# Patient Record
Sex: MALE | Race: WHITE | NOT HISPANIC OR LATINO
[De-identification: names, ages, dates, MRNs, and addresses within clinical notes are randomized per-mention and may not be internally consistent; named-entity substitution may affect disease eponyms.]

---

## 2021-01-01 ENCOUNTER — APPOINTMENT (OUTPATIENT)
Dept: CT IMAGING | Facility: CLINIC | Age: 76
End: 2021-01-01
Payer: MEDICARE

## 2021-01-01 ENCOUNTER — APPOINTMENT (OUTPATIENT)
Dept: RADIATION ONCOLOGY | Facility: CLINIC | Age: 76
End: 2021-01-01

## 2021-01-01 ENCOUNTER — NON-APPOINTMENT (OUTPATIENT)
Age: 76
End: 2021-01-01

## 2021-01-01 ENCOUNTER — APPOINTMENT (OUTPATIENT)
Dept: NUCLEAR MEDICINE | Facility: CLINIC | Age: 76
End: 2021-01-01
Payer: MEDICARE

## 2021-01-01 ENCOUNTER — RESULT REVIEW (OUTPATIENT)
Age: 76
End: 2021-01-01

## 2021-01-01 ENCOUNTER — EMERGENCY (EMERGENCY)
Facility: HOSPITAL | Age: 76
LOS: 1 days | Discharge: HOME | End: 2021-01-01
Attending: EMERGENCY MEDICINE | Admitting: EMERGENCY MEDICINE
Payer: MEDICARE

## 2021-01-01 ENCOUNTER — OUTPATIENT (OUTPATIENT)
Dept: OUTPATIENT SERVICES | Facility: HOSPITAL | Age: 76
LOS: 1 days | End: 2021-01-01
Payer: MEDICARE

## 2021-01-01 ENCOUNTER — APPOINTMENT (OUTPATIENT)
Dept: OTOLARYNGOLOGY | Facility: CLINIC | Age: 76
End: 2021-01-01
Payer: MEDICARE

## 2021-01-01 ENCOUNTER — APPOINTMENT (OUTPATIENT)
Dept: OTOLARYNGOLOGY | Facility: CLINIC | Age: 76
End: 2021-01-01

## 2021-01-01 ENCOUNTER — APPOINTMENT (OUTPATIENT)
Dept: ULTRASOUND IMAGING | Facility: IMAGING CENTER | Age: 76
End: 2021-01-01
Payer: MEDICARE

## 2021-01-01 VITALS
HEIGHT: 65 IN | WEIGHT: 149.91 LBS | TEMPERATURE: 97 F | OXYGEN SATURATION: 98 % | DIASTOLIC BLOOD PRESSURE: 74 MMHG | SYSTOLIC BLOOD PRESSURE: 132 MMHG | HEART RATE: 76 BPM | RESPIRATION RATE: 16 BRPM

## 2021-01-01 VITALS
HEART RATE: 81 BPM | OXYGEN SATURATION: 96 % | DIASTOLIC BLOOD PRESSURE: 84 MMHG | WEIGHT: 178.9 LBS | DIASTOLIC BLOOD PRESSURE: 92 MMHG | SYSTOLIC BLOOD PRESSURE: 148 MMHG | OXYGEN SATURATION: 95 % | RESPIRATION RATE: 17 BRPM | BODY MASS INDEX: 25.67 KG/M2 | RESPIRATION RATE: 15 BRPM | TEMPERATURE: 98 F | SYSTOLIC BLOOD PRESSURE: 142 MMHG | HEART RATE: 91 BPM | TEMPERATURE: 97.2 F | HEIGHT: 70 IN

## 2021-01-01 VITALS
WEIGHT: 160.06 LBS | DIASTOLIC BLOOD PRESSURE: 89 MMHG | HEIGHT: 64 IN | TEMPERATURE: 99 F | HEART RATE: 115 BPM | OXYGEN SATURATION: 94 % | RESPIRATION RATE: 15 BRPM | SYSTOLIC BLOOD PRESSURE: 149 MMHG

## 2021-01-01 VITALS
RESPIRATION RATE: 15 BRPM | DIASTOLIC BLOOD PRESSURE: 89 MMHG | SYSTOLIC BLOOD PRESSURE: 154 MMHG | OXYGEN SATURATION: 96 % | TEMPERATURE: 99 F | HEART RATE: 98 BPM

## 2021-01-01 VITALS
SYSTOLIC BLOOD PRESSURE: 136 MMHG | OXYGEN SATURATION: 96 % | HEART RATE: 106 BPM | WEIGHT: 173.17 LBS | BODY MASS INDEX: 24.85 KG/M2 | TEMPERATURE: 96.8 F | RESPIRATION RATE: 17 BRPM | DIASTOLIC BLOOD PRESSURE: 84 MMHG

## 2021-01-01 VITALS
BODY MASS INDEX: 21.47 KG/M2 | TEMPERATURE: 97.5 F | SYSTOLIC BLOOD PRESSURE: 172 MMHG | HEIGHT: 70 IN | WEIGHT: 150 LBS | DIASTOLIC BLOOD PRESSURE: 117 MMHG

## 2021-01-01 VITALS
HEART RATE: 91 BPM | HEIGHT: 70 IN | DIASTOLIC BLOOD PRESSURE: 85 MMHG | WEIGHT: 160 LBS | SYSTOLIC BLOOD PRESSURE: 128 MMHG | BODY MASS INDEX: 22.9 KG/M2

## 2021-01-01 VITALS — HEIGHT: 70 IN | BODY MASS INDEX: 21.47 KG/M2 | WEIGHT: 150 LBS

## 2021-01-01 VITALS — RESPIRATION RATE: 18 BRPM | HEIGHT: 70 IN | BODY MASS INDEX: 25.77 KG/M2 | WEIGHT: 180 LBS

## 2021-01-01 VITALS — BODY MASS INDEX: 25.15 KG/M2 | WEIGHT: 175.26 LBS

## 2021-01-01 VITALS — SYSTOLIC BLOOD PRESSURE: 135 MMHG | HEART RATE: 99 BPM | DIASTOLIC BLOOD PRESSURE: 78 MMHG

## 2021-01-01 DIAGNOSIS — C02.9 MALIGNANT NEOPLASM OF TONGUE, UNSPECIFIED: ICD-10-CM

## 2021-01-01 DIAGNOSIS — C61 MALIGNANT NEOPLASM OF PROSTATE: Chronic | ICD-10-CM

## 2021-01-01 DIAGNOSIS — I10 ESSENTIAL (PRIMARY) HYPERTENSION: ICD-10-CM

## 2021-01-01 DIAGNOSIS — R59.0 LOCALIZED ENLARGED LYMPH NODES: ICD-10-CM

## 2021-01-01 DIAGNOSIS — K21.9 GASTRO-ESOPHAGEAL REFLUX DISEASE WITHOUT ESOPHAGITIS: ICD-10-CM

## 2021-01-01 DIAGNOSIS — Z98.52 VASECTOMY STATUS: Chronic | ICD-10-CM

## 2021-01-01 DIAGNOSIS — Z98.890 OTHER SPECIFIED POSTPROCEDURAL STATES: Chronic | ICD-10-CM

## 2021-01-01 DIAGNOSIS — G20 PARKINSON'S DISEASE: ICD-10-CM

## 2021-01-01 DIAGNOSIS — G47.33 OBSTRUCTIVE SLEEP APNEA (ADULT) (PEDIATRIC): ICD-10-CM

## 2021-01-01 DIAGNOSIS — C01 MALIGNANT NEOPLASM OF BASE OF TONGUE: ICD-10-CM

## 2021-01-01 LAB
ALBUMIN SERPL ELPH-MCNC: 4 G/DL — SIGNIFICANT CHANGE UP (ref 3.3–5)
ALP SERPL-CCNC: 65 U/L — SIGNIFICANT CHANGE UP (ref 40–120)
ALT FLD-CCNC: 30 U/L — SIGNIFICANT CHANGE UP (ref 12–78)
ANION GAP SERPL CALC-SCNC: 13 MMOL/L — SIGNIFICANT CHANGE UP (ref 7–14)
ANION GAP SERPL CALC-SCNC: 7 MMOL/L — SIGNIFICANT CHANGE UP (ref 5–17)
AST SERPL-CCNC: 17 U/L — SIGNIFICANT CHANGE UP (ref 15–37)
BASOPHILS # BLD AUTO: 0.05 K/UL — SIGNIFICANT CHANGE UP (ref 0–0.2)
BASOPHILS NFR BLD AUTO: 0.6 % — SIGNIFICANT CHANGE UP (ref 0–2)
BILIRUB SERPL-MCNC: 0.9 MG/DL — SIGNIFICANT CHANGE UP (ref 0.2–1.2)
BLD GP AB SCN SERPL QL: NEGATIVE — SIGNIFICANT CHANGE UP
BUN SERPL-MCNC: 16 MG/DL — SIGNIFICANT CHANGE UP (ref 7–23)
BUN SERPL-MCNC: 19 MG/DL — SIGNIFICANT CHANGE UP (ref 7–23)
CALCIUM SERPL-MCNC: 9.9 MG/DL — SIGNIFICANT CHANGE UP (ref 8.4–10.5)
CALCIUM SERPL-MCNC: 9.9 MG/DL — SIGNIFICANT CHANGE UP (ref 8.5–10.1)
CHLORIDE SERPL-SCNC: 106 MMOL/L — SIGNIFICANT CHANGE UP (ref 96–108)
CHLORIDE SERPL-SCNC: 99 MMOL/L — SIGNIFICANT CHANGE UP (ref 98–107)
CO2 SERPL-SCNC: 26 MMOL/L — SIGNIFICANT CHANGE UP (ref 22–31)
CO2 SERPL-SCNC: 26 MMOL/L — SIGNIFICANT CHANGE UP (ref 22–31)
CREAT SERPL-MCNC: 0.74 MG/DL — SIGNIFICANT CHANGE UP (ref 0.5–1.3)
CREAT SERPL-MCNC: 0.83 MG/DL — SIGNIFICANT CHANGE UP (ref 0.5–1.3)
EOSINOPHIL # BLD AUTO: 0.04 K/UL — SIGNIFICANT CHANGE UP (ref 0–0.5)
EOSINOPHIL NFR BLD AUTO: 0.5 % — SIGNIFICANT CHANGE UP (ref 0–6)
GLUCOSE SERPL-MCNC: 125 MG/DL — HIGH (ref 70–99)
GLUCOSE SERPL-MCNC: 96 MG/DL — SIGNIFICANT CHANGE UP (ref 70–99)
HCT VFR BLD CALC: 36.6 % — LOW (ref 39–50)
HCT VFR BLD CALC: 46 % — SIGNIFICANT CHANGE UP (ref 39–50)
HGB BLD-MCNC: 12.7 G/DL — LOW (ref 13–17)
HGB BLD-MCNC: 16.2 G/DL — SIGNIFICANT CHANGE UP (ref 13–17)
IMM GRANULOCYTES NFR BLD AUTO: 0.5 % — SIGNIFICANT CHANGE UP (ref 0–1.5)
LYMPHOCYTES # BLD AUTO: 0.42 K/UL — LOW (ref 1–3.3)
LYMPHOCYTES # BLD AUTO: 5.1 % — LOW (ref 13–44)
MCHC RBC-ENTMCNC: 33.8 PG — SIGNIFICANT CHANGE UP (ref 27–34)
MCHC RBC-ENTMCNC: 34.7 GM/DL — SIGNIFICANT CHANGE UP (ref 32–36)
MCHC RBC-ENTMCNC: 35.2 GM/DL — SIGNIFICANT CHANGE UP (ref 32–36)
MCHC RBC-ENTMCNC: 35.6 PG — HIGH (ref 27–34)
MCV RBC AUTO: 102.5 FL — HIGH (ref 80–100)
MCV RBC AUTO: 96 FL — SIGNIFICANT CHANGE UP (ref 80–100)
MONOCYTES # BLD AUTO: 0.98 K/UL — HIGH (ref 0–0.9)
MONOCYTES NFR BLD AUTO: 11.9 % — SIGNIFICANT CHANGE UP (ref 2–14)
NEUTROPHILS # BLD AUTO: 6.71 K/UL — SIGNIFICANT CHANGE UP (ref 1.8–7.4)
NEUTROPHILS NFR BLD AUTO: 81.4 % — HIGH (ref 43–77)
NRBC # BLD: 0 /100 WBCS — SIGNIFICANT CHANGE UP
NRBC # BLD: 0 /100 WBCS — SIGNIFICANT CHANGE UP (ref 0–0)
NRBC # FLD: 0 K/UL — SIGNIFICANT CHANGE UP
PLATELET # BLD AUTO: 241 K/UL — SIGNIFICANT CHANGE UP (ref 150–400)
PLATELET # BLD AUTO: 268 K/UL — SIGNIFICANT CHANGE UP (ref 150–400)
POTASSIUM SERPL-MCNC: 3.9 MMOL/L — SIGNIFICANT CHANGE UP (ref 3.5–5.3)
POTASSIUM SERPL-MCNC: 4.2 MMOL/L — SIGNIFICANT CHANGE UP (ref 3.5–5.3)
POTASSIUM SERPL-SCNC: 3.9 MMOL/L — SIGNIFICANT CHANGE UP (ref 3.5–5.3)
POTASSIUM SERPL-SCNC: 4.2 MMOL/L — SIGNIFICANT CHANGE UP (ref 3.5–5.3)
PROT SERPL-MCNC: 7.9 G/DL — SIGNIFICANT CHANGE UP (ref 6–8.3)
RBC # BLD: 3.57 M/UL — LOW (ref 4.2–5.8)
RBC # BLD: 4.79 M/UL — SIGNIFICANT CHANGE UP (ref 4.2–5.8)
RBC # FLD: 13.1 % — SIGNIFICANT CHANGE UP (ref 10.3–14.5)
RBC # FLD: 14.1 % — SIGNIFICANT CHANGE UP (ref 10.3–14.5)
RH IG SCN BLD-IMP: POSITIVE — SIGNIFICANT CHANGE UP
SODIUM SERPL-SCNC: 138 MMOL/L — SIGNIFICANT CHANGE UP (ref 135–145)
SODIUM SERPL-SCNC: 139 MMOL/L — SIGNIFICANT CHANGE UP (ref 135–145)
TROPONIN I SERPL-MCNC: <.015 NG/ML — SIGNIFICANT CHANGE UP (ref 0.01–0.04)
WBC # BLD: 5.18 K/UL — SIGNIFICANT CHANGE UP (ref 3.8–10.5)
WBC # BLD: 8.24 K/UL — SIGNIFICANT CHANGE UP (ref 3.8–10.5)
WBC # FLD AUTO: 5.18 K/UL — SIGNIFICANT CHANGE UP (ref 3.8–10.5)
WBC # FLD AUTO: 8.24 K/UL — SIGNIFICANT CHANGE UP (ref 3.8–10.5)

## 2021-01-01 PROCEDURE — 99214 OFFICE O/P EST MOD 30 MIN: CPT | Mod: 25

## 2021-01-01 PROCEDURE — 78815 PET IMAGE W/CT SKULL-THIGH: CPT | Mod: 26,PS,MH

## 2021-01-01 PROCEDURE — 77014: CPT | Mod: 26

## 2021-01-01 PROCEDURE — 77387B: CUSTOM | Mod: 26

## 2021-01-01 PROCEDURE — 99215 OFFICE O/P EST HI 40 MIN: CPT

## 2021-01-01 PROCEDURE — 93010 ELECTROCARDIOGRAM REPORT: CPT

## 2021-01-01 PROCEDURE — 78815 PET IMAGE W/CT SKULL-THIGH: CPT

## 2021-01-01 PROCEDURE — 80053 COMPREHEN METABOLIC PANEL: CPT

## 2021-01-01 PROCEDURE — 31575 DIAGNOSTIC LARYNGOSCOPY: CPT

## 2021-01-01 PROCEDURE — 88173 CYTOPATH EVAL FNA REPORT: CPT | Mod: 26

## 2021-01-01 PROCEDURE — 10005 FNA BX W/US GDN 1ST LES: CPT

## 2021-01-01 PROCEDURE — 99284 EMERGENCY DEPT VISIT MOD MDM: CPT

## 2021-01-01 PROCEDURE — 77427 RADIATION TX MANAGEMENT X5: CPT

## 2021-01-01 PROCEDURE — 88173 CYTOPATH EVAL FNA REPORT: CPT

## 2021-01-01 PROCEDURE — A9552: CPT

## 2021-01-01 PROCEDURE — 70491 CT SOFT TISSUE NECK W/DYE: CPT | Mod: 26,MH

## 2021-01-01 PROCEDURE — 88172 CYTP DX EVAL FNA 1ST EA SITE: CPT

## 2021-01-01 PROCEDURE — 88305 TISSUE EXAM BY PATHOLOGIST: CPT | Mod: 26

## 2021-01-01 PROCEDURE — 93005 ELECTROCARDIOGRAM TRACING: CPT

## 2021-01-01 PROCEDURE — 85025 COMPLETE CBC W/AUTO DIFF WBC: CPT

## 2021-01-01 PROCEDURE — 84484 ASSAY OF TROPONIN QUANT: CPT

## 2021-01-01 PROCEDURE — 82565 ASSAY OF CREATININE: CPT

## 2021-01-01 PROCEDURE — 88305 TISSUE EXAM BY PATHOLOGIST: CPT

## 2021-01-01 PROCEDURE — 99284 EMERGENCY DEPT VISIT MOD MDM: CPT | Mod: 25

## 2021-01-01 PROCEDURE — 70491 CT SOFT TISSUE NECK W/DYE: CPT

## 2021-01-01 PROCEDURE — 70450 CT HEAD/BRAIN W/O DYE: CPT | Mod: MA

## 2021-01-01 PROCEDURE — 70450 CT HEAD/BRAIN W/O DYE: CPT | Mod: 26,MA

## 2021-01-01 PROCEDURE — 36415 COLL VENOUS BLD VENIPUNCTURE: CPT

## 2021-01-01 RX ORDER — GLUCOSAMINE HCL/CHONDROITIN SU 500-400 MG
2 CAPSULE ORAL
Qty: 0 | Refills: 0 | DISCHARGE

## 2021-01-01 RX ORDER — PRIMIDONE 250 MG/1
1 TABLET ORAL
Qty: 0 | Refills: 0 | DISCHARGE

## 2021-01-01 RX ORDER — PANTOPRAZOLE SODIUM 20 MG/1
1 TABLET, DELAYED RELEASE ORAL
Qty: 0 | Refills: 0 | DISCHARGE

## 2021-01-01 RX ORDER — SIMVASTATIN 20 MG/1
1 TABLET, FILM COATED ORAL
Qty: 0 | Refills: 0 | DISCHARGE

## 2021-01-01 RX ORDER — SODIUM CHLORIDE 9 MG/ML
1000 INJECTION, SOLUTION INTRAVENOUS
Refills: 0 | Status: DISCONTINUED | OUTPATIENT
Start: 2022-01-01 | End: 2022-01-01

## 2021-01-01 RX ORDER — CARBIDOPA AND LEVODOPA 25; 100 MG/1; MG/1
1 TABLET ORAL
Qty: 0 | Refills: 0 | DISCHARGE

## 2021-01-01 RX ORDER — CITALOPRAM 10 MG/1
1 TABLET, FILM COATED ORAL
Qty: 0 | Refills: 0 | DISCHARGE

## 2021-01-01 RX ORDER — DILTIAZEM HCL 120 MG
1 CAPSULE, EXT RELEASE 24 HR ORAL
Qty: 0 | Refills: 0 | DISCHARGE

## 2021-01-29 PROBLEM — Z00.00 ENCOUNTER FOR PREVENTIVE HEALTH EXAMINATION: Status: ACTIVE | Noted: 2021-01-29

## 2021-02-05 ENCOUNTER — APPOINTMENT (OUTPATIENT)
Dept: OTOLARYNGOLOGY | Facility: CLINIC | Age: 76
End: 2021-02-05
Payer: MEDICARE

## 2021-02-05 VITALS
DIASTOLIC BLOOD PRESSURE: 84 MMHG | HEART RATE: 94 BPM | HEIGHT: 70 IN | BODY MASS INDEX: 29.2 KG/M2 | SYSTOLIC BLOOD PRESSURE: 128 MMHG | WEIGHT: 204 LBS

## 2021-02-05 DIAGNOSIS — Z78.9 OTHER SPECIFIED HEALTH STATUS: ICD-10-CM

## 2021-02-05 PROCEDURE — 99204 OFFICE O/P NEW MOD 45 MIN: CPT

## 2021-02-05 PROCEDURE — 31575 DIAGNOSTIC LARYNGOSCOPY: CPT

## 2021-02-05 RX ORDER — SIMVASTATIN 80 MG/1
TABLET, FILM COATED ORAL
Refills: 0 | Status: ACTIVE | COMMUNITY

## 2021-02-05 RX ORDER — PREDNISONE 50 MG/1
50 TABLET ORAL
Refills: 0 | Status: ACTIVE | COMMUNITY

## 2021-02-05 RX ORDER — DILTIAZEM HYDROCHLORIDE 90 MG/1
TABLET ORAL
Refills: 0 | Status: ACTIVE | COMMUNITY

## 2021-02-05 RX ORDER — CITALOPRAM HYDROBROMIDE 10 MG/1
TABLET, FILM COATED ORAL
Refills: 0 | Status: ACTIVE | COMMUNITY

## 2021-02-05 RX ORDER — CHROMIUM 200 MCG
TABLET ORAL
Refills: 0 | Status: ACTIVE | COMMUNITY

## 2021-02-05 RX ORDER — PANTOPRAZOLE 40 MG/1
TABLET, DELAYED RELEASE ORAL
Refills: 0 | Status: ACTIVE | COMMUNITY

## 2021-02-05 RX ORDER — MULTIVIT-MIN/FA/LYCOPEN/LUTEIN .4-300-25
TABLET ORAL
Refills: 0 | Status: ACTIVE | COMMUNITY

## 2021-02-05 RX ORDER — ASCORBIC ACID 500 MG
TABLET ORAL
Refills: 0 | Status: ACTIVE | COMMUNITY

## 2021-02-05 RX ORDER — CARBIDOPA 25 MG/1
TABLET ORAL
Refills: 0 | Status: ACTIVE | COMMUNITY

## 2021-02-05 RX ORDER — ENALAPRIL MALEATE 10 MG/1
10 TABLET ORAL
Refills: 0 | Status: ACTIVE | COMMUNITY

## 2021-02-05 NOTE — REASON FOR VISIT
[Initial Consultation] : an initial consultation for [FreeTextEntry2] : referred by Dr. Ariel Cottrell, oral surgeon for SCCa.

## 2021-02-05 NOTE — CONSULT LETTER
[Dear  ___] : Dear ~ANKIT, [Consult Letter:] : I had the pleasure of evaluating your patient, [unfilled]. [Please see my note below.] : Please see my note below. [Consult Closing:] : Thank you very much for allowing me to participate in the care of this patient.  If you have any questions, please do not hesitate to contact me. [Sincerely,] : Sincerely, [FreeTextEntry2] : Dr. Minh Cottrell\par [address], \[place] [address] [FreeTextEntry3] : Dev

## 2021-02-05 NOTE — PHYSICAL EXAM
[Laryngoscopy Performed] : laryngoscopy was performed, see procedure section for findings [FreeTextEntry1] : Ulcerated lesion approx. 1.5 cm along the L. ventral tongue.  No other lesions.  +TTP. [Normal] : no rashes

## 2021-02-05 NOTE — HISTORY OF PRESENT ILLNESS
[de-identified] : 75 year male with Parkinson's referred by Dr. Ariel Cottrell, oral surgeon for SCCa. 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 Tumor extends into striated muscle. States noticed lesion on under tongue 12/2020, stable in size. Reports pain only when he eats/drink anything acidic. Denies fevers, chills, night sweats, weight loss.

## 2021-02-05 NOTE — PROCEDURE
[Gag Reflex] : gag reflex preventing mirror examination [None] : none [Flexible Endoscope] : examined with the flexible endoscope [Serial Number: ___] : Serial Number: [unfilled] [de-identified] : No lesions in the NPx, OPx, HPx or larynx.  VC are mobile, airway patent.\par

## 2021-02-12 ENCOUNTER — OUTPATIENT (OUTPATIENT)
Dept: OUTPATIENT SERVICES | Facility: HOSPITAL | Age: 76
LOS: 1 days | End: 2021-02-12
Payer: MEDICARE

## 2021-02-12 ENCOUNTER — APPOINTMENT (OUTPATIENT)
Dept: NUCLEAR MEDICINE | Facility: CLINIC | Age: 76
End: 2021-02-12
Payer: MEDICARE

## 2021-02-12 ENCOUNTER — APPOINTMENT (OUTPATIENT)
Dept: CT IMAGING | Facility: CLINIC | Age: 76
End: 2021-02-12
Payer: MEDICARE

## 2021-02-12 ENCOUNTER — APPOINTMENT (OUTPATIENT)
Dept: NUCLEAR MEDICINE | Facility: CLINIC | Age: 76
End: 2021-02-12

## 2021-02-12 DIAGNOSIS — C02.9 MALIGNANT NEOPLASM OF TONGUE, UNSPECIFIED: ICD-10-CM

## 2021-02-12 PROCEDURE — 70491 CT SOFT TISSUE NECK W/DYE: CPT | Mod: 26,MG

## 2021-02-12 PROCEDURE — 78815 PET IMAGE W/CT SKULL-THIGH: CPT

## 2021-02-12 PROCEDURE — G1004: CPT

## 2021-02-12 PROCEDURE — 78815 PET IMAGE W/CT SKULL-THIGH: CPT | Mod: 26,PI,MG

## 2021-02-12 PROCEDURE — A9552: CPT

## 2021-02-12 PROCEDURE — 70491 CT SOFT TISSUE NECK W/DYE: CPT

## 2021-02-16 ENCOUNTER — APPOINTMENT (OUTPATIENT)
Dept: OTOLARYNGOLOGY | Facility: CLINIC | Age: 76
End: 2021-02-16
Payer: MEDICARE

## 2021-02-16 VITALS
HEART RATE: 84 BPM | HEIGHT: 70 IN | DIASTOLIC BLOOD PRESSURE: 88 MMHG | RESPIRATION RATE: 18 BRPM | TEMPERATURE: 97 F | BODY MASS INDEX: 29.2 KG/M2 | SYSTOLIC BLOOD PRESSURE: 144 MMHG | WEIGHT: 204 LBS

## 2021-02-16 DIAGNOSIS — Z09 ENCOUNTER FOR FOLLOW-UP EXAMINATION AFTER COMPLETED TREATMENT FOR CONDITIONS OTHER THAN MALIGNANT NEOPLASM: ICD-10-CM

## 2021-02-16 PROCEDURE — 99215 OFFICE O/P EST HI 40 MIN: CPT

## 2021-02-16 NOTE — PHYSICAL EXAM
[Normal] : no rashes [FreeTextEntry1] : Ulcerated lesion approx. 1.5 cm along the L. ventral tongue.  No other lesions.  +TTP.

## 2021-02-16 NOTE — REASON FOR VISIT
[Subsequent Evaluation] : a subsequent evaluation for [Other: _____] : [unfilled] [FreeTextEntry2] : follow up left ventral tongue SCCa

## 2021-02-16 NOTE — CONSULT LETTER
[Dear  ___] : Dear ~ANKIT, [Courtesy Letter:] : I had the pleasure of seeing your patient, [unfilled], in my office today. [Consult Closing:] : Thank you very much for allowing me to participate in the care of this patient.  If you have any questions, please do not hesitate to contact me. [Please see my note below.] : Please see my note below. [Sincerely,] : Sincerely, [FreeTextEntry2] : Dr. Minh Cottrell\par [address], \[place] [address]  [FreeTextEntry3] : Dev\par \par Jayden Boland MD FACS\par Division of Head and Neck Surgery\par Department of Otolaryngology \par [facility]\par \par  of Otolaryngology\par Assistant Professor of Surgery\par [facility] [employer] [facility] [DrJesus  ___] : Dr. PATEL

## 2021-02-16 NOTE — HISTORY OF PRESENT ILLNESS
[de-identified] : 75 year old male presents for follow up with Left ventral tongue SCCa; well to moderately differentiated, tumor extends into striated muscle. Pt had recent  neck CT and Pet Ct February 12, 2021 here to discuss results.   Denies any dyspnea, dysphagia, otalgia or weight loss.  He denies any bleeding.

## 2021-02-24 ENCOUNTER — APPOINTMENT (OUTPATIENT)
Dept: OTOLARYNGOLOGY | Facility: CLINIC | Age: 76
End: 2021-02-24
Payer: MEDICARE

## 2021-02-24 PROCEDURE — 92610 EVALUATE SWALLOWING FUNCTION: CPT | Mod: GN

## 2021-03-03 NOTE — ASSESSMENT
[FreeTextEntry1] : CLINICAL DYSPHAGIA EVALUATION\par  \par Date of Report:  2021\par Date of Evaluation:  2021\par Patient Name: Mandeep Naidu\par Patient : 1945          CA: 75 years old\par Primary Diagnosis: Ventral tongue SCCa\par Treatment Diagnosis: Oral-Pharyngeal dysphagia \par Referring Physician: Dr. Jayden Boland\par Date of Onset: 2021\par  \par IMPRESSIONS: Oral-pharyngeal swallow function is WFL at this time\par \par RECOMMENDATIONS:\par 1) The patient was advised to continue consumption of a regular solid with thin liquid diet, as tolerated.\par 2) The patient was advised to allow for swallow between intakes, alternate food with liquid, crush medication (when feasible), and to consume small bites/sips at a decreased rate of consumption. \par 3) The need for swallow therapy will be based upon the patient’s surgical outcomes.\par 4) Follow up with physician as directed.\par  \par REASON FOR REFERRAL\par Mandeep Naidu is a 75 year old male who was seen for a pre-operative comprehensive Clinical Dysphagia Evaluation upon the referral of his MD, Dr. Boland, to rule out aspiration, assess for diet texture change as appropriate, explore positional strategies, and/or compensatory techniques as necessary. The physician ordered this evaluation to ensure that the patient meets his nutrition/hydration needs by mouth without compromising respiratory status.  \par  \par HISTORY OF PRESENTING ILLNESS:  Mr. Naidu arrived to today’s evaluation for education regarding patient’s plan of care and a baseline clinical dysphagia evaluation. The patient was found to have “cT1/2 N0 M0 L. ventral tongue SCCa.” Upon clinician questioning, the patient is denying odynophagia or change in breathing pattern during or following meals. He is further denying any unintentional weight loss or recent or recurrent pneumonia at this time.\par \par CURRENT NUTRITIONAL INTAKE\par Solids:  The patient is currently consuming a regular solid diet at this time. \par Liquids:  The patient is currently consuming thin liquids at this time.  \par \par MEDICAL HISTORY\par As per chart review and patient’s report, Mr. Naidu’s medical and surgical history are significant of:\par \par Active Problems\par Tongue cancer (141.9) (C02.9)\par Parkinson’s Disease\par \par CLINICAL FINDINGS\par Oral Peripheral Assessment:\par Structures:    WFL\par Symmetry:    WFL                 \par Dentition:     Complete upper and lower dentition \par Soft Palate:   WFL\par Secretions:  Patient managed his secretions without difficulty throughout today’s assessment.\par Cognition/Communication: WFL\par Voice: WFL\par  \par Functions: Assessment of the labio-lingual musculature revealed labio-lingual strength and ROM to be WFL. \par  \par Hyolaryngeal Elevation/Excursion: Hyolaryngeal excursion was judged to be adequate and complete via digital palpation.\par \par Volitional Cough/Throat Clear: Upon clinician verbal instruction, the patient elicited an effective volitional cough.\par  \par Volitional Swallow:  Upon clinician verbal instruction, the patient demonstrated a timely pharyngeal swallow trigger and adequate hyolaryngeal excursion and elevation.\par \par Consistencies Administered: \par Solids:  _X__ Regular   __  Mechanical Soft   _X_Puree\par Liquids:  __X_ Thin   __  Nectar Thick   ___Honey Thick\par _X_	via single sips             _X_ via consecutive sips\par  \par Oral Stage: \par The patient demonstrated functional oral management of puree, regular solid, and thin liquid textures marked by adequate bolus collection, transfer, and posterior transport without any evidence of anterior/lateral loss or buccal pocketing noted.\par  \par Pharyngeal Stage:\par The patient demonstrated a functional pharyngeal phase of the swallow for puree, regular solid, and thin liquid textures marked by a timely pharyngeal swallow trigger with adequate hyolaryngeal elevation upon digital palpation without any evidence of airway penetration.\par   \par PROGNOSIS: Good for recommended diet\par  \par EDUCATION: Verbal and written educational information and instruction was provided in regard to optimal eating strategies (i.e. small bites/sips, decreased rate of consumption) to the patient.\par \par Per chart review and most recent note from Dr. Boland regarding the patient’s plan of care, “Standard of care would be surgical ablation with neck dissection and appropriate reconstruction followed by adjuvant (C)XRT based on pathologic findings. The rationale behind this was reviewed along with the expected risks and benefits of the procedure and the anticipated perioperative course with emphasis on the risks of bleeding, infection, chyle leak, failure of reconstruction requiring significant revision surgery and the possibility of tracheostomy and gastrostomy tube placements.  Given the anticipated defect I think he would be a good candidate for reconstruction with a FAMM flap.  However, I have also discussed with him the option of RFFF if necessary based on findings on frozen section regarding the margins.  We also discussed the alternative of primary nonsurgical treatment and its limitations. All questions answered.   He understands all this and would like to proceed.  He will obtain clearance from Dr. Felix and we will plan for the procedure in the very near future.” \par \par Upon questioning, patient reported that he is not having difficulty swallowing and /or voicing, currently taking regular diet with thin liquids. Education provided re: potential changes in voice and swallowing function following surgical intervention - patient advised on rehabilitation process during in- patient admission as well as following discharge. Patient verbalized full understanding. All questions answered and contact information provided. \par \par Should you have any additional concerns, please contact the Center at [phone].\par \par Hailey Sands M.S. CCC-SLP\par Senior Speech-Language Pathologist\par [facility]	\par

## 2021-03-04 ENCOUNTER — OUTPATIENT (OUTPATIENT)
Dept: OUTPATIENT SERVICES | Facility: HOSPITAL | Age: 76
LOS: 1 days | End: 2021-03-04
Payer: MEDICARE

## 2021-03-04 VITALS
TEMPERATURE: 98 F | SYSTOLIC BLOOD PRESSURE: 122 MMHG | HEIGHT: 64 IN | HEART RATE: 93 BPM | WEIGHT: 199.96 LBS | DIASTOLIC BLOOD PRESSURE: 80 MMHG | OXYGEN SATURATION: 98 % | RESPIRATION RATE: 16 BRPM

## 2021-03-04 DIAGNOSIS — E66.9 OBESITY, UNSPECIFIED: ICD-10-CM

## 2021-03-04 DIAGNOSIS — C61 MALIGNANT NEOPLASM OF PROSTATE: Chronic | ICD-10-CM

## 2021-03-04 DIAGNOSIS — Z01.812 ENCOUNTER FOR PREPROCEDURAL LABORATORY EXAMINATION: ICD-10-CM

## 2021-03-04 DIAGNOSIS — Z98.52 VASECTOMY STATUS: Chronic | ICD-10-CM

## 2021-03-04 DIAGNOSIS — I10 ESSENTIAL (PRIMARY) HYPERTENSION: ICD-10-CM

## 2021-03-04 DIAGNOSIS — C02.9 MALIGNANT NEOPLASM OF TONGUE, UNSPECIFIED: ICD-10-CM

## 2021-03-04 DIAGNOSIS — G20 PARKINSON'S DISEASE: ICD-10-CM

## 2021-03-04 DIAGNOSIS — R06.00 DYSPNEA, UNSPECIFIED: ICD-10-CM

## 2021-03-04 LAB
ALBUMIN SERPL ELPH-MCNC: 4.7 G/DL — SIGNIFICANT CHANGE UP (ref 3.3–5)
ALP SERPL-CCNC: 64 U/L — SIGNIFICANT CHANGE UP (ref 40–120)
ALT FLD-CCNC: 28 U/L — SIGNIFICANT CHANGE UP (ref 4–41)
ANION GAP SERPL CALC-SCNC: 13 MMOL/L — SIGNIFICANT CHANGE UP (ref 7–14)
AST SERPL-CCNC: 28 U/L — SIGNIFICANT CHANGE UP (ref 4–40)
BILIRUB SERPL-MCNC: 0.4 MG/DL — SIGNIFICANT CHANGE UP (ref 0.2–1.2)
BLD GP AB SCN SERPL QL: NEGATIVE — SIGNIFICANT CHANGE UP
BUN SERPL-MCNC: 22 MG/DL — SIGNIFICANT CHANGE UP (ref 7–23)
CALCIUM SERPL-MCNC: 10.3 MG/DL — SIGNIFICANT CHANGE UP (ref 8.4–10.5)
CHLORIDE SERPL-SCNC: 100 MMOL/L — SIGNIFICANT CHANGE UP (ref 98–107)
CO2 SERPL-SCNC: 26 MMOL/L — SIGNIFICANT CHANGE UP (ref 22–31)
CREAT SERPL-MCNC: 1.05 MG/DL — SIGNIFICANT CHANGE UP (ref 0.5–1.3)
GLUCOSE SERPL-MCNC: 144 MG/DL — HIGH (ref 70–99)
HCT VFR BLD CALC: 49.9 % — SIGNIFICANT CHANGE UP (ref 39–50)
HGB BLD-MCNC: 17.1 G/DL — HIGH (ref 13–17)
MCHC RBC-ENTMCNC: 32.3 PG — SIGNIFICANT CHANGE UP (ref 27–34)
MCHC RBC-ENTMCNC: 34.3 GM/DL — SIGNIFICANT CHANGE UP (ref 32–36)
MCV RBC AUTO: 94.2 FL — SIGNIFICANT CHANGE UP (ref 80–100)
NRBC # BLD: 0 /100 WBCS — SIGNIFICANT CHANGE UP
NRBC # FLD: 0 K/UL — SIGNIFICANT CHANGE UP
PLATELET # BLD AUTO: 260 K/UL — SIGNIFICANT CHANGE UP (ref 150–400)
POTASSIUM SERPL-MCNC: 4.4 MMOL/L — SIGNIFICANT CHANGE UP (ref 3.5–5.3)
POTASSIUM SERPL-SCNC: 4.4 MMOL/L — SIGNIFICANT CHANGE UP (ref 3.5–5.3)
PROT SERPL-MCNC: 7.8 G/DL — SIGNIFICANT CHANGE UP (ref 6–8.3)
RBC # BLD: 5.3 M/UL — SIGNIFICANT CHANGE UP (ref 4.2–5.8)
RBC # FLD: 13.5 % — SIGNIFICANT CHANGE UP (ref 10.3–14.5)
RH IG SCN BLD-IMP: POSITIVE — SIGNIFICANT CHANGE UP
SODIUM SERPL-SCNC: 139 MMOL/L — SIGNIFICANT CHANGE UP (ref 135–145)
WBC # BLD: 7.51 K/UL — SIGNIFICANT CHANGE UP (ref 3.8–10.5)
WBC # FLD AUTO: 7.51 K/UL — SIGNIFICANT CHANGE UP (ref 3.8–10.5)

## 2021-03-04 PROCEDURE — 93010 ELECTROCARDIOGRAM REPORT: CPT

## 2021-03-04 RX ORDER — SODIUM CHLORIDE 9 MG/ML
1000 INJECTION, SOLUTION INTRAVENOUS
Refills: 0 | Status: DISCONTINUED | OUTPATIENT
Start: 2021-03-12 | End: 2021-03-13

## 2021-03-04 NOTE — H&P PST ADULT - NSANTHOSAYNRD_GEN_A_CORE
No. SUZANNE screening performed.  STOP BANG Legend: 0-2 = LOW Risk; 3-4 = INTERMEDIATE Risk; 5-8 = HIGH Risk

## 2021-03-04 NOTE — H&P PST ADULT - NEGATIVE NEUROLOGICAL SYMPTOMS
no weakness/no paresthesias/no generalized seizures/no focal seizures/no syncope/no tremors/no vertigo/no loss of sensation

## 2021-03-04 NOTE — H&P PST ADULT - NSICDXPASTMEDICALHX_GEN_ALL_CORE_FT
PAST MEDICAL HISTORY:  GERD (gastroesophageal reflux disease)     Hyperlipidemia     Hypertension     Malignant neoplasm of tongue, unspecified     Mitral valve prolapse     Parkinson's disease     Prostate cancer

## 2021-03-04 NOTE — H&P PST ADULT - NSICDXPROBLEM_GEN_ALL_CORE_FT
PROBLEM DIAGNOSES  Problem: Malignant neoplasm of tongue, unspecified  Assessment and Plan: Patient scheduled for left partial glossectomy and floor of mouth resection left neck dissection repair with facial artery myomucosal flap on 3/12/2021  Written & verbal preop instructions, gi prophylaxis patient to take own & surgical soap given  Pt verbalized good understanding.  Teach back done on surgical soap instructions.   Requesting preop medical evaluation with PCP, pending copy of report    Problem: Class 1 obesity with body mass index (BMI) of 34.0 to 34.9 in adult  Assessment and Plan: SUZANNE precaution, OR booking notified    Problem: Dyspnea on exertion  Assessment and Plan: Patient reports dyspnea wit activities, EKG done in chart, requesting preop Cardiology evaluation, pending copy of report    Problem: Hypertension  Assessment and Plan: Patient instructed to take  Enalapril and Diltiazemon as per routine schedule    Problem: Parkinson's disease  Assessment and Plan: Patient instructed to take Carbidopa Levodopa on AM of surgery    Problem: Encounter for preprocedure screening laboratory testing for COVID-19  Assessment and Plan: Patient aware of need for COVID testing prior to procedure and advised to co ordinate with surgeon.

## 2021-03-04 NOTE — H&P PST ADULT - HISTORY OF PRESENT ILLNESS
76 yo male presents to [facility] for preop evaluation for left partial glossectomy and floor of mouth resection left neck dissection repair with facial artery myomucosal flap.  Patient reports hypoglossal lesion on left side since December 2020 that he initially thought was a canker sore.  Patient was seen by his dentist then by oral surgeon s/p biopsy and diagnosed with malignant neoplasm of tongue, unspecified. 76 yo male presents to [facility] for preop evaluation for left partial glossectomy and floor of mouth resection left neck dissection repair with facial artery myomucosal flap.  Patient reports sublingual lesion on left side since December 2020 that he initially thought was a canker sore.  Patient was seen by his dentist then by oral surgeon s/p biopsy and diagnosed with malignant neoplasm of tongue, unspecified.

## 2021-03-08 DIAGNOSIS — Z01.818 ENCOUNTER FOR OTHER PREPROCEDURAL EXAMINATION: ICD-10-CM

## 2021-03-09 ENCOUNTER — APPOINTMENT (OUTPATIENT)
Dept: DISASTER EMERGENCY | Facility: CLINIC | Age: 76
End: 2021-03-09

## 2021-03-10 LAB — SARS-COV-2 N GENE NPH QL NAA+PROBE: NOT DETECTED

## 2021-03-11 ENCOUNTER — TRANSCRIPTION ENCOUNTER (OUTPATIENT)
Age: 76
End: 2021-03-11

## 2021-03-11 NOTE — ASU PATIENT PROFILE, ADULT - PMH
GERD (gastroesophageal reflux disease)    Hyperlipidemia    Hypertension    Malignant neoplasm of tongue, unspecified    Mitral valve prolapse    Parkinson's disease    Prostate cancer

## 2021-03-12 ENCOUNTER — RESULT REVIEW (OUTPATIENT)
Age: 76
End: 2021-03-12

## 2021-03-12 ENCOUNTER — APPOINTMENT (OUTPATIENT)
Dept: OTOLARYNGOLOGY | Facility: HOSPITAL | Age: 76
End: 2021-03-12

## 2021-03-12 ENCOUNTER — INPATIENT (INPATIENT)
Facility: HOSPITAL | Age: 76
LOS: 6 days | Discharge: NURSING, REHAB OR LONG-TERM CARE | End: 2021-03-19
Attending: OTOLARYNGOLOGY | Admitting: OTOLARYNGOLOGY
Payer: MEDICARE

## 2021-03-12 VITALS
HEART RATE: 90 BPM | DIASTOLIC BLOOD PRESSURE: 82 MMHG | RESPIRATION RATE: 18 BRPM | OXYGEN SATURATION: 96 % | TEMPERATURE: 98 F | HEIGHT: 64 IN | WEIGHT: 199.96 LBS | SYSTOLIC BLOOD PRESSURE: 156 MMHG

## 2021-03-12 DIAGNOSIS — C02.9 MALIGNANT NEOPLASM OF TONGUE, UNSPECIFIED: ICD-10-CM

## 2021-03-12 DIAGNOSIS — Z98.52 VASECTOMY STATUS: Chronic | ICD-10-CM

## 2021-03-12 DIAGNOSIS — C61 MALIGNANT NEOPLASM OF PROSTATE: Chronic | ICD-10-CM

## 2021-03-12 PROCEDURE — 88307 TISSUE EXAM BY PATHOLOGIST: CPT | Mod: 26

## 2021-03-12 PROCEDURE — 88305 TISSUE EXAM BY PATHOLOGIST: CPT | Mod: 26

## 2021-03-12 PROCEDURE — 41120 PARTIAL REMOVAL OF TONGUE: CPT | Mod: GC

## 2021-03-12 PROCEDURE — 40840 RECONSTRUCTION OF MOUTH: CPT | Mod: GC

## 2021-03-12 PROCEDURE — 88331 PATH CONSLTJ SURG 1 BLK 1SPC: CPT | Mod: 26

## 2021-03-12 PROCEDURE — 15120 SPLT AGRFT F/S/N/H/F/G/M 1ST: CPT | Mod: GC

## 2021-03-12 PROCEDURE — 38724 REMOVAL OF LYMPH NODES NECK: CPT | Mod: GC,LT

## 2021-03-12 PROCEDURE — 71045 X-RAY EXAM CHEST 1 VIEW: CPT | Mod: 26

## 2021-03-12 PROCEDURE — 88332 PATH CONSLTJ SURG EA ADD BLK: CPT | Mod: 26

## 2021-03-12 PROCEDURE — 41116 EXCISION OF MOUTH LESION: CPT | Mod: GC

## 2021-03-12 PROCEDURE — 88309 TISSUE EXAM BY PATHOLOGIST: CPT | Mod: 26

## 2021-03-12 RX ORDER — ACETAMINOPHEN 500 MG
650 TABLET ORAL EVERY 6 HOURS
Refills: 0 | Status: DISCONTINUED | OUTPATIENT
Start: 2021-03-12 | End: 2021-03-14

## 2021-03-12 RX ORDER — PRIMIDONE 250 MG/1
50 TABLET ORAL DAILY
Refills: 0 | Status: DISCONTINUED | OUTPATIENT
Start: 2021-03-12 | End: 2021-03-19

## 2021-03-12 RX ORDER — PANTOPRAZOLE SODIUM 20 MG/1
40 TABLET, DELAYED RELEASE ORAL
Refills: 0 | Status: DISCONTINUED | OUTPATIENT
Start: 2021-03-12 | End: 2021-03-16

## 2021-03-12 RX ORDER — DILTIAZEM HCL 120 MG
60 CAPSULE, EXT RELEASE 24 HR ORAL EVERY 6 HOURS
Refills: 0 | Status: DISCONTINUED | OUTPATIENT
Start: 2021-03-12 | End: 2021-03-12

## 2021-03-12 RX ORDER — ENOXAPARIN SODIUM 100 MG/ML
40 INJECTION SUBCUTANEOUS DAILY
Refills: 0 | Status: DISCONTINUED | OUTPATIENT
Start: 2021-03-12 | End: 2021-03-19

## 2021-03-12 RX ORDER — BACITRACIN ZINC 500 UNIT/G
1 OINTMENT IN PACKET (EA) TOPICAL
Refills: 0 | Status: DISCONTINUED | OUTPATIENT
Start: 2021-03-12 | End: 2021-03-19

## 2021-03-12 RX ORDER — MORPHINE SULFATE 50 MG/1
4 CAPSULE, EXTENDED RELEASE ORAL
Refills: 0 | Status: DISCONTINUED | OUTPATIENT
Start: 2021-03-12 | End: 2021-03-12

## 2021-03-12 RX ORDER — OXYCODONE HYDROCHLORIDE 5 MG/1
5 TABLET ORAL EVERY 6 HOURS
Refills: 0 | Status: DISCONTINUED | OUTPATIENT
Start: 2021-03-12 | End: 2021-03-19

## 2021-03-12 RX ORDER — ONDANSETRON 8 MG/1
4 TABLET, FILM COATED ORAL ONCE
Refills: 0 | Status: DISCONTINUED | OUTPATIENT
Start: 2021-03-12 | End: 2021-03-12

## 2021-03-12 RX ORDER — CITALOPRAM 10 MG/1
20 TABLET, FILM COATED ORAL DAILY
Refills: 0 | Status: DISCONTINUED | OUTPATIENT
Start: 2021-03-12 | End: 2021-03-19

## 2021-03-12 RX ORDER — AMPICILLIN SODIUM AND SULBACTAM SODIUM 250; 125 MG/ML; MG/ML
INJECTION, POWDER, FOR SUSPENSION INTRAMUSCULAR; INTRAVENOUS
Refills: 0 | Status: DISCONTINUED | OUTPATIENT
Start: 2021-03-12 | End: 2021-03-13

## 2021-03-12 RX ORDER — AMPICILLIN SODIUM AND SULBACTAM SODIUM 250; 125 MG/ML; MG/ML
3 INJECTION, POWDER, FOR SUSPENSION INTRAMUSCULAR; INTRAVENOUS ONCE
Refills: 0 | Status: COMPLETED | OUTPATIENT
Start: 2021-03-12 | End: 2021-03-12

## 2021-03-12 RX ORDER — AMPICILLIN SODIUM AND SULBACTAM SODIUM 250; 125 MG/ML; MG/ML
1.5 INJECTION, POWDER, FOR SUSPENSION INTRAMUSCULAR; INTRAVENOUS EVERY 6 HOURS
Refills: 0 | Status: DISCONTINUED | OUTPATIENT
Start: 2021-03-12 | End: 2021-03-13

## 2021-03-12 RX ORDER — FENTANYL CITRATE 50 UG/ML
25 INJECTION INTRAVENOUS
Refills: 0 | Status: DISCONTINUED | OUTPATIENT
Start: 2021-03-12 | End: 2021-03-12

## 2021-03-12 RX ORDER — CARBIDOPA AND LEVODOPA 25; 100 MG/1; MG/1
1 TABLET ORAL
Refills: 0 | Status: DISCONTINUED | OUTPATIENT
Start: 2021-03-12 | End: 2021-03-19

## 2021-03-12 RX ORDER — DILTIAZEM HCL 120 MG
60 CAPSULE, EXT RELEASE 24 HR ORAL EVERY 6 HOURS
Refills: 0 | Status: DISCONTINUED | OUTPATIENT
Start: 2021-03-12 | End: 2021-03-15

## 2021-03-12 RX ADMIN — AMPICILLIN SODIUM AND SULBACTAM SODIUM 200 GRAM(S): 250; 125 INJECTION, POWDER, FOR SUSPENSION INTRAMUSCULAR; INTRAVENOUS at 15:02

## 2021-03-12 RX ADMIN — AMPICILLIN SODIUM AND SULBACTAM SODIUM 200 GRAM(S): 250; 125 INJECTION, POWDER, FOR SUSPENSION INTRAMUSCULAR; INTRAVENOUS at 22:12

## 2021-03-12 RX ADMIN — OXYCODONE HYDROCHLORIDE 5 MILLIGRAM(S): 5 TABLET ORAL at 22:09

## 2021-03-12 RX ADMIN — CARBIDOPA AND LEVODOPA 1 TABLET(S): 25; 100 TABLET ORAL at 21:06

## 2021-03-12 RX ADMIN — Medication 1 APPLICATION(S): at 21:06

## 2021-03-12 RX ADMIN — OXYCODONE HYDROCHLORIDE 5 MILLIGRAM(S): 5 TABLET ORAL at 22:39

## 2021-03-12 NOTE — PACU DISCHARGE NOTE - PAIN:
Controlled with current regime Doxycycline Pregnancy And Lactation Text: This medication is Pregnancy Category D and not consider safe during pregnancy. It is also excreted in breast milk but is considered safe for shorter treatment courses. Imiquimod Pregnancy And Lactation Text: This medication is Pregnancy Category C. It is unknown if this medication is excreted in breast milk. Simponi Counseling:  I discussed with the patient the risks of golimumab including but not limited to myelosuppression, immunosuppression, autoimmune hepatitis, demyelinating diseases, lymphoma, and serious infections.  The patient understands that monitoring is required including a PPD at baseline and must alert us or the primary physician if symptoms of infection or other concerning signs are noted. Valtrex Counseling: I discussed with the patient the risks of valacyclovir including but not limited to kidney damage, nausea, vomiting and severe allergy.  The patient understands that if the infection seems to be worsening or is not improving, they are to call. Clofazimine Pregnancy And Lactation Text: This medication is Pregnancy Category C and isn't considered safe during pregnancy. It is excreted in breast milk. Ketoconazole Pregnancy And Lactation Text: This medication is Pregnancy Category C and it isn't know if it is safe during pregnancy. It is also excreted in breast milk and breast feeding isn't recommended. Cimzia Counseling:  I discussed with the patient the risks of Cimzia including but not limited to immunosuppression, allergic reactions and infections.  The patient understands that monitoring is required including a PPD at baseline and must alert us or the primary physician if symptoms of infection or other concerning signs are noted. Cyclophosphamide Counseling:  I discussed with the patient the risks of cyclophosphamide including but not limited to hair loss, hormonal abnormalities, decreased fertility, abdominal pain, diarrhea, nausea and vomiting, bone marrow suppression and infection. The patient understands that monitoring is required while taking this medication. Topical Sulfur Applications Counseling: Topical Sulfur Counseling: Patient counseled that this medication may cause skin irritation or allergic reactions.  In the event of skin irritation, the patient was advised to reduce the amount of the drug applied or use it less frequently.   The patient verbalized understanding of the proper use and possible adverse effects of topical sulfur application.  All of the patient's questions and concerns were addressed. Odomzo Counseling- I discussed with the patient the risks of Odomzo including but not limited to nausea, vomiting, diarrhea, constipation, weight loss, changes in the sense of taste, decreased appetite, muscle spasms, and hair loss.  The patient verbalized understanding of the proper use and possible adverse effects of Odomzo.  All of the patient's questions and concerns were addressed. Minoxidil Counseling: Minoxidil is a topical medication which can increase blood flow where it is applied. It is uncertain how this medication increases hair growth. Side effects are uncommon and include stinging and allergic reactions. Simponi Pregnancy And Lactation Text: The risk during pregnancy and breastfeeding is uncertain with this medication. Benzoyl Peroxide Counseling: Patient counseled that medicine may cause skin irritation and bleach clothing.  In the event of skin irritation, the patient was advised to reduce the amount of the drug applied or use it less frequently.   The patient verbalized understanding of the proper use and possible adverse effects of benzoyl peroxide.  All of the patient's questions and concerns were addressed. Terbinafine Counseling: Patient counseling regarding adverse effects of terbinafine including but not limited to headache, diarrhea, rash, upset stomach, liver function test abnormalities, itching, taste/smell disturbance, nausea, abdominal pain, and flatulence.  There is a rare possibility of liver failure that can occur when taking terbinafine.  The patient understands that a baseline LFT and kidney function test may be required. The patient verbalized understanding of the proper use and possible adverse effects of terbinafine.  All of the patient's questions and concerns were addressed. Colchicine Counseling:  Patient counseled regarding adverse effects including but not limited to stomach upset (nausea, vomiting, stomach pain, or diarrhea).  Patient instructed to limit alcohol consumption while taking this medication.  Colchicine may reduce blood counts especially with prolonged use.  The patient understands that monitoring of kidney function and blood counts may be required, especially at baseline. The patient verbalized understanding of the proper use and possible adverse effects of colchicine.  All of the patient's questions and concerns were addressed. Cimzia Pregnancy And Lactation Text: This medication crosses the placenta but can be considered safe in certain situations. Cimzia may be excreted in breast milk. Topical Sulfur Applications Pregnancy And Lactation Text: This medication is Pregnancy Category C and has an unknown safety profile during pregnancy. It is unknown if this topical medication is excreted in breast milk. Erythromycin Counseling:  I discussed with the patient the risks of erythromycin including but not limited to GI upset, allergic reaction, drug rash, diarrhea, increase in liver enzymes, and yeast infections. Valtrex Pregnancy And Lactation Text: this medication is Pregnancy Category B and is considered safe during pregnancy. This medication is not directly found in breast milk but it's metabolite acyclovir is present. Cyclophosphamide Pregnancy And Lactation Text: This medication is Pregnancy Category D and it isn't considered safe during pregnancy. This medication is excreted in breast milk. Minoxidil Pregnancy And Lactation Text: This medication has not been assigned a Pregnancy Risk Category but animal studies failed to show danger with the topical medication. It is unknown if the medication is excreted in breast milk. Odomzo Pregnancy And Lactation Text: This medication is Pregnancy Category X and is absolutely contraindicated during pregnancy. It is unknown if it is excreted in breast milk. Skyrizi Counseling: I discussed with the patient the risks of risankizumab-rzaa including but not limited to immunosuppression, and serious infections.  The patient understands that monitoring is required including a PPD at baseline and must alert us or the primary physician if symptoms of infection or other concerning signs are noted. Erythromycin Pregnancy And Lactation Text: This medication is Pregnancy Category B and is considered safe during pregnancy. It is also excreted in breast milk. Benzoyl Peroxide Pregnancy And Lactation Text: This medication is Pregnancy Category C. It is unknown if benzoyl peroxide is excreted in breast milk. Terbinafine Pregnancy And Lactation Text: This medication is Pregnancy Category B and is considered safe during pregnancy. It is also excreted in breast milk and breast feeding isn't recommended. Cosentyx Counseling:  I discussed with the patient the risks of Cosentyx including but not limited to worsening of Crohn's disease, immunosuppression, allergic reactions and infections.  The patient understands that monitoring is required including a PPD at baseline and must alert us or the primary physician if symptoms of infection or other concerning signs are noted. Cyclosporine Counseling:  I discussed with the patient the risks of cyclosporine including but not limited to hypertension, gingival hyperplasia,myelosuppression, immunosuppression, liver damage, kidney damage, neurotoxicity, lymphoma, and serious infections. The patient understands that monitoring is required including baseline blood pressure, CBC, CMP, lipid panel and uric acid, and then 1-2 times monthly CMP and blood pressure. Otezla Counseling: The side effects of Otezla were discussed with the patient, including but not limited to worsening or new depression, weight loss, diarrhea, nausea, upper respiratory tract infection, and headache. Patient instructed to call the office should any adverse effect occur.  The patient verbalized understanding of the proper use and possible adverse effects of Otezla.  All the patient's questions and concerns were addressed. Wartpeel Counseling:  I discussed with the patient the risks of Wartpeel including but not limited to erythema, scaling, itching, weeping, crusting, and pain. Use Enhanced Medication Counseling?: No Carac Counseling:  I discussed with the patient the risks of Carac including but not limited to erythema, scaling, itching, weeping, crusting, and pain. Detail Level: Simple Dapsone Counseling: I discussed with the patient the risks of dapsone including but not limited to hemolytic anemia, agranulocytosis, rashes, methemoglobinemia, kidney failure, peripheral neuropathy, headaches, GI upset, and liver toxicity.  Patients who start dapsone require monitoring including baseline LFTs and weekly CBCs for the first month, then every month thereafter.  The patient verbalized understanding of the proper use and possible adverse effects of dapsone.  All of the patient's questions and concerns were addressed. Cosentyx Pregnancy And Lactation Text: This medication is Pregnancy Category B and is considered safe during pregnancy. It is unknown if this medication is excreted in breast milk. Mirvaso Counseling: Mirvaso is a topical medication which can decrease superficial blood flow where applied. Side effects are uncommon and include stinging, redness and allergic reactions. Metronidazole Counseling:  I discussed with the patient the risks of metronidazole including but not limited to seizures, nausea/vomiting, a metallic taste in the mouth, nausea/vomiting and severe allergy. Cyclosporine Pregnancy And Lactation Text: This medication is Pregnancy Category C and it isn't know if it is safe during pregnancy. This medication is excreted in breast milk. Wartpeel Pregnancy And Lactation Text: This medication is Pregnancy Category X and contraindicated in pregnancy and in women who may become pregnant. It is unknown if this medication is excreted in breast milk. Stelara Counseling:  I discussed with the patient the risks of ustekinumab including but not limited to immunosuppression, malignancy, posterior leukoencephalopathy syndrome, and serious infections.  The patient understands that monitoring is required including a PPD at baseline and must alert us or the primary physician if symptoms of infection or other concerning signs are noted. Otezla Pregnancy And Lactation Text: This medication is Pregnancy Category C and it isn't known if it is safe during pregnancy. It is unknown if it is excreted in breast milk. Dapsone Pregnancy And Lactation Text: This medication is Pregnancy Category C and is not considered safe during pregnancy or breast feeding. Mirvaso Pregnancy And Lactation Text: This medication has not been assigned a Pregnancy Risk Category. It is unknown if the medication is excreted in breast milk. Metronidazole Pregnancy And Lactation Text: This medication is Pregnancy Category B and considered safe during pregnancy.  It is also excreted in breast milk. Dupixent Counseling: I discussed with the patient the risks of dupilumab including but not limited to eye infection and irritation, cold sores, injection site reactions, worsening of asthma, allergic reactions and increased risk of parasitic infection.  Live vaccines should be avoided while taking dupilumab. Dupilumab will also interact with certain medications such as warfarin and cyclosporine. The patient understands that monitoring is required and they must alert us or the primary physician if symptoms of infection or other concerning signs are noted. Cimetidine Counseling:  I discussed with the patient the risks of Cimetidine including but not limited to gynecomastia, headache, diarrhea, nausea, drowsiness, arrhythmias, pancreatitis, skin rashes, psychosis, bone marrow suppression and kidney toxicity. Methotrexate Counseling:  Patient counseled regarding adverse effects of methotrexate including but not limited to nausea, vomiting, abnormalities in liver function tests. Patients may develop mouth sores, rash, diarrhea, and abnormalities in blood counts. The patient understands that monitoring is required including LFT's and blood counts.  There is a rare possibility of scarring of the liver and lung problems that can occur when taking methotrexate. Persistent nausea, loss of appetite, pale stools, dark urine, cough, and shortness of breath should be reported immediately. Patient advised to discontinue methotrexate treatment at least three months before attempting to become pregnant.  I discussed the need for folate supplements while taking methotrexate.  These supplements can decrease side effects during methotrexate treatment. The patient verbalized understanding of the proper use and possible adverse effects of methotrexate.  All of the patient's questions and concerns were addressed. Erivedge Counseling- I discussed with the patient the risks of Erivedge including but not limited to nausea, vomiting, diarrhea, constipation, weight loss, changes in the sense of taste, decreased appetite, muscle spasms, and hair loss.  The patient verbalized understanding of the proper use and possible adverse effects of Erivedge.  All of the patient's questions and concerns were addressed. Oxybutynin Counseling:  I discussed with the patient the risks of oxybutynin including but not limited to skin rash, drowsiness, dry mouth, difficulty urinating, and blurred vision. Zyclara Counseling:  I discussed with the patient the risks of imiquimod including but not limited to erythema, scaling, itching, weeping, crusting, and pain.  Patient understands that the inflammatory response to imiquimod is variable from person to person and was educated regarded proper titration schedule.  If flu-like symptoms develop, patient knows to discontinue the medication and contact us. Minocycline Counseling: Patient advised regarding possible photosensitivity and discoloration of the teeth, skin, lips, tongue and gums.  Patient instructed to avoid sunlight, if possible.  When exposed to sunlight, patients should wear protective clothing, sunglasses, and sunscreen.  The patient was instructed to call the office immediately if the following severe adverse effects occur:  hearing changes, easy bruising/bleeding, severe headache, or vision changes.  The patient verbalized understanding of the proper use and possible adverse effects of minocycline.  All of the patient's questions and concerns were addressed. Picato Counseling:  I discussed with the patient the risks of Picato including but not limited to erythema, scaling, itching, weeping, crusting, and pain. Dupixent Pregnancy And Lactation Text: This medication likely crosses the placenta but the risk for the fetus is uncertain. This medication is excreted in breast milk. Calcipotriene Counseling:  I discussed with the patient the risks of calcipotriene including but not limited to erythema, scaling, itching, and irritation. Methotrexate Pregnancy And Lactation Text: This medication is Pregnancy Category X and is known to cause fetal harm. This medication is excreted in breast milk. Taltz Counseling: I discussed with the patient the risks of ixekizumab including but not limited to immunosuppression, serious infections, worsening of inflammatory bowel disease and drug reactions.  The patient understands that monitoring is required including a PPD at baseline and must alert us or the primary physician if symptoms of infection or other concerning signs are noted. Enbrel Counseling:  I discussed with the patient the risks of etanercept including but not limited to myelosuppression, immunosuppression, autoimmune hepatitis, demyelinating diseases, lymphoma, and infections.  The patient understands that monitoring is required including a PPD at baseline and must alert us or the primary physician if symptoms of infection or other concerning signs are noted. Oxybutynin Pregnancy And Lactation Text: This medication is Pregnancy Category B and is considered safe during pregnancy. It is unknown if it is excreted in breast milk. Calcipotriene Pregnancy And Lactation Text: This medication has not been proven safe during pregnancy. It is unknown if this medication is excreted in breast milk. Minocycline Pregnancy And Lactation Text: This medication is Pregnancy Category D and not consider safe during pregnancy. It is also excreted in breast milk. Doxepin Counseling:  Patient advised that the medication is sedating and not to drive a car after taking this medication. Patient informed of potential adverse effects including but not limited to dry mouth, urinary retention, and blurry vision.  The patient verbalized understanding of the proper use and possible adverse effects of doxepin.  All of the patient's questions and concerns were addressed. Propranolol Counseling:  I discussed with the patient the risks of propranolol including but not limited to low heart rate, low blood pressure, low blood sugar, restlessness and increased cold sensitivity. They should call the office if they experience any of these side effects. Prednisone Counseling:  I discussed with the patient the risks of prolonged use of prednisone including but not limited to weight gain, insomnia, osteoporosis, mood changes, diabetes, susceptibility to infection, glaucoma and high blood pressure.  In cases where prednisone use is prolonged, patients should be monitored with blood pressure checks, serum glucose levels and an eye exam.  Additionally, the patient may need to be placed on GI prophylaxis, PCP prophylaxis, and calcium and vitamin D supplementation and/or a bisphosphonate.  The patient verbalized understanding of the proper use and the possible adverse effects of prednisone.  All of the patient's questions and concerns were addressed. Finasteride Counseling:  I discussed with the patient the risks of use of finasteride including but not limited to decreased libido, decreased ejaculate volume, gynecomastia, and depression. Women should not handle medication.  All of the patient's questions and concerns were addressed. Quinolones Counseling:  I discussed with the patient the risks of fluoroquinolones including but not limited to GI upset, allergic reaction, drug rash, diarrhea, dizziness, photosensitivity, yeast infections, liver function test abnormalities, tendonitis/tendon rupture. Protopic Counseling: Patient may experience a mild burning sensation during topical application. Protopic is not approved in children less than 2 years of age. There have been case reports of hematologic and skin malignancies in patients using topical calcineurin inhibitors although causality is questionable. 5-Fu Counseling: 5-Fluorouracil Counseling:  I discussed with the patient the risks of 5-fluorouracil including but not limited to erythema, scaling, itching, weeping, crusting, and pain. Doxepin Pregnancy And Lactation Text: This medication is Pregnancy Category C and it isn't known if it is safe during pregnancy. It is also excreted in breast milk and breast feeding isn't recommended. Propranolol Pregnancy And Lactation Text: This medication is Pregnancy Category C and it isn't known if it is safe during pregnancy. It is excreted in breast milk. Protopic Pregnancy And Lactation Text: This medication is Pregnancy Category C. It is unknown if this medication is excreted in breast milk when applied topically. Quinolones Pregnancy And Lactation Text: This medication is Pregnancy Category C and it isn't know if it is safe during pregnancy. It is also excreted in breast milk. Humira Counseling:  I discussed with the patient the risks of adalimumab including but not limited to myelosuppression, immunosuppression, autoimmune hepatitis, demyelinating diseases, lymphoma, and serious infections.  The patient understands that monitoring is required including a PPD at baseline and must alert us or the primary physician if symptoms of infection or other concerning signs are noted. Tremfya Counseling: I discussed with the patient the risks of guselkumab including but not limited to immunosuppression, serious infections, worsening of inflammatory bowel disease and drug reactions.  The patient understands that monitoring is required including a PPD at baseline and must alert us or the primary physician if symptoms of infection or other concerning signs are noted. Hydroxyzine Counseling: Patient advised that the medication is sedating and not to drive a car after taking this medication.  Patient informed of potential adverse effects including but not limited to dry mouth, urinary retention, and blurry vision.  The patient verbalized understanding of the proper use and possible adverse effects of hydroxyzine.  All of the patient's questions and concerns were addressed. Finasteride Pregnancy And Lactation Text: This medication is absolutely contraindicated during pregnancy. It is unknown if it is excreted in breast milk. Tetracycline Counseling: Patient counseled regarding possible photosensitivity and increased risk for sunburn.  Patient instructed to avoid sunlight, if possible.  When exposed to sunlight, patients should wear protective clothing, sunglasses, and sunscreen.  The patient was instructed to call the office immediately if the following severe adverse effects occur:  hearing changes, easy bruising/bleeding, severe headache, or vision changes.  The patient verbalized understanding of the proper use and possible adverse effects of tetracycline.  All of the patient's questions and concerns were addressed. Patient understands to avoid pregnancy while on therapy due to potential birth defects. Birth Control Pills Counseling: Birth Control Pill Counseling: I discussed with the patient the potential side effects of OCPs including but not limited to increased risk of stroke, heart attack, thrombophlebitis, deep venous thrombosis, hepatic adenomas, breast changes, GI upset, headaches, and depression.  The patient verbalized understanding of the proper use and possible adverse effects of OCPs. All of the patient's questions and concerns were addressed. Rhofade Counseling: Rhofade is a topical medication which can decrease superficial blood flow where applied. Side effects are uncommon and include stinging, redness and allergic reactions. Drysol Counseling:  I discussed with the patient the risks of drysol/aluminum chloride including but not limited to skin rash, itching, irritation, burning. Rifampin Counseling: I discussed with the patient the risks of rifampin including but not limited to liver damage, kidney damage, red-orange body fluids, nausea/vomiting and severe allergy. Gabapentin Counseling: I discussed with the patient the risks of gabapentin including but not limited to dizziness, somnolence, fatigue and ataxia. Hydroxyzine Pregnancy And Lactation Text: This medication is not safe during pregnancy and should not be taken. It is also excreted in breast milk and breast feeding isn't recommended. Azithromycin Counseling:  I discussed with the patient the risks of azithromycin including but not limited to GI upset, allergic reaction, drug rash, diarrhea, and yeast infections. Birth Control Pills Pregnancy And Lactation Text: This medication should be avoided if pregnant and for the first 30 days post-partum. Xeljanz Counseling: I discussed with the patient the risks of Xeljanz therapy including increased risk of infection, liver issues, headache, diarrhea, or cold symptoms. Live vaccines should be avoided. They were instructed to call if they have any problems. Drysol Pregnancy And Lactation Text: This medication is considered safe during pregnancy and breast feeding. Azithromycin Pregnancy And Lactation Text: This medication is considered safe during pregnancy and is also secreted in breast milk. Ilumya Counseling: I discussed with the patient the risks of tildrakizumab including but not limited to immunosuppression, malignancy, posterior leukoencephalopathy syndrome, and serious infections.  The patient understands that monitoring is required including a PPD at baseline and must alert us or the primary physician if symptoms of infection or other concerning signs are noted. Acitretin Counseling:  I discussed with the patient the risks of acitretin including but not limited to hair loss, dry lips/skin/eyes, liver damage, hyperlipidemia, depression/suicidal ideation, photosensitivity.  Serious rare side effects can include but are not limited to pancreatitis, pseudotumor cerebri, bony changes, clot formation/stroke/heart attack.  Patient understands that alcohol is contraindicated since it can result in liver toxicity and significantly prolong the elimination of the drug by many years. Spironolactone Counseling: Patient advised regarding risks of diarrhea, abdominal pain, hyperkalemia, birth defects (for female patients), liver toxicity and renal toxicity. The patient may need blood work to monitor liver and kidney function and potassium levels while on therapy. The patient verbalized understanding of the proper use and possible adverse effects of spironolactone.  All of the patient's questions and concerns were addressed. Rifampin Pregnancy And Lactation Text: This medication is Pregnancy Category C and it isn't know if it is safe during pregnancy. It is also excreted in breast milk and should not be used if you are breast feeding. Solaraze Counseling:  I discussed with the patient the risks of Solaraze including but not limited to erythema, scaling, itching, weeping, crusting, and pain. Xelalphonsoz Pregnancy And Lactation Text: This medication is Pregnancy Category D and is not considered safe during pregnancy.  The risk during breast feeding is also uncertain. Glycopyrrolate Counseling:  I discussed with the patient the risks of glycopyrrolate including but not limited to skin rash, drowsiness, dry mouth, difficulty urinating, and blurred vision. Elidel Counseling: Patient may experience a mild burning sensation during topical application. Elidel is not approved in children less than 2 years of age. There have been case reports of hematologic and skin malignancies in patients using topical calcineurin inhibitors although causality is questionable. Acitretin Pregnancy And Lactation Text: This medication is Pregnancy Category X and should not be given to women who are pregnant or may become pregnant in the future. This medication is excreted in breast milk. Sarecycline Counseling: Patient advised regarding possible photosensitivity and discoloration of the teeth, skin, lips, tongue and gums.  Patient instructed to avoid sunlight, if possible.  When exposed to sunlight, patients should wear protective clothing, sunglasses, and sunscreen.  The patient was instructed to call the office immediately if the following severe adverse effects occur:  hearing changes, easy bruising/bleeding, severe headache, or vision changes.  The patient verbalized understanding of the proper use and possible adverse effects of sarecycline.  All of the patient's questions and concerns were addressed. Fluconazole Counseling:  Patient counseled regarding adverse effects of fluconazole including but not limited to headache, diarrhea, nausea, upset stomach, liver function test abnormalities, taste disturbance, and stomach pain.  There is a rare possibility of liver failure that can occur when taking fluconazole.  The patient understands that monitoring of LFTs and kidney function test may be required, especially at baseline. The patient verbalized understanding of the proper use and possible adverse effects of fluconazole.  All of the patient's questions and concerns were addressed. Albendazole Counseling:  I discussed with the patient the risks of albendazole including but not limited to cytopenia, kidney damage, nausea/vomiting and severe allergy.  The patient understands that this medication is being used in an off-label manner. Bactrim Counseling:  I discussed with the patient the risks of sulfa antibiotics including but not limited to GI upset, allergic reaction, drug rash, diarrhea, dizziness, photosensitivity, and yeast infections.  Rarely, more serious reactions can occur including but not limited to aplastic anemia, agranulocytosis, methemoglobinemia, blood dyscrasias, liver or kidney failure, lung infiltrates or desquamative/blistering drug rashes. Spironolactone Pregnancy And Lactation Text: This medication can cause feminization of the male fetus and should be avoided during pregnancy. The active metabolite is also found in breast milk. Xolair Counseling:  Patient informed of potential adverse effects including but not limited to fever, muscle aches, rash and allergic reactions.  The patient verbalized understanding of the proper use and possible adverse effects of Xolair.  All of the patient's questions and concerns were addressed. Glycopyrrolate Pregnancy And Lactation Text: This medication is Pregnancy Category B and is considered safe during pregnancy. It is unknown if it is excreted breast milk. Infliximab Counseling:  I discussed with the patient the risks of infliximab including but not limited to myelosuppression, immunosuppression, autoimmune hepatitis, demyelinating diseases, lymphoma, and serious infections.  The patient understands that monitoring is required including a PPD at baseline and must alert us or the primary physician if symptoms of infection or other concerning signs are noted. Opioid Counseling: I discussed with the patient the potential side effects of opioids including but not limited to addiction, altered mental status, and depression. I stressed avoiding alcohol, benzodiazepines, muscle relaxants and sleep aids unless specifically okayed by a physician. The patient verbalized understanding of the proper use and possible adverse effects of opioids. All of the patient's questions and concerns were addressed. They were instructed to flush the remaining pills down the toilet if they did not need them for pain. Solaraze Pregnancy And Lactation Text: This medication is Pregnancy Category B and is considered safe. There is some data to suggest avoiding during the third trimester. It is unknown if this medication is excreted in breast milk. Bactrim Pregnancy And Lactation Text: This medication is Pregnancy Category D and is known to cause fetal risk.  It is also excreted in breast milk. Bexarotene Counseling:  I discussed with the patient the risks of bexarotene including but not limited to hair loss, dry lips/skin/eyes, liver abnormalities, hyperlipidemia, pancreatitis, depression/suicidal ideation, photosensitivity, drug rash/allergic reactions, hypothyroidism, anemia, leukopenia, infection, cataracts, and teratogenicity.  Patient understands that they will need regular blood tests to check lipid profile, liver function tests, white blood cell count, thyroid function tests and pregnancy test if applicable. Albendazole Pregnancy And Lactation Text: This medication is Pregnancy Category C and it isn't known if it is safe during pregnancy. It is also excreted in breast milk. Xolair Pregnancy And Lactation Text: This medication is Pregnancy Category B and is considered safe during pregnancy. This medication is excreted in breast milk. Hydroxychloroquine Counseling:  I discussed with the patient that a baseline ophthalmologic exam is needed at the start of therapy and every year thereafter while on therapy. A CBC may also be warranted for monitoring.  The side effects of this medication were discussed with the patient, including but not limited to agranulocytosis, aplastic anemia, seizures, rashes, retinopathy, and liver toxicity. Patient instructed to call the office should any adverse effect occur.  The patient verbalized understanding of the proper use and possible adverse effects of Plaquenil.  All the patient's questions and concerns were addressed. Opioid Pregnancy And Lactation Text: These medications can lead to premature delivery and should be avoided during pregnancy. These medications are also present in breast milk in small amounts. SSKI Counseling:  I discussed with the patient the risks of SSKI including but not limited to thyroid abnormalities, metallic taste, GI upset, fever, headache, acne, arthralgias, paraesthesias, lymphadenopathy, easy bleeding, arrhythmias, and allergic reaction. Topical Retinoid counseling:  Patient advised to apply a pea-sized amount only at bedtime and wait 30 minutes after washing their face before applying.  If too drying, patient may add a non-comedogenic moisturizer. The patient verbalized understanding of the proper use and possible adverse effects of retinoids.  All of the patient's questions and concerns were addressed. Bexarotene Pregnancy And Lactation Text: This medication is Pregnancy Category X and should not be given to women who are pregnant or may become pregnant. This medication should not be used if you are breast feeding. Eucrisa Counseling: Patient may experience a mild burning sensation during topical application. Eucrisa is not approved in children less than 2 years of age. Ivermectin Counseling:  Patient instructed to take medication on an empty stomach with a full glass of water.  Patient informed of potential adverse effects including but not limited to nausea, diarrhea, dizziness, itching, and swelling of the extremities or lymph nodes.  The patient verbalized understanding of the proper use and possible adverse effects of ivermectin.  All of the patient's questions and concerns were addressed. Cephalexin Counseling: I counseled the patient regarding use of cephalexin as an antibiotic for prophylactic and/or therapeutic purposes. Cephalexin (commonly prescribed under brand name Keflex) is a cephalosporin antibiotic which is active against numerous classes of bacteria, including most skin bacteria. Side effects may include nausea, diarrhea, gastrointestinal upset, rash, hives, yeast infections, and in rare cases, hepatitis, kidney disease, seizures, fever, confusion, neurologic symptoms, and others. Patients with severe allergies to penicillin medications are cautioned that there is about a 10% incidence of cross-reactivity with cephalosporins. When possible, patients with penicillin allergies should use alternatives to cephalosporins for antibiotic therapy. Griseofulvin Counseling:  I discussed with the patient the risks of griseofulvin including but not limited to photosensitivity, cytopenia, liver damage, nausea/vomiting and severe allergy.  The patient understands that this medication is best absorbed when taken with a fatty meal (e.g., ice cream or french fries). Rituxan Counseling:  I discussed with the patient the risks of Rituxan infusions. Side effects can include infusion reactions, severe drug rashes including mucocutaneous reactions, reactivation of latent hepatitis and other infections and rarely progressive multifocal leukoencephalopathy.  All of the patient's questions and concerns were addressed. Sski Pregnancy And Lactation Text: This medication is Pregnancy Category D and isn't considered safe during pregnancy. It is excreted in breast milk. Hydroxychloroquine Pregnancy And Lactation Text: This medication has been shown to cause fetal harm but it isn't assigned a Pregnancy Risk Category. There are small amounts excreted in breast milk. Cephalexin Pregnancy And Lactation Text: This medication is Pregnancy Category B and considered safe during pregnancy.  It is also excreted in breast milk but can be used safely for shorter doses. Azathioprine Counseling:  I discussed with the patient the risks of azathioprine including but not limited to myelosuppression, immunosuppression, hepatotoxicity, lymphoma, and infections.  The patient understands that monitoring is required including baseline LFTs, Creatinine, possible TPMP genotyping and weekly CBCs for the first month and then every 2 weeks thereafter.  The patient verbalized understanding of the proper use and possible adverse effects of azathioprine.  All of the patient's questions and concerns were addressed. Thalidomide Counseling: I discussed with the patient the risks of thalidomide including but not limited to birth defects, anxiety, weakness, chest pain, dizziness, cough and severe allergy. Isotretinoin Counseling: Patient should get monthly blood tests, not donate blood, not drive at night if vision affected, not share medication, and not undergo elective surgery for 6 months after tx completed. Side effects reviewed, pt to contact office should one occur. Griseofulvin Pregnancy And Lactation Text: This medication is Pregnancy Category X and is known to cause serious birth defects. It is unknown if this medication is excreted in breast milk but breast feeding should be avoided. Niacinamide Counseling: I recommended taking niacin or niacinamide, also know as vitamin B3, twice daily. Recent evidence suggests that taking vitamin B3 (500 mg twice daily) can reduce the risk of actinic keratoses and non-melanoma skin cancers. Side effects of vitamin B3 include flushing and headache. Rituxan Pregnancy And Lactation Text: This medication is Pregnancy Category C and it isn't know if it is safe during pregnancy. It is unknown if this medication is excreted in breast milk but similar antibodies are known to be excreted. Tazorac Counseling:  Patient advised that medication is irritating and drying.  Patient may need to apply sparingly and wash off after an hour before eventually leaving it on overnight.  The patient verbalized understanding of the proper use and possible adverse effects of tazorac.  All of the patient's questions and concerns were addressed. Hydroquinone Counseling:  Patient advised that medication may result in skin irritation, lightening (hypopigmentation), dryness, and burning.  In the event of skin irritation, the patient was advised to reduce the amount of the drug applied or use it less frequently.  Rarely, spots that are treated with hydroquinone can become darker (pseudoochronosis).  Should this occur, patient instructed to stop medication and call the office. The patient verbalized understanding of the proper use and possible adverse effects of hydroquinone.  All of the patient's questions and concerns were addressed. Clindamycin Counseling: I counseled the patient regarding use of clindamycin as an antibiotic for prophylactic and/or therapeutic purposes. Clindamycin is active against numerous classes of bacteria, including skin bacteria. Side effects may include nausea, diarrhea, gastrointestinal upset, rash, hives, yeast infections, and in rare cases, colitis. Itraconazole Counseling:  I discussed with the patient the risks of itraconazole including but not limited to liver damage, nausea/vomiting, neuropathy, and severe allergy.  The patient understands that this medication is best absorbed when taken with acidic beverages such as non-diet cola or ginger ale.  The patient understands that monitoring is required including baseline LFTs and repeat LFTs at intervals.  The patient understands that they are to contact us or the primary physician if concerning signs are noted. Arava Counseling:  Patient counseled regarding adverse effects of Arava including but not limited to nausea, vomiting, abnormalities in liver function tests. Patients may develop mouth sores, rash, diarrhea, and abnormalities in blood counts. The patient understands that monitoring is required including LFTs and blood counts.  There is a rare possibility of scarring of the liver and lung problems that can occur when taking methotrexate. Persistent nausea, loss of appetite, pale stools, dark urine, cough, and shortness of breath should be reported immediately. Patient advised to discontinue Arava treatment and consult with a physician prior to attempting conception. The patient will have to undergo a treatment to eliminate Arava from the body prior to conception. Isotretinoin Pregnancy And Lactation Text: This medication is Pregnancy Category X and is considered extremely dangerous during pregnancy. It is unknown if it is excreted in breast milk. Azathioprine Pregnancy And Lactation Text: This medication is Pregnancy Category D and isn't considered safe during pregnancy. It is unknown if this medication is excreted in breast milk. Siliq Counseling:  I discussed with the patient the risks of Siliq including but not limited to new or worsening depression, suicidal thoughts and behavior, immunosuppression, malignancy, posterior leukoencephalopathy syndrome, and serious infections.  The patient understands that monitoring is required including a PPD at baseline and must alert us or the primary physician if symptoms of infection or other concerning signs are noted. There is also a special program designed to monitor depression which is required with Siliq. Tazorac Pregnancy And Lactation Text: This medication is not safe during pregnancy. It is unknown if this medication is excreted in breast milk. Niacinamide Pregnancy And Lactation Text: These medications are considered safe during pregnancy. High Dose Vitamin A Counseling: Side effects reviewed, pt to contact office should one occur. Clindamycin Pregnancy And Lactation Text: This medication can be used in pregnancy if certain situations. Clindamycin is also present in breast milk. Topical Clindamycin Counseling: Patient counseled that this medication may cause skin irritation or allergic reactions.  In the event of skin irritation, the patient was advised to reduce the amount of the drug applied or use it less frequently.   The patient verbalized understanding of the proper use and possible adverse effects of clindamycin.  All of the patient's questions and concerns were addressed. Tranexamic Acid Counseling:  Patient advised of the small risk of bleeding problems with tranexamic acid. They were also instructed to call if they developed any nausea, vomiting or diarrhea. All of the patient's questions and concerns were addressed. Nsaids Counseling: NSAID Counseling: I discussed with the patient that NSAIDs should be taken with food. Prolonged use of NSAIDs can result in the development of stomach ulcers.  Patient advised to stop taking NSAIDs if abdominal pain occurs.  The patient verbalized understanding of the proper use and possible adverse effects of NSAIDs.  All of the patient's questions and concerns were addressed. Cellcept Counseling:  I discussed with the patient the risks of mycophenolate mofetil including but not limited to infection/immunosuppression, GI upset, hypokalemia, hypercholesterolemia, bone marrow suppression, lymphoproliferative disorders, malignancy, GI ulceration/bleed/perforation, colitis, interstitial lung disease, kidney failure, progressive multifocal leukoencephalopathy, and birth defects.  The patient understands that monitoring is required including a baseline creatinine and regular CBC testing. In addition, patient must alert us immediately if symptoms of infection or other concerning signs are noted. Imiquimod Counseling:  I discussed with the patient the risks of imiquimod including but not limited to erythema, scaling, itching, weeping, crusting, and pain.  Patient understands that the inflammatory response to imiquimod is variable from person to person and was educated regarded proper titration schedule.  If flu-like symptoms develop, patient knows to discontinue the medication and contact us. Clofazimine Counseling:  I discussed with the patient the risks of clofazimine including but not limited to skin and eye pigmentation, liver damage, nausea/vomiting, gastrointestinal bleeding and allergy. Doxycycline Counseling:  Patient counseled regarding possible photosensitivity and increased risk for sunburn.  Patient instructed to avoid sunlight, if possible.  When exposed to sunlight, patients should wear protective clothing, sunglasses, and sunscreen.  The patient was instructed to call the office immediately if the following severe adverse effects occur:  hearing changes, easy bruising/bleeding, severe headache, or vision changes.  The patient verbalized understanding of the proper use and possible adverse effects of doxycycline.  All of the patient's questions and concerns were addressed. Tranexamic Acid Pregnancy And Lactation Text: It is unknown if this medication is safe during pregnancy or breast feeding. High Dose Vitamin A Pregnancy And Lactation Text: High dose vitamin A therapy is contraindicated during pregnancy and breast feeding. Ketoconazole Counseling:   Patient counseled regarding improving absorption with orange juice.  Adverse effects include but are not limited to breast enlargement, headache, diarrhea, nausea, upset stomach, liver function test abnormalities, taste disturbance, and stomach pain.  There is a rare possibility of liver failure that can occur when taking ketoconazole. The patient understands that monitoring of LFTs may be required, especially at baseline. The patient verbalized understanding of the proper use and possible adverse effects of ketoconazole.  All of the patient's questions and concerns were addressed. Nsaids Pregnancy And Lactation Text: These medications are considered safe up to 30 weeks gestation. It is excreted in breast milk.

## 2021-03-12 NOTE — BRIEF OPERATIVE NOTE - OPERATION/FINDINGS
L partial glossectomy/FOM resection. Full thickness skin graft from R chest, closed primarily. L SND levels 1-3.

## 2021-03-12 NOTE — BRIEF OPERATIVE NOTE - NSICDXBRIEFPROCEDURE_GEN_ALL_CORE_FT
PROCEDURES:  Selective neck dissection 12-Mar-2021 13:23:43  Dian Shultz  Partial glossectomy 12-Mar-2021 13:23:34  Dian Shultz

## 2021-03-13 LAB
ANION GAP SERPL CALC-SCNC: 13 MMOL/L — SIGNIFICANT CHANGE UP (ref 7–14)
BUN SERPL-MCNC: 21 MG/DL — SIGNIFICANT CHANGE UP (ref 7–23)
CALCIUM SERPL-MCNC: 9.2 MG/DL — SIGNIFICANT CHANGE UP (ref 8.4–10.5)
CHLORIDE SERPL-SCNC: 100 MMOL/L — SIGNIFICANT CHANGE UP (ref 98–107)
CO2 SERPL-SCNC: 26 MMOL/L — SIGNIFICANT CHANGE UP (ref 22–31)
CREAT SERPL-MCNC: 0.9 MG/DL — SIGNIFICANT CHANGE UP (ref 0.5–1.3)
GLUCOSE SERPL-MCNC: 122 MG/DL — HIGH (ref 70–99)
HCT VFR BLD CALC: 44.1 % — SIGNIFICANT CHANGE UP (ref 39–50)
HGB BLD-MCNC: 15.1 G/DL — SIGNIFICANT CHANGE UP (ref 13–17)
MAGNESIUM SERPL-MCNC: 2.3 MG/DL — SIGNIFICANT CHANGE UP (ref 1.6–2.6)
MCHC RBC-ENTMCNC: 33.4 PG — SIGNIFICANT CHANGE UP (ref 27–34)
MCHC RBC-ENTMCNC: 34.2 GM/DL — SIGNIFICANT CHANGE UP (ref 32–36)
MCV RBC AUTO: 97.6 FL — SIGNIFICANT CHANGE UP (ref 80–100)
NRBC # BLD: 0 /100 WBCS — SIGNIFICANT CHANGE UP
NRBC # FLD: 0 K/UL — SIGNIFICANT CHANGE UP
PHOSPHATE SERPL-MCNC: 2.8 MG/DL — SIGNIFICANT CHANGE UP (ref 2.5–4.5)
PLATELET # BLD AUTO: 224 K/UL — SIGNIFICANT CHANGE UP (ref 150–400)
POTASSIUM SERPL-MCNC: 4.1 MMOL/L — SIGNIFICANT CHANGE UP (ref 3.5–5.3)
POTASSIUM SERPL-SCNC: 4.1 MMOL/L — SIGNIFICANT CHANGE UP (ref 3.5–5.3)
RBC # BLD: 4.52 M/UL — SIGNIFICANT CHANGE UP (ref 4.2–5.8)
RBC # FLD: 13.6 % — SIGNIFICANT CHANGE UP (ref 10.3–14.5)
SODIUM SERPL-SCNC: 139 MMOL/L — SIGNIFICANT CHANGE UP (ref 135–145)
WBC # BLD: 10.28 K/UL — SIGNIFICANT CHANGE UP (ref 3.8–10.5)
WBC # FLD AUTO: 10.28 K/UL — SIGNIFICANT CHANGE UP (ref 3.8–10.5)

## 2021-03-13 RX ORDER — OXYCODONE HYDROCHLORIDE 5 MG/1
5 TABLET ORAL ONCE
Refills: 0 | Status: DISCONTINUED | OUTPATIENT
Start: 2021-03-13 | End: 2021-03-13

## 2021-03-13 RX ORDER — OXYCODONE HYDROCHLORIDE 5 MG/1
10 TABLET ORAL EVERY 6 HOURS
Refills: 0 | Status: DISCONTINUED | OUTPATIENT
Start: 2021-03-13 | End: 2021-03-19

## 2021-03-13 RX ADMIN — CARBIDOPA AND LEVODOPA 1 TABLET(S): 25; 100 TABLET ORAL at 06:12

## 2021-03-13 RX ADMIN — AMPICILLIN SODIUM AND SULBACTAM SODIUM 200 GRAM(S): 250; 125 INJECTION, POWDER, FOR SUSPENSION INTRAMUSCULAR; INTRAVENOUS at 03:37

## 2021-03-13 RX ADMIN — Medication 60 MILLIGRAM(S): at 06:10

## 2021-03-13 RX ADMIN — SODIUM CHLORIDE 30 MILLILITER(S): 9 INJECTION, SOLUTION INTRAVENOUS at 09:36

## 2021-03-13 RX ADMIN — OXYCODONE HYDROCHLORIDE 5 MILLIGRAM(S): 5 TABLET ORAL at 06:11

## 2021-03-13 RX ADMIN — Medication 1 APPLICATION(S): at 19:17

## 2021-03-13 RX ADMIN — Medication 60 MILLIGRAM(S): at 00:32

## 2021-03-13 RX ADMIN — OXYCODONE HYDROCHLORIDE 5 MILLIGRAM(S): 5 TABLET ORAL at 06:41

## 2021-03-13 RX ADMIN — OXYCODONE HYDROCHLORIDE 10 MILLIGRAM(S): 5 TABLET ORAL at 14:45

## 2021-03-13 RX ADMIN — CARBIDOPA AND LEVODOPA 1 TABLET(S): 25; 100 TABLET ORAL at 19:10

## 2021-03-13 RX ADMIN — OXYCODONE HYDROCHLORIDE 10 MILLIGRAM(S): 5 TABLET ORAL at 15:30

## 2021-03-13 RX ADMIN — Medication 60 MILLIGRAM(S): at 23:42

## 2021-03-13 RX ADMIN — OXYCODONE HYDROCHLORIDE 5 MILLIGRAM(S): 5 TABLET ORAL at 10:10

## 2021-03-13 RX ADMIN — OXYCODONE HYDROCHLORIDE 10 MILLIGRAM(S): 5 TABLET ORAL at 20:53

## 2021-03-13 RX ADMIN — AMPICILLIN SODIUM AND SULBACTAM SODIUM 200 GRAM(S): 250; 125 INJECTION, POWDER, FOR SUSPENSION INTRAMUSCULAR; INTRAVENOUS at 14:43

## 2021-03-13 RX ADMIN — CITALOPRAM 20 MILLIGRAM(S): 10 TABLET, FILM COATED ORAL at 14:43

## 2021-03-13 RX ADMIN — PRIMIDONE 50 MILLIGRAM(S): 250 TABLET ORAL at 19:10

## 2021-03-13 RX ADMIN — Medication 10 MILLIGRAM(S): at 06:11

## 2021-03-13 RX ADMIN — Medication 60 MILLIGRAM(S): at 19:10

## 2021-03-13 RX ADMIN — ENOXAPARIN SODIUM 40 MILLIGRAM(S): 100 INJECTION SUBCUTANEOUS at 14:45

## 2021-03-13 RX ADMIN — OXYCODONE HYDROCHLORIDE 10 MILLIGRAM(S): 5 TABLET ORAL at 21:43

## 2021-03-13 RX ADMIN — Medication 1 APPLICATION(S): at 06:11

## 2021-03-13 RX ADMIN — PANTOPRAZOLE SODIUM 40 MILLIGRAM(S): 20 TABLET, DELAYED RELEASE ORAL at 06:12

## 2021-03-13 RX ADMIN — AMPICILLIN SODIUM AND SULBACTAM SODIUM 200 GRAM(S): 250; 125 INJECTION, POWDER, FOR SUSPENSION INTRAMUSCULAR; INTRAVENOUS at 09:33

## 2021-03-13 RX ADMIN — OXYCODONE HYDROCHLORIDE 5 MILLIGRAM(S): 5 TABLET ORAL at 09:33

## 2021-03-14 RX ORDER — ACETAMINOPHEN 500 MG
1000 TABLET ORAL ONCE
Refills: 0 | Status: COMPLETED | OUTPATIENT
Start: 2021-03-14 | End: 2021-03-14

## 2021-03-14 RX ORDER — ACETAMINOPHEN 500 MG
650 TABLET ORAL EVERY 6 HOURS
Refills: 0 | Status: DISCONTINUED | OUTPATIENT
Start: 2021-03-14 | End: 2021-03-19

## 2021-03-14 RX ORDER — ACETAMINOPHEN 500 MG
650 TABLET ORAL ONCE
Refills: 0 | Status: DISCONTINUED | OUTPATIENT
Start: 2021-03-14 | End: 2021-03-14

## 2021-03-14 RX ORDER — ONDANSETRON 8 MG/1
4 TABLET, FILM COATED ORAL ONCE
Refills: 0 | Status: COMPLETED | OUTPATIENT
Start: 2021-03-14 | End: 2021-03-15

## 2021-03-14 RX ADMIN — Medication 1 APPLICATION(S): at 06:22

## 2021-03-14 RX ADMIN — Medication 60 MILLIGRAM(S): at 19:42

## 2021-03-14 RX ADMIN — CARBIDOPA AND LEVODOPA 1 TABLET(S): 25; 100 TABLET ORAL at 19:42

## 2021-03-14 RX ADMIN — ENOXAPARIN SODIUM 40 MILLIGRAM(S): 100 INJECTION SUBCUTANEOUS at 12:39

## 2021-03-14 RX ADMIN — OXYCODONE HYDROCHLORIDE 10 MILLIGRAM(S): 5 TABLET ORAL at 04:34

## 2021-03-14 RX ADMIN — OXYCODONE HYDROCHLORIDE 10 MILLIGRAM(S): 5 TABLET ORAL at 23:04

## 2021-03-14 RX ADMIN — Medication 60 MILLIGRAM(S): at 06:22

## 2021-03-14 RX ADMIN — Medication 1000 MILLIGRAM(S): at 10:25

## 2021-03-14 RX ADMIN — OXYCODONE HYDROCHLORIDE 10 MILLIGRAM(S): 5 TABLET ORAL at 04:04

## 2021-03-14 RX ADMIN — CARBIDOPA AND LEVODOPA 1 TABLET(S): 25; 100 TABLET ORAL at 06:22

## 2021-03-14 RX ADMIN — CITALOPRAM 20 MILLIGRAM(S): 10 TABLET, FILM COATED ORAL at 12:39

## 2021-03-14 RX ADMIN — Medication 10 MILLIGRAM(S): at 06:22

## 2021-03-14 RX ADMIN — Medication 650 MILLIGRAM(S): at 20:20

## 2021-03-14 RX ADMIN — OXYCODONE HYDROCHLORIDE 10 MILLIGRAM(S): 5 TABLET ORAL at 11:50

## 2021-03-14 RX ADMIN — PANTOPRAZOLE SODIUM 40 MILLIGRAM(S): 20 TABLET, DELAYED RELEASE ORAL at 06:23

## 2021-03-14 RX ADMIN — OXYCODONE HYDROCHLORIDE 10 MILLIGRAM(S): 5 TABLET ORAL at 16:49

## 2021-03-14 RX ADMIN — PRIMIDONE 50 MILLIGRAM(S): 250 TABLET ORAL at 12:39

## 2021-03-14 RX ADMIN — Medication 400 MILLIGRAM(S): at 09:48

## 2021-03-14 RX ADMIN — OXYCODONE HYDROCHLORIDE 10 MILLIGRAM(S): 5 TABLET ORAL at 23:43

## 2021-03-14 RX ADMIN — Medication 60 MILLIGRAM(S): at 23:04

## 2021-03-14 RX ADMIN — Medication 650 MILLIGRAM(S): at 19:44

## 2021-03-14 RX ADMIN — Medication 1 APPLICATION(S): at 19:19

## 2021-03-14 RX ADMIN — OXYCODONE HYDROCHLORIDE 10 MILLIGRAM(S): 5 TABLET ORAL at 11:06

## 2021-03-14 NOTE — PHYSICAL THERAPY INITIAL EVALUATION ADULT - GENERAL OBSERVATIONS, REHAB EVAL
Pt received semi-uribe in bed, +NG tube, NAD. Pt agreeable to PT consultation. Cleared for PT as per RUBEN Garber

## 2021-03-14 NOTE — PHYSICAL THERAPY INITIAL EVALUATION ADULT - ADDITIONAL COMMENTS
Pt lives at home with daughter in house, 3 steps to enter, resides on first floor. Ambulates with cane. performs ADLs independently.     pt left seated in bedside chair, NAD. +call bell. RN aware of session.

## 2021-03-14 NOTE — PHYSICAL THERAPY INITIAL EVALUATION ADULT - DISCHARGE DISPOSITION, PT EVAL
anticipate Home with home PT in order to address independence during ADLs and ambulation and decrease fall risk during all functional activities

## 2021-03-15 LAB
ANION GAP SERPL CALC-SCNC: 10 MMOL/L — SIGNIFICANT CHANGE UP (ref 7–14)
BUN SERPL-MCNC: 16 MG/DL — SIGNIFICANT CHANGE UP (ref 7–23)
CALCIUM SERPL-MCNC: 9.3 MG/DL — SIGNIFICANT CHANGE UP (ref 8.4–10.5)
CHLORIDE SERPL-SCNC: 99 MMOL/L — SIGNIFICANT CHANGE UP (ref 98–107)
CO2 SERPL-SCNC: 28 MMOL/L — SIGNIFICANT CHANGE UP (ref 22–31)
CREAT SERPL-MCNC: 0.87 MG/DL — SIGNIFICANT CHANGE UP (ref 0.5–1.3)
GLUCOSE SERPL-MCNC: 193 MG/DL — HIGH (ref 70–99)
HCT VFR BLD CALC: 44.9 % — SIGNIFICANT CHANGE UP (ref 39–50)
HGB BLD-MCNC: 15 G/DL — SIGNIFICANT CHANGE UP (ref 13–17)
MAGNESIUM SERPL-MCNC: 2.3 MG/DL — SIGNIFICANT CHANGE UP (ref 1.6–2.6)
MCHC RBC-ENTMCNC: 32.5 PG — SIGNIFICANT CHANGE UP (ref 27–34)
MCHC RBC-ENTMCNC: 33.4 GM/DL — SIGNIFICANT CHANGE UP (ref 32–36)
MCV RBC AUTO: 97.4 FL — SIGNIFICANT CHANGE UP (ref 80–100)
NRBC # BLD: 0 /100 WBCS — SIGNIFICANT CHANGE UP
NRBC # FLD: 0 K/UL — SIGNIFICANT CHANGE UP
PHOSPHATE SERPL-MCNC: 3.2 MG/DL — SIGNIFICANT CHANGE UP (ref 2.5–4.5)
PLATELET # BLD AUTO: 209 K/UL — SIGNIFICANT CHANGE UP (ref 150–400)
POTASSIUM SERPL-MCNC: 4.1 MMOL/L — SIGNIFICANT CHANGE UP (ref 3.5–5.3)
POTASSIUM SERPL-SCNC: 4.1 MMOL/L — SIGNIFICANT CHANGE UP (ref 3.5–5.3)
RBC # BLD: 4.61 M/UL — SIGNIFICANT CHANGE UP (ref 4.2–5.8)
RBC # FLD: 13.7 % — SIGNIFICANT CHANGE UP (ref 10.3–14.5)
SODIUM SERPL-SCNC: 137 MMOL/L — SIGNIFICANT CHANGE UP (ref 135–145)
WBC # BLD: 8.13 K/UL — SIGNIFICANT CHANGE UP (ref 3.8–10.5)
WBC # FLD AUTO: 8.13 K/UL — SIGNIFICANT CHANGE UP (ref 3.8–10.5)

## 2021-03-15 RX ORDER — DILTIAZEM HCL 120 MG
60 CAPSULE, EXT RELEASE 24 HR ORAL EVERY 6 HOURS
Refills: 0 | Status: DISCONTINUED | OUTPATIENT
Start: 2021-03-15 | End: 2021-03-19

## 2021-03-15 RX ORDER — DILTIAZEM HCL 120 MG
60 CAPSULE, EXT RELEASE 24 HR ORAL EVERY 6 HOURS
Refills: 0 | Status: DISCONTINUED | OUTPATIENT
Start: 2021-03-15 | End: 2021-03-15

## 2021-03-15 RX ADMIN — PANTOPRAZOLE SODIUM 40 MILLIGRAM(S): 20 TABLET, DELAYED RELEASE ORAL at 05:39

## 2021-03-15 RX ADMIN — ONDANSETRON 4 MILLIGRAM(S): 8 TABLET, FILM COATED ORAL at 04:22

## 2021-03-15 RX ADMIN — OXYCODONE HYDROCHLORIDE 10 MILLIGRAM(S): 5 TABLET ORAL at 13:39

## 2021-03-15 RX ADMIN — OXYCODONE HYDROCHLORIDE 10 MILLIGRAM(S): 5 TABLET ORAL at 05:39

## 2021-03-15 RX ADMIN — ENOXAPARIN SODIUM 40 MILLIGRAM(S): 100 INJECTION SUBCUTANEOUS at 13:38

## 2021-03-15 RX ADMIN — Medication 60 MILLIGRAM(S): at 17:39

## 2021-03-15 RX ADMIN — Medication 60 MILLIGRAM(S): at 05:39

## 2021-03-15 RX ADMIN — Medication 650 MILLIGRAM(S): at 01:44

## 2021-03-15 RX ADMIN — OXYCODONE HYDROCHLORIDE 10 MILLIGRAM(S): 5 TABLET ORAL at 14:03

## 2021-03-15 RX ADMIN — Medication 60 MILLIGRAM(S): at 23:12

## 2021-03-15 RX ADMIN — PRIMIDONE 50 MILLIGRAM(S): 250 TABLET ORAL at 13:38

## 2021-03-15 RX ADMIN — Medication 60 MILLIGRAM(S): at 13:38

## 2021-03-15 RX ADMIN — Medication 650 MILLIGRAM(S): at 02:15

## 2021-03-15 RX ADMIN — OXYCODONE HYDROCHLORIDE 5 MILLIGRAM(S): 5 TABLET ORAL at 10:27

## 2021-03-15 RX ADMIN — OXYCODONE HYDROCHLORIDE 5 MILLIGRAM(S): 5 TABLET ORAL at 18:09

## 2021-03-15 RX ADMIN — CARBIDOPA AND LEVODOPA 1 TABLET(S): 25; 100 TABLET ORAL at 17:39

## 2021-03-15 RX ADMIN — Medication 1 APPLICATION(S): at 05:38

## 2021-03-15 RX ADMIN — Medication 1 APPLICATION(S): at 17:39

## 2021-03-15 RX ADMIN — OXYCODONE HYDROCHLORIDE 5 MILLIGRAM(S): 5 TABLET ORAL at 17:39

## 2021-03-15 RX ADMIN — CARBIDOPA AND LEVODOPA 1 TABLET(S): 25; 100 TABLET ORAL at 05:39

## 2021-03-15 RX ADMIN — OXYCODONE HYDROCHLORIDE 5 MILLIGRAM(S): 5 TABLET ORAL at 09:57

## 2021-03-15 RX ADMIN — OXYCODONE HYDROCHLORIDE 10 MILLIGRAM(S): 5 TABLET ORAL at 22:08

## 2021-03-15 RX ADMIN — CITALOPRAM 20 MILLIGRAM(S): 10 TABLET, FILM COATED ORAL at 13:38

## 2021-03-15 RX ADMIN — Medication 10 MILLIGRAM(S): at 05:39

## 2021-03-15 RX ADMIN — OXYCODONE HYDROCHLORIDE 10 MILLIGRAM(S): 5 TABLET ORAL at 22:38

## 2021-03-15 NOTE — SWALLOW BEDSIDE ASSESSMENT ADULT - ADDITIONAL RECOMMENDATIONS
Monitor tolerance and reconsult this service as indicated  Swallow therapy pending discharge plans outpatient at [facility] [phone], this service will attempt to follow up as schedule permits

## 2021-03-15 NOTE — SWALLOW BEDSIDE ASSESSMENT ADULT - ORAL PHASE
suspect premature spillage diffuse oral cavity residue which clears with liquid wash Within functional limits

## 2021-03-15 NOTE — SWALLOW BEDSIDE ASSESSMENT ADULT - COMMENTS
As per ENT Note 3/15/21: 75M s/p s/p partial glossectomy, SND and full thickness skin graft. DOing well with NG tube feeds started.    As per Operative Note 3/12/21: L partial glossectomy/FOM resection. Full thickness skin graft from R chest, closed primarily. L SND levels 1-3.    CXR 3/12/21: The cardiac silhouette is normal in size. The lung volumes are low. There is a trace left pleural effusion and bibasilar atelectasis. There is a nasogastric tube with its tip in good position, within the stomach.    According to [employer], patient seen for outpatient swallow evaluation 2/2021, please refer to report for full details.      SLP received patient sitting upright in chair, awake, alert, able to follow directions, answer questions and engage in conversation. Patient denies pain.  Noted sutures on lateral left side of neck, KERVIN drain on lateral left of neck and NGT in situ.

## 2021-03-15 NOTE — SWALLOW BEDSIDE ASSESSMENT ADULT - SWALLOW EVAL: DIAGNOSIS
Patient consumed trials of puree, solids, honey thick liquids, nectar thick liquids and thin liquids presenting with moderate oropharyngeal dysphagia. Oral phase characterized by adequate acceptance, adequate anterior bolus containment, slow mastication and bolus manipulation, suspect premature spillage with thin liquids, diffuse oral cavity residue with solids which cleared with liquid wash.  Pharyngeal phase characterized by suspect delayed initiation of the pharyngeal swallow and hyolaryngeal elevation and excursion noted upon palpation.  Coughing following trials of thin liquids indicative of laryngeal penetration/aspiration.  No clinically overt signs or symptoms of aspiration across trials of puree, solids, nectar thick liquids and honey thick liquids. However, given severity of oral phase deficits patient would benefit from puree consistency at this time.

## 2021-03-16 ENCOUNTER — TRANSCRIPTION ENCOUNTER (OUTPATIENT)
Age: 76
End: 2021-03-16

## 2021-03-16 LAB — SARS-COV-2 RNA SPEC QL NAA+PROBE: SIGNIFICANT CHANGE UP

## 2021-03-16 RX ORDER — OXYCODONE AND ACETAMINOPHEN 5; 325 MG/1; MG/1
1 TABLET ORAL
Qty: 16 | Refills: 0
Start: 2021-03-16 | End: 2021-03-19

## 2021-03-16 RX ORDER — ACETAMINOPHEN 500 MG
20.31 TABLET ORAL
Qty: 0 | Refills: 0 | DISCHARGE
Start: 2021-03-16

## 2021-03-16 RX ORDER — OXYCODONE HYDROCHLORIDE 5 MG/1
5 TABLET ORAL ONCE
Refills: 0 | Status: DISCONTINUED | OUTPATIENT
Start: 2021-03-16 | End: 2021-03-16

## 2021-03-16 RX ORDER — PANTOPRAZOLE SODIUM 20 MG/1
40 TABLET, DELAYED RELEASE ORAL
Refills: 0 | Status: DISCONTINUED | OUTPATIENT
Start: 2021-03-16 | End: 2021-03-19

## 2021-03-16 RX ORDER — BACITRACIN ZINC 500 UNIT/G
1 OINTMENT IN PACKET (EA) TOPICAL
Qty: 1 | Refills: 0
Start: 2021-03-16 | End: 2021-03-22

## 2021-03-16 RX ADMIN — OXYCODONE HYDROCHLORIDE 5 MILLIGRAM(S): 5 TABLET ORAL at 17:30

## 2021-03-16 RX ADMIN — OXYCODONE HYDROCHLORIDE 5 MILLIGRAM(S): 5 TABLET ORAL at 16:52

## 2021-03-16 RX ADMIN — OXYCODONE HYDROCHLORIDE 10 MILLIGRAM(S): 5 TABLET ORAL at 04:56

## 2021-03-16 RX ADMIN — OXYCODONE HYDROCHLORIDE 10 MILLIGRAM(S): 5 TABLET ORAL at 04:26

## 2021-03-16 RX ADMIN — Medication 1 APPLICATION(S): at 18:03

## 2021-03-16 RX ADMIN — OXYCODONE HYDROCHLORIDE 5 MILLIGRAM(S): 5 TABLET ORAL at 10:40

## 2021-03-16 RX ADMIN — Medication 10 MILLIGRAM(S): at 07:18

## 2021-03-16 RX ADMIN — Medication 1 APPLICATION(S): at 07:19

## 2021-03-16 RX ADMIN — OXYCODONE HYDROCHLORIDE 5 MILLIGRAM(S): 5 TABLET ORAL at 09:57

## 2021-03-16 RX ADMIN — CARBIDOPA AND LEVODOPA 1 TABLET(S): 25; 100 TABLET ORAL at 18:39

## 2021-03-16 RX ADMIN — OXYCODONE HYDROCHLORIDE 10 MILLIGRAM(S): 5 TABLET ORAL at 23:17

## 2021-03-16 RX ADMIN — OXYCODONE HYDROCHLORIDE 10 MILLIGRAM(S): 5 TABLET ORAL at 13:28

## 2021-03-16 RX ADMIN — CARBIDOPA AND LEVODOPA 1 TABLET(S): 25; 100 TABLET ORAL at 07:19

## 2021-03-16 RX ADMIN — PANTOPRAZOLE SODIUM 40 MILLIGRAM(S): 20 TABLET, DELAYED RELEASE ORAL at 07:19

## 2021-03-16 RX ADMIN — PRIMIDONE 50 MILLIGRAM(S): 250 TABLET ORAL at 11:16

## 2021-03-16 RX ADMIN — Medication 60 MILLIGRAM(S): at 07:18

## 2021-03-16 RX ADMIN — OXYCODONE HYDROCHLORIDE 10 MILLIGRAM(S): 5 TABLET ORAL at 12:47

## 2021-03-16 RX ADMIN — CITALOPRAM 20 MILLIGRAM(S): 10 TABLET, FILM COATED ORAL at 11:16

## 2021-03-16 RX ADMIN — OXYCODONE HYDROCHLORIDE 5 MILLIGRAM(S): 5 TABLET ORAL at 08:08

## 2021-03-16 RX ADMIN — ENOXAPARIN SODIUM 40 MILLIGRAM(S): 100 INJECTION SUBCUTANEOUS at 11:17

## 2021-03-16 RX ADMIN — Medication 60 MILLIGRAM(S): at 11:16

## 2021-03-16 RX ADMIN — OXYCODONE HYDROCHLORIDE 5 MILLIGRAM(S): 5 TABLET ORAL at 07:38

## 2021-03-16 RX ADMIN — Medication 60 MILLIGRAM(S): at 18:03

## 2021-03-16 RX ADMIN — Medication 60 MILLIGRAM(S): at 23:17

## 2021-03-16 RX ADMIN — OXYCODONE HYDROCHLORIDE 10 MILLIGRAM(S): 5 TABLET ORAL at 23:47

## 2021-03-16 NOTE — DISCHARGE NOTE PROVIDER - NSDCFUADDINST_GEN_ALL_CORE_FT
you should empty your KERVIN drain as taught at [facility] and record the amount. Call Dr. Boland's office to schedule an appointment to have it removed.  you should empty your KERVIN drain as taught at [facility] and record the amount. Call Dr. Boland's office to make an appointment to have the KERVIN drain removed on Thursday 3/18.   you should empty your KERVIN drain as taught at [facility] and record the amount. Call Dr. Boland's office to make an appointment to have the KERVIN drain removed and staples removed.

## 2021-03-16 NOTE — DISCHARGE NOTE PROVIDER - NSDCMRMEDTOKEN_GEN_ALL_CORE_FT
acetaminophen 160 mg/5 mL oral suspension: 20.31 milliliter(s) orally every 6 hours, As needed, mild pain (1-3), breakthrough mod/severe pain  bacitracin 500 units/g topical ointment: 1 application topically 2 times a day  carbidopa-levodopa 25 mg-100 mg oral tablet: 1 tab(s) orally 2 times a day  citalopram 20 mg oral tablet: 1 tab(s) orally once a day  dilTIAZem 240 mg/24 hours oral tablet, extended release: 1 tab(s) orally once a day  enalapril 10 mg oral tablet: 1 tab(s) orally once a day  Multiple Vitamins oral tablet: 2 tab(s) orally once a day, last dose 3/5/2021  Osteo Bi-Flex 250 mg-200 mg oral tablet: 2 tab(s) orally once a day, last dose 3/5/2021  pantoprazole 40 mg oral delayed release tablet: 1 tab(s) orally once a day-before breakfast  Percocet 5 mg-325 mg oral tablet: 1 tab(s) orally every 6 hours MDD:4  primidone 50 mg oral tablet: 1 tab(s) orally once a day-evening  simvastatin 40 mg oral tablet: 1 tab(s) orally once a day

## 2021-03-16 NOTE — DISCHARGE NOTE PROVIDER - NSDCCPCAREPLAN_GEN_ALL_CORE_FT
PRINCIPAL DISCHARGE DIAGNOSIS  Diagnosis: Tongue cancer  Assessment and Plan of Treatment:        PRINCIPAL DISCHARGE DIAGNOSIS  Diagnosis: Tongue cancer  Assessment and Plan of Treatment: - Please follow up with Dr. Boland as scheduled

## 2021-03-16 NOTE — DISCHARGE NOTE PROVIDER - HOSPITAL COURSE
S/P partial glossectomy with neck dissection 3/12/21. Patient was observed on [facility]. Swallow study was done and patient was started on a full liquid diet. KERVIN drain was monitored for out put and needed to be kept in place to prevent seroma formation. Cleared for discharge home on 3/16/20. All prescriptions were sent to patients pharmacy. S/P partial glossectomy with neck dissection 3/12/21. Patient was observed on 8 south. Swallow study was done and patient was started on a full liquid diet. KERVIN drain was monitored for out put and needed to be kept in place to prevent seroma formation. Cleared for discharge home on 3/16/20. Then recommended for rehab. Discharged to rehab on 3/17/21. All prescriptions were sent to patients pharmacy. 76 y/o M S/P partial glossectomy with neck dissection 3/12/21. Patient was observed on 8 [facility]. Swallow study was done and patient was started on a full liquid diet. KERVIN drain was monitored for out put and removed on 03/18/2021. PT evaluated patient recommended for rehab on 03/17/2021. neck staple removed on 03/19/2021. Patient discharged to rehab on 3/19/21. All prescriptions were sent to patients pharmacy.

## 2021-03-16 NOTE — DISCHARGE NOTE PROVIDER - CARE PROVIDER_API CALL
Jayden Boland)  Otolaryngology  [address] [place] [address]  Phone: [phone]  Fax: [phone]  Follow Up Time:

## 2021-03-17 RX ORDER — SODIUM CHLORIDE 9 MG/ML
1000 INJECTION, SOLUTION INTRAVENOUS
Refills: 0 | Status: DISCONTINUED | OUTPATIENT
Start: 2021-03-17 | End: 2021-03-17

## 2021-03-17 RX ORDER — SENNA PLUS 8.6 MG/1
2 TABLET ORAL AT BEDTIME
Refills: 0 | Status: DISCONTINUED | OUTPATIENT
Start: 2021-03-17 | End: 2021-03-19

## 2021-03-17 RX ADMIN — PRIMIDONE 50 MILLIGRAM(S): 250 TABLET ORAL at 11:15

## 2021-03-17 RX ADMIN — OXYCODONE HYDROCHLORIDE 5 MILLIGRAM(S): 5 TABLET ORAL at 08:56

## 2021-03-17 RX ADMIN — Medication 10 MILLIGRAM(S): at 05:22

## 2021-03-17 RX ADMIN — Medication 650 MILLIGRAM(S): at 03:39

## 2021-03-17 RX ADMIN — OXYCODONE HYDROCHLORIDE 5 MILLIGRAM(S): 5 TABLET ORAL at 09:34

## 2021-03-17 RX ADMIN — CARBIDOPA AND LEVODOPA 1 TABLET(S): 25; 100 TABLET ORAL at 18:27

## 2021-03-17 RX ADMIN — Medication 60 MILLIGRAM(S): at 18:27

## 2021-03-17 RX ADMIN — OXYCODONE HYDROCHLORIDE 10 MILLIGRAM(S): 5 TABLET ORAL at 05:23

## 2021-03-17 RX ADMIN — Medication 650 MILLIGRAM(S): at 04:09

## 2021-03-17 RX ADMIN — Medication 1 APPLICATION(S): at 18:27

## 2021-03-17 RX ADMIN — Medication 650 MILLIGRAM(S): at 12:15

## 2021-03-17 RX ADMIN — OXYCODONE HYDROCHLORIDE 10 MILLIGRAM(S): 5 TABLET ORAL at 16:14

## 2021-03-17 RX ADMIN — SODIUM CHLORIDE 75 MILLILITER(S): 9 INJECTION, SOLUTION INTRAVENOUS at 05:26

## 2021-03-17 RX ADMIN — OXYCODONE HYDROCHLORIDE 10 MILLIGRAM(S): 5 TABLET ORAL at 20:35

## 2021-03-17 RX ADMIN — Medication 1 APPLICATION(S): at 05:23

## 2021-03-17 RX ADMIN — OXYCODONE HYDROCHLORIDE 10 MILLIGRAM(S): 5 TABLET ORAL at 20:00

## 2021-03-17 RX ADMIN — Medication 60 MILLIGRAM(S): at 05:22

## 2021-03-17 RX ADMIN — CITALOPRAM 20 MILLIGRAM(S): 10 TABLET, FILM COATED ORAL at 11:16

## 2021-03-17 RX ADMIN — OXYCODONE HYDROCHLORIDE 10 MILLIGRAM(S): 5 TABLET ORAL at 17:00

## 2021-03-17 RX ADMIN — Medication 60 MILLIGRAM(S): at 11:16

## 2021-03-17 RX ADMIN — PANTOPRAZOLE SODIUM 40 MILLIGRAM(S): 20 TABLET, DELAYED RELEASE ORAL at 06:10

## 2021-03-17 RX ADMIN — Medication 650 MILLIGRAM(S): at 11:30

## 2021-03-17 RX ADMIN — ENOXAPARIN SODIUM 40 MILLIGRAM(S): 100 INJECTION SUBCUTANEOUS at 11:15

## 2021-03-17 RX ADMIN — CARBIDOPA AND LEVODOPA 1 TABLET(S): 25; 100 TABLET ORAL at 05:22

## 2021-03-17 RX ADMIN — OXYCODONE HYDROCHLORIDE 10 MILLIGRAM(S): 5 TABLET ORAL at 06:00

## 2021-03-18 LAB — SURGICAL PATHOLOGY STUDY: SIGNIFICANT CHANGE UP

## 2021-03-18 RX ADMIN — Medication 60 MILLIGRAM(S): at 17:56

## 2021-03-18 RX ADMIN — OXYCODONE HYDROCHLORIDE 10 MILLIGRAM(S): 5 TABLET ORAL at 09:16

## 2021-03-18 RX ADMIN — CARBIDOPA AND LEVODOPA 1 TABLET(S): 25; 100 TABLET ORAL at 17:56

## 2021-03-18 RX ADMIN — ENOXAPARIN SODIUM 40 MILLIGRAM(S): 100 INJECTION SUBCUTANEOUS at 12:52

## 2021-03-18 RX ADMIN — CITALOPRAM 20 MILLIGRAM(S): 10 TABLET, FILM COATED ORAL at 12:52

## 2021-03-18 RX ADMIN — OXYCODONE HYDROCHLORIDE 10 MILLIGRAM(S): 5 TABLET ORAL at 18:24

## 2021-03-18 RX ADMIN — OXYCODONE HYDROCHLORIDE 5 MILLIGRAM(S): 5 TABLET ORAL at 03:07

## 2021-03-18 RX ADMIN — PANTOPRAZOLE SODIUM 40 MILLIGRAM(S): 20 TABLET, DELAYED RELEASE ORAL at 06:24

## 2021-03-18 RX ADMIN — Medication 1 APPLICATION(S): at 07:46

## 2021-03-18 RX ADMIN — PRIMIDONE 50 MILLIGRAM(S): 250 TABLET ORAL at 12:52

## 2021-03-18 RX ADMIN — Medication 650 MILLIGRAM(S): at 12:49

## 2021-03-18 RX ADMIN — CARBIDOPA AND LEVODOPA 1 TABLET(S): 25; 100 TABLET ORAL at 06:24

## 2021-03-18 RX ADMIN — OXYCODONE HYDROCHLORIDE 5 MILLIGRAM(S): 5 TABLET ORAL at 02:37

## 2021-03-18 RX ADMIN — Medication 1 APPLICATION(S): at 17:53

## 2021-03-18 RX ADMIN — Medication 60 MILLIGRAM(S): at 02:38

## 2021-03-18 RX ADMIN — OXYCODONE HYDROCHLORIDE 10 MILLIGRAM(S): 5 TABLET ORAL at 08:43

## 2021-03-18 RX ADMIN — Medication 60 MILLIGRAM(S): at 12:52

## 2021-03-18 RX ADMIN — Medication 60 MILLIGRAM(S): at 06:24

## 2021-03-18 RX ADMIN — Medication 10 MILLIGRAM(S): at 06:24

## 2021-03-18 RX ADMIN — Medication 650 MILLIGRAM(S): at 13:19

## 2021-03-18 RX ADMIN — Medication 650 MILLIGRAM(S): at 06:24

## 2021-03-18 RX ADMIN — SENNA PLUS 2 TABLET(S): 8.6 TABLET ORAL at 02:41

## 2021-03-18 RX ADMIN — OXYCODONE HYDROCHLORIDE 10 MILLIGRAM(S): 5 TABLET ORAL at 17:54

## 2021-03-18 RX ADMIN — Medication 650 MILLIGRAM(S): at 06:54

## 2021-03-18 RX ADMIN — Medication 650 MILLIGRAM(S): at 19:59

## 2021-03-18 NOTE — CHART NOTE - NSCHARTNOTEFT_GEN_A_CORE
Patient's left neck hai-avalos drain removed, no complications, patient tolerated well.  Hai-avalos drain stoma covered with guaze pad.  Left neck every other staple removed as well.

## 2021-03-19 ENCOUNTER — TRANSCRIPTION ENCOUNTER (OUTPATIENT)
Age: 76
End: 2021-03-19

## 2021-03-19 VITALS
DIASTOLIC BLOOD PRESSURE: 97 MMHG | OXYGEN SATURATION: 97 % | HEART RATE: 75 BPM | RESPIRATION RATE: 18 BRPM | SYSTOLIC BLOOD PRESSURE: 142 MMHG | TEMPERATURE: 98 F

## 2021-03-19 RX ADMIN — OXYCODONE HYDROCHLORIDE 10 MILLIGRAM(S): 5 TABLET ORAL at 10:30

## 2021-03-19 RX ADMIN — Medication 650 MILLIGRAM(S): at 06:00

## 2021-03-19 RX ADMIN — OXYCODONE HYDROCHLORIDE 10 MILLIGRAM(S): 5 TABLET ORAL at 00:55

## 2021-03-19 RX ADMIN — OXYCODONE HYDROCHLORIDE 10 MILLIGRAM(S): 5 TABLET ORAL at 09:45

## 2021-03-19 RX ADMIN — CITALOPRAM 20 MILLIGRAM(S): 10 TABLET, FILM COATED ORAL at 11:13

## 2021-03-19 RX ADMIN — OXYCODONE HYDROCHLORIDE 10 MILLIGRAM(S): 5 TABLET ORAL at 00:25

## 2021-03-19 RX ADMIN — Medication 60 MILLIGRAM(S): at 00:25

## 2021-03-19 RX ADMIN — Medication 60 MILLIGRAM(S): at 11:14

## 2021-03-19 RX ADMIN — CARBIDOPA AND LEVODOPA 1 TABLET(S): 25; 100 TABLET ORAL at 05:28

## 2021-03-19 RX ADMIN — PRIMIDONE 50 MILLIGRAM(S): 250 TABLET ORAL at 11:14

## 2021-03-19 RX ADMIN — PANTOPRAZOLE SODIUM 40 MILLIGRAM(S): 20 TABLET, DELAYED RELEASE ORAL at 05:21

## 2021-03-19 RX ADMIN — Medication 650 MILLIGRAM(S): at 16:24

## 2021-03-19 RX ADMIN — Medication 1 APPLICATION(S): at 05:22

## 2021-03-19 RX ADMIN — OXYCODONE HYDROCHLORIDE 5 MILLIGRAM(S): 5 TABLET ORAL at 14:02

## 2021-03-19 RX ADMIN — Medication 650 MILLIGRAM(S): at 05:28

## 2021-03-19 RX ADMIN — Medication 10 MILLIGRAM(S): at 05:22

## 2021-03-19 RX ADMIN — Medication 60 MILLIGRAM(S): at 05:22

## 2021-03-19 RX ADMIN — ENOXAPARIN SODIUM 40 MILLIGRAM(S): 100 INJECTION SUBCUTANEOUS at 11:13

## 2021-03-19 NOTE — DIETITIAN INITIAL EVALUATION ADULT. - PERTINENT MEDS FT
carbidopa/levodopa  25/100  citalopram  diltiazem    Tablet  enalapril  pantoprazole    Tablet  primidone  senna

## 2021-03-19 NOTE — DISCHARGE NOTE NURSING/CASE MANAGEMENT/SOCIAL WORK - PATIENT PORTAL LINK FT
You can access the FollowMyHealth Patient Portal offered by [facility] by registering at the following website: [email or web address]. By joining [facility]’s FollowMyHealth portal, you will also be able to view your health information using other applications (apps) compatible with our system.

## 2021-03-19 NOTE — DISCHARGE NOTE NURSING/CASE MANAGEMENT/SOCIAL WORK - NSDCCRNAME_GEN_ALL_CORE_FT
[facility]  [address] [facility]  [phone]     [facility] [id number], Trip # [id number]   2pm

## 2021-03-19 NOTE — DIETITIAN INITIAL EVALUATION ADULT. - OTHER INFO
Met with patient at bedside. S/p swallow bedside on 3/15 Puree (dysphagia 1) and Nectar Thick Liquids recommended. Currently on Full liquids, Nectar Consistency per ENT, tolerating diet well. Patient denies any nausea/vomiting/diarrhea/constipation at this time. Patient denies any weight changes. Currently weighs 199.9lbs/90.7kg (3/12). Plan d/c [facility].

## 2021-03-19 NOTE — PROGRESS NOTE ADULT - SUBJECTIVE AND OBJECTIVE BOX
Pt seen and examined at the bedside. Pt doing well s/p partial glossectomy, SND and full thickness skin graft. Tolerating puree with nectar thick. Pain controlled.  PT changed recommendation to rehab, for which he is awaiting placement. KERVIN and every other staple was removed yesterday. Overnight, received maalox for gas.     Vital Signs Last 24 Hrs  T(C): 36.8 (18 Mar 2021 21:45), Max: 37 (18 Mar 2021 17:29)  T(F): 98.3 (18 Mar 2021 21:45), Max: 98.6 (18 Mar 2021 17:29)  HR: 70 (18 Mar 2021 21:45) (70 - 89)  BP: 141/98 (18 Mar 2021 21:45) (126/65 - 155/70)  BP(mean): --  RR: 16 (18 Mar 2021 21:45) (16 - 18)  SpO2: 97% (18 Mar 2021 21:45) (94% - 98%)    PE:  Gen: Laying supine in bed in NAD  OC: Surgical site clean/dry and intact. Full thickness skin graft appears viable  Neck: Incision clean/dry and intact with no swelling or collection noted ,  Chest: Incision clean/dry and intact with no swelling or collection noted     A/P:  75M s/p partial glossectomy, SND and full thickness skin graft. Doing well with PO diet    - Pain control  - continue puree with nectar  - Lovenox  - Ambulate as tolerated  - Home meds restarted   
Pt seen and examined at the bedside. Pt doing well s/p partial glossectomy, SND and full thickness skin graft. NG tube was placed and placement confirmed on CXR. NG tube feeds started. Pt was complaining of pain post op and was given liquid oxycodone via NG tube with good effect.    Vital Signs Last 24 Hrs  T(C): 36.6 (13 Mar 2021 00:30), Max: 37 (12 Mar 2021 13:45)  T(F): 97.9 (13 Mar 2021 00:30), Max: 98.6 (12 Mar 2021 13:45)  HR: 95 (13 Mar 2021 00:30) (90 - 107)  BP: 141/87 (13 Mar 2021 00:30) (126/79 - 162/107)  BP(mean): 105 (12 Mar 2021 21:00) (92 - 124)  RR: 18 (13 Mar 2021 00:30) (10 - 24)  SpO2: 93% (13 Mar 2021 00:30) (91% - 96%)    PE:  Gen: Laying supinn in bed in NAD  OC: Surgical site clean/dry and intact. Fullthickness skin graft appears viable  Neck: Incision clean/dry and intact with no swelling or collection noted     A/P:  POD1 s/p s/p partial glossectomy, SND and full thickness skin graft. DOing well with NG tube feeds started.     - Pain control  - Continue NG tube feeds  - Lovenox  - Ambulate as tolerated  - Unasyn for 24 hours  - Home meds restarted   
Pt seen and examined at the bedside. Pt doing well s/p partial glossectomy, SND and full thickness skin graft. Tolerating NGT feeds. Pain controlled on medication.    Vital Signs Last 24 Hrs  T(C): 37.2 (13 Mar 2021 21:51), Max: 37.2 (13 Mar 2021 21:51)  T(F): 98.9 (13 Mar 2021 21:51), Max: 98.9 (13 Mar 2021 21:51)  HR: 83 (13 Mar 2021 21:51) (81 - 100)  BP: 140/76 (13 Mar 2021 21:51) (116/68 - 167/95)  BP(mean): --  RR: 18 (13 Mar 2021 21:51) (17 - 19)  SpO2: 96% (13 Mar 2021 21:51) (90% - 96%)    PE:  Gen: Laying supinn in bed in NAD  OC: Surgical site clean/dry and intact. Fullthickness skin graft appears viable  Neck: Incision clean/dry and intact with no swelling or collection noted     A/P:  75M s/p s/p partial glossectomy, SND and full thickness skin graft. DOing well with NG tube feeds started.     - Pain control  - Continue NG tube feeds  - Lovenox  - Ambulate as tolerated  - Home meds restarted   
Pt seen and examined at the bedside. Pt doing well s/p partial glossectomy, SND and full thickness skin graft. Tolerating NGT feeds. Pt complaining of pain today with acetaminophen added between oxycodone, which helped control breakthrough pain. Plan for bedside swallow today.     Vital Signs Last 24 Hrs  T(C): 37.1 (14 Mar 2021 21:47), Max: 37.1 (14 Mar 2021 21:47)  T(F): 98.7 (14 Mar 2021 21:47), Max: 98.7 (14 Mar 2021 21:47)  HR: 79 (14 Mar 2021 21:47) (72 - 87)  BP: 143/77 (14 Mar 2021 21:47) (111/60 - 151/74)  BP(mean): --  RR: 17 (14 Mar 2021 21:47) (16 - 19)  SpO2: 94% (14 Mar 2021 21:47) (91% - 95%)    PE:  Gen: Laying supine in bed in NAD  OC: Surgical site clean/dry and intact. Full thickness skin graft appears viable  Neck: Incision clean/dry and intact with no swelling or collection noted   Chest: Incision clean/dry and intact with no swelling or collection noted     A/P:  75M s/p s/p partial glossectomy, SND and full thickness skin graft. DOing well with NG tube feeds started.     - Bedside swallow today   - Pain control  - Continue NG tube feeds  - Lovenox  - Ambulate as tolerated  - Home meds restarted   
Pt seen and examined at the bedside. Pt doing well s/p partial glossectomy, SND and full thickness skin graft. Tolerating puree with nectar thick. Pain controlled.    T(C): 37.1 (03-16-21 @ 07:17), Max: 37.2 (03-16-21 @ 02:06)  HR: 78 (03-16-21 @ 07:17) (78 - 90)  BP: 138/90 (03-16-21 @ 07:17) (132/83 - 153/84)  RR: 16 (03-16-21 @ 07:17) (16 - 16)  SpO2: 98% (03-16-21 @ 07:17) (94% - 98%)    PE:  Gen: Laying supine in bed in NAD  OC: Surgical site clean/dry and intact. Full thickness skin graft appears viable  Neck: Incision clean/dry and intact with no swelling or collection noted   Chest: Incision clean/dry and intact with no swelling or collection noted     A/P:  75M s/p s/p partial glossectomy, SND and full thickness skin graft. DOing well with NG tube feeds started.     - Pain control  - continue puree with nectar  - Lovenox  - Ambulate as tolerated  - Home meds restarted   
Pt seen and examined at the bedside. Pt doing well s/p partial glossectomy, SND and full thickness skin graft. Tolerating puree with nectar thick. Pain controlled.  PT changed recommendation to rehab.    T(C): 36.7 (03-18-21 @ 02:04), Max: 37.1 (03-17-21 @ 18:00)  HR: 81 (03-18-21 @ 02:38) (71 - 81)  BP: 133/71 (03-18-21 @ 02:38) (129/73 - 155/87)  RR: 18 (03-18-21 @ 02:04) (17 - 18)  SpO2: 97% (03-18-21 @ 02:04) (94% - 97%)    PE:  Gen: Laying supine in bed in NAD  OC: Surgical site clean/dry and intact. Full thickness skin graft appears viable  Neck: Incision clean/dry and intact with no swelling or collection noted , KERVIN 25cc  Chest: Incision clean/dry and intact with no swelling or collection noted     A/P:  75M s/p partial glossectomy, SND and full thickness skin graft. Doing well with PO diet    - Pain control  - continue puree with nectar  - Lovenox  - Ambulate as tolerated  - Home meds restarted   
Pt seen and examined at the bedside. Pt doing well s/p partial glossectomy, SND and full thickness skin graft. Tolerating puree with nectar thick. Pain controlled.  PT changed recommendation to rehab. Pt will remain in house and continue to work with PT until stable for home    Vital Signs Last 24 Hrs  T(C): 37.3 (16 Mar 2021 21:00), Max: 37.3 (16 Mar 2021 21:00)  T(F): 99.2 (16 Mar 2021 21:00), Max: 99.2 (16 Mar 2021 21:00)  HR: 72 (16 Mar 2021 21:00) (72 - 82)  BP: 112/65 (16 Mar 2021 21:00) (112/65 - 151/79)  BP(mean): --  RR: 18 (16 Mar 2021 21:00) (16 - 18)  SpO2: 96% (16 Mar 2021 21:00) (93% - 98%)    PE:  Gen: Laying supine in bed in NAD  OC: Surgical site clean/dry and intact. Full thickness skin graft appears viable  Neck: Incision clean/dry and intact with no swelling or collection noted   Chest: Incision clean/dry and intact with no swelling or collection noted     A/P:  75M s/p partial glossectomy, SND and full thickness skin graft. DOing well with PO diet    - Pain control  - continue puree with nectar  - Lovenox  - Ambulate as tolerated  - Home meds restarted

## 2021-03-29 PROBLEM — E78.5 HYPERLIPIDEMIA, UNSPECIFIED: Chronic | Status: ACTIVE | Noted: 2021-03-04

## 2021-03-29 PROBLEM — I34.1 NONRHEUMATIC MITRAL (VALVE) PROLAPSE: Chronic | Status: ACTIVE | Noted: 2021-03-04

## 2021-03-29 PROBLEM — C61 MALIGNANT NEOPLASM OF PROSTATE: Chronic | Status: ACTIVE | Noted: 2021-03-04

## 2021-03-29 PROBLEM — K21.9 GASTRO-ESOPHAGEAL REFLUX DISEASE WITHOUT ESOPHAGITIS: Chronic | Status: ACTIVE | Noted: 2021-03-04

## 2021-03-29 PROBLEM — G20 PARKINSON'S DISEASE: Chronic | Status: ACTIVE | Noted: 2021-03-04

## 2021-03-29 PROBLEM — I10 ESSENTIAL (PRIMARY) HYPERTENSION: Chronic | Status: ACTIVE | Noted: 2021-03-04

## 2021-04-05 ENCOUNTER — APPOINTMENT (OUTPATIENT)
Dept: OTOLARYNGOLOGY | Facility: CLINIC | Age: 76
End: 2021-04-05
Payer: MEDICARE

## 2021-04-05 PROCEDURE — 31575 DIAGNOSTIC LARYNGOSCOPY: CPT | Mod: 58

## 2021-04-05 PROCEDURE — 99024 POSTOP FOLLOW-UP VISIT: CPT

## 2021-04-05 NOTE — PHYSICAL EXAM
[de-identified] : Incision healing well, no LAD. [Laryngoscopy Performed] : laryngoscopy was performed, see procedure section for findings [FreeTextEntry1] : Tongue is healing well.  No obvious lesions along the posterior margin.  No new concerning lesions in the OC/OPx on inspection or palpation.\par  [Normal] : no rashes [de-identified] : FTSG donor site is healing well.

## 2021-04-05 NOTE — PROCEDURE
[Gag Reflex] : gag reflex preventing mirror examination [None] : none [Flexible Endoscope] : examined with the flexible endoscope [Serial Number: ___] : Serial Number: [unfilled] [de-identified] : No lesions in the NPx, OPx, HPx or larynx.  VC are mobile, airway patent.\par

## 2021-04-05 NOTE — REASON FOR VISIT
[Subsequent Evaluation] : a subsequent evaluation for [Other: _____] : [unfilled] [FreeTextEntry2] : follow up left ventral tongue SCCa- post op

## 2021-04-05 NOTE — HISTORY OF PRESENT ILLNESS
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 pt is doing well post op. pt has no c/o. pt was on puree diet and now regular diet.  Denies any dyspnea, dysphagia, otalgia or weight loss.  He denies any bleeding.

## 2021-04-15 ENCOUNTER — TRANSCRIPTION ENCOUNTER (OUTPATIENT)
Age: 76
End: 2021-04-15

## 2021-04-15 ENCOUNTER — RESULT REVIEW (OUTPATIENT)
Age: 76
End: 2021-04-15

## 2021-04-15 NOTE — ASU PATIENT PROFILE, ADULT - ABLE TO REACH PT
mom Patient in rehab, spoke to supervisor Hasmin/yes received call from hamzah Mckeon unable to arrive at hospital/yes

## 2021-04-15 NOTE — ASU PATIENT PROFILE, ADULT - SITE
oral   Phone number      ext [phone]  [phone], or [phone] Linda states pt will not be able to have surgery secondary to no ambulette arranged.

## 2021-04-16 ENCOUNTER — OUTPATIENT (OUTPATIENT)
Dept: OUTPATIENT SERVICES | Facility: HOSPITAL | Age: 76
LOS: 1 days | Discharge: HOME | End: 2021-04-16
Payer: MEDICARE

## 2021-04-16 ENCOUNTER — APPOINTMENT (OUTPATIENT)
Dept: OTOLARYNGOLOGY | Facility: HOSPITAL | Age: 76
End: 2021-04-16

## 2021-04-16 VITALS
HEART RATE: 67 BPM | RESPIRATION RATE: 18 BRPM | DIASTOLIC BLOOD PRESSURE: 65 MMHG | SYSTOLIC BLOOD PRESSURE: 133 MMHG | OXYGEN SATURATION: 98 %

## 2021-04-16 VITALS
TEMPERATURE: 99 F | SYSTOLIC BLOOD PRESSURE: 116 MMHG | DIASTOLIC BLOOD PRESSURE: 70 MMHG | HEART RATE: 66 BPM | RESPIRATION RATE: 16 BRPM | WEIGHT: 199.96 LBS | HEIGHT: 64 IN | OXYGEN SATURATION: 94 %

## 2021-04-16 DIAGNOSIS — Z98.52 VASECTOMY STATUS: Chronic | ICD-10-CM

## 2021-04-16 DIAGNOSIS — C61 MALIGNANT NEOPLASM OF PROSTATE: Chronic | ICD-10-CM

## 2021-04-16 DIAGNOSIS — C02.9 MALIGNANT NEOPLASM OF TONGUE, UNSPECIFIED: ICD-10-CM

## 2021-04-16 PROCEDURE — 88305 TISSUE EXAM BY PATHOLOGIST: CPT | Mod: 26

## 2021-04-16 PROCEDURE — 41120 PARTIAL REMOVAL OF TONGUE: CPT | Mod: GC,58

## 2021-04-16 RX ORDER — CHLORHEXIDINE GLUCONATE 213 G/1000ML
15 SOLUTION TOPICAL
Qty: 180 | Refills: 0
Start: 2021-04-16 | End: 2021-04-27

## 2021-04-16 RX ORDER — CHLORHEXIDINE GLUCONATE 213 G/1000ML
15 SOLUTION TOPICAL
Qty: 630 | Refills: 0
Start: 2021-04-16 | End: 2021-04-29

## 2021-04-16 NOTE — ASU DISCHARGE PLAN (ADULT/PEDIATRIC) - SPECIFY DIET AND FLUID
Soft diet for the next few days. Progress slowly. Wash out mouth with peridex after each meal and then again at bedtime

## 2021-04-16 NOTE — ASU DISCHARGE PLAN (ADULT/PEDIATRIC) - ASU DISCHARGE DATE/TIME
16-Apr-2021 15:16 16-Apr-2021 15:29 16-Apr-2021 15:31 16-Apr-2021 15:51 16-Apr-2021 15:53 16-Apr-2021 16:15

## 2021-04-16 NOTE — ASU DISCHARGE PLAN (ADULT/PEDIATRIC) - CARE PROVIDER_API CALL
Jayden Boland)  Otolaryngology  [address] [place] [address]  Phone: [phone]  Fax: [phone]  Follow Up Time:

## 2021-04-16 NOTE — ASU PREOP CHECKLIST - AICD PRESENT
Thank you for coming to the [facility]; please note the following instructions:    1. Follow up with a limited echocardiogram and Dr Ng in 6 months.     2. Remember to take an antibiotic prior to any dental procedures.             If you have any questions regarding your visit please contact your care team:     Cardiology  Telephone Number   Stephanie TESFAYE, RN  Reena REDD, RN   Pati LANDON, RMA  Nuria DOWNEY, RMA  Titus DUQUE, LPN   [phone] (option 1)   For scheduling appts:     [phone] (select option 1)       For the Device Clinic (Pacemakers and ICD's)  RN's :  Beti Simons   During business hours: [phone]    *After business hours:  [phone] (select option 4)      Normal test result notifications will be released via [email or web address] or mailed within 7 business days.  All other test results, will be communicated via telephone once reviewed by your cardiologist.    If you need a medication refill please contact your pharmacy.  Please allow 3 business days for your refill to be completed.    As always, thank you for trusting us with your health care needs!     no

## 2021-04-20 LAB — SURGICAL PATHOLOGY STUDY: SIGNIFICANT CHANGE UP

## 2021-05-05 RX ORDER — CHLORHEXIDINE GLUCONATE, 0.12% ORAL RINSE 1.2 MG/ML
0.12 SOLUTION DENTAL
Qty: 1 | Refills: 0 | Status: ACTIVE | COMMUNITY
Start: 2021-05-05 | End: 1900-01-01

## 2021-05-06 ENCOUNTER — NON-APPOINTMENT (OUTPATIENT)
Age: 76
End: 2021-05-06

## 2021-05-10 ENCOUNTER — OUTPATIENT (OUTPATIENT)
Dept: OUTPATIENT SERVICES | Facility: HOSPITAL | Age: 76
LOS: 1 days | Discharge: HOME | End: 2021-05-10
Payer: MEDICARE

## 2021-05-10 DIAGNOSIS — C61 MALIGNANT NEOPLASM OF PROSTATE: Chronic | ICD-10-CM

## 2021-05-10 DIAGNOSIS — Z98.52 VASECTOMY STATUS: Chronic | ICD-10-CM

## 2021-05-11 ENCOUNTER — APPOINTMENT (OUTPATIENT)
Dept: RADIATION ONCOLOGY | Facility: CLINIC | Age: 76
End: 2021-05-11
Payer: MEDICARE

## 2021-05-11 ENCOUNTER — NON-APPOINTMENT (OUTPATIENT)
Age: 76
End: 2021-05-11

## 2021-05-11 VITALS
TEMPERATURE: 96.4 F | HEIGHT: 70 IN | HEART RATE: 99 BPM | DIASTOLIC BLOOD PRESSURE: 76 MMHG | BODY MASS INDEX: 27.16 KG/M2 | SYSTOLIC BLOOD PRESSURE: 126 MMHG | WEIGHT: 189.7 LBS | RESPIRATION RATE: 18 BRPM | OXYGEN SATURATION: 93 %

## 2021-05-11 DIAGNOSIS — I10 ESSENTIAL (PRIMARY) HYPERTENSION: ICD-10-CM

## 2021-05-11 DIAGNOSIS — Z80.8 FAMILY HISTORY OF MALIGNANT NEOPLASM OF OTHER ORGANS OR SYSTEMS: ICD-10-CM

## 2021-05-11 DIAGNOSIS — E78.5 HYPERLIPIDEMIA, UNSPECIFIED: ICD-10-CM

## 2021-05-11 DIAGNOSIS — G20 PARKINSON'S DISEASE: ICD-10-CM

## 2021-05-11 PROCEDURE — 99204 OFFICE O/P NEW MOD 45 MIN: CPT | Mod: 25,GC

## 2021-05-11 RX ORDER — 70%ISOPROPYL ALCOHOL 0.7 ML/ML
70 SWAB TOPICAL
Refills: 0 | Status: ACTIVE | COMMUNITY

## 2021-05-11 NOTE — VITALS
[Maximal Pain Intensity: 3/10] : 3/10 [Least Pain Intensity: 0/10] : 0/10 [Pain Location: ___] : Pain Location: [unfilled] [80: Normal activity with effort; some signs or symptoms of disease.] : 80: Normal activity with effort; some signs or symptoms of disease.  [ECOG Performance Status: 2 - Ambulatory and capable of all self care but unable to carry out any work activities] : Performance Status: 2 - Ambulatory and capable of all self care but unable to carry out any work activities. Up and about more than 50% of waking hours

## 2021-05-16 NOTE — REVIEW OF SYSTEMS
[Negative] : Allergic/Immunologic [FreeTextEntry5] : HTN, HLD [FreeTextEntry9] : tremor, Parkinson disease

## 2021-05-16 NOTE — PHYSICAL EXAM
[Sclera] : the sclera and conjunctiva were normal [] : no respiratory distress [Heart Rate And Rhythm] : heart rate and rhythm were normal [Cervical Lymph Nodes Enlarged Posterior Bilaterally] : posterior cervical [Cervical Lymph Nodes Enlarged Anterior Bilaterally] : anterior cervical [Supraclavicular Lymph Nodes Enlarged Bilaterally] : supraclavicular [Skin Color & Pigmentation] : normal skin color and pigmentation [Oriented To Time, Place, And Person] : oriented to person, place, and time [Normal] : normoactive bowel sounds, soft and nontender, no hepatosplenomegaly or masses appreciated [de-identified] : Hand tremors seen from Parkinson.

## 2021-05-16 NOTE — HISTORY OF PRESENT ILLNESS
[FreeTextEntry1] : Mandeep Escalona is a 75 year old male with history of Parkinsons Disease and newly diagnosed left ventral tongue SCC s/p left partial glossectomy, floor of mouth resection and neck dissection and repaired with flap on 3/12/2021 followed by re-excision on 4/16/2021 for positive posterior margin, [id number], Stage II. Patient presents to discuss radiation therapy.\par \par Brief Oncological History:\par 2017 - Prostate cancer s/p Cyberknife at [place].\par \par 1/22/21 -  Underwent biopsy of left ventral tongue lesion found by dentist. Biopsy showed SCC, well to moderately differentiated. \par \par 2/16/21- CT Neck showing enhancing soft tissue focus within the oral tongue on the left in keeping with patient's history of a squamous cell neoplasm. No cervical adenopathy or lingual cortical erosion.\par \par PET CT showing  FDG-avid enhancing lesion, left lateral oral tongue, corresponds to known squamous cell carcinoma, 1.7 x 0.8 cm. No scan evidence of metastatic disease.\par \par 3/12/21 - Underwent tongue and floor of mouth, left, partial glossectomy. Pathology showing invasive squamous cell carcinoma, well to moderately differentiated. Posterior margin positive for tumor, with PNI. Revised margin microscopically positive.  Lymph nodes, left levels 1, 2 and 3, neck dissection. Submandibular gland negative for metastatic carcinoma. Tumor size 1.2x0.9x0.7 cm. [id number]. Depth of invasion 6mm. \par \par 4/16/21 - Underwent re-excesion of posterior margin, negative for carcinoma. \par \par Patient doing well today, eating well and recovered post surgery. Back to normal appetite, no recent weight loss.

## 2021-05-18 ENCOUNTER — APPOINTMENT (OUTPATIENT)
Dept: OTOLARYNGOLOGY | Facility: CLINIC | Age: 76
End: 2021-05-18
Payer: MEDICARE

## 2021-05-18 VITALS
WEIGHT: 185 LBS | DIASTOLIC BLOOD PRESSURE: 75 MMHG | HEIGHT: 70 IN | SYSTOLIC BLOOD PRESSURE: 117 MMHG | BODY MASS INDEX: 26.48 KG/M2 | HEART RATE: 82 BPM

## 2021-05-18 PROCEDURE — 99024 POSTOP FOLLOW-UP VISIT: CPT

## 2021-05-18 PROCEDURE — 31575 DIAGNOSTIC LARYNGOSCOPY: CPT | Mod: 58

## 2021-05-18 NOTE — PROCEDURE
[Gag Reflex] : gag reflex preventing mirror examination [None] : none [Flexible Endoscope] : examined with the flexible endoscope [Serial Number: ___] : Serial Number: [unfilled] [de-identified] : No lesions in the NPx, OPx, HPx or larynx.  VC are mobile, airway patent.\par  [de-identified] : Tongue cancer

## 2021-05-18 NOTE — REASON FOR VISIT
[Subsequent Evaluation] : a subsequent evaluation for [FreeTextEntry2] : s/p Revision partial glossectomy, 04/16/2021.

## 2021-05-18 NOTE — HISTORY OF PRESENT ILLNESS
[de-identified] : 75 year old male with Left ventral tongue SCCa, s/p Revision partial glossectomy, 04/16/2021. Denies fevers, bleeding or infections. States eating well and avoiding any sour foods. Denies dysphagia, odynophagia, dyspnea.

## 2021-05-18 NOTE — PHYSICAL EXAM
[Laryngoscopy Performed] : laryngoscopy was performed, see procedure section for findings [Normal] : no rashes [de-identified] : Incision healing well, no LAD. [FreeTextEntry1] : Tongue is healing well.  No obvious lesions along the posterior margin.  No new concerning lesions in the OC/OPx on inspection or palpation.\par  [de-identified] : FTSG donor site is healing well.

## 2021-05-18 NOTE — CONSULT LETTER
[Dear  ___] : Dear ~ANKIT, [Courtesy Letter:] : I had the pleasure of seeing your patient, [unfilled], in my office today. [Please see my note below.] : Please see my note below. [Consult Closing:] : Thank you very much for allowing me to participate in the care of this patient.  If you have any questions, please do not hesitate to contact me. [Sincerely,] : Sincerely, [FreeTextEntry2] : Dr. Minh Cottrell\par [address], \[place] [address]  [FreeTextEntry3] : Dev

## 2021-05-24 PROCEDURE — 77263 THER RADIOLOGY TX PLNG CPLX: CPT

## 2021-05-25 ENCOUNTER — NON-APPOINTMENT (OUTPATIENT)
Age: 76
End: 2021-05-25

## 2021-06-07 PROCEDURE — 77338 DESIGN MLC DEVICE FOR IMRT: CPT | Mod: 26

## 2021-06-07 PROCEDURE — 77301 RADIOTHERAPY DOSE PLAN IMRT: CPT | Mod: 26

## 2021-06-07 PROCEDURE — 77300 RADIATION THERAPY DOSE PLAN: CPT | Mod: 26

## 2021-06-08 ENCOUNTER — NON-APPOINTMENT (OUTPATIENT)
Age: 76
End: 2021-06-08

## 2021-06-14 PROCEDURE — 77387B: CUSTOM | Mod: 26

## 2021-06-14 PROCEDURE — 77427 RADIATION TX MANAGEMENT X5: CPT

## 2021-06-15 PROCEDURE — 77387B: CUSTOM | Mod: 26

## 2021-06-16 ENCOUNTER — NON-APPOINTMENT (OUTPATIENT)
Age: 76
End: 2021-06-16

## 2021-06-16 VITALS — HEIGHT: 70 IN | WEIGHT: 181 LBS | BODY MASS INDEX: 25.91 KG/M2 | RESPIRATION RATE: 18 BRPM

## 2021-06-16 PROCEDURE — 77387B: CUSTOM | Mod: 26

## 2021-06-17 PROCEDURE — 77387B: CUSTOM | Mod: 26

## 2021-06-18 PROCEDURE — 77014: CPT | Mod: 26

## 2021-06-21 PROCEDURE — 77387B: CUSTOM | Mod: 26

## 2021-06-21 PROCEDURE — 77427 RADIATION TX MANAGEMENT X5: CPT

## 2021-06-22 ENCOUNTER — NON-APPOINTMENT (OUTPATIENT)
Age: 76
End: 2021-06-22

## 2021-06-22 PROCEDURE — 77387B: CUSTOM | Mod: 26

## 2021-06-23 ENCOUNTER — NON-APPOINTMENT (OUTPATIENT)
Age: 76
End: 2021-06-23

## 2021-06-23 VITALS — RESPIRATION RATE: 18 BRPM | BODY MASS INDEX: 26.05 KG/M2 | HEIGHT: 70 IN | WEIGHT: 182 LBS

## 2021-06-23 PROCEDURE — 77387B: CUSTOM | Mod: 26

## 2021-06-24 PROCEDURE — 77387B: CUSTOM | Mod: 26

## 2021-06-27 NOTE — HISTORY OF PRESENT ILLNESS
[FreeTextEntry1] : Mandeep Escalona is a 75 year old male with history of Parkinsons Disease and newly diagnosed left ventral tongue SCC s/p left partial glossectomy, floor of mouth resection and neck dissection and repaired with flap on 3/12/2021 followed by re-excision on 4/16/2021 for positive posterior margin, [id number], Stage II. Patient presents to discuss radiation therapy.\par \par Brief Oncological History:\par 2017 - Prostate cancer s/p Cyberknife at [place].\par \par 1/22/21 -  Underwent biopsy of left ventral tongue lesion found by dentist. Biopsy showed SCC, well to moderately differentiated. \par \par 2/16/21- CT Neck showing enhancing soft tissue focus within the oral tongue on the left in keeping with patient's history of a squamous cell neoplasm. No cervical adenopathy or lingual cortical erosion.\par \par PET CT showing  FDG-avid enhancing lesion, left lateral oral tongue, corresponds to known squamous cell carcinoma, 1.7 x 0.8 cm. No scan evidence of metastatic disease.\par \par 3/12/21 - Underwent tongue and floor of mouth, left, partial glossectomy. Pathology showing invasive squamous cell carcinoma, well to moderately differentiated. Posterior margin positive for tumor, with PNI. Revised margin microscopically positive.  Lymph nodes, left levels 1, 2 and 3, neck dissection. Submandibular gland negative for metastatic carcinoma. Tumor size 1.2x0.9x0.7 cm. [id number]. Depth of invasion 6mm. \par \par 4/16/21 - Underwent re-excesion of posterior margin, negative for carcinoma. \par \par Patient doing well, eating well and recovered post surgery. Back to normal appetite in 5/2021 on consult day. \par \par 3/30 fractions of radiation completed. No pain, dysphagia, SOB or skin toxicity. \par \par \par \par \par

## 2021-06-27 NOTE — REVIEW OF SYSTEMS
[Dysphagia: Grade 0] : Dysphagia: Grade 0 [Tinnitus - Grade 0] : Tinnitus - Grade 0 [Blurred Vision: Grade 0] : Blurred Vision: Grade 0 [Mucositis Oral: Grade 0] : Mucositis Oral: Grade 0  [Xerostomia: Grade 0] : Xerostomia: Grade 0 [Oral Pain: Grade 0] : Oral Pain: Grade 0 [Salivary duct inflammation: Grade 0] : Salivary duct inflammation: Grade 0 [Dysgeusia: Grade 0] : Dysgeusia: Grade 0 [Hoarseness: Grade 0] : Hoarseness: Grade 0 [Alopecia: Grade 0] : Alopecia: Grade 0 [Pruritus: Grade 0] : Pruritus: Grade 0 [Skin Atrophy: Grade 0] : Skin Atrophy: Grade 0 [Skin Hyperpigmentation: Grade 0] : Skin Hyperpigmentation: Grade 0 [Skin Induration: Grade 0] : Skin Induration: Grade 0 [Dermatitis Radiation: Grade 0] : Dermatitis Radiation: Grade 0 [Negative] : Allergic/Immunologic [FreeTextEntry5] : HTN, HLD [FreeTextEntry9] : tremor, Parkinson disease

## 2021-06-28 ENCOUNTER — EMERGENCY (EMERGENCY)
Facility: HOSPITAL | Age: 76
LOS: 1 days | Discharge: HOME | End: 2021-06-28
Attending: EMERGENCY MEDICINE | Admitting: EMERGENCY MEDICINE
Payer: MEDICARE

## 2021-06-28 ENCOUNTER — NON-APPOINTMENT (OUTPATIENT)
Age: 76
End: 2021-06-28

## 2021-06-28 VITALS
DIASTOLIC BLOOD PRESSURE: 96 MMHG | RESPIRATION RATE: 18 BRPM | HEART RATE: 88 BPM | TEMPERATURE: 98 F | SYSTOLIC BLOOD PRESSURE: 165 MMHG | OXYGEN SATURATION: 98 %

## 2021-06-28 VITALS
HEIGHT: 64 IN | SYSTOLIC BLOOD PRESSURE: 153 MMHG | OXYGEN SATURATION: 96 % | TEMPERATURE: 98 F | HEART RATE: 85 BPM | DIASTOLIC BLOOD PRESSURE: 79 MMHG | RESPIRATION RATE: 18 BRPM

## 2021-06-28 VITALS
SYSTOLIC BLOOD PRESSURE: 162 MMHG | DIASTOLIC BLOOD PRESSURE: 89 MMHG | OXYGEN SATURATION: 98 % | RESPIRATION RATE: 18 BRPM | HEART RATE: 88 BPM

## 2021-06-28 DIAGNOSIS — C61 MALIGNANT NEOPLASM OF PROSTATE: Chronic | ICD-10-CM

## 2021-06-28 DIAGNOSIS — Z98.52 VASECTOMY STATUS: Chronic | ICD-10-CM

## 2021-06-28 PROCEDURE — 70450 CT HEAD/BRAIN W/O DYE: CPT | Mod: 26

## 2021-06-28 PROCEDURE — 77387B: CUSTOM | Mod: 26

## 2021-06-28 PROCEDURE — 99284 EMERGENCY DEPT VISIT MOD MDM: CPT | Mod: GC

## 2021-06-28 RX ORDER — TETANUS TOXOID, REDUCED DIPHTHERIA TOXOID AND ACELLULAR PERTUSSIS VACCINE, ADSORBED 5; 2.5; 8; 8; 2.5 [IU]/.5ML; [IU]/.5ML; UG/.5ML; UG/.5ML; UG/.5ML
0.5 SUSPENSION INTRAMUSCULAR ONCE
Refills: 0 | Status: COMPLETED | OUTPATIENT
Start: 2021-06-28 | End: 2021-06-28

## 2021-06-28 RX ADMIN — TETANUS TOXOID, REDUCED DIPHTHERIA TOXOID AND ACELLULAR PERTUSSIS VACCINE, ADSORBED 0.5 MILLILITER(S): 5; 2.5; 8; 8; 2.5 SUSPENSION INTRAMUSCULAR at 17:24

## 2021-06-28 NOTE — ED PROVIDER NOTE - NS ED ROS FT
General: no fever  Head: no headache  Eyes: no vision change  ENT: no nasal discharge/congestion  CV: no chest pain  Resp: no SOB, no cough  GI: no N/V/D, no abdominal pain  : no dysuria  MSK: no joint pain, no muscle aches, no neck pain or back pain  Skin: +abrasion on forehead  Neuro: no focal weakness, no change in sensation

## 2021-06-28 NOTE — ED PROVIDER NOTE - CLINICAL SUMMARY MEDICAL DECISION MAKING FREE TEXT BOX
76yo man presents after a mechanical fall at doctor's office with head injury and sustained abrasion on right forehead and has no other traumatic injuries or neurologic deficits on exam. Will evaluate with CTH and update tetanus. If no bleed or abnormalities on CT, will discharge with outpt f/u.

## 2021-06-28 NOTE — ED ADULT NURSE NOTE - NSIMPLEMENTINTERV_GEN_ALL_ED
Implemented All Fall Risk Interventions:  [place] to call system. Call bell, personal items and telephone within reach. Instruct patient to call for assistance. Room bathroom lighting operational. Non-slip footwear when patient is off stretcher. Physically safe environment: no spills, clutter or unnecessary equipment. Stretcher in lowest position, wheels locked, appropriate side rails in place. Provide visual cue, wrist band, yellow gown, etc. Monitor gait and stability. Monitor for mental status changes and reorient to person, place, and time. Review medications for side effects contributing to fall risk. Reinforce activity limits and safety measures with patient and family.

## 2021-06-28 NOTE — ED PROVIDER NOTE - OBJECTIVE STATEMENT
76yo man recent diagnosis of tongue cancer s/p resection and undergoing radiation presents after fall with head injury at [facility] and was BIBEMS. He states his foot caught on the rug and he fell. He was able to get up by himself. He denies any lightheadedness, chest pain, SOB prior to fall. No LOC, headache, n/v, change in vision or speech, focal weakness or paresthesia/numbness in extremities. No neck pain, back pain, or extremity joint pain. No recent fevers, cough, vomiting or diarrhea, abdominal pain, or dysuria. 74yo man recent diagnosis of tongue cancer s/p resection and undergoing radiation presents after fall with head injury at [facility] and was BIBEMS. Not on a/c. He states his foot caught on the rug and he fell. He was able to get up by himself. He denies any lightheadedness, chest pain, SOB prior to fall. No LOC, headache, n/v, change in vision or speech, focal weakness or paresthesia/numbness in extremities. No neck pain, back pain, or extremity joint pain. No recent fevers, cough, vomiting or diarrhea, abdominal pain, or dysuria.

## 2021-06-28 NOTE — ED PROVIDER NOTE - ATTENDING CONTRIBUTION TO CARE
76yo man recent diagnosis of tongue cancer s/p resection and undergoing radiation presents after fall with head injury at [facility] and was BIBEMS. Not on a/c. He states his foot caught on the rug and he fell. He was able to get up by himself. He denies any lightheadedness, chest pain, SOB prior to fall. No LOC, headache, n/v, change in vision or speech, focal weakness or paresthesia/numbness in extremities. No neck pain, back pain, or extremity joint pain. No recent fevers, cough, vomiting or diarrhea, abdominal pain, or dysuria.

## 2021-06-28 NOTE — ED PROVIDER NOTE - PATIENT PORTAL LINK FT
You can access the FollowMyHealth Patient Portal offered by [facility] by registering at the following website: [email or web address]. By joining [employer]’s FollowMyHealth portal, you will also be able to view your health information using other applications (apps) compatible with our system.

## 2021-06-28 NOTE — ED ADULT NURSE NOTE - OBJECTIVE STATEMENT
pt tripped and fell hit head today while leaving radiation treatment. pt denies chest pain, dizziness, lightheadedness, loc, weakness, ha. pt placed in spot 12B, awaiting md corbin. no s/s of acute distress, small abrasion noted on right forehead.

## 2021-06-28 NOTE — ED ADULT NURSE NOTE - CHIEF COMPLAINT QUOTE
pt coming from [facility], pt had mechanical fall and c/o abrasion to head.  denies LOC

## 2021-06-28 NOTE — ED ADULT TRIAGE NOTE - CHIEF COMPLAINT QUOTE
pt coming from [facility], pt had mechanical fall and c/o abrasion to head.  denies LOC

## 2021-06-28 NOTE — ED PROVIDER NOTE - PHYSICAL EXAMINATION
General: well-appearing elderly man in no acute distress  Head: normocephalic, no skull depressions or tenderness to palpation, abrasion on right midforehead  Eyes: PERRL, EOMI   Mouth: moist mucous membranes  Neck: supple neck, full ROM, no midline cervical tenderness to palpation  CV: normal rate and rhythm, peripheral pulses 2+ bilateral UE and LE  Respiratory: clear to auscultation bilaterally  Abdomen: soft, nontender, nondistended  MSK: full passive ROM of all extremity joints, no joint deformities or tenderness to palpation  Neuro: alert and oriented x3, CN III-XII intact, speech clear, strength 5/5 UE and LE bilaterally  Skin: superficial abrasion on right forehead

## 2021-06-28 NOTE — ED PROVIDER NOTE - NSFOLLOWUPINSTRUCTIONS_ED_ALL_ED_FT
You were seen and evaluated in the emergency room for head injury after a fall.    Please follow up with your primary care provider in the next few days.    Clean area on your forehead once a day with soap and warm water, apply ointment, and keep covered until sutures are removed.    Continue all home medications as prescribed.    Return to the Emergency Department immediately if you have slurred speech, difficulty swallowing, change in vision, weakness in arms or legs, worsening headache, or any new and concerning symptoms or concerns.     Please read all attached.

## 2021-06-29 ENCOUNTER — APPOINTMENT (OUTPATIENT)
Dept: OTOLARYNGOLOGY | Facility: CLINIC | Age: 76
End: 2021-06-29

## 2021-06-29 PROCEDURE — 77387B: CUSTOM | Mod: 26

## 2021-06-30 ENCOUNTER — NON-APPOINTMENT (OUTPATIENT)
Age: 76
End: 2021-06-30

## 2021-06-30 VITALS — RESPIRATION RATE: 16 BRPM | BODY MASS INDEX: 26.05 KG/M2 | HEIGHT: 70 IN | WEIGHT: 182 LBS

## 2021-06-30 PROCEDURE — 77387B: CUSTOM | Mod: 26

## 2021-07-01 PROCEDURE — 77427 RADIATION TX MANAGEMENT X5: CPT

## 2021-07-01 PROCEDURE — 77387B: CUSTOM | Mod: 26

## 2021-07-02 LAB
ALBUMIN SERPL ELPH-MCNC: 4.5 G/DL
ALP BLD-CCNC: 63 U/L
ALT SERPL-CCNC: 19 U/L
ANION GAP SERPL CALC-SCNC: 14 MMOL/L
AST SERPL-CCNC: 22 U/L
BASOPHILS # BLD AUTO: 0.06 K/UL
BASOPHILS NFR BLD AUTO: 0.9 %
BILIRUB SERPL-MCNC: 0.4 MG/DL
BUN SERPL-MCNC: 24 MG/DL
CALCIUM SERPL-MCNC: 10.7 MG/DL
CHLORIDE SERPL-SCNC: 100 MMOL/L
CO2 SERPL-SCNC: 24 MMOL/L
CREAT SERPL-MCNC: 0.95 MG/DL
EOSINOPHIL # BLD AUTO: 0.17 K/UL
EOSINOPHIL NFR BLD AUTO: 2.6 %
GLUCOSE SERPL-MCNC: 123 MG/DL
HCT VFR BLD CALC: 51 %
HGB BLD-MCNC: 16.5 G/DL
IMM GRANULOCYTES NFR BLD AUTO: 0.3 %
LYMPHOCYTES # BLD AUTO: 0.62 K/UL
LYMPHOCYTES NFR BLD AUTO: 9.5 %
MAN DIFF?: NORMAL
MCHC RBC-ENTMCNC: 32.4 GM/DL
MCHC RBC-ENTMCNC: 32.5 PG
MCV RBC AUTO: 100.6 FL
MONOCYTES # BLD AUTO: 0.94 K/UL
MONOCYTES NFR BLD AUTO: 14.4 %
NEUTROPHILS # BLD AUTO: 4.73 K/UL
NEUTROPHILS NFR BLD AUTO: 72.3 %
PLATELET # BLD AUTO: 231 K/UL
POTASSIUM SERPL-SCNC: 4.5 MMOL/L
PROT SERPL-MCNC: 7.7 G/DL
RBC # BLD: 5.07 M/UL
RBC # FLD: 14 %
SODIUM SERPL-SCNC: 139 MMOL/L
WBC # FLD AUTO: 6.54 K/UL

## 2021-07-02 PROCEDURE — 77387B: CUSTOM | Mod: 26

## 2021-07-02 NOTE — REVIEW OF SYSTEMS
[Dysphagia: Grade 0] : Dysphagia: Grade 0 [Tinnitus - Grade 0] : Tinnitus - Grade 0 [Blurred Vision: Grade 0] : Blurred Vision: Grade 0 [Mucositis Oral: Grade 0] : Mucositis Oral: Grade 0  [Xerostomia: Grade 0] : Xerostomia: Grade 0 [Oral Pain: Grade 0] : Oral Pain: Grade 0 [Salivary duct inflammation: Grade 0] : Salivary duct inflammation: Grade 0 [Dysgeusia: Grade 0] : Dysgeusia: Grade 0 [Hoarseness: Grade 0] : Hoarseness: Grade 0 [Alopecia: Grade 0] : Alopecia: Grade 0 [Pruritus: Grade 0] : Pruritus: Grade 0 [Skin Atrophy: Grade 0] : Skin Atrophy: Grade 0 [Skin Hyperpigmentation: Grade 0] : Skin Hyperpigmentation: Grade 0 [Dermatitis Radiation: Grade 0] : Dermatitis Radiation: Grade 0 [Skin Induration: Grade 0] : Skin Induration: Grade 0 [Negative] : Allergic/Immunologic [FreeTextEntry5] : HTN, HLD [FreeTextEntry9] : tremor, Parkinson disease

## 2021-07-02 NOTE — HISTORY OF PRESENT ILLNESS
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 Patient presents to discuss radiation therapy.\par \par Brief Oncological History:\par 2017 - Prostate cancer s/p [facility] at [place].\par \par 1/22/21 -  Underwent biopsy of left ventral tongue lesion found by dentist. Biopsy showed SCC, well to moderately differentiated. \par \par 2/16/21- CT Neck showing enhancing soft tissue focus within the oral tongue on the left in keeping with patient's history of a squamous cell neoplasm. No cervical adenopathy or lingual cortical erosion.\par \par PET CT showing  FDG-avid enhancing lesion, left lateral oral tongue, corresponds to known squamous cell carcinoma, 1.7 x 0.8 cm. No scan evidence of metastatic disease.\par \par 3/12/21 - Underwent tongue and floor of mouth, left, partial glossectomy. Pathology showing invasive squamous cell carcinoma, well to moderately differentiated. Posterior margin positive for tumor, with PNI. Revised margin microscopically positive.  Lymph nodes, left levels 1, 2 and 3, neck dissection. Submandibular gland negative for metastatic carcinoma. Tumor size 1.2x0.9x0.7 cm. [id number]. Depth of invasion 6mm. \par \par 4/16/21 - Underwent re-excesion of posterior margin, negative for carcinoma. \par \par Patient doing well, eating well and recovered post surgery. Back to normal appetite in 5/2021 on consult day. \par \par 8/30 fractions of radiation completed. No pain, dysphagia, SOB or skin toxicity. \par \par \par \par \par

## 2021-07-13 NOTE — REVIEW OF SYSTEMS
[Skin Hyperpigmentation: Grade 1 - Hyperpigmentation covering <10% BSA; no psychosocial impact] : Skin Hyperpigmentation: Grade 1 - Hyperpigmentation covering <10% BSA; no psychosocial impact [Dermatitis Radiation: Grade 1 - Faint erythema or dry desquamation] : Dermatitis Radiation: Grade 1 - Faint erythema or dry desquamation [de-identified] : throat pain [FreeTextEntry5] : HTN, HLD [FreeTextEntry9] : tremor, Parkinson disease

## 2021-07-13 NOTE — HISTORY OF PRESENT ILLNESS
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 Patient undergoing RT to 60Gy. \par \par 12/30 fractions of radiation completed. c/o throat pain. Rx mouthwash and Gabapentin given. No dysphagia, SOB or skin toxicity. Blood test was okay. \par \par \par \par \par 
intermittent scattered wheeze

## 2021-07-27 NOTE — DISEASE MANAGEMENT
[Pathological] : TNM Stage: p [TTNM] : 2 [NTNM] : 0 [MTNM] : 0 [II] : II [de-identified] : 2271 [de-identified] : 6000 cgy [de-identified] : tongue /head and neck

## 2021-07-27 NOTE — HISTORY OF PRESENT ILLNESS
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 Patient undergoing RT to 60Gy. \par \par 30/30 fractions of radiation completed   C/o increased throat pain using mouthwash and Gabapentin. C/o dysphagia but still has sense of taste with decreased appetite. 6-7 lb weight loss note. Thrush noted to oral cavity and tongue. Fluconazole e-scribed.  No SOB. Using skin cream for skin toxicity. \par \par \par \par

## 2021-07-27 NOTE — REVIEW OF SYSTEMS
[Dysphagia: Grade 0] : Dysphagia: Grade 0 [Fatigue: Grade 1 - Fatigue relieved by rest] : Fatigue: Grade 1 - Fatigue relieved by rest [Tinnitus - Grade 0] : Tinnitus - Grade 0 [Blurred Vision: Grade 0] : Blurred Vision: Grade 0 [Mucositis Oral: Grade 1 - Asymptomatic or mild symptoms; intervention not indicated] : Mucositis Oral: Grade 1 - Asymptomatic or mild symptoms; intervention not indicated [Xerostomia: Grade 1 - Symptomatic (e.g., dry or thick saliva) without significant dietary alteration; unstimulated saliva flow >0.2 ml/min] : Xerostomia: Grade 1 - Symptomatic (e.g., dry or thick saliva) without significant dietary alteration; unstimulated saliva flow >0.2 ml/min [Oral Pain: Grade 2 - Moderate pain; limiting instrumental ADL] : Oral Pain: Grade 2 - Moderate pain; limiting instrumental ADL [Salivary duct inflammation: Grade 0] : Salivary duct inflammation: Grade 0 [Dysgeusia: Grade 0] : Dysgeusia: Grade 0 [de-identified] : throat pain [Hoarseness: Grade 0] : Hoarseness: Grade 0 [Alopecia: Grade 0] : Alopecia: Grade 0 [Pruritus: Grade 0] : Pruritus: Grade 0 [Skin Atrophy: Grade 0] : Skin Atrophy: Grade 0 [Skin Hyperpigmentation: Grade 1 - Hyperpigmentation covering <10% BSA; no psychosocial impact] : Skin Hyperpigmentation: Grade 1 - Hyperpigmentation covering <10% BSA; no psychosocial impact [Skin Induration: Grade 0] : Skin Induration: Grade 0 [Dermatitis Radiation: Grade 2 - Moderate to brisk erythema; patchy moist desquamation, mostly confined to skin folds and creases; moderate edema] : Dermatitis Radiation: Grade 2 - Moderate to brisk erythema; patchy moist desquamation, mostly confined to skin folds and creases; moderate edema [Negative] : Allergic/Immunologic [FreeTextEntry5] : HTN, HLD [FreeTextEntry9] : tremor, Parkinson disease

## 2021-07-27 NOTE — VITALS
[Maximal Pain Intensity: 5/10] : 5/10 [Least Pain Intensity: 0/10] : 0/10 [Pain Description/Quality: ___] : Pain description/quality: [unfilled] [Pain Duration: ___] : Pain duration: [unfilled] [Pain Location: ___] : Pain Location: [unfilled] [Pain Interferes with ADLs] : Pain interferes with activities of daily living. [Adjuvant (neuroleptics)] : adjuvant (neuroleptics) [80: Normal activity with effort; some signs or symptoms of disease.] : 80: Normal activity with effort; some signs or symptoms of disease.  [ECOG Performance Status: 2 - Ambulatory and capable of all self care but unable to carry out any work activities] : Performance Status: 2 - Ambulatory and capable of all self care but unable to carry out any work activities. Up and about more than 50% of waking hours

## 2021-07-30 NOTE — HISTORY OF PRESENT ILLNESS
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 Patient undergoing RT to 60Gy. \par \par 16/30 fractions of radiation completed. C/o throat pain. On mouthwash given and Gabapentin. c/o dysphagia. Rx percocet given and will refer to pain team. No SOB or skin toxicity. CBC/ CMP unremarkable. \par \par \par \par

## 2021-07-30 NOTE — DISEASE MANAGEMENT
[TTNM] : 2 [NTNM] : 0 [MTNM] : 0 [de-identified] : 4000 cgy [de-identified] : 6000 cgy [de-identified] : tongue /head and neck

## 2021-07-30 NOTE — REVIEW OF SYSTEMS
[de-identified] : throat pain [FreeTextEntry5] : HTN, HLD [FreeTextEntry9] : tremor, Parkinson disease

## 2021-07-30 NOTE — HISTORY OF PRESENT ILLNESS
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 Patient undergoing RT to 60Gy. \par \par 20/30 fractions of radiation complete and 400 cGy of 600 cGy.  C/o mild throat pain using mouthwash given and Gabapentin. c/o dysphagia but still has sense of taste and a healthy appetite..No SOB or skin toxicity. Feeling well overall. \par \par \par \par

## 2021-07-30 NOTE — REVIEW OF SYSTEMS
[de-identified] : throat pain [FreeTextEntry5] : HTN, HLD [FreeTextEntry9] : tremor, Parkinson disease

## 2021-07-30 NOTE — HISTORY OF PRESENT ILLNESS
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 Patient undergoing RT to 60Gy. \par \par 26/30 fractions of radiation complete   C/o mild throat pain using mouthwash given and Gabapentin. c/o dysphagia but still has sense of taste and a healthy appetite..No SOB. Using skin cream for skin toxicity. Feeling well overall. \par \par \par \par

## 2021-07-30 NOTE — REVIEW OF SYSTEMS
[Dysphagia: Grade 0] : Dysphagia: Grade 0 [Tinnitus - Grade 0] : Tinnitus - Grade 0 [Blurred Vision: Grade 0] : Blurred Vision: Grade 0 [Mucositis Oral: Grade 0] : Mucositis Oral: Grade 0  [Xerostomia: Grade 0] : Xerostomia: Grade 0 [Oral Pain: Grade 1 - Mild pain] : Oral Pain: Grade 1 - Mild pain [Salivary duct inflammation: Grade 0] : Salivary duct inflammation: Grade 0 [Dysgeusia: Grade 0] : Dysgeusia: Grade 0 [Hoarseness: Grade 0] : Hoarseness: Grade 0 [Alopecia: Grade 0] : Alopecia: Grade 0 [Pruritus: Grade 0] : Pruritus: Grade 0 [Skin Atrophy: Grade 0] : Skin Atrophy: Grade 0 [Skin Hyperpigmentation: Grade 1 - Hyperpigmentation covering <10% BSA; no psychosocial impact] : Skin Hyperpigmentation: Grade 1 - Hyperpigmentation covering <10% BSA; no psychosocial impact [Skin Induration: Grade 0] : Skin Induration: Grade 0 [Dermatitis Radiation: Grade 1 - Faint erythema or dry desquamation] : Dermatitis Radiation: Grade 1 - Faint erythema or dry desquamation [Negative] : Allergic/Immunologic [de-identified] : throat pain [FreeTextEntry5] : HTN, HLD [FreeTextEntry9] : tremor, Parkinson disease

## 2021-08-03 NOTE — HISTORY OF PRESENT ILLNESS
[de-identified] : 75 year old male with Left ventral tongue SCCa, s/p Revision partial glossectomy, 04/16/2021. pt  completed RT in 7/2021, pt is here to f/u.   pt c.o dry mouth, sore throat  and neck pain. pt tolerates soft diet, stable wt.   Denies fevers, bleeding or infections. States eating well and avoiding any sour foods. Denies dysphagia, odynophagia, dyspnea.

## 2021-08-03 NOTE — PROCEDURE
[None] : none [Flexible Endoscope] : examined with the flexible endoscope [Serial Number: ___] : Serial Number: [unfilled] [de-identified] : No lesions in the NPx, OPx, HPx or larynx.  Expected posttreatment changes with moderate mucositis, VC are mobile, airway patent.\par  [de-identified] : Tongue SCCa

## 2021-08-03 NOTE — PHYSICAL EXAM
[Laryngoscopy Performed] : laryngoscopy was performed, see procedure section for findings [Normal] : no rashes [de-identified] : Posttreatment changes with mild dermatitis, no LAD. [FreeTextEntry1] : Tongue is well healed.  No obvious lesions along the posterior margin.  No new concerning lesions in the OC/OPx on inspection or palpation.  Moderate mucositis.\par  [de-identified] : FTSG donor site is healing well.

## 2021-09-20 NOTE — PROCEDURE
[Gag Reflex] : gag reflex preventing mirror examination [None] : none [Flexible Endoscope] : examined with the flexible endoscope [Serial Number: ___] : Serial Number: [unfilled] [de-identified] : No lesions in the NPx, OPx, HPx or larynx.  Stable posttreatment changes, VC are mobile, airway patent.\par

## 2021-09-20 NOTE — PHYSICAL EXAM
[Laryngoscopy Performed] : laryngoscopy was performed, see procedure section for findings [Normal] : no rashes [de-identified] : Posttreatment changes, no LAD. [de-identified] : FTSG donor site is healing well.   [FreeTextEntry1] : Tongue is well healed.  No obvious lesions along the posterior margin.  No new concerning lesions in the OC/OPx on inspection or palpation.

## 2021-09-20 NOTE — HISTORY OF PRESENT ILLNESS
[de-identified] : 75 year old male follow up for Left Tongue SCCa\par History s/p revision partial glossectomy 4/16/21, completed RT July 2021\par Reports dry mouth, sore throat with neck pain?\par Tolerating solids, mechanical soft diet maintained, Weight stable\par Reports issues with Right side of tongue, molars are uncomfortable and not smooth, area is sore\par Denies dysphagia, dyspnea, odynophagia, dysphonia, hemoptysis, recent fevers, chills and throat/oral infections.

## 2021-10-07 NOTE — ED PROVIDER NOTE - OBJECTIVE STATEMENT
74 yo male hx of gerd, htn, hld, parkinsons BIBEMS called by his family because he was out all day.  States he went to the beach for joyride which he normally does and didn't get a chance to answer his calls.  AAOx3, otherwise states he feels fine.  No chest pain, no sob.

## 2021-10-07 NOTE — ED PROVIDER NOTE - PATIENT PORTAL LINK FT
You can access the FollowMyHealth Patient Portal offered by [facility] by registering at the following website: [email or web address]. By joining [employer]’s FollowMyHealth portal, you will also be able to view your health information using other applications (apps) compatible with our system.

## 2021-10-07 NOTE — ED PROVIDER NOTE - PHYSICAL EXAMINATION
Gen: Alert, NAD  Head/eyes: NC/AT, PERRL  ENT: airway patent  Neck: supple, no tenderness/meningismus/JVD, Trachea midline  Pulm/lung: Bilateral clear BS, normal resp effort, no wheeze/stridor/retractions  CV/heart: RRR, no M/R/G, +2 dist pulses (radial, pedal DP/PT, popliteal)  GI/Abd: soft, NT/ND, +BS, no guarding/rebound tenderness  Musculoskeletal: no edema/erythema/cyanosis, FROM in all extremities, no C/T/L spine ttp  Skin: no rash, no vesicles, no petechaie, no ecchymosis, no swelling  Neuro: AAOx3, CN 2-12 intact, normal sensation, 5/5 motor strength in all extremities, normal gait, +b/l hand resting tremor

## 2021-11-30 NOTE — HISTORY OF PRESENT ILLNESS
[de-identified] : 76  year old male follow up for Left Tongue SCCa, s/p revision partial glossectomy 4/16/21, completed RT July 2021. PET and CT of neck completed on 11/27/2021 report pending. Patient reports dry mouth. Denies throat/neck pain and dysphagia. Tolerating thin liquids, certain solid food and mechanical soft diet. States no changes with appetite, eating and drinking well. Recent 10 lb weight loss.

## 2021-11-30 NOTE — PHYSICAL EXAM
[Laryngoscopy Performed] : laryngoscopy was performed, see procedure section for findings [Normal] : no rashes [de-identified] : Posttreatment changes, no LAD. [FreeTextEntry1] : Tongue is well healed.  No obvious lesions along the posterior margin.  No new concerning lesions in the OC/OPx on inspection or palpation. [de-identified] : FTSG donor site is healing well.

## 2021-11-30 NOTE — PROCEDURE
[Gag Reflex] : gag reflex preventing mirror examination [None] : none [Flexible Endoscope] : examined with the flexible endoscope [Serial Number: ___] : Serial Number: [unfilled] [de-identified] : No lesions in the NPx, OPx, HPx or larynx. Stable posttreatment changes, VC are mobile, airway patent.\par  [de-identified] : Tongue SCCa

## 2021-12-14 NOTE — PHYSICAL EXAM
[de-identified] : Posttreatment changes, no obviously palpable LAD. [FreeTextEntry1] : Tongue is well healed.  No obvious lesions along the posterior margin.  No new concerning lesions in the OC/OPx on inspection or palpation. [Normal] : no rashes [de-identified] : FTSG donor site is healing well.

## 2021-12-14 NOTE — HISTORY OF PRESENT ILLNESS
[de-identified] : 76  year old male follow up for Left Tongue SCCa, s/p revision partial glossectomy 4/16/21, completed RT July 2021. PET ct 11/27/2021,  11/27/2021 ct neck showed Enlarging right level II lymph node suspicious for a clau metastasis. Ultrasound-guided FNA.and 12/7/2021 FNA - Consistent with  squamous carcinoma. pt is here for further treatment plan. \par Patient reports dry mouth. Denies throat/neck pain and dysphagia. Tolerating thin liquids, certain solid food and mechanical soft diet. States no changes with appetite, eating and drinking well. gaining wt back

## 2021-12-14 NOTE — REASON FOR VISIT
[Subsequent Evaluation] : a subsequent evaluation for [FreeTextEntry2] : Tongue SCCa Abdomen soft, nontender, nondistended, bowel sounds present in all 4 quadrants.

## 2021-12-30 NOTE — H&P PST ADULT - NSANTHNECKRD_ENT_A_CORE
Patient ID: Beatrice Marshall  is a 70 y o  male Date of Birth 1953     My role is Foot, Ankle and Wound Specialist    Chief Complaint   Patient presents with    Follow Up Wound Care Visit     Right Foot wound       Surgical wound foot    Subjective:   Kalia Orosco is here two weeks after delayed primary closure of wound  He finished his course of oral antibiotics and notices less redness, drainage from the site  He is having drainage from his anterior Left thigh SPTSG donor site  He denies pain, fevers         The following portions of the patient's history were reviewed and updated as appropriate:   Patient Active Problem List   Diagnosis    Hyperlipidemia    Type 2 diabetes mellitus with diabetic neuropathy ([address] )    Atrial fibrillation with RVR ([facility] )    Right middle lobe pulmonary nodule    Essential hypertension    Acquired absence of other right toe(s) ([address] )    Benign prostatic hyperplasia with lower urinary tract symptoms    Positive for microalbuminuria    Deep disruption or dehiscence of operation wound    Post-operative state    Aftercare for amputation stump    Ambulatory dysfunction    S/P transmetatarsal amputation of foot, right (HCC)    Sleep disturbance    Acute osteomyelitis of right ankle or foot ([address] )    Anxiety    S/P R ALT flap to right foot     Past Medical History:   Diagnosis Date    Arthritis     Atrial fibrillation ([address] )     Diagnosed 11/2018    Benign prostate hyperplasia 04/2002    Cellulitis     right lower leg     Colon polyp 2006    Diabetes mellitus ([address] )     Hearing aid worn     bilat    Hearing loss     90% loss left ear and 40% right ear    History of clubfoot     "since birth"    History of pneumonia     History of TIA (transient ischemic attack) 04/25/2017 11/30/18 pt denies    Hyperlipidemia 5/15/2014    Hypertension     Infectious viral hepatitis     "cant remember type"    Irregular heart beat     Neuropathy     both feet    Osteomyelitis ([address] ) right great toe    Right middle lobe pulmonary nodule 11/6/2018    Seasickness     Teeth missing     Type 2 diabetes mellitus with diabetic neuropathy ([facility] ) 11/6/2018    Wears glasses     reading    Wound, open     bottom of right foot     Past Surgical History:   Procedure Laterality Date    BUNIONECTOMY Right 12/4/2018    Procedure: 5TH METATARSAL BONE PARTIAL RESECTION, FULL THICKNESS DEBRIDEMENT OF DIABETIC ULCER;  Surgeon: Ever Yadav DPM;  [facility];  Service: Podiatry    CLUB FOOT RELEASE Bilateral     COLONOSCOPY      COMPLEX WOUND CLOSURE TO EXTREMITY  4/27/2021    Procedure: COMPLEX WOUND CLOSURE TO EXTREMITY: RIGHT FOOT;  Surgeon: Annabelle Montiel MD;  [facility];  Service: Plastics    EXTERNAL FIXATOR APPLICATION Right 3/95/4766    Procedure: Application multiplane external fixation;  Surgeon: Gracia Arreguin DPM;  [facility];  Service: Podiatry    FOOT HARDWARE REMOVAL Right 2/17/2021    Procedure: REMOVAL EXTERNAL FIXATOR;  Surgeon: Gracia Arreguin DPM;  [facility];  Service: Podiatry    FOREIGN BODY REMOVAL Right 4/27/2021    Procedure: REMOVAL FOREIGN BODY EXTREMITY: RIGHT FOOT;  Surgeon: Annabelle Montiel MD;  [facility];  Service: Plastics    INCISION AND DRAINAGE OF WOUND Right 2/17/2021    Procedure: INCISION AND DRAINAGE (I&D) EXTREMITY;  Surgeon: Gracia Arreguin DPM;  [facility];  Service: Podiatry    ORIF FOOT FRACTURE Right 3/4/2021    Procedure: VAC PLACEMENT; SCREW FIXATION RIGHT FOOT FUSION;  Surgeon: Gracia Arreguin DPM;  [facility];  Service: Podiatry    [address] [place] [address] Right 2/22/2021    Procedure: AMPUTATION TRANSMETATARSAL (TMA), REMOVAL NAIL IM T2 ICF HARDWARE;  Surgeon: Gracia Arreguin DPM;  [facility];  Service: Podiatry    MO FUSION FOOT BONES,MIDTARSAL,OSTEOTMY Right 2/12/2021    Procedure: foot ARTHRODESIS/FUSION;  Surgeon: Gracia Arreguin DONOVAN;  [facility];  Service: Podiatry    CO LENGTH/SHORT LEG/ANKL TENDON,SINGLE Right 2/12/2021    Procedure: LENGTHEN TIBIAL TENDON, TRANS HEEL CORD;  Surgeon: Eleazar Villalobos DPM;  [facility];  Service: Podiatry    CO MUSCLE-SKIN FLAP,TRUNK Right 4/12/2021    Procedure: RECONSTRUCTION MICROSURGICAL W/ FREE FLAP;  Surgeon: Taylor Mayo MD;  [facility];  Service: Plastics    CO MUSCLE-SKIN FLAP,TRUNK Right 4/12/2021    Procedure: TAKEBACK RECONSTRUCTION MICROSURGICAL W/ FREE FLAP;  Surgeon: Taylor Mayo MD;  [facility];  Service: Plastics    CO MUSCLE-SKIN FLAP,TRUNK Right 4/13/2021    Procedure: RECONSTRUCTION MICROSURGICAL W/ FREE FLAP, RE EXPLORATION, MICRO VASCULAR REVISION;  Surgeon: Taylor Mayo MD;  [facility];  Service: Plastics    CO PART [address] BONE METATARSAL HEAD,EA Right 12/29/2020    Procedure: EXCISION EXOSTOSIS;  Surgeon: Eleazar Villalobos DPM;  [facility];  Service: Rosie Deems SURG WND EXTEN/COMPLIC Right 29/54/0769    Procedure: PRIMARY DELAYED CLOSURE;  Surgeon: Eleazar Villalobos DPM;  [facility];  Service: Podiatry    TOE AMPUTATION Right     partial great toe    TONSILLECTOMY      VAC DRESSING APPLICATION Right 2/1/4663    Procedure: APPLICATION VAC DRESSING EXTREMITY;  Surgeon: Taylor Mayo MD;  [facility];  Service: Plastics    [address] [facility] Right 3/1/2021    Procedure: DEBRIDEMENT LOWER EXTREMITY ([address] OUT); Surgeon: Taylor Mayo MD;  [facility];  Service: Plastics    WOUND DEBRIDEMENT Right 4/7/2021    Procedure: DEBRIDEMENT RIGHT FOOT;  Surgeon: Taylor Mayo MD;  [facility];  Service: Plastics    WOUND DEBRIDEMENT Right 4/12/2021    Procedure: DEBRIDEMENT LOWER EXTREMITY [facility] OUT);   Surgeon: Taylor Mayo MD;  [facility];  Service: Plastics    WOUND DEBRIDEMENT Right 2021    Procedure: DEBRIDEMENT LOWER EXTREMITY [facility] OUT): RIGHT FOOT;  Surgeon: Olvin Prather MD;  [facility];  Service: Plastics     Social History     Socioeconomic History    Marital status: /Civil Union     Spouse name: None    Number of children: None    Years of education: None    Highest education level: None   Occupational History    None   Tobacco Use    Smoking status: Former Smoker     Types: Cigarettes     Quit date: 1988     Years since quittin 7    Smokeless tobacco: Former User    Tobacco comment: exposure to passive smoke   Vaping Use    Vaping Use: Never used   Substance and Sexual Activity    Alcohol use: Yes     Comment: socially    Drug use: Not Currently    Sexual activity: Yes     Partners: Female   Other Topics Concern    None   Social History Narrative    None     Social Determinants of Health     Financial Resource Strain:     Difficulty of Paying Living Expenses:    Food Insecurity:     Worried About Running Out of Food in the Last Year:     Ran Out of Food in the Last Year:    Transportation Needs:     Lack of Transportation (Medical):      Lack of Transportation (Non-Medical):    Physical Activity:     Days of Exercise per Week:     Minutes of Exercise per Session:    Stress:     Feeling of Stress :    Social Connections:     Frequency of Communication with Friends and Family:     Frequency of Social Gatherings with Friends and Family:     Attends Worship Services:     Active Member of Clubs or Organizations:     Attends Club or Organization Meetings:     Marital Status:    Intimate Partner Violence:     Fear of Current or Ex-Partner:     Emotionally Abused:     Physically Abused:     Sexually Abused:         Current Outpatient Medications:     acetaminophen (Mapap Arthritis Pain) 650 mg CR tablet, Take 1 tablet (650 mg total) by mouth 2 (two) times a day, Disp: 60 tablet, Rfl: 1    aspirin 325 mg tablet, Take 1 tablet (325 mg total) by mouth daily, Disp:  , Rfl: 0    atorvastatin (LIPITOR) 40 mg tablet, Take 1 tablet (40 mg total) by mouth daily, Disp: 90 tablet, Rfl: 3    Blood Glucose Monitoring Suppl (ACCU-CHEK JYOTI SMARTVIEW) w/Device KIT, use to check Blood Sugar as needed, Disp: , Rfl:     cephalexin (KEFLEX) 500 mg capsule, Take 1 capsule (500 mg total) by mouth every 6 (six) hours for 7 days, Disp: 28 capsule, Rfl: 0    diltiazem (CARDIZEM) 90 mg tablet, Take 1 tablet (90 mg total) by mouth 2 (two) times a day, Disp: 60 tablet, Rfl: 0    docusate sodium (COLACE) 100 mg capsule, Take 1 capsule (100 mg total) by mouth 2 (two) times a day, Disp: 10 capsule, Rfl: 0    Eliquis 5 MG, TAKE 1 TABLET BY MOUTH TWICE A DAY, Disp: 60 tablet, Rfl: 12    glimepiride (AMARYL) 2 mg tablet, Take 1 tablet (2 mg total) by mouth 2 (two) times a day, Disp: 60 tablet, Rfl: 1    glucose blood (Accu-Chek SmartView) test strip, Use as instructed BID, Disp: 200 each, Rfl: 6    Januvia 100 MG tablet, Take 1 tablet (100 mg total) by mouth daily, Disp: 132 tablet, Rfl: 3    lisinopril (ZESTRIL) 40 mg tablet, Take 1 tablet (40 mg total) by mouth daily, Disp: 90 tablet, Rfl: 3    melatonin 3 mg, Take 1 tablet (3 mg total) by mouth daily at bedtime, Disp: , Rfl: 0    metFORMIN (GLUCOPHAGE) 500 mg tablet, Take 1 tablet (500 mg total) by mouth 2 (two) times a day with meals, Disp: 180 tablet, Rfl: 6    metoprolol succinate (TOPROL-XL) 25 mg 24 hr tablet, Take (3) Tablets daily by mouth PO BID, Disp: 60 tablet, Rfl: 12    polyethylene glycol (MIRALAX) 17 g packet, Take 17 g by mouth daily, Disp:  , Rfl: 0    Sodium Chloride Flush (NORMAL SALINE FLUSH IV), Infuse 10 mL into a venous catheter 4 (four) times a day, Disp: , Rfl:   Family History   Problem Relation Age of Onset    Diabetes Father         mellitus      Review of Systems   Constitutional: Positive for activity change   Negative for appetite change, chills, diaphoresis, fatigue, fever and unexpected weight change  Skin: Positive for wound  Allergies  Simvastatin and Itraconazole    Objective:  BP (!) 187/94   Pulse 78   Temp (!) 97 3 °F (36 3 °C)   Resp 18     Physical Exam      Wound 06/04/21 Diabetic Ulcer Foot Anterior;Right (Active)   Wound Image   08/06/21 0911   Wound Description Epithelialization;Pink; Other (Comment) 08/06/21 0914   Lavern-wound Assessment Maceration;Scar Tissue 08/06/21 0914   Wound Length (cm) 1 5 cm 08/06/21 0914   Wound Width (cm) 3 5 cm 08/06/21 0914   Wound Depth (cm) 1 cm 08/06/21 0914   Wound Surface Area (cm^2) 5 25 cm^2 08/06/21 0914   Wound Volume (cm^3) 5 25 cm^3 08/06/21 0914   Calculated Wound Volume (cm^3) 5 25 cm^3 08/06/21 0914   Change in Wound Size % 83 17 08/06/21 0914   Drainage Amount Moderate 07/30/21 0858   Drainage Description Serosanguineous; Yellow 07/30/21 0858   Non-staged Wound Description Full thickness 07/30/21 0858   Dressing Wound V A C  07/02/21 0847   Dressing Status Leaking (Comment) 07/30/21 0858       Wound 08/06/21 Donor Site Thigh Left; Anterior (Active)   Wound Image   08/06/21 0925   Wound Description Pink;Granulation tissue; Yellow 08/06/21 0925   Lavern-wound Assessment Scar Tissue;Fragile 08/06/21 0925   Wound Length (cm) 4 5 cm 08/06/21 0925   Wound Width (cm) 2 cm 08/06/21 0925   Wound Depth (cm) 0 1 cm 08/06/21 0925   Wound Surface Area (cm^2) 9 cm^2 08/06/21 0925   Wound Volume (cm^3) 0 9 cm^3 08/06/21 0925   Calculated Wound Volume (cm^3) 0 9 cm^3 08/06/21 0925   Drainage Amount Scant 08/06/21 0925   Drainage Description Serosanguineous 08/06/21 0925   Non-staged Wound Description Full thickness 08/06/21 0925           Wound 06/04/21 Diabetic Ulcer Foot Anterior;Right (Active)   Wound Image   08/06/21 0911   Wound Description Epithelialization;Pink; Other (Comment) 08/06/21 0914   Lavern-wound Assessment Maceration;Scar Tissue 08/06/21 0914   Wound Length (cm) 1 5 cm 08/06/21 0914 Wound Width (cm) 3 5 cm 08/06/21 0914   Wound Depth (cm) 1 cm 08/06/21 0914   Wound Surface Area (cm^2) 5 25 cm^2 08/06/21 0914   Wound Volume (cm^3) 5 25 cm^3 08/06/21 0914   Calculated Wound Volume (cm^3) 5 25 cm^3 08/06/21 0914   Change in Wound Size % 83 17 08/06/21 0914   Drainage Amount Moderate 07/30/21 0858   Drainage Description Serosanguineous; Yellow 07/30/21 0858   Non-staged Wound Description Full thickness 07/30/21 0858   Dressing Wound V A C  07/02/21 0847   Dressing Status Leaking (Comment) 07/30/21 0858       Wound 08/06/21 Donor Site Thigh Left; Anterior (Active)   Wound Image   08/06/21 0925   Wound Description Pink;Granulation tissue; Yellow 08/06/21 0925   Lavern-wound Assessment Scar Tissue;Fragile 08/06/21 0925   Wound Length (cm) 4 5 cm 08/06/21 0925   Wound Width (cm) 2 cm 08/06/21 0925   Wound Depth (cm) 0 1 cm 08/06/21 0925   Wound Surface Area (cm^2) 9 cm^2 08/06/21 0925   Wound Volume (cm^3) 0 9 cm^3 08/06/21 0925   Calculated Wound Volume (cm^3) 0 9 cm^3 08/06/21 0925   Drainage Amount Scant 08/06/21 0925   Drainage Description Serosanguineous 08/06/21 0925   Non-staged Wound Description Full thickness 08/06/21 0925                         Diagnosis:  There are no diagnoses linked to this encounter  No diagnosis found  ASSESSMENT    Surgical wound      PLAN    Discussion of Management /  Recommendations  Sutures left in place  Benzoin to forefoot  Long "steri strips" made 6" long with medfix tape to reinforce wound  DSD to be changed triweekly  Follow-up 3 weeks to assess  I recommend he cleanse the donor site with soap and water and apply Telfa daily  I texted Dr Wright Messing about all of above  No

## 2021-12-30 NOTE — H&P PST ADULT - NSICDXPASTSURGICALHX_GEN_ALL_CORE_FT
PAST SURGICAL HISTORY:  History of vasectomy     Prostate cancer s/p cyber knife    S/P partial glossectomy 4/21

## 2021-12-30 NOTE — H&P PST ADULT - PROBLEM SELECTOR PLAN 1
Right neck dissection     Preop instructions provided and patient verbalizes understanding.  Labs done and results pending.   Hibiclens provided with instructions and was signed by patient. Teach-back method was utilized to assess patient's understanding. Patient verbalized understanding.

## 2021-12-30 NOTE — H&P PST ADULT - HISTORY OF PRESENT ILLNESS
This is a 76 y.o. male  This is a 76 y.o. male s/p partial glossectomy 4/16/21 , follow up radiation discontinues 7/21 . Pt had Pet scan 11/27/21 ,CT of neck 11/27/21 with enlarged right level II lymph node suspicious for clau metastasis . Ultrasound - guided FNA and 12/7/21 FNA consistent with squamous cell carcinoma. Pt evaluated by Dr Boland ,malignant neoplasm of tongue unspecified Pt scheduled for surgery 1/5/22.

## 2021-12-30 NOTE — H&P PST ADULT - NSICDXPASTMEDICALHX_GEN_ALL_CORE_FT
PAST MEDICAL HISTORY:  GERD (gastroesophageal reflux disease)     Hyperlipidemia     Hypertension     Malignant neoplasm of tongue, unspecified s/p surgery and radiation discontinued 7/21    Mitral valve prolapse     Parkinson's disease     Prostate cancer

## 2021-12-30 NOTE — H&P PST ADULT - NEUROLOGICAL COMMENTS
history of parkinson's disease , left upper extremity tremor history of parkinson's disease , left and right upper extremity tremor

## 2022-01-01 ENCOUNTER — EMERGENCY (EMERGENCY)
Facility: HOSPITAL | Age: 77
LOS: 1 days | Discharge: ANOTHER HOSPITAL | End: 2022-01-01
Attending: EMERGENCY MEDICINE | Admitting: EMERGENCY MEDICINE
Payer: MEDICARE

## 2022-01-01 ENCOUNTER — INPATIENT (INPATIENT)
Facility: HOSPITAL | Age: 77
LOS: 17 days | DRG: 870 | End: 2022-07-05
Attending: INTERNAL MEDICINE | Admitting: INTERNAL MEDICINE
Payer: MEDICARE

## 2022-01-01 ENCOUNTER — APPOINTMENT (OUTPATIENT)
Dept: HEMATOLOGY ONCOLOGY | Facility: CLINIC | Age: 77
End: 2022-01-01
Payer: MEDICARE

## 2022-01-01 ENCOUNTER — EMERGENCY (EMERGENCY)
Facility: HOSPITAL | Age: 77
LOS: 1 days | Discharge: HOME | End: 2022-01-01
Attending: INTERNAL MEDICINE | Admitting: INTERNAL MEDICINE
Payer: MEDICARE

## 2022-01-01 ENCOUNTER — TRANSCRIPTION ENCOUNTER (OUTPATIENT)
Age: 77
End: 2022-01-01

## 2022-01-01 ENCOUNTER — APPOINTMENT (OUTPATIENT)
Dept: OTOLARYNGOLOGY | Facility: HOSPITAL | Age: 77
End: 2022-01-01

## 2022-01-01 ENCOUNTER — APPOINTMENT (OUTPATIENT)
Dept: OTOLARYNGOLOGY | Facility: CLINIC | Age: 77
End: 2022-01-01
Payer: MEDICARE

## 2022-01-01 ENCOUNTER — RESULT REVIEW (OUTPATIENT)
Age: 77
End: 2022-01-01

## 2022-01-01 ENCOUNTER — APPOINTMENT (OUTPATIENT)
Dept: NUCLEAR MEDICINE | Facility: CLINIC | Age: 77
End: 2022-01-01
Payer: MEDICARE

## 2022-01-01 ENCOUNTER — APPOINTMENT (OUTPATIENT)
Dept: NUCLEAR MEDICINE | Facility: CLINIC | Age: 77
End: 2022-01-01

## 2022-01-01 ENCOUNTER — APPOINTMENT (OUTPATIENT)
Dept: OTOLARYNGOLOGY | Facility: CLINIC | Age: 77
End: 2022-01-01

## 2022-01-01 ENCOUNTER — NON-APPOINTMENT (OUTPATIENT)
Age: 77
End: 2022-01-01

## 2022-01-01 ENCOUNTER — APPOINTMENT (OUTPATIENT)
Dept: HEMATOLOGY ONCOLOGY | Facility: CLINIC | Age: 77
End: 2022-01-01

## 2022-01-01 ENCOUNTER — OUTPATIENT (OUTPATIENT)
Dept: OUTPATIENT SERVICES | Facility: HOSPITAL | Age: 77
LOS: 1 days | Discharge: HOME | End: 2022-01-01

## 2022-01-01 ENCOUNTER — APPOINTMENT (OUTPATIENT)
Dept: NEUROLOGY | Facility: CLINIC | Age: 77
End: 2022-01-01

## 2022-01-01 ENCOUNTER — EMERGENCY (EMERGENCY)
Facility: HOSPITAL | Age: 77
LOS: 1 days | Discharge: HOME | End: 2022-01-01
Attending: EMERGENCY MEDICINE | Admitting: EMERGENCY MEDICINE
Payer: MEDICARE

## 2022-01-01 ENCOUNTER — OUTPATIENT (OUTPATIENT)
Dept: OUTPATIENT SERVICES | Facility: HOSPITAL | Age: 77
LOS: 1 days | End: 2022-01-01
Payer: MEDICARE

## 2022-01-01 ENCOUNTER — INPATIENT (INPATIENT)
Facility: HOSPITAL | Age: 77
LOS: 3 days | Discharge: HOME | End: 2022-01-09
Attending: OTOLARYNGOLOGY | Admitting: OTOLARYNGOLOGY
Payer: MEDICARE

## 2022-01-01 ENCOUNTER — INPATIENT (INPATIENT)
Facility: HOSPITAL | Age: 77
LOS: 10 days | Discharge: NURSING, REHAB OR LONG-TERM CARE | DRG: 40 | End: 2022-06-08
Attending: HOSPITALIST | Admitting: HOSPITALIST
Payer: MEDICARE

## 2022-01-01 ENCOUNTER — INPATIENT (INPATIENT)
Facility: HOSPITAL | Age: 77
LOS: 4 days | Discharge: HOME | DRG: 64 | End: 2022-05-10
Attending: INTERNAL MEDICINE | Admitting: INTERNAL MEDICINE
Payer: MEDICARE

## 2022-01-01 ENCOUNTER — APPOINTMENT (OUTPATIENT)
Dept: CT IMAGING | Facility: CLINIC | Age: 77
End: 2022-01-01
Payer: MEDICARE

## 2022-01-01 ENCOUNTER — APPOINTMENT (OUTPATIENT)
Dept: PHYSICAL MEDICINE AND REHAB | Facility: CLINIC | Age: 77
End: 2022-01-01
Payer: MEDICARE

## 2022-01-01 VITALS
SYSTOLIC BLOOD PRESSURE: 114 MMHG | TEMPERATURE: 98 F | OXYGEN SATURATION: 96 % | DIASTOLIC BLOOD PRESSURE: 68 MMHG | HEART RATE: 74 BPM | RESPIRATION RATE: 18 BRPM

## 2022-01-01 VITALS
HEART RATE: 122 BPM | TEMPERATURE: 102 F | OXYGEN SATURATION: 79 % | WEIGHT: 98.11 LBS | SYSTOLIC BLOOD PRESSURE: 123 MMHG | HEIGHT: 65 IN | DIASTOLIC BLOOD PRESSURE: 80 MMHG

## 2022-01-01 VITALS
TEMPERATURE: 98 F | WEIGHT: 130.07 LBS | HEIGHT: 65 IN | HEART RATE: 117 BPM | OXYGEN SATURATION: 100 % | DIASTOLIC BLOOD PRESSURE: 73 MMHG | RESPIRATION RATE: 18 BRPM | SYSTOLIC BLOOD PRESSURE: 129 MMHG

## 2022-01-01 VITALS
OXYGEN SATURATION: 98 % | DIASTOLIC BLOOD PRESSURE: 93 MMHG | TEMPERATURE: 98 F | SYSTOLIC BLOOD PRESSURE: 142 MMHG | RESPIRATION RATE: 18 BRPM | HEART RATE: 97 BPM

## 2022-01-01 VITALS
BODY MASS INDEX: 21.95 KG/M2 | WEIGHT: 144.84 LBS | HEART RATE: 78 BPM | HEIGHT: 67.99 IN | TEMPERATURE: 97.2 F | DIASTOLIC BLOOD PRESSURE: 61 MMHG | RESPIRATION RATE: 16 BRPM | SYSTOLIC BLOOD PRESSURE: 100 MMHG | OXYGEN SATURATION: 96 %

## 2022-01-01 VITALS
OXYGEN SATURATION: 97 % | DIASTOLIC BLOOD PRESSURE: 72 MMHG | HEART RATE: 81 BPM | RESPIRATION RATE: 17 BRPM | SYSTOLIC BLOOD PRESSURE: 136 MMHG | TEMPERATURE: 98 F

## 2022-01-01 VITALS
RESPIRATION RATE: 18 BRPM | HEART RATE: 83 BPM | TEMPERATURE: 99 F | OXYGEN SATURATION: 91 % | DIASTOLIC BLOOD PRESSURE: 63 MMHG | SYSTOLIC BLOOD PRESSURE: 109 MMHG

## 2022-01-01 VITALS
DIASTOLIC BLOOD PRESSURE: 83 MMHG | HEIGHT: 68 IN | WEIGHT: 148 LBS | HEART RATE: 100 BPM | BODY MASS INDEX: 22.43 KG/M2 | SYSTOLIC BLOOD PRESSURE: 126 MMHG

## 2022-01-01 VITALS
SYSTOLIC BLOOD PRESSURE: 130 MMHG | HEART RATE: 88 BPM | RESPIRATION RATE: 16 BRPM | DIASTOLIC BLOOD PRESSURE: 76 MMHG | TEMPERATURE: 98 F | OXYGEN SATURATION: 98 %

## 2022-01-01 VITALS
TEMPERATURE: 98 F | WEIGHT: 143.96 LBS | HEART RATE: 106 BPM | HEIGHT: 65 IN | SYSTOLIC BLOOD PRESSURE: 104 MMHG | RESPIRATION RATE: 16 BRPM | OXYGEN SATURATION: 99 % | DIASTOLIC BLOOD PRESSURE: 68 MMHG

## 2022-01-01 VITALS
HEIGHT: 65 IN | DIASTOLIC BLOOD PRESSURE: 47 MMHG | WEIGHT: 149.91 LBS | SYSTOLIC BLOOD PRESSURE: 100 MMHG | RESPIRATION RATE: 15 BRPM | OXYGEN SATURATION: 99 % | TEMPERATURE: 97 F | HEART RATE: 74 BPM

## 2022-01-01 VITALS
SYSTOLIC BLOOD PRESSURE: 145 MMHG | HEIGHT: 68 IN | BODY MASS INDEX: 21.98 KG/M2 | HEART RATE: 96 BPM | DIASTOLIC BLOOD PRESSURE: 93 MMHG | WEIGHT: 145 LBS

## 2022-01-01 VITALS
OXYGEN SATURATION: 100 % | TEMPERATURE: 99 F | SYSTOLIC BLOOD PRESSURE: 114 MMHG | RESPIRATION RATE: 18 BRPM | HEIGHT: 65 IN | HEART RATE: 101 BPM | DIASTOLIC BLOOD PRESSURE: 69 MMHG

## 2022-01-01 VITALS
DIASTOLIC BLOOD PRESSURE: 67 MMHG | TEMPERATURE: 98 F | RESPIRATION RATE: 20 BRPM | HEART RATE: 77 BPM | OXYGEN SATURATION: 100 % | SYSTOLIC BLOOD PRESSURE: 136 MMHG

## 2022-01-01 VITALS — HEART RATE: 250 BPM | OXYGEN SATURATION: 90 % | SYSTOLIC BLOOD PRESSURE: 156 MMHG | DIASTOLIC BLOOD PRESSURE: 81 MMHG

## 2022-01-01 VITALS
SYSTOLIC BLOOD PRESSURE: 115 MMHG | OXYGEN SATURATION: 97 % | TEMPERATURE: 98 F | HEIGHT: 65 IN | WEIGHT: 149.91 LBS | HEART RATE: 86 BPM | DIASTOLIC BLOOD PRESSURE: 69 MMHG | RESPIRATION RATE: 15 BRPM

## 2022-01-01 VITALS
HEIGHT: 65 IN | DIASTOLIC BLOOD PRESSURE: 84 MMHG | SYSTOLIC BLOOD PRESSURE: 126 MMHG | RESPIRATION RATE: 26 BRPM | TEMPERATURE: 99 F | HEART RATE: 67 BPM | WEIGHT: 137.13 LBS | OXYGEN SATURATION: 99 %

## 2022-01-01 VITALS
HEIGHT: 68 IN | WEIGHT: 145 LBS | HEART RATE: 103 BPM | DIASTOLIC BLOOD PRESSURE: 84 MMHG | SYSTOLIC BLOOD PRESSURE: 156 MMHG | BODY MASS INDEX: 21.98 KG/M2

## 2022-01-01 DIAGNOSIS — F32.9 MAJOR DEPRESSIVE DISORDER, SINGLE EPISODE, UNSPECIFIED: ICD-10-CM

## 2022-01-01 DIAGNOSIS — Z80.6 FAMILY HISTORY OF LEUKEMIA: ICD-10-CM

## 2022-01-01 DIAGNOSIS — Z98.52 VASECTOMY STATUS: Chronic | ICD-10-CM

## 2022-01-01 DIAGNOSIS — G20 PARKINSON'S DISEASE: ICD-10-CM

## 2022-01-01 DIAGNOSIS — E43 UNSPECIFIED SEVERE PROTEIN-CALORIE MALNUTRITION: ICD-10-CM

## 2022-01-01 DIAGNOSIS — N39.0 URINARY TRACT INFECTION, SITE NOT SPECIFIED: ICD-10-CM

## 2022-01-01 DIAGNOSIS — N17.9 ACUTE KIDNEY FAILURE, UNSPECIFIED: ICD-10-CM

## 2022-01-01 DIAGNOSIS — E83.52 HYPERCALCEMIA: ICD-10-CM

## 2022-01-01 DIAGNOSIS — C61 MALIGNANT NEOPLASM OF PROSTATE: Chronic | ICD-10-CM

## 2022-01-01 DIAGNOSIS — A41.9 SEPSIS, UNSPECIFIED ORGANISM: ICD-10-CM

## 2022-01-01 DIAGNOSIS — E87.2 ACIDOSIS: ICD-10-CM

## 2022-01-01 DIAGNOSIS — I10 ESSENTIAL (PRIMARY) HYPERTENSION: ICD-10-CM

## 2022-01-01 DIAGNOSIS — R59.0 LOCALIZED ENLARGED LYMPH NODES: ICD-10-CM

## 2022-01-01 DIAGNOSIS — Z29.9 ENCOUNTER FOR PROPHYLACTIC MEASURES, UNSPECIFIED: ICD-10-CM

## 2022-01-01 DIAGNOSIS — C02.9 MALIGNANT NEOPLASM OF TONGUE, UNSPECIFIED: ICD-10-CM

## 2022-01-01 DIAGNOSIS — Z98.890 OTHER SPECIFIED POSTPROCEDURAL STATES: Chronic | ICD-10-CM

## 2022-01-01 DIAGNOSIS — E87.6 HYPOKALEMIA: ICD-10-CM

## 2022-01-01 DIAGNOSIS — I63.9 CEREBRAL INFARCTION, UNSPECIFIED: ICD-10-CM

## 2022-01-01 DIAGNOSIS — E87.0 HYPEROSMOLALITY AND HYPERNATREMIA: ICD-10-CM

## 2022-01-01 DIAGNOSIS — L89.150 PRESSURE ULCER OF SACRAL REGION, UNSTAGEABLE: ICD-10-CM

## 2022-01-01 DIAGNOSIS — K62.5 HEMORRHAGE OF ANUS AND RECTUM: ICD-10-CM

## 2022-01-01 DIAGNOSIS — E87.8 OTHER DISORDERS OF ELECTROLYTE AND FLUID BALANCE, NOT ELSEWHERE CLASSIFIED: ICD-10-CM

## 2022-01-01 DIAGNOSIS — R53.1 WEAKNESS: ICD-10-CM

## 2022-01-01 DIAGNOSIS — D64.9 ANEMIA, UNSPECIFIED: ICD-10-CM

## 2022-01-01 DIAGNOSIS — Z84.1 FAMILY HISTORY OF DISORDERS OF KIDNEY AND URETER: ICD-10-CM

## 2022-01-01 DIAGNOSIS — N20.1 CALCULUS OF URETER: ICD-10-CM

## 2022-01-01 DIAGNOSIS — K21.9 GASTRO-ESOPHAGEAL REFLUX DISEASE WITHOUT ESOPHAGITIS: ICD-10-CM

## 2022-01-01 DIAGNOSIS — E78.5 HYPERLIPIDEMIA, UNSPECIFIED: ICD-10-CM

## 2022-01-01 DIAGNOSIS — A41.50 GRAM-NEGATIVE SEPSIS, UNSPECIFIED: ICD-10-CM

## 2022-01-01 DIAGNOSIS — C44.92 SQUAMOUS CELL CARCINOMA OF SKIN, UNSPECIFIED: ICD-10-CM

## 2022-01-01 DIAGNOSIS — Z78.9 OTHER SPECIFIED HEALTH STATUS: ICD-10-CM

## 2022-01-01 DIAGNOSIS — Z80.0 FAMILY HISTORY OF MALIGNANT NEOPLASM OF DIGESTIVE ORGANS: ICD-10-CM

## 2022-01-01 DIAGNOSIS — J96.01 ACUTE RESPIRATORY FAILURE WITH HYPOXIA: ICD-10-CM

## 2022-01-01 LAB
-  AMIKACIN: SIGNIFICANT CHANGE UP
-  AMOXICILLIN/CLAVULANIC ACID: SIGNIFICANT CHANGE UP
-  AMPICILLIN/SULBACTAM: SIGNIFICANT CHANGE UP
-  AMPICILLIN: SIGNIFICANT CHANGE UP
-  AMPICILLIN: SIGNIFICANT CHANGE UP
-  AZTREONAM: SIGNIFICANT CHANGE UP
-  CEFAZOLIN: SIGNIFICANT CHANGE UP
-  CEFEPIME: SIGNIFICANT CHANGE UP
-  CEFOXITIN: SIGNIFICANT CHANGE UP
-  CEFTAZIDIME: SIGNIFICANT CHANGE UP
-  CEFTAZIDIME: SIGNIFICANT CHANGE UP
-  CEFTRIAXONE: SIGNIFICANT CHANGE UP
-  CIPROFLOXACIN: SIGNIFICANT CHANGE UP
-  ERTAPENEM: SIGNIFICANT CHANGE UP
-  GENTAMICIN: SIGNIFICANT CHANGE UP
-  IMIPENEM: SIGNIFICANT CHANGE UP
-  LEVOFLOXACIN: SIGNIFICANT CHANGE UP
-  MEROPENEM: SIGNIFICANT CHANGE UP
-  NITROFURANTOIN: SIGNIFICANT CHANGE UP
-  PIPERACILLIN/TAZOBACTAM: SIGNIFICANT CHANGE UP
-  TETRACYCLINE: SIGNIFICANT CHANGE UP
-  TOBRAMYCIN: SIGNIFICANT CHANGE UP
-  TRIMETHOPRIM/SULFAMETHOXAZOLE: SIGNIFICANT CHANGE UP
-  VANCOMYCIN: SIGNIFICANT CHANGE UP
24R-OH-CALCIDIOL SERPL-MCNC: 39.2 NG/ML — SIGNIFICANT CHANGE UP (ref 30–80)
A1C WITH ESTIMATED AVERAGE GLUCOSE RESULT: 5.6 % — SIGNIFICANT CHANGE UP (ref 4–5.6)
A1C WITH ESTIMATED AVERAGE GLUCOSE RESULT: 5.7 % — HIGH (ref 4–5.6)
ACANTHOCYTES BLD QL SMEAR: SLIGHT — SIGNIFICANT CHANGE UP
ACANTHOCYTES BLD QL SMEAR: SLIGHT — SIGNIFICANT CHANGE UP
ALBUMIN SERPL ELPH-MCNC: 1.5 G/DL — LOW (ref 3.3–5)
ALBUMIN SERPL ELPH-MCNC: 1.6 G/DL — LOW (ref 3.3–5)
ALBUMIN SERPL ELPH-MCNC: 1.7 G/DL — LOW (ref 3.3–5)
ALBUMIN SERPL ELPH-MCNC: 1.8 G/DL — LOW (ref 3.3–5)
ALBUMIN SERPL ELPH-MCNC: 1.9 G/DL — LOW (ref 3.3–5)
ALBUMIN SERPL ELPH-MCNC: 1.9 G/DL — LOW (ref 3.3–5)
ALBUMIN SERPL ELPH-MCNC: 2 G/DL — LOW (ref 3.3–5)
ALBUMIN SERPL ELPH-MCNC: 2.1 G/DL — LOW (ref 3.3–5)
ALBUMIN SERPL ELPH-MCNC: 2.2 G/DL — LOW (ref 3.3–5)
ALBUMIN SERPL ELPH-MCNC: 2.8 G/DL — LOW (ref 3.3–5.2)
ALBUMIN SERPL ELPH-MCNC: 2.9 G/DL — LOW (ref 3.3–5)
ALBUMIN SERPL ELPH-MCNC: 3 G/DL — LOW (ref 3.3–5)
ALBUMIN SERPL ELPH-MCNC: 3.1 G/DL — LOW (ref 3.3–5.2)
ALBUMIN SERPL ELPH-MCNC: 3.2 G/DL — LOW (ref 3.3–5)
ALBUMIN SERPL ELPH-MCNC: 3.2 G/DL — LOW (ref 3.3–5)
ALBUMIN SERPL ELPH-MCNC: 3.2 G/DL — LOW (ref 3.3–5.2)
ALBUMIN SERPL ELPH-MCNC: 3.3 G/DL — SIGNIFICANT CHANGE UP (ref 3.3–5.2)
ALBUMIN SERPL ELPH-MCNC: 3.4 G/DL — SIGNIFICANT CHANGE UP (ref 3.3–5)
ALBUMIN SERPL ELPH-MCNC: 4.1 G/DL
ALBUMIN SERPL ELPH-MCNC: 4.3 G/DL — SIGNIFICANT CHANGE UP (ref 3.3–5)
ALP BLD-CCNC: 79 U/L
ALP SERPL-CCNC: 49 U/L — SIGNIFICANT CHANGE UP (ref 40–120)
ALP SERPL-CCNC: 50 U/L — SIGNIFICANT CHANGE UP (ref 40–120)
ALP SERPL-CCNC: 51 U/L — SIGNIFICANT CHANGE UP (ref 40–120)
ALP SERPL-CCNC: 55 U/L — SIGNIFICANT CHANGE UP (ref 40–120)
ALP SERPL-CCNC: 67 U/L — SIGNIFICANT CHANGE UP (ref 40–120)
ALP SERPL-CCNC: 67 U/L — SIGNIFICANT CHANGE UP (ref 40–120)
ALP SERPL-CCNC: 68 U/L — SIGNIFICANT CHANGE UP (ref 40–120)
ALP SERPL-CCNC: 70 U/L — SIGNIFICANT CHANGE UP (ref 40–120)
ALP SERPL-CCNC: 72 U/L — SIGNIFICANT CHANGE UP (ref 40–120)
ALP SERPL-CCNC: 72 U/L — SIGNIFICANT CHANGE UP (ref 40–120)
ALP SERPL-CCNC: 73 U/L — SIGNIFICANT CHANGE UP (ref 40–120)
ALP SERPL-CCNC: 73 U/L — SIGNIFICANT CHANGE UP (ref 40–120)
ALP SERPL-CCNC: 74 U/L — SIGNIFICANT CHANGE UP (ref 40–120)
ALP SERPL-CCNC: 75 U/L — SIGNIFICANT CHANGE UP (ref 40–120)
ALP SERPL-CCNC: 76 U/L — SIGNIFICANT CHANGE UP (ref 40–120)
ALP SERPL-CCNC: 79 U/L — SIGNIFICANT CHANGE UP (ref 40–120)
ALP SERPL-CCNC: 79 U/L — SIGNIFICANT CHANGE UP (ref 40–120)
ALP SERPL-CCNC: 80 U/L — SIGNIFICANT CHANGE UP (ref 40–120)
ALP SERPL-CCNC: 81 U/L — SIGNIFICANT CHANGE UP (ref 30–120)
ALP SERPL-CCNC: 81 U/L — SIGNIFICANT CHANGE UP (ref 40–120)
ALP SERPL-CCNC: 82 U/L — SIGNIFICANT CHANGE UP (ref 40–120)
ALP SERPL-CCNC: 83 U/L — SIGNIFICANT CHANGE UP (ref 40–120)
ALP SERPL-CCNC: 86 U/L — SIGNIFICANT CHANGE UP (ref 40–120)
ALP SERPL-CCNC: 87 U/L — SIGNIFICANT CHANGE UP (ref 40–120)
ALP SERPL-CCNC: 91 U/L — SIGNIFICANT CHANGE UP (ref 40–120)
ALT FLD-CCNC: 10 U/L — LOW (ref 12–78)
ALT FLD-CCNC: 14 U/L — SIGNIFICANT CHANGE UP (ref 12–78)
ALT FLD-CCNC: 15 U/L — SIGNIFICANT CHANGE UP (ref 12–78)
ALT FLD-CCNC: 17 U/L DA — SIGNIFICANT CHANGE UP (ref 10–60)
ALT FLD-CCNC: 18 U/L — SIGNIFICANT CHANGE UP (ref 12–78)
ALT FLD-CCNC: 19 U/L — SIGNIFICANT CHANGE UP (ref 12–78)
ALT FLD-CCNC: 19 U/L — SIGNIFICANT CHANGE UP (ref 12–78)
ALT FLD-CCNC: 21 U/L — SIGNIFICANT CHANGE UP (ref 4–41)
ALT FLD-CCNC: 22 U/L — SIGNIFICANT CHANGE UP (ref 12–78)
ALT FLD-CCNC: 23 U/L — SIGNIFICANT CHANGE UP (ref 12–78)
ALT FLD-CCNC: 24 U/L — SIGNIFICANT CHANGE UP (ref 12–78)
ALT FLD-CCNC: 36 U/L — SIGNIFICANT CHANGE UP (ref 12–78)
ALT FLD-CCNC: 42 U/L — SIGNIFICANT CHANGE UP (ref 12–78)
ALT FLD-CCNC: 48 U/L — SIGNIFICANT CHANGE UP (ref 12–78)
ALT FLD-CCNC: 48 U/L — SIGNIFICANT CHANGE UP (ref 12–78)
ALT FLD-CCNC: 49 U/L — SIGNIFICANT CHANGE UP (ref 12–78)
ALT FLD-CCNC: 52 U/L — SIGNIFICANT CHANGE UP (ref 12–78)
ALT FLD-CCNC: 56 U/L — SIGNIFICANT CHANGE UP (ref 12–78)
ALT FLD-CCNC: 6 U/L — LOW (ref 12–78)
ALT FLD-CCNC: 7 U/L — LOW (ref 12–78)
ALT FLD-CCNC: 7 U/L — LOW (ref 12–78)
ALT FLD-CCNC: 7 U/L — SIGNIFICANT CHANGE UP
ALT FLD-CCNC: 8 U/L — LOW (ref 12–78)
ALT FLD-CCNC: 9 U/L — LOW (ref 12–78)
ALT FLD-CCNC: 9 U/L — SIGNIFICANT CHANGE UP
ALT FLD-CCNC: <5 U/L — SIGNIFICANT CHANGE UP
ALT FLD-CCNC: <5 U/L — SIGNIFICANT CHANGE UP
ALT FLD-CCNC: <6 U/L — LOW (ref 12–78)
ALT SERPL-CCNC: 21 U/L
AMMONIA BLD-MCNC: 30 UMOL/L — SIGNIFICANT CHANGE UP (ref 11–32)
AMMONIA BLD-MCNC: 30 UMOL/L — SIGNIFICANT CHANGE UP (ref 11–32)
AMMONIA BLD-MCNC: 33 UMOL/L — HIGH (ref 11–32)
AMMONIA BLD-MCNC: 60 UMOL/L — HIGH (ref 11–32)
ANION GAP SERPL CALC-SCNC: -1 MMOL/L — LOW (ref 5–17)
ANION GAP SERPL CALC-SCNC: 10 MMOL/L — SIGNIFICANT CHANGE UP (ref 5–17)
ANION GAP SERPL CALC-SCNC: 11 MMOL/L — SIGNIFICANT CHANGE UP (ref 5–17)
ANION GAP SERPL CALC-SCNC: 11 MMOL/L — SIGNIFICANT CHANGE UP (ref 7–14)
ANION GAP SERPL CALC-SCNC: 12 MMOL/L
ANION GAP SERPL CALC-SCNC: 12 MMOL/L — SIGNIFICANT CHANGE UP (ref 5–17)
ANION GAP SERPL CALC-SCNC: 13 MMOL/L — SIGNIFICANT CHANGE UP (ref 5–17)
ANION GAP SERPL CALC-SCNC: 2 MMOL/L — LOW (ref 5–17)
ANION GAP SERPL CALC-SCNC: 3 MMOL/L — LOW (ref 5–17)
ANION GAP SERPL CALC-SCNC: 4 MMOL/L — LOW (ref 5–17)
ANION GAP SERPL CALC-SCNC: 4 MMOL/L — LOW (ref 5–17)
ANION GAP SERPL CALC-SCNC: 5 MMOL/L — SIGNIFICANT CHANGE UP (ref 5–17)
ANION GAP SERPL CALC-SCNC: 6 MMOL/L — SIGNIFICANT CHANGE UP (ref 5–17)
ANION GAP SERPL CALC-SCNC: 7 MMOL/L — SIGNIFICANT CHANGE UP (ref 5–17)
ANION GAP SERPL CALC-SCNC: 8 MMOL/L — SIGNIFICANT CHANGE UP (ref 5–17)
ANION GAP SERPL CALC-SCNC: 9 MMOL/L — SIGNIFICANT CHANGE UP (ref 5–17)
ANISOCYTOSIS BLD QL: SLIGHT — SIGNIFICANT CHANGE UP
ANISOCYTOSIS BLD QL: SLIGHT — SIGNIFICANT CHANGE UP
APPEARANCE UR: ABNORMAL
APTT BLD: 28.7 SEC — SIGNIFICANT CHANGE UP (ref 27.5–35.5)
APTT BLD: 33.1 SEC — SIGNIFICANT CHANGE UP (ref 27.5–35.5)
APTT BLD: 35.5 SEC — SIGNIFICANT CHANGE UP (ref 27.5–35.5)
APTT BLD: 36.1 SEC — HIGH (ref 27.5–35.5)
APTT BLD: 36.2 SEC — HIGH (ref 27.5–35.5)
APTT BLD: 36.4 SEC — HIGH (ref 27.5–35.5)
APTT BLD: 37.9 SEC — HIGH (ref 27.5–35.5)
APTT BLD: 39.3 SEC — HIGH (ref 27.5–35.5)
APTT BLD: 43.1 SEC — HIGH (ref 27.5–35.5)
AST SERPL-CCNC: 10 U/L
AST SERPL-CCNC: 10 U/L — LOW (ref 15–37)
AST SERPL-CCNC: 10 U/L — SIGNIFICANT CHANGE UP
AST SERPL-CCNC: 10 U/L — SIGNIFICANT CHANGE UP
AST SERPL-CCNC: 11 U/L — LOW (ref 15–37)
AST SERPL-CCNC: 11 U/L — SIGNIFICANT CHANGE UP
AST SERPL-CCNC: 12 U/L — SIGNIFICANT CHANGE UP
AST SERPL-CCNC: 14 U/L — SIGNIFICANT CHANGE UP (ref 4–40)
AST SERPL-CCNC: 15 U/L — SIGNIFICANT CHANGE UP (ref 15–37)
AST SERPL-CCNC: 15 U/L — SIGNIFICANT CHANGE UP (ref 15–37)
AST SERPL-CCNC: 17 U/L — SIGNIFICANT CHANGE UP (ref 15–37)
AST SERPL-CCNC: 18 U/L — SIGNIFICANT CHANGE UP (ref 15–37)
AST SERPL-CCNC: 19 U/L — SIGNIFICANT CHANGE UP (ref 15–37)
AST SERPL-CCNC: 19 U/L — SIGNIFICANT CHANGE UP (ref 15–37)
AST SERPL-CCNC: 23 U/L — SIGNIFICANT CHANGE UP (ref 15–37)
AST SERPL-CCNC: 24 U/L — SIGNIFICANT CHANGE UP (ref 15–37)
AST SERPL-CCNC: 26 U/L — SIGNIFICANT CHANGE UP (ref 10–40)
AST SERPL-CCNC: 26 U/L — SIGNIFICANT CHANGE UP (ref 15–37)
AST SERPL-CCNC: 27 U/L — SIGNIFICANT CHANGE UP (ref 15–37)
AST SERPL-CCNC: 28 U/L — SIGNIFICANT CHANGE UP (ref 15–37)
AST SERPL-CCNC: 28 U/L — SIGNIFICANT CHANGE UP (ref 15–37)
AST SERPL-CCNC: 30 U/L — SIGNIFICANT CHANGE UP (ref 15–37)
AST SERPL-CCNC: 31 U/L — SIGNIFICANT CHANGE UP (ref 15–37)
AST SERPL-CCNC: 32 U/L — SIGNIFICANT CHANGE UP (ref 15–37)
AST SERPL-CCNC: 36 U/L — SIGNIFICANT CHANGE UP (ref 15–37)
AST SERPL-CCNC: 36 U/L — SIGNIFICANT CHANGE UP (ref 15–37)
AST SERPL-CCNC: 37 U/L — SIGNIFICANT CHANGE UP (ref 15–37)
AST SERPL-CCNC: 38 U/L — HIGH (ref 15–37)
AST SERPL-CCNC: 40 U/L — HIGH (ref 15–37)
AST SERPL-CCNC: 62 U/L — HIGH (ref 15–37)
AST SERPL-CCNC: 71 U/L — HIGH (ref 15–37)
BACTERIA # UR AUTO: ABNORMAL
BASE EXCESS BLDA CALC-SCNC: -2.6 MMOL/L — LOW (ref -2–3)
BASE EXCESS BLDA CALC-SCNC: -4.3 MMOL/L — LOW (ref -2–3)
BASE EXCESS BLDA CALC-SCNC: -4.9 MMOL/L — LOW (ref -2–3)
BASE EXCESS BLDA CALC-SCNC: -5.6 MMOL/L — LOW (ref -2–3)
BASE EXCESS BLDA CALC-SCNC: 1.5 MMOL/L — SIGNIFICANT CHANGE UP (ref -2–3)
BASE EXCESS BLDA CALC-SCNC: 12.1 MMOL/L — HIGH (ref -2–3)
BASE EXCESS BLDA CALC-SCNC: 14.9 MMOL/L — HIGH (ref -2–3)
BASE EXCESS BLDA CALC-SCNC: 15.2 MMOL/L — HIGH (ref -2–3)
BASE EXCESS BLDA CALC-SCNC: 4.9 MMOL/L — HIGH (ref -2–3)
BASE EXCESS BLDA CALC-SCNC: 7.3 MMOL/L — HIGH (ref -2–3)
BASE EXCESS BLDA CALC-SCNC: 7.8 MMOL/L — HIGH (ref -2–3)
BASE EXCESS BLDV CALC-SCNC: 1.9 MMOL/L — SIGNIFICANT CHANGE UP (ref -2–3)
BASE EXCESS BLDV CALC-SCNC: 7.7 MMOL/L — HIGH (ref -2–3)
BASOPHILS # BLD AUTO: 0 K/UL — SIGNIFICANT CHANGE UP (ref 0–0.2)
BASOPHILS # BLD AUTO: 0.01 K/UL — SIGNIFICANT CHANGE UP (ref 0–0.2)
BASOPHILS # BLD AUTO: 0.02 K/UL — SIGNIFICANT CHANGE UP (ref 0–0.2)
BASOPHILS # BLD AUTO: 0.03 K/UL — SIGNIFICANT CHANGE UP (ref 0–0.2)
BASOPHILS # BLD AUTO: 0.04 K/UL — SIGNIFICANT CHANGE UP (ref 0–0.2)
BASOPHILS # BLD AUTO: 0.05 K/UL — SIGNIFICANT CHANGE UP (ref 0–0.2)
BASOPHILS # BLD AUTO: 0.07 K/UL — SIGNIFICANT CHANGE UP (ref 0–0.2)
BASOPHILS NFR BLD AUTO: 0 % — SIGNIFICANT CHANGE UP (ref 0–2)
BASOPHILS NFR BLD AUTO: 0.1 % — SIGNIFICANT CHANGE UP (ref 0–2)
BASOPHILS NFR BLD AUTO: 0.2 % — SIGNIFICANT CHANGE UP (ref 0–2)
BASOPHILS NFR BLD AUTO: 0.3 % — SIGNIFICANT CHANGE UP (ref 0–2)
BASOPHILS NFR BLD AUTO: 0.3 % — SIGNIFICANT CHANGE UP (ref 0–2)
BASOPHILS NFR BLD AUTO: 0.4 % — SIGNIFICANT CHANGE UP (ref 0–2)
BASOPHILS NFR BLD AUTO: 0.4 % — SIGNIFICANT CHANGE UP (ref 0–2)
BASOPHILS NFR BLD AUTO: 0.5 % — SIGNIFICANT CHANGE UP (ref 0–2)
BASOPHILS NFR BLD AUTO: 0.5 % — SIGNIFICANT CHANGE UP (ref 0–2)
BASOPHILS NFR BLD AUTO: 0.6 % — SIGNIFICANT CHANGE UP (ref 0–2)
BASOPHILS NFR BLD AUTO: 0.6 % — SIGNIFICANT CHANGE UP (ref 0–2)
BASOPHILS NFR BLD AUTO: 0.8 % — SIGNIFICANT CHANGE UP (ref 0–2)
BASOPHILS NFR BLD AUTO: 1.1 % — SIGNIFICANT CHANGE UP (ref 0–2)
BILIRUB SERPL-MCNC: 0.2 MG/DL — SIGNIFICANT CHANGE UP (ref 0.2–1.2)
BILIRUB SERPL-MCNC: 0.3 MG/DL — LOW (ref 0.4–2)
BILIRUB SERPL-MCNC: 0.3 MG/DL — SIGNIFICANT CHANGE UP (ref 0.2–1.2)
BILIRUB SERPL-MCNC: 0.4 MG/DL — SIGNIFICANT CHANGE UP (ref 0.2–1.2)
BILIRUB SERPL-MCNC: 0.4 MG/DL — SIGNIFICANT CHANGE UP (ref 0.4–2)
BILIRUB SERPL-MCNC: 0.5 MG/DL
BILIRUB SERPL-MCNC: 0.5 MG/DL — SIGNIFICANT CHANGE UP (ref 0.2–1.2)
BILIRUB SERPL-MCNC: 0.6 MG/DL — SIGNIFICANT CHANGE UP (ref 0.2–1.2)
BILIRUB SERPL-MCNC: 0.7 MG/DL — SIGNIFICANT CHANGE UP (ref 0.2–1.2)
BILIRUB SERPL-MCNC: 0.7 MG/DL — SIGNIFICANT CHANGE UP (ref 0.2–1.2)
BILIRUB UR-MCNC: ABNORMAL
BILIRUB UR-MCNC: ABNORMAL
BILIRUB UR-MCNC: NEGATIVE — SIGNIFICANT CHANGE UP
BLD GP AB SCN SERPL QL: SIGNIFICANT CHANGE UP
BLD GP AB SCN SERPL QL: SIGNIFICANT CHANGE UP
BLOOD GAS COMMENTS ARTERIAL: SIGNIFICANT CHANGE UP
BLOOD GAS VENOUS COMPREHENSIVE RESULT: SIGNIFICANT CHANGE UP
BUN SERPL-MCNC: 11 MG/DL — SIGNIFICANT CHANGE UP (ref 7–23)
BUN SERPL-MCNC: 11.6 MG/DL — SIGNIFICANT CHANGE UP (ref 8–20)
BUN SERPL-MCNC: 12.4 MG/DL — SIGNIFICANT CHANGE UP (ref 8–20)
BUN SERPL-MCNC: 13 MG/DL — SIGNIFICANT CHANGE UP (ref 7–23)
BUN SERPL-MCNC: 14.4 MG/DL — SIGNIFICANT CHANGE UP (ref 8–20)
BUN SERPL-MCNC: 14.7 MG/DL — SIGNIFICANT CHANGE UP (ref 8–20)
BUN SERPL-MCNC: 15 MG/DL — SIGNIFICANT CHANGE UP (ref 7–23)
BUN SERPL-MCNC: 15.1 MG/DL — SIGNIFICANT CHANGE UP (ref 8–20)
BUN SERPL-MCNC: 15.3 MG/DL — SIGNIFICANT CHANGE UP (ref 8–20)
BUN SERPL-MCNC: 15.5 MG/DL — SIGNIFICANT CHANGE UP (ref 8–20)
BUN SERPL-MCNC: 16 MG/DL — SIGNIFICANT CHANGE UP (ref 7–23)
BUN SERPL-MCNC: 18.7 MG/DL — SIGNIFICANT CHANGE UP (ref 8–20)
BUN SERPL-MCNC: 19 MG/DL — SIGNIFICANT CHANGE UP (ref 7–23)
BUN SERPL-MCNC: 19.7 MG/DL — SIGNIFICANT CHANGE UP (ref 8–20)
BUN SERPL-MCNC: 21.8 MG/DL — HIGH (ref 8–20)
BUN SERPL-MCNC: 22 MG/DL — SIGNIFICANT CHANGE UP (ref 7–23)
BUN SERPL-MCNC: 25 MG/DL
BUN SERPL-MCNC: 25 MG/DL — HIGH (ref 7–23)
BUN SERPL-MCNC: 25 MG/DL — HIGH (ref 7–23)
BUN SERPL-MCNC: 26 MG/DL — HIGH (ref 7–23)
BUN SERPL-MCNC: 29 MG/DL — HIGH (ref 7–23)
BUN SERPL-MCNC: 29 MG/DL — HIGH (ref 7–23)
BUN SERPL-MCNC: 30 MG/DL — HIGH (ref 7–23)
BUN SERPL-MCNC: 31 MG/DL — HIGH (ref 7–23)
BUN SERPL-MCNC: 32 MG/DL — HIGH (ref 7–23)
BUN SERPL-MCNC: 32 MG/DL — HIGH (ref 7–23)
BUN SERPL-MCNC: 33 MG/DL — HIGH (ref 7–23)
BUN SERPL-MCNC: 34 MG/DL — HIGH (ref 7–23)
BUN SERPL-MCNC: 35 MG/DL — HIGH (ref 7–23)
BUN SERPL-MCNC: 35 MG/DL — HIGH (ref 7–23)
BUN SERPL-MCNC: 38 MG/DL — HIGH (ref 7–23)
BUN SERPL-MCNC: 42 MG/DL — HIGH (ref 7–23)
BUN SERPL-MCNC: 43 MG/DL — HIGH (ref 7–23)
BUN SERPL-MCNC: 46 MG/DL — HIGH (ref 7–23)
BUN SERPL-MCNC: 46 MG/DL — HIGH (ref 7–23)
BURR CELLS BLD QL SMEAR: PRESENT — SIGNIFICANT CHANGE UP
CA-I SERPL-SCNC: 1.32 MMOL/L — SIGNIFICANT CHANGE UP (ref 1.15–1.33)
CALCIUM SERPL-MCNC: 10 MG/DL — SIGNIFICANT CHANGE UP (ref 8.4–10.5)
CALCIUM SERPL-MCNC: 10.1 MG/DL — SIGNIFICANT CHANGE UP (ref 8.5–10.1)
CALCIUM SERPL-MCNC: 10.1 MG/DL — SIGNIFICANT CHANGE UP (ref 8.5–10.1)
CALCIUM SERPL-MCNC: 10.2 MG/DL — HIGH (ref 8.5–10.1)
CALCIUM SERPL-MCNC: 10.3 MG/DL — HIGH (ref 8.5–10.1)
CALCIUM SERPL-MCNC: 10.3 MG/DL — HIGH (ref 8.6–10.2)
CALCIUM SERPL-MCNC: 10.4 MG/DL — HIGH (ref 8.5–10.1)
CALCIUM SERPL-MCNC: 10.4 MG/DL — SIGNIFICANT CHANGE UP (ref 8.4–10.5)
CALCIUM SERPL-MCNC: 10.6 MG/DL
CALCIUM SERPL-MCNC: 10.6 MG/DL — HIGH (ref 8.5–10.1)
CALCIUM SERPL-MCNC: 10.8 MG/DL — HIGH (ref 8.6–10.2)
CALCIUM SERPL-MCNC: 10.8 MG/DL — HIGH (ref 8.6–10.2)
CALCIUM SERPL-MCNC: 10.9 MG/DL — HIGH (ref 8.6–10.2)
CALCIUM SERPL-MCNC: 11 MG/DL — HIGH (ref 8.4–10.5)
CALCIUM SERPL-MCNC: 11 MG/DL — HIGH (ref 8.5–10.1)
CALCIUM SERPL-MCNC: 11 MG/DL — HIGH (ref 8.6–10.2)
CALCIUM SERPL-MCNC: 11.2 MG/DL — HIGH (ref 8.5–10.1)
CALCIUM SERPL-MCNC: 11.2 MG/DL — HIGH (ref 8.6–10.2)
CALCIUM SERPL-MCNC: 11.2 MG/DL — HIGH (ref 8.6–10.2)
CALCIUM SERPL-MCNC: 11.3 MG/DL — HIGH (ref 8.6–10.2)
CALCIUM SERPL-MCNC: 11.3 MG/DL — HIGH (ref 8.6–10.2)
CALCIUM SERPL-MCNC: 11.5 MG/DL — HIGH (ref 8.5–10.1)
CALCIUM SERPL-MCNC: 11.6 MG/DL — HIGH (ref 8.6–10.2)
CALCIUM SERPL-MCNC: 11.8 MG/DL — HIGH (ref 8.5–10.1)
CALCIUM SERPL-MCNC: 12 MG/DL — HIGH (ref 8.5–10.1)
CALCIUM SERPL-MCNC: 12.5 MG/DL — HIGH (ref 8.5–10.1)
CALCIUM SERPL-MCNC: 8.2 MG/DL — LOW (ref 8.5–10.1)
CALCIUM SERPL-MCNC: 8.7 MG/DL — SIGNIFICANT CHANGE UP (ref 8.4–10.5)
CALCIUM SERPL-MCNC: 8.8 MG/DL — SIGNIFICANT CHANGE UP (ref 8.5–10.1)
CALCIUM SERPL-MCNC: 8.8 MG/DL — SIGNIFICANT CHANGE UP (ref 8.5–10.1)
CALCIUM SERPL-MCNC: 8.9 MG/DL — SIGNIFICANT CHANGE UP (ref 8.5–10.1)
CALCIUM SERPL-MCNC: 9 MG/DL — SIGNIFICANT CHANGE UP (ref 8.5–10.1)
CALCIUM SERPL-MCNC: 9 MG/DL — SIGNIFICANT CHANGE UP (ref 8.5–10.1)
CALCIUM SERPL-MCNC: 9.1 MG/DL — SIGNIFICANT CHANGE UP (ref 8.5–10.1)
CALCIUM SERPL-MCNC: 9.1 MG/DL — SIGNIFICANT CHANGE UP (ref 8.5–10.1)
CALCIUM SERPL-MCNC: 9.3 MG/DL — SIGNIFICANT CHANGE UP (ref 8.5–10.1)
CALCIUM SERPL-MCNC: 9.4 MG/DL — SIGNIFICANT CHANGE UP (ref 8.5–10.1)
CALCIUM SERPL-MCNC: 9.5 MG/DL — SIGNIFICANT CHANGE UP (ref 8.5–10.1)
CALCIUM SERPL-MCNC: 9.5 MG/DL — SIGNIFICANT CHANGE UP (ref 8.5–10.1)
CALCIUM SERPL-MCNC: 9.7 MG/DL — SIGNIFICANT CHANGE UP (ref 8.5–10.1)
CALCIUM SERPL-MCNC: 9.9 MG/DL — SIGNIFICANT CHANGE UP (ref 8.5–10.1)
CHLORIDE BLDV-SCNC: 100 MMOL/L — SIGNIFICANT CHANGE UP (ref 96–108)
CHLORIDE BLDV-SCNC: 103 MMOL/L — SIGNIFICANT CHANGE UP (ref 98–107)
CHLORIDE SERPL-SCNC: 100 MMOL/L — SIGNIFICANT CHANGE UP (ref 96–108)
CHLORIDE SERPL-SCNC: 100 MMOL/L — SIGNIFICANT CHANGE UP (ref 98–107)
CHLORIDE SERPL-SCNC: 101 MMOL/L — SIGNIFICANT CHANGE UP (ref 96–108)
CHLORIDE SERPL-SCNC: 102 MMOL/L — SIGNIFICANT CHANGE UP (ref 96–108)
CHLORIDE SERPL-SCNC: 102 MMOL/L — SIGNIFICANT CHANGE UP (ref 96–108)
CHLORIDE SERPL-SCNC: 103 MMOL/L — SIGNIFICANT CHANGE UP (ref 96–108)
CHLORIDE SERPL-SCNC: 103 MMOL/L — SIGNIFICANT CHANGE UP (ref 98–107)
CHLORIDE SERPL-SCNC: 104 MMOL/L — SIGNIFICANT CHANGE UP (ref 96–108)
CHLORIDE SERPL-SCNC: 104 MMOL/L — SIGNIFICANT CHANGE UP (ref 98–107)
CHLORIDE SERPL-SCNC: 105 MMOL/L — SIGNIFICANT CHANGE UP (ref 96–108)
CHLORIDE SERPL-SCNC: 105 MMOL/L — SIGNIFICANT CHANGE UP (ref 96–108)
CHLORIDE SERPL-SCNC: 105 MMOL/L — SIGNIFICANT CHANGE UP (ref 98–107)
CHLORIDE SERPL-SCNC: 106 MMOL/L — SIGNIFICANT CHANGE UP (ref 96–108)
CHLORIDE SERPL-SCNC: 107 MMOL/L — SIGNIFICANT CHANGE UP (ref 96–108)
CHLORIDE SERPL-SCNC: 107 MMOL/L — SIGNIFICANT CHANGE UP (ref 98–107)
CHLORIDE SERPL-SCNC: 108 MMOL/L — SIGNIFICANT CHANGE UP (ref 96–108)
CHLORIDE SERPL-SCNC: 109 MMOL/L — HIGH (ref 96–108)
CHLORIDE SERPL-SCNC: 109 MMOL/L — HIGH (ref 96–108)
CHLORIDE SERPL-SCNC: 110 MMOL/L — HIGH (ref 98–107)
CHLORIDE SERPL-SCNC: 111 MMOL/L — HIGH (ref 98–107)
CHLORIDE SERPL-SCNC: 111 MMOL/L — HIGH (ref 98–107)
CHLORIDE SERPL-SCNC: 114 MMOL/L — HIGH (ref 96–108)
CHLORIDE SERPL-SCNC: 114 MMOL/L — HIGH (ref 96–108)
CHLORIDE SERPL-SCNC: 115 MMOL/L — HIGH (ref 96–108)
CHLORIDE SERPL-SCNC: 115 MMOL/L — HIGH (ref 96–108)
CHLORIDE SERPL-SCNC: 116 MMOL/L — HIGH (ref 96–108)
CHLORIDE SERPL-SCNC: 116 MMOL/L — HIGH (ref 96–108)
CHLORIDE SERPL-SCNC: 117 MMOL/L — HIGH (ref 96–108)
CHLORIDE SERPL-SCNC: 118 MMOL/L — HIGH (ref 96–108)
CHLORIDE SERPL-SCNC: 119 MMOL/L — HIGH (ref 96–108)
CHLORIDE SERPL-SCNC: 120 MMOL/L — HIGH (ref 96–108)
CHLORIDE SERPL-SCNC: 97 MMOL/L — LOW (ref 98–107)
CHLORIDE SERPL-SCNC: 99 MMOL/L
CHLORIDE SERPL-SCNC: 99 MMOL/L — SIGNIFICANT CHANGE UP (ref 96–108)
CHOLEST SERPL-MCNC: 148 MG/DL — SIGNIFICANT CHANGE UP
CHOLEST SERPL-MCNC: 155 MG/DL — SIGNIFICANT CHANGE UP
CHOLEST SERPL-MCNC: 178 MG/DL — SIGNIFICANT CHANGE UP
CO2 BLDV-SCNC: 34.1 MMOL/L — HIGH (ref 22–26)
CO2 SERPL-SCNC: 21 MMOL/L — LOW (ref 22–31)
CO2 SERPL-SCNC: 23 MMOL/L — SIGNIFICANT CHANGE UP (ref 22–31)
CO2 SERPL-SCNC: 24 MMOL/L — SIGNIFICANT CHANGE UP (ref 22–29)
CO2 SERPL-SCNC: 24 MMOL/L — SIGNIFICANT CHANGE UP (ref 22–31)
CO2 SERPL-SCNC: 24 MMOL/L — SIGNIFICANT CHANGE UP (ref 22–31)
CO2 SERPL-SCNC: 25 MMOL/L — SIGNIFICANT CHANGE UP (ref 22–29)
CO2 SERPL-SCNC: 25 MMOL/L — SIGNIFICANT CHANGE UP (ref 22–29)
CO2 SERPL-SCNC: 25 MMOL/L — SIGNIFICANT CHANGE UP (ref 22–31)
CO2 SERPL-SCNC: 26 MMOL/L — SIGNIFICANT CHANGE UP (ref 22–31)
CO2 SERPL-SCNC: 27 MMOL/L — SIGNIFICANT CHANGE UP (ref 22–29)
CO2 SERPL-SCNC: 27 MMOL/L — SIGNIFICANT CHANGE UP (ref 22–29)
CO2 SERPL-SCNC: 28 MMOL/L
CO2 SERPL-SCNC: 28 MMOL/L — SIGNIFICANT CHANGE UP (ref 22–29)
CO2 SERPL-SCNC: 28 MMOL/L — SIGNIFICANT CHANGE UP (ref 22–31)
CO2 SERPL-SCNC: 29 MMOL/L — SIGNIFICANT CHANGE UP (ref 22–29)
CO2 SERPL-SCNC: 29 MMOL/L — SIGNIFICANT CHANGE UP (ref 22–31)
CO2 SERPL-SCNC: 29 MMOL/L — SIGNIFICANT CHANGE UP (ref 22–31)
CO2 SERPL-SCNC: 30 MMOL/L — SIGNIFICANT CHANGE UP (ref 22–31)
CO2 SERPL-SCNC: 31 MMOL/L — SIGNIFICANT CHANGE UP (ref 22–31)
CO2 SERPL-SCNC: 31 MMOL/L — SIGNIFICANT CHANGE UP (ref 22–31)
CO2 SERPL-SCNC: 32 MMOL/L — HIGH (ref 22–31)
CO2 SERPL-SCNC: 32 MMOL/L — HIGH (ref 22–31)
CO2 SERPL-SCNC: 33 MMOL/L — HIGH (ref 22–31)
CO2 SERPL-SCNC: 34 MMOL/L — HIGH (ref 22–31)
CO2 SERPL-SCNC: 34 MMOL/L — HIGH (ref 22–31)
CO2 SERPL-SCNC: 35 MMOL/L — HIGH (ref 22–31)
CO2 SERPL-SCNC: 35 MMOL/L — HIGH (ref 22–31)
CO2 SERPL-SCNC: 36 MMOL/L — HIGH (ref 22–31)
CO2 SERPL-SCNC: 36 MMOL/L — HIGH (ref 22–31)
CO2 SERPL-SCNC: 37 MMOL/L — HIGH (ref 22–31)
CO2 SERPL-SCNC: 37 MMOL/L — HIGH (ref 22–31)
CO2 SERPL-SCNC: 40 MMOL/L — HIGH (ref 22–31)
COLOR SPEC: YELLOW — SIGNIFICANT CHANGE UP
CREAT SERPL-MCNC: 0.52 MG/DL — SIGNIFICANT CHANGE UP (ref 0.5–1.3)
CREAT SERPL-MCNC: 0.57 MG/DL — SIGNIFICANT CHANGE UP (ref 0.5–1.3)
CREAT SERPL-MCNC: 0.67 MG/DL — SIGNIFICANT CHANGE UP (ref 0.5–1.3)
CREAT SERPL-MCNC: 0.67 MG/DL — SIGNIFICANT CHANGE UP (ref 0.5–1.3)
CREAT SERPL-MCNC: 0.69 MG/DL — SIGNIFICANT CHANGE UP (ref 0.5–1.3)
CREAT SERPL-MCNC: 0.7 MG/DL — SIGNIFICANT CHANGE UP (ref 0.5–1.3)
CREAT SERPL-MCNC: 0.71 MG/DL — SIGNIFICANT CHANGE UP (ref 0.5–1.3)
CREAT SERPL-MCNC: 0.72 MG/DL — SIGNIFICANT CHANGE UP (ref 0.5–1.3)
CREAT SERPL-MCNC: 0.73 MG/DL — SIGNIFICANT CHANGE UP (ref 0.5–1.3)
CREAT SERPL-MCNC: 0.74 MG/DL — SIGNIFICANT CHANGE UP (ref 0.5–1.3)
CREAT SERPL-MCNC: 0.75 MG/DL — SIGNIFICANT CHANGE UP (ref 0.5–1.3)
CREAT SERPL-MCNC: 0.78 MG/DL — SIGNIFICANT CHANGE UP (ref 0.5–1.3)
CREAT SERPL-MCNC: 0.79 MG/DL — SIGNIFICANT CHANGE UP (ref 0.5–1.3)
CREAT SERPL-MCNC: 0.81 MG/DL — SIGNIFICANT CHANGE UP (ref 0.5–1.3)
CREAT SERPL-MCNC: 0.83 MG/DL — SIGNIFICANT CHANGE UP (ref 0.5–1.3)
CREAT SERPL-MCNC: 0.85 MG/DL — SIGNIFICANT CHANGE UP (ref 0.5–1.3)
CREAT SERPL-MCNC: 0.88 MG/DL — SIGNIFICANT CHANGE UP (ref 0.5–1.3)
CREAT SERPL-MCNC: 0.9 MG/DL — SIGNIFICANT CHANGE UP (ref 0.5–1.3)
CREAT SERPL-MCNC: 0.91 MG/DL — SIGNIFICANT CHANGE UP (ref 0.5–1.3)
CREAT SERPL-MCNC: 0.93 MG/DL
CREAT SERPL-MCNC: 0.94 MG/DL — SIGNIFICANT CHANGE UP (ref 0.5–1.3)
CREAT SERPL-MCNC: 0.95 MG/DL — SIGNIFICANT CHANGE UP (ref 0.5–1.3)
CREAT SERPL-MCNC: 0.99 MG/DL — SIGNIFICANT CHANGE UP (ref 0.5–1.3)
CREAT SERPL-MCNC: 1 MG/DL — SIGNIFICANT CHANGE UP (ref 0.5–1.3)
CREAT SERPL-MCNC: 1.03 MG/DL — SIGNIFICANT CHANGE UP (ref 0.5–1.3)
CREAT SERPL-MCNC: 1.05 MG/DL — SIGNIFICANT CHANGE UP (ref 0.5–1.3)
CREAT SERPL-MCNC: 1.1 MG/DL — SIGNIFICANT CHANGE UP (ref 0.5–1.3)
CREAT SERPL-MCNC: 1.12 MG/DL — SIGNIFICANT CHANGE UP (ref 0.5–1.3)
CREAT SERPL-MCNC: 1.2 MG/DL — SIGNIFICANT CHANGE UP (ref 0.5–1.3)
CREAT SERPL-MCNC: 1.6 MG/DL — HIGH (ref 0.5–1.3)
CREAT SERPL-MCNC: 1.8 MG/DL — HIGH (ref 0.5–1.3)
CREAT SERPL-MCNC: 2.2 MG/DL — HIGH (ref 0.5–1.3)
CREAT SERPL-MCNC: 2.4 MG/DL — HIGH (ref 0.5–1.3)
CREAT SERPL-MCNC: 2.5 MG/DL — HIGH (ref 0.5–1.3)
CREAT SERPL-MCNC: 2.6 MG/DL — HIGH (ref 0.5–1.3)
CRP SERPL-MCNC: 32 MG/L
CULTURE RESULTS: SIGNIFICANT CHANGE UP
D DIMER BLD IA.RAPID-MCNC: <150 NG/ML DDU — SIGNIFICANT CHANGE UP
DIFF PNL FLD: ABNORMAL
EGFR: 101 ML/MIN/1.73M2 — SIGNIFICANT CHANGE UP
EGFR: 104 ML/MIN/1.73M2 — SIGNIFICANT CHANGE UP
EGFR: 25 ML/MIN/1.73M2 — LOW
EGFR: 26 ML/MIN/1.73M2 — LOW
EGFR: 27 ML/MIN/1.73M2 — LOW
EGFR: 30 ML/MIN/1.73M2 — LOW
EGFR: 38 ML/MIN/1.73M2 — LOW
EGFR: 44 ML/MIN/1.73M2 — LOW
EGFR: 62 ML/MIN/1.73M2 — SIGNIFICANT CHANGE UP
EGFR: 68 ML/MIN/1.73M2 — SIGNIFICANT CHANGE UP
EGFR: 69 ML/MIN/1.73M2 — SIGNIFICANT CHANGE UP
EGFR: 73 ML/MIN/1.73M2 — SIGNIFICANT CHANGE UP
EGFR: 75 ML/MIN/1.73M2 — SIGNIFICANT CHANGE UP
EGFR: 78 ML/MIN/1.73M2 — SIGNIFICANT CHANGE UP
EGFR: 82 ML/MIN/1.73M2 — SIGNIFICANT CHANGE UP
EGFR: 83 ML/MIN/1.73M2 — SIGNIFICANT CHANGE UP
EGFR: 85 ML/MIN/1.73M2
EGFR: 87 ML/MIN/1.73M2 — SIGNIFICANT CHANGE UP
EGFR: 88 ML/MIN/1.73M2 — SIGNIFICANT CHANGE UP
EGFR: 89 ML/MIN/1.73M2 — SIGNIFICANT CHANGE UP
EGFR: 90 ML/MIN/1.73M2 — SIGNIFICANT CHANGE UP
EGFR: 90 ML/MIN/1.73M2 — SIGNIFICANT CHANGE UP
EGFR: 91 ML/MIN/1.73M2 — SIGNIFICANT CHANGE UP
EGFR: 92 ML/MIN/1.73M2 — SIGNIFICANT CHANGE UP
EGFR: 92 ML/MIN/1.73M2 — SIGNIFICANT CHANGE UP
EGFR: 93 ML/MIN/1.73M2 — SIGNIFICANT CHANGE UP
EGFR: 94 ML/MIN/1.73M2 — SIGNIFICANT CHANGE UP
EGFR: 95 ML/MIN/1.73M2 — SIGNIFICANT CHANGE UP
EGFR: 95 ML/MIN/1.73M2 — SIGNIFICANT CHANGE UP
EGFR: 96 ML/MIN/1.73M2 — SIGNIFICANT CHANGE UP
EGFR: 96 ML/MIN/1.73M2 — SIGNIFICANT CHANGE UP
ELLIPTOCYTES BLD QL SMEAR: SLIGHT — SIGNIFICANT CHANGE UP
EOSINOPHIL # BLD AUTO: 0 K/UL — SIGNIFICANT CHANGE UP (ref 0–0.5)
EOSINOPHIL # BLD AUTO: 0.01 K/UL — SIGNIFICANT CHANGE UP (ref 0–0.5)
EOSINOPHIL # BLD AUTO: 0.02 K/UL — SIGNIFICANT CHANGE UP (ref 0–0.5)
EOSINOPHIL # BLD AUTO: 0.02 K/UL — SIGNIFICANT CHANGE UP (ref 0–0.5)
EOSINOPHIL # BLD AUTO: 0.03 K/UL — SIGNIFICANT CHANGE UP (ref 0–0.5)
EOSINOPHIL # BLD AUTO: 0.05 K/UL — SIGNIFICANT CHANGE UP (ref 0–0.5)
EOSINOPHIL # BLD AUTO: 0.06 K/UL — SIGNIFICANT CHANGE UP (ref 0–0.5)
EOSINOPHIL # BLD AUTO: 0.07 K/UL — SIGNIFICANT CHANGE UP (ref 0–0.5)
EOSINOPHIL # BLD AUTO: 0.08 K/UL — SIGNIFICANT CHANGE UP (ref 0–0.5)
EOSINOPHIL # BLD AUTO: 0.09 K/UL — SIGNIFICANT CHANGE UP (ref 0–0.5)
EOSINOPHIL # BLD AUTO: 0.09 K/UL — SIGNIFICANT CHANGE UP (ref 0–0.5)
EOSINOPHIL # BLD AUTO: 0.1 K/UL — SIGNIFICANT CHANGE UP (ref 0–0.5)
EOSINOPHIL # BLD AUTO: 0.12 K/UL — SIGNIFICANT CHANGE UP (ref 0–0.5)
EOSINOPHIL # BLD AUTO: 0.14 K/UL — SIGNIFICANT CHANGE UP (ref 0–0.5)
EOSINOPHIL # BLD AUTO: 0.14 K/UL — SIGNIFICANT CHANGE UP (ref 0–0.5)
EOSINOPHIL # BLD AUTO: 0.16 K/UL — SIGNIFICANT CHANGE UP (ref 0–0.5)
EOSINOPHIL # BLD AUTO: 0.16 K/UL — SIGNIFICANT CHANGE UP (ref 0–0.5)
EOSINOPHIL # BLD AUTO: 0.17 K/UL — SIGNIFICANT CHANGE UP (ref 0–0.5)
EOSINOPHIL # BLD AUTO: 0.19 K/UL — SIGNIFICANT CHANGE UP (ref 0–0.5)
EOSINOPHIL # BLD AUTO: 0.21 K/UL — SIGNIFICANT CHANGE UP (ref 0–0.5)
EOSINOPHIL # BLD AUTO: 0.26 K/UL — SIGNIFICANT CHANGE UP (ref 0–0.5)
EOSINOPHIL # BLD AUTO: 0.28 K/UL — SIGNIFICANT CHANGE UP (ref 0–0.5)
EOSINOPHIL # BLD AUTO: 0.28 K/UL — SIGNIFICANT CHANGE UP (ref 0–0.5)
EOSINOPHIL # BLD AUTO: 0.3 K/UL — SIGNIFICANT CHANGE UP (ref 0–0.5)
EOSINOPHIL # BLD AUTO: 0.3 K/UL — SIGNIFICANT CHANGE UP (ref 0–0.5)
EOSINOPHIL NFR BLD AUTO: 0 % — SIGNIFICANT CHANGE UP (ref 0–6)
EOSINOPHIL NFR BLD AUTO: 0.1 % — SIGNIFICANT CHANGE UP (ref 0–6)
EOSINOPHIL NFR BLD AUTO: 0.2 % — SIGNIFICANT CHANGE UP (ref 0–6)
EOSINOPHIL NFR BLD AUTO: 0.3 % — SIGNIFICANT CHANGE UP (ref 0–6)
EOSINOPHIL NFR BLD AUTO: 0.3 % — SIGNIFICANT CHANGE UP (ref 0–6)
EOSINOPHIL NFR BLD AUTO: 0.7 % — SIGNIFICANT CHANGE UP (ref 0–6)
EOSINOPHIL NFR BLD AUTO: 0.9 % — SIGNIFICANT CHANGE UP (ref 0–6)
EOSINOPHIL NFR BLD AUTO: 0.9 % — SIGNIFICANT CHANGE UP (ref 0–6)
EOSINOPHIL NFR BLD AUTO: 1.1 % — SIGNIFICANT CHANGE UP (ref 0–6)
EOSINOPHIL NFR BLD AUTO: 1.2 % — SIGNIFICANT CHANGE UP (ref 0–6)
EOSINOPHIL NFR BLD AUTO: 1.7 % — SIGNIFICANT CHANGE UP (ref 0–6)
EOSINOPHIL NFR BLD AUTO: 1.9 % — SIGNIFICANT CHANGE UP (ref 0–6)
EOSINOPHIL NFR BLD AUTO: 1.9 % — SIGNIFICANT CHANGE UP (ref 0–6)
EOSINOPHIL NFR BLD AUTO: 2.3 % — SIGNIFICANT CHANGE UP (ref 0–6)
EOSINOPHIL NFR BLD AUTO: 2.3 % — SIGNIFICANT CHANGE UP (ref 0–6)
EOSINOPHIL NFR BLD AUTO: 2.6 % — SIGNIFICANT CHANGE UP (ref 0–6)
EOSINOPHIL NFR BLD AUTO: 2.7 % — SIGNIFICANT CHANGE UP (ref 0–6)
EOSINOPHIL NFR BLD AUTO: 3.2 % — SIGNIFICANT CHANGE UP (ref 0–6)
EOSINOPHIL NFR BLD AUTO: 3.2 % — SIGNIFICANT CHANGE UP (ref 0–6)
EOSINOPHIL NFR BLD AUTO: 3.5 % — SIGNIFICANT CHANGE UP (ref 0–6)
EOSINOPHIL NFR BLD AUTO: 3.5 % — SIGNIFICANT CHANGE UP (ref 0–6)
EOSINOPHIL NFR BLD AUTO: 4.9 % — SIGNIFICANT CHANGE UP (ref 0–6)
EOSINOPHIL NFR BLD AUTO: 5.2 % — SIGNIFICANT CHANGE UP (ref 0–6)
EPI CELLS # UR: SIGNIFICANT CHANGE UP
ERYTHROCYTE [SEDIMENTATION RATE] IN BLOOD: 70 MM/HR — HIGH (ref 0–20)
ESTIMATED AVERAGE GLUCOSE: 114 MG/DL — SIGNIFICANT CHANGE UP (ref 68–114)
ESTIMATED AVERAGE GLUCOSE: 117 MG/DL — HIGH (ref 68–114)
FERRITIN SERPL-MCNC: 169 NG/ML — SIGNIFICANT CHANGE UP (ref 30–400)
FLUAV AG NPH QL: SIGNIFICANT CHANGE UP
FLUAV AG NPH QL: SIGNIFICANT CHANGE UP
FLUBV AG NPH QL: SIGNIFICANT CHANGE UP
FLUBV AG NPH QL: SIGNIFICANT CHANGE UP
FOLATE SERPL-MCNC: 12.7 NG/ML — SIGNIFICANT CHANGE UP
GAS PNL BLDA: SIGNIFICANT CHANGE UP
GAS PNL BLDV: 136 MMOL/L — SIGNIFICANT CHANGE UP (ref 136–145)
GAS PNL BLDV: 136 MMOL/L — SIGNIFICANT CHANGE UP (ref 136–145)
GAS PNL BLDV: SIGNIFICANT CHANGE UP
GIANT PLATELETS BLD QL SMEAR: PRESENT — SIGNIFICANT CHANGE UP
GIANT PLATELETS BLD QL SMEAR: PRESENT — SIGNIFICANT CHANGE UP
GLUCOSE BLDV-MCNC: 126 MG/DL — HIGH (ref 70–99)
GLUCOSE BLDV-MCNC: 162 MG/DL — HIGH (ref 70–99)
GLUCOSE SERPL-MCNC: 104 MG/DL — HIGH (ref 70–99)
GLUCOSE SERPL-MCNC: 104 MG/DL — HIGH (ref 70–99)
GLUCOSE SERPL-MCNC: 105 MG/DL — HIGH (ref 70–99)
GLUCOSE SERPL-MCNC: 107 MG/DL — HIGH (ref 70–99)
GLUCOSE SERPL-MCNC: 108 MG/DL — HIGH (ref 70–99)
GLUCOSE SERPL-MCNC: 108 MG/DL — HIGH (ref 70–99)
GLUCOSE SERPL-MCNC: 110 MG/DL — HIGH (ref 70–99)
GLUCOSE SERPL-MCNC: 111 MG/DL — HIGH (ref 70–99)
GLUCOSE SERPL-MCNC: 112 MG/DL — HIGH (ref 70–99)
GLUCOSE SERPL-MCNC: 118 MG/DL — HIGH (ref 70–99)
GLUCOSE SERPL-MCNC: 122 MG/DL — HIGH (ref 70–99)
GLUCOSE SERPL-MCNC: 125 MG/DL — HIGH (ref 70–99)
GLUCOSE SERPL-MCNC: 127 MG/DL — HIGH (ref 70–99)
GLUCOSE SERPL-MCNC: 128 MG/DL — HIGH (ref 70–99)
GLUCOSE SERPL-MCNC: 131 MG/DL — HIGH (ref 70–99)
GLUCOSE SERPL-MCNC: 134 MG/DL — HIGH (ref 70–99)
GLUCOSE SERPL-MCNC: 144 MG/DL — HIGH (ref 70–99)
GLUCOSE SERPL-MCNC: 145 MG/DL — HIGH (ref 70–99)
GLUCOSE SERPL-MCNC: 146 MG/DL — HIGH (ref 70–99)
GLUCOSE SERPL-MCNC: 146 MG/DL — HIGH (ref 70–99)
GLUCOSE SERPL-MCNC: 152 MG/DL — HIGH (ref 70–99)
GLUCOSE SERPL-MCNC: 162 MG/DL — HIGH (ref 70–99)
GLUCOSE SERPL-MCNC: 163 MG/DL — HIGH (ref 70–99)
GLUCOSE SERPL-MCNC: 163 MG/DL — HIGH (ref 70–99)
GLUCOSE SERPL-MCNC: 165 MG/DL — HIGH (ref 70–99)
GLUCOSE SERPL-MCNC: 169 MG/DL — HIGH (ref 70–99)
GLUCOSE SERPL-MCNC: 170 MG/DL — HIGH (ref 70–99)
GLUCOSE SERPL-MCNC: 170 MG/DL — HIGH (ref 70–99)
GLUCOSE SERPL-MCNC: 177 MG/DL — HIGH (ref 70–99)
GLUCOSE SERPL-MCNC: 192 MG/DL — HIGH (ref 70–99)
GLUCOSE SERPL-MCNC: 198 MG/DL — HIGH (ref 70–99)
GLUCOSE SERPL-MCNC: 200 MG/DL — HIGH (ref 70–99)
GLUCOSE SERPL-MCNC: 203 MG/DL — HIGH (ref 70–99)
GLUCOSE SERPL-MCNC: 212 MG/DL — HIGH (ref 70–99)
GLUCOSE SERPL-MCNC: 225 MG/DL — HIGH (ref 70–99)
GLUCOSE SERPL-MCNC: 228 MG/DL — HIGH (ref 70–99)
GLUCOSE SERPL-MCNC: 95 MG/DL
GLUCOSE SERPL-MCNC: 97 MG/DL — SIGNIFICANT CHANGE UP (ref 70–99)
GLUCOSE UR QL: NEGATIVE MG/DL — SIGNIFICANT CHANGE UP
GLUCOSE UR QL: NEGATIVE MG/DL — SIGNIFICANT CHANGE UP
GLUCOSE UR QL: NEGATIVE — SIGNIFICANT CHANGE UP
GRAM STN FLD: SIGNIFICANT CHANGE UP
GRAM STN FLD: SIGNIFICANT CHANGE UP
HAV IGM SER QL: NONREACTIVE
HBV CORE IGM SER QL: NONREACTIVE
HBV SURFACE AG SER QL: NONREACTIVE
HCO3 BLDA-SCNC: 21 MMOL/L — SIGNIFICANT CHANGE UP (ref 21–28)
HCO3 BLDA-SCNC: 22 MMOL/L — SIGNIFICANT CHANGE UP (ref 21–28)
HCO3 BLDA-SCNC: 23 MMOL/L — SIGNIFICANT CHANGE UP (ref 21–28)
HCO3 BLDA-SCNC: 23 MMOL/L — SIGNIFICANT CHANGE UP (ref 21–28)
HCO3 BLDA-SCNC: 27 MMOL/L — SIGNIFICANT CHANGE UP (ref 21–28)
HCO3 BLDA-SCNC: 31 MMOL/L — HIGH (ref 21–28)
HCO3 BLDA-SCNC: 33 MMOL/L — HIGH (ref 21–28)
HCO3 BLDA-SCNC: 34 MMOL/L — HIGH (ref 21–28)
HCO3 BLDA-SCNC: 35 MMOL/L — HIGH (ref 21–28)
HCO3 BLDA-SCNC: 39 MMOL/L — HIGH (ref 21–28)
HCO3 BLDA-SCNC: 41 MMOL/L — HIGH (ref 21–28)
HCO3 BLDV-SCNC: 27 MMOL/L — SIGNIFICANT CHANGE UP (ref 22–29)
HCO3 BLDV-SCNC: 33 MMOL/L — HIGH (ref 22–29)
HCT VFR BLD CALC: 21.6 % — LOW (ref 39–50)
HCT VFR BLD CALC: 21.6 % — LOW (ref 39–50)
HCT VFR BLD CALC: 22.4 % — LOW (ref 39–50)
HCT VFR BLD CALC: 22.7 % — LOW (ref 39–50)
HCT VFR BLD CALC: 24 % — LOW (ref 39–50)
HCT VFR BLD CALC: 24 % — LOW (ref 39–50)
HCT VFR BLD CALC: 24.2 % — LOW (ref 39–50)
HCT VFR BLD CALC: 24.6 % — LOW (ref 39–50)
HCT VFR BLD CALC: 25.7 % — LOW (ref 39–50)
HCT VFR BLD CALC: 26.1 % — LOW (ref 39–50)
HCT VFR BLD CALC: 26.5 % — LOW (ref 39–50)
HCT VFR BLD CALC: 26.6 % — LOW (ref 39–50)
HCT VFR BLD CALC: 26.7 % — LOW (ref 39–50)
HCT VFR BLD CALC: 26.9 % — LOW (ref 39–50)
HCT VFR BLD CALC: 27 % — LOW (ref 39–50)
HCT VFR BLD CALC: 27.3 % — LOW (ref 39–50)
HCT VFR BLD CALC: 27.8 % — LOW (ref 39–50)
HCT VFR BLD CALC: 27.9 % — LOW (ref 39–50)
HCT VFR BLD CALC: 28.4 % — LOW (ref 39–50)
HCT VFR BLD CALC: 29.1 % — LOW (ref 39–50)
HCT VFR BLD CALC: 29.5 % — LOW (ref 39–50)
HCT VFR BLD CALC: 30.6 % — LOW (ref 39–50)
HCT VFR BLD CALC: 31.3 % — LOW (ref 39–50)
HCT VFR BLD CALC: 32 % — LOW (ref 39–50)
HCT VFR BLD CALC: 34.2 % — LOW (ref 39–50)
HCT VFR BLD CALC: 35 % — LOW (ref 39–50)
HCT VFR BLD CALC: 35.2 % — LOW (ref 39–50)
HCT VFR BLD CALC: 36.5 % — LOW (ref 39–50)
HCT VFR BLD CALC: 36.6 % — LOW (ref 39–50)
HCT VFR BLD CALC: 36.7 % — LOW (ref 39–50)
HCT VFR BLD CALC: 36.7 % — LOW (ref 39–50)
HCT VFR BLD CALC: 37.2 % — LOW (ref 39–50)
HCT VFR BLD CALC: 37.3 % — LOW (ref 39–50)
HCT VFR BLD CALC: 37.4 % — LOW (ref 39–50)
HCT VFR BLD CALC: 37.5 % — LOW (ref 39–50)
HCT VFR BLD CALC: 37.7 % — LOW (ref 39–50)
HCT VFR BLD CALC: 37.9 % — LOW (ref 39–50)
HCT VFR BLD CALC: 38.1 % — LOW (ref 39–50)
HCT VFR BLD CALC: 38.1 % — LOW (ref 39–50)
HCT VFR BLD CALC: 38.5 % — LOW (ref 39–50)
HCT VFR BLD CALC: 38.5 % — LOW (ref 39–50)
HCT VFR BLD CALC: 39.6 % — SIGNIFICANT CHANGE UP (ref 39–50)
HCT VFR BLD CALC: 41.5 % — SIGNIFICANT CHANGE UP (ref 39–50)
HCT VFR BLDA CALC: 37 % — SIGNIFICANT CHANGE UP
HCT VFR BLDA CALC: 43 % — SIGNIFICANT CHANGE UP (ref 39–51)
HCV AB SER QL: NONREACTIVE
HCV S/CO RATIO: 0.13 S/CO
HDLC SERPL-MCNC: 45 MG/DL — SIGNIFICANT CHANGE UP
HDLC SERPL-MCNC: 52 MG/DL — SIGNIFICANT CHANGE UP
HDLC SERPL-MCNC: 55 MG/DL — SIGNIFICANT CHANGE UP
HEPARIN-PF4 AB RESULT: <0.6 U/ML — SIGNIFICANT CHANGE UP (ref 0–0.9)
HGB BLD CALC-MCNC: 12.3 G/DL — LOW (ref 12.6–17.4)
HGB BLD CALC-MCNC: 14.4 G/DL — SIGNIFICANT CHANGE UP (ref 13–17)
HGB BLD-MCNC: 10 G/DL — LOW (ref 13–17)
HGB BLD-MCNC: 10.6 G/DL — LOW (ref 13–17)
HGB BLD-MCNC: 10.7 G/DL — LOW (ref 13–17)
HGB BLD-MCNC: 11.5 G/DL — LOW (ref 13–17)
HGB BLD-MCNC: 11.6 G/DL — LOW (ref 13–17)
HGB BLD-MCNC: 12.1 G/DL — LOW (ref 13–17)
HGB BLD-MCNC: 12.2 G/DL — LOW (ref 13–17)
HGB BLD-MCNC: 12.3 G/DL — LOW (ref 13–17)
HGB BLD-MCNC: 12.3 G/DL — LOW (ref 13–17)
HGB BLD-MCNC: 12.4 G/DL — LOW (ref 13–17)
HGB BLD-MCNC: 12.5 G/DL — LOW (ref 13–17)
HGB BLD-MCNC: 12.5 G/DL — LOW (ref 13–17)
HGB BLD-MCNC: 12.7 G/DL — LOW (ref 13–17)
HGB BLD-MCNC: 12.8 G/DL — LOW (ref 13–17)
HGB BLD-MCNC: 12.9 G/DL — LOW (ref 13–17)
HGB BLD-MCNC: 12.9 G/DL — LOW (ref 13–17)
HGB BLD-MCNC: 13.2 G/DL — SIGNIFICANT CHANGE UP (ref 13–17)
HGB BLD-MCNC: 13.3 G/DL — SIGNIFICANT CHANGE UP (ref 13–17)
HGB BLD-MCNC: 13.4 G/DL — SIGNIFICANT CHANGE UP (ref 13–17)
HGB BLD-MCNC: 13.5 G/DL — SIGNIFICANT CHANGE UP (ref 13–17)
HGB BLD-MCNC: 13.6 G/DL — SIGNIFICANT CHANGE UP (ref 13–17)
HGB BLD-MCNC: 14.2 G/DL — SIGNIFICANT CHANGE UP (ref 13–17)
HGB BLD-MCNC: 7 G/DL — CRITICAL LOW (ref 13–17)
HGB BLD-MCNC: 7 G/DL — CRITICAL LOW (ref 13–17)
HGB BLD-MCNC: 7.3 G/DL — LOW (ref 13–17)
HGB BLD-MCNC: 7.4 G/DL — LOW (ref 13–17)
HGB BLD-MCNC: 7.8 G/DL — LOW (ref 13–17)
HGB BLD-MCNC: 7.9 G/DL — LOW (ref 13–17)
HGB BLD-MCNC: 7.9 G/DL — LOW (ref 13–17)
HGB BLD-MCNC: 8 G/DL — LOW (ref 13–17)
HGB BLD-MCNC: 8.2 G/DL — LOW (ref 13–17)
HGB BLD-MCNC: 8.4 G/DL — LOW (ref 13–17)
HGB BLD-MCNC: 8.5 G/DL — LOW (ref 13–17)
HGB BLD-MCNC: 8.7 G/DL — LOW (ref 13–17)
HGB BLD-MCNC: 8.8 G/DL — LOW (ref 13–17)
HGB BLD-MCNC: 9.1 G/DL — LOW (ref 13–17)
HGB BLD-MCNC: 9.1 G/DL — LOW (ref 13–17)
HGB BLD-MCNC: 9.2 G/DL — LOW (ref 13–17)
HGB BLD-MCNC: 9.3 G/DL — LOW (ref 13–17)
HGB BLD-MCNC: 9.4 G/DL — LOW (ref 13–17)
HGB BLD-MCNC: 9.6 G/DL — LOW (ref 13–17)
HOROWITZ INDEX BLDA+IHG-RTO: 100 — SIGNIFICANT CHANGE UP
HOROWITZ INDEX BLDA+IHG-RTO: 30 — SIGNIFICANT CHANGE UP
HOROWITZ INDEX BLDA+IHG-RTO: 40 — SIGNIFICANT CHANGE UP
HOROWITZ INDEX BLDA+IHG-RTO: 50 — SIGNIFICANT CHANGE UP
HOROWITZ INDEX BLDA+IHG-RTO: 50 — SIGNIFICANT CHANGE UP
IANC: 4.19 K/UL — SIGNIFICANT CHANGE UP (ref 1.5–8.5)
IMM GRANULOCYTES NFR BLD AUTO: 0.2 % — SIGNIFICANT CHANGE UP (ref 0–1.5)
IMM GRANULOCYTES NFR BLD AUTO: 0.2 % — SIGNIFICANT CHANGE UP (ref 0–1.5)
IMM GRANULOCYTES NFR BLD AUTO: 0.3 % — SIGNIFICANT CHANGE UP (ref 0–1.5)
IMM GRANULOCYTES NFR BLD AUTO: 0.3 % — SIGNIFICANT CHANGE UP (ref 0–1.5)
IMM GRANULOCYTES NFR BLD AUTO: 0.4 % — SIGNIFICANT CHANGE UP (ref 0–1.5)
IMM GRANULOCYTES NFR BLD AUTO: 0.4 % — SIGNIFICANT CHANGE UP (ref 0–1.5)
IMM GRANULOCYTES NFR BLD AUTO: 0.5 % — SIGNIFICANT CHANGE UP (ref 0–1.5)
IMM GRANULOCYTES NFR BLD AUTO: 0.6 % — SIGNIFICANT CHANGE UP (ref 0–1.5)
IMM GRANULOCYTES NFR BLD AUTO: 0.7 % — SIGNIFICANT CHANGE UP (ref 0–1.5)
IMM GRANULOCYTES NFR BLD AUTO: 0.8 % — SIGNIFICANT CHANGE UP (ref 0–1.5)
IMM GRANULOCYTES NFR BLD AUTO: 0.9 % — SIGNIFICANT CHANGE UP (ref 0–1.5)
IMM GRANULOCYTES NFR BLD AUTO: 1 % — SIGNIFICANT CHANGE UP (ref 0–1.5)
IMM GRANULOCYTES NFR BLD AUTO: 1.1 % — SIGNIFICANT CHANGE UP (ref 0–1.5)
IMM GRANULOCYTES NFR BLD AUTO: 1.3 % — SIGNIFICANT CHANGE UP (ref 0–1.5)
INR BLD: 1.02 RATIO — SIGNIFICANT CHANGE UP (ref 0.88–1.16)
INR BLD: 1.06 RATIO — SIGNIFICANT CHANGE UP (ref 0.88–1.16)
INR BLD: 1.14 RATIO — SIGNIFICANT CHANGE UP (ref 0.88–1.16)
INR BLD: 1.15 RATIO — SIGNIFICANT CHANGE UP (ref 0.88–1.16)
INR BLD: 1.16 RATIO — SIGNIFICANT CHANGE UP (ref 0.88–1.16)
INR BLD: 1.23 RATIO — HIGH (ref 0.88–1.16)
INR BLD: 1.23 RATIO — HIGH (ref 0.88–1.16)
INR BLD: 1.25 RATIO — HIGH (ref 0.88–1.16)
INR BLD: 1.26 RATIO — HIGH (ref 0.88–1.16)
INR BLD: 1.73 RATIO — HIGH (ref 0.88–1.16)
IRON SATN MFR SERPL: 22 % — SIGNIFICANT CHANGE UP (ref 16–55)
IRON SATN MFR SERPL: 56 UG/DL — SIGNIFICANT CHANGE UP (ref 45–165)
KETONES UR-MCNC: ABNORMAL
KETONES UR-MCNC: ABNORMAL
KETONES UR-MCNC: NEGATIVE — SIGNIFICANT CHANGE UP
LACTATE BLDV-MCNC: 0.8 MMOL/L — SIGNIFICANT CHANGE UP (ref 0.5–2)
LACTATE BLDV-MCNC: 1.9 MMOL/L — SIGNIFICANT CHANGE UP (ref 0.5–2)
LACTATE SERPL-SCNC: 1.5 MMOL/L — SIGNIFICANT CHANGE UP (ref 0.7–2)
LACTATE SERPL-SCNC: 2.3 MMOL/L — HIGH (ref 0.7–2)
LACTATE SERPL-SCNC: 2.5 MMOL/L — HIGH (ref 0.7–2)
LACTATE SERPL-SCNC: 2.6 MMOL/L — HIGH (ref 0.7–2)
LACTATE SERPL-SCNC: 3.1 MMOL/L — HIGH (ref 0.7–2)
LACTATE SERPL-SCNC: 4.8 MMOL/L — CRITICAL HIGH (ref 0.7–2)
LACTATE SERPL-SCNC: 4.9 MMOL/L — CRITICAL HIGH (ref 0.7–2)
LEUKOCYTE ESTERASE UR-ACNC: ABNORMAL
LIDOCAIN IGE QN: 81 U/L — SIGNIFICANT CHANGE UP (ref 73–393)
LIPID PNL WITH DIRECT LDL SERPL: 103 MG/DL — HIGH
LIPID PNL WITH DIRECT LDL SERPL: 86 MG/DL — SIGNIFICANT CHANGE UP
LIPID PNL WITH DIRECT LDL SERPL: 89 MG/DL — SIGNIFICANT CHANGE UP
LYMPHOCYTES # BLD AUTO: 0.09 K/UL — LOW (ref 1–3.3)
LYMPHOCYTES # BLD AUTO: 0.1 K/UL — LOW (ref 1–3.3)
LYMPHOCYTES # BLD AUTO: 0.12 K/UL — LOW (ref 1–3.3)
LYMPHOCYTES # BLD AUTO: 0.12 K/UL — LOW (ref 1–3.3)
LYMPHOCYTES # BLD AUTO: 0.13 K/UL — LOW (ref 1–3.3)
LYMPHOCYTES # BLD AUTO: 0.14 K/UL — LOW (ref 1–3.3)
LYMPHOCYTES # BLD AUTO: 0.14 K/UL — LOW (ref 1–3.3)
LYMPHOCYTES # BLD AUTO: 0.16 K/UL — LOW (ref 1–3.3)
LYMPHOCYTES # BLD AUTO: 0.17 K/UL — LOW (ref 1–3.3)
LYMPHOCYTES # BLD AUTO: 0.17 K/UL — LOW (ref 1–3.3)
LYMPHOCYTES # BLD AUTO: 0.19 K/UL — LOW (ref 1–3.3)
LYMPHOCYTES # BLD AUTO: 0.2 K/UL — LOW (ref 1–3.3)
LYMPHOCYTES # BLD AUTO: 0.2 K/UL — LOW (ref 1–3.3)
LYMPHOCYTES # BLD AUTO: 0.21 K/UL — LOW (ref 1–3.3)
LYMPHOCYTES # BLD AUTO: 0.21 K/UL — LOW (ref 1–3.3)
LYMPHOCYTES # BLD AUTO: 0.22 K/UL — LOW (ref 1–3.3)
LYMPHOCYTES # BLD AUTO: 0.22 K/UL — LOW (ref 1–3.3)
LYMPHOCYTES # BLD AUTO: 0.23 K/UL — LOW (ref 1–3.3)
LYMPHOCYTES # BLD AUTO: 0.25 K/UL — LOW (ref 1–3.3)
LYMPHOCYTES # BLD AUTO: 0.28 K/UL — LOW (ref 1–3.3)
LYMPHOCYTES # BLD AUTO: 0.28 K/UL — LOW (ref 1–3.3)
LYMPHOCYTES # BLD AUTO: 0.31 K/UL — LOW (ref 1–3.3)
LYMPHOCYTES # BLD AUTO: 0.31 K/UL — LOW (ref 1–3.3)
LYMPHOCYTES # BLD AUTO: 0.34 K/UL — LOW (ref 1–3.3)
LYMPHOCYTES # BLD AUTO: 0.34 K/UL — LOW (ref 1–3.3)
LYMPHOCYTES # BLD AUTO: 0.35 K/UL — LOW (ref 1–3.3)
LYMPHOCYTES # BLD AUTO: 0.37 K/UL — LOW (ref 1–3.3)
LYMPHOCYTES # BLD AUTO: 0.37 K/UL — LOW (ref 1–3.3)
LYMPHOCYTES # BLD AUTO: 0.39 K/UL — LOW (ref 1–3.3)
LYMPHOCYTES # BLD AUTO: 0.41 K/UL — LOW (ref 1–3.3)
LYMPHOCYTES # BLD AUTO: 0.53 K/UL — LOW (ref 1–3.3)
LYMPHOCYTES # BLD AUTO: 0.6 K/UL — LOW (ref 1–3.3)
LYMPHOCYTES # BLD AUTO: 1.1 % — LOW (ref 13–44)
LYMPHOCYTES # BLD AUTO: 1.1 % — LOW (ref 13–44)
LYMPHOCYTES # BLD AUTO: 1.4 % — LOW (ref 13–44)
LYMPHOCYTES # BLD AUTO: 1.5 % — LOW (ref 13–44)
LYMPHOCYTES # BLD AUTO: 1.6 % — LOW (ref 13–44)
LYMPHOCYTES # BLD AUTO: 1.7 % — LOW (ref 13–44)
LYMPHOCYTES # BLD AUTO: 1.8 % — LOW (ref 13–44)
LYMPHOCYTES # BLD AUTO: 2 % — LOW (ref 13–44)
LYMPHOCYTES # BLD AUTO: 2 % — LOW (ref 13–44)
LYMPHOCYTES # BLD AUTO: 2.1 % — LOW (ref 13–44)
LYMPHOCYTES # BLD AUTO: 2.3 % — LOW (ref 13–44)
LYMPHOCYTES # BLD AUTO: 2.4 % — LOW (ref 13–44)
LYMPHOCYTES # BLD AUTO: 2.6 % — LOW (ref 13–44)
LYMPHOCYTES # BLD AUTO: 2.8 % — LOW (ref 13–44)
LYMPHOCYTES # BLD AUTO: 2.9 % — LOW (ref 13–44)
LYMPHOCYTES # BLD AUTO: 3 % — LOW (ref 13–44)
LYMPHOCYTES # BLD AUTO: 3 % — LOW (ref 13–44)
LYMPHOCYTES # BLD AUTO: 3.3 % — LOW (ref 13–44)
LYMPHOCYTES # BLD AUTO: 3.7 % — LOW (ref 13–44)
LYMPHOCYTES # BLD AUTO: 4.1 % — LOW (ref 13–44)
LYMPHOCYTES # BLD AUTO: 4.2 % — LOW (ref 13–44)
LYMPHOCYTES # BLD AUTO: 4.4 % — LOW (ref 13–44)
LYMPHOCYTES # BLD AUTO: 4.6 % — LOW (ref 13–44)
LYMPHOCYTES # BLD AUTO: 4.7 % — LOW (ref 13–44)
LYMPHOCYTES # BLD AUTO: 5 % — LOW (ref 13–44)
LYMPHOCYTES # BLD AUTO: 5 % — LOW (ref 13–44)
LYMPHOCYTES # BLD AUTO: 5.1 % — LOW (ref 13–44)
LYMPHOCYTES # BLD AUTO: 5.5 % — LOW (ref 13–44)
LYMPHOCYTES # BLD AUTO: 6 % — LOW (ref 13–44)
LYMPHOCYTES # BLD AUTO: 6.1 % — LOW (ref 13–44)
LYMPHOCYTES # BLD AUTO: 7.1 % — LOW (ref 13–44)
LYMPHOCYTES # BLD AUTO: 7.9 % — LOW (ref 13–44)
MACROCYTES BLD QL: SLIGHT — SIGNIFICANT CHANGE UP
MAGNESIUM SERPL-MCNC: 1.8 MG/DL — SIGNIFICANT CHANGE UP (ref 1.6–2.6)
MAGNESIUM SERPL-MCNC: 1.9 MG/DL — SIGNIFICANT CHANGE UP (ref 1.6–2.6)
MAGNESIUM SERPL-MCNC: 2 MG/DL — SIGNIFICANT CHANGE UP (ref 1.6–2.6)
MAGNESIUM SERPL-MCNC: 2.1 MG/DL — SIGNIFICANT CHANGE UP (ref 1.6–2.6)
MAGNESIUM SERPL-MCNC: 2.2 MG/DL — SIGNIFICANT CHANGE UP (ref 1.6–2.6)
MAGNESIUM SERPL-MCNC: 2.3 MG/DL — SIGNIFICANT CHANGE UP (ref 1.6–2.6)
MAGNESIUM SERPL-MCNC: 2.4 MG/DL
MAGNESIUM SERPL-MCNC: 2.4 MG/DL — SIGNIFICANT CHANGE UP (ref 1.6–2.6)
MANUAL SMEAR VERIFICATION: SIGNIFICANT CHANGE UP
MCHC RBC-ENTMCNC: 31.6 GM/DL — LOW (ref 32–36)
MCHC RBC-ENTMCNC: 31.8 PG — SIGNIFICANT CHANGE UP (ref 27–34)
MCHC RBC-ENTMCNC: 31.8 PG — SIGNIFICANT CHANGE UP (ref 27–34)
MCHC RBC-ENTMCNC: 31.9 GM/DL — LOW (ref 32–36)
MCHC RBC-ENTMCNC: 31.9 PG — SIGNIFICANT CHANGE UP (ref 27–34)
MCHC RBC-ENTMCNC: 31.9 PG — SIGNIFICANT CHANGE UP (ref 27–34)
MCHC RBC-ENTMCNC: 32 PG — SIGNIFICANT CHANGE UP (ref 27–34)
MCHC RBC-ENTMCNC: 32.1 GM/DL — SIGNIFICANT CHANGE UP (ref 32–36)
MCHC RBC-ENTMCNC: 32.1 GM/DL — SIGNIFICANT CHANGE UP (ref 32–36)
MCHC RBC-ENTMCNC: 32.1 PG — SIGNIFICANT CHANGE UP (ref 27–34)
MCHC RBC-ENTMCNC: 32.2 GM/DL — SIGNIFICANT CHANGE UP (ref 32–36)
MCHC RBC-ENTMCNC: 32.2 PG — SIGNIFICANT CHANGE UP (ref 27–34)
MCHC RBC-ENTMCNC: 32.2 PG — SIGNIFICANT CHANGE UP (ref 27–34)
MCHC RBC-ENTMCNC: 32.3 PG — SIGNIFICANT CHANGE UP (ref 27–34)
MCHC RBC-ENTMCNC: 32.4 GM/DL — SIGNIFICANT CHANGE UP (ref 32–36)
MCHC RBC-ENTMCNC: 32.4 GM/DL — SIGNIFICANT CHANGE UP (ref 32–36)
MCHC RBC-ENTMCNC: 32.4 PG — SIGNIFICANT CHANGE UP (ref 27–34)
MCHC RBC-ENTMCNC: 32.5 GM/DL — SIGNIFICANT CHANGE UP (ref 32–36)
MCHC RBC-ENTMCNC: 32.5 PG — SIGNIFICANT CHANGE UP (ref 27–34)
MCHC RBC-ENTMCNC: 32.5 PG — SIGNIFICANT CHANGE UP (ref 27–34)
MCHC RBC-ENTMCNC: 32.6 GM/DL — SIGNIFICANT CHANGE UP (ref 32–36)
MCHC RBC-ENTMCNC: 32.6 PG — SIGNIFICANT CHANGE UP (ref 27–34)
MCHC RBC-ENTMCNC: 32.7 GM/DL — SIGNIFICANT CHANGE UP (ref 32–36)
MCHC RBC-ENTMCNC: 32.7 PG — SIGNIFICANT CHANGE UP (ref 27–34)
MCHC RBC-ENTMCNC: 32.8 PG — SIGNIFICANT CHANGE UP (ref 27–34)
MCHC RBC-ENTMCNC: 32.9 GM/DL — SIGNIFICANT CHANGE UP (ref 32–36)
MCHC RBC-ENTMCNC: 32.9 PG — SIGNIFICANT CHANGE UP (ref 27–34)
MCHC RBC-ENTMCNC: 33 G/DL — SIGNIFICANT CHANGE UP (ref 32–36)
MCHC RBC-ENTMCNC: 33.1 GM/DL — SIGNIFICANT CHANGE UP (ref 32–36)
MCHC RBC-ENTMCNC: 33.3 GM/DL — SIGNIFICANT CHANGE UP (ref 32–36)
MCHC RBC-ENTMCNC: 33.4 GM/DL — SIGNIFICANT CHANGE UP (ref 32–36)
MCHC RBC-ENTMCNC: 33.5 GM/DL — SIGNIFICANT CHANGE UP (ref 32–36)
MCHC RBC-ENTMCNC: 33.6 GM/DL — SIGNIFICANT CHANGE UP (ref 32–36)
MCHC RBC-ENTMCNC: 33.6 GM/DL — SIGNIFICANT CHANGE UP (ref 32–36)
MCHC RBC-ENTMCNC: 33.7 GM/DL — SIGNIFICANT CHANGE UP (ref 32–36)
MCHC RBC-ENTMCNC: 33.8 GM/DL — SIGNIFICANT CHANGE UP (ref 32–36)
MCHC RBC-ENTMCNC: 33.8 GM/DL — SIGNIFICANT CHANGE UP (ref 32–36)
MCHC RBC-ENTMCNC: 33.9 GM/DL — SIGNIFICANT CHANGE UP (ref 32–36)
MCHC RBC-ENTMCNC: 34 GM/DL — SIGNIFICANT CHANGE UP (ref 32–36)
MCHC RBC-ENTMCNC: 34.1 PG — HIGH (ref 27–34)
MCHC RBC-ENTMCNC: 34.2 GM/DL — SIGNIFICANT CHANGE UP (ref 32–36)
MCHC RBC-ENTMCNC: 34.2 GM/DL — SIGNIFICANT CHANGE UP (ref 32–36)
MCHC RBC-ENTMCNC: 34.5 GM/DL — SIGNIFICANT CHANGE UP (ref 32–36)
MCHC RBC-ENTMCNC: 34.6 GM/DL — SIGNIFICANT CHANGE UP (ref 32–36)
MCHC RBC-ENTMCNC: 35.3 GM/DL — SIGNIFICANT CHANGE UP (ref 32–36)
MCHC RBC-ENTMCNC: 35.7 GM/DL — SIGNIFICANT CHANGE UP (ref 32–36)
MCHC RBC-ENTMCNC: 35.8 GM/DL — SIGNIFICANT CHANGE UP (ref 32–36)
MCV RBC AUTO: 100 FL — SIGNIFICANT CHANGE UP (ref 80–100)
MCV RBC AUTO: 100 FL — SIGNIFICANT CHANGE UP (ref 80–100)
MCV RBC AUTO: 100.4 FL — HIGH (ref 80–100)
MCV RBC AUTO: 100.7 FL — HIGH (ref 80–100)
MCV RBC AUTO: 100.8 FL — HIGH (ref 80–100)
MCV RBC AUTO: 101.2 FL — HIGH (ref 80–100)
MCV RBC AUTO: 101.7 FL — HIGH (ref 80–100)
MCV RBC AUTO: 101.9 FL — HIGH (ref 80–100)
MCV RBC AUTO: 91.7 FL — SIGNIFICANT CHANGE UP (ref 80–100)
MCV RBC AUTO: 92.3 FL — SIGNIFICANT CHANGE UP (ref 80–100)
MCV RBC AUTO: 92.8 FL — SIGNIFICANT CHANGE UP (ref 80–100)
MCV RBC AUTO: 93.4 FL — SIGNIFICANT CHANGE UP (ref 80–100)
MCV RBC AUTO: 94.5 FL — SIGNIFICANT CHANGE UP (ref 80–100)
MCV RBC AUTO: 94.7 FL — SIGNIFICANT CHANGE UP (ref 80–100)
MCV RBC AUTO: 95 FL — SIGNIFICANT CHANGE UP (ref 80–100)
MCV RBC AUTO: 95 FL — SIGNIFICANT CHANGE UP (ref 80–100)
MCV RBC AUTO: 95.3 FL — SIGNIFICANT CHANGE UP (ref 80–100)
MCV RBC AUTO: 95.7 FL — SIGNIFICANT CHANGE UP (ref 80–100)
MCV RBC AUTO: 95.8 FL — SIGNIFICANT CHANGE UP (ref 80–100)
MCV RBC AUTO: 95.9 FL — SIGNIFICANT CHANGE UP (ref 80–100)
MCV RBC AUTO: 96.3 FL — SIGNIFICANT CHANGE UP (ref 80–100)
MCV RBC AUTO: 96.6 FL — SIGNIFICANT CHANGE UP (ref 80–100)
MCV RBC AUTO: 97.2 FL — SIGNIFICANT CHANGE UP (ref 80–100)
MCV RBC AUTO: 97.2 FL — SIGNIFICANT CHANGE UP (ref 80–100)
MCV RBC AUTO: 97.6 FL — SIGNIFICANT CHANGE UP (ref 80–100)
MCV RBC AUTO: 97.8 FL — SIGNIFICANT CHANGE UP (ref 80–100)
MCV RBC AUTO: 98 FL — SIGNIFICANT CHANGE UP (ref 80–100)
MCV RBC AUTO: 98 FL — SIGNIFICANT CHANGE UP (ref 80–100)
MCV RBC AUTO: 98.1 FL — SIGNIFICANT CHANGE UP (ref 80–100)
MCV RBC AUTO: 98.2 FL — SIGNIFICANT CHANGE UP (ref 80–100)
MCV RBC AUTO: 98.2 FL — SIGNIFICANT CHANGE UP (ref 80–100)
MCV RBC AUTO: 98.3 FL — SIGNIFICANT CHANGE UP (ref 80–100)
MCV RBC AUTO: 98.3 FL — SIGNIFICANT CHANGE UP (ref 80–100)
MCV RBC AUTO: 98.6 FL — SIGNIFICANT CHANGE UP (ref 80–100)
MCV RBC AUTO: 98.6 FL — SIGNIFICANT CHANGE UP (ref 80–100)
MCV RBC AUTO: 99.1 FL — SIGNIFICANT CHANGE UP (ref 80–100)
MCV RBC AUTO: 99.1 FL — SIGNIFICANT CHANGE UP (ref 80–100)
MCV RBC AUTO: 99.6 FL — SIGNIFICANT CHANGE UP (ref 80–100)
MCV RBC AUTO: 99.8 FL — SIGNIFICANT CHANGE UP (ref 80–100)
METHOD TYPE: SIGNIFICANT CHANGE UP
MICROCYTES BLD QL: SLIGHT — SIGNIFICANT CHANGE UP
MONOCYTES # BLD AUTO: 0.09 K/UL — SIGNIFICANT CHANGE UP (ref 0–0.9)
MONOCYTES # BLD AUTO: 0.18 K/UL — SIGNIFICANT CHANGE UP (ref 0–0.9)
MONOCYTES # BLD AUTO: 0.2 K/UL — SIGNIFICANT CHANGE UP (ref 0–0.9)
MONOCYTES # BLD AUTO: 0.22 K/UL — SIGNIFICANT CHANGE UP (ref 0–0.9)
MONOCYTES # BLD AUTO: 0.24 K/UL — SIGNIFICANT CHANGE UP (ref 0–0.9)
MONOCYTES # BLD AUTO: 0.25 K/UL — SIGNIFICANT CHANGE UP (ref 0–0.9)
MONOCYTES # BLD AUTO: 0.28 K/UL — SIGNIFICANT CHANGE UP (ref 0–0.9)
MONOCYTES # BLD AUTO: 0.28 K/UL — SIGNIFICANT CHANGE UP (ref 0–0.9)
MONOCYTES # BLD AUTO: 0.29 K/UL — SIGNIFICANT CHANGE UP (ref 0–0.9)
MONOCYTES # BLD AUTO: 0.3 K/UL — SIGNIFICANT CHANGE UP (ref 0–0.9)
MONOCYTES # BLD AUTO: 0.31 K/UL — SIGNIFICANT CHANGE UP (ref 0–0.9)
MONOCYTES # BLD AUTO: 0.31 K/UL — SIGNIFICANT CHANGE UP (ref 0–0.9)
MONOCYTES # BLD AUTO: 0.35 K/UL — SIGNIFICANT CHANGE UP (ref 0–0.9)
MONOCYTES # BLD AUTO: 0.35 K/UL — SIGNIFICANT CHANGE UP (ref 0–0.9)
MONOCYTES # BLD AUTO: 0.37 K/UL — SIGNIFICANT CHANGE UP (ref 0–0.9)
MONOCYTES # BLD AUTO: 0.38 K/UL — SIGNIFICANT CHANGE UP (ref 0–0.9)
MONOCYTES # BLD AUTO: 0.39 K/UL — SIGNIFICANT CHANGE UP (ref 0–0.9)
MONOCYTES # BLD AUTO: 0.4 K/UL — SIGNIFICANT CHANGE UP (ref 0–0.9)
MONOCYTES # BLD AUTO: 0.41 K/UL — SIGNIFICANT CHANGE UP (ref 0–0.9)
MONOCYTES # BLD AUTO: 0.42 K/UL — SIGNIFICANT CHANGE UP (ref 0–0.9)
MONOCYTES # BLD AUTO: 0.42 K/UL — SIGNIFICANT CHANGE UP (ref 0–0.9)
MONOCYTES # BLD AUTO: 0.49 K/UL — SIGNIFICANT CHANGE UP (ref 0–0.9)
MONOCYTES # BLD AUTO: 0.63 K/UL — SIGNIFICANT CHANGE UP (ref 0–0.9)
MONOCYTES # BLD AUTO: 0.64 K/UL — SIGNIFICANT CHANGE UP (ref 0–0.9)
MONOCYTES # BLD AUTO: 0.65 K/UL — SIGNIFICANT CHANGE UP (ref 0–0.9)
MONOCYTES # BLD AUTO: 0.68 K/UL — SIGNIFICANT CHANGE UP (ref 0–0.9)
MONOCYTES # BLD AUTO: 0.69 K/UL — SIGNIFICANT CHANGE UP (ref 0–0.9)
MONOCYTES # BLD AUTO: 0.78 K/UL — SIGNIFICANT CHANGE UP (ref 0–0.9)
MONOCYTES NFR BLD AUTO: 1.4 % — LOW (ref 2–14)
MONOCYTES NFR BLD AUTO: 1.5 % — LOW (ref 2–14)
MONOCYTES NFR BLD AUTO: 1.7 % — LOW (ref 2–14)
MONOCYTES NFR BLD AUTO: 10.3 % — SIGNIFICANT CHANGE UP (ref 2–14)
MONOCYTES NFR BLD AUTO: 10.4 % — SIGNIFICANT CHANGE UP (ref 2–14)
MONOCYTES NFR BLD AUTO: 11.7 % — SIGNIFICANT CHANGE UP (ref 2–14)
MONOCYTES NFR BLD AUTO: 2 % — SIGNIFICANT CHANGE UP (ref 2–14)
MONOCYTES NFR BLD AUTO: 2.4 % — SIGNIFICANT CHANGE UP (ref 2–14)
MONOCYTES NFR BLD AUTO: 2.6 % — SIGNIFICANT CHANGE UP (ref 2–14)
MONOCYTES NFR BLD AUTO: 3 % — SIGNIFICANT CHANGE UP (ref 2–14)
MONOCYTES NFR BLD AUTO: 3.3 % — SIGNIFICANT CHANGE UP (ref 2–14)
MONOCYTES NFR BLD AUTO: 3.5 % — SIGNIFICANT CHANGE UP (ref 2–14)
MONOCYTES NFR BLD AUTO: 4 % — SIGNIFICANT CHANGE UP (ref 2–14)
MONOCYTES NFR BLD AUTO: 4.4 % — SIGNIFICANT CHANGE UP (ref 2–14)
MONOCYTES NFR BLD AUTO: 4.4 % — SIGNIFICANT CHANGE UP (ref 2–14)
MONOCYTES NFR BLD AUTO: 4.6 % — SIGNIFICANT CHANGE UP (ref 2–14)
MONOCYTES NFR BLD AUTO: 4.7 % — SIGNIFICANT CHANGE UP (ref 2–14)
MONOCYTES NFR BLD AUTO: 4.8 % — SIGNIFICANT CHANGE UP (ref 2–14)
MONOCYTES NFR BLD AUTO: 5 % — SIGNIFICANT CHANGE UP (ref 2–14)
MONOCYTES NFR BLD AUTO: 5.2 % — SIGNIFICANT CHANGE UP (ref 2–14)
MONOCYTES NFR BLD AUTO: 5.5 % — SIGNIFICANT CHANGE UP (ref 2–14)
MONOCYTES NFR BLD AUTO: 6 % — SIGNIFICANT CHANGE UP (ref 2–14)
MONOCYTES NFR BLD AUTO: 6.8 % — SIGNIFICANT CHANGE UP (ref 2–14)
MONOCYTES NFR BLD AUTO: 7 % — SIGNIFICANT CHANGE UP (ref 2–14)
MONOCYTES NFR BLD AUTO: 7 % — SIGNIFICANT CHANGE UP (ref 2–14)
MONOCYTES NFR BLD AUTO: 7.9 % — SIGNIFICANT CHANGE UP (ref 2–14)
MONOCYTES NFR BLD AUTO: 8 % — SIGNIFICANT CHANGE UP (ref 2–14)
MONOCYTES NFR BLD AUTO: 8.2 % — SIGNIFICANT CHANGE UP (ref 2–14)
MRSA PCR RESULT.: SIGNIFICANT CHANGE UP
NEUTROPHILS # BLD AUTO: 11.55 K/UL — HIGH (ref 1.8–7.4)
NEUTROPHILS # BLD AUTO: 11.64 K/UL — HIGH (ref 1.8–7.4)
NEUTROPHILS # BLD AUTO: 12.21 K/UL — HIGH (ref 1.8–7.4)
NEUTROPHILS # BLD AUTO: 12.44 K/UL — HIGH (ref 1.8–7.4)
NEUTROPHILS # BLD AUTO: 3.1 K/UL — SIGNIFICANT CHANGE UP (ref 1.8–7.4)
NEUTROPHILS # BLD AUTO: 4.29 K/UL — SIGNIFICANT CHANGE UP (ref 1.8–7.4)
NEUTROPHILS # BLD AUTO: 4.41 K/UL — SIGNIFICANT CHANGE UP (ref 1.8–7.4)
NEUTROPHILS # BLD AUTO: 4.47 K/UL — SIGNIFICANT CHANGE UP (ref 1.8–7.4)
NEUTROPHILS # BLD AUTO: 4.48 K/UL — SIGNIFICANT CHANGE UP (ref 1.8–7.4)
NEUTROPHILS # BLD AUTO: 4.69 K/UL — SIGNIFICANT CHANGE UP (ref 1.8–7.4)
NEUTROPHILS # BLD AUTO: 5.15 K/UL — SIGNIFICANT CHANGE UP (ref 1.8–7.4)
NEUTROPHILS # BLD AUTO: 5.17 K/UL — SIGNIFICANT CHANGE UP (ref 1.8–7.4)
NEUTROPHILS # BLD AUTO: 5.33 K/UL — SIGNIFICANT CHANGE UP (ref 1.8–7.4)
NEUTROPHILS # BLD AUTO: 5.57 K/UL — SIGNIFICANT CHANGE UP (ref 1.8–7.4)
NEUTROPHILS # BLD AUTO: 5.64 K/UL — SIGNIFICANT CHANGE UP (ref 1.8–7.4)
NEUTROPHILS # BLD AUTO: 5.67 K/UL — SIGNIFICANT CHANGE UP (ref 1.8–7.4)
NEUTROPHILS # BLD AUTO: 5.72 K/UL — SIGNIFICANT CHANGE UP (ref 1.8–7.4)
NEUTROPHILS # BLD AUTO: 6.13 K/UL — SIGNIFICANT CHANGE UP (ref 1.8–7.4)
NEUTROPHILS # BLD AUTO: 6.18 K/UL — SIGNIFICANT CHANGE UP (ref 1.8–7.4)
NEUTROPHILS # BLD AUTO: 6.81 K/UL — SIGNIFICANT CHANGE UP (ref 1.8–7.4)
NEUTROPHILS # BLD AUTO: 6.94 K/UL — SIGNIFICANT CHANGE UP (ref 1.8–7.4)
NEUTROPHILS # BLD AUTO: 6.94 K/UL — SIGNIFICANT CHANGE UP (ref 1.8–7.4)
NEUTROPHILS # BLD AUTO: 6.97 K/UL — SIGNIFICANT CHANGE UP (ref 1.8–7.4)
NEUTROPHILS # BLD AUTO: 7.11 K/UL — SIGNIFICANT CHANGE UP (ref 1.8–7.4)
NEUTROPHILS # BLD AUTO: 7.3 K/UL — SIGNIFICANT CHANGE UP (ref 1.8–7.4)
NEUTROPHILS # BLD AUTO: 7.34 K/UL — SIGNIFICANT CHANGE UP (ref 1.8–7.4)
NEUTROPHILS # BLD AUTO: 7.48 K/UL — HIGH (ref 1.8–7.4)
NEUTROPHILS # BLD AUTO: 7.56 K/UL — HIGH (ref 1.8–7.4)
NEUTROPHILS # BLD AUTO: 7.92 K/UL — HIGH (ref 1.8–7.4)
NEUTROPHILS # BLD AUTO: 9.14 K/UL — HIGH (ref 1.8–7.4)
NEUTROPHILS # BLD AUTO: 9.33 K/UL — HIGH (ref 1.8–7.4)
NEUTROPHILS # BLD AUTO: 9.61 K/UL — HIGH (ref 1.8–7.4)
NEUTROPHILS NFR BLD AUTO: 66 % — SIGNIFICANT CHANGE UP (ref 43–77)
NEUTROPHILS NFR BLD AUTO: 76.1 % — SIGNIFICANT CHANGE UP (ref 43–77)
NEUTROPHILS NFR BLD AUTO: 77.3 % — HIGH (ref 43–77)
NEUTROPHILS NFR BLD AUTO: 79.9 % — HIGH (ref 43–77)
NEUTROPHILS NFR BLD AUTO: 82.2 % — HIGH (ref 43–77)
NEUTROPHILS NFR BLD AUTO: 82.5 % — HIGH (ref 43–77)
NEUTROPHILS NFR BLD AUTO: 82.5 % — HIGH (ref 43–77)
NEUTROPHILS NFR BLD AUTO: 82.6 % — HIGH (ref 43–77)
NEUTROPHILS NFR BLD AUTO: 83.3 % — HIGH (ref 43–77)
NEUTROPHILS NFR BLD AUTO: 84.3 % — HIGH (ref 43–77)
NEUTROPHILS NFR BLD AUTO: 85.8 % — HIGH (ref 43–77)
NEUTROPHILS NFR BLD AUTO: 86 % — HIGH (ref 43–77)
NEUTROPHILS NFR BLD AUTO: 86.3 % — HIGH (ref 43–77)
NEUTROPHILS NFR BLD AUTO: 87.1 % — HIGH (ref 43–77)
NEUTROPHILS NFR BLD AUTO: 87.1 % — HIGH (ref 43–77)
NEUTROPHILS NFR BLD AUTO: 88 % — HIGH (ref 43–77)
NEUTROPHILS NFR BLD AUTO: 88.6 % — HIGH (ref 43–77)
NEUTROPHILS NFR BLD AUTO: 89.4 % — HIGH (ref 43–77)
NEUTROPHILS NFR BLD AUTO: 89.6 % — HIGH (ref 43–77)
NEUTROPHILS NFR BLD AUTO: 90.1 % — HIGH (ref 43–77)
NEUTROPHILS NFR BLD AUTO: 91 % — HIGH (ref 43–77)
NEUTROPHILS NFR BLD AUTO: 91.4 % — HIGH (ref 43–77)
NEUTROPHILS NFR BLD AUTO: 91.7 % — HIGH (ref 43–77)
NEUTROPHILS NFR BLD AUTO: 92 % — HIGH (ref 43–77)
NEUTROPHILS NFR BLD AUTO: 92.4 % — HIGH (ref 43–77)
NEUTROPHILS NFR BLD AUTO: 92.5 % — HIGH (ref 43–77)
NEUTROPHILS NFR BLD AUTO: 93.1 % — HIGH (ref 43–77)
NEUTROPHILS NFR BLD AUTO: 94.2 % — HIGH (ref 43–77)
NEUTROPHILS NFR BLD AUTO: 94.2 % — HIGH (ref 43–77)
NEUTROPHILS NFR BLD AUTO: 95.1 % — HIGH (ref 43–77)
NEUTROPHILS NFR BLD AUTO: 95.4 % — HIGH (ref 43–77)
NEUTROPHILS NFR BLD AUTO: 96.4 % — HIGH (ref 43–77)
NEUTS BAND # BLD: 27 % — HIGH (ref 0–8)
NITRITE UR-MCNC: NEGATIVE — SIGNIFICANT CHANGE UP
NON HDL CHOLESTEROL: 103 MG/DL — SIGNIFICANT CHANGE UP
NON HDL CHOLESTEROL: 103 MG/DL — SIGNIFICANT CHANGE UP
NON HDL CHOLESTEROL: 123 MG/DL — SIGNIFICANT CHANGE UP
NRBC # BLD: 0 /100 WBCS — SIGNIFICANT CHANGE UP (ref 0–0)
NRBC # BLD: 0 — SIGNIFICANT CHANGE UP
NRBC # BLD: SIGNIFICANT CHANGE UP /100 WBCS (ref 0–0)
NT-PROBNP SERPL-SCNC: 2520 PG/ML — HIGH (ref 0–450)
OB PNL STL: POSITIVE
ORGANISM # SPEC MICROSCOPIC CNT: SIGNIFICANT CHANGE UP
OVALOCYTES BLD QL SMEAR: SLIGHT — SIGNIFICANT CHANGE UP
PCO2 BLDA: 43 MMHG — SIGNIFICANT CHANGE UP (ref 35–48)
PCO2 BLDA: 44 MMHG — SIGNIFICANT CHANGE UP (ref 35–48)
PCO2 BLDA: 45 MMHG — SIGNIFICANT CHANGE UP (ref 35–48)
PCO2 BLDA: 48 MMHG — SIGNIFICANT CHANGE UP (ref 35–48)
PCO2 BLDA: 54 MMHG — HIGH (ref 35–48)
PCO2 BLDA: 56 MMHG — HIGH (ref 35–48)
PCO2 BLDA: 64 MMHG — HIGH (ref 35–48)
PCO2 BLDA: 78 MMHG — CRITICAL HIGH (ref 35–48)
PCO2 BLDA: 82 MMHG — CRITICAL HIGH (ref 35–48)
PCO2 BLDV: 46 MMHG — SIGNIFICANT CHANGE UP (ref 42–55)
PCO2 BLDV: 47 MMHG — SIGNIFICANT CHANGE UP (ref 42–55)
PF4 HEPARIN CMPLX AB SER-ACNC: NEGATIVE — SIGNIFICANT CHANGE UP
PH BLDA: 7.21 — LOW (ref 7.35–7.45)
PH BLDA: 7.24 — LOW (ref 7.35–7.45)
PH BLDA: 7.27 — LOW (ref 7.35–7.45)
PH BLDA: 7.28 — LOW (ref 7.35–7.45)
PH BLDA: 7.33 — LOW (ref 7.35–7.45)
PH BLDA: 7.33 — LOW (ref 7.35–7.45)
PH BLDA: 7.34 — LOW (ref 7.35–7.45)
PH BLDA: 7.38 — SIGNIFICANT CHANGE UP (ref 7.35–7.45)
PH BLDA: 7.45 — SIGNIFICANT CHANGE UP (ref 7.35–7.45)
PH BLDA: 7.45 — SIGNIFICANT CHANGE UP (ref 7.35–7.45)
PH BLDA: 7.51 — HIGH (ref 7.35–7.45)
PH BLDV: 7.37 — SIGNIFICANT CHANGE UP (ref 7.32–7.43)
PH BLDV: 7.46 — HIGH (ref 7.32–7.43)
PH UR: 5 — SIGNIFICANT CHANGE UP (ref 5–8)
PH UR: 5 — SIGNIFICANT CHANGE UP (ref 5–8)
PH UR: 6 — SIGNIFICANT CHANGE UP (ref 5–8)
PH UR: 7 — SIGNIFICANT CHANGE UP (ref 5–8)
PH UR: 7 — SIGNIFICANT CHANGE UP (ref 5–8)
PHOSPHATE SERPL-MCNC: 1.5 MG/DL — LOW (ref 2.5–4.5)
PHOSPHATE SERPL-MCNC: 1.8 MG/DL — LOW (ref 2.5–4.5)
PHOSPHATE SERPL-MCNC: 2.2 MG/DL — LOW (ref 2.5–4.5)
PHOSPHATE SERPL-MCNC: 2.2 MG/DL — LOW (ref 2.5–4.5)
PHOSPHATE SERPL-MCNC: 2.3 MG/DL — LOW (ref 2.5–4.5)
PHOSPHATE SERPL-MCNC: 2.3 MG/DL — LOW (ref 2.5–4.5)
PHOSPHATE SERPL-MCNC: 2.4 MG/DL — LOW (ref 2.5–4.5)
PHOSPHATE SERPL-MCNC: 2.4 MG/DL — SIGNIFICANT CHANGE UP (ref 2.4–4.7)
PHOSPHATE SERPL-MCNC: 2.6 MG/DL — SIGNIFICANT CHANGE UP (ref 2.4–4.7)
PHOSPHATE SERPL-MCNC: 2.6 MG/DL — SIGNIFICANT CHANGE UP (ref 2.5–4.5)
PHOSPHATE SERPL-MCNC: 2.7 MG/DL — SIGNIFICANT CHANGE UP (ref 2.5–4.5)
PHOSPHATE SERPL-MCNC: 2.8 MG/DL — SIGNIFICANT CHANGE UP (ref 2.5–4.5)
PHOSPHATE SERPL-MCNC: 2.9 MG/DL — SIGNIFICANT CHANGE UP (ref 2.5–4.5)
PHOSPHATE SERPL-MCNC: 3 MG/DL — SIGNIFICANT CHANGE UP (ref 2.5–4.5)
PHOSPHATE SERPL-MCNC: 3.2 MG/DL — SIGNIFICANT CHANGE UP (ref 2.5–4.5)
PHOSPHATE SERPL-MCNC: 3.2 MG/DL — SIGNIFICANT CHANGE UP (ref 2.5–4.5)
PHOSPHATE SERPL-MCNC: 3.3 MG/DL — SIGNIFICANT CHANGE UP (ref 2.5–4.5)
PHOSPHATE SERPL-MCNC: 3.3 MG/DL — SIGNIFICANT CHANGE UP (ref 2.5–4.5)
PHOSPHATE SERPL-MCNC: 3.8 MG/DL — SIGNIFICANT CHANGE UP (ref 2.5–4.5)
PHOSPHATE SERPL-MCNC: 3.9 MG/DL — SIGNIFICANT CHANGE UP (ref 2.5–4.5)
PLAT MORPH BLD: ABNORMAL
PLAT MORPH BLD: NORMAL — SIGNIFICANT CHANGE UP
PLATELET # BLD AUTO: 123 K/UL — LOW (ref 150–400)
PLATELET # BLD AUTO: 125 K/UL — LOW (ref 150–400)
PLATELET # BLD AUTO: 131 K/UL — LOW (ref 150–400)
PLATELET # BLD AUTO: 138 K/UL — LOW (ref 150–400)
PLATELET # BLD AUTO: 141 K/UL — LOW (ref 150–400)
PLATELET # BLD AUTO: 162 K/UL — SIGNIFICANT CHANGE UP (ref 150–400)
PLATELET # BLD AUTO: 190 K/UL — SIGNIFICANT CHANGE UP (ref 150–400)
PLATELET # BLD AUTO: 190 K/UL — SIGNIFICANT CHANGE UP (ref 150–400)
PLATELET # BLD AUTO: 233 K/UL — SIGNIFICANT CHANGE UP (ref 150–400)
PLATELET # BLD AUTO: 237 K/UL — SIGNIFICANT CHANGE UP (ref 150–400)
PLATELET # BLD AUTO: 240 K/UL — SIGNIFICANT CHANGE UP (ref 150–400)
PLATELET # BLD AUTO: 245 K/UL — SIGNIFICANT CHANGE UP (ref 150–400)
PLATELET # BLD AUTO: 259 K/UL — SIGNIFICANT CHANGE UP (ref 150–400)
PLATELET # BLD AUTO: 262 K/UL — SIGNIFICANT CHANGE UP (ref 150–400)
PLATELET # BLD AUTO: 271 K/UL — SIGNIFICANT CHANGE UP (ref 150–400)
PLATELET # BLD AUTO: 282 K/UL — SIGNIFICANT CHANGE UP (ref 150–400)
PLATELET # BLD AUTO: 283 K/UL — SIGNIFICANT CHANGE UP (ref 150–400)
PLATELET # BLD AUTO: 285 K/UL — SIGNIFICANT CHANGE UP (ref 150–400)
PLATELET # BLD AUTO: 287 K/UL — SIGNIFICANT CHANGE UP (ref 150–400)
PLATELET # BLD AUTO: 289 K/UL — SIGNIFICANT CHANGE UP (ref 150–400)
PLATELET # BLD AUTO: 291 K/UL — SIGNIFICANT CHANGE UP (ref 150–400)
PLATELET # BLD AUTO: 292 K/UL — SIGNIFICANT CHANGE UP (ref 150–400)
PLATELET # BLD AUTO: 296 K/UL — SIGNIFICANT CHANGE UP (ref 150–400)
PLATELET # BLD AUTO: 296 K/UL — SIGNIFICANT CHANGE UP (ref 150–400)
PLATELET # BLD AUTO: 303 K/UL — SIGNIFICANT CHANGE UP (ref 150–400)
PLATELET # BLD AUTO: 303 K/UL — SIGNIFICANT CHANGE UP (ref 150–400)
PLATELET # BLD AUTO: 307 K/UL — SIGNIFICANT CHANGE UP (ref 150–400)
PLATELET # BLD AUTO: 309 K/UL — SIGNIFICANT CHANGE UP (ref 150–400)
PLATELET # BLD AUTO: 315 K/UL — SIGNIFICANT CHANGE UP (ref 150–400)
PLATELET # BLD AUTO: 321 K/UL — SIGNIFICANT CHANGE UP (ref 150–400)
PLATELET # BLD AUTO: 322 K/UL — SIGNIFICANT CHANGE UP (ref 150–400)
PLATELET # BLD AUTO: 337 K/UL — SIGNIFICANT CHANGE UP (ref 150–400)
PLATELET # BLD AUTO: 339 K/UL — SIGNIFICANT CHANGE UP (ref 150–400)
PLATELET # BLD AUTO: 358 K/UL — SIGNIFICANT CHANGE UP (ref 150–400)
PLATELET # BLD AUTO: 399 K/UL — SIGNIFICANT CHANGE UP (ref 150–400)
PLATELET # BLD AUTO: 404 K/UL — HIGH (ref 150–400)
PLATELET # BLD AUTO: 419 K/UL — HIGH (ref 150–400)
PLATELET # BLD AUTO: 431 K/UL — HIGH (ref 150–400)
PLATELET # BLD AUTO: SIGNIFICANT CHANGE UP K/UL (ref 150–400)
PLATELET COUNT - ESTIMATE: NORMAL — SIGNIFICANT CHANGE UP
PO2 BLDA: 104 MMHG — SIGNIFICANT CHANGE UP (ref 83–108)
PO2 BLDA: 134 MMHG — HIGH (ref 83–108)
PO2 BLDA: 147 MMHG — HIGH (ref 83–108)
PO2 BLDA: 155 MMHG — HIGH (ref 83–108)
PO2 BLDA: 52 MMHG — LOW (ref 83–108)
PO2 BLDA: 65 MMHG — LOW (ref 83–108)
PO2 BLDA: 70 MMHG — LOW (ref 83–108)
PO2 BLDA: 76 MMHG — LOW (ref 83–108)
PO2 BLDA: 82 MMHG — LOW (ref 83–108)
PO2 BLDA: 92 MMHG — SIGNIFICANT CHANGE UP (ref 83–108)
PO2 BLDA: 94 MMHG — SIGNIFICANT CHANGE UP (ref 83–108)
PO2 BLDV: 39 MMHG — SIGNIFICANT CHANGE UP
PO2 BLDV: 53 MMHG — HIGH (ref 25–45)
POIKILOCYTOSIS BLD QL AUTO: SLIGHT — SIGNIFICANT CHANGE UP
POIKILOCYTOSIS BLD QL AUTO: SLIGHT — SIGNIFICANT CHANGE UP
POLYCHROMASIA BLD QL SMEAR: SIGNIFICANT CHANGE UP
POLYCHROMASIA BLD QL SMEAR: SLIGHT — SIGNIFICANT CHANGE UP
POLYCHROMASIA BLD QL SMEAR: SLIGHT — SIGNIFICANT CHANGE UP
POTASSIUM BLDV-SCNC: 3 MMOL/L — LOW (ref 3.5–5.1)
POTASSIUM BLDV-SCNC: 4.1 MMOL/L — SIGNIFICANT CHANGE UP (ref 3.5–5.1)
POTASSIUM SERPL-MCNC: 3 MMOL/L — LOW (ref 3.5–5.3)
POTASSIUM SERPL-MCNC: 3.1 MMOL/L — LOW (ref 3.5–5.3)
POTASSIUM SERPL-MCNC: 3.2 MMOL/L — LOW (ref 3.5–5.3)
POTASSIUM SERPL-MCNC: 3.3 MMOL/L — LOW (ref 3.5–5.3)
POTASSIUM SERPL-MCNC: 3.5 MMOL/L — SIGNIFICANT CHANGE UP (ref 3.5–5.3)
POTASSIUM SERPL-MCNC: 3.6 MMOL/L — SIGNIFICANT CHANGE UP (ref 3.5–5.3)
POTASSIUM SERPL-MCNC: 3.7 MMOL/L — SIGNIFICANT CHANGE UP (ref 3.5–5.3)
POTASSIUM SERPL-MCNC: 3.8 MMOL/L — SIGNIFICANT CHANGE UP (ref 3.5–5.3)
POTASSIUM SERPL-MCNC: 3.8 MMOL/L — SIGNIFICANT CHANGE UP (ref 3.5–5.3)
POTASSIUM SERPL-MCNC: 3.9 MMOL/L — SIGNIFICANT CHANGE UP (ref 3.5–5.3)
POTASSIUM SERPL-MCNC: 4 MMOL/L — SIGNIFICANT CHANGE UP (ref 3.5–5.3)
POTASSIUM SERPL-MCNC: 4.1 MMOL/L — SIGNIFICANT CHANGE UP (ref 3.5–5.3)
POTASSIUM SERPL-MCNC: 4.1 MMOL/L — SIGNIFICANT CHANGE UP (ref 3.5–5.3)
POTASSIUM SERPL-MCNC: 4.4 MMOL/L — SIGNIFICANT CHANGE UP (ref 3.5–5.3)
POTASSIUM SERPL-MCNC: 4.5 MMOL/L — SIGNIFICANT CHANGE UP (ref 3.5–5.3)
POTASSIUM SERPL-MCNC: 4.6 MMOL/L — SIGNIFICANT CHANGE UP (ref 3.5–5.3)
POTASSIUM SERPL-MCNC: 4.7 MMOL/L — SIGNIFICANT CHANGE UP (ref 3.5–5.3)
POTASSIUM SERPL-MCNC: 4.8 MMOL/L — SIGNIFICANT CHANGE UP (ref 3.5–5.3)
POTASSIUM SERPL-MCNC: 4.8 MMOL/L — SIGNIFICANT CHANGE UP (ref 3.5–5.3)
POTASSIUM SERPL-MCNC: 4.9 MMOL/L — SIGNIFICANT CHANGE UP (ref 3.5–5.3)
POTASSIUM SERPL-SCNC: 3 MMOL/L — LOW (ref 3.5–5.3)
POTASSIUM SERPL-SCNC: 3.1 MMOL/L — LOW (ref 3.5–5.3)
POTASSIUM SERPL-SCNC: 3.2 MMOL/L — LOW (ref 3.5–5.3)
POTASSIUM SERPL-SCNC: 3.3 MMOL/L — LOW (ref 3.5–5.3)
POTASSIUM SERPL-SCNC: 3.5 MMOL/L — SIGNIFICANT CHANGE UP (ref 3.5–5.3)
POTASSIUM SERPL-SCNC: 3.6 MMOL/L — SIGNIFICANT CHANGE UP (ref 3.5–5.3)
POTASSIUM SERPL-SCNC: 3.7 MMOL/L — SIGNIFICANT CHANGE UP (ref 3.5–5.3)
POTASSIUM SERPL-SCNC: 3.8 MMOL/L — SIGNIFICANT CHANGE UP (ref 3.5–5.3)
POTASSIUM SERPL-SCNC: 3.8 MMOL/L — SIGNIFICANT CHANGE UP (ref 3.5–5.3)
POTASSIUM SERPL-SCNC: 3.9 MMOL/L — SIGNIFICANT CHANGE UP (ref 3.5–5.3)
POTASSIUM SERPL-SCNC: 4 MMOL/L — SIGNIFICANT CHANGE UP (ref 3.5–5.3)
POTASSIUM SERPL-SCNC: 4.1 MMOL/L — SIGNIFICANT CHANGE UP (ref 3.5–5.3)
POTASSIUM SERPL-SCNC: 4.1 MMOL/L — SIGNIFICANT CHANGE UP (ref 3.5–5.3)
POTASSIUM SERPL-SCNC: 4.4 MMOL/L — SIGNIFICANT CHANGE UP (ref 3.5–5.3)
POTASSIUM SERPL-SCNC: 4.5 MMOL/L — SIGNIFICANT CHANGE UP (ref 3.5–5.3)
POTASSIUM SERPL-SCNC: 4.6 MMOL/L
POTASSIUM SERPL-SCNC: 4.6 MMOL/L — SIGNIFICANT CHANGE UP (ref 3.5–5.3)
POTASSIUM SERPL-SCNC: 4.7 MMOL/L — SIGNIFICANT CHANGE UP (ref 3.5–5.3)
POTASSIUM SERPL-SCNC: 4.8 MMOL/L — SIGNIFICANT CHANGE UP (ref 3.5–5.3)
POTASSIUM SERPL-SCNC: 4.8 MMOL/L — SIGNIFICANT CHANGE UP (ref 3.5–5.3)
POTASSIUM SERPL-SCNC: 4.9 MMOL/L — SIGNIFICANT CHANGE UP (ref 3.5–5.3)
PROCALCITONIN SERPL-MCNC: 0.06 NG/ML — HIGH (ref 0–0.04)
PROT SERPL-MCNC: 5.4 G/DL — LOW (ref 6–8.3)
PROT SERPL-MCNC: 5.5 G/DL — LOW (ref 6–8.3)
PROT SERPL-MCNC: 5.6 G/DL — LOW (ref 6–8.3)
PROT SERPL-MCNC: 5.6 G/DL — LOW (ref 6–8.3)
PROT SERPL-MCNC: 5.7 G/DL — LOW (ref 6–8.3)
PROT SERPL-MCNC: 5.8 G/DL — LOW (ref 6–8.3)
PROT SERPL-MCNC: 5.8 G/DL — LOW (ref 6–8.3)
PROT SERPL-MCNC: 5.9 G/DL — LOW (ref 6–8.3)
PROT SERPL-MCNC: 6 G/DL — SIGNIFICANT CHANGE UP (ref 6–8.3)
PROT SERPL-MCNC: 6 G/DL — SIGNIFICANT CHANGE UP (ref 6–8.3)
PROT SERPL-MCNC: 6.1 G/DL — SIGNIFICANT CHANGE UP (ref 6–8.3)
PROT SERPL-MCNC: 6.2 G/DL — LOW (ref 6.6–8.7)
PROT SERPL-MCNC: 6.2 G/DL — SIGNIFICANT CHANGE UP (ref 6–8.3)
PROT SERPL-MCNC: 6.2 G/DL — SIGNIFICANT CHANGE UP (ref 6–8.3)
PROT SERPL-MCNC: 6.3 G/DL — LOW (ref 6.6–8.7)
PROT SERPL-MCNC: 6.3 G/DL — SIGNIFICANT CHANGE UP (ref 6–8.3)
PROT SERPL-MCNC: 6.4 G/DL — SIGNIFICANT CHANGE UP (ref 6–8.3)
PROT SERPL-MCNC: 6.5 G/DL — LOW (ref 6.6–8.7)
PROT SERPL-MCNC: 6.5 G/DL — SIGNIFICANT CHANGE UP (ref 6–8.3)
PROT SERPL-MCNC: 6.6 G/DL — SIGNIFICANT CHANGE UP (ref 6–8.3)
PROT SERPL-MCNC: 6.7 G/DL
PROT SERPL-MCNC: 6.7 G/DL — SIGNIFICANT CHANGE UP (ref 6.6–8.7)
PROT SERPL-MCNC: 7 G/DL — SIGNIFICANT CHANGE UP (ref 6–8.3)
PROT SERPL-MCNC: 7.1 G/DL — SIGNIFICANT CHANGE UP (ref 6–8.3)
PROT SERPL-MCNC: 7.2 G/DL — SIGNIFICANT CHANGE UP (ref 6–8.3)
PROT SERPL-MCNC: 7.3 G/DL — SIGNIFICANT CHANGE UP (ref 6–8.3)
PROT SERPL-MCNC: 7.5 G/DL — SIGNIFICANT CHANGE UP (ref 6–8.3)
PROT UR-MCNC: 100
PROT UR-MCNC: 100
PROT UR-MCNC: 15
PROT UR-MCNC: 15
PROT UR-MCNC: 30 MG/DL
PROTHROM AB SERPL-ACNC: 11.9 SEC — SIGNIFICANT CHANGE UP (ref 10.5–13.4)
PROTHROM AB SERPL-ACNC: 12.4 SEC — SIGNIFICANT CHANGE UP (ref 10.5–13.4)
PROTHROM AB SERPL-ACNC: 13.3 SEC — SIGNIFICANT CHANGE UP (ref 10.5–13.4)
PROTHROM AB SERPL-ACNC: 13.5 SEC — HIGH (ref 10.5–13.4)
PROTHROM AB SERPL-ACNC: 13.5 SEC — HIGH (ref 10.5–13.4)
PROTHROM AB SERPL-ACNC: 14.4 SEC — HIGH (ref 10.5–13.4)
PROTHROM AB SERPL-ACNC: 14.4 SEC — HIGH (ref 10.5–13.4)
PROTHROM AB SERPL-ACNC: 14.6 SEC — HIGH (ref 10.5–13.4)
PROTHROM AB SERPL-ACNC: 14.8 SEC — HIGH (ref 10.5–13.4)
PROTHROM AB SERPL-ACNC: 20.4 SEC — HIGH (ref 10.5–13.4)
PTH RELATED PROT SERPL-MCNC: <2 PMOL/L — SIGNIFICANT CHANGE UP
PTH-INTACT FLD-MCNC: 19 PG/ML — SIGNIFICANT CHANGE UP (ref 15–65)
PTH-INTACT FLD-MCNC: 22 PG/ML — SIGNIFICANT CHANGE UP (ref 15–65)
RAPID RVP RESULT: SIGNIFICANT CHANGE UP
RAPID RVP RESULT: SIGNIFICANT CHANGE UP
RBC # BLD: 2.16 M/UL — LOW (ref 4.2–5.8)
RBC # BLD: 2.18 M/UL — LOW (ref 4.2–5.8)
RBC # BLD: 2.26 M/UL — LOW (ref 4.2–5.8)
RBC # BLD: 2.31 M/UL — LOW (ref 4.2–5.8)
RBC # BLD: 2.38 M/UL — LOW (ref 4.2–5.8)
RBC # BLD: 2.41 M/UL — LOW (ref 4.2–5.8)
RBC # BLD: 2.43 M/UL — LOW (ref 4.2–5.8)
RBC # BLD: 2.45 M/UL — LOW (ref 4.2–5.8)
RBC # BLD: 2.58 M/UL — LOW (ref 4.2–5.8)
RBC # BLD: 2.59 M/UL — LOW (ref 4.2–5.8)
RBC # BLD: 2.65 M/UL — LOW (ref 4.2–5.8)
RBC # BLD: 2.66 M/UL — LOW (ref 4.2–5.8)
RBC # BLD: 2.68 M/UL — LOW (ref 4.2–5.8)
RBC # BLD: 2.71 M/UL — LOW (ref 4.2–5.8)
RBC # BLD: 2.72 M/UL — LOW (ref 4.2–5.8)
RBC # BLD: 2.78 M/UL — LOW (ref 4.2–5.8)
RBC # BLD: 2.79 M/UL — LOW (ref 4.2–5.8)
RBC # BLD: 2.87 M/UL — LOW (ref 4.2–5.8)
RBC # BLD: 2.88 M/UL — LOW (ref 4.2–5.8)
RBC # BLD: 2.89 M/UL — LOW (ref 4.2–5.8)
RBC # BLD: 3 M/UL — LOW (ref 4.2–5.8)
RBC # BLD: 3.12 M/UL — LOW (ref 4.2–5.8)
RBC # BLD: 3.27 M/UL — LOW (ref 4.2–5.8)
RBC # BLD: 3.28 M/UL — LOW (ref 4.2–5.8)
RBC # BLD: 3.54 M/UL — LOW (ref 4.2–5.8)
RBC # BLD: 3.57 M/UL — LOW (ref 4.2–5.8)
RBC # BLD: 3.81 M/UL — LOW (ref 4.2–5.8)
RBC # BLD: 3.83 M/UL — LOW (ref 4.2–5.8)
RBC # BLD: 3.83 M/UL — LOW (ref 4.2–5.8)
RBC # BLD: 3.86 M/UL — LOW (ref 4.2–5.8)
RBC # BLD: 3.88 M/UL — LOW (ref 4.2–5.8)
RBC # BLD: 3.93 M/UL — LOW (ref 4.2–5.8)
RBC # BLD: 3.95 M/UL — LOW (ref 4.2–5.8)
RBC # BLD: 3.99 M/UL — LOW (ref 4.2–5.8)
RBC # BLD: 4.01 M/UL — LOW (ref 4.2–5.8)
RBC # BLD: 4.04 M/UL — LOW (ref 4.2–5.8)
RBC # BLD: 4.08 M/UL — LOW (ref 4.2–5.8)
RBC # BLD: 4.11 M/UL — LOW (ref 4.2–5.8)
RBC # BLD: 4.15 M/UL — LOW (ref 4.2–5.8)
RBC # BLD: 4.16 M/UL — LOW (ref 4.2–5.8)
RBC # BLD: 4.19 M/UL — LOW (ref 4.2–5.8)
RBC # FLD: 12.3 % — SIGNIFICANT CHANGE UP (ref 10.3–14.5)
RBC # FLD: 13.8 % — SIGNIFICANT CHANGE UP (ref 10.3–14.5)
RBC # FLD: 13.9 % — SIGNIFICANT CHANGE UP (ref 10.3–14.5)
RBC # FLD: 14 % — SIGNIFICANT CHANGE UP (ref 10.3–14.5)
RBC # FLD: 14.1 % — SIGNIFICANT CHANGE UP (ref 10.3–14.5)
RBC # FLD: 14.2 % — SIGNIFICANT CHANGE UP (ref 10.3–14.5)
RBC # FLD: 14.3 % — SIGNIFICANT CHANGE UP (ref 10.3–14.5)
RBC # FLD: 14.4 % — SIGNIFICANT CHANGE UP (ref 10.3–14.5)
RBC # FLD: 14.6 % — HIGH (ref 10.3–14.5)
RBC # FLD: 14.8 % — HIGH (ref 10.3–14.5)
RBC # FLD: 14.9 % — HIGH (ref 10.3–14.5)
RBC # FLD: 15 % — HIGH (ref 10.3–14.5)
RBC # FLD: 15.1 % — HIGH (ref 10.3–14.5)
RBC # FLD: 15.3 % — HIGH (ref 10.3–14.5)
RBC # FLD: 15.4 % — HIGH (ref 10.3–14.5)
RBC # FLD: 15.5 % — HIGH (ref 10.3–14.5)
RBC # FLD: 15.5 % — HIGH (ref 10.3–14.5)
RBC # FLD: 15.7 % — HIGH (ref 10.3–14.5)
RBC # FLD: 15.7 % — HIGH (ref 10.3–14.5)
RBC # FLD: 15.8 % — HIGH (ref 10.3–14.5)
RBC # FLD: 15.9 % — HIGH (ref 10.3–14.5)
RBC # FLD: 15.9 % — HIGH (ref 10.3–14.5)
RBC # FLD: 16 % — HIGH (ref 10.3–14.5)
RBC # FLD: 16.1 % — HIGH (ref 10.3–14.5)
RBC # FLD: 16.2 % — HIGH (ref 10.3–14.5)
RBC # FLD: 16.4 % — HIGH (ref 10.3–14.5)
RBC # FLD: 16.6 % — HIGH (ref 10.3–14.5)
RBC # FLD: 16.8 % — HIGH (ref 10.3–14.5)
RBC BLD AUTO: ABNORMAL
RBC BLD AUTO: SIGNIFICANT CHANGE UP
RBC CASTS # UR COMP ASSIST: ABNORMAL /HPF (ref 0–4)
RSV RNA NPH QL NAA+NON-PROBE: SIGNIFICANT CHANGE UP
S AUREUS DNA NOSE QL NAA+PROBE: SIGNIFICANT CHANGE UP
SAO2 % BLDA: 100 % — HIGH (ref 94–98)
SAO2 % BLDA: 82.8 % — LOW (ref 94–98)
SAO2 % BLDA: 96.1 % — SIGNIFICANT CHANGE UP (ref 94–98)
SAO2 % BLDA: 96.3 % — SIGNIFICANT CHANGE UP (ref 94–98)
SAO2 % BLDA: 97.5 % — SIGNIFICANT CHANGE UP (ref 94–98)
SAO2 % BLDA: 99.1 % — HIGH (ref 94–98)
SAO2 % BLDA: 99.7 % — HIGH (ref 94–98)
SAO2 % BLDA: 99.8 % — HIGH (ref 94–98)
SAO2 % BLDV: 70.5 % — SIGNIFICANT CHANGE UP
SAO2 % BLDV: 83.6 % — SIGNIFICANT CHANGE UP
SARS-COV-2 RNA SPEC QL NAA+PROBE: SIGNIFICANT CHANGE UP
SCHISTOCYTES BLD QL AUTO: SLIGHT — SIGNIFICANT CHANGE UP
SODIUM SERPL-SCNC: 137 MMOL/L — SIGNIFICANT CHANGE UP (ref 135–145)
SODIUM SERPL-SCNC: 138 MMOL/L — SIGNIFICANT CHANGE UP (ref 135–145)
SODIUM SERPL-SCNC: 139 MMOL/L
SODIUM SERPL-SCNC: 139 MMOL/L — SIGNIFICANT CHANGE UP (ref 135–145)
SODIUM SERPL-SCNC: 140 MMOL/L — SIGNIFICANT CHANGE UP (ref 135–145)
SODIUM SERPL-SCNC: 141 MMOL/L — SIGNIFICANT CHANGE UP (ref 135–145)
SODIUM SERPL-SCNC: 141 MMOL/L — SIGNIFICANT CHANGE UP (ref 135–145)
SODIUM SERPL-SCNC: 142 MMOL/L — SIGNIFICANT CHANGE UP (ref 135–145)
SODIUM SERPL-SCNC: 143 MMOL/L — SIGNIFICANT CHANGE UP (ref 135–145)
SODIUM SERPL-SCNC: 144 MMOL/L — SIGNIFICANT CHANGE UP (ref 135–145)
SODIUM SERPL-SCNC: 144 MMOL/L — SIGNIFICANT CHANGE UP (ref 135–145)
SODIUM SERPL-SCNC: 147 MMOL/L — HIGH (ref 135–145)
SODIUM SERPL-SCNC: 148 MMOL/L — HIGH (ref 135–145)
SODIUM SERPL-SCNC: 149 MMOL/L — HIGH (ref 135–145)
SODIUM SERPL-SCNC: 151 MMOL/L — HIGH (ref 135–145)
SODIUM SERPL-SCNC: 151 MMOL/L — HIGH (ref 135–145)
SODIUM SERPL-SCNC: 152 MMOL/L — HIGH (ref 135–145)
SODIUM SERPL-SCNC: 153 MMOL/L — HIGH (ref 135–145)
SODIUM SERPL-SCNC: 154 MMOL/L — HIGH (ref 135–145)
SODIUM SERPL-SCNC: 155 MMOL/L — HIGH (ref 135–145)
SP GR SPEC: 1 — LOW (ref 1.01–1.02)
SP GR SPEC: 1.01 — SIGNIFICANT CHANGE UP (ref 1.01–1.02)
SP GR SPEC: 1.01 — SIGNIFICANT CHANGE UP (ref 1.01–1.02)
SP GR SPEC: 1.02 — SIGNIFICANT CHANGE UP (ref 1.01–1.02)
SP GR SPEC: 1.02 — SIGNIFICANT CHANGE UP (ref 1.01–1.02)
SPECIMEN SOURCE: SIGNIFICANT CHANGE UP
SURGICAL PATHOLOGY STUDY: SIGNIFICANT CHANGE UP
TIBC SERPL-MCNC: 254 UG/DL — SIGNIFICANT CHANGE UP (ref 220–430)
TRANSFERRIN SERPL-MCNC: 203 MG/DL — SIGNIFICANT CHANGE UP (ref 200–360)
TRIGL SERPL-MCNC: 70 MG/DL — SIGNIFICANT CHANGE UP
TRIGL SERPL-MCNC: 87 MG/DL — SIGNIFICANT CHANGE UP
TRIGL SERPL-MCNC: 96 MG/DL — SIGNIFICANT CHANGE UP
TROPONIN I, HIGH SENSITIVITY RESULT: 8 NG/L — SIGNIFICANT CHANGE UP
TSH SERPL-ACNC: 1.9 UIU/ML
TSH SERPL-MCNC: 4.2 UIU/ML — HIGH (ref 0.36–3.74)
UIBC SERPL-MCNC: 197 UG/DL — SIGNIFICANT CHANGE UP (ref 110–370)
UROBILINOGEN FLD QL: 1
UROBILINOGEN FLD QL: NEGATIVE MG/DL — SIGNIFICANT CHANGE UP
UROBILINOGEN FLD QL: NEGATIVE MG/DL — SIGNIFICANT CHANGE UP
UROBILINOGEN FLD QL: NEGATIVE — SIGNIFICANT CHANGE UP
UROBILINOGEN FLD QL: NEGATIVE — SIGNIFICANT CHANGE UP
VARIANT LYMPHS # BLD: 1.7 % — SIGNIFICANT CHANGE UP (ref 0–6)
VARIANT LYMPHS # BLD: 1.8 % — SIGNIFICANT CHANGE UP (ref 0–6)
VARIANT LYMPHS # BLD: 5.3 % — SIGNIFICANT CHANGE UP (ref 0–6)
VIT B12 SERPL-MCNC: 599 PG/ML — SIGNIFICANT CHANGE UP (ref 232–1245)
VIT D25+D1,25 OH+D1,25 PNL SERPL-MCNC: 80.7 PG/ML — HIGH (ref 19.9–79.3)
VIT D25+D1,25 OH+D1,25 PNL SERPL-MCNC: 89.9 PG/ML — HIGH (ref 19.9–79.3)
WBC # BLD: 10.33 K/UL — SIGNIFICANT CHANGE UP (ref 3.8–10.5)
WBC # BLD: 12.07 K/UL — HIGH (ref 3.8–10.5)
WBC # BLD: 12.1 K/UL — HIGH (ref 3.8–10.5)
WBC # BLD: 13.07 K/UL — HIGH (ref 3.8–10.5)
WBC # BLD: 13.38 K/UL — HIGH (ref 3.8–10.5)
WBC # BLD: 3.68 K/UL — LOW (ref 3.8–10.5)
WBC # BLD: 4.61 K/UL — SIGNIFICANT CHANGE UP (ref 3.8–10.5)
WBC # BLD: 5.13 K/UL — SIGNIFICANT CHANGE UP (ref 3.8–10.5)
WBC # BLD: 5.43 K/UL — SIGNIFICANT CHANGE UP (ref 3.8–10.5)
WBC # BLD: 5.77 K/UL — SIGNIFICANT CHANGE UP (ref 3.8–10.5)
WBC # BLD: 5.8 K/UL — SIGNIFICANT CHANGE UP (ref 3.8–10.5)
WBC # BLD: 6 K/UL — SIGNIFICANT CHANGE UP (ref 3.8–10.5)
WBC # BLD: 6.01 K/UL — SIGNIFICANT CHANGE UP (ref 3.8–10.5)
WBC # BLD: 6.07 K/UL — SIGNIFICANT CHANGE UP (ref 3.8–10.5)
WBC # BLD: 6.14 K/UL — SIGNIFICANT CHANGE UP (ref 3.8–10.5)
WBC # BLD: 6.24 K/UL — SIGNIFICANT CHANGE UP (ref 3.8–10.5)
WBC # BLD: 6.46 K/UL — SIGNIFICANT CHANGE UP (ref 3.8–10.5)
WBC # BLD: 6.59 K/UL — SIGNIFICANT CHANGE UP (ref 3.8–10.5)
WBC # BLD: 6.66 K/UL — SIGNIFICANT CHANGE UP (ref 3.8–10.5)
WBC # BLD: 6.69 K/UL — SIGNIFICANT CHANGE UP (ref 3.8–10.5)
WBC # BLD: 6.73 K/UL — SIGNIFICANT CHANGE UP (ref 3.8–10.5)
WBC # BLD: 6.76 K/UL — SIGNIFICANT CHANGE UP (ref 3.8–10.5)
WBC # BLD: 6.77 K/UL — SIGNIFICANT CHANGE UP (ref 3.8–10.5)
WBC # BLD: 6.82 K/UL — SIGNIFICANT CHANGE UP (ref 3.8–10.5)
WBC # BLD: 6.92 K/UL — SIGNIFICANT CHANGE UP (ref 3.8–10.5)
WBC # BLD: 7.48 K/UL — SIGNIFICANT CHANGE UP (ref 3.8–10.5)
WBC # BLD: 7.5 K/UL — SIGNIFICANT CHANGE UP (ref 3.8–10.5)
WBC # BLD: 7.55 K/UL — SIGNIFICANT CHANGE UP (ref 3.8–10.5)
WBC # BLD: 7.56 K/UL — SIGNIFICANT CHANGE UP (ref 3.8–10.5)
WBC # BLD: 7.8 K/UL — SIGNIFICANT CHANGE UP (ref 3.8–10.5)
WBC # BLD: 7.93 K/UL — SIGNIFICANT CHANGE UP (ref 3.8–10.5)
WBC # BLD: 8 K/UL — SIGNIFICANT CHANGE UP (ref 3.8–10.5)
WBC # BLD: 8.01 K/UL — SIGNIFICANT CHANGE UP (ref 3.8–10.5)
WBC # BLD: 8.05 K/UL — SIGNIFICANT CHANGE UP (ref 3.8–10.5)
WBC # BLD: 8.17 K/UL — SIGNIFICANT CHANGE UP (ref 3.8–10.5)
WBC # BLD: 8.38 K/UL — SIGNIFICANT CHANGE UP (ref 3.8–10.5)
WBC # BLD: 8.48 K/UL — SIGNIFICANT CHANGE UP (ref 3.8–10.5)
WBC # BLD: 8.59 K/UL — SIGNIFICANT CHANGE UP (ref 3.8–10.5)
WBC # BLD: 8.84 K/UL — SIGNIFICANT CHANGE UP (ref 3.8–10.5)
WBC # BLD: 9.72 K/UL — SIGNIFICANT CHANGE UP (ref 3.8–10.5)
WBC # BLD: 9.91 K/UL — SIGNIFICANT CHANGE UP (ref 3.8–10.5)
WBC # FLD AUTO: 10.33 K/UL — SIGNIFICANT CHANGE UP (ref 3.8–10.5)
WBC # FLD AUTO: 12.07 K/UL — HIGH (ref 3.8–10.5)
WBC # FLD AUTO: 12.1 K/UL — HIGH (ref 3.8–10.5)
WBC # FLD AUTO: 13.07 K/UL — HIGH (ref 3.8–10.5)
WBC # FLD AUTO: 13.38 K/UL — HIGH (ref 3.8–10.5)
WBC # FLD AUTO: 3.68 K/UL — LOW (ref 3.8–10.5)
WBC # FLD AUTO: 4.61 K/UL — SIGNIFICANT CHANGE UP (ref 3.8–10.5)
WBC # FLD AUTO: 5.13 K/UL — SIGNIFICANT CHANGE UP (ref 3.8–10.5)
WBC # FLD AUTO: 5.43 K/UL — SIGNIFICANT CHANGE UP (ref 3.8–10.5)
WBC # FLD AUTO: 5.77 K/UL — SIGNIFICANT CHANGE UP (ref 3.8–10.5)
WBC # FLD AUTO: 5.8 K/UL — SIGNIFICANT CHANGE UP (ref 3.8–10.5)
WBC # FLD AUTO: 6 K/UL — SIGNIFICANT CHANGE UP (ref 3.8–10.5)
WBC # FLD AUTO: 6.01 K/UL — SIGNIFICANT CHANGE UP (ref 3.8–10.5)
WBC # FLD AUTO: 6.07 K/UL — SIGNIFICANT CHANGE UP (ref 3.8–10.5)
WBC # FLD AUTO: 6.14 K/UL — SIGNIFICANT CHANGE UP (ref 3.8–10.5)
WBC # FLD AUTO: 6.24 K/UL — SIGNIFICANT CHANGE UP (ref 3.8–10.5)
WBC # FLD AUTO: 6.46 K/UL — SIGNIFICANT CHANGE UP (ref 3.8–10.5)
WBC # FLD AUTO: 6.59 K/UL — SIGNIFICANT CHANGE UP (ref 3.8–10.5)
WBC # FLD AUTO: 6.66 K/UL — SIGNIFICANT CHANGE UP (ref 3.8–10.5)
WBC # FLD AUTO: 6.69 K/UL — SIGNIFICANT CHANGE UP (ref 3.8–10.5)
WBC # FLD AUTO: 6.73 K/UL — SIGNIFICANT CHANGE UP (ref 3.8–10.5)
WBC # FLD AUTO: 6.76 K/UL — SIGNIFICANT CHANGE UP (ref 3.8–10.5)
WBC # FLD AUTO: 6.77 K/UL — SIGNIFICANT CHANGE UP (ref 3.8–10.5)
WBC # FLD AUTO: 6.82 K/UL — SIGNIFICANT CHANGE UP (ref 3.8–10.5)
WBC # FLD AUTO: 6.92 K/UL — SIGNIFICANT CHANGE UP (ref 3.8–10.5)
WBC # FLD AUTO: 7.48 K/UL — SIGNIFICANT CHANGE UP (ref 3.8–10.5)
WBC # FLD AUTO: 7.5 K/UL — SIGNIFICANT CHANGE UP (ref 3.8–10.5)
WBC # FLD AUTO: 7.55 K/UL — SIGNIFICANT CHANGE UP (ref 3.8–10.5)
WBC # FLD AUTO: 7.56 K/UL — SIGNIFICANT CHANGE UP (ref 3.8–10.5)
WBC # FLD AUTO: 7.8 K/UL — SIGNIFICANT CHANGE UP (ref 3.8–10.5)
WBC # FLD AUTO: 7.93 K/UL — SIGNIFICANT CHANGE UP (ref 3.8–10.5)
WBC # FLD AUTO: 8 K/UL — SIGNIFICANT CHANGE UP (ref 3.8–10.5)
WBC # FLD AUTO: 8.01 K/UL — SIGNIFICANT CHANGE UP (ref 3.8–10.5)
WBC # FLD AUTO: 8.05 K/UL — SIGNIFICANT CHANGE UP (ref 3.8–10.5)
WBC # FLD AUTO: 8.17 K/UL — SIGNIFICANT CHANGE UP (ref 3.8–10.5)
WBC # FLD AUTO: 8.38 K/UL — SIGNIFICANT CHANGE UP (ref 3.8–10.5)
WBC # FLD AUTO: 8.48 K/UL — SIGNIFICANT CHANGE UP (ref 3.8–10.5)
WBC # FLD AUTO: 8.59 K/UL — SIGNIFICANT CHANGE UP (ref 3.8–10.5)
WBC # FLD AUTO: 8.84 K/UL — SIGNIFICANT CHANGE UP (ref 3.8–10.5)
WBC # FLD AUTO: 9.72 K/UL — SIGNIFICANT CHANGE UP (ref 3.8–10.5)
WBC # FLD AUTO: 9.91 K/UL — SIGNIFICANT CHANGE UP (ref 3.8–10.5)
WBC UR QL: >50 /HPF (ref 0–5)
WBC UR QL: ABNORMAL
WBC UR QL: ABNORMAL /HPF (ref 0–5)

## 2022-01-01 PROCEDURE — 83605 ASSAY OF LACTIC ACID: CPT

## 2022-01-01 PROCEDURE — 96374 THER/PROPH/DIAG INJ IV PUSH: CPT | Mod: XU

## 2022-01-01 PROCEDURE — 99291 CRITICAL CARE FIRST HOUR: CPT

## 2022-01-01 PROCEDURE — 93306 TTE W/DOPPLER COMPLETE: CPT

## 2022-01-01 PROCEDURE — 99233 SBSQ HOSP IP/OBS HIGH 50: CPT

## 2022-01-01 PROCEDURE — 83735 ASSAY OF MAGNESIUM: CPT

## 2022-01-01 PROCEDURE — 85379 FIBRIN DEGRADATION QUANT: CPT

## 2022-01-01 PROCEDURE — 74176 CT ABD & PELVIS W/O CONTRAST: CPT | Mod: MA

## 2022-01-01 PROCEDURE — 86900 BLOOD TYPING SEROLOGIC ABO: CPT

## 2022-01-01 PROCEDURE — 85610 PROTHROMBIN TIME: CPT

## 2022-01-01 PROCEDURE — 99223 1ST HOSP IP/OBS HIGH 75: CPT

## 2022-01-01 PROCEDURE — 99232 SBSQ HOSP IP/OBS MODERATE 35: CPT

## 2022-01-01 PROCEDURE — 81001 URINALYSIS AUTO W/SCOPE: CPT

## 2022-01-01 PROCEDURE — 87077 CULTURE AEROBIC IDENTIFY: CPT

## 2022-01-01 PROCEDURE — 85027 COMPLETE CBC AUTOMATED: CPT

## 2022-01-01 PROCEDURE — 99239 HOSP IP/OBS DSCHRG MGMT >30: CPT

## 2022-01-01 PROCEDURE — 99024 POSTOP FOLLOW-UP VISIT: CPT

## 2022-01-01 PROCEDURE — 84484 ASSAY OF TROPONIN QUANT: CPT

## 2022-01-01 PROCEDURE — 74230 X-RAY XM SWLNG FUNCJ C+: CPT

## 2022-01-01 PROCEDURE — 70544 MR ANGIOGRAPHY HEAD W/O DYE: CPT | Mod: 26

## 2022-01-01 PROCEDURE — 80061 LIPID PANEL: CPT

## 2022-01-01 PROCEDURE — 97110 THERAPEUTIC EXERCISES: CPT

## 2022-01-01 PROCEDURE — 99292 CRITICAL CARE ADDL 30 MIN: CPT

## 2022-01-01 PROCEDURE — 96374 THER/PROPH/DIAG INJ IV PUSH: CPT

## 2022-01-01 PROCEDURE — 70551 MRI BRAIN STEM W/O DYE: CPT | Mod: 26

## 2022-01-01 PROCEDURE — 99233 SBSQ HOSP IP/OBS HIGH 50: CPT | Mod: GC

## 2022-01-01 PROCEDURE — 92526 ORAL FUNCTION THERAPY: CPT

## 2022-01-01 PROCEDURE — 70450 CT HEAD/BRAIN W/O DYE: CPT | Mod: MA

## 2022-01-01 PROCEDURE — 97116 GAIT TRAINING THERAPY: CPT

## 2022-01-01 PROCEDURE — 99285 EMERGENCY DEPT VISIT HI MDM: CPT

## 2022-01-01 PROCEDURE — 82803 BLOOD GASES ANY COMBINATION: CPT

## 2022-01-01 PROCEDURE — G1004: CPT

## 2022-01-01 PROCEDURE — 92611 MOTION FLUOROSCOPY/SWALLOW: CPT

## 2022-01-01 PROCEDURE — 78815 PET IMAGE W/CT SKULL-THIGH: CPT | Mod: 26,PS,MH

## 2022-01-01 PROCEDURE — 94668 MNPJ CHEST WALL SBSQ: CPT

## 2022-01-01 PROCEDURE — 71045 X-RAY EXAM CHEST 1 VIEW: CPT | Mod: 26

## 2022-01-01 PROCEDURE — 84100 ASSAY OF PHOSPHORUS: CPT

## 2022-01-01 PROCEDURE — 84443 ASSAY THYROID STIM HORMONE: CPT

## 2022-01-01 PROCEDURE — 70450 CT HEAD/BRAIN W/O DYE: CPT | Mod: 26,MA

## 2022-01-01 PROCEDURE — 82962 GLUCOSE BLOOD TEST: CPT

## 2022-01-01 PROCEDURE — 86901 BLOOD TYPING SEROLOGIC RH(D): CPT

## 2022-01-01 PROCEDURE — 97162 PT EVAL MOD COMPLEX 30 MIN: CPT

## 2022-01-01 PROCEDURE — U0003: CPT

## 2022-01-01 PROCEDURE — 76770 US EXAM ABDO BACK WALL COMP: CPT

## 2022-01-01 PROCEDURE — 38724 REMOVAL OF LYMPH NODES NECK: CPT | Mod: GC,RT

## 2022-01-01 PROCEDURE — 0042T: CPT

## 2022-01-01 PROCEDURE — 84145 PROCALCITONIN (PCT): CPT

## 2022-01-01 PROCEDURE — 87637 SARSCOV2&INF A&B&RSV AMP PRB: CPT

## 2022-01-01 PROCEDURE — 84132 ASSAY OF SERUM POTASSIUM: CPT

## 2022-01-01 PROCEDURE — 71250 CT THORAX DX C-: CPT | Mod: MA

## 2022-01-01 PROCEDURE — 87186 SC STD MICRODIL/AGAR DIL: CPT

## 2022-01-01 PROCEDURE — 82310 ASSAY OF CALCIUM: CPT

## 2022-01-01 PROCEDURE — 87040 BLOOD CULTURE FOR BACTERIA: CPT

## 2022-01-01 PROCEDURE — 99222 1ST HOSP IP/OBS MODERATE 55: CPT

## 2022-01-01 PROCEDURE — 93010 ELECTROCARDIOGRAM REPORT: CPT

## 2022-01-01 PROCEDURE — 96361 HYDRATE IV INFUSION ADD-ON: CPT

## 2022-01-01 PROCEDURE — 99285 EMERGENCY DEPT VISIT HI MDM: CPT | Mod: 25

## 2022-01-01 PROCEDURE — U0005: CPT

## 2022-01-01 PROCEDURE — 80053 COMPREHEN METABOLIC PANEL: CPT

## 2022-01-01 PROCEDURE — 85025 COMPLETE CBC W/AUTO DIFF WBC: CPT

## 2022-01-01 PROCEDURE — 71260 CT THORAX DX C+: CPT | Mod: MG

## 2022-01-01 PROCEDURE — 85730 THROMBOPLASTIN TIME PARTIAL: CPT

## 2022-01-01 PROCEDURE — 93005 ELECTROCARDIOGRAM TRACING: CPT

## 2022-01-01 PROCEDURE — C1764: CPT

## 2022-01-01 PROCEDURE — 99291 CRITICAL CARE FIRST HOUR: CPT | Mod: CS

## 2022-01-01 PROCEDURE — 82607 VITAMIN B-12: CPT

## 2022-01-01 PROCEDURE — 87070 CULTURE OTHR SPECIMN AEROBIC: CPT

## 2022-01-01 PROCEDURE — 83036 HEMOGLOBIN GLYCOSYLATED A1C: CPT

## 2022-01-01 PROCEDURE — 70491 CT SOFT TISSUE NECK W/DYE: CPT | Mod: 26,MG

## 2022-01-01 PROCEDURE — 70491 CT SOFT TISSUE NECK W/DYE: CPT | Mod: 26,MB

## 2022-01-01 PROCEDURE — 70498 CT ANGIOGRAPHY NECK: CPT | Mod: 26,MA

## 2022-01-01 PROCEDURE — 72125 CT NECK SPINE W/O DYE: CPT | Mod: 26,MA

## 2022-01-01 PROCEDURE — 99231 SBSQ HOSP IP/OBS SF/LOW 25: CPT

## 2022-01-01 PROCEDURE — 83540 ASSAY OF IRON: CPT

## 2022-01-01 PROCEDURE — 31575 DIAGNOSTIC LARYNGOSCOPY: CPT

## 2022-01-01 PROCEDURE — A9552: CPT

## 2022-01-01 PROCEDURE — 99223 1ST HOSP IP/OBS HIGH 75: CPT | Mod: GC

## 2022-01-01 PROCEDURE — 83880 ASSAY OF NATRIURETIC PEPTIDE: CPT

## 2022-01-01 PROCEDURE — 93306 TTE W/DOPPLER COMPLETE: CPT | Mod: 26

## 2022-01-01 PROCEDURE — 99221 1ST HOSP IP/OBS SF/LOW 40: CPT

## 2022-01-01 PROCEDURE — 94640 AIRWAY INHALATION TREATMENT: CPT

## 2022-01-01 PROCEDURE — 74176 CT ABD & PELVIS W/O CONTRAST: CPT | Mod: 26

## 2022-01-01 PROCEDURE — 85018 HEMOGLOBIN: CPT

## 2022-01-01 PROCEDURE — 70544 MR ANGIOGRAPHY HEAD W/O DYE: CPT

## 2022-01-01 PROCEDURE — 82947 ASSAY GLUCOSE BLOOD QUANT: CPT

## 2022-01-01 PROCEDURE — 70450 CT HEAD/BRAIN W/O DYE: CPT | Mod: 26

## 2022-01-01 PROCEDURE — 76770 US EXAM ABDO BACK WALL COMP: CPT | Mod: 26

## 2022-01-01 PROCEDURE — 70551 MRI BRAIN STEM W/O DYE: CPT

## 2022-01-01 PROCEDURE — 71045 X-RAY EXAM CHEST 1 VIEW: CPT

## 2022-01-01 PROCEDURE — 36415 COLL VENOUS BLD VENIPUNCTURE: CPT

## 2022-01-01 PROCEDURE — 80048 BASIC METABOLIC PNL TOTAL CA: CPT

## 2022-01-01 PROCEDURE — 94002 VENT MGMT INPAT INIT DAY: CPT

## 2022-01-01 PROCEDURE — 74230 X-RAY XM SWLNG FUNCJ C+: CPT | Mod: 26

## 2022-01-01 PROCEDURE — 99205 OFFICE O/P NEW HI 60 MIN: CPT

## 2022-01-01 PROCEDURE — 87641 MR-STAPH DNA AMP PROBE: CPT

## 2022-01-01 PROCEDURE — P9045: CPT

## 2022-01-01 PROCEDURE — 94799 UNLISTED PULMONARY SVC/PX: CPT

## 2022-01-01 PROCEDURE — 72125 CT NECK SPINE W/O DYE: CPT | Mod: MA

## 2022-01-01 PROCEDURE — 82330 ASSAY OF CALCIUM: CPT

## 2022-01-01 PROCEDURE — 82306 VITAMIN D 25 HYDROXY: CPT

## 2022-01-01 PROCEDURE — 86022 PLATELET ANTIBODIES: CPT

## 2022-01-01 PROCEDURE — P9047: CPT

## 2022-01-01 PROCEDURE — 51703 INSERT BLADDER CATH COMPLEX: CPT

## 2022-01-01 PROCEDURE — 83519 RIA NONANTIBODY: CPT

## 2022-01-01 PROCEDURE — 71045 X-RAY EXAM CHEST 1 VIEW: CPT | Mod: 26,76

## 2022-01-01 PROCEDURE — 71260 CT THORAX DX C+: CPT | Mod: 26,MG

## 2022-01-01 PROCEDURE — 99497 ADVNCD CARE PLAN 30 MIN: CPT

## 2022-01-01 PROCEDURE — 85014 HEMATOCRIT: CPT

## 2022-01-01 PROCEDURE — 0225U NFCT DS DNA&RNA 21 SARSCOV2: CPT

## 2022-01-01 PROCEDURE — 82140 ASSAY OF AMMONIA: CPT

## 2022-01-01 PROCEDURE — 12001 RPR S/N/AX/GEN/TRNK 2.5CM/<: CPT | Mod: 58

## 2022-01-01 PROCEDURE — 84466 ASSAY OF TRANSFERRIN: CPT

## 2022-01-01 PROCEDURE — 83970 ASSAY OF PARATHORMONE: CPT

## 2022-01-01 PROCEDURE — 94667 MNPJ CHEST WALL 1ST: CPT

## 2022-01-01 PROCEDURE — 86850 RBC ANTIBODY SCREEN: CPT

## 2022-01-01 PROCEDURE — P9016: CPT

## 2022-01-01 PROCEDURE — 99284 EMERGENCY DEPT VISIT MOD MDM: CPT

## 2022-01-01 PROCEDURE — 70450 CT HEAD/BRAIN W/O DYE: CPT

## 2022-01-01 PROCEDURE — 36430 TRANSFUSION BLD/BLD COMPNT: CPT

## 2022-01-01 PROCEDURE — 83550 IRON BINDING TEST: CPT

## 2022-01-01 PROCEDURE — 82746 ASSAY OF FOLIC ACID SERUM: CPT

## 2022-01-01 PROCEDURE — 71250 CT THORAX DX C-: CPT | Mod: 26

## 2022-01-01 PROCEDURE — 82728 ASSAY OF FERRITIN: CPT

## 2022-01-01 PROCEDURE — 96375 TX/PRO/DX INJ NEW DRUG ADDON: CPT | Mod: XU

## 2022-01-01 PROCEDURE — L8699: CPT

## 2022-01-01 PROCEDURE — 92610 EVALUATE SWALLOWING FUNCTION: CPT

## 2022-01-01 PROCEDURE — 78815 PET IMAGE W/CT SKULL-THIGH: CPT

## 2022-01-01 PROCEDURE — 82272 OCCULT BLD FECES 1-3 TESTS: CPT

## 2022-01-01 PROCEDURE — 99214 OFFICE O/P EST MOD 30 MIN: CPT | Mod: 25

## 2022-01-01 PROCEDURE — 87640 STAPH A DNA AMP PROBE: CPT

## 2022-01-01 PROCEDURE — 87086 URINE CULTURE/COLONY COUNT: CPT

## 2022-01-01 PROCEDURE — 99204 OFFICE O/P NEW MOD 45 MIN: CPT

## 2022-01-01 PROCEDURE — 82435 ASSAY OF BLOOD CHLORIDE: CPT

## 2022-01-01 PROCEDURE — A9698: CPT

## 2022-01-01 PROCEDURE — 84295 ASSAY OF SERUM SODIUM: CPT

## 2022-01-01 PROCEDURE — 33285 INSJ SUBQ CAR RHYTHM MNTR: CPT

## 2022-01-01 PROCEDURE — 70496 CT ANGIOGRAPHY HEAD: CPT | Mod: 26,MA

## 2022-01-01 PROCEDURE — 70496 CT ANGIOGRAPHY HEAD: CPT | Mod: MA

## 2022-01-01 PROCEDURE — 88307 TISSUE EXAM BY PATHOLOGIST: CPT | Mod: 26

## 2022-01-01 PROCEDURE — 86923 COMPATIBILITY TEST ELECTRIC: CPT

## 2022-01-01 PROCEDURE — 94003 VENT MGMT INPAT SUBQ DAY: CPT

## 2022-01-01 PROCEDURE — 74176 CT ABD & PELVIS W/O CONTRAST: CPT

## 2022-01-01 PROCEDURE — 82652 VIT D 1 25-DIHYDROXY: CPT

## 2022-01-01 PROCEDURE — 99284 EMERGENCY DEPT VISIT MOD MDM: CPT | Mod: GC

## 2022-01-01 PROCEDURE — 93880 EXTRACRANIAL BILAT STUDY: CPT | Mod: 26

## 2022-01-01 PROCEDURE — 70491 CT SOFT TISSUE NECK W/DYE: CPT | Mod: MG

## 2022-01-01 PROCEDURE — 96375 TX/PRO/DX INJ NEW DRUG ADDON: CPT

## 2022-01-01 PROCEDURE — 70498 CT ANGIOGRAPHY NECK: CPT | Mod: MA

## 2022-01-01 PROCEDURE — 97530 THERAPEUTIC ACTIVITIES: CPT

## 2022-01-01 PROCEDURE — 83690 ASSAY OF LIPASE: CPT

## 2022-01-01 PROCEDURE — 97167 OT EVAL HIGH COMPLEX 60 MIN: CPT

## 2022-01-01 PROCEDURE — 93880 EXTRACRANIAL BILAT STUDY: CPT

## 2022-01-01 DEVICE — KIT ENDO SAFTEY PEG PULL STD 20 FR ENDOVIVE: Type: IMPLANTABLE DEVICE | Status: FUNCTIONAL

## 2022-01-01 DEVICE — PEG SAFETY 20FR PUSH WITH AMP: Type: IMPLANTABLE DEVICE | Status: FUNCTIONAL

## 2022-01-01 DEVICE — LIGATING CLIPS WECK HORIZON SMALL-WIDE (RED) 24: Type: IMPLANTABLE DEVICE | Status: FUNCTIONAL

## 2022-01-01 DEVICE — LIGATING CLIPS WECK HORIZON MEDIUM (BLUE) 24: Type: IMPLANTABLE DEVICE | Status: FUNCTIONAL

## 2022-01-01 RX ORDER — HEPARIN SODIUM 5000 [USP'U]/ML
5000 INJECTION INTRAVENOUS; SUBCUTANEOUS EVERY 8 HOURS
Refills: 0 | Status: DISCONTINUED | OUTPATIENT
Start: 2022-01-01 | End: 2022-01-01

## 2022-01-01 RX ORDER — ASPIRIN/CALCIUM CARB/MAGNESIUM 324 MG
324 TABLET ORAL ONCE
Refills: 0 | Status: DISCONTINUED | OUTPATIENT
Start: 2022-01-01 | End: 2022-01-01

## 2022-01-01 RX ORDER — POTASSIUM CHLORIDE 20 MEQ
40 PACKET (EA) ORAL EVERY 4 HOURS
Refills: 0 | Status: COMPLETED | OUTPATIENT
Start: 2022-01-01 | End: 2022-01-01

## 2022-01-01 RX ORDER — PANTOPRAZOLE SODIUM 20 MG/1
40 TABLET, DELAYED RELEASE ORAL DAILY
Refills: 0 | Status: DISCONTINUED | OUTPATIENT
Start: 2022-01-01 | End: 2022-01-01

## 2022-01-01 RX ORDER — LACTULOSE 10 G/15ML
20 SOLUTION ORAL ONCE
Refills: 0 | Status: DISCONTINUED | OUTPATIENT
Start: 2022-01-01 | End: 2022-01-01

## 2022-01-01 RX ORDER — DIPHENHYDRAMINE HYDROCHLORIDE AND LIDOCAINE HYDROCHLORIDE AND ALUMINUM HYDROXIDE AND MAGNESIUM HYDRO
KIT
Qty: 300 | Refills: 2 | Status: DISCONTINUED | COMMUNITY
Start: 2021-06-26 | End: 2022-01-01

## 2022-01-01 RX ORDER — HYDROMORPHONE HYDROCHLORIDE 2 MG/ML
0.5 INJECTION INTRAMUSCULAR; INTRAVENOUS; SUBCUTANEOUS
Refills: 0 | Status: DISCONTINUED | OUTPATIENT
Start: 2022-01-01 | End: 2022-01-01

## 2022-01-01 RX ORDER — CHLORHEXIDINE GLUCONATE 213 G/1000ML
15 SOLUTION TOPICAL EVERY 12 HOURS
Refills: 0 | Status: DISCONTINUED | OUTPATIENT
Start: 2022-01-01 | End: 2022-01-01

## 2022-01-01 RX ORDER — MIDAZOLAM HYDROCHLORIDE 1 MG/ML
2 INJECTION, SOLUTION INTRAMUSCULAR; INTRAVENOUS ONCE
Refills: 0 | Status: DISCONTINUED | OUTPATIENT
Start: 2022-01-01 | End: 2022-01-01

## 2022-01-01 RX ORDER — SODIUM CHLORIDE 9 MG/ML
500 INJECTION, SOLUTION INTRAVENOUS ONCE
Refills: 0 | Status: COMPLETED | OUTPATIENT
Start: 2022-01-01 | End: 2022-01-01

## 2022-01-01 RX ORDER — FENTANYL CITRATE 50 UG/ML
50 INJECTION INTRAVENOUS ONCE
Refills: 0 | Status: DISCONTINUED | OUTPATIENT
Start: 2022-01-01 | End: 2022-01-01

## 2022-01-01 RX ORDER — TAMSULOSIN HYDROCHLORIDE 0.4 MG/1
1 CAPSULE ORAL
Qty: 0 | Refills: 0 | DISCHARGE
Start: 2022-01-01

## 2022-01-01 RX ORDER — HYDROCORTISONE 20 MG
50 TABLET ORAL EVERY 8 HOURS
Refills: 0 | Status: DISCONTINUED | OUTPATIENT
Start: 2022-01-01 | End: 2022-01-01

## 2022-01-01 RX ORDER — POTASSIUM CHLORIDE 20 MEQ
10 PACKET (EA) ORAL
Refills: 0 | Status: COMPLETED | OUTPATIENT
Start: 2022-01-01 | End: 2022-01-01

## 2022-01-01 RX ORDER — SIMVASTATIN 20 MG/1
40 TABLET, FILM COATED ORAL AT BEDTIME
Refills: 0 | Status: DISCONTINUED | OUTPATIENT
Start: 2022-01-01 | End: 2022-01-01

## 2022-01-01 RX ORDER — HYDROCORTISONE 20 MG
25 TABLET ORAL EVERY 8 HOURS
Refills: 0 | Status: DISCONTINUED | OUTPATIENT
Start: 2022-01-01 | End: 2022-01-01

## 2022-01-01 RX ORDER — PROPOFOL 10 MG/ML
15.89 INJECTION, EMULSION INTRAVENOUS
Qty: 1000 | Refills: 0 | Status: DISCONTINUED | OUTPATIENT
Start: 2022-01-01 | End: 2022-01-01

## 2022-01-01 RX ORDER — DIPHENHYDRAMINE HYDROCHLORIDE AND LIDOCAINE HYDROCHLORIDE AND ALUMINUM HYDROXIDE AND MAGNESIUM HYDRO
KIT
Qty: 1 | Refills: 2 | Status: DISCONTINUED | COMMUNITY
Start: 2021-06-30 | End: 2022-01-01

## 2022-01-01 RX ORDER — PRIMIDONE 250 MG/1
1 TABLET ORAL
Qty: 0 | Refills: 0 | DISCHARGE

## 2022-01-01 RX ORDER — ACETAMINOPHEN 500 MG
650 TABLET ORAL EVERY 6 HOURS
Refills: 0 | Status: DISCONTINUED | OUTPATIENT
Start: 2022-01-01 | End: 2022-01-01

## 2022-01-01 RX ORDER — PRIMIDONE 250 MG/1
50 TABLET ORAL DAILY
Refills: 0 | Status: DISCONTINUED | OUTPATIENT
Start: 2022-01-01 | End: 2022-01-01

## 2022-01-01 RX ORDER — TOBRAMYCIN SULFATE 40 MG/ML
300 VIAL (ML) INJECTION EVERY 12 HOURS
Refills: 0 | Status: COMPLETED | OUTPATIENT
Start: 2022-01-01 | End: 2022-01-01

## 2022-01-01 RX ORDER — CARBIDOPA AND LEVODOPA 25; 100 MG/1; MG/1
1 TABLET ORAL
Refills: 0 | Status: DISCONTINUED | OUTPATIENT
Start: 2022-01-01 | End: 2022-01-01

## 2022-01-01 RX ORDER — IPRATROPIUM/ALBUTEROL SULFATE 18-103MCG
3 AEROSOL WITH ADAPTER (GRAM) INHALATION EVERY 6 HOURS
Refills: 0 | Status: DISCONTINUED | OUTPATIENT
Start: 2022-01-01 | End: 2022-01-01

## 2022-01-01 RX ORDER — HYDROMORPHONE HYDROCHLORIDE 2 MG/ML
2 INJECTION INTRAMUSCULAR; INTRAVENOUS; SUBCUTANEOUS
Qty: 10 | Refills: 0 | Status: DISCONTINUED | OUTPATIENT
Start: 2022-01-01 | End: 2022-01-01

## 2022-01-01 RX ORDER — ENOXAPARIN SODIUM 100 MG/ML
40 INJECTION SUBCUTANEOUS EVERY 24 HOURS
Refills: 0 | Status: ACTIVE | OUTPATIENT
Start: 2022-01-01 | End: 2023-04-27

## 2022-01-01 RX ORDER — CARBIDOPA AND LEVODOPA 25; 100 MG/1; MG/1
1 TABLET ORAL DAILY
Refills: 0 | Status: DISCONTINUED | OUTPATIENT
Start: 2022-01-01 | End: 2022-01-01

## 2022-01-01 RX ORDER — ALBUMIN HUMAN 25 %
250 VIAL (ML) INTRAVENOUS ONCE
Refills: 0 | Status: COMPLETED | OUTPATIENT
Start: 2022-01-01 | End: 2022-01-01

## 2022-01-01 RX ORDER — ENOXAPARIN SODIUM 100 MG/ML
40 INJECTION SUBCUTANEOUS EVERY 12 HOURS
Refills: 0 | Status: DISCONTINUED | OUTPATIENT
Start: 2022-01-01 | End: 2022-01-01

## 2022-01-01 RX ORDER — OMEPRAZOLE 10 MG/1
1 CAPSULE, DELAYED RELEASE ORAL
Qty: 0 | Refills: 0 | DISCHARGE

## 2022-01-01 RX ORDER — CLOPIDOGREL BISULFATE 75 MG/1
75 TABLET, FILM COATED ORAL DAILY
Refills: 0 | Status: DISCONTINUED | OUTPATIENT
Start: 2022-01-01 | End: 2022-01-01

## 2022-01-01 RX ORDER — PIPERACILLIN AND TAZOBACTAM 4; .5 G/20ML; G/20ML
3.38 INJECTION, POWDER, LYOPHILIZED, FOR SOLUTION INTRAVENOUS EVERY 8 HOURS
Refills: 0 | Status: DISCONTINUED | OUTPATIENT
Start: 2022-01-01 | End: 2022-01-01

## 2022-01-01 RX ORDER — GABAPENTIN 300 MG/1
300 CAPSULE ORAL EVERY 8 HOURS
Qty: 90 | Refills: 0 | Status: COMPLETED | COMMUNITY
Start: 2021-06-30 | End: 2022-01-01

## 2022-01-01 RX ORDER — POTASSIUM CHLORIDE 20 MEQ
10 PACKET (EA) ORAL ONCE
Refills: 0 | Status: DISCONTINUED | OUTPATIENT
Start: 2022-01-01 | End: 2022-01-01

## 2022-01-01 RX ORDER — ENOXAPARIN SODIUM 100 MG/ML
60 INJECTION SUBCUTANEOUS EVERY 12 HOURS
Refills: 0 | Status: DISCONTINUED | OUTPATIENT
Start: 2022-01-01 | End: 2022-01-01

## 2022-01-01 RX ORDER — HYDROMORPHONE HYDROCHLORIDE 2 MG/ML
1 INJECTION INTRAMUSCULAR; INTRAVENOUS; SUBCUTANEOUS EVERY 4 HOURS
Refills: 0 | Status: DISCONTINUED | OUTPATIENT
Start: 2022-01-01 | End: 2022-01-01

## 2022-01-01 RX ORDER — POTASSIUM PHOSPHATE, MONOBASIC POTASSIUM PHOSPHATE, DIBASIC 236; 224 MG/ML; MG/ML
30 INJECTION, SOLUTION INTRAVENOUS ONCE
Refills: 0 | Status: COMPLETED | OUTPATIENT
Start: 2022-01-01 | End: 2022-01-01

## 2022-01-01 RX ORDER — SODIUM,POTASSIUM PHOSPHATES 278-250MG
1 POWDER IN PACKET (EA) ORAL ONCE
Refills: 0 | Status: COMPLETED | OUTPATIENT
Start: 2022-01-01 | End: 2022-01-01

## 2022-01-01 RX ORDER — PIPERACILLIN AND TAZOBACTAM 4; .5 G/20ML; G/20ML
4.5 INJECTION, POWDER, LYOPHILIZED, FOR SOLUTION INTRAVENOUS EVERY 12 HOURS
Refills: 0 | Status: DISCONTINUED | OUTPATIENT
Start: 2022-01-01 | End: 2022-01-01

## 2022-01-01 RX ORDER — SIMVASTATIN 20 MG/1
1 TABLET, FILM COATED ORAL
Qty: 0 | Refills: 0 | DISCHARGE

## 2022-01-01 RX ORDER — CITALOPRAM 10 MG/1
20 TABLET, FILM COATED ORAL DAILY
Refills: 0 | Status: DISCONTINUED | OUTPATIENT
Start: 2022-01-01 | End: 2022-01-01

## 2022-01-01 RX ORDER — SODIUM CHLORIDE 9 MG/ML
1000 INJECTION, SOLUTION INTRAVENOUS ONCE
Refills: 0 | Status: COMPLETED | OUTPATIENT
Start: 2022-01-01 | End: 2022-01-01

## 2022-01-01 RX ORDER — SODIUM CHLORIDE 9 MG/ML
1000 INJECTION INTRAMUSCULAR; INTRAVENOUS; SUBCUTANEOUS ONCE
Refills: 0 | Status: COMPLETED | OUTPATIENT
Start: 2022-01-01 | End: 2022-01-01

## 2022-01-01 RX ORDER — ASCORBIC ACID 60 MG
500 TABLET,CHEWABLE ORAL DAILY
Refills: 0 | Status: DISCONTINUED | OUTPATIENT
Start: 2022-01-01 | End: 2022-01-01

## 2022-01-01 RX ORDER — ACETYLCYSTEINE 200 MG/ML
4 VIAL (ML) MISCELLANEOUS EVERY 6 HOURS
Refills: 0 | Status: DISCONTINUED | OUTPATIENT
Start: 2022-01-01 | End: 2022-01-01

## 2022-01-01 RX ORDER — ROBINUL 0.2 MG/ML
0.2 INJECTION INTRAMUSCULAR; INTRAVENOUS EVERY 8 HOURS
Refills: 0 | Status: DISCONTINUED | OUTPATIENT
Start: 2022-01-01 | End: 2022-01-01

## 2022-01-01 RX ORDER — CEFAZOLIN SODIUM 1 G
2000 VIAL (EA) INJECTION EVERY 8 HOURS
Refills: 0 | Status: COMPLETED | OUTPATIENT
Start: 2022-01-01 | End: 2022-01-01

## 2022-01-01 RX ORDER — DILTIAZEM HCL 120 MG
240 CAPSULE, EXT RELEASE 24 HR ORAL DAILY
Refills: 0 | Status: DISCONTINUED | OUTPATIENT
Start: 2022-01-01 | End: 2022-01-01

## 2022-01-01 RX ORDER — CITALOPRAM 10 MG/1
1 TABLET, FILM COATED ORAL
Qty: 0 | Refills: 0 | DISCHARGE

## 2022-01-01 RX ORDER — PANTOPRAZOLE SODIUM 20 MG/1
40 TABLET, DELAYED RELEASE ORAL EVERY 12 HOURS
Refills: 0 | Status: DISCONTINUED | OUTPATIENT
Start: 2022-01-01 | End: 2022-01-01

## 2022-01-01 RX ORDER — HYDROCORTISONE 20 MG
25 TABLET ORAL EVERY 12 HOURS
Refills: 0 | Status: DISCONTINUED | OUTPATIENT
Start: 2022-01-01 | End: 2022-01-01

## 2022-01-01 RX ORDER — HYDROMORPHONE HYDROCHLORIDE 2 MG/ML
2 INJECTION INTRAMUSCULAR; INTRAVENOUS; SUBCUTANEOUS ONCE
Refills: 0 | Status: DISCONTINUED | OUTPATIENT
Start: 2022-01-01 | End: 2022-01-01

## 2022-01-01 RX ORDER — TAMSULOSIN HYDROCHLORIDE 0.4 MG/1
1 CAPSULE ORAL
Qty: 30 | Refills: 0
Start: 2022-01-01 | End: 2022-01-01

## 2022-01-01 RX ORDER — POTASSIUM CHLORIDE 20 MEQ
40 PACKET (EA) ORAL ONCE
Refills: 0 | Status: COMPLETED | OUTPATIENT
Start: 2022-01-01 | End: 2022-01-01

## 2022-01-01 RX ORDER — SODIUM CHLORIDE 9 MG/ML
1000 INJECTION INTRAMUSCULAR; INTRAVENOUS; SUBCUTANEOUS
Refills: 0 | Status: DISCONTINUED | OUTPATIENT
Start: 2022-01-01 | End: 2022-01-01

## 2022-01-01 RX ORDER — ZINC OXIDE 200 MG/G
1 OINTMENT TOPICAL
Qty: 0 | Refills: 0 | DISCHARGE

## 2022-01-01 RX ORDER — DEXAMETHASONE 0.5 MG/5ML
10 ELIXIR ORAL DAILY
Refills: 0 | Status: DISCONTINUED | OUTPATIENT
Start: 2022-01-01 | End: 2022-01-01

## 2022-01-01 RX ORDER — PIPERACILLIN AND TAZOBACTAM 4; .5 G/20ML; G/20ML
3.38 INJECTION, POWDER, LYOPHILIZED, FOR SOLUTION INTRAVENOUS ONCE
Refills: 0 | Status: COMPLETED | OUTPATIENT
Start: 2022-01-01 | End: 2022-01-01

## 2022-01-01 RX ORDER — CARBIDOPA AND LEVODOPA 25; 100 MG/1; MG/1
1 TABLET ORAL AT BEDTIME
Refills: 0 | Status: DISCONTINUED | OUTPATIENT
Start: 2022-01-01 | End: 2022-01-01

## 2022-01-01 RX ORDER — VASOPRESSIN 20 [USP'U]/ML
0.04 INJECTION INTRAVENOUS
Qty: 50 | Refills: 0 | Status: DISCONTINUED | OUTPATIENT
Start: 2022-01-01 | End: 2022-01-01

## 2022-01-01 RX ORDER — ASPIRIN/CALCIUM CARB/MAGNESIUM 324 MG
1 TABLET ORAL
Qty: 0 | Refills: 0 | DISCHARGE
Start: 2022-01-01

## 2022-01-01 RX ORDER — ACETAMINOPHEN 500 MG
2 TABLET ORAL
Qty: 0 | Refills: 0 | DISCHARGE
Start: 2022-01-01

## 2022-01-01 RX ORDER — CHLORHEXIDINE GLUCONATE 213 G/1000ML
15 SOLUTION TOPICAL ONCE
Refills: 0 | Status: COMPLETED | OUTPATIENT
Start: 2022-01-01 | End: 2022-01-01

## 2022-01-01 RX ORDER — POTASSIUM CHLORIDE 20 MEQ
40 PACKET (EA) ORAL EVERY 4 HOURS
Refills: 0 | Status: DISCONTINUED | OUTPATIENT
Start: 2022-01-01 | End: 2022-01-01

## 2022-01-01 RX ORDER — ATORVASTATIN CALCIUM 80 MG/1
1 TABLET, FILM COATED ORAL
Qty: 0 | Refills: 0 | DISCHARGE
Start: 2022-01-01

## 2022-01-01 RX ORDER — POTASSIUM CHLORIDE 20 MEQ
10 PACKET (EA) ORAL ONCE
Refills: 0 | Status: COMPLETED | OUTPATIENT
Start: 2022-01-01 | End: 2022-01-01

## 2022-01-01 RX ORDER — LANOLIN ALCOHOL/MO/W.PET/CERES
3 CREAM (GRAM) TOPICAL AT BEDTIME
Refills: 0 | Status: DISCONTINUED | OUTPATIENT
Start: 2022-01-01 | End: 2022-01-01

## 2022-01-01 RX ORDER — CEFTRIAXONE 500 MG/1
1000 INJECTION, POWDER, FOR SOLUTION INTRAMUSCULAR; INTRAVENOUS ONCE
Refills: 0 | Status: COMPLETED | OUTPATIENT
Start: 2022-01-01 | End: 2022-01-01

## 2022-01-01 RX ORDER — VANCOMYCIN HCL 1 G
1000 VIAL (EA) INTRAVENOUS ONCE
Refills: 0 | Status: COMPLETED | OUTPATIENT
Start: 2022-01-01 | End: 2022-01-01

## 2022-01-01 RX ORDER — TAMSULOSIN HYDROCHLORIDE 0.4 MG/1
0.4 CAPSULE ORAL AT BEDTIME
Refills: 0 | Status: DISCONTINUED | OUTPATIENT
Start: 2022-01-01 | End: 2022-01-01

## 2022-01-01 RX ORDER — SODIUM CHLORIDE 9 MG/ML
4 INJECTION INTRAMUSCULAR; INTRAVENOUS; SUBCUTANEOUS EVERY 6 HOURS
Refills: 0 | Status: DISCONTINUED | OUTPATIENT
Start: 2022-01-01 | End: 2022-01-01

## 2022-01-01 RX ORDER — FLUCONAZOLE 100 MG/1
100 TABLET ORAL
Qty: 6 | Refills: 0 | Status: DISCONTINUED | COMMUNITY
Start: 2021-01-01 | End: 2022-01-01

## 2022-01-01 RX ORDER — CLOPIDOGREL BISULFATE 75 MG/1
75 TABLET, FILM COATED ORAL DAILY
Refills: 0 | Status: ACTIVE | OUTPATIENT
Start: 2022-01-01 | End: 2023-04-29

## 2022-01-01 RX ORDER — FLUCONAZOLE 40 MG/ML
40 POWDER, FOR SUSPENSION ORAL
Qty: 1 | Refills: 2 | Status: DISCONTINUED | COMMUNITY
Start: 2021-01-01 | End: 2022-01-01

## 2022-01-01 RX ORDER — AMOXICILLIN 500 MG/1
500 CAPSULE ORAL
Qty: 40 | Refills: 0 | Status: COMPLETED | COMMUNITY
Start: 2022-01-01

## 2022-01-01 RX ORDER — SENNA PLUS 8.6 MG/1
2 TABLET ORAL AT BEDTIME
Refills: 0 | Status: DISCONTINUED | OUTPATIENT
Start: 2022-01-01 | End: 2022-01-01

## 2022-01-01 RX ORDER — PIPERACILLIN AND TAZOBACTAM 4; .5 G/20ML; G/20ML
3.38 INJECTION, POWDER, LYOPHILIZED, FOR SOLUTION INTRAVENOUS EVERY 8 HOURS
Refills: 0 | Status: ACTIVE | OUTPATIENT
Start: 2022-01-01 | End: 2022-01-01

## 2022-01-01 RX ORDER — APREPITANT 80 MG/1
40 CAPSULE ORAL ONCE
Refills: 0 | Status: COMPLETED | OUTPATIENT
Start: 2022-01-01 | End: 2022-01-01

## 2022-01-01 RX ORDER — ASPIRIN/CALCIUM CARB/MAGNESIUM 324 MG
81 TABLET ORAL DAILY
Refills: 0 | Status: DISCONTINUED | OUTPATIENT
Start: 2022-01-01 | End: 2022-01-01

## 2022-01-01 RX ORDER — SILVER SULFADIAZINE 10 MG/G
1 CREAM TOPICAL TWICE DAILY
Qty: 2 | Refills: 0 | Status: DISCONTINUED | COMMUNITY
Start: 2021-01-01 | End: 2022-01-01

## 2022-01-01 RX ORDER — CHLORHEXIDINE GLUCONATE 213 G/1000ML
1 SOLUTION TOPICAL
Refills: 0 | Status: DISCONTINUED | OUTPATIENT
Start: 2022-01-01 | End: 2022-01-01

## 2022-01-01 RX ORDER — ATORVASTATIN CALCIUM 80 MG/1
80 TABLET, FILM COATED ORAL AT BEDTIME
Refills: 0 | Status: DISCONTINUED | OUTPATIENT
Start: 2022-01-01 | End: 2022-01-01

## 2022-01-01 RX ORDER — NOREPINEPHRINE BITARTRATE/D5W 8 MG/250ML
0.05 PLASTIC BAG, INJECTION (ML) INTRAVENOUS
Qty: 8 | Refills: 0 | Status: DISCONTINUED | OUTPATIENT
Start: 2022-01-01 | End: 2022-01-01

## 2022-01-01 RX ORDER — ENOXAPARIN SODIUM 100 MG/ML
70 INJECTION SUBCUTANEOUS EVERY 12 HOURS
Refills: 0 | Status: DISCONTINUED | OUTPATIENT
Start: 2022-01-01 | End: 2022-01-01

## 2022-01-01 RX ORDER — ASPIRIN 81 MG
81 TABLET, DELAYED RELEASE (ENTERIC COATED) ORAL
Refills: 0 | Status: ACTIVE | COMMUNITY

## 2022-01-01 RX ORDER — ASPIRIN/CALCIUM CARB/MAGNESIUM 324 MG
300 TABLET ORAL ONCE
Refills: 0 | Status: COMPLETED | OUTPATIENT
Start: 2022-01-01 | End: 2022-01-01

## 2022-01-01 RX ORDER — CARBIDOPA AND LEVODOPA 25; 100 MG/1; MG/1
1 TABLET ORAL
Qty: 0 | Refills: 0 | DISCHARGE

## 2022-01-01 RX ORDER — SODIUM CHLORIDE 9 MG/ML
1000 INJECTION, SOLUTION INTRAVENOUS
Refills: 0 | Status: DISCONTINUED | OUTPATIENT
Start: 2022-01-01 | End: 2022-01-01

## 2022-01-01 RX ORDER — MEROPENEM 1 G/30ML
1000 INJECTION INTRAVENOUS EVERY 8 HOURS
Refills: 0 | Status: COMPLETED | OUTPATIENT
Start: 2022-01-01 | End: 2022-01-01

## 2022-01-01 RX ORDER — MUPIROCIN 20 MG/G
1 OINTMENT TOPICAL
Refills: 0 | Status: DISCONTINUED | OUTPATIENT
Start: 2022-01-01 | End: 2022-01-01

## 2022-01-01 RX ORDER — PANTOPRAZOLE SODIUM 20 MG/1
1 TABLET, DELAYED RELEASE ORAL
Qty: 0 | Refills: 0 | DISCHARGE
Start: 2022-01-01

## 2022-01-01 RX ORDER — PANTOPRAZOLE SODIUM 20 MG/1
40 TABLET, DELAYED RELEASE ORAL
Refills: 0 | Status: DISCONTINUED | OUTPATIENT
Start: 2022-01-01 | End: 2022-01-01

## 2022-01-01 RX ORDER — FENTANYL CITRATE 50 UG/ML
50 INJECTION INTRAVENOUS
Refills: 0 | Status: DISCONTINUED | OUTPATIENT
Start: 2022-01-01 | End: 2022-01-01

## 2022-01-01 RX ORDER — SODIUM,POTASSIUM PHOSPHATES 278-250MG
1 POWDER IN PACKET (EA) ORAL
Refills: 0 | Status: COMPLETED | OUTPATIENT
Start: 2022-01-01 | End: 2022-01-01

## 2022-01-01 RX ORDER — PANTOPRAZOLE SODIUM 20 MG/1
1 TABLET, DELAYED RELEASE ORAL
Qty: 0 | Refills: 0 | DISCHARGE

## 2022-01-01 RX ORDER — CLOPIDOGREL BISULFATE 75 MG/1
300 TABLET, FILM COATED ORAL ONCE
Refills: 0 | Status: COMPLETED | OUTPATIENT
Start: 2022-01-01 | End: 2022-01-01

## 2022-01-01 RX ORDER — HYDROCORTISONE 1 %
1 OINTMENT (GRAM) TOPICAL DAILY
Refills: 0 | Status: DISCONTINUED | OUTPATIENT
Start: 2022-01-01 | End: 2022-01-01

## 2022-01-01 RX ORDER — ASPIRIN/CALCIUM CARB/MAGNESIUM 324 MG
1 TABLET ORAL
Qty: 90 | Refills: 0
Start: 2022-01-01 | End: 2022-08-07

## 2022-01-01 RX ORDER — SUCCINYLCHOLINE CHLORIDE 100 MG/5ML
100 SYRINGE (ML) INTRAVENOUS ONCE
Refills: 0 | Status: COMPLETED | OUTPATIENT
Start: 2022-01-01 | End: 2022-01-01

## 2022-01-01 RX ORDER — ACETAMINOPHEN 500 MG
975 TABLET ORAL ONCE
Refills: 0 | Status: COMPLETED | OUTPATIENT
Start: 2022-01-01 | End: 2022-01-01

## 2022-01-01 RX ORDER — CEFTRIAXONE 500 MG/1
1000 INJECTION, POWDER, FOR SOLUTION INTRAMUSCULAR; INTRAVENOUS EVERY 24 HOURS
Refills: 0 | Status: DISCONTINUED | OUTPATIENT
Start: 2022-01-01 | End: 2022-01-01

## 2022-01-01 RX ORDER — DEXMEDETOMIDINE HYDROCHLORIDE IN 0.9% SODIUM CHLORIDE 4 UG/ML
0.3 INJECTION INTRAVENOUS
Qty: 400 | Refills: 0 | Status: DISCONTINUED | OUTPATIENT
Start: 2022-01-01 | End: 2022-01-01

## 2022-01-01 RX ORDER — ACETAMINOPHEN 500 MG
2 TABLET ORAL
Qty: 0 | Refills: 0 | DISCHARGE

## 2022-01-01 RX ORDER — PROPOFOL 10 MG/ML
20 INJECTION, EMULSION INTRAVENOUS
Qty: 1000 | Refills: 0 | Status: DISCONTINUED | OUTPATIENT
Start: 2022-01-01 | End: 2022-01-01

## 2022-01-01 RX ORDER — DILTIAZEM HCL 120 MG
1 CAPSULE, EXT RELEASE 24 HR ORAL
Qty: 0 | Refills: 0 | DISCHARGE

## 2022-01-01 RX ORDER — POLYETHYLENE GLYCOL 3350 17 G/17G
17 POWDER, FOR SOLUTION ORAL EVERY 24 HOURS
Refills: 0 | Status: DISCONTINUED | OUTPATIENT
Start: 2022-01-01 | End: 2022-01-01

## 2022-01-01 RX ORDER — CITALOPRAM 10 MG/1
10 TABLET, FILM COATED ORAL DAILY
Refills: 0 | Status: DISCONTINUED | OUTPATIENT
Start: 2022-01-01 | End: 2022-01-01

## 2022-01-01 RX ORDER — ENOXAPARIN SODIUM 100 MG/ML
60 INJECTION SUBCUTANEOUS
Qty: 60 | Refills: 0
Start: 2022-01-01 | End: 2022-07-07

## 2022-01-01 RX ORDER — ENOXAPARIN SODIUM 100 MG/ML
40 INJECTION SUBCUTANEOUS DAILY
Refills: 0 | Status: DISCONTINUED | OUTPATIENT
Start: 2022-01-01 | End: 2022-01-01

## 2022-01-01 RX ORDER — CEFTRIAXONE 500 MG/1
1000 INJECTION, POWDER, FOR SOLUTION INTRAMUSCULAR; INTRAVENOUS EVERY 24 HOURS
Refills: 0 | Status: COMPLETED | OUTPATIENT
Start: 2022-01-01 | End: 2022-01-01

## 2022-01-01 RX ORDER — ONDANSETRON 8 MG/1
4 TABLET, FILM COATED ORAL ONCE
Refills: 0 | Status: DISCONTINUED | OUTPATIENT
Start: 2022-01-01 | End: 2022-01-01

## 2022-01-01 RX ORDER — ACETAMINOPHEN 500 MG
1000 TABLET ORAL ONCE
Refills: 0 | Status: COMPLETED | OUTPATIENT
Start: 2022-01-01 | End: 2022-01-01

## 2022-01-01 RX ORDER — PAMIDRONATE DISODIUM 9 MG/ML
90 INJECTION, SOLUTION INTRAVENOUS ONCE
Refills: 0 | Status: COMPLETED | OUTPATIENT
Start: 2022-01-01 | End: 2022-01-01

## 2022-01-01 RX ORDER — CEFEPIME 1 G/1
1000 INJECTION, POWDER, FOR SOLUTION INTRAMUSCULAR; INTRAVENOUS EVERY 12 HOURS
Refills: 0 | Status: DISCONTINUED | OUTPATIENT
Start: 2022-01-01 | End: 2022-01-01

## 2022-01-01 RX ORDER — LACTULOSE 10 G/15ML
20 SOLUTION ORAL EVERY 8 HOURS
Refills: 0 | Status: DISCONTINUED | OUTPATIENT
Start: 2022-01-01 | End: 2022-01-01

## 2022-01-01 RX ORDER — MIDODRINE HYDROCHLORIDE 2.5 MG/1
5 TABLET ORAL EVERY 8 HOURS
Refills: 0 | Status: DISCONTINUED | OUTPATIENT
Start: 2022-01-01 | End: 2022-01-01

## 2022-01-01 RX ORDER — PRIMIDONE 250 MG/1
25 TABLET ORAL DAILY
Refills: 0 | Status: DISCONTINUED | OUTPATIENT
Start: 2022-01-01 | End: 2022-01-01

## 2022-01-01 RX ORDER — ASPIRIN/CALCIUM CARB/MAGNESIUM 324 MG
81 TABLET ORAL DAILY
Refills: 0 | Status: ACTIVE | OUTPATIENT
Start: 2022-01-01 | End: 2023-04-28

## 2022-01-01 RX ORDER — ETOMIDATE 2 MG/ML
20 INJECTION INTRAVENOUS ONCE
Refills: 0 | Status: COMPLETED | OUTPATIENT
Start: 2022-01-01 | End: 2022-01-01

## 2022-01-01 RX ORDER — AMPICILLIN TRIHYDRATE 250 MG
2 CAPSULE ORAL EVERY 6 HOURS
Refills: 0 | Status: COMPLETED | OUTPATIENT
Start: 2022-01-01 | End: 2022-01-01

## 2022-01-01 RX ORDER — ASPIRIN/CALCIUM CARB/MAGNESIUM 324 MG
300 TABLET ORAL DAILY
Refills: 0 | Status: DISCONTINUED | OUTPATIENT
Start: 2022-01-01 | End: 2022-01-01

## 2022-01-01 RX ORDER — DEXAMETHASONE 0.5 MG/5ML
10 ELIXIR ORAL EVERY 12 HOURS
Refills: 0 | Status: DISCONTINUED | OUTPATIENT
Start: 2022-01-01 | End: 2022-01-01

## 2022-01-01 RX ORDER — CEFAZOLIN SODIUM 1 G
2000 VIAL (EA) INJECTION ONCE
Refills: 0 | Status: DISCONTINUED | OUTPATIENT
Start: 2022-01-01 | End: 2022-01-01

## 2022-01-01 RX ORDER — DEXAMETHASONE 0.5 MG/5ML
10 ELIXIR ORAL EVERY 12 HOURS
Refills: 0 | Status: COMPLETED | OUTPATIENT
Start: 2022-01-01 | End: 2022-01-01

## 2022-01-01 RX ORDER — OXYCODONE AND ACETAMINOPHEN 5; 325 MG/1; MG/1
5-325 TABLET ORAL
Qty: 30 | Refills: 0 | Status: DISCONTINUED | COMMUNITY
Start: 2021-01-01 | End: 2022-01-01

## 2022-01-01 RX ORDER — FERROUS SULFATE 325(65) MG
1 TABLET ORAL
Qty: 30 | Refills: 0
Start: 2022-01-01 | End: 2022-07-07

## 2022-01-01 RX ORDER — POLYETHYLENE GLYCOL 3350 17 G/17G
17 POWDER, FOR SOLUTION ORAL DAILY
Refills: 0 | Status: DISCONTINUED | OUTPATIENT
Start: 2022-01-01 | End: 2022-01-01

## 2022-01-01 RX ORDER — ONDANSETRON 8 MG/1
4 TABLET, FILM COATED ORAL EVERY 8 HOURS
Refills: 0 | Status: DISCONTINUED | OUTPATIENT
Start: 2022-01-01 | End: 2022-01-01

## 2022-01-01 RX ORDER — ASCORBIC ACID 60 MG
1 TABLET,CHEWABLE ORAL
Qty: 0 | Refills: 0 | DISCHARGE

## 2022-01-01 RX ORDER — FENTANYL CITRATE 50 UG/ML
25 INJECTION INTRAVENOUS
Refills: 0 | Status: DISCONTINUED | OUTPATIENT
Start: 2022-01-01 | End: 2022-01-01

## 2022-01-01 RX ORDER — ALBUMIN HUMAN 25 %
50 VIAL (ML) INTRAVENOUS EVERY 6 HOURS
Refills: 0 | Status: COMPLETED | OUTPATIENT
Start: 2022-01-01 | End: 2022-01-01

## 2022-01-01 RX ADMIN — CHLORHEXIDINE GLUCONATE 1 APPLICATION(S): 213 SOLUTION TOPICAL at 05:03

## 2022-01-01 RX ADMIN — CHLORHEXIDINE GLUCONATE 15 MILLILITER(S): 213 SOLUTION TOPICAL at 05:21

## 2022-01-01 RX ADMIN — CHLORHEXIDINE GLUCONATE 15 MILLILITER(S): 213 SOLUTION TOPICAL at 18:05

## 2022-01-01 RX ADMIN — SIMVASTATIN 40 MILLIGRAM(S): 20 TABLET, FILM COATED ORAL at 21:31

## 2022-01-01 RX ADMIN — PIPERACILLIN AND TAZOBACTAM 25 GRAM(S): 4; .5 INJECTION, POWDER, LYOPHILIZED, FOR SOLUTION INTRAVENOUS at 10:23

## 2022-01-01 RX ADMIN — SENNA PLUS 2 TABLET(S): 8.6 TABLET ORAL at 21:31

## 2022-01-01 RX ADMIN — CITALOPRAM 10 MILLIGRAM(S): 10 TABLET, FILM COATED ORAL at 12:21

## 2022-01-01 RX ADMIN — HEPARIN SODIUM 5000 UNIT(S): 5000 INJECTION INTRAVENOUS; SUBCUTANEOUS at 21:32

## 2022-01-01 RX ADMIN — FENTANYL CITRATE 25 MICROGRAM(S): 50 INJECTION INTRAVENOUS at 15:24

## 2022-01-01 RX ADMIN — Medication 4 MILLILITER(S): at 20:35

## 2022-01-01 RX ADMIN — Medication 300 MILLIGRAM(S): at 11:45

## 2022-01-01 RX ADMIN — CHLORHEXIDINE GLUCONATE 15 MILLILITER(S): 213 SOLUTION TOPICAL at 17:50

## 2022-01-01 RX ADMIN — CITALOPRAM 20 MILLIGRAM(S): 10 TABLET, FILM COATED ORAL at 12:24

## 2022-01-01 RX ADMIN — CARBIDOPA AND LEVODOPA 1 TABLET(S): 25; 100 TABLET ORAL at 06:30

## 2022-01-01 RX ADMIN — Medication 3 MILLILITER(S): at 00:49

## 2022-01-01 RX ADMIN — MUPIROCIN 1 APPLICATION(S): 20 OINTMENT TOPICAL at 05:19

## 2022-01-01 RX ADMIN — LACTULOSE 20 GRAM(S): 10 SOLUTION ORAL at 21:45

## 2022-01-01 RX ADMIN — CARBIDOPA AND LEVODOPA 1 TABLET(S): 25; 100 TABLET ORAL at 10:31

## 2022-01-01 RX ADMIN — Medication 100 MILLIEQUIVALENT(S): at 11:41

## 2022-01-01 RX ADMIN — Medication 125 MILLILITER(S): at 11:30

## 2022-01-01 RX ADMIN — CITALOPRAM 10 MILLIGRAM(S): 10 TABLET, FILM COATED ORAL at 11:46

## 2022-01-01 RX ADMIN — PRIMIDONE 50 MILLIGRAM(S): 250 TABLET ORAL at 11:46

## 2022-01-01 RX ADMIN — CARBIDOPA AND LEVODOPA 1 TABLET(S): 25; 100 TABLET ORAL at 05:13

## 2022-01-01 RX ADMIN — Medication 4 MILLILITER(S): at 13:58

## 2022-01-01 RX ADMIN — TAMSULOSIN HYDROCHLORIDE 0.4 MILLIGRAM(S): 0.4 CAPSULE ORAL at 22:39

## 2022-01-01 RX ADMIN — SODIUM CHLORIDE 4 MILLILITER(S): 9 INJECTION INTRAMUSCULAR; INTRAVENOUS; SUBCUTANEOUS at 13:30

## 2022-01-01 RX ADMIN — ENOXAPARIN SODIUM 60 MILLIGRAM(S): 100 INJECTION SUBCUTANEOUS at 05:54

## 2022-01-01 RX ADMIN — Medication 3 MILLILITER(S): at 00:02

## 2022-01-01 RX ADMIN — SODIUM CHLORIDE 125 MILLILITER(S): 9 INJECTION, SOLUTION INTRAVENOUS at 16:42

## 2022-01-01 RX ADMIN — SODIUM CHLORIDE 30 MILLILITER(S): 9 INJECTION, SOLUTION INTRAVENOUS at 15:39

## 2022-01-01 RX ADMIN — SODIUM CHLORIDE 1000 MILLILITER(S): 9 INJECTION INTRAMUSCULAR; INTRAVENOUS; SUBCUTANEOUS at 02:37

## 2022-01-01 RX ADMIN — Medication 250 MILLIGRAM(S): at 13:27

## 2022-01-01 RX ADMIN — Medication 3 MILLILITER(S): at 20:07

## 2022-01-01 RX ADMIN — ENOXAPARIN SODIUM 70 MILLIGRAM(S): 100 INJECTION SUBCUTANEOUS at 11:12

## 2022-01-01 RX ADMIN — PANTOPRAZOLE SODIUM 40 MILLIGRAM(S): 20 TABLET, DELAYED RELEASE ORAL at 05:20

## 2022-01-01 RX ADMIN — Medication 3 MILLILITER(S): at 19:51

## 2022-01-01 RX ADMIN — POLYETHYLENE GLYCOL 3350 17 GRAM(S): 17 POWDER, FOR SOLUTION ORAL at 12:11

## 2022-01-01 RX ADMIN — MUPIROCIN 1 APPLICATION(S): 20 OINTMENT TOPICAL at 05:07

## 2022-01-01 RX ADMIN — Medication 200 GRAM(S): at 01:00

## 2022-01-01 RX ADMIN — HEPARIN SODIUM 5000 UNIT(S): 5000 INJECTION INTRAVENOUS; SUBCUTANEOUS at 05:04

## 2022-01-01 RX ADMIN — MUPIROCIN 1 APPLICATION(S): 20 OINTMENT TOPICAL at 05:20

## 2022-01-01 RX ADMIN — CARBIDOPA AND LEVODOPA 1 TABLET(S): 25; 100 TABLET ORAL at 11:40

## 2022-01-01 RX ADMIN — ENOXAPARIN SODIUM 60 MILLIGRAM(S): 100 INJECTION SUBCUTANEOUS at 17:36

## 2022-01-01 RX ADMIN — CITALOPRAM 10 MILLIGRAM(S): 10 TABLET, FILM COATED ORAL at 12:23

## 2022-01-01 RX ADMIN — PRIMIDONE 25 MILLIGRAM(S): 250 TABLET ORAL at 11:12

## 2022-01-01 RX ADMIN — SODIUM CHLORIDE 4 MILLILITER(S): 9 INJECTION INTRAMUSCULAR; INTRAVENOUS; SUBCUTANEOUS at 20:28

## 2022-01-01 RX ADMIN — MEROPENEM 100 MILLIGRAM(S): 1 INJECTION INTRAVENOUS at 13:03

## 2022-01-01 RX ADMIN — LACTULOSE 20 GRAM(S): 10 SOLUTION ORAL at 21:56

## 2022-01-01 RX ADMIN — CARBIDOPA AND LEVODOPA 1 TABLET(S): 25; 100 TABLET ORAL at 21:33

## 2022-01-01 RX ADMIN — CHLORHEXIDINE GLUCONATE 15 MILLILITER(S): 213 SOLUTION TOPICAL at 07:31

## 2022-01-01 RX ADMIN — Medication 25 MILLIGRAM(S): at 05:42

## 2022-01-01 RX ADMIN — TAMSULOSIN HYDROCHLORIDE 0.4 MILLIGRAM(S): 0.4 CAPSULE ORAL at 21:29

## 2022-01-01 RX ADMIN — HEPARIN SODIUM 5000 UNIT(S): 5000 INJECTION INTRAVENOUS; SUBCUTANEOUS at 05:48

## 2022-01-01 RX ADMIN — CHLORHEXIDINE GLUCONATE 15 MILLILITER(S): 213 SOLUTION TOPICAL at 18:12

## 2022-01-01 RX ADMIN — LACTULOSE 20 GRAM(S): 10 SOLUTION ORAL at 21:52

## 2022-01-01 RX ADMIN — CHLORHEXIDINE GLUCONATE 15 MILLILITER(S): 213 SOLUTION TOPICAL at 17:54

## 2022-01-01 RX ADMIN — Medication 200 GRAM(S): at 05:19

## 2022-01-01 RX ADMIN — Medication 3 MILLILITER(S): at 19:57

## 2022-01-01 RX ADMIN — Medication 40 MILLIEQUIVALENT(S): at 12:27

## 2022-01-01 RX ADMIN — PANTOPRAZOLE SODIUM 40 MILLIGRAM(S): 20 TABLET, DELAYED RELEASE ORAL at 06:27

## 2022-01-01 RX ADMIN — Medication 300 MILLIGRAM(S): at 07:28

## 2022-01-01 RX ADMIN — MEROPENEM 100 MILLIGRAM(S): 1 INJECTION INTRAVENOUS at 21:59

## 2022-01-01 RX ADMIN — Medication 40 MILLIEQUIVALENT(S): at 13:16

## 2022-01-01 RX ADMIN — CHLORHEXIDINE GLUCONATE 15 MILLILITER(S): 213 SOLUTION TOPICAL at 05:49

## 2022-01-01 RX ADMIN — Medication 10 MILLIGRAM(S): at 05:18

## 2022-01-01 RX ADMIN — Medication 300 MILLIGRAM(S): at 19:50

## 2022-01-01 RX ADMIN — LACTULOSE 20 GRAM(S): 10 SOLUTION ORAL at 22:39

## 2022-01-01 RX ADMIN — MUPIROCIN 1 APPLICATION(S): 20 OINTMENT TOPICAL at 17:36

## 2022-01-01 RX ADMIN — MEROPENEM 100 MILLIGRAM(S): 1 INJECTION INTRAVENOUS at 05:08

## 2022-01-01 RX ADMIN — Medication 100 MILLIEQUIVALENT(S): at 16:50

## 2022-01-01 RX ADMIN — CHLORHEXIDINE GLUCONATE 15 MILLILITER(S): 213 SOLUTION TOPICAL at 18:14

## 2022-01-01 RX ADMIN — POLYETHYLENE GLYCOL 3350 17 GRAM(S): 17 POWDER, FOR SOLUTION ORAL at 13:47

## 2022-01-01 RX ADMIN — HEPARIN SODIUM 5000 UNIT(S): 5000 INJECTION INTRAVENOUS; SUBCUTANEOUS at 15:36

## 2022-01-01 RX ADMIN — PANTOPRAZOLE SODIUM 40 MILLIGRAM(S): 20 TABLET, DELAYED RELEASE ORAL at 05:11

## 2022-01-01 RX ADMIN — SIMVASTATIN 40 MILLIGRAM(S): 20 TABLET, FILM COATED ORAL at 23:34

## 2022-01-01 RX ADMIN — POTASSIUM PHOSPHATE, MONOBASIC POTASSIUM PHOSPHATE, DIBASIC 83.33 MILLIMOLE(S): 236; 224 INJECTION, SOLUTION INTRAVENOUS at 11:48

## 2022-01-01 RX ADMIN — Medication 3 MILLILITER(S): at 13:35

## 2022-01-01 RX ADMIN — HEPARIN SODIUM 5000 UNIT(S): 5000 INJECTION INTRAVENOUS; SUBCUTANEOUS at 14:25

## 2022-01-01 RX ADMIN — MEROPENEM 100 MILLIGRAM(S): 1 INJECTION INTRAVENOUS at 05:49

## 2022-01-01 RX ADMIN — CITALOPRAM 20 MILLIGRAM(S): 10 TABLET, FILM COATED ORAL at 11:48

## 2022-01-01 RX ADMIN — CHLORHEXIDINE GLUCONATE 15 MILLILITER(S): 213 SOLUTION TOPICAL at 05:50

## 2022-01-01 RX ADMIN — MEROPENEM 100 MILLIGRAM(S): 1 INJECTION INTRAVENOUS at 22:14

## 2022-01-01 RX ADMIN — Medication 3 MILLILITER(S): at 14:21

## 2022-01-01 RX ADMIN — Medication 240 MILLIGRAM(S): at 05:47

## 2022-01-01 RX ADMIN — ATORVASTATIN CALCIUM 80 MILLIGRAM(S): 80 TABLET, FILM COATED ORAL at 21:23

## 2022-01-01 RX ADMIN — PANTOPRAZOLE SODIUM 40 MILLIGRAM(S): 20 TABLET, DELAYED RELEASE ORAL at 11:36

## 2022-01-01 RX ADMIN — CARBIDOPA AND LEVODOPA 1 TABLET(S): 25; 100 TABLET ORAL at 23:01

## 2022-01-01 RX ADMIN — Medication 102 MILLIGRAM(S): at 12:05

## 2022-01-01 RX ADMIN — SODIUM CHLORIDE 4 MILLILITER(S): 9 INJECTION INTRAMUSCULAR; INTRAVENOUS; SUBCUTANEOUS at 20:24

## 2022-01-01 RX ADMIN — PIPERACILLIN AND TAZOBACTAM 25 GRAM(S): 4; .5 INJECTION, POWDER, LYOPHILIZED, FOR SOLUTION INTRAVENOUS at 11:48

## 2022-01-01 RX ADMIN — ATORVASTATIN CALCIUM 80 MILLIGRAM(S): 80 TABLET, FILM COATED ORAL at 22:14

## 2022-01-01 RX ADMIN — Medication 3 MILLILITER(S): at 15:35

## 2022-01-01 RX ADMIN — PANTOPRAZOLE SODIUM 40 MILLIGRAM(S): 20 TABLET, DELAYED RELEASE ORAL at 06:08

## 2022-01-01 RX ADMIN — Medication 100 MILLIEQUIVALENT(S): at 13:15

## 2022-01-01 RX ADMIN — Medication 25 MILLIGRAM(S): at 18:01

## 2022-01-01 RX ADMIN — CHLORHEXIDINE GLUCONATE 15 MILLILITER(S): 213 SOLUTION TOPICAL at 17:19

## 2022-01-01 RX ADMIN — Medication 500 MILLIGRAM(S): at 12:22

## 2022-01-01 RX ADMIN — CHLORHEXIDINE GLUCONATE 15 MILLILITER(S): 213 SOLUTION TOPICAL at 17:02

## 2022-01-01 RX ADMIN — PANTOPRAZOLE SODIUM 40 MILLIGRAM(S): 20 TABLET, DELAYED RELEASE ORAL at 05:29

## 2022-01-01 RX ADMIN — HEPARIN SODIUM 5000 UNIT(S): 5000 INJECTION INTRAVENOUS; SUBCUTANEOUS at 14:48

## 2022-01-01 RX ADMIN — Medication 240 MILLIGRAM(S): at 05:32

## 2022-01-01 RX ADMIN — CARBIDOPA AND LEVODOPA 1 TABLET(S): 25; 100 TABLET ORAL at 11:47

## 2022-01-01 RX ADMIN — SODIUM CHLORIDE 75 MILLILITER(S): 9 INJECTION, SOLUTION INTRAVENOUS at 12:26

## 2022-01-01 RX ADMIN — ATORVASTATIN CALCIUM 80 MILLIGRAM(S): 80 TABLET, FILM COATED ORAL at 21:27

## 2022-01-01 RX ADMIN — Medication 4.17 MICROGRAM(S)/KG/MIN: at 20:29

## 2022-01-01 RX ADMIN — FENTANYL CITRATE 50 MICROGRAM(S): 50 INJECTION INTRAVENOUS at 12:51

## 2022-01-01 RX ADMIN — PRIMIDONE 50 MILLIGRAM(S): 250 TABLET ORAL at 12:30

## 2022-01-01 RX ADMIN — SODIUM CHLORIDE 1000 MILLILITER(S): 9 INJECTION, SOLUTION INTRAVENOUS at 09:27

## 2022-01-01 RX ADMIN — Medication 4 MILLILITER(S): at 12:37

## 2022-01-01 RX ADMIN — Medication 3 MILLILITER(S): at 07:28

## 2022-01-01 RX ADMIN — Medication 100 MILLIEQUIVALENT(S): at 14:51

## 2022-01-01 RX ADMIN — Medication 1 PACKET(S): at 18:05

## 2022-01-01 RX ADMIN — Medication 4 MILLILITER(S): at 13:50

## 2022-01-01 RX ADMIN — SODIUM CHLORIDE 4 MILLILITER(S): 9 INJECTION INTRAMUSCULAR; INTRAVENOUS; SUBCUTANEOUS at 13:41

## 2022-01-01 RX ADMIN — MEROPENEM 100 MILLIGRAM(S): 1 INJECTION INTRAVENOUS at 05:07

## 2022-01-01 RX ADMIN — SENNA PLUS 2 TABLET(S): 8.6 TABLET ORAL at 21:22

## 2022-01-01 RX ADMIN — Medication 50 MILLIGRAM(S): at 21:27

## 2022-01-01 RX ADMIN — CHLORHEXIDINE GLUCONATE 1 APPLICATION(S): 213 SOLUTION TOPICAL at 05:21

## 2022-01-01 RX ADMIN — HEPARIN SODIUM 5000 UNIT(S): 5000 INJECTION INTRAVENOUS; SUBCUTANEOUS at 05:36

## 2022-01-01 RX ADMIN — HEPARIN SODIUM 5000 UNIT(S): 5000 INJECTION INTRAVENOUS; SUBCUTANEOUS at 05:29

## 2022-01-01 RX ADMIN — CHLORHEXIDINE GLUCONATE 1 APPLICATION(S): 213 SOLUTION TOPICAL at 05:37

## 2022-01-01 RX ADMIN — Medication 3 MILLILITER(S): at 12:35

## 2022-01-01 RX ADMIN — SODIUM CHLORIDE 1000 MILLILITER(S): 9 INJECTION INTRAMUSCULAR; INTRAVENOUS; SUBCUTANEOUS at 04:20

## 2022-01-01 RX ADMIN — MEROPENEM 100 MILLIGRAM(S): 1 INJECTION INTRAVENOUS at 14:06

## 2022-01-01 RX ADMIN — Medication 200 GRAM(S): at 18:17

## 2022-01-01 RX ADMIN — CARBIDOPA AND LEVODOPA 1 TABLET(S): 25; 100 TABLET ORAL at 12:12

## 2022-01-01 RX ADMIN — Medication 650 MILLIGRAM(S): at 06:11

## 2022-01-01 RX ADMIN — HEPARIN SODIUM 5000 UNIT(S): 5000 INJECTION INTRAVENOUS; SUBCUTANEOUS at 21:22

## 2022-01-01 RX ADMIN — PRIMIDONE 50 MILLIGRAM(S): 250 TABLET ORAL at 11:24

## 2022-01-01 RX ADMIN — SODIUM CHLORIDE 4 MILLILITER(S): 9 INJECTION INTRAMUSCULAR; INTRAVENOUS; SUBCUTANEOUS at 20:17

## 2022-01-01 RX ADMIN — Medication 1 PACKET(S): at 21:56

## 2022-01-01 RX ADMIN — PRIMIDONE 50 MILLIGRAM(S): 250 TABLET ORAL at 11:47

## 2022-01-01 RX ADMIN — SODIUM CHLORIDE 75 MILLILITER(S): 9 INJECTION, SOLUTION INTRAVENOUS at 05:17

## 2022-01-01 RX ADMIN — SODIUM CHLORIDE 4 MILLILITER(S): 9 INJECTION INTRAMUSCULAR; INTRAVENOUS; SUBCUTANEOUS at 00:27

## 2022-01-01 RX ADMIN — Medication 200 GRAM(S): at 17:44

## 2022-01-01 RX ADMIN — Medication 40 MILLIEQUIVALENT(S): at 14:24

## 2022-01-01 RX ADMIN — Medication 200 GRAM(S): at 17:42

## 2022-01-01 RX ADMIN — CITALOPRAM 10 MILLIGRAM(S): 10 TABLET, FILM COATED ORAL at 11:41

## 2022-01-01 RX ADMIN — LACTULOSE 20 GRAM(S): 10 SOLUTION ORAL at 05:48

## 2022-01-01 RX ADMIN — CARBIDOPA AND LEVODOPA 1 TABLET(S): 25; 100 TABLET ORAL at 21:31

## 2022-01-01 RX ADMIN — MIDODRINE HYDROCHLORIDE 5 MILLIGRAM(S): 2.5 TABLET ORAL at 05:36

## 2022-01-01 RX ADMIN — PANTOPRAZOLE SODIUM 40 MILLIGRAM(S): 20 TABLET, DELAYED RELEASE ORAL at 06:07

## 2022-01-01 RX ADMIN — Medication 3 MILLILITER(S): at 07:41

## 2022-01-01 RX ADMIN — CITALOPRAM 20 MILLIGRAM(S): 10 TABLET, FILM COATED ORAL at 11:42

## 2022-01-01 RX ADMIN — Medication 100 MILLIEQUIVALENT(S): at 21:03

## 2022-01-01 RX ADMIN — Medication 240 MILLIGRAM(S): at 05:30

## 2022-01-01 RX ADMIN — CHLORHEXIDINE GLUCONATE 1 APPLICATION(S): 213 SOLUTION TOPICAL at 06:17

## 2022-01-01 RX ADMIN — CARBIDOPA AND LEVODOPA 1 TABLET(S): 25; 100 TABLET ORAL at 21:43

## 2022-01-01 RX ADMIN — Medication 3 MILLILITER(S): at 20:13

## 2022-01-01 RX ADMIN — CARBIDOPA AND LEVODOPA 1 TABLET(S): 25; 100 TABLET ORAL at 00:29

## 2022-01-01 RX ADMIN — Medication 3 MILLILITER(S): at 20:23

## 2022-01-01 RX ADMIN — PIPERACILLIN AND TAZOBACTAM 25 GRAM(S): 4; .5 INJECTION, POWDER, LYOPHILIZED, FOR SOLUTION INTRAVENOUS at 09:30

## 2022-01-01 RX ADMIN — Medication 200 GRAM(S): at 05:10

## 2022-01-01 RX ADMIN — Medication 3 MILLILITER(S): at 00:51

## 2022-01-01 RX ADMIN — Medication 3 MILLILITER(S): at 19:49

## 2022-01-01 RX ADMIN — CEFTRIAXONE 100 MILLIGRAM(S): 500 INJECTION, POWDER, FOR SOLUTION INTRAMUSCULAR; INTRAVENOUS at 21:03

## 2022-01-01 RX ADMIN — PRIMIDONE 50 MILLIGRAM(S): 250 TABLET ORAL at 11:37

## 2022-01-01 RX ADMIN — HEPARIN SODIUM 5000 UNIT(S): 5000 INJECTION INTRAVENOUS; SUBCUTANEOUS at 06:26

## 2022-01-01 RX ADMIN — PANTOPRAZOLE SODIUM 40 MILLIGRAM(S): 20 TABLET, DELAYED RELEASE ORAL at 06:25

## 2022-01-01 RX ADMIN — Medication 650 MILLIGRAM(S): at 18:52

## 2022-01-01 RX ADMIN — Medication 40 MILLIEQUIVALENT(S): at 17:36

## 2022-01-01 RX ADMIN — CITALOPRAM 20 MILLIGRAM(S): 10 TABLET, FILM COATED ORAL at 11:46

## 2022-01-01 RX ADMIN — PRIMIDONE 50 MILLIGRAM(S): 250 TABLET ORAL at 11:42

## 2022-01-01 RX ADMIN — MUPIROCIN 1 APPLICATION(S): 20 OINTMENT TOPICAL at 05:55

## 2022-01-01 RX ADMIN — SODIUM CHLORIDE 4 MILLILITER(S): 9 INJECTION INTRAMUSCULAR; INTRAVENOUS; SUBCUTANEOUS at 07:28

## 2022-01-01 RX ADMIN — LACTULOSE 20 GRAM(S): 10 SOLUTION ORAL at 13:17

## 2022-01-01 RX ADMIN — Medication 4 MILLILITER(S): at 08:09

## 2022-01-01 RX ADMIN — PIPERACILLIN AND TAZOBACTAM 25 GRAM(S): 4; .5 INJECTION, POWDER, LYOPHILIZED, FOR SOLUTION INTRAVENOUS at 12:08

## 2022-01-01 RX ADMIN — Medication 3 MILLILITER(S): at 07:34

## 2022-01-01 RX ADMIN — SODIUM CHLORIDE 1000 MILLILITER(S): 9 INJECTION INTRAMUSCULAR; INTRAVENOUS; SUBCUTANEOUS at 21:03

## 2022-01-01 RX ADMIN — CARBIDOPA AND LEVODOPA 1 TABLET(S): 25; 100 TABLET ORAL at 06:34

## 2022-01-01 RX ADMIN — CARBIDOPA AND LEVODOPA 1 TABLET(S): 25; 100 TABLET ORAL at 18:11

## 2022-01-01 RX ADMIN — SODIUM CHLORIDE 4 MILLILITER(S): 9 INJECTION INTRAMUSCULAR; INTRAVENOUS; SUBCUTANEOUS at 07:04

## 2022-01-01 RX ADMIN — ATORVASTATIN CALCIUM 80 MILLIGRAM(S): 80 TABLET, FILM COATED ORAL at 21:45

## 2022-01-01 RX ADMIN — Medication 40 MILLIEQUIVALENT(S): at 11:19

## 2022-01-01 RX ADMIN — CHLORHEXIDINE GLUCONATE 1 APPLICATION(S): 213 SOLUTION TOPICAL at 18:25

## 2022-01-01 RX ADMIN — CARBIDOPA AND LEVODOPA 1 TABLET(S): 25; 100 TABLET ORAL at 21:23

## 2022-01-01 RX ADMIN — HEPARIN SODIUM 5000 UNIT(S): 5000 INJECTION INTRAVENOUS; SUBCUTANEOUS at 13:15

## 2022-01-01 RX ADMIN — Medication 4 MILLILITER(S): at 00:09

## 2022-01-01 RX ADMIN — PIPERACILLIN AND TAZOBACTAM 25 GRAM(S): 4; .5 INJECTION, POWDER, LYOPHILIZED, FOR SOLUTION INTRAVENOUS at 02:50

## 2022-01-01 RX ADMIN — ENOXAPARIN SODIUM 60 MILLIGRAM(S): 100 INJECTION SUBCUTANEOUS at 05:11

## 2022-01-01 RX ADMIN — CITALOPRAM 20 MILLIGRAM(S): 10 TABLET, FILM COATED ORAL at 11:25

## 2022-01-01 RX ADMIN — Medication 10 MILLIGRAM(S): at 05:13

## 2022-01-01 RX ADMIN — CARBIDOPA AND LEVODOPA 1 TABLET(S): 25; 100 TABLET ORAL at 11:46

## 2022-01-01 RX ADMIN — Medication 3 MILLILITER(S): at 13:29

## 2022-01-01 RX ADMIN — LACTULOSE 20 GRAM(S): 10 SOLUTION ORAL at 05:11

## 2022-01-01 RX ADMIN — CARBIDOPA AND LEVODOPA 1 TABLET(S): 25; 100 TABLET ORAL at 08:37

## 2022-01-01 RX ADMIN — CHLORHEXIDINE GLUCONATE 15 MILLILITER(S): 213 SOLUTION TOPICAL at 06:17

## 2022-01-01 RX ADMIN — MEROPENEM 100 MILLIGRAM(S): 1 INJECTION INTRAVENOUS at 22:03

## 2022-01-01 RX ADMIN — CITALOPRAM 20 MILLIGRAM(S): 10 TABLET, FILM COATED ORAL at 12:08

## 2022-01-01 RX ADMIN — Medication 100 MILLIEQUIVALENT(S): at 20:20

## 2022-01-01 RX ADMIN — LACTULOSE 20 GRAM(S): 10 SOLUTION ORAL at 22:14

## 2022-01-01 RX ADMIN — SODIUM CHLORIDE 4 MILLILITER(S): 9 INJECTION INTRAMUSCULAR; INTRAVENOUS; SUBCUTANEOUS at 13:48

## 2022-01-01 RX ADMIN — Medication 240 MILLIGRAM(S): at 15:37

## 2022-01-01 RX ADMIN — FENTANYL CITRATE 25 MICROGRAM(S): 50 INJECTION INTRAVENOUS at 15:09

## 2022-01-01 RX ADMIN — CITALOPRAM 20 MILLIGRAM(S): 10 TABLET, FILM COATED ORAL at 11:43

## 2022-01-01 RX ADMIN — SODIUM CHLORIDE 4 MILLILITER(S): 9 INJECTION INTRAMUSCULAR; INTRAVENOUS; SUBCUTANEOUS at 01:10

## 2022-01-01 RX ADMIN — TAMSULOSIN HYDROCHLORIDE 0.4 MILLIGRAM(S): 0.4 CAPSULE ORAL at 21:28

## 2022-01-01 RX ADMIN — Medication 4 MILLILITER(S): at 13:41

## 2022-01-01 RX ADMIN — Medication 200 GRAM(S): at 02:15

## 2022-01-01 RX ADMIN — CARBIDOPA AND LEVODOPA 1 TABLET(S): 25; 100 TABLET ORAL at 21:59

## 2022-01-01 RX ADMIN — Medication 3 MILLILITER(S): at 13:48

## 2022-01-01 RX ADMIN — Medication 240 MILLIGRAM(S): at 06:00

## 2022-01-01 RX ADMIN — PRIMIDONE 25 MILLIGRAM(S): 250 TABLET ORAL at 13:04

## 2022-01-01 RX ADMIN — CITALOPRAM 20 MILLIGRAM(S): 10 TABLET, FILM COATED ORAL at 12:10

## 2022-01-01 RX ADMIN — ENOXAPARIN SODIUM 60 MILLIGRAM(S): 100 INJECTION SUBCUTANEOUS at 05:19

## 2022-01-01 RX ADMIN — SODIUM CHLORIDE 4 MILLILITER(S): 9 INJECTION INTRAMUSCULAR; INTRAVENOUS; SUBCUTANEOUS at 13:22

## 2022-01-01 RX ADMIN — SODIUM CHLORIDE 500 MILLILITER(S): 9 INJECTION, SOLUTION INTRAVENOUS at 17:31

## 2022-01-01 RX ADMIN — CARBIDOPA AND LEVODOPA 1 TABLET(S): 25; 100 TABLET ORAL at 05:18

## 2022-01-01 RX ADMIN — ATORVASTATIN CALCIUM 80 MILLIGRAM(S): 80 TABLET, FILM COATED ORAL at 22:04

## 2022-01-01 RX ADMIN — Medication 102 MILLIGRAM(S): at 20:21

## 2022-01-01 RX ADMIN — TAMSULOSIN HYDROCHLORIDE 0.4 MILLIGRAM(S): 0.4 CAPSULE ORAL at 22:07

## 2022-01-01 RX ADMIN — POLYETHYLENE GLYCOL 3350 17 GRAM(S): 17 POWDER, FOR SOLUTION ORAL at 17:54

## 2022-01-01 RX ADMIN — Medication 500 MILLIGRAM(S): at 12:24

## 2022-01-01 RX ADMIN — Medication 3 MILLILITER(S): at 00:07

## 2022-01-01 RX ADMIN — PRIMIDONE 50 MILLIGRAM(S): 250 TABLET ORAL at 12:08

## 2022-01-01 RX ADMIN — Medication 3 MILLILITER(S): at 00:55

## 2022-01-01 RX ADMIN — CARBIDOPA AND LEVODOPA 1 TABLET(S): 25; 100 TABLET ORAL at 17:46

## 2022-01-01 RX ADMIN — CHLORHEXIDINE GLUCONATE 15 MILLILITER(S): 213 SOLUTION TOPICAL at 17:59

## 2022-01-01 RX ADMIN — Medication 10 MILLIGRAM(S): at 06:25

## 2022-01-01 RX ADMIN — CITALOPRAM 20 MILLIGRAM(S): 10 TABLET, FILM COATED ORAL at 11:50

## 2022-01-01 RX ADMIN — PANTOPRAZOLE SODIUM 40 MILLIGRAM(S): 20 TABLET, DELAYED RELEASE ORAL at 13:04

## 2022-01-01 RX ADMIN — VASOPRESSIN 2.4 UNIT(S)/MIN: 20 INJECTION INTRAVENOUS at 16:00

## 2022-01-01 RX ADMIN — CHLORHEXIDINE GLUCONATE 15 MILLILITER(S): 213 SOLUTION TOPICAL at 05:35

## 2022-01-01 RX ADMIN — Medication 650 MILLIGRAM(S): at 19:42

## 2022-01-01 RX ADMIN — Medication 200 GRAM(S): at 12:25

## 2022-01-01 RX ADMIN — SODIUM CHLORIDE 4 MILLILITER(S): 9 INJECTION INTRAMUSCULAR; INTRAVENOUS; SUBCUTANEOUS at 07:47

## 2022-01-01 RX ADMIN — HEPARIN SODIUM 5000 UNIT(S): 5000 INJECTION INTRAVENOUS; SUBCUTANEOUS at 23:34

## 2022-01-01 RX ADMIN — PANTOPRAZOLE SODIUM 40 MILLIGRAM(S): 20 TABLET, DELAYED RELEASE ORAL at 05:02

## 2022-01-01 RX ADMIN — CHLORHEXIDINE GLUCONATE 1 APPLICATION(S): 213 SOLUTION TOPICAL at 05:22

## 2022-01-01 RX ADMIN — SODIUM CHLORIDE 1000 MILLILITER(S): 9 INJECTION INTRAMUSCULAR; INTRAVENOUS; SUBCUTANEOUS at 13:01

## 2022-01-01 RX ADMIN — CITALOPRAM 20 MILLIGRAM(S): 10 TABLET, FILM COATED ORAL at 12:04

## 2022-01-01 RX ADMIN — LACTULOSE 20 GRAM(S): 10 SOLUTION ORAL at 05:25

## 2022-01-01 RX ADMIN — LACTULOSE 20 GRAM(S): 10 SOLUTION ORAL at 14:24

## 2022-01-01 RX ADMIN — Medication 650 MILLIGRAM(S): at 06:31

## 2022-01-01 RX ADMIN — Medication 10 MILLIGRAM(S): at 05:30

## 2022-01-01 RX ADMIN — LACTULOSE 20 GRAM(S): 10 SOLUTION ORAL at 13:54

## 2022-01-01 RX ADMIN — CITALOPRAM 20 MILLIGRAM(S): 10 TABLET, FILM COATED ORAL at 12:27

## 2022-01-01 RX ADMIN — Medication 3 MILLILITER(S): at 12:51

## 2022-01-01 RX ADMIN — CARBIDOPA AND LEVODOPA 1 TABLET(S): 25; 100 TABLET ORAL at 10:07

## 2022-01-01 RX ADMIN — SODIUM CHLORIDE 1000 MILLILITER(S): 9 INJECTION, SOLUTION INTRAVENOUS at 10:14

## 2022-01-01 RX ADMIN — Medication 500 MILLIGRAM(S): at 11:42

## 2022-01-01 RX ADMIN — CARBIDOPA AND LEVODOPA 1 TABLET(S): 25; 100 TABLET ORAL at 21:27

## 2022-01-01 RX ADMIN — PANTOPRAZOLE SODIUM 40 MILLIGRAM(S): 20 TABLET, DELAYED RELEASE ORAL at 05:32

## 2022-01-01 RX ADMIN — Medication 3 MILLILITER(S): at 09:12

## 2022-01-01 RX ADMIN — CARBIDOPA AND LEVODOPA 1 TABLET(S): 25; 100 TABLET ORAL at 22:29

## 2022-01-01 RX ADMIN — PRIMIDONE 50 MILLIGRAM(S): 250 TABLET ORAL at 12:52

## 2022-01-01 RX ADMIN — CHLORHEXIDINE GLUCONATE 15 MILLILITER(S): 213 SOLUTION TOPICAL at 18:31

## 2022-01-01 RX ADMIN — CITALOPRAM 10 MILLIGRAM(S): 10 TABLET, FILM COATED ORAL at 11:55

## 2022-01-01 RX ADMIN — MEROPENEM 100 MILLIGRAM(S): 1 INJECTION INTRAVENOUS at 13:18

## 2022-01-01 RX ADMIN — Medication 650 MILLIGRAM(S): at 13:08

## 2022-01-01 RX ADMIN — TAMSULOSIN HYDROCHLORIDE 0.4 MILLIGRAM(S): 0.4 CAPSULE ORAL at 23:15

## 2022-01-01 RX ADMIN — ATORVASTATIN CALCIUM 80 MILLIGRAM(S): 80 TABLET, FILM COATED ORAL at 21:54

## 2022-01-01 RX ADMIN — POTASSIUM PHOSPHATE, MONOBASIC POTASSIUM PHOSPHATE, DIBASIC 83.33 MILLIMOLE(S): 236; 224 INJECTION, SOLUTION INTRAVENOUS at 12:09

## 2022-01-01 RX ADMIN — TAMSULOSIN HYDROCHLORIDE 0.4 MILLIGRAM(S): 0.4 CAPSULE ORAL at 21:14

## 2022-01-01 RX ADMIN — CHLORHEXIDINE GLUCONATE 15 MILLILITER(S): 213 SOLUTION TOPICAL at 05:51

## 2022-01-01 RX ADMIN — ENOXAPARIN SODIUM 70 MILLIGRAM(S): 100 INJECTION SUBCUTANEOUS at 23:02

## 2022-01-01 RX ADMIN — Medication 50 MILLIGRAM(S): at 05:36

## 2022-01-01 RX ADMIN — CITALOPRAM 20 MILLIGRAM(S): 10 TABLET, FILM COATED ORAL at 12:49

## 2022-01-01 RX ADMIN — Medication 240 MILLIGRAM(S): at 05:37

## 2022-01-01 RX ADMIN — PANTOPRAZOLE SODIUM 40 MILLIGRAM(S): 20 TABLET, DELAYED RELEASE ORAL at 05:05

## 2022-01-01 RX ADMIN — Medication 650 MILLIGRAM(S): at 06:52

## 2022-01-01 RX ADMIN — Medication 3 MILLILITER(S): at 00:36

## 2022-01-01 RX ADMIN — Medication 500 MILLIGRAM(S): at 12:49

## 2022-01-01 RX ADMIN — CHLORHEXIDINE GLUCONATE 15 MILLILITER(S): 213 SOLUTION TOPICAL at 05:28

## 2022-01-01 RX ADMIN — Medication 300 MILLIGRAM(S): at 20:15

## 2022-01-01 RX ADMIN — Medication 3 MILLILITER(S): at 07:40

## 2022-01-01 RX ADMIN — Medication 240 MILLIGRAM(S): at 05:09

## 2022-01-01 RX ADMIN — Medication 650 MILLIGRAM(S): at 20:00

## 2022-01-01 RX ADMIN — Medication 3 MILLILITER(S): at 07:31

## 2022-01-01 RX ADMIN — CARBIDOPA AND LEVODOPA 1 TABLET(S): 25; 100 TABLET ORAL at 11:18

## 2022-01-01 RX ADMIN — CHLORHEXIDINE GLUCONATE 15 MILLILITER(S): 213 SOLUTION TOPICAL at 05:03

## 2022-01-01 RX ADMIN — Medication 50 MILLILITER(S): at 23:37

## 2022-01-01 RX ADMIN — Medication 3 MILLILITER(S): at 20:28

## 2022-01-01 RX ADMIN — ATORVASTATIN CALCIUM 80 MILLIGRAM(S): 80 TABLET, FILM COATED ORAL at 21:10

## 2022-01-01 RX ADMIN — HYDROMORPHONE HYDROCHLORIDE 5 MG/HR: 2 INJECTION INTRAMUSCULAR; INTRAVENOUS; SUBCUTANEOUS at 15:16

## 2022-01-01 RX ADMIN — Medication 3 MILLILITER(S): at 14:51

## 2022-01-01 RX ADMIN — ATORVASTATIN CALCIUM 80 MILLIGRAM(S): 80 TABLET, FILM COATED ORAL at 22:39

## 2022-01-01 RX ADMIN — ENOXAPARIN SODIUM 40 MILLIGRAM(S): 100 INJECTION SUBCUTANEOUS at 17:16

## 2022-01-01 RX ADMIN — Medication 10 MILLIGRAM(S): at 05:38

## 2022-01-01 RX ADMIN — LACTULOSE 20 GRAM(S): 10 SOLUTION ORAL at 05:09

## 2022-01-01 RX ADMIN — CHLORHEXIDINE GLUCONATE 1 APPLICATION(S): 213 SOLUTION TOPICAL at 05:35

## 2022-01-01 RX ADMIN — PRIMIDONE 50 MILLIGRAM(S): 250 TABLET ORAL at 12:49

## 2022-01-01 RX ADMIN — SODIUM CHLORIDE 4 MILLILITER(S): 9 INJECTION INTRAMUSCULAR; INTRAVENOUS; SUBCUTANEOUS at 13:50

## 2022-01-01 RX ADMIN — Medication 1 TABLET(S): at 17:51

## 2022-01-01 RX ADMIN — CARBIDOPA AND LEVODOPA 1 TABLET(S): 25; 100 TABLET ORAL at 05:30

## 2022-01-01 RX ADMIN — CEFEPIME 100 MILLIGRAM(S): 1 INJECTION, POWDER, FOR SOLUTION INTRAMUSCULAR; INTRAVENOUS at 21:22

## 2022-01-01 RX ADMIN — HEPARIN SODIUM 5000 UNIT(S): 5000 INJECTION INTRAVENOUS; SUBCUTANEOUS at 13:26

## 2022-01-01 RX ADMIN — SODIUM CHLORIDE 4 MILLILITER(S): 9 INJECTION INTRAMUSCULAR; INTRAVENOUS; SUBCUTANEOUS at 00:56

## 2022-01-01 RX ADMIN — CARBIDOPA AND LEVODOPA 1 TABLET(S): 25; 100 TABLET ORAL at 23:05

## 2022-01-01 RX ADMIN — Medication 4 MILLILITER(S): at 00:06

## 2022-01-01 RX ADMIN — CHLORHEXIDINE GLUCONATE 1 APPLICATION(S): 213 SOLUTION TOPICAL at 05:50

## 2022-01-01 RX ADMIN — Medication 4 MILLILITER(S): at 20:24

## 2022-01-01 RX ADMIN — Medication 300 MILLIGRAM(S): at 07:56

## 2022-01-01 RX ADMIN — Medication 125 MILLILITER(S): at 13:10

## 2022-01-01 RX ADMIN — Medication 4 MILLILITER(S): at 07:28

## 2022-01-01 RX ADMIN — MEROPENEM 100 MILLIGRAM(S): 1 INJECTION INTRAVENOUS at 21:32

## 2022-01-01 RX ADMIN — CEFTRIAXONE 100 MILLIGRAM(S): 500 INJECTION, POWDER, FOR SOLUTION INTRAMUSCULAR; INTRAVENOUS at 05:32

## 2022-01-01 RX ADMIN — Medication 200 GRAM(S): at 06:26

## 2022-01-01 RX ADMIN — PANTOPRAZOLE SODIUM 40 MILLIGRAM(S): 20 TABLET, DELAYED RELEASE ORAL at 11:46

## 2022-01-01 RX ADMIN — HEPARIN SODIUM 5000 UNIT(S): 5000 INJECTION INTRAVENOUS; SUBCUTANEOUS at 05:32

## 2022-01-01 RX ADMIN — SIMVASTATIN 40 MILLIGRAM(S): 20 TABLET, FILM COATED ORAL at 21:53

## 2022-01-01 RX ADMIN — ATORVASTATIN CALCIUM 80 MILLIGRAM(S): 80 TABLET, FILM COATED ORAL at 21:31

## 2022-01-01 RX ADMIN — HYDROMORPHONE HYDROCHLORIDE 2 MILLIGRAM(S): 2 INJECTION INTRAMUSCULAR; INTRAVENOUS; SUBCUTANEOUS at 15:57

## 2022-01-01 RX ADMIN — CITALOPRAM 20 MILLIGRAM(S): 10 TABLET, FILM COATED ORAL at 13:01

## 2022-01-01 RX ADMIN — Medication 200 GRAM(S): at 00:21

## 2022-01-01 RX ADMIN — CARBIDOPA AND LEVODOPA 1 TABLET(S): 25; 100 TABLET ORAL at 08:33

## 2022-01-01 RX ADMIN — Medication 62.5 MILLIMOLE(S): at 08:33

## 2022-01-01 RX ADMIN — CHLORHEXIDINE GLUCONATE 1 APPLICATION(S): 213 SOLUTION TOPICAL at 05:08

## 2022-01-01 RX ADMIN — CARBIDOPA AND LEVODOPA 1 TABLET(S): 25; 100 TABLET ORAL at 18:13

## 2022-01-01 RX ADMIN — Medication 200 GRAM(S): at 00:37

## 2022-01-01 RX ADMIN — ATORVASTATIN CALCIUM 80 MILLIGRAM(S): 80 TABLET, FILM COATED ORAL at 21:46

## 2022-01-01 RX ADMIN — SODIUM CHLORIDE 500 MILLILITER(S): 9 INJECTION, SOLUTION INTRAVENOUS at 14:46

## 2022-01-01 RX ADMIN — SODIUM CHLORIDE 75 MILLILITER(S): 9 INJECTION, SOLUTION INTRAVENOUS at 04:50

## 2022-01-01 RX ADMIN — SODIUM CHLORIDE 4 MILLILITER(S): 9 INJECTION INTRAMUSCULAR; INTRAVENOUS; SUBCUTANEOUS at 08:28

## 2022-01-01 RX ADMIN — TAMSULOSIN HYDROCHLORIDE 0.4 MILLIGRAM(S): 0.4 CAPSULE ORAL at 23:33

## 2022-01-01 RX ADMIN — PIPERACILLIN AND TAZOBACTAM 25 GRAM(S): 4; .5 INJECTION, POWDER, LYOPHILIZED, FOR SOLUTION INTRAVENOUS at 12:03

## 2022-01-01 RX ADMIN — Medication 650 MILLIGRAM(S): at 06:44

## 2022-01-01 RX ADMIN — CHLORHEXIDINE GLUCONATE 1 APPLICATION(S): 213 SOLUTION TOPICAL at 06:26

## 2022-01-01 RX ADMIN — POTASSIUM PHOSPHATE, MONOBASIC POTASSIUM PHOSPHATE, DIBASIC 83.33 MILLIMOLE(S): 236; 224 INJECTION, SOLUTION INTRAVENOUS at 10:23

## 2022-01-01 RX ADMIN — CARBIDOPA AND LEVODOPA 1 TABLET(S): 25; 100 TABLET ORAL at 11:55

## 2022-01-01 RX ADMIN — CHLORHEXIDINE GLUCONATE 1 APPLICATION(S): 213 SOLUTION TOPICAL at 05:09

## 2022-01-01 RX ADMIN — HEPARIN SODIUM 5000 UNIT(S): 5000 INJECTION INTRAVENOUS; SUBCUTANEOUS at 13:29

## 2022-01-01 RX ADMIN — Medication 200 GRAM(S): at 18:11

## 2022-01-01 RX ADMIN — CITALOPRAM 20 MILLIGRAM(S): 10 TABLET, FILM COATED ORAL at 11:17

## 2022-01-01 RX ADMIN — Medication 3 MILLILITER(S): at 01:03

## 2022-01-01 RX ADMIN — SODIUM CHLORIDE 4 MILLILITER(S): 9 INJECTION INTRAMUSCULAR; INTRAVENOUS; SUBCUTANEOUS at 00:02

## 2022-01-01 RX ADMIN — Medication 102 MILLIGRAM(S): at 23:29

## 2022-01-01 RX ADMIN — ATORVASTATIN CALCIUM 80 MILLIGRAM(S): 80 TABLET, FILM COATED ORAL at 21:33

## 2022-01-01 RX ADMIN — MEROPENEM 100 MILLIGRAM(S): 1 INJECTION INTRAVENOUS at 22:38

## 2022-01-01 RX ADMIN — ATORVASTATIN CALCIUM 80 MILLIGRAM(S): 80 TABLET, FILM COATED ORAL at 21:24

## 2022-01-01 RX ADMIN — ATORVASTATIN CALCIUM 80 MILLIGRAM(S): 80 TABLET, FILM COATED ORAL at 21:52

## 2022-01-01 RX ADMIN — PIPERACILLIN AND TAZOBACTAM 25 GRAM(S): 4; .5 INJECTION, POWDER, LYOPHILIZED, FOR SOLUTION INTRAVENOUS at 06:24

## 2022-01-01 RX ADMIN — Medication 50 MILLIGRAM(S): at 13:35

## 2022-01-01 RX ADMIN — LACTULOSE 20 GRAM(S): 10 SOLUTION ORAL at 05:52

## 2022-01-01 RX ADMIN — ENOXAPARIN SODIUM 70 MILLIGRAM(S): 100 INJECTION SUBCUTANEOUS at 23:07

## 2022-01-01 RX ADMIN — CHLORHEXIDINE GLUCONATE 1 APPLICATION(S): 213 SOLUTION TOPICAL at 05:48

## 2022-01-01 RX ADMIN — Medication 40 MILLIEQUIVALENT(S): at 10:23

## 2022-01-01 RX ADMIN — Medication 100 MILLIEQUIVALENT(S): at 21:22

## 2022-01-01 RX ADMIN — CITALOPRAM 20 MILLIGRAM(S): 10 TABLET, FILM COATED ORAL at 11:47

## 2022-01-01 RX ADMIN — Medication 10 MILLIGRAM(S): at 05:20

## 2022-01-01 RX ADMIN — FENTANYL CITRATE 50 MICROGRAM(S): 50 INJECTION INTRAVENOUS at 16:40

## 2022-01-01 RX ADMIN — CHLORHEXIDINE GLUCONATE 15 MILLILITER(S): 213 SOLUTION TOPICAL at 17:28

## 2022-01-01 RX ADMIN — CHLORHEXIDINE GLUCONATE 15 MILLILITER(S): 213 SOLUTION TOPICAL at 05:08

## 2022-01-01 RX ADMIN — Medication 10 MILLIGRAM(S): at 05:47

## 2022-01-01 RX ADMIN — Medication 3 MILLILITER(S): at 01:05

## 2022-01-01 RX ADMIN — SODIUM CHLORIDE 4 MILLILITER(S): 9 INJECTION INTRAMUSCULAR; INTRAVENOUS; SUBCUTANEOUS at 01:21

## 2022-01-01 RX ADMIN — CHLORHEXIDINE GLUCONATE 15 MILLILITER(S): 213 SOLUTION TOPICAL at 05:25

## 2022-01-01 RX ADMIN — PRIMIDONE 50 MILLIGRAM(S): 250 TABLET ORAL at 13:10

## 2022-01-01 RX ADMIN — CITALOPRAM 20 MILLIGRAM(S): 10 TABLET, FILM COATED ORAL at 12:52

## 2022-01-01 RX ADMIN — HEPARIN SODIUM 5000 UNIT(S): 5000 INJECTION INTRAVENOUS; SUBCUTANEOUS at 21:27

## 2022-01-01 RX ADMIN — Medication 4 MILLILITER(S): at 00:26

## 2022-01-01 RX ADMIN — PANTOPRAZOLE SODIUM 40 MILLIGRAM(S): 20 TABLET, DELAYED RELEASE ORAL at 17:37

## 2022-01-01 RX ADMIN — PRIMIDONE 50 MILLIGRAM(S): 250 TABLET ORAL at 11:33

## 2022-01-01 RX ADMIN — ATORVASTATIN CALCIUM 80 MILLIGRAM(S): 80 TABLET, FILM COATED ORAL at 23:17

## 2022-01-01 RX ADMIN — CARBIDOPA AND LEVODOPA 1 TABLET(S): 25; 100 TABLET ORAL at 21:54

## 2022-01-01 RX ADMIN — Medication 4 MILLILITER(S): at 01:00

## 2022-01-01 RX ADMIN — Medication 50 MILLIGRAM(S): at 06:24

## 2022-01-01 RX ADMIN — Medication 50 MILLIGRAM(S): at 14:58

## 2022-01-01 RX ADMIN — MUPIROCIN 1 APPLICATION(S): 20 OINTMENT TOPICAL at 18:17

## 2022-01-01 RX ADMIN — Medication 300 MILLIGRAM(S): at 20:16

## 2022-01-01 RX ADMIN — Medication 1000 MILLIGRAM(S): at 13:00

## 2022-01-01 RX ADMIN — Medication 10 MILLIGRAM(S): at 05:54

## 2022-01-01 RX ADMIN — Medication 4.17 MICROGRAM(S)/KG/MIN: at 21:28

## 2022-01-01 RX ADMIN — PANTOPRAZOLE SODIUM 40 MILLIGRAM(S): 20 TABLET, DELAYED RELEASE ORAL at 11:38

## 2022-01-01 RX ADMIN — CHLORHEXIDINE GLUCONATE 15 MILLILITER(S): 213 SOLUTION TOPICAL at 05:41

## 2022-01-01 RX ADMIN — CHLORHEXIDINE GLUCONATE 15 MILLILITER(S): 213 SOLUTION TOPICAL at 18:36

## 2022-01-01 RX ADMIN — SENNA PLUS 2 TABLET(S): 8.6 TABLET ORAL at 21:43

## 2022-01-01 RX ADMIN — MEROPENEM 100 MILLIGRAM(S): 1 INJECTION INTRAVENOUS at 22:08

## 2022-01-01 RX ADMIN — MEROPENEM 100 MILLIGRAM(S): 1 INJECTION INTRAVENOUS at 05:35

## 2022-01-01 RX ADMIN — Medication 3 MILLILITER(S): at 20:35

## 2022-01-01 RX ADMIN — Medication 81 MILLIGRAM(S): at 11:24

## 2022-01-01 RX ADMIN — Medication 240 MILLIGRAM(S): at 05:01

## 2022-01-01 RX ADMIN — CITALOPRAM 20 MILLIGRAM(S): 10 TABLET, FILM COATED ORAL at 11:13

## 2022-01-01 RX ADMIN — SODIUM CHLORIDE 4 MILLILITER(S): 9 INJECTION INTRAMUSCULAR; INTRAVENOUS; SUBCUTANEOUS at 01:00

## 2022-01-01 RX ADMIN — PANTOPRAZOLE SODIUM 40 MILLIGRAM(S): 20 TABLET, DELAYED RELEASE ORAL at 12:21

## 2022-01-01 RX ADMIN — MEROPENEM 100 MILLIGRAM(S): 1 INJECTION INTRAVENOUS at 13:09

## 2022-01-01 RX ADMIN — SODIUM CHLORIDE 75 MILLILITER(S): 9 INJECTION, SOLUTION INTRAVENOUS at 17:34

## 2022-01-01 RX ADMIN — ATORVASTATIN CALCIUM 80 MILLIGRAM(S): 80 TABLET, FILM COATED ORAL at 21:06

## 2022-01-01 RX ADMIN — Medication 102 MILLIGRAM(S): at 05:08

## 2022-01-01 RX ADMIN — ENOXAPARIN SODIUM 70 MILLIGRAM(S): 100 INJECTION SUBCUTANEOUS at 23:41

## 2022-01-01 RX ADMIN — CITALOPRAM 20 MILLIGRAM(S): 10 TABLET, FILM COATED ORAL at 13:04

## 2022-01-01 RX ADMIN — Medication 1 PACKET(S): at 11:38

## 2022-01-01 RX ADMIN — Medication 100 MILLIEQUIVALENT(S): at 18:57

## 2022-01-01 RX ADMIN — ATORVASTATIN CALCIUM 80 MILLIGRAM(S): 80 TABLET, FILM COATED ORAL at 21:29

## 2022-01-01 RX ADMIN — Medication 3 MILLILITER(S): at 07:30

## 2022-01-01 RX ADMIN — Medication 3 MILLILITER(S): at 20:15

## 2022-01-01 RX ADMIN — HEPARIN SODIUM 5000 UNIT(S): 5000 INJECTION INTRAVENOUS; SUBCUTANEOUS at 15:21

## 2022-01-01 RX ADMIN — PRIMIDONE 50 MILLIGRAM(S): 250 TABLET ORAL at 11:18

## 2022-01-01 RX ADMIN — SODIUM CHLORIDE 1000 MILLILITER(S): 9 INJECTION INTRAMUSCULAR; INTRAVENOUS; SUBCUTANEOUS at 11:45

## 2022-01-01 RX ADMIN — CITALOPRAM 20 MILLIGRAM(S): 10 TABLET, FILM COATED ORAL at 11:37

## 2022-01-01 RX ADMIN — Medication 240 MILLIGRAM(S): at 06:26

## 2022-01-01 RX ADMIN — HYDROMORPHONE HYDROCHLORIDE 1 MILLIGRAM(S): 2 INJECTION INTRAMUSCULAR; INTRAVENOUS; SUBCUTANEOUS at 15:16

## 2022-01-01 RX ADMIN — CITALOPRAM 10 MILLIGRAM(S): 10 TABLET, FILM COATED ORAL at 12:05

## 2022-01-01 RX ADMIN — Medication 1 PACKET(S): at 12:06

## 2022-01-01 RX ADMIN — Medication 3 MILLILITER(S): at 13:58

## 2022-01-01 RX ADMIN — HYDROMORPHONE HYDROCHLORIDE 10 MG/HR: 2 INJECTION INTRAMUSCULAR; INTRAVENOUS; SUBCUTANEOUS at 16:29

## 2022-01-01 RX ADMIN — Medication 100 MILLIGRAM(S): at 11:52

## 2022-01-01 RX ADMIN — Medication 3 MILLILITER(S): at 08:28

## 2022-01-01 RX ADMIN — Medication 10 MILLIGRAM(S): at 05:05

## 2022-01-01 RX ADMIN — Medication 300 MILLIGRAM(S): at 07:59

## 2022-01-01 RX ADMIN — PRIMIDONE 50 MILLIGRAM(S): 250 TABLET ORAL at 12:10

## 2022-01-01 RX ADMIN — LACTULOSE 20 GRAM(S): 10 SOLUTION ORAL at 09:47

## 2022-01-01 RX ADMIN — HEPARIN SODIUM 5000 UNIT(S): 5000 INJECTION INTRAVENOUS; SUBCUTANEOUS at 21:43

## 2022-01-01 RX ADMIN — PIPERACILLIN AND TAZOBACTAM 25 GRAM(S): 4; .5 INJECTION, POWDER, LYOPHILIZED, FOR SOLUTION INTRAVENOUS at 18:08

## 2022-01-01 RX ADMIN — POLYETHYLENE GLYCOL 3350 17 GRAM(S): 17 POWDER, FOR SOLUTION ORAL at 11:18

## 2022-01-01 RX ADMIN — HEPARIN SODIUM 5000 UNIT(S): 5000 INJECTION INTRAVENOUS; SUBCUTANEOUS at 06:25

## 2022-01-01 RX ADMIN — SODIUM CHLORIDE 4 MILLILITER(S): 9 INJECTION INTRAMUSCULAR; INTRAVENOUS; SUBCUTANEOUS at 00:51

## 2022-01-01 RX ADMIN — Medication 975 MILLIGRAM(S): at 06:56

## 2022-01-01 RX ADMIN — Medication 3 MILLILITER(S): at 07:27

## 2022-01-01 RX ADMIN — LACTULOSE 20 GRAM(S): 10 SOLUTION ORAL at 13:04

## 2022-01-01 RX ADMIN — SODIUM CHLORIDE 500 MILLILITER(S): 9 INJECTION, SOLUTION INTRAVENOUS at 15:42

## 2022-01-01 RX ADMIN — HEPARIN SODIUM 5000 UNIT(S): 5000 INJECTION INTRAVENOUS; SUBCUTANEOUS at 22:08

## 2022-01-01 RX ADMIN — TAMSULOSIN HYDROCHLORIDE 0.4 MILLIGRAM(S): 0.4 CAPSULE ORAL at 21:53

## 2022-01-01 RX ADMIN — Medication 240 MILLIGRAM(S): at 05:54

## 2022-01-01 RX ADMIN — LACTULOSE 20 GRAM(S): 10 SOLUTION ORAL at 13:24

## 2022-01-01 RX ADMIN — SODIUM CHLORIDE 1000 MILLILITER(S): 9 INJECTION INTRAMUSCULAR; INTRAVENOUS; SUBCUTANEOUS at 03:37

## 2022-01-01 RX ADMIN — Medication 50 MILLIGRAM(S): at 21:30

## 2022-01-01 RX ADMIN — ENOXAPARIN SODIUM 60 MILLIGRAM(S): 100 INJECTION SUBCUTANEOUS at 05:05

## 2022-01-01 RX ADMIN — CARBIDOPA AND LEVODOPA 1 TABLET(S): 25; 100 TABLET ORAL at 11:24

## 2022-01-01 RX ADMIN — HEPARIN SODIUM 5000 UNIT(S): 5000 INJECTION INTRAVENOUS; SUBCUTANEOUS at 13:10

## 2022-01-01 RX ADMIN — HEPARIN SODIUM 5000 UNIT(S): 5000 INJECTION INTRAVENOUS; SUBCUTANEOUS at 05:07

## 2022-01-01 RX ADMIN — ENOXAPARIN SODIUM 70 MILLIGRAM(S): 100 INJECTION SUBCUTANEOUS at 23:59

## 2022-01-01 RX ADMIN — HYDROMORPHONE HYDROCHLORIDE 2 MILLIGRAM(S): 2 INJECTION INTRAMUSCULAR; INTRAVENOUS; SUBCUTANEOUS at 16:28

## 2022-01-01 RX ADMIN — CARBIDOPA AND LEVODOPA 1 TABLET(S): 25; 100 TABLET ORAL at 17:34

## 2022-01-01 RX ADMIN — APREPITANT 40 MILLIGRAM(S): 80 CAPSULE ORAL at 06:56

## 2022-01-01 RX ADMIN — SODIUM CHLORIDE 4 MILLILITER(S): 9 INJECTION INTRAMUSCULAR; INTRAVENOUS; SUBCUTANEOUS at 07:55

## 2022-01-01 RX ADMIN — CITALOPRAM 20 MILLIGRAM(S): 10 TABLET, FILM COATED ORAL at 11:09

## 2022-01-01 RX ADMIN — Medication 300 MILLIGRAM(S): at 08:11

## 2022-01-01 RX ADMIN — POTASSIUM PHOSPHATE, MONOBASIC POTASSIUM PHOSPHATE, DIBASIC 83.33 MILLIMOLE(S): 236; 224 INJECTION, SOLUTION INTRAVENOUS at 08:47

## 2022-01-01 RX ADMIN — SODIUM CHLORIDE 4 MILLILITER(S): 9 INJECTION INTRAMUSCULAR; INTRAVENOUS; SUBCUTANEOUS at 07:30

## 2022-01-01 RX ADMIN — SODIUM CHLORIDE 60 MILLILITER(S): 9 INJECTION INTRAMUSCULAR; INTRAVENOUS; SUBCUTANEOUS at 07:59

## 2022-01-01 RX ADMIN — CITALOPRAM 20 MILLIGRAM(S): 10 TABLET, FILM COATED ORAL at 12:30

## 2022-01-01 RX ADMIN — PRIMIDONE 50 MILLIGRAM(S): 250 TABLET ORAL at 11:08

## 2022-01-01 RX ADMIN — MUPIROCIN 1 APPLICATION(S): 20 OINTMENT TOPICAL at 17:41

## 2022-01-01 RX ADMIN — LACTULOSE 20 GRAM(S): 10 SOLUTION ORAL at 14:04

## 2022-01-01 RX ADMIN — MEROPENEM 100 MILLIGRAM(S): 1 INJECTION INTRAVENOUS at 13:26

## 2022-01-01 RX ADMIN — Medication 500 MILLIGRAM(S): at 12:52

## 2022-01-01 RX ADMIN — TAMSULOSIN HYDROCHLORIDE 0.4 MILLIGRAM(S): 0.4 CAPSULE ORAL at 21:30

## 2022-01-01 RX ADMIN — Medication 650 MILLIGRAM(S): at 12:38

## 2022-01-01 RX ADMIN — PANTOPRAZOLE SODIUM 40 MILLIGRAM(S): 20 TABLET, DELAYED RELEASE ORAL at 13:01

## 2022-01-01 RX ADMIN — Medication 200 GRAM(S): at 11:36

## 2022-01-01 RX ADMIN — CITALOPRAM 20 MILLIGRAM(S): 10 TABLET, FILM COATED ORAL at 13:43

## 2022-01-01 RX ADMIN — ENOXAPARIN SODIUM 70 MILLIGRAM(S): 100 INJECTION SUBCUTANEOUS at 01:05

## 2022-01-01 RX ADMIN — CEFTRIAXONE 100 MILLIGRAM(S): 500 INJECTION, POWDER, FOR SOLUTION INTRAMUSCULAR; INTRAVENOUS at 05:35

## 2022-01-01 RX ADMIN — Medication 4 MILLILITER(S): at 19:27

## 2022-01-01 RX ADMIN — PRIMIDONE 50 MILLIGRAM(S): 250 TABLET ORAL at 11:50

## 2022-01-01 RX ADMIN — PANTOPRAZOLE SODIUM 40 MILLIGRAM(S): 20 TABLET, DELAYED RELEASE ORAL at 11:05

## 2022-01-01 RX ADMIN — SIMVASTATIN 40 MILLIGRAM(S): 20 TABLET, FILM COATED ORAL at 21:22

## 2022-01-01 RX ADMIN — Medication 50 MILLIGRAM(S): at 05:23

## 2022-01-01 RX ADMIN — SODIUM CHLORIDE 4 MILLILITER(S): 9 INJECTION INTRAMUSCULAR; INTRAVENOUS; SUBCUTANEOUS at 13:17

## 2022-01-01 RX ADMIN — LACTULOSE 20 GRAM(S): 10 SOLUTION ORAL at 05:21

## 2022-01-01 RX ADMIN — Medication 4 MILLILITER(S): at 14:21

## 2022-01-01 RX ADMIN — Medication 4 MILLILITER(S): at 01:23

## 2022-01-01 RX ADMIN — PRIMIDONE 50 MILLIGRAM(S): 250 TABLET ORAL at 14:36

## 2022-01-01 RX ADMIN — SODIUM CHLORIDE 4 MILLILITER(S): 9 INJECTION INTRAMUSCULAR; INTRAVENOUS; SUBCUTANEOUS at 09:04

## 2022-01-01 RX ADMIN — Medication 3 MILLIGRAM(S): at 21:31

## 2022-01-01 RX ADMIN — PANTOPRAZOLE SODIUM 40 MILLIGRAM(S): 20 TABLET, DELAYED RELEASE ORAL at 12:04

## 2022-01-01 RX ADMIN — SODIUM CHLORIDE 2000 MILLILITER(S): 9 INJECTION INTRAMUSCULAR; INTRAVENOUS; SUBCUTANEOUS at 23:10

## 2022-01-01 RX ADMIN — DEXMEDETOMIDINE HYDROCHLORIDE IN 0.9% SODIUM CHLORIDE 4.2 MICROGRAM(S)/KG/HR: 4 INJECTION INTRAVENOUS at 12:45

## 2022-01-01 RX ADMIN — Medication 4 MILLILITER(S): at 08:28

## 2022-01-01 RX ADMIN — LACTULOSE 20 GRAM(S): 10 SOLUTION ORAL at 13:00

## 2022-01-01 RX ADMIN — CARBIDOPA AND LEVODOPA 1 TABLET(S): 25; 100 TABLET ORAL at 23:33

## 2022-01-01 RX ADMIN — Medication 240 MILLIGRAM(S): at 06:03

## 2022-01-01 RX ADMIN — CHLORHEXIDINE GLUCONATE 15 MILLILITER(S): 213 SOLUTION TOPICAL at 05:09

## 2022-01-01 RX ADMIN — Medication 4 MILLILITER(S): at 07:27

## 2022-01-01 RX ADMIN — Medication 100 MILLIGRAM(S): at 21:06

## 2022-01-01 RX ADMIN — CHLORHEXIDINE GLUCONATE 1 APPLICATION(S): 213 SOLUTION TOPICAL at 05:29

## 2022-01-01 RX ADMIN — PRIMIDONE 50 MILLIGRAM(S): 250 TABLET ORAL at 12:12

## 2022-01-01 RX ADMIN — ENOXAPARIN SODIUM 60 MILLIGRAM(S): 100 INJECTION SUBCUTANEOUS at 10:05

## 2022-01-01 RX ADMIN — ATORVASTATIN CALCIUM 80 MILLIGRAM(S): 80 TABLET, FILM COATED ORAL at 22:16

## 2022-01-01 RX ADMIN — Medication 4.17 MICROGRAM(S)/KG/MIN: at 08:27

## 2022-01-01 RX ADMIN — Medication 4 MILLILITER(S): at 15:38

## 2022-01-01 RX ADMIN — MUPIROCIN 1 APPLICATION(S): 20 OINTMENT TOPICAL at 05:01

## 2022-01-01 RX ADMIN — LACTULOSE 20 GRAM(S): 10 SOLUTION ORAL at 05:41

## 2022-01-01 RX ADMIN — PANTOPRAZOLE SODIUM 40 MILLIGRAM(S): 20 TABLET, DELAYED RELEASE ORAL at 05:38

## 2022-01-01 RX ADMIN — PANTOPRAZOLE SODIUM 40 MILLIGRAM(S): 20 TABLET, DELAYED RELEASE ORAL at 18:10

## 2022-01-01 RX ADMIN — Medication 650 MILLIGRAM(S): at 13:00

## 2022-01-01 RX ADMIN — ENOXAPARIN SODIUM 40 MILLIGRAM(S): 100 INJECTION SUBCUTANEOUS at 12:12

## 2022-01-01 RX ADMIN — ENOXAPARIN SODIUM 70 MILLIGRAM(S): 100 INJECTION SUBCUTANEOUS at 23:29

## 2022-01-01 RX ADMIN — Medication 50 MILLIGRAM(S): at 13:10

## 2022-01-01 RX ADMIN — PRIMIDONE 50 MILLIGRAM(S): 250 TABLET ORAL at 12:03

## 2022-01-01 RX ADMIN — Medication 4.17 MICROGRAM(S)/KG/MIN: at 04:00

## 2022-01-01 RX ADMIN — MEROPENEM 100 MILLIGRAM(S): 1 INJECTION INTRAVENOUS at 05:28

## 2022-01-01 RX ADMIN — Medication 3 MILLIGRAM(S): at 21:42

## 2022-01-01 RX ADMIN — LACTULOSE 20 GRAM(S): 10 SOLUTION ORAL at 05:35

## 2022-01-01 RX ADMIN — TAMSULOSIN HYDROCHLORIDE 0.4 MILLIGRAM(S): 0.4 CAPSULE ORAL at 21:54

## 2022-01-01 RX ADMIN — MEROPENEM 100 MILLIGRAM(S): 1 INJECTION INTRAVENOUS at 05:51

## 2022-01-01 RX ADMIN — CARBIDOPA AND LEVODOPA 1 TABLET(S): 25; 100 TABLET ORAL at 18:17

## 2022-01-01 RX ADMIN — Medication 300 MILLIGRAM(S): at 21:03

## 2022-01-01 RX ADMIN — PRIMIDONE 50 MILLIGRAM(S): 250 TABLET ORAL at 12:22

## 2022-01-01 RX ADMIN — SODIUM CHLORIDE 4 MILLILITER(S): 9 INJECTION INTRAMUSCULAR; INTRAVENOUS; SUBCUTANEOUS at 13:52

## 2022-01-01 RX ADMIN — Medication 10 MILLIGRAM(S): at 06:26

## 2022-01-01 RX ADMIN — LACTULOSE 20 GRAM(S): 10 SOLUTION ORAL at 21:33

## 2022-01-01 RX ADMIN — CARBIDOPA AND LEVODOPA 1 TABLET(S): 25; 100 TABLET ORAL at 09:34

## 2022-01-01 RX ADMIN — Medication 200 GRAM(S): at 11:41

## 2022-01-01 RX ADMIN — SODIUM CHLORIDE 100 MILLILITER(S): 9 INJECTION, SOLUTION INTRAVENOUS at 16:31

## 2022-01-01 RX ADMIN — PANTOPRAZOLE SODIUM 40 MILLIGRAM(S): 20 TABLET, DELAYED RELEASE ORAL at 12:06

## 2022-01-01 RX ADMIN — CARBIDOPA AND LEVODOPA 1 TABLET(S): 25; 100 TABLET ORAL at 06:25

## 2022-01-01 RX ADMIN — Medication 4 MILLILITER(S): at 00:55

## 2022-01-01 RX ADMIN — Medication 4 MILLILITER(S): at 07:50

## 2022-01-01 RX ADMIN — Medication 50 MILLILITER(S): at 17:15

## 2022-01-01 RX ADMIN — HEPARIN SODIUM 5000 UNIT(S): 5000 INJECTION INTRAVENOUS; SUBCUTANEOUS at 15:03

## 2022-01-01 RX ADMIN — ATORVASTATIN CALCIUM 80 MILLIGRAM(S): 80 TABLET, FILM COATED ORAL at 21:59

## 2022-01-01 RX ADMIN — ENOXAPARIN SODIUM 70 MILLIGRAM(S): 100 INJECTION SUBCUTANEOUS at 12:06

## 2022-01-01 RX ADMIN — PIPERACILLIN AND TAZOBACTAM 25 GRAM(S): 4; .5 INJECTION, POWDER, LYOPHILIZED, FOR SOLUTION INTRAVENOUS at 19:29

## 2022-01-01 RX ADMIN — SODIUM CHLORIDE 4 MILLILITER(S): 9 INJECTION INTRAMUSCULAR; INTRAVENOUS; SUBCUTANEOUS at 20:04

## 2022-01-01 RX ADMIN — Medication 200 GRAM(S): at 12:26

## 2022-01-01 RX ADMIN — CARBIDOPA AND LEVODOPA 1 TABLET(S): 25; 100 TABLET ORAL at 12:30

## 2022-01-01 RX ADMIN — PANTOPRAZOLE SODIUM 40 MILLIGRAM(S): 20 TABLET, DELAYED RELEASE ORAL at 05:07

## 2022-01-01 RX ADMIN — CEFTRIAXONE 100 MILLIGRAM(S): 500 INJECTION, POWDER, FOR SOLUTION INTRAMUSCULAR; INTRAVENOUS at 05:09

## 2022-01-01 RX ADMIN — ATORVASTATIN CALCIUM 80 MILLIGRAM(S): 80 TABLET, FILM COATED ORAL at 21:28

## 2022-01-01 RX ADMIN — ENOXAPARIN SODIUM 70 MILLIGRAM(S): 100 INJECTION SUBCUTANEOUS at 12:20

## 2022-01-01 RX ADMIN — MUPIROCIN 1 APPLICATION(S): 20 OINTMENT TOPICAL at 17:29

## 2022-01-01 RX ADMIN — CARBIDOPA AND LEVODOPA 1 TABLET(S): 25; 100 TABLET ORAL at 18:30

## 2022-01-01 RX ADMIN — PANTOPRAZOLE SODIUM 40 MILLIGRAM(S): 20 TABLET, DELAYED RELEASE ORAL at 05:09

## 2022-01-01 RX ADMIN — CHLORHEXIDINE GLUCONATE 1 APPLICATION(S): 213 SOLUTION TOPICAL at 05:41

## 2022-01-01 RX ADMIN — PAMIDRONATE DISODIUM 65 MILLIGRAM(S): 9 INJECTION, SOLUTION INTRAVENOUS at 14:24

## 2022-01-01 RX ADMIN — Medication 4 MILLILITER(S): at 00:51

## 2022-01-01 RX ADMIN — Medication 200 GRAM(S): at 23:13

## 2022-01-01 RX ADMIN — Medication 300 MILLIGRAM(S): at 19:27

## 2022-01-01 RX ADMIN — CARBIDOPA AND LEVODOPA 1 TABLET(S): 25; 100 TABLET ORAL at 12:06

## 2022-01-01 RX ADMIN — MIDODRINE HYDROCHLORIDE 5 MILLIGRAM(S): 2.5 TABLET ORAL at 14:24

## 2022-01-01 RX ADMIN — HEPARIN SODIUM 5000 UNIT(S): 5000 INJECTION INTRAVENOUS; SUBCUTANEOUS at 13:54

## 2022-01-01 RX ADMIN — VASOPRESSIN 2.4 UNIT(S)/MIN: 20 INJECTION INTRAVENOUS at 06:24

## 2022-01-01 RX ADMIN — Medication 500 MILLIGRAM(S): at 12:27

## 2022-01-01 RX ADMIN — Medication 50 MILLIGRAM(S): at 21:24

## 2022-01-01 RX ADMIN — Medication 240 MILLIGRAM(S): at 14:37

## 2022-01-01 RX ADMIN — Medication 3 MILLILITER(S): at 00:18

## 2022-01-01 RX ADMIN — ATORVASTATIN CALCIUM 80 MILLIGRAM(S): 80 TABLET, FILM COATED ORAL at 21:58

## 2022-01-01 RX ADMIN — PANTOPRAZOLE SODIUM 40 MILLIGRAM(S): 20 TABLET, DELAYED RELEASE ORAL at 17:29

## 2022-01-01 RX ADMIN — Medication 10 MILLIGRAM(S): at 05:03

## 2022-01-01 RX ADMIN — CARBIDOPA AND LEVODOPA 1 TABLET(S): 25; 100 TABLET ORAL at 11:38

## 2022-01-01 RX ADMIN — CARBIDOPA AND LEVODOPA 1 TABLET(S): 25; 100 TABLET ORAL at 12:10

## 2022-01-01 RX ADMIN — ATORVASTATIN CALCIUM 80 MILLIGRAM(S): 80 TABLET, FILM COATED ORAL at 21:15

## 2022-01-01 RX ADMIN — CHLORHEXIDINE GLUCONATE 15 MILLILITER(S): 213 SOLUTION TOPICAL at 17:15

## 2022-01-01 RX ADMIN — FENTANYL CITRATE 25 MICROGRAM(S): 50 INJECTION INTRAVENOUS at 20:08

## 2022-01-01 RX ADMIN — CHLORHEXIDINE GLUCONATE 15 MILLILITER(S): 213 SOLUTION TOPICAL at 18:07

## 2022-01-01 RX ADMIN — ENOXAPARIN SODIUM 70 MILLIGRAM(S): 100 INJECTION SUBCUTANEOUS at 11:56

## 2022-01-01 RX ADMIN — Medication 200 GRAM(S): at 05:55

## 2022-01-01 RX ADMIN — Medication 4 MILLILITER(S): at 08:00

## 2022-01-01 RX ADMIN — PROPOFOL 5.34 MICROGRAM(S)/KG/MIN: 10 INJECTION, EMULSION INTRAVENOUS at 17:08

## 2022-01-01 RX ADMIN — ATORVASTATIN CALCIUM 80 MILLIGRAM(S): 80 TABLET, FILM COATED ORAL at 21:56

## 2022-01-01 RX ADMIN — CARBIDOPA AND LEVODOPA 1 TABLET(S): 25; 100 TABLET ORAL at 00:02

## 2022-01-01 RX ADMIN — CITALOPRAM 10 MILLIGRAM(S): 10 TABLET, FILM COATED ORAL at 11:38

## 2022-01-01 RX ADMIN — SODIUM CHLORIDE 4 MILLILITER(S): 9 INJECTION INTRAMUSCULAR; INTRAVENOUS; SUBCUTANEOUS at 13:59

## 2022-01-01 RX ADMIN — ATORVASTATIN CALCIUM 80 MILLIGRAM(S): 80 TABLET, FILM COATED ORAL at 22:08

## 2022-01-01 RX ADMIN — MEROPENEM 100 MILLIGRAM(S): 1 INJECTION INTRAVENOUS at 21:52

## 2022-01-01 RX ADMIN — Medication 240 MILLIGRAM(S): at 05:05

## 2022-01-01 RX ADMIN — ENOXAPARIN SODIUM 40 MILLIGRAM(S): 100 INJECTION SUBCUTANEOUS at 05:30

## 2022-01-01 RX ADMIN — Medication 3 MILLILITER(S): at 19:56

## 2022-01-01 RX ADMIN — FENTANYL CITRATE 25 MICROGRAM(S): 50 INJECTION INTRAVENOUS at 20:38

## 2022-01-01 RX ADMIN — HEPARIN SODIUM 5000 UNIT(S): 5000 INJECTION INTRAVENOUS; SUBCUTANEOUS at 21:26

## 2022-01-01 RX ADMIN — FENTANYL CITRATE 25 MICROGRAM(S): 50 INJECTION INTRAVENOUS at 03:57

## 2022-01-01 RX ADMIN — HEPARIN SODIUM 5000 UNIT(S): 5000 INJECTION INTRAVENOUS; SUBCUTANEOUS at 05:22

## 2022-01-01 RX ADMIN — Medication 3 MILLILITER(S): at 13:41

## 2022-01-01 RX ADMIN — Medication 100 MILLIEQUIVALENT(S): at 15:49

## 2022-01-01 RX ADMIN — SODIUM CHLORIDE 4 MILLILITER(S): 9 INJECTION INTRAMUSCULAR; INTRAVENOUS; SUBCUTANEOUS at 08:09

## 2022-01-01 RX ADMIN — Medication 3 MILLILITER(S): at 01:21

## 2022-01-01 RX ADMIN — Medication 40 MILLIEQUIVALENT(S): at 12:49

## 2022-01-01 RX ADMIN — Medication 3 MILLILITER(S): at 07:55

## 2022-01-01 RX ADMIN — CARBIDOPA AND LEVODOPA 1 TABLET(S): 25; 100 TABLET ORAL at 12:05

## 2022-01-01 RX ADMIN — Medication 4 MILLILITER(S): at 09:12

## 2022-01-01 RX ADMIN — SODIUM CHLORIDE 4 MILLILITER(S): 9 INJECTION INTRAMUSCULAR; INTRAVENOUS; SUBCUTANEOUS at 19:42

## 2022-01-01 RX ADMIN — Medication 3 MILLILITER(S): at 19:50

## 2022-01-01 RX ADMIN — HEPARIN SODIUM 5000 UNIT(S): 5000 INJECTION INTRAVENOUS; SUBCUTANEOUS at 14:24

## 2022-01-01 RX ADMIN — CARBIDOPA AND LEVODOPA 1 TABLET(S): 25; 100 TABLET ORAL at 12:23

## 2022-01-01 RX ADMIN — CITALOPRAM 20 MILLIGRAM(S): 10 TABLET, FILM COATED ORAL at 12:12

## 2022-01-01 RX ADMIN — Medication 3 MILLILITER(S): at 07:44

## 2022-01-01 RX ADMIN — HYDROMORPHONE HYDROCHLORIDE 2 MILLIGRAM(S): 2 INJECTION INTRAMUSCULAR; INTRAVENOUS; SUBCUTANEOUS at 16:15

## 2022-01-01 RX ADMIN — FENTANYL CITRATE 50 MICROGRAM(S): 50 INJECTION INTRAVENOUS at 14:37

## 2022-01-01 RX ADMIN — MEROPENEM 100 MILLIGRAM(S): 1 INJECTION INTRAVENOUS at 22:06

## 2022-01-01 RX ADMIN — Medication 4 MILLILITER(S): at 07:30

## 2022-01-01 RX ADMIN — POLYETHYLENE GLYCOL 3350 17 GRAM(S): 17 POWDER, FOR SOLUTION ORAL at 11:33

## 2022-01-01 RX ADMIN — CHLORHEXIDINE GLUCONATE 1 APPLICATION(S): 213 SOLUTION TOPICAL at 05:36

## 2022-01-01 RX ADMIN — Medication 25 MILLIGRAM(S): at 21:23

## 2022-01-01 RX ADMIN — Medication 4 MILLILITER(S): at 19:51

## 2022-01-01 RX ADMIN — Medication 650 MILLIGRAM(S): at 12:03

## 2022-01-01 RX ADMIN — HEPARIN SODIUM 5000 UNIT(S): 5000 INJECTION INTRAVENOUS; SUBCUTANEOUS at 22:39

## 2022-01-01 RX ADMIN — Medication 3 MILLILITER(S): at 07:50

## 2022-01-01 RX ADMIN — DEXMEDETOMIDINE HYDROCHLORIDE IN 0.9% SODIUM CHLORIDE 4.2 MICROGRAM(S)/KG/HR: 4 INJECTION INTRAVENOUS at 18:16

## 2022-01-01 RX ADMIN — CARBIDOPA AND LEVODOPA 1 TABLET(S): 25; 100 TABLET ORAL at 06:27

## 2022-01-01 RX ADMIN — Medication 3 MILLILITER(S): at 19:42

## 2022-01-01 RX ADMIN — Medication 10 MILLIGRAM(S): at 14:36

## 2022-01-01 RX ADMIN — Medication 81 MILLIGRAM(S): at 11:42

## 2022-01-01 RX ADMIN — Medication 4 MILLILITER(S): at 19:50

## 2022-01-01 RX ADMIN — ENOXAPARIN SODIUM 40 MILLIGRAM(S): 100 INJECTION SUBCUTANEOUS at 05:14

## 2022-01-01 RX ADMIN — Medication 81 MILLIGRAM(S): at 12:05

## 2022-01-01 RX ADMIN — TAMSULOSIN HYDROCHLORIDE 0.4 MILLIGRAM(S): 0.4 CAPSULE ORAL at 21:25

## 2022-01-01 RX ADMIN — Medication 3 MILLIGRAM(S): at 21:21

## 2022-01-01 RX ADMIN — CHLORHEXIDINE GLUCONATE 15 MILLILITER(S): 213 SOLUTION TOPICAL at 18:08

## 2022-01-01 RX ADMIN — Medication 3 MILLILITER(S): at 07:03

## 2022-01-01 RX ADMIN — TAMSULOSIN HYDROCHLORIDE 0.4 MILLIGRAM(S): 0.4 CAPSULE ORAL at 21:15

## 2022-01-01 RX ADMIN — PANTOPRAZOLE SODIUM 40 MILLIGRAM(S): 20 TABLET, DELAYED RELEASE ORAL at 11:50

## 2022-01-01 RX ADMIN — CHLORHEXIDINE GLUCONATE 15 MILLILITER(S): 213 SOLUTION TOPICAL at 06:25

## 2022-01-01 RX ADMIN — CARBIDOPA AND LEVODOPA 1 TABLET(S): 25; 100 TABLET ORAL at 06:29

## 2022-01-01 RX ADMIN — SODIUM CHLORIDE 4 MILLILITER(S): 9 INJECTION INTRAMUSCULAR; INTRAVENOUS; SUBCUTANEOUS at 01:24

## 2022-01-01 RX ADMIN — ENOXAPARIN SODIUM 60 MILLIGRAM(S): 100 INJECTION SUBCUTANEOUS at 18:11

## 2022-01-01 RX ADMIN — Medication 4 MILLILITER(S): at 19:49

## 2022-01-01 RX ADMIN — SODIUM CHLORIDE 4 MILLILITER(S): 9 INJECTION INTRAMUSCULAR; INTRAVENOUS; SUBCUTANEOUS at 00:08

## 2022-01-01 RX ADMIN — SODIUM CHLORIDE 4 MILLILITER(S): 9 INJECTION INTRAMUSCULAR; INTRAVENOUS; SUBCUTANEOUS at 15:36

## 2022-01-01 RX ADMIN — TAMSULOSIN HYDROCHLORIDE 0.4 MILLIGRAM(S): 0.4 CAPSULE ORAL at 21:42

## 2022-01-01 RX ADMIN — CHLORHEXIDINE GLUCONATE 15 MILLILITER(S): 213 SOLUTION TOPICAL at 05:11

## 2022-01-01 RX ADMIN — Medication 81 MILLIGRAM(S): at 17:34

## 2022-01-01 RX ADMIN — PIPERACILLIN AND TAZOBACTAM 25 GRAM(S): 4; .5 INJECTION, POWDER, LYOPHILIZED, FOR SOLUTION INTRAVENOUS at 02:42

## 2022-01-01 RX ADMIN — CARBIDOPA AND LEVODOPA 1 TABLET(S): 25; 100 TABLET ORAL at 21:22

## 2022-01-01 RX ADMIN — SODIUM CHLORIDE 4 MILLILITER(S): 9 INJECTION INTRAMUSCULAR; INTRAVENOUS; SUBCUTANEOUS at 19:50

## 2022-01-01 RX ADMIN — POLYETHYLENE GLYCOL 3350 17 GRAM(S): 17 POWDER, FOR SOLUTION ORAL at 12:05

## 2022-01-01 RX ADMIN — POLYETHYLENE GLYCOL 3350 17 GRAM(S): 17 POWDER, FOR SOLUTION ORAL at 18:16

## 2022-01-01 RX ADMIN — CHLORHEXIDINE GLUCONATE 1 APPLICATION(S): 213 SOLUTION TOPICAL at 05:11

## 2022-01-01 RX ADMIN — HEPARIN SODIUM 5000 UNIT(S): 5000 INJECTION INTRAVENOUS; SUBCUTANEOUS at 21:53

## 2022-01-01 RX ADMIN — Medication 102 MILLIGRAM(S): at 12:22

## 2022-01-01 RX ADMIN — ETOMIDATE 20 MILLIGRAM(S): 2 INJECTION INTRAVENOUS at 11:51

## 2022-01-01 RX ADMIN — PANTOPRAZOLE SODIUM 40 MILLIGRAM(S): 20 TABLET, DELAYED RELEASE ORAL at 12:23

## 2022-01-01 RX ADMIN — LACTULOSE 20 GRAM(S): 10 SOLUTION ORAL at 05:30

## 2022-01-01 RX ADMIN — Medication 300 MILLIGRAM(S): at 07:50

## 2022-01-01 RX ADMIN — CITALOPRAM 20 MILLIGRAM(S): 10 TABLET, FILM COATED ORAL at 11:33

## 2022-01-01 RX ADMIN — HEPARIN SODIUM 5000 UNIT(S): 5000 INJECTION INTRAVENOUS; SUBCUTANEOUS at 21:23

## 2022-01-01 RX ADMIN — Medication 25 MILLIGRAM(S): at 06:17

## 2022-01-01 RX ADMIN — SODIUM CHLORIDE 1000 MILLILITER(S): 9 INJECTION, SOLUTION INTRAVENOUS at 14:29

## 2022-01-01 RX ADMIN — Medication 240 MILLIGRAM(S): at 05:11

## 2022-01-01 RX ADMIN — CITALOPRAM 20 MILLIGRAM(S): 10 TABLET, FILM COATED ORAL at 13:10

## 2022-01-01 RX ADMIN — Medication 500 MILLIGRAM(S): at 14:36

## 2022-01-01 RX ADMIN — ENOXAPARIN SODIUM 70 MILLIGRAM(S): 100 INJECTION SUBCUTANEOUS at 11:47

## 2022-01-01 RX ADMIN — Medication 102 MILLIGRAM(S): at 22:38

## 2022-01-01 RX ADMIN — CARBIDOPA AND LEVODOPA 1 TABLET(S): 25; 100 TABLET ORAL at 06:00

## 2022-01-01 RX ADMIN — PRIMIDONE 50 MILLIGRAM(S): 250 TABLET ORAL at 13:43

## 2022-01-01 RX ADMIN — Medication 240 MILLIGRAM(S): at 05:12

## 2022-01-01 RX ADMIN — FENTANYL CITRATE 25 MICROGRAM(S): 50 INJECTION INTRAVENOUS at 03:27

## 2022-01-01 RX ADMIN — Medication 240 MILLIGRAM(S): at 06:25

## 2022-01-01 RX ADMIN — Medication 3 MILLILITER(S): at 07:57

## 2022-01-01 RX ADMIN — Medication 200 GRAM(S): at 12:51

## 2022-01-01 RX ADMIN — CARBIDOPA AND LEVODOPA 1 TABLET(S): 25; 100 TABLET ORAL at 11:09

## 2022-01-01 RX ADMIN — CHLORHEXIDINE GLUCONATE 1 APPLICATION(S): 213 SOLUTION TOPICAL at 05:30

## 2022-01-01 RX ADMIN — POLYETHYLENE GLYCOL 3350 17 GRAM(S): 17 POWDER, FOR SOLUTION ORAL at 17:17

## 2022-01-01 RX ADMIN — Medication 4.17 MICROGRAM(S)/KG/MIN: at 17:08

## 2022-01-01 RX ADMIN — PROPOFOL 5.34 MICROGRAM(S)/KG/MIN: 10 INJECTION, EMULSION INTRAVENOUS at 15:25

## 2022-01-01 RX ADMIN — CARBIDOPA AND LEVODOPA 1 TABLET(S): 25; 100 TABLET ORAL at 23:58

## 2022-01-01 RX ADMIN — SODIUM CHLORIDE 1000 MILLILITER(S): 9 INJECTION INTRAMUSCULAR; INTRAVENOUS; SUBCUTANEOUS at 03:20

## 2022-01-01 RX ADMIN — Medication 4 MILLILITER(S): at 13:48

## 2022-01-01 RX ADMIN — POLYETHYLENE GLYCOL 3350 17 GRAM(S): 17 POWDER, FOR SOLUTION ORAL at 17:19

## 2022-01-01 RX ADMIN — PANTOPRAZOLE SODIUM 40 MILLIGRAM(S): 20 TABLET, DELAYED RELEASE ORAL at 14:48

## 2022-01-01 RX ADMIN — Medication 100 MILLIEQUIVALENT(S): at 10:39

## 2022-01-01 RX ADMIN — PIPERACILLIN AND TAZOBACTAM 25 GRAM(S): 4; .5 INJECTION, POWDER, LYOPHILIZED, FOR SOLUTION INTRAVENOUS at 18:02

## 2022-01-01 RX ADMIN — PANTOPRAZOLE SODIUM 40 MILLIGRAM(S): 20 TABLET, DELAYED RELEASE ORAL at 05:13

## 2022-01-01 RX ADMIN — MUPIROCIN 1 APPLICATION(S): 20 OINTMENT TOPICAL at 18:12

## 2022-01-01 RX ADMIN — Medication 3 MILLILITER(S): at 13:52

## 2022-01-01 RX ADMIN — MEROPENEM 100 MILLIGRAM(S): 1 INJECTION INTRAVENOUS at 14:26

## 2022-01-01 RX ADMIN — Medication 100 MILLIEQUIVALENT(S): at 14:36

## 2022-01-01 RX ADMIN — CHLORHEXIDINE GLUCONATE 1 APPLICATION(S): 213 SOLUTION TOPICAL at 05:51

## 2022-01-01 RX ADMIN — Medication 4 MILLILITER(S): at 19:42

## 2022-01-01 RX ADMIN — Medication 3 MILLILITER(S): at 12:37

## 2022-01-01 RX ADMIN — SIMVASTATIN 40 MILLIGRAM(S): 20 TABLET, FILM COATED ORAL at 21:42

## 2022-01-01 RX ADMIN — CITALOPRAM 20 MILLIGRAM(S): 10 TABLET, FILM COATED ORAL at 11:08

## 2022-01-01 RX ADMIN — LACTULOSE 20 GRAM(S): 10 SOLUTION ORAL at 15:16

## 2022-01-01 RX ADMIN — Medication 300 MILLIGRAM(S): at 10:02

## 2022-01-01 RX ADMIN — CARBIDOPA AND LEVODOPA 1 TABLET(S): 25; 100 TABLET ORAL at 08:56

## 2022-01-01 RX ADMIN — Medication 200 GRAM(S): at 05:01

## 2022-01-01 RX ADMIN — MEROPENEM 100 MILLIGRAM(S): 1 INJECTION INTRAVENOUS at 21:10

## 2022-01-01 RX ADMIN — PANTOPRAZOLE SODIUM 40 MILLIGRAM(S): 20 TABLET, DELAYED RELEASE ORAL at 17:51

## 2022-01-01 RX ADMIN — HEPARIN SODIUM 5000 UNIT(S): 5000 INJECTION INTRAVENOUS; SUBCUTANEOUS at 05:46

## 2022-01-01 RX ADMIN — Medication 50 MILLILITER(S): at 05:04

## 2022-01-01 RX ADMIN — SENNA PLUS 2 TABLET(S): 8.6 TABLET ORAL at 23:33

## 2022-01-01 RX ADMIN — Medication 3 MILLILITER(S): at 08:10

## 2022-01-01 RX ADMIN — Medication 240 MILLIGRAM(S): at 05:19

## 2022-01-01 RX ADMIN — CITALOPRAM 20 MILLIGRAM(S): 10 TABLET, FILM COATED ORAL at 14:37

## 2022-01-01 RX ADMIN — CARBIDOPA AND LEVODOPA 1 TABLET(S): 25; 100 TABLET ORAL at 13:42

## 2022-01-01 RX ADMIN — HEPARIN SODIUM 5000 UNIT(S): 5000 INJECTION INTRAVENOUS; SUBCUTANEOUS at 05:38

## 2022-01-01 RX ADMIN — Medication 81 MILLIGRAM(S): at 11:33

## 2022-01-01 RX ADMIN — LACTULOSE 20 GRAM(S): 10 SOLUTION ORAL at 21:10

## 2022-01-01 RX ADMIN — CHLORHEXIDINE GLUCONATE 15 MILLILITER(S): 213 SOLUTION TOPICAL at 18:01

## 2022-01-01 RX ADMIN — CARBIDOPA AND LEVODOPA 1 TABLET(S): 25; 100 TABLET ORAL at 11:33

## 2022-01-01 RX ADMIN — Medication 4 MILLILITER(S): at 20:28

## 2022-01-01 RX ADMIN — HEPARIN SODIUM 5000 UNIT(S): 5000 INJECTION INTRAVENOUS; SUBCUTANEOUS at 14:40

## 2022-01-01 RX ADMIN — CARBIDOPA AND LEVODOPA 1 TABLET(S): 25; 100 TABLET ORAL at 09:47

## 2022-01-01 RX ADMIN — Medication 100 MILLIGRAM(S): at 05:36

## 2022-01-01 RX ADMIN — Medication 3 MILLILITER(S): at 13:22

## 2022-01-01 RX ADMIN — CITALOPRAM 20 MILLIGRAM(S): 10 TABLET, FILM COATED ORAL at 12:22

## 2022-01-01 RX ADMIN — MEROPENEM 100 MILLIGRAM(S): 1 INJECTION INTRAVENOUS at 13:54

## 2022-01-01 RX ADMIN — Medication 4.17 MICROGRAM(S)/KG/MIN: at 13:34

## 2022-01-01 RX ADMIN — LACTULOSE 20 GRAM(S): 10 SOLUTION ORAL at 22:04

## 2022-01-01 RX ADMIN — TAMSULOSIN HYDROCHLORIDE 0.4 MILLIGRAM(S): 0.4 CAPSULE ORAL at 23:17

## 2022-01-01 RX ADMIN — PRIMIDONE 50 MILLIGRAM(S): 250 TABLET ORAL at 12:05

## 2022-01-01 RX ADMIN — CEFEPIME 100 MILLIGRAM(S): 1 INJECTION, POWDER, FOR SOLUTION INTRAMUSCULAR; INTRAVENOUS at 08:26

## 2022-01-01 RX ADMIN — Medication 3 MILLILITER(S): at 20:16

## 2022-01-01 RX ADMIN — SODIUM CHLORIDE 4 MILLILITER(S): 9 INJECTION INTRAMUSCULAR; INTRAVENOUS; SUBCUTANEOUS at 07:50

## 2022-01-01 RX ADMIN — MEROPENEM 100 MILLIGRAM(S): 1 INJECTION INTRAVENOUS at 21:26

## 2022-01-01 RX ADMIN — Medication 3 MILLILITER(S): at 00:08

## 2022-01-01 RX ADMIN — SODIUM CHLORIDE 4 MILLILITER(S): 9 INJECTION INTRAMUSCULAR; INTRAVENOUS; SUBCUTANEOUS at 12:37

## 2022-01-01 RX ADMIN — CLOPIDOGREL BISULFATE 75 MILLIGRAM(S): 75 TABLET, FILM COATED ORAL at 12:24

## 2022-01-01 RX ADMIN — Medication 4 MILLILITER(S): at 01:08

## 2022-01-01 RX ADMIN — Medication 81 MILLIGRAM(S): at 11:08

## 2022-01-01 RX ADMIN — Medication 40 MILLIEQUIVALENT(S): at 10:38

## 2022-01-01 RX ADMIN — ATORVASTATIN CALCIUM 80 MILLIGRAM(S): 80 TABLET, FILM COATED ORAL at 21:14

## 2022-01-01 RX ADMIN — SODIUM CHLORIDE 4 MILLILITER(S): 9 INJECTION INTRAMUSCULAR; INTRAVENOUS; SUBCUTANEOUS at 00:06

## 2022-01-01 RX ADMIN — Medication 400 MILLIGRAM(S): at 12:52

## 2022-01-01 RX ADMIN — CARBIDOPA AND LEVODOPA 1 TABLET(S): 25; 100 TABLET ORAL at 11:23

## 2022-01-01 RX ADMIN — PROPOFOL 5.34 MICROGRAM(S)/KG/MIN: 10 INJECTION, EMULSION INTRAVENOUS at 23:57

## 2022-01-01 RX ADMIN — PANTOPRAZOLE SODIUM 40 MILLIGRAM(S): 20 TABLET, DELAYED RELEASE ORAL at 11:12

## 2022-01-01 RX ADMIN — SODIUM CHLORIDE 4 MILLILITER(S): 9 INJECTION INTRAMUSCULAR; INTRAVENOUS; SUBCUTANEOUS at 20:35

## 2022-01-01 RX ADMIN — ROBINUL 0.2 MILLIGRAM(S): 0.2 INJECTION INTRAMUSCULAR; INTRAVENOUS at 15:15

## 2022-01-01 RX ADMIN — HEPARIN SODIUM 5000 UNIT(S): 5000 INJECTION INTRAVENOUS; SUBCUTANEOUS at 21:31

## 2022-01-01 RX ADMIN — SENNA PLUS 2 TABLET(S): 8.6 TABLET ORAL at 21:54

## 2022-01-01 RX ADMIN — CARBIDOPA AND LEVODOPA 1 TABLET(S): 25; 100 TABLET ORAL at 12:21

## 2022-01-01 RX ADMIN — PIPERACILLIN AND TAZOBACTAM 25 GRAM(S): 4; .5 INJECTION, POWDER, LYOPHILIZED, FOR SOLUTION INTRAVENOUS at 18:09

## 2022-01-01 RX ADMIN — CARBIDOPA AND LEVODOPA 1 TABLET(S): 25; 100 TABLET ORAL at 18:10

## 2022-01-01 RX ADMIN — Medication 4 MILLILITER(S): at 20:16

## 2022-01-01 RX ADMIN — Medication 500 MILLIGRAM(S): at 11:37

## 2022-01-01 RX ADMIN — Medication 4 MILLILITER(S): at 01:21

## 2022-01-01 RX ADMIN — Medication 3 MILLILITER(S): at 19:26

## 2022-01-01 RX ADMIN — Medication 300 MILLIGRAM(S): at 20:36

## 2022-01-01 RX ADMIN — ENOXAPARIN SODIUM 60 MILLIGRAM(S): 100 INJECTION SUBCUTANEOUS at 17:41

## 2022-01-01 RX ADMIN — Medication 4 MILLILITER(S): at 07:40

## 2022-01-01 RX ADMIN — PIPERACILLIN AND TAZOBACTAM 200 GRAM(S): 4; .5 INJECTION, POWDER, LYOPHILIZED, FOR SOLUTION INTRAVENOUS at 02:27

## 2022-01-01 RX ADMIN — Medication 81 MILLIGRAM(S): at 17:31

## 2022-01-01 RX ADMIN — MUPIROCIN 1 APPLICATION(S): 20 OINTMENT TOPICAL at 17:51

## 2022-01-01 RX ADMIN — HEPARIN SODIUM 5000 UNIT(S): 5000 INJECTION INTRAVENOUS; SUBCUTANEOUS at 05:10

## 2022-01-01 RX ADMIN — Medication 3 MILLILITER(S): at 01:00

## 2022-01-01 RX ADMIN — CARBIDOPA AND LEVODOPA 1 TABLET(S): 25; 100 TABLET ORAL at 08:04

## 2022-01-01 RX ADMIN — Medication 4 MILLILITER(S): at 13:30

## 2022-01-01 RX ADMIN — PANTOPRAZOLE SODIUM 40 MILLIGRAM(S): 20 TABLET, DELAYED RELEASE ORAL at 05:19

## 2022-01-01 RX ADMIN — Medication 4.17 MICROGRAM(S)/KG/MIN: at 17:09

## 2022-01-01 RX ADMIN — Medication 4 MILLILITER(S): at 00:03

## 2022-01-01 RX ADMIN — Medication 10 MILLIGRAM(S): at 06:00

## 2022-01-01 RX ADMIN — CARBIDOPA AND LEVODOPA 1 TABLET(S): 25; 100 TABLET ORAL at 23:17

## 2022-01-01 RX ADMIN — ENOXAPARIN SODIUM 70 MILLIGRAM(S): 100 INJECTION SUBCUTANEOUS at 12:23

## 2022-01-01 RX ADMIN — CHLORHEXIDINE GLUCONATE 15 MILLILITER(S): 213 SOLUTION TOPICAL at 17:05

## 2022-01-01 RX ADMIN — Medication 300 MILLIGRAM(S): at 07:04

## 2022-01-01 RX ADMIN — PIPERACILLIN AND TAZOBACTAM 25 GRAM(S): 4; .5 INJECTION, POWDER, LYOPHILIZED, FOR SOLUTION INTRAVENOUS at 03:56

## 2022-01-01 RX ADMIN — PIPERACILLIN AND TAZOBACTAM 25 GRAM(S): 4; .5 INJECTION, POWDER, LYOPHILIZED, FOR SOLUTION INTRAVENOUS at 11:50

## 2022-01-01 RX ADMIN — CARBIDOPA AND LEVODOPA 1 TABLET(S): 25; 100 TABLET ORAL at 08:26

## 2022-01-01 RX ADMIN — Medication 3 MILLILITER(S): at 01:25

## 2022-01-01 RX ADMIN — Medication 240 MILLIGRAM(S): at 06:09

## 2022-01-01 RX ADMIN — Medication 300 MILLIGRAM(S): at 09:12

## 2022-01-01 RX ADMIN — TAMSULOSIN HYDROCHLORIDE 0.4 MILLIGRAM(S): 0.4 CAPSULE ORAL at 21:45

## 2022-01-01 RX ADMIN — SODIUM CHLORIDE 4 MILLILITER(S): 9 INJECTION INTRAMUSCULAR; INTRAVENOUS; SUBCUTANEOUS at 07:40

## 2022-01-01 RX ADMIN — ATORVASTATIN CALCIUM 80 MILLIGRAM(S): 80 TABLET, FILM COATED ORAL at 23:15

## 2022-01-01 RX ADMIN — PRIMIDONE 25 MILLIGRAM(S): 250 TABLET ORAL at 11:05

## 2022-01-01 RX ADMIN — CARBIDOPA AND LEVODOPA 1 TABLET(S): 25; 100 TABLET ORAL at 17:39

## 2022-01-01 RX ADMIN — PIPERACILLIN AND TAZOBACTAM 3.38 GRAM(S): 4; .5 INJECTION, POWDER, LYOPHILIZED, FOR SOLUTION INTRAVENOUS at 13:30

## 2022-01-01 RX ADMIN — MEROPENEM 100 MILLIGRAM(S): 1 INJECTION INTRAVENOUS at 05:21

## 2022-01-01 RX ADMIN — Medication 81 MILLIGRAM(S): at 12:24

## 2022-01-01 RX ADMIN — PRIMIDONE 25 MILLIGRAM(S): 250 TABLET ORAL at 11:40

## 2022-01-01 RX ADMIN — ENOXAPARIN SODIUM 60 MILLIGRAM(S): 100 INJECTION SUBCUTANEOUS at 17:44

## 2022-01-01 RX ADMIN — PIPERACILLIN AND TAZOBACTAM 25 GRAM(S): 4; .5 INJECTION, POWDER, LYOPHILIZED, FOR SOLUTION INTRAVENOUS at 18:53

## 2022-01-01 RX ADMIN — MUPIROCIN 1 APPLICATION(S): 20 OINTMENT TOPICAL at 06:27

## 2022-01-01 RX ADMIN — SODIUM CHLORIDE 50 MILLILITER(S): 9 INJECTION, SOLUTION INTRAVENOUS at 05:05

## 2022-01-01 RX ADMIN — MUPIROCIN 1 APPLICATION(S): 20 OINTMENT TOPICAL at 05:11

## 2022-01-01 RX ADMIN — Medication 300 MILLIGRAM(S): at 20:24

## 2022-01-01 RX ADMIN — Medication 200 GRAM(S): at 17:28

## 2022-01-01 RX ADMIN — Medication 3 MILLILITER(S): at 13:42

## 2022-01-01 RX ADMIN — CHLORHEXIDINE GLUCONATE 1 APPLICATION(S): 213 SOLUTION TOPICAL at 05:28

## 2022-01-01 RX ADMIN — CHLORHEXIDINE GLUCONATE 15 MILLILITER(S): 213 SOLUTION TOPICAL at 05:29

## 2022-01-01 RX ADMIN — Medication 3 MILLILITER(S): at 13:49

## 2022-01-01 RX ADMIN — CLOPIDOGREL BISULFATE 75 MILLIGRAM(S): 75 TABLET, FILM COATED ORAL at 12:38

## 2022-01-01 RX ADMIN — PANTOPRAZOLE SODIUM 40 MILLIGRAM(S): 20 TABLET, DELAYED RELEASE ORAL at 07:58

## 2022-01-01 RX ADMIN — ENOXAPARIN SODIUM 40 MILLIGRAM(S): 100 INJECTION SUBCUTANEOUS at 05:00

## 2022-01-01 RX ADMIN — HEPARIN SODIUM 5000 UNIT(S): 5000 INJECTION INTRAVENOUS; SUBCUTANEOUS at 21:58

## 2022-01-01 RX ADMIN — MIDAZOLAM HYDROCHLORIDE 2 MILLIGRAM(S): 1 INJECTION, SOLUTION INTRAMUSCULAR; INTRAVENOUS at 14:03

## 2022-01-01 RX ADMIN — MIDAZOLAM HYDROCHLORIDE 2 MILLIGRAM(S): 1 INJECTION, SOLUTION INTRAMUSCULAR; INTRAVENOUS at 16:41

## 2022-01-01 RX ADMIN — PANTOPRAZOLE SODIUM 40 MILLIGRAM(S): 20 TABLET, DELAYED RELEASE ORAL at 11:56

## 2022-01-01 RX ADMIN — PRIMIDONE 25 MILLIGRAM(S): 250 TABLET ORAL at 13:01

## 2022-01-01 RX ADMIN — ENOXAPARIN SODIUM 70 MILLIGRAM(S): 100 INJECTION SUBCUTANEOUS at 00:15

## 2022-01-01 RX ADMIN — PANTOPRAZOLE SODIUM 40 MILLIGRAM(S): 20 TABLET, DELAYED RELEASE ORAL at 11:41

## 2022-01-01 RX ADMIN — SODIUM CHLORIDE 4 MILLILITER(S): 9 INJECTION INTRAMUSCULAR; INTRAVENOUS; SUBCUTANEOUS at 14:21

## 2022-01-01 RX ADMIN — CARBIDOPA AND LEVODOPA 1 TABLET(S): 25; 100 TABLET ORAL at 17:44

## 2022-01-01 RX ADMIN — LACTULOSE 20 GRAM(S): 10 SOLUTION ORAL at 21:59

## 2022-01-01 RX ADMIN — Medication 100 MILLIEQUIVALENT(S): at 17:09

## 2022-01-01 RX ADMIN — Medication 100 MILLIEQUIVALENT(S): at 16:00

## 2022-01-01 RX ADMIN — ENOXAPARIN SODIUM 70 MILLIGRAM(S): 100 INJECTION SUBCUTANEOUS at 23:13

## 2022-01-01 RX ADMIN — ENOXAPARIN SODIUM 70 MILLIGRAM(S): 100 INJECTION SUBCUTANEOUS at 13:04

## 2022-01-01 RX ADMIN — CARBIDOPA AND LEVODOPA 1 TABLET(S): 25; 100 TABLET ORAL at 23:37

## 2022-01-01 RX ADMIN — PANTOPRAZOLE SODIUM 40 MILLIGRAM(S): 20 TABLET, DELAYED RELEASE ORAL at 12:08

## 2022-01-01 RX ADMIN — Medication 4.17 MICROGRAM(S)/KG/MIN: at 23:59

## 2022-01-01 RX ADMIN — PANTOPRAZOLE SODIUM 40 MILLIGRAM(S): 20 TABLET, DELAYED RELEASE ORAL at 06:10

## 2022-01-01 RX ADMIN — Medication 240 MILLIGRAM(S): at 05:18

## 2022-01-01 RX ADMIN — Medication 300 MILLIGRAM(S): at 20:28

## 2022-01-01 RX ADMIN — ENOXAPARIN SODIUM 60 MILLIGRAM(S): 100 INJECTION SUBCUTANEOUS at 17:52

## 2022-01-01 RX ADMIN — Medication 300 MILLIGRAM(S): at 07:40

## 2022-01-01 RX ADMIN — Medication 50 MILLIGRAM(S): at 14:24

## 2022-01-01 RX ADMIN — SENNA PLUS 2 TABLET(S): 8.6 TABLET ORAL at 23:22

## 2022-01-01 RX ADMIN — ATORVASTATIN CALCIUM 80 MILLIGRAM(S): 80 TABLET, FILM COATED ORAL at 22:06

## 2022-01-01 RX ADMIN — Medication 4 MILLILITER(S): at 07:09

## 2022-01-01 RX ADMIN — MUPIROCIN 1 APPLICATION(S): 20 OINTMENT TOPICAL at 17:43

## 2022-01-01 RX ADMIN — PIPERACILLIN AND TAZOBACTAM 200 GRAM(S): 4; .5 INJECTION, POWDER, LYOPHILIZED, FOR SOLUTION INTRAVENOUS at 12:52

## 2022-01-01 RX ADMIN — ENOXAPARIN SODIUM 40 MILLIGRAM(S): 100 INJECTION SUBCUTANEOUS at 05:55

## 2022-01-01 RX ADMIN — Medication 3 MILLILITER(S): at 00:26

## 2022-01-01 RX ADMIN — ENOXAPARIN SODIUM 70 MILLIGRAM(S): 100 INJECTION SUBCUTANEOUS at 13:00

## 2022-01-01 RX ADMIN — CHLORHEXIDINE GLUCONATE 15 MILLILITER(S): 213 SOLUTION TOPICAL at 17:37

## 2022-01-01 RX ADMIN — PANTOPRAZOLE SODIUM 40 MILLIGRAM(S): 20 TABLET, DELAYED RELEASE ORAL at 05:47

## 2022-01-01 RX ADMIN — Medication 1 PACKET(S): at 12:33

## 2022-01-01 RX ADMIN — HEPARIN SODIUM 5000 UNIT(S): 5000 INJECTION INTRAVENOUS; SUBCUTANEOUS at 13:47

## 2022-01-01 RX ADMIN — CITALOPRAM 20 MILLIGRAM(S): 10 TABLET, FILM COATED ORAL at 11:06

## 2022-01-01 RX ADMIN — Medication 4 MILLILITER(S): at 13:23

## 2022-01-01 RX ADMIN — Medication 4 MILLILITER(S): at 13:53

## 2022-01-01 RX ADMIN — SODIUM CHLORIDE 100 MILLILITER(S): 9 INJECTION, SOLUTION INTRAVENOUS at 08:04

## 2022-01-01 RX ADMIN — Medication 4.17 MICROGRAM(S)/KG/MIN: at 12:55

## 2022-01-01 RX ADMIN — SODIUM CHLORIDE 4 MILLILITER(S): 9 INJECTION INTRAMUSCULAR; INTRAVENOUS; SUBCUTANEOUS at 19:27

## 2022-01-01 RX ADMIN — PANTOPRAZOLE SODIUM 40 MILLIGRAM(S): 20 TABLET, DELAYED RELEASE ORAL at 06:00

## 2022-01-01 RX ADMIN — POLYETHYLENE GLYCOL 3350 17 GRAM(S): 17 POWDER, FOR SOLUTION ORAL at 11:25

## 2022-01-01 RX ADMIN — CARBIDOPA AND LEVODOPA 1 TABLET(S): 25; 100 TABLET ORAL at 22:09

## 2022-01-01 RX ADMIN — Medication 3 MILLILITER(S): at 13:17

## 2022-01-01 RX ADMIN — MEROPENEM 100 MILLIGRAM(S): 1 INJECTION INTRAVENOUS at 15:16

## 2022-01-01 RX ADMIN — FENTANYL CITRATE 50 MICROGRAM(S): 50 INJECTION INTRAVENOUS at 13:01

## 2022-01-01 RX ADMIN — MEROPENEM 100 MILLIGRAM(S): 1 INJECTION INTRAVENOUS at 13:17

## 2022-01-01 RX ADMIN — Medication 10 MILLIGRAM(S): at 05:11

## 2022-01-01 RX ADMIN — PIPERACILLIN AND TAZOBACTAM 25 GRAM(S): 4; .5 INJECTION, POWDER, LYOPHILIZED, FOR SOLUTION INTRAVENOUS at 05:35

## 2022-01-01 RX ADMIN — Medication 3 MILLILITER(S): at 01:15

## 2022-01-01 RX ADMIN — PRIMIDONE 50 MILLIGRAM(S): 250 TABLET ORAL at 12:24

## 2022-01-05 NOTE — PATIENT PROFILE ADULT - FALL HARM RISK - HARM RISK INTERVENTIONS
Assistance with ambulation/Assistance OOB with selected safe patient handling equipment/Communicate Risk of Fall with Harm to all staff/Discuss with provider need for PT consult/Monitor gait and stability/Provide patient with walking aids - walker, cane, crutches/Reinforce activity limits and safety measures with patient and family/Sit up slowly, dangle for a short time, stand at bedside before walking/Tailored Fall Risk Interventions/Use of alarms - bed, chair and/or voice tab/Visual Cue: Yellow wristband and red socks/Bed in lowest position, wheels locked, appropriate side rails in place/Call bell, personal items and telephone in reach/Instruct patient to call for assistance before getting out of bed or chair/Non-slip footwear when patient is out of bed/[place] to call system/Physically safe environment - no spills, clutter or unnecessary equipment/Purposeful Proactive Rounding/Room/bathroom lighting operational, light cord in reach

## 2022-01-05 NOTE — ASU PATIENT PROFILE, ADULT - FALL HARM RISK - HARM RISK INTERVENTIONS

## 2022-01-06 NOTE — DISCHARGE NOTE PROVIDER - HOSPITAL COURSE
S/P Right neck dissection. Patient was observed on [address] with KERVIN drain in place. Started on regular diet. Cleared for discharge with KERVIN drain after teaching was done on 1/6/22. S/P Right neck dissection. Patient was observed on [address] [place] with KERVIN drain in place. Started on regular diet. KERVIN drain output continued to decrease until it was below 30 and was then removed. Cleared for discharge. On the day of discharge, pt was willing and ready to go home. All questions were answered.

## 2022-01-06 NOTE — DISCHARGE NOTE PROVIDER - NSDCMRMEDTOKEN_GEN_ALL_CORE_FT
acetaminophen 325 mg oral tablet: 2 tab(s) orally every 6 hours, As needed, Mild Pain (1 - 3)  carbidopa-levodopa 25 mg-100 mg oral tablet: 1 tab(s) orally 2 times a day  citalopram 20 mg oral tablet: 1 tab(s) orally once a day  DilTIAZem (Eqv-Cardizem CD) 240 mg/24 hours oral capsule, extended release: 1 cap(s) orally once a day  enalapril 10 mg oral tablet: 1 tab(s) orally once a day  pantoprazole 40 mg oral delayed release tablet: 1 tab(s) orally once a day  primidone 50 mg oral tablet: 1 tab(s) orally once a day  simvastatin 40 mg oral tablet: 1 tab(s) orally once a day (at bedtime)   acetaminophen 325 mg oral tablet: 2 tab(s) orally every 6 hours, As needed, Mild Pain (1 - 3)  carbidopa-levodopa 25 mg-100 mg oral tablet: 1 tab(s) orally 2 times a day  citalopram 20 mg oral tablet: 1 tab(s) orally once a day  DilTIAZem (Eqv-Cardizem CD) 240 mg/24 hours oral capsule, extended release: 1 cap(s) orally once a day  enalapril 10 mg oral tablet: 1 tab(s) orally once a day  pantoprazole 40 mg oral delayed release tablet: 1 tab(s) orally once a day (before a meal)  primidone 50 mg oral tablet: 1 tab(s) orally once a day  simvastatin 40 mg oral tablet: 1 tab(s) orally once a day (at bedtime)

## 2022-01-06 NOTE — OCCUPATIONAL THERAPY INITIAL EVALUATION ADULT - ADDITIONAL COMMENTS
Pt reports living in a house with his daughter, 3 steps to enter, 1st floor set up. Tub shower +shower chair +grab bars. Used cane prior. Owns rolling walker. Pt reports being independent in all ADLs.

## 2022-01-06 NOTE — PROVIDER CONTACT NOTE (OTHER) - SITUATION
Patient is AXO3 ,during the initial round on the patient the surgical site at the neck was soaked with blood. Patient denies any chest pain or being hypotensive..

## 2022-01-06 NOTE — OCCUPATIONAL THERAPY INITIAL EVALUATION ADULT - PERTINENT HX OF CURRENT PROBLEM, REHAB EVAL
Pt. 75 y/o M with Parkinson's, now s/p right neck dissection I-III for level II clau metastasis POD#1.

## 2022-01-06 NOTE — OCCUPATIONAL THERAPY INITIAL EVALUATION ADULT - PLANNED THERAPY INTERVENTIONS, OT EVAL
ADL retraining/balance training/bed mobility training/fine motor coordination training/motor coordination training/strengthening/transfer training

## 2022-01-06 NOTE — OCCUPATIONAL THERAPY INITIAL EVALUATION ADULT - ASSISTIVE DEVICE FOR TRANSFER: SIT/STAND, REHAB EVAL
"Prescription approved per [facility] Refill Protocol.      Requested Prescriptions   Pending Prescriptions Disp Refills     ENTRESTO 24-26 MG per tablet [Pharmacy Med Name: ENTRESTO 24 MG-26 MG TABLET] 60 tablet 0     Sig: TAKE 1 TABLET BY MOUTH 2 TIMES DAILY FOR HEART DISEASE (& BLOOD PRESSURE)       Angiotensin-II Receptors Passed - 7/17/2020 12:14 PM        Passed - Last blood pressure under 140/90 in past 12 months     BP Readings from Last 3 Encounters:   02/05/20 134/70   06/17/19 126/59   05/31/19 114/58                 Passed - Recent (12 mo) or future (30 days) visit within the authorizing provider's specialty     Patient has had an office visit with the authorizing provider or a provider within the authorizing providers department within the previous 12 mos or has a future within next 30 days. See \"Patient Info\" tab in inbasket, or \"Choose Columns\" in Meds & Orders section of the refill encounter.              Passed - Medication is active on med list        Passed - Patient is age 18 or older        Passed - Normal serum creatinine on file in past 12 months     Recent Labs   Lab Test 05/11/20  1107   CR 0.99       Ok to refill medication if creatinine is low          Passed - Normal serum potassium on file in past 12 months     Recent Labs   Lab Test 05/11/20  1107   POTASSIUM 4.2                           Jeffry Zapata RN, BSN, PHN    " rolling walker

## 2022-01-06 NOTE — DISCHARGE NOTE PROVIDER - NSDCFUADDINST_GEN_ALL_CORE_FT
empty KERVIN drain as taught at [facility] and record the amount. Follow up in office to have KERVIN drain removed.  Please take Tylenol/Motrin for pain relief  Please follow up in clinic as scheduled to have staples and sutures removed   If you start to develop difficulty breathing, shortness of breath, neck swelling or any other concerning symptom, please come to the emergency room  Please take Tylenol/Motrin for pain relief  Please follow up in clinic as scheduled to have staples and sutures removed   Can shower at home. Allow water to run over incision. Do not scrub over incision site. Do not submerge incision site in water.   If you start to develop difficulty breathing, shortness of breath, neck swelling or any other concerning symptom, please come to the emergency room

## 2022-01-06 NOTE — PHYSICAL THERAPY INITIAL EVALUATION ADULT - GAIT DEVIATIONS NOTED, PT EVAL
decreased katarzyna/increased time in double stance/decreased step length/decreased stride length/decreased weight-shifting ability

## 2022-01-06 NOTE — DISCHARGE NOTE PROVIDER - CARE PROVIDER_API CALL
Jayden Boland)  Otolaryngology  [address] [place] [address]  Phone: [phone]  Fax: [phone]  Follow Up Time:

## 2022-01-07 NOTE — CONSULT NOTE ADULT - SUBJECTIVE AND OBJECTIVE BOX
HPI:  75 yo M w/ hx Parkinsons, HTN, HLD, prostate ca, Oral ca s/p partial glossectomy 4/16/21 and RT on PETCT 11/27/21 CT of neck 11/27/21 with enlarged right level II lymph node suspicious for clau metastasis . Ultrasound guided FNA and 12/7/21 FNA consistent with squamous cell carcinoma. Pt underwent 1/5 underwent s/p R SND I-III for level II clau metastasis.       PAST MEDICAL & SURGICAL HISTORY:  Hypertension    Hyperlipidemia    GERD (gastroesophageal reflux disease)    Parkinson&#x27;s disease    Prostate cancer    Mitral valve prolapse    Malignant neoplasm of tongue, 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    History of vasectomy    Prostate cancer  s/p cyber knife    S/P partial glossectomy  4/21        Social History: Lives in a house with daughter. 3 steps to enter, bedroom/bathroom on 1st floor. denies T/A/d    FAMILY HISTORY:  FH: heart disease  father, brothers    FH: type 2 diabetes  mother        Allergies    No Known Allergies    Intolerances          MEDICATIONS  (STANDING):  carbidopa/levodopa  25/100 1 Tablet(s) Oral <User Schedule>  citalopram 20 milliGRAM(s) Oral daily  diltiazem    milliGRAM(s) Oral daily  enoxaparin Injectable 40 milliGRAM(s) SubCutaneous every 12 hours  primidone 50 milliGRAM(s) Oral daily    MEDICATIONS  (PRN):  acetaminophen     Tablet .. 650 milliGRAM(s) Oral every 6 hours PRN Mild Pain (1 - 3)      Review of Systems:   CONSTITUTIONAL: No fever, weight loss, or fatigue  EYES: No eye pain, visual disturbances, or discharge  ENMT:  No difficulty hearing, tinnitus, vertigo; No sinus or throat pain  NECK: No pain or stiffness  BREASTS: No pain, masses, or nipple discharge  RESPIRATORY: No cough, wheezing, chills or hemoptysis; No shortness of breath  CARDIOVASCULAR: No chest pain, palpitations, dizziness, or leg swelling  GASTROINTESTINAL: No abdominal or epigastric pain. No nausea, vomiting, or hematemesis; No diarrhea or constipation. No melena or hematochezia.  GENITOURINARY: No dysuria, frequency, hematuria, or incontinence  NEUROLOGICAL: No headaches, memory loss, loss of strength, numbness, or tremors  SKIN: No itching, burning, rashes, or lesions   LYMPH NODES: No enlarged glands  ENDOCRINE: No heat or cold intolerance; No hair loss  MUSCULOSKELETAL: No joint pain or swelling; No muscle, back, or extremity pain  PSYCHIATRIC: No depression, anxiety, mood swings, or difficulty sleeping  HEME/LYMPH: No easy bruising, or bleeding gums  ALLERY AND IMMUNOLOGIC: No hives or eczema          CAPILLARY BLOOD GLUCOSE        I&O's Summary    06 Jan 2022 07:01  -  07 Jan 2022 07:00  --------------------------------------------------------  IN: 800 mL / OUT: 975 mL / NET: -175 mL    07 Jan 2022 07:01  -  07 Jan 2022 10:39  --------------------------------------------------------  IN: 0 mL / OUT: 150 mL / NET: -150 mL      Vital Signs Last 24 Hrs  T(C): 36.9 (07 Jan 2022 10:00), Max: 37.1 (06 Jan 2022 14:25)  T(F): 98.4 (07 Jan 2022 10:00), Max: 98.8 (06 Jan 2022 21:23)  HR: 79 (07 Jan 2022 10:00) (63 - 83)  BP: 120/70 (07 Jan 2022 10:00) (120/70 - 148/69)  BP(mean): --  RR: 18 (07 Jan 2022 10:00) (16 - 18)  SpO2: 91% (07 Jan 2022 10:00) (91% - 95%)    PHYSICAL EXAM:  GENERAL: NAD, well-developed  HEAD:  Atraumatic, Normocephalic  EYES: EOMI, PERRLA, conjunctiva and sclera clear  NECK: Supple, No JVD  CHEST/LUNG: Clear to auscultation bilaterally; No wheeze  HEART: Regular rate and rhythm; No murmurs, rubs, or gallops  ABDOMEN: Soft, Nontender, Nondistended; Bowel sounds present  EXTREMITIES:  2+ Peripheral Pulses, No clubbing, cyanosis, or edema  PSYCH: AAOx3  NEUROLOGY: non-focal  SKIN: No rashes or lesions    LABS:                    RADIOLOGY & ADDITIONAL TESTS:    Imaging Personally Reviewed:pe< from: NM PET/CT Onc FDG Skull to Thigh, Subsq (11.27.21 @ 00:00) >  IMPRESSION: Since FDG-PET/CT scan 2/12/2021:    1. Interval partial left glossectomy. Focal FDG avidity along the gingival surface of the first molar left mandibular region extending to the skin. Please correlate clinically.    2. New FDG-avid right level IIb cervical node, nonspecific.    < end of copied text >  past"LYMPH NODE", LEVEL 2B, RIGHT, US GUIDED FNA   POSITIVE FOR MALIGNANCY.   Consistent with squamous carcinoma.     Consultant(s) Notes Reviewed:      Care Discussed with Consultants/Other Providers:   HPI:  77 yo M w/ hx Parkinsons, HTN, HLD, prostate ca, Oral ca s/p partial glossectomy 4/16/21 and RT on PETCT 11/27/21 CT of neck 11/27/21 with enlarged right level II lymph node suspicious for clau metastasis . Ultrasound guided FNA and 12/7/21 FNA consistent with squamous cell carcinoma. Pt underwent 1/5 underwent s/p R SND I-III for level II clau metastasis.       PAST MEDICAL & SURGICAL HISTORY:  Hypertension    Hyperlipidemia    GERD (gastroesophageal reflux disease)    Parkinson&#x27;s disease    Prostate cancer    Mitral valve prolapse    Malignant neoplasm of tongue, 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    History of vasectomy    Prostate cancer  s/p cyber knife    S/P partial glossectomy  4/21        Social History: Lives in a house with daughter. 3 steps to enter, bedroom/bathroom on 1st floor. denies T/A/d    FAMILY HISTORY:  FH: heart disease  father, brothers    FH: type 2 diabetes  mother        Allergies    No Known Allergies    Intolerances          MEDICATIONS  (STANDING):  carbidopa/levodopa  25/100 1 Tablet(s) Oral <User Schedule>  citalopram 20 milliGRAM(s) Oral daily  diltiazem    milliGRAM(s) Oral daily  enoxaparin Injectable 40 milliGRAM(s) SubCutaneous every 12 hours  primidone 50 milliGRAM(s) Oral daily    MEDICATIONS  (PRN):  acetaminophen     Tablet .. 650 milliGRAM(s) Oral every 6 hours PRN Mild Pain (1 - 3)      Review of Systems:   CONSTITUTIONAL: No fever, weight loss, or fatigue  EYES: No eye pain, visual disturbances, or discharge  ENMT:  No difficulty hearing, tinnitus, vertigo; No sinus or throat pain  NECK: No pain or stiffness  BREASTS: No pain, masses, or nipple discharge  RESPIRATORY: No cough, wheezing, chills or hemoptysis; No shortness of breath  CARDIOVASCULAR: No chest pain, palpitations, dizziness, or leg swelling  GASTROINTESTINAL: No abdominal or epigastric pain. No nausea, vomiting, or hematemesis; No diarrhea or constipation. No melena or hematochezia.  GENITOURINARY: No dysuria, frequency, hematuria, or incontinence  NEUROLOGICAL: No headaches, memory loss, loss of strength, numbness, or tremors  SKIN: No itching, burning, rashes, or lesions   LYMPH NODES: No enlarged glands  ENDOCRINE: No heat or cold intolerance; No hair loss  MUSCULOSKELETAL: No joint pain or swelling; No muscle, back, or extremity pain  PSYCHIATRIC: No depression, anxiety, mood swings, or difficulty sleeping  HEME/LYMPH: No easy bruising, or bleeding gums  ALLERY AND IMMUNOLOGIC: No hives or eczema          CAPILLARY BLOOD GLUCOSE        I&O's Summary    06 Jan 2022 07:01  -  07 Jan 2022 07:00  --------------------------------------------------------  IN: 800 mL / OUT: 975 mL / NET: -175 mL    07 Jan 2022 07:01  -  07 Jan 2022 10:39  --------------------------------------------------------  IN: 0 mL / OUT: 150 mL / NET: -150 mL      Vital Signs Last 24 Hrs  T(C): 36.9 (07 Jan 2022 10:00), Max: 37.1 (06 Jan 2022 14:25)  T(F): 98.4 (07 Jan 2022 10:00), Max: 98.8 (06 Jan 2022 21:23)  HR: 79 (07 Jan 2022 10:00) (63 - 83)  BP: 120/70 (07 Jan 2022 10:00) (120/70 - 148/69)  BP(mean): --  RR: 18 (07 Jan 2022 10:00) (16 - 18)  SpO2: 91% (07 Jan 2022 10:00) (91% - 95%)    PHYSICAL EXAM:  GENERAL: NAD,   HEAD:  Atraumatic, Normocephalic  EYES: EOMI, PERRLA, conjunctiva and sclera clear  NECK: staples   CHEST/LUNG: Clear to auscultation bilaterally; No wheeze  HEART: Regular rate and rhythm; No murmurs, rubs, or gallops  ABDOMEN: Soft, Nontender, Nondistended; Bowel sounds present  EXTREMITIES:  2+ Peripheral Pulses, No clubbing, cyanosis, or edema  PSYCH: AAOx3  NEUROLOGY: non-focal; + tremor  SKIN: No rashes or lesions    LABS:                    RADIOLOGY & ADDITIONAL TESTS:    Imaging Personally Reviewed:pe< from: NM PET/CT Onc FDG Skull to Thigh, Subsq (11.27.21 @ 00:00) >  IMPRESSION: Since FDG-PET/CT scan 2/12/2021:    1. Interval partial left glossectomy. Focal FDG avidity along the gingival surface of the first molar left mandibular region extending to the skin. Please correlate clinically.    2. New FDG-avid right level IIb cervical node, nonspecific.    < end of copied text >  past"LYMPH NODE", LEVEL 2B, RIGHT, US GUIDED FNA   POSITIVE FOR MALIGNANCY.   Consistent with squamous carcinoma.     Consultant(s) Notes Reviewed:      Care Discussed with Consultants/Other Providers:

## 2022-01-07 NOTE — PROCEDURE NOTE - ADDITIONAL PROCEDURE DETAILS
Patient will need to follow up with Dr. [facility] for suture removal after 7-10 days 3 interrupted 4-0 nylon sutures placed in between the staples to control local oozing from the right incision site, patient tolerated the procedure well, no complications.

## 2022-01-07 NOTE — CONSULT NOTE ADULT - PROBLEM SELECTOR RECOMMENDATION 9
- KERVIN drain management as per ENT  - wound care as per ENT  - noted oozing at incision line today as per ENT plans for possible interrupted suture placement   - monitor CBC

## 2022-01-07 NOTE — CONSULT NOTE ADULT - PROBLEM SELECTOR RECOMMENDATION 6
- Lovenox sq. if indication is DVT prophylaxis then would change to qd - Lovenox sq. if indication is DVT prophylaxis then would change to qd    - Dispo: PT recom rehab pt would like to go home with family. dispo as per primary team

## 2022-01-07 NOTE — CONSULT NOTE ADULT - ASSESSMENT
75 yo M w/ Hx parkinson's, HTN, HLD, prostate ca, oral ca s/p partial glossectomy output PET scan and US guided FNA showed squamous cell ca. Now s/p R SND and KERVIN drain placement.

## 2022-01-09 NOTE — PROGRESS NOTE ADULT - ASSESSMENT
76M s/p R SND I-III for level II clau metastasis. FNA showing SCC 12/27/21.    - Regular diet  - continue home meds  - Pain control PRN  - d/c ancef  - OOBTC  - PT/OT  - Dc home
76M s/p R SND I-III for level II clau metastasis. FNA showing SCC 12/27/21.    - Regular diet  - continue home meds  - Pain control PRN  - OOBTC  - PT/OT  - Monitor KERVIN output  - dipso: home pending decrease in KERVIN output
76M s/p R SND I-III for level II clau metastasis. FNA showing SCC 12/27/21.    - Regular diet  - continue home meds  - Pain control PRN  - OOBTC  - PT/OT  - Monitor KERVIN output  - dipso: possible dc home today pending daughter able to pick him up 
76M s/p R SND I-III for level II clau metastasis. FNA showing SCC 12/27/21.    - Regular diet  - continue home meds  - Pain control PRN  - OOBTC  - PT/OT  - dipso: dc home today 
77 yo M w/ Hx parkinson's, HTN, HLD, prostate ca, oral ca s/p partial glossectomy output PET scan and US guided FNA showed squamous cell ca. Now s/p R SND and KERVIN drain placement.

## 2022-01-09 NOTE — DISCHARGE NOTE NURSING/CASE MANAGEMENT/SOCIAL WORK - NSDCPNINST_GEN_ALL_CORE
Please follow up with your surgeon within two weeks. Please resume regular diet and activity as tolerated. Keep incision site clean/dry.

## 2022-01-09 NOTE — DISCHARGE NOTE NURSING/CASE MANAGEMENT/SOCIAL WORK - PATIENT PORTAL LINK FT
You can access the FollowMyHealth Patient Portal offered by [facility] by registering at the following website: [email or web address]. By joining [facility]’s FollowMyHealth portal, you will also be able to view your health information using other applications (apps) compatible with our system.

## 2022-01-09 NOTE — DISCHARGE NOTE NURSING/CASE MANAGEMENT/SOCIAL WORK - NSDCPEFALRISK_GEN_ALL_CORE
For information on Fall & Injury Prevention, visit: [email or web address] OR  [email or web address] OR  https://www.cdc.gov/steadi/patient.html

## 2022-01-09 NOTE — PROGRESS NOTE ADULT - SUBJECTIVE AND OBJECTIVE BOX
ENT Progress Note    HPI  76M s/p partial glossectomy 4/16/21 , follow up radiation discontinues 7/21 . Pt had Pet scan 11/27/21 ,CT of neck 11/27/21 with enlarged right level II lymph node suspicious for clau metastasis . Ultrasound - guided FNA and 12/7/21 FNA consistent with squamous cell carcinoma. S/p R neck dissection I-III 1/5/21    Interval:  1/5: s/p R neck dissection I-III  1/6: NAEON. Pt examined at bedside this morning. No complaints. Tolerating diet.    Vital Signs Last 24 Hrs  T(C): 36.8 (06 Jan 2022 06:00), Max: 36.8 (06 Jan 2022 02:25)  T(F): 98.3 (06 Jan 2022 06:00), Max: 98.3 (06 Jan 2022 02:25)  HR: 76 (06 Jan 2022 06:00) (73 - 85)  BP: 136/67 (06 Jan 2022 06:00) (101/60 - 136/67)  BP(mean): 81 (05 Jan 2022 15:00) (72 - 82)  RR: 19 (06 Jan 2022 06:00) (13 - 27)  SpO2: 100% (06 Jan 2022 06:00) (96% - 100%)    Gen: NAD, nonlabored breathing on RA  Neck: R neck soft, flat, incision c/d/i, KERVIN 40cc SS
ENT Progress Note    HPI  76M s/p partial glossectomy 4/16/21 , follow up radiation discontinues 7/21 . Pt had Pet scan 11/27/21 ,CT of neck 11/27/21 with enlarged right level II lymph node suspicious for clau metastasis . Ultrasound - guided FNA and 12/7/21 FNA consistent with squamous cell carcinoma. S/p R neck dissection I-III 1/5/21    Interval:  1/5: s/p R neck dissection I-III  1/6: NAEON. Pt examined at bedside this morning. No complaints. Tolerating diet.  1/7: NAEON. Oozing from incision site. Dressing changed. No complaints. Tolerating diet. Will ambulate today with PT    Vital Signs Last 24 Hrs  T(C): 36.4 (07 Jan 2022 05:36), Max: 37.1 (06 Jan 2022 14:25)  T(F): 97.6 (07 Jan 2022 05:36), Max: 98.8 (06 Jan 2022 21:23)  HR: 63 (07 Jan 2022 05:36) (63 - 83)  BP: 123/75 (07 Jan 2022 05:36) (120/71 - 148/69)  BP(mean): --  RR: 18 (07 Jan 2022 05:36) (16 - 18)  SpO2: 94% (07 Jan 2022 05:36) (93% - 95%)    Gen: NAD, nonlabored breathing on RA  Neck: Oozing from incision site, dressing saturated and changed. R neck soft, flat, incision clean and intact with staples, KERVIN 125cc SS
ENT Progress Note    HPI  76M s/p partial glossectomy 4/16/21 , follow up radiation discontinues 7/21 . Pt had Pet scan 11/27/21 ,CT of neck 11/27/21 with enlarged right level II lymph node suspicious for clau metastasis . Ultrasound - guided FNA and 12/7/21 FNA consistent with squamous cell carcinoma. S/p R neck dissection I-III 1/5/21    Interval:  1/5: s/p R neck dissection I-III  1/6: NAEON. Pt examined at bedside this morning. No complaints. Tolerating diet.  1/7: NAEON. Oozing from incision site. Dressing changed. No complaints. Tolerating diet. Will ambulate today with PT  1/8: No acute events overnight. KERVIN with 24 ccs of output. Pt feels well and feels that he is ready for discharge.     Vital Signs Last 24 Hrs  T(C): 36.5 (08 Jan 2022 06:00), Max: 37.6 (07 Jan 2022 21:30)  T(F): 97.7 (08 Jan 2022 06:00), Max: 99.6 (07 Jan 2022 21:30)  HR: 80 (08 Jan 2022 06:00) (79 - 87)  BP: 140/86 (08 Jan 2022 06:00) (120/70 - 140/86)  BP(mean): --  RR: 18 (08 Jan 2022 06:00) (18 - 20)  SpO2: 100% (08 Jan 2022 06:00) (91% - 100%)    Gen: NAD, nonlabored breathing on RA  Neck: Oozing from incision site, dressing saturated and changed. R neck soft, flat, incision clean and intact with staples, KERVIN SS
ENT Progress Note    HPI  76M s/p partial glossectomy 4/16/21 , follow up radiation discontinues 7/21 . Pt had Pet scan 11/27/21 ,CT of neck 11/27/21 with enlarged right level II lymph node suspicious for clau metastasis . Ultrasound - guided FNA and 12/7/21 FNA consistent with squamous cell carcinoma. S/p R neck dissection I-III 1/5/21    Interval:  1/5: s/p R neck dissection I-III  1/6: NAEON. Pt examined at bedside this morning. No complaints. Tolerating diet.  1/7: NAEON. Oozing from incision site. Dressing changed. No complaints. Tolerating diet. Will ambulate today with PT  1/8: No acute events overnight. KERVIN with 24 ccs of output. Pt feels well and feels that he is ready for discharge.   1/9: NAEON. KERVIN removed yesterday. Stayed last night only because of transportation issue. Home this AM    Vital Signs Last 24 Hrs  T(C): 36.8 (09 Jan 2022 06:00), Max: 36.9 (08 Jan 2022 17:46)  T(F): 98.2 (09 Jan 2022 06:00), Max: 98.4 (08 Jan 2022 17:46)  HR: 81 (09 Jan 2022 06:00) (75 - 99)  BP: 136/72 (09 Jan 2022 06:00) (123/78 - 142/77)  BP(mean): --  RR: 17 (09 Jan 2022 06:00) (17 - 18)  SpO2: 97% (09 Jan 2022 06:00) (95% - 100%)    Gen: NAD,   Neck: incision site c/d/i, R neck soft, flat,   Pulm: breathing comfortably on RA  
Patient is a 76y old  Male who presents with a chief complaint of Neck mass (06 Jan 2022 07:35)    SUBJECTIVE / OVERNIGHT EVENTS: No acute events. Feels well currently. Eager to return home when possible. No pain. No shortness of breath. Afebrile. No nausea or vomiting.     MEDICATIONS  (STANDING):  carbidopa/levodopa  25/100 1 Tablet(s) Oral <User Schedule>  citalopram 20 milliGRAM(s) Oral daily  diltiazem    milliGRAM(s) Oral daily  enoxaparin Injectable 40 milliGRAM(s) SubCutaneous daily  pantoprazole    Tablet 40 milliGRAM(s) Oral before breakfast  primidone 50 milliGRAM(s) Oral daily  senna 2 Tablet(s) Oral at bedtime    MEDICATIONS  (PRN):  acetaminophen     Tablet .. 650 milliGRAM(s) Oral every 6 hours PRN Mild Pain (1 - 3)    CAPILLARY BLOOD GLUCOSE    I&O's Summary    07 Jan 2022 07:01  -  08 Jan 2022 07:00  --------------------------------------------------------  IN: 680 mL / OUT: 1674 mL / NET: -994 mL    08 Jan 2022 07:01  -  08 Jan 2022 13:13  --------------------------------------------------------  IN: 0 mL / OUT: 350 mL / NET: -350 mL    PHYSICAL EXAM:  Vital Signs Last 24 Hrs  T(C): 36.7 (08 Jan 2022 10:09), Max: 37.6 (07 Jan 2022 21:30)  T(F): 98 (08 Jan 2022 10:09), Max: 99.6 (07 Jan 2022 21:30)  HR: 90 (08 Jan 2022 10:09) (80 - 90)  BP: 123/78 (08 Jan 2022 10:09) (122/66 - 140/86)  BP(mean): --  RR: 18 (08 Jan 2022 10:09) (18 - 20)  SpO2: 100% (08 Jan 2022 10:09) (94% - 100%)    CONSTITUTIONAL: NAD, well-developed, well-groomed  EYES: EOMI; conjunctiva and sclera clear  ENMT: Moist oral mucosa  NECK: Supple, +KERVIN drain  RESPIRATORY: Normal respiratory effort; lungs are clear to auscultation bilaterally  CARDIOVASCULAR: Regular rate and rhythm, normal S1 and S2; No lower extremity edema; Peripheral pulses are 2+ bilaterally  ABDOMEN: Nontender to palpation, normoactive bowel sounds, no rebound/guarding  NEUROLOGY: moving all extremities, resting tremor  SKIN: No rashes    LABS and IMAGING: Reviewed

## 2022-01-09 NOTE — DISCHARGE NOTE NURSING/CASE MANAGEMENT/SOCIAL WORK - NSDCVIVACCINE_GEN_ALL_CORE_FT
Tdap; 28-Jun-2021 17:24; Alexsandra Musa); [employer]; [id number] (Exp. Date: 25-Nov-2022); IntraMuscular; Deltoid Left.; 0.5 milliLiter(s); VIS (VIS Published: 09-May-2013, VIS Presented: 28-Jun-2021);

## 2022-01-12 PROBLEM — C02.9 MALIGNANT NEOPLASM OF TONGUE, UNSPECIFIED: Chronic | Status: ACTIVE | Noted: 2021-03-04

## 2022-01-12 NOTE — HISTORY OF PRESENT ILLNESS
[de-identified] :  Pt is one week post right  neck dissection for + SCCa on FNA of right lymph nodes.  pt is doing well, no major c.o. pt has no neck redness, no neck mass, no difficulty swallowing, no neck pain, no painful swallowing and no chills. path c/w  1 out of 35 lymph nodes positive for metastatic squamous cell carcinoma (Metastatic deposit 1. 3 cm), with extranodal extension,  Submandibular gland with chronic inflammation and atrophy, negative for carcinoma.\par

## 2022-01-15 NOTE — CONSULT NOTE ADULT - SUBJECTIVE AND OBJECTIVE BOX
HPI: 76y Male with pmhx of parkinson's, HTN, HLD, prostate ca, SCC of parotid s/p parotidectomy and right neck dissection 1/5 presenting with right neck swelling. Pt stated that he started to notice some swelling above his incision line over the past two days. He stated that it has gotten slowly bigger over the past day, but not too much. He denied associated fevers, chills, difficulty swallowing or any voice change (he was asked this last question a second time because he initially stated to ED staff that he was having voice change but he confirmed that he was in fact not having this). He stated that he is breathing well without any respiratory distress, difficulty breathing or shortness of breath. He denied associate pain as well. Describes it as feeling full.     CT neck obtained in the ED showed:  Postoperative changes from a right neck dissection. Fluid collection right side of the neck along the anterior margin of an edematous sternocleidomastoid muscle extending from the submandibular region to the level of the cricoid cartilage. This measures approximately 5.7 x 3.0 x 5.2 cm and does not currently exhibit peripheral enhancement. Findings may represent a seroma. Superimposed or early infection could not be excluded. Mild to moderate effacement of the right oropharynx and hypopharynx secondary to mass effect from the fluid collection.      Allergies    No Known Allergies        PAST MEDICAL & SURGICAL HISTORY:  Hypertension    Hyperlipidemia    GERD (gastroesophageal reflux disease)    Parkinson's disease    Prostate cancer    Mitral valve prolapse    Malignant neoplasm of tongue, 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    History of vasectomy    Prostate cancer  s/p cyber knife    S/P partial glossectomy  4/21  	      MEDICATIONS:  Antiinfectives:       Hematologic/Anticoagulation:      Pain medications/Neuro:      IV fluids:      Endocrine Medications:       All other standing medications:       All other PRN medications:      Vital Signs Last 24 Hrs  T(C): 36.9 (15 Abraham 2022 18:15), Max: 37.3 (15 Abraham 2022 11:46)  T(F): 98.4 (15 Abraham 2022 18:15), Max: 99.1 (15 Abraham 2022 11:46)  HR: 97 (15 Abraham 2022 18:15) (92 - 101)  BP: 142/93 (15 Abraham 2022 18:15) (114/69 - 142/93)  BP(mean): --  RR: 18 (15 Abraham 2022 18:15) (18 - 22)  SpO2: 98% (15 Abraham 2022 18:15) (96% - 100%)    LABS:  CBC-    01-15    138  |  97<L>  |  16  ----------------------------<  163<H>  4.0   |  30  |  0.75    Ca    10.0      15 Abraham 2022 12:30    TPro  7.2  /  Alb  4.3  /  TBili  0.2  /  DBili  x   /  AST  14  /  ALT  21  /  AlkPhos  82  01-15    Coagulation Studies-    Endocrine Panel-  --  --  10.0 mg/dL        PHYSICAL EXAM:    ENT EXAM-   Constitutional: Well-developed, well-nourished.  No hoarseness.     Head:  normocephalic, atraumatic.   Ears:  Ear canals both clear.  Tympanic membranes both intact; no effusion or retraction.  OC/OP: Floor of mouth, buccal mucosa, lips, hard palate, soft palate, uvula, posterior pharyngeal wall normal.  Mucosa moist.  Neck:  Trachea midline.  Right sided incision clean/dry and intact with some fullness noted superiorly over the incision that is soft to the touch. No erythema noted and no warmth to the touch.   Lymph:  No cervical adenopathy.    MULTISYSTEM EXAM-  Neuro/Psych:  A&O x 3.  Mood stable.  Affect bright.  Cranial nerves: 2-12 grossly intact bilaterally.  Eyes:  EOMI, no nystagmus.  Pulm:  No dyspnea, non-labored breathing  Cardiovascular: Carotid pulses 2+ bilaterally.  No peripheral edema.  Skin:  No rash or lesions on exposed skin of head/neck    Procedure: Aspiration of Seroma  The neck was first prepped with betadine and draped in sterile fashion. An 18 gauge needle on a 20 cc syringe was then placed into the full area an approx 8 ccs of serous fluid was aspirated. Multiple passes were performed. The neck appeared more flat after aspiration of serous fluid. No purulence noted in aspirate.        Assessment/Plan:  76y Male POD 10 s/p parotidectomy with right neck dissection presenting with seroma that was aspirated with no airway or swallowing complaints now s/p aspiration of 8 ccs of serous fluid with partial decrease in size.    - No further ENT intervention  - Pt stated that he feels well and that he would prefer to go home   - Please start the pt on Augmentin as seromas can sometimes become infected  - Will change his appt to this week  - As explained to the pt, if it starts to get larger again or if he starts to develop any concerning symptoms, please come back to the ED for evaluation  - Discussed with ENT attending Dr. Boland, who agrees with above plan.             [facility] at [phone] with any questions/concerns.

## 2022-01-15 NOTE — ED PROVIDER NOTE - PROGRESS NOTE DETAILS
Jackson, PGY-2  ENT consulted and will patient at bedside Jackson, PGY-2  Collection of fluids on CT. ENT drained approximately 8-10cc of SS fluid. Report patient okay for d/c on Augmentin and f/u at clinic in 1 week

## 2022-01-15 NOTE — ED PROVIDER NOTE - NSFOLLOWUPINSTRUCTIONS_ED_ALL_ED_FT
A prescription was sent to your local pharmacy to help prevent infection. Please follow up with the ENT doctors within 1 week (information attached)     DISCHARGE INSTRUCTIONS:    Return to the emergency department if:   •The area around your swelling  becomes very painful, warm, or has red streaks or has discharge     •You have a fever and chills.    •Your heart is beating faster than usual.    •You feel faint or confused    • You have difficulty breathing, shortness of breath, vomiting, or cant tolerate solids or liquids

## 2022-01-15 NOTE — ED PROVIDER NOTE - PATIENT PORTAL LINK FT
You can access the FollowMyHealth Patient Portal offered by [facility] by registering at the following website: [email or web address]. By joining [employer]’s FollowMyHealth portal, you will also be able to view your health information using other applications (apps) compatible with our system.

## 2022-01-15 NOTE — ED ADULT NURSE NOTE - OBJECTIVE STATEMENT
A&Ox4. ambulatory. c/o pain and swelling to incision site on right side of neck. NAD. pt is talking in full sentences, no drooling observed. Right side of neck has redness and swelling to incision site. pt denies chest pain, dizziness, n/v/d, fevers, chills, HA, urinary symptoms. respirations are even and un labored. skin intact. 20g placed to right ac. labs drawn and sent. safety precautions maintained.

## 2022-01-15 NOTE — ED PROVIDER NOTE - ATTENDING CONTRIBUTION TO CARE
I performed a history and physical exam of the patient and discussed their management with the resident and /or advanced care provider. I reviewed the resident and /or ACP's note and agree with the documented findings and plan of care. My medical decision making and observations are found above.  Lungs clear, abd soft, anterior neck swollen not red or very tender.

## 2022-01-15 NOTE — ED PROVIDER NOTE - CLINICAL SUMMARY MEDICAL DECISION MAKING FREE TEXT BOX
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 Will obtain CT neck for possible underlying abscess vs. 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 Will obtain CT neck for possible underlying abscess vs. post op complication, consult ENT, basic labs and reassess  [place]: 77yo hx of parkinsons. scc of mouth, had recent neck surgery. (thurs) now with increased neck swelling and some voice change. No redness, no fluctuance. breathing well. will get CT and ENT to see.

## 2022-01-15 NOTE — ED PROVIDER NOTE - NSFOLLOWUPCLINICS_GEN_ALL_ED_FT
[facility] - ENT  Otolaryngology (ENT)  [address] [place] [address]  Phone: [phone]  Fax:

## 2022-01-15 NOTE — ED PROVIDER NOTE - OBJECTIVE STATEMENT
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 Last night had neck pain with associated swelling of neck near incision site with "raspy" voice. No fevers/chills, n/v/d, cough, rashes. No chest pain, SOB, dyspnea. Tolerating solids and liquids with no issues.

## 2022-01-15 NOTE — ED PROVIDER NOTE - NS ED ROS FT
Gen: Denies fever  CV: Denies chest pain, palpitations  Skin: Denies rash, erythema, color changes  Resp: Denies SOB, cough  GI: Denies diarrhea, constipation, nausea, vomiting  Neuro: Denies LOC, weakness

## 2022-01-15 NOTE — ED PROVIDER NOTE - PHYSICAL EXAMINATION
Gen: WDWN, NAD, comfortable appearing, speaking in full sentences   HEENT: Right sided and midline neck swelling with no erythema or fluctuance and mildly TTP. Surgical incision site c/d/i. PERRLA, EOMI, no nasal discharge, mucous membranes moist, no oropharyngeal edema/erythema, mild oral thrush   CV: RRR, +S1/S2, no M/R/G, equal b/l radial pulses 2+  Resp: CTAB, no W/R/R, SPO2 100% on RA, no increased WOB   GI: Abdomen soft non-distended, NTTP, no masses/organomegaly   Skin: No rashes/erythema   Neuro: A&Ox4, moving all 4 extremities spontaneously, gross sensation intact in UE and LE BL  Psych: appropriate mood

## 2022-01-24 NOTE — HISTORY OF PRESENT ILLNESS
[de-identified] :  Pt is post right  neck dissection for + SCCa on FNA of right lymph nodes. pt was in ED for swelling of neck and Ct of neck 1/15/2022 showed a seroma vs infection.  pt presents here for wound check and swelling of the neck. pt is doing well, no major c.o. no fever or swelling of the neck.  pt has no neck redness, no neck mass, no difficulty swallowing, no neck pain, no painful swallowing and no chills. \par

## 2022-01-31 NOTE — PHYSICAL EXAM
[Laryngoscopy Performed] : laryngoscopy was performed, see procedure section for findings [Normal] : no rashes [de-identified] : Posttreatment changes, incision healing well, no collections, no drainage. [FreeTextEntry1] : Tongue is well healed.  No obvious lesions along the posterior margin.  No new concerning lesions in the OC/OPx on inspection or palpation. [de-identified] : FTSG donor site is healing well.

## 2022-01-31 NOTE — HISTORY OF PRESENT ILLNESS
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  path c/w 1 out of 35 lymph nodes positive for metastatic squamous cell carcinoma (Metastatic deposit 1. 3 cm), with extranodal extension, Submandibular gland with chronic inflammation and atrophy, negative for carcinoma 1/12/22. Patient has a history of s/p revision partial glossectomy 4/16/21, completed RT July 2021. PET ct 11/27/2021, 11/27/2021 ct neck showed Enlarging right level II lymph node suspicious for a clau metastasis. Ultrasound-guided FNA.and 12/7/2021 FNA - Consistent with squamous carcinoma. States neck is still swollen. Patient denies throat infections, fevers, chills, night sweats, dysphagia, odynophagia, dyspnea,  or otalgia.\par \par  \par

## 2022-01-31 NOTE — PROCEDURE
[Gag Reflex] : gag reflex preventing mirror examination [None] : none [Flexible Endoscope] : examined with the flexible endoscope [Serial Number: ___] : Serial Number: [unfilled] [de-identified] : No lesions in the NPx, OPx, HPx or larynx.  Stable posttreatment changes, VC are mobile, airway patent.\par

## 2022-04-12 NOTE — HISTORY OF PRESENT ILLNESS
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  path c/w 1 out of 35 lymph nodes positive for metastatic squamous cell carcinoma (Metastatic deposit 1. 3 cm), with extranodal extension, Submandibular gland with chronic inflammation and atrophy, negative for carcinoma 1/12/22. Patient has a history of s/p revision partial glossectomy 4/16/21, completed RT July 2021. PET ct 11/27/2021, 11/27/2021 ct neck showed Enlarging right level II lymph node suspicious for a clau metastasis. Ultrasound-guided FNA.and 12/7/2021 FNA - Consistent with squamous carcinoma. pt is here to f/u and still mild swelling of neck, last CT of neck and chest 4/7/2022, Patient denies throat infections, fevers, chills, night sweats, dysphagia, odynophagia, dyspnea, wt loss or otalgia.\par \par  \par

## 2022-04-12 NOTE — PROCEDURE
[Gag Reflex] : gag reflex preventing mirror examination [None] : none [Flexible Endoscope] : examined with the flexible endoscope [Serial Number: ___] : Serial Number: [unfilled] [de-identified] : No lesions in the NPx, OPx, HPx or larynx.  Expected posttreatment changes, VC are mobile, airway patent.\par

## 2022-04-12 NOTE — PHYSICAL EXAM
[Laryngoscopy Performed] : laryngoscopy was performed, see procedure section for findings [Normal] : no rashes [de-identified] : Posttreatment changes, incision healing well.  No obvious mass is appreciated in area of concern on recent CT Neck - scarring? [FreeTextEntry1] : Tongue is well healed.  No obvious lesions along the posterior margin.  No new concerning lesions in the OC/OPx on inspection or palpation. [de-identified] : FTSG donor site is healing well.

## 2022-05-02 NOTE — PROCEDURE
[Gag Reflex] : gag reflex preventing mirror examination [None] : none [Flexible Endoscope] : examined with the flexible endoscope [Serial Number: ___] : Serial Number: [unfilled] [de-identified] : No lesions in the NPx, OPx, HPx or larynx.  Expected posttreatment changes, VC are mobile, airway patent.\par

## 2022-05-02 NOTE — HISTORY OF PRESENT ILLNESS
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  path c/w 1 out of 35 lymph nodes positive for metastatic squamous cell carcinoma (Metastatic deposit 1. 3 cm), with extranodal extension, Submandibular gland with chronic inflammation and atrophy, negative for carcinoma 1/12/22. Patient has a history of s/p revision partial glossectomy 4/16/21, completed RT July 2021. PET ct 11/27/2021, 11/27/2021 ct neck showed Enlarging right level II lymph node suspicious for a clau metastasis. Ultrasound-guided FNA.and 12/7/2021 FNA - Consistent with squamous carcinoma. Today, pt is here to review his PET CT. pt c.o right side with stiff neck and small lump and right ear pain for 3 weeks, no hearing loss.  Patient denies throat infections, fevers, chills, night sweats, dysphagia, odynophagia, dyspnea, wt loss or otalgia.\par \par  \par

## 2022-05-02 NOTE — PHYSICAL EXAM
[Laryngoscopy Performed] : laryngoscopy was performed, see procedure section for findings [Normal] : no rashes [de-identified] : Posttreatment changes, incision healing well.  Along the area of concern in the right neck, there are distinct dermal nodules and a firm mass concerning for recurrent disease.  There is also a new dermal nodule in the left neck, which was avid on his recent PET. [FreeTextEntry1] : Tongue is well healed.  No obvious lesions along the posterior margin.  No new concerning lesions in the OC/OPx on inspection or palpation. [de-identified] : FTSG donor site is healing well.

## 2022-05-05 NOTE — CONSULT NOTE ADULT - ASSESSMENT
Incidental finding of left UVJ stone  Not clinically significant at present  Start Flomax 0.4mg for expulsive therapy  No  surgical intervention at present    History obtained from daughter Viki over phone ([phone]). She was told to discuss presence of left UVJ stone with Dr. Leach.  Derrick dolan.

## 2022-05-05 NOTE — PROGRESS NOTE ADULT - ATTENDING COMMENTS
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 Admitted for possible  UTI.   Pt seen, examined, case & care plan d/w pt, residents at detail.  AM labs   ID Dr Connelly  Palliative care eval done .  PT Eval---> VICTOR MANUEL  Fall precautions.   D/W Dtr at detail about out pt follow up with Oncologist as per ENT next week. concern about Rx with recurrence of ca   Urology- Eval Dr Alcaraz  about Left UVJ stone   Total care time is 40 minutes. 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 Admitted for possible  UTI.   Pt seen, examined, case & care plan d/w pt, residents at detail.  AM labs   ID Dr Connelly -d/w CT stone hunt results-Restart IV Rocephin 1 gm daily ,  Palliative care eval done .  PT Eval---> VICTOR MANUEL  Fall precautions.   D/W Dtr at detail about out pt follow up with Oncologist as per ENT next week. concern about Rx with recurrence of ca   Urology- Eval Dr Alcaraz  about Left UVJ stone   Total care time is 40 minutes.

## 2022-05-05 NOTE — DISCHARGE NOTE PROVIDER - HOSPITAL COURSE
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 Wife Viki at bedside providing collateral information. Pt has been doing well until around 8:30 PM last night. He was very weak and was having difficulty getting up and walking due to the weakness.  Pt normally walks with a cane but was unable to get up last night. Daughter also noted that pt was having sweats but denies any fever or chills. Pt has been eating and drinking well. Pt denies any urinary urgency, frequency, dysuria or hematuria. Denies chest pain, palpitations, SOB, cough, abdominal pain, nausea, vomiting, diarrhea, headaches, dizziness.   Denies recent travel, recent antibiotic use, or sick contacts.    ED Course:   Vitals: BP: 123/53, HR: 74, Temp: 97.9 F, RR: 16, SpO2: 94% on RA   Labs: H/H: 12.9/37.9,  BUN/Cr: 25/1.80, GFR: 38, gluc: 228, lactate: 3.1  trop neg x1, COVID negative   UA: small bilirubin, trace ketone, 30 protein, moderate LE, 11-25 WBC, 3-5 RBC, few bacteria, few calcium oxalate   CXR: no acute lung pathology as per personal read, official read pending   EKG: junctional rhythm with PVCs, HR: 69   Received 1L NS bolus x1 and Rocephin in the ED  (05 May 2022 05:43)      ---  HOSPITAL COURSE:   Patient admitted to the general medical floor for UTI and ÁNGEL. UA + for 11-25 WBC, 3-5 RBC, few bacteria. Patient started on IV Rocephin, ID was consulted. Urine culture resulted ******, blood culture *****. Patient did not meet sepsis criteria on admission however lactate was elevated and downtrended s/p IVF. Patient also presented with ÁNGEL. Renal indices improved to baseline s/p IVF. ACEI was held initially and restarted for hx HTN.*** For PMH Anemia, iron studies showed ****. Hgb remained stable throughout admission. Palliative consulted for GOC. PT consulted, rec *****.       Patient seen and examined on day of discharge. Overall feeling well, denies any acute complaints. ***    Patient optimized from medical standpoint for discharge **** with close outpatient follow up.   ---  CONSULTANTS:   ID: Dr. Connelly  Palliative: Dr. Urena    ---  TIME SPENT:  I, the attending physician, was physically present for the key portions of the evaluation and management (E/M) service provided. 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 Wife Viki at bedside providing collateral information. Pt has been doing well until around 8:30 PM last night. He was very weak and was having difficulty getting up and walking due to the weakness.  Pt normally walks with a cane but was unable to get up last night. Daughter also noted that pt was having sweats but denies any fever or chills. Pt has been eating and drinking well. Pt denies any urinary urgency, frequency, dysuria or hematuria. Denies chest pain, palpitations, SOB, cough, abdominal pain, nausea, vomiting, diarrhea, headaches, dizziness.   Denies recent travel, recent antibiotic use, or sick contacts.    ED Course:   Vitals: BP: 123/53, HR: 74, Temp: 97.9 F, RR: 16, SpO2: 94% on RA   Labs: H/H: 12.9/37.9,  BUN/Cr: 25/1.80, GFR: 38, gluc: 228, lactate: 3.1  trop neg x1, COVID negative   UA: small bilirubin, trace ketone, 30 protein, moderate LE, 11-25 WBC, 3-5 RBC, few bacteria, few calcium oxalate   CXR: no acute lung pathology as per personal read, official read pending   EKG: junctional rhythm with PVCs, HR: 69   Received 1L NS bolus x1 and Rocephin in the ED  (05 May 2022 05:43)      ---  HOSPITAL COURSE:   Patient admitted to the general medical floor for UTI and ÁNGEL. UA + for 11-25 WBC, 3-5 RBC, few bacteria. Patient started on IV Rocephin, ID was consulted. Urine culture was negative. Blood culture NGTD. Patient did not meet sepsis criteria on admission however lactate was elevated and downtrended s/p IVF. CT renal stone hunt showed trace left hydronephrosis, 4mm left UVJ stone, nonobstructing R intrarenal calculus. Urology consulted, no surgical intervention indicated, patient started on Flomax. Patient also admitted with ÁNGEL. Renal indices improved to baseline s/p IVF. ACEI was held initially and restarted for hx HTN. For PMH Anemia, iron studies were normal. Hgb remained stable throughout admission. Family insisted patient was not at baseline mental status although UTI was improving. CT head obtained and showed no acute intracranial hemorrhage or acute territorial infarct, +NE age-indeterminate lacunar infarct left basal ganglia. Neurology consulted, MR brain showed old lacunar infarct involving the right sublenticular nucleus region, +evidence of abnormal T2 prolongation with restricted diffusion seen involving the left brachium pontis and left lenticular nucleus/corona radiata region which are compatible with areas of acute infarcts. Patient started on ASA, statin, Dopplers of carotids showed ******, TTE showed ******, SS recommended ********, lipid panel WNL, HgbA1c 5.7. Palliative consulted for [facility]. PT consulted, rec VICTOR MANUEL.    Patient seen and examined on day of discharge. Overall feeling well, denies any acute complaints. ****    Patient optimized from medical standpoint for discharge VICTOR MANUEL with close outpatient follow up.   ---  CONSULTANTS:   ID: Dr. Connelly  Palliative: Dr. Urena  Neuro: Dr. Peterson  Urology: Dr. Alcaraz    ---  TIME SPENT:  I, the attending physician, was physically present for the key portions of the evaluation and management (E/M) service provided. 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 Wife Viki at bedside providing collateral information. Pt has been doing well until around 8:30 PM last night. He was very weak and was having difficulty getting up and walking due to the weakness.  Pt normally walks with a cane but was unable to get up last night. Daughter also noted that pt was having sweats but denies any fever or chills. Pt has been eating and drinking well. Pt denies any urinary urgency, frequency, dysuria or hematuria. Denies chest pain, palpitations, SOB, cough, abdominal pain, nausea, vomiting, diarrhea, headaches, dizziness.   Denies recent travel, recent antibiotic use, or sick contacts.    ED Course:   Vitals: BP: 123/53, HR: 74, Temp: 97.9 F, RR: 16, SpO2: 94% on RA   Labs: H/H: 12.9/37.9,  BUN/Cr: 25/1.80, GFR: 38, gluc: 228, lactate: 3.1  trop neg x1, COVID negative   UA: small bilirubin, trace ketone, 30 protein, moderate LE, 11-25 WBC, 3-5 RBC, few bacteria, few calcium oxalate   CXR: no acute lung pathology as per personal read, official read pending   EKG: junctional rhythm with PVCs, HR: 69   Received 1L NS bolus x1 and Rocephin in the ED  (05 May 2022 05:43)      ---  HOSPITAL COURSE:   Patient admitted to the general medical floor for UTI and ÁNGEL. UA + for 11-25 WBC, 3-5 RBC, few bacteria. Patient started on IV Rocephin, ID was consulted. Urine culture was negative. Blood culture NGTD. Patient did not meet sepsis criteria on admission however lactate was elevated and downtrended s/p IVF. CT renal stone hunt showed trace left hydronephrosis, 4mm left UVJ stone, nonobstructing R intrarenal calculus. Urology consulted, no surgical intervention indicated, patient started on Flomax. Patient also admitted with ÁNGEL. Renal indices improved to baseline s/p IVF. ACEI was held initially and restarted for hx HTN. For PMH Anemia, iron studies were normal. Hgb remained stable throughout admission. Family insisted patient was not at baseline mental status although UTI was improving. CT head obtained and showed no acute intracranial hemorrhage or acute territorial infarct, +NE age-indeterminate lacunar infarct left basal ganglia. Neurology consulted, MR brain showed old lacunar infarct involving the right sublenticular nucleus region, +evidence of abnormal T2 prolongation with restricted diffusion seen involving the left brachium pontis and left lenticular nucleus/corona radiata region which are compatible with areas of acute infarcts. Patient started on ASA, statin, Dopplers of carotids showed ******, TTE showed ******, SS recommended ********, lipid panel WNL, HgbA1c 5.7. Palliative consulted for [facility]. PT consulted, rec VICTOR MANUEL after discharge     Patient seen and examined on day of discharge. Overall feeling well, denies any acute complaints. ****    Patient optimized from medical standpoint for discharge VICTOR MANUEL with close outpatient follow up.   ---  CONSULTANTS:   ID: Dr. Connelly  Palliative: Dr. Urena  Neuro: Dr. Peterson  Urology: Dr. Alcaraz    ---  TIME SPENT:  I, the attending physician, was physically present for the key portions of the evaluation and management (E/M) service provided. 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 Wife Viki at bedside providing collateral information. Pt has been doing well until around 8:30 PM last night. He was very weak and was having difficulty getting up and walking due to the weakness.  Pt normally walks with a cane but was unable to get up last night. Daughter also noted that pt was having sweats but denies any fever or chills. Pt has been eating and drinking well. Pt denies any urinary urgency, frequency, dysuria or hematuria. Denies chest pain, palpitations, SOB, cough, abdominal pain, nausea, vomiting, diarrhea, headaches, dizziness.   Denies recent travel, recent antibiotic use, or sick contacts.    ED Course:   Vitals: BP: 123/53, HR: 74, Temp: 97.9 F, RR: 16, SpO2: 94% on RA   Labs: H/H: 12.9/37.9,  BUN/Cr: 25/1.80, GFR: 38, gluc: 228, lactate: 3.1  trop neg x1, COVID negative   UA: small bilirubin, trace ketone, 30 protein, moderate LE, 11-25 WBC, 3-5 RBC, few bacteria, few calcium oxalate   CXR: no acute lung pathology as per personal read, official read pending   EKG: junctional rhythm with PVCs, HR: 69   Received 1L NS bolus x1 and Rocephin in the ED  (05 May 2022 05:43)      ---  HOSPITAL COURSE:   Patient admitted to the general medical floor for UTI and ÁNGEL. UA + for 11-25 WBC, 3-5 RBC, few bacteria. Patient started on IV Rocephin, ID was consulted. Urine culture was negative. Blood culture NGTD. Patient did not meet sepsis criteria on admission however lactate was elevated and downtrended s/p IVF. CT renal stone hunt showed trace left hydronephrosis, 4mm left UVJ stone, nonobstructing R intrarenal calculus. Urology consulted, no surgical intervention indicated, patient started on Flomax. Patient also admitted with ÁNGEL. Renal indices improved to baseline s/p IVF. ACEI was held initially and restarted for hx HTN. For PMH Anemia, iron studies were normal. Hgb remained stable throughout admission. Family insisted patient was not at baseline mental status although UTI was improving. CT head obtained and showed no acute intracranial hemorrhage or acute territorial infarct, +NE age-indeterminate lacunar infarct left basal ganglia. Neurology consulted, MR brain showed old lacunar infarct involving the right sublenticular nucleus region, +evidence of abnormal T2 prolongation with restricted diffusion seen involving the left brachium pontis and left lenticular nucleus/corona radiata region which are compatible with areas of acute infarcts. Patient started on ASA, statin, Dopplers of carotids showed ******, TTE showed ******, SS recommended soft and bite sized with moderately thick liquids. Lipid panel WNL, HgbA1c 5.7. Palliative consulted for [facility]. PT consulted, rec VICTOR MANUEL after discharge _____    Patient seen and examined on day of discharge. Overall feeling well, denies any acute complaints. ****    Patient optimized from medical standpoint for discharge VICTOR MANUEL with close outpatient follow up.     ---  CONSULTANTS:   ID: Dr. Connelly  Palliative: Dr. Urena  Neuro: Dr. Peterson  Urology: Dr. Alcaraz    ---  TIME SPENT:  I, the attending physician, was physically present for the key portions of the evaluation and management (E/M) service provided. 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 Wife Viki at bedside providing collateral information. Pt has been doing well until around 8:30 PM last night. He was very weak and was having difficulty getting up and walking due to the weakness.  Pt normally walks with a cane but was unable to get up last night. Daughter also noted that pt was having sweats but denies any fever or chills. Pt has been eating and drinking well. Pt denies any urinary urgency, frequency, dysuria or hematuria. Denies chest pain, palpitations, SOB, cough, abdominal pain, nausea, vomiting, diarrhea, headaches, dizziness.   Denies recent travel, recent antibiotic use, or sick contacts.    ED Course:   Vitals: BP: 123/53, HR: 74, Temp: 97.9 F, RR: 16, SpO2: 94% on RA   Labs: H/H: 12.9/37.9,  BUN/Cr: 25/1.80, GFR: 38, gluc: 228, lactate: 3.1  trop neg x1, COVID negative   UA: small bilirubin, trace ketone, 30 protein, moderate LE, 11-25 WBC, 3-5 RBC, few bacteria, few calcium oxalate   CXR: no acute lung pathology as per personal read, official read pending   EKG: junctional rhythm with PVCs, HR: 69   Received 1L NS bolus x1 and Rocephin in the ED  (05 May 2022 05:43)      ---  HOSPITAL COURSE:   Patient admitted to the general medical floor for UTI and ÁNGEL. UA + for 11-25 WBC, 3-5 RBC, few bacteria. Patient started on IV Rocephin, ID was consulted. Urine culture was negative. Blood culture NGTD. Patient did not meet sepsis criteria on admission however lactate was elevated and downtrended s/p IVF. CT renal stone hunt showed trace left hydronephrosis, 4mm left UVJ stone, nonobstructing R intrarenal calculus. Urology consulted, no surgical intervention indicated, patient started on Flomax. Patient also admitted with ÁNGEL. Renal indices improved to baseline s/p IVF. ACEI was held initially and restarted for hx HTN. For PMH Anemia, iron studies were normal. Hgb remained stable throughout admission. Family insisted patient was not at baseline mental status although UTI was improving. CT head obtained and showed no acute intracranial hemorrhage or acute territorial infarct, +NE age-indeterminate lacunar infarct left basal ganglia. Neurology consulted, MR brain showed old lacunar infarct involving the right sublenticular nucleus region, +evidence of abnormal T2 prolongation with restricted diffusion seen involving the left brachium pontis and left lenticular nucleus/corona radiata region which are compatible with areas of acute infarcts. Patient started on ASA, statin, Dopplers of carotids showed mild calcific plaque without duplex evidence of hemodynamically significant stenosis. TTE showing LVEF 60-65%, normal RV systolic function, diastolic dysfunction. SS recommended soft and bite sized with moderately thick liquids. Lipid panel WNL, HgbA1c 5.7. Palliative consulted for GOC. PT consulted, rec VICTOR MANUEL after discharge but patient and family wish for patient to go home with home PT.    Patient seen and examined on day of discharge. Overall feeling well, denies any acute complaints.      Patient optimized from medical standpoint for discharge home with home PT and close outpatient follow up.     ---  CONSULTANTS:   ID: Dr. Connelly  Palliative: Dr. Urena  Neuro: Dr. Peterson  Urology: Dr. Alcaraz    ---  TIME SPENT:  I, the attending physician, was physically present for the key portions of the evaluation and management (E/M) service provided. 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 Wife Viki at bedside providing collateral information. Pt has been doing well until around 8:30 PM last night. He was very weak and was having difficulty getting up and walking due to the weakness.  Pt normally walks with a cane but was unable to get up last night. Daughter also noted that pt was having sweats but denies any fever or chills. Pt has been eating and drinking well. Pt denies any urinary urgency, frequency, dysuria or hematuria. Denies chest pain, palpitations, SOB, cough, abdominal pain, nausea, vomiting, diarrhea, headaches, dizziness.   Denies recent travel, recent antibiotic use, or sick contacts.    ED Course:   Vitals: BP: 123/53, HR: 74, Temp: 97.9 F, RR: 16, SpO2: 94% on RA   Labs: H/H: 12.9/37.9,  BUN/Cr: 25/1.80, GFR: 38, gluc: 228, lactate: 3.1  trop neg x1, COVID negative   UA: small bilirubin, trace ketone, 30 protein, moderate LE, 11-25 WBC, 3-5 RBC, few bacteria, few calcium oxalate   CXR: no acute lung pathology as per personal read, official read pending   EKG: junctional rhythm with PVCs, HR: 69   Received 1L NS bolus x1 and Rocephin in the ED  (05 May 2022 05:43)      ---  HOSPITAL COURSE:   Patient admitted to the general medical floor for UTI and ÁNGEL. UA + for 11-25 WBC, 3-5 RBC, few bacteria. Patient started on IV Rocephin, ID was consulted. Urine culture was negative. Blood culture NGTD. Patient did not meet sepsis criteria on admission however lactate was elevated and downtrended s/p IVF. CT renal stone hunt showed trace left hydronephrosis, 4mm left UVJ stone, nonobstructing R intrarenal calculus. Urology consulted, no surgical intervention indicated, patient started on Flomax. Patient also admitted with ÁNGEL. Renal indices improved to baseline s/p IVF. ACEI was held initially and restarted for hx HTN. For PMH Anemia, iron studies were normal. Hgb remained stable throughout admission. Family insisted patient was not at baseline mental status although UTI was improving. CT head obtained and showed no acute intracranial hemorrhage or acute territorial infarct, +NE age-indeterminate lacunar infarct left basal ganglia. Neurology consulted, MR brain showed old lacunar infarct involving the right sublenticular nucleus region, +evidence of abnormal T2 prolongation with restricted diffusion seen involving the left brachium pontis and left lenticular nucleus/corona radiata region which are compatible with areas of acute infarcts. Patient started on ASA, statin, Dopplers of carotids showed mild calcific plaque without duplex evidence of hemodynamically significant stenosis. TTE showing LVEF 60-65%, normal RV systolic function, diastolic dysfunction. SS recommended soft and bite sized with moderately thick liquids. Lipid panel WNL, HgbA1c 5.7. Palliative consulted for GOC. PT consulted, rec VICTOR MANUEL after discharge but patient and family wish for patient to go home with home PT. as pt has schedule oncology appointment this week Thursday, case d/w dtr at detail.    MR Brain  Extensive parenchymal volume loss and chronic microvascular ischemic   changes are identified  Old lacunar infarct involving the right sublenticular nucleus region is   seen. There is evidence of abnormal T2 prolongation with restricted diffusion   seen involving the left brachium pontis and left lenticular   nucleus/corona radiata region.  These findings are compatible with areas of acute infarcts. No   hemorrhagic transformation is seen. No significant shift or herniation is   seen.  The large vessels demonstrate normal flow  The visualized paranasal sinuses mastoid and mastoid clear.    CT [place]:  Trace left hydronephrosis. 4 mm left UVJ calculus. Nonobstructing right   intrarenal calculus.  < from: TTE Echo Complete w/o Contrast w/ Doppler (05.08.22 @ 14:56) >      IMPRESSION:  Technically difficult study  Normal left ventricular internal dimensions and systolic function,   estimated LVEF of 60-65%.  Grossly normal RV size and systolic function.  Normal trileaflet aortic valve, without AI.  Trace physiologic MR  Mild TR.    < end of copied text >                Patient seen and examined on day of discharge. Overall feeling well, denies any acute complaints.      Patient optimized from medical standpoint for discharge home with home PT and close outpatient follow up.     ---  CONSULTANTS:   ID: Dr. Connelly  Palliative: Dr. Urena  Neuro: Dr. Peterson  Urology: Dr. Alcaraz    ---  TIME SPENT:  I, the attending physician, was physically present for the key portions of the evaluation and management (E/M) service provided. The total amount of time spent reviewing the hospital notes, laboratory values, imaging findings, assessing/counseling the patient, discussing with consultant physicians, social work, nursing staff was 60 minutes    ---

## 2022-05-05 NOTE — H&P ADULT - ASSESSMENT
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 Admitted for UTI.

## 2022-05-05 NOTE — PHYSICAL THERAPY INITIAL EVALUATION ADULT - ADDITIONAL COMMENTS
Pt lives in a house with his daughter. 3 ALDA with railing and 1 flight (12 steps) to get to bedroom. Pt ambulated with a cane, has RW does not use. Has grad bars and shower chair. Pt lives in a house with his daughter. 3 ALDA with railing and 1 flight (12 steps) to get to bedroom. Pt ambulated with a cane, has RW does not use. Has grab bars and shower chair.

## 2022-05-05 NOTE — DISCHARGE NOTE PROVIDER - NSDCFUADDINST_GEN_ALL_CORE_FT
Please follow up with your primary care doctor within 1 week of discharge from the hospital.  You or your family member are responsible for making all follow up appointments.  If you develop worsening confusion, weakness, fevers, chills, burning with urination, please return to the ED immediately.  Please follow up with your primary care doctor within 1 week of discharge from rehab.  You or your family member are responsible for making all follow up appointments.  If you develop worsening confusion, weakness, fevers, chills, burning with urination, please return to the ED immediately.  Please follow up with your primary care doctor within 1 week of discharge from rehab.  -Follow up with Oncologist this week Thursday for your Cancer work up & Treatments  Follow up with Cardiology in 2 weeks for out pt Holter Monitor arrangement  -Follow up with Neurology-DR Peterson in 3-4 weeks  -Follow up with DR Boland -ENT as schedule    You or your family member are responsible for making all follow up appointments.  If you develop worsening confusion, weakness, fevers, chills, burning with urination, please return to the ED immediately.

## 2022-05-05 NOTE — DISCHARGE NOTE PROVIDER - NSDCMRMEDTOKEN_GEN_ALL_CORE_FT
carbidopa-levodopa 25 mg-100 mg oral tablet: 1 tab(s) orally 2 times a day  citalopram 20 mg oral tablet: 1 tab(s) orally once a day  DilTIAZem (Eqv-Cardizem CD) 240 mg/24 hours oral capsule, extended release: 1 cap(s) orally once a day  enalapril 10 mg oral tablet: 1 tab(s) orally once a day  pantoprazole 40 mg oral delayed release tablet: 1 tab(s) orally once a day (before a meal)  primidone 50 mg oral tablet: 1 tab(s) orally once a day  simvastatin 40 mg oral tablet: 1 tab(s) orally once a day (at bedtime)  Vitamin C 1000 mg oral tablet: 1 tab(s) orally once a day   aspirin 81 mg oral delayed release tablet: 1 tab(s) orally once a day  carbidopa-levodopa 25 mg-100 mg oral tablet: 1 tab(s) orally 2 times a day  citalopram 20 mg oral tablet: 1 tab(s) orally once a day  DilTIAZem (Eqv-Cardizem CD) 240 mg/24 hours oral capsule, extended release: 1 cap(s) orally once a day  enalapril 10 mg oral tablet: 1 tab(s) orally once a day  Flomax 0.4 mg oral capsule: 1 cap(s) orally once a day (at bedtime)  pantoprazole 40 mg oral delayed release tablet: 1 tab(s) orally once a day (before a meal)  primidone 50 mg oral tablet: 1 tab(s) orally once a day  simvastatin 40 mg oral tablet: 1 tab(s) orally once a day (at bedtime)  Vitamin C 1000 mg oral tablet: 1 tab(s) orally once a day   aspirin 81 mg oral delayed release tablet: 1 tab(s) orally once a day  carbidopa-levodopa 25 mg-100 mg oral tablet: 1 tab(s) orally 2 times a day  citalopram 20 mg oral tablet: 1 tab(s) orally once a day  DilTIAZem (Eqv-Cardizem CD) 240 mg/24 hours oral capsule, extended release: 1 cap(s) orally once a day  enalapril 10 mg oral tablet: 1 tab(s) orally once a day  Flomax 0.4 mg oral capsule: 1 cap(s) orally once a day (at bedtime)  pantoprazole 40 mg oral delayed release tablet: 1 tab(s) orally once a day (before a meal)  primidone 50 mg oral tablet: 1 tab(s) orally once a day  simvastatin 40 mg oral tablet: 1 tab(s) orally once a day (at bedtime)  transport chair: ICD R26.9 transport chair   Ht 165.1cm   Wt 68kg  MARIA L=99    Vitamin C 1000 mg oral tablet: 1 tab(s) orally once a day   aspirin 81 mg oral delayed release tablet: 1 tab(s) orally once a day  carbidopa-levodopa 25 mg-100 mg oral tablet: 1 tab(s) orally 2 times a day  citalopram 20 mg oral tablet: 1 tab(s) orally once a day  DilTIAZem (Eqv-Cardizem CD) 240 mg/24 hours oral capsule, extended release: 1 cap(s) orally once a day  enalapril 10 mg oral tablet: 1 tab(s) orally once a day  Flomax 0.4 mg oral capsule: 1 cap(s) orally once a day (at bedtime) MDD:1  pantoprazole 40 mg oral delayed release tablet: 1 tab(s) orally once a day (before a meal)  primidone 50 mg oral tablet: 1 tab(s) orally once a day  simvastatin 40 mg oral tablet: 1 tab(s) orally once a day (at bedtime)  transport chair: ICD R26.9 transport chair   Ht 165.1cm   Wt 68kg  MARIA L=99    Vitamin C 1000 mg oral tablet: 1 tab(s) orally once a day

## 2022-05-05 NOTE — ED PROVIDER NOTE - CARE PLAN
Principal Discharge DX:	Generalized weakness   1 Principal Discharge DX:	Generalized weakness  Secondary Diagnosis:	Acute UTI

## 2022-05-05 NOTE — PROGRESS NOTE ADULT - PROBLEM SELECTOR PLAN 1
Pt presents with increasing weakness  likely 2/2 UTI   - Does not meets SIRS criteria  - lactate: 3.1 on admission, repeat WNL  - UA: small bilirubin, trace ketone, 30 protein, moderate LE, 11-25 WBC, 3-5 RBC, few bacteria, few calcium oxalate  - CXR: no acute lung pathology as per personal read, f/u official read  - s/p 1L NS bolus x1 and Rocephin in the ED   - ID Dr. Connelly following- reccs DC antibiotics given low suspicion of UTI, likely dehydrated   - F/u UCx and BCx x2  - Monitor leukocytosis and fever Pt presents with increasing weakness  likely 2/2 UTI   - Does not meets SIRS criteria  - lactate: 3.1 on admission, repeat WNL  - UA: small bilirubin, trace ketone, 30 protein, moderate LE, 11-25 WBC, 3-5 RBC, few bacteria, few calcium oxalate  - CXR: no acute lung pathology   - CT renal stone hunt ordered given microscopic hematuria- f/u results   - s/p 1L NS bolus x1 and Rocephin in the ED   - ID Dr. Connelly following- reccs DC antibiotics given low suspicion of UTI, likely dehydrated   - F/u UCx and BCx x2  - Monitor leukocytosis and fever Pt presents with increasing weakness  likely 2/2 UTI   - Does not meets SIRS criteria  - lactate: 3.1 on admission, repeat WNL  - UA: small bilirubin, trace ketone, 30 protein, moderate LE, 11-25 WBC, 3-5 RBC, few bacteria, few calcium oxalate  - CXR: no acute lung pathology   - CT renal stone hunt ordered given microscopic hematuria- Trace left hydronephrosis. 4 mm left UVJ calculus. Nonobstructing right intrarenal calculus.  - Urology consulted- f/u reccs   - s/p 1L NS bolus x1 and Rocephin in the ED   - ID Dr. Connelly following- reccs DC antibiotics given low suspicion of UTI, likely dehydrated   - F/u UCx and BCx x2  - Monitor leukocytosis and fever Pt presents with increasing weakness  likely 2/2 UTI   - lactate: 3.1 on admission, repeat WNL  - CT renal stone hunt ordered given microscopic hematuria- Trace left hydronephrosis. 4 mm left UVJ calculus. Nonobstructing right intrarenal calculus.  - Urology consulted- f/u reccs   - s/p 1L NS bolus x1 and Rocephin in the ED   - ID Dr. Connelly following- reccs DC antibiotics given low suspicion of UTI, likely dehydrated   - F/u UCx and BCx x2  - Monitor leukocytosis and fever

## 2022-05-05 NOTE — H&P ADULT - HISTORY OF PRESENT ILLNESS
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 Pt has been having difficulty walking due to weakness.     Denies fever, chills, chest pain, palpitations, SOB, cough, abdominal pain, nausea, vomiting, diarrhea, headaches, changes in vision, dizziness.   Denies recent travel, recent antibiotic use, or sick contacts.    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 Wife Viki at bedside providing collateral information. Pt has been doing well until around 8:30 PM last night. He was very weak and was having difficulty getting up and walking due to the weakness.  Pt normally walks with a cane but was unable     Denies fever, chills, chest pain, palpitations, SOB, cough, abdominal pain, nausea, vomiting, diarrhea, headaches, changes in vision, dizziness.   Denies recent travel, recent antibiotic use, or sick contacts.    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 Wife Viki at bedside providing collateral information. Pt has been doing well until around 8:30 PM last night. He was very weak and was having difficulty getting up and walking due to the weakness.  Pt normally walks with a cane but was unable to get up last night. Daughter also noted that pt was having sweats but denies any fever or chills. Pt has been eating and drinking well. Pt denies any urinary urgency, frequency, dysuria or hematuria. Denies chest pain, palpitations, SOB, cough, abdominal pain, nausea, vomiting, diarrhea, headaches, dizziness.   Denies recent travel, recent antibiotic use, or sick contacts.    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 Wife Viki at bedside providing collateral information. Pt has been doing well until around 8:30 PM last night. He was very weak and was having difficulty getting up and walking due to the weakness.  Pt normally walks with a cane but was unable to get up last night. Daughter also noted that pt was having sweats but denies any fever or chills. Pt has been eating and drinking well. Pt denies any urinary urgency, frequency, dysuria or hematuria. Denies chest pain, palpitations, SOB, cough, abdominal pain, nausea, vomiting, diarrhea, headaches, dizziness.   Denies recent travel, recent antibiotic use, or sick contacts.    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 Wife Viki at bedside providing collateral information. Pt has been doing well until around 8:30 PM last night. He was very weak and was having difficulty getting up and walking due to the weakness.  Pt normally walks with a cane but was unable to get up last night. Daughter also noted that pt was having sweats but denies any fever or chills. Pt has been eating and drinking well. Pt denies any urinary urgency, frequency, dysuria or hematuria. Denies chest pain, palpitations, SOB, cough, abdominal pain, nausea, vomiting, diarrhea, headaches, dizziness.   Denies recent travel, recent antibiotic use, or sick contacts.    ED Course:   Vitals: BP: 123/53, HR: 74, Temp: 97.9 F, RR: 16, SpO2: 94% on RA   Labs: H/H: 12.9/37.9,  BUN/Cr: 25/1.80, GFR: 38, gluc: 228, lactate: 3.1  trop neg x1, COVID negative   UA: small bilirubin, trace ketone, 30 protein, moderate LE, 11-25 WBC, 3-5 RBC, few bacteria, few calcium oxalate   CXR: no acute lung pathology as per personal read, official read pending   EKG: junctional rhythm with PVCs, HR: 69   Received 1L NS bolus x1 and Rocephin in the ED

## 2022-05-05 NOTE — H&P ADULT - ATTENDING COMMENTS
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 Admitted for possible  UTI.   Pt seen, examined, case & care plan d/w pt, residents at detail.  AM labs   ID Dr Connelly  Palliative care eval.  PT Eval  Fall precautions.

## 2022-05-05 NOTE — DISCHARGE NOTE PROVIDER - NSDCFUSCHEDAPPT_GEN_ALL_CORE_FT
Kelly Jeffries  [facility] Physician [facility]  [facility]  Scheduled Appointment: 05/12/2022    Jayden Boland  [facility]  [address] [place] [address] R  Scheduled Appointment: 06/20/2022    Jayden Boland  [facility]  [address] [place]  Scheduled Appointment: 07/19/2022

## 2022-05-05 NOTE — PROGRESS NOTE ADULT - SUBJECTIVE AND OBJECTIVE BOX
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 Wife Viki at bedside providing collateral information. Pt has been doing well until around 8:30 PM last night. He was very weak and was having difficulty getting up and walking due to the weakness.  Pt normally walks with a cane but was unable to get up last night. Daughter also noted that pt was having sweats but denies any fever or chills. Pt has been eating and drinking well. Pt denies any urinary urgency, frequency, dysuria or hematuria. Denies chest pain, palpitations, SOB, cough, abdominal pain, nausea, vomiting, diarrhea, headaches, dizziness.   Denies recent travel, recent antibiotic use, or sick contacts.    ED Course:   Vitals: BP: 123/53, HR: 74, Temp: 97.9 F, RR: 16, SpO2: 94% on RA   Labs: H/H: 12.9/37.9,  BUN/Cr: 25/1.80, GFR: 38, gluc: 228, lactate: 3.1  trop neg x1, COVID negative   UA: small bilirubin, trace ketone, 30 protein, moderate LE, 11-25 WBC, 3-5 RBC, few bacteria, few calcium oxalate   CXR: no acute lung pathology as per personal read, official read pending   EKG: junctional rhythm with PVCs, HR: 69   Received 1L NS bolus x1 and Rocephin in the ED  (05 May 2022 05:43)    INTERVAL HPI:  22: Patient was seen and examined at bedside. States he is feeling well. Denies any dysuria or polyuria. ABx dcd per ID reccs. PT recc VICTOR MANUEL. CT renal hunt pending.     OVERNIGHT EVENTS:  No acute events overnight.     Home Medications:  carbidopa-levodopa 25 mg-100 mg oral tablet: 1 tab(s) orally 2 times a day (05 May 2022 06:27)  citalopram 20 mg oral tablet: 1 tab(s) orally once a day (05 May 2022 06:27)  DilTIAZem (Eqv-Cardizem CD) 240 mg/24 hours oral capsule, extended release: 1 cap(s) orally once a day (05 May 2022 06:27)  enalapril 10 mg oral tablet: 1 tab(s) orally once a day (05 May 2022 06:27)  pantoprazole 40 mg oral delayed release tablet: 1 tab(s) orally once a day (before a meal) (05 May 2022 06:27)  primidone 50 mg oral tablet: 1 tab(s) orally once a day (05 May 2022 06:27)  simvastatin 40 mg oral tablet: 1 tab(s) orally once a day (at bedtime) (05 May 2022 06:27)  Vitamin C 1000 mg oral tablet: 1 tab(s) orally once a day (05 May 2022 06:27)      MEDICATIONS  (STANDING):  carbidopa/levodopa  25/100 1 Tablet(s) Oral daily  carbidopa/levodopa  25/100 1 Tablet(s) Oral at bedtime  citalopram 20 milliGRAM(s) Oral daily  diltiazem    milliGRAM(s) Oral daily  heparin   Injectable 5000 Unit(s) SubCutaneous every 8 hours  pantoprazole    Tablet 40 milliGRAM(s) Oral before breakfast  polyethylene glycol 3350 17 Gram(s) Oral daily  primidone 50 milliGRAM(s) Oral daily  senna 2 Tablet(s) Oral at bedtime  simvastatin 40 milliGRAM(s) Oral at bedtime  sodium chloride 0.9%. 1000 milliLiter(s) (60 mL/Hr) IV Continuous <Continuous>    MEDICATIONS  (PRN):  acetaminophen     Tablet .. 650 milliGRAM(s) Oral every 6 hours PRN Temp greater or equal to 38C (100.4F), Mild Pain (1 - 3)  melatonin 3 milliGRAM(s) Oral at bedtime PRN Insomnia  ondansetron Injectable 4 milliGRAM(s) IV Push every 8 hours PRN Nausea and/or Vomiting      Allergies    No Known Allergies    Intolerances        Social History:  Tobacco: Denies  EtOH: Denies   Recreational drug use: Denies   Lives with: daughter and grandkids   Ambulates: with a cane   ADLs: with assistance   Vaccinations: Moderna 3/3 (05 May 2022 05:43)      REVIEW OF SYSTEMS:  CONSTITUTIONAL: No fever, No chills, No fatigue, No myalgia, No Body ache, No Weakness  EYES: No eye pain,  No visual disturbances, No discharge, NO Redness  ENMT:  No ear pain, No nose bleed, No vertigo; No sinus pain, NO throat pain, No Congestion  NECK: No pain, No stiffness  RESPIRATORY: No cough, NO wheezing, No  hemoptysis, NO  shortness of breath  CARDIOVASCULAR: No chest pain, palpitations  GASTROINTESTINAL: No abdominal pain, NO epigastric pain. No nausea, No vomiting; No diarrhea, No constipation. [ x ] NO BM  GENITOURINARY: No dysuria, No frequency, No urgency, No hematuria, NO incontinence  NEUROLOGICAL: No headaches, No dizziness, No numbness, No tingling, No tremors, No weakness  EXT: No Swelling, No Pain, No Edema  SKIN:  [ x] No itching, burning, rashes, or lesions   MUSCULOSKELETAL: No joint pain ,No Jt swelling; No muscle pain, No back pain, No extremity pain  PSYCHIATRIC: No depression,  No anxiety,  No mood swings ,No difficulty sleeping at night  PAIN SCALE:  [ x] None  [  ] Other-  ROS Unable to obtain due to - [  ] Dementia  [  ] Lethargy [  ] Drowsy [  ] Sedated [  ] non verbal  REST OF REVIEW Of SYSTEM - [ x ] Normal     Vital Signs Last 24 Hrs  T(C): 36.4 (05 May 2022 08:30), Max: 36.6 (05 May 2022 03:20)  T(F): 97.6 (05 May 2022 08:30), Max: 97.9 (05 May 2022 03:20)  HR: 61 (05 May 2022 08:30) (60 - 74)  BP: 128/76 (05 May 2022 08:30) (100/47 - 128/76)  BP(mean): --  RR: 18 (05 May 2022 08:30) (15 - 18)  SpO2: 93% (05 May 2022 08:30) (93% - 99%)  Finger Stick        - @ 07:01  -  - @ 12:38  --------------------------------------------------------  IN: 0 mL / OUT: 300 mL / NET: -300 mL        PHYSICAL EXAM: i am good   GENERAL:  [x  ] NAD , [ x ] well appearing, [  ] Agitated, [  ] Drowsy,  [  ] Lethargy, [  ] confused   HEAD:  [x  ] Normal, [  ] Other  EYES:  [ x ] EOMI, [x  ] PERRLA, [  ] conjunctiva and sclera clear normal, [  ] Other,  [  ] Pallor,[  ] Discharge  ENMT:  [ x ] Normal, [ x ] Moist mucous membranes, [  ] Good dentition, [  ] No Thrush  NECK:  [  x] Supple, [x  ] No JVD, [ x ] Normal thyroid, [  ] Lymphadenopathy [  ] Other  CHEST/LUNG:  [x  ] Clear to auscultation bilaterally, [x  ] Breath Sounds equal  [  ] poor effort  [x  ] No rales, [x  ] No rhonchi  [ x ]  No wheezing,   HEART:  [ x ] Regular rate and rhythm, [  ] tachycardia, [  ] Bradycardia,  [  ] irregular  [x  ] No murmurs, No rubs, No gallops, [  ] PPM in place (Mfr:  )  ABDOMEN:  [ x ] Soft, [x  ] Nontender, [ x ] Nondistended, [  ] No mass, [ x ] Bowel sounds present, [  ] obese  NERVOUS SYSTEM:  [x  ] Alert & Oriented X3, [  ] Nonfocal  [  ] Confusion  [  ] Encephalopathic [  ] Sedated [  ] Unable to assess, [  ] Dementia [  ] Other-  EXTREMITIES: [x  ] 2+ Peripheral Pulses, No clubbing, No cyanosis,  [  ] edema B/L lower EXT. [  ] PVD stasis skin changes B/L Lower EXT, [  ] wound  LYMPH: No lymphadenopathy noted  SKIN:  [  x] No rashes or lesions, [  ] Pressure Ulcers, [  ] ecchymosis, [  ] Skin Tears, [  ] Other    DIET: Diet, DASH/TLC:   Sodium & Cholesterol Restricted (22 @ 12:33)      LABS:                        12.5   7.48  )-----------( 259      ( 05 May 2022 08:48 )             37.2     05 May 2022 08:48    143    |  109    |  19     ----------------------------<  112    3.6     |  26     |  1.10     Ca    9.5        05 May 2022 08:48  Phos  2.9       05 May 2022 08:48  Mg     2.4       05 May 2022 08:48    TPro  6.4    /  Alb  2.9    /  TBili  0.4    /  DBili  x      /  AST  11     /  ALT  18     /  AlkPhos  68     05 May 2022 08:48    PT/INR - ( 05 May 2022 02:15 )   PT: 12.4 sec;   INR: 1.06 ratio         PTT - ( 05 May 2022 02:15 )  PTT:28.7 sec  Urinalysis Basic - ( 05 May 2022 04:58 )    Color: Yellow / Appearance: Slightly Turbid / S.020 / pH: x  Gluc: x / Ketone: Trace  / Bili: Small / Urobili: Negative   Blood: x / Protein: 30 mg/dL / Nitrite: Negative   Leuk Esterase: Moderate / RBC: 3-5 /HPF / WBC 11-25   Sq Epi: x / Non Sq Epi: Occasional / Bacteria: Few         Anemia Panel:      Thyroid Panel:          RADIOLOGY & ADDITIONAL TESTS:      HEALTH ISSUES - PROBLEM Dx:  Acute UTI    ÁNGEL (acute kidney injury)    Lactic acidosis    Anemia    HTN (hypertension)    HLD (hyperlipidemia)    GERD (gastroesophageal reflux disease)    Need for prophylactic measure    Parkinson disease    Acute UTI    Acute UTI        Consultant(s) Notes Reviewed:  [ x ] YES     Care Discussed with [X] Consultants  [ x ] Patient  [ x ] Family [  ] HCP [  ]   [  ] Social Service  [x  ] RN, [  ] Physical Therapy,[  ] Palliative care team  DVT PPX: [  ] Lovenox, [x  ] S C Heparin, [  ] Coumadin, [  ] Xarelto, [  ] Eliquis, [  ] Pradaxa, [  ] IV Heparin drip, [  ] SCD [  ] Contraindication 2 to GI Bleed,[  ] Ambulation [  ] Contraindicated 2 to  bleed [  ] Contraindicated 2 to Brain Bleed  Advanced directive: [ x ] None, [  ] DNR/DNI Patient is a 76y old  Male who presents with a chief complaint of weakness, UTI (05 May 2022 09:18)    HPI:  77 yo M with PMH parkinson's, HTN, HLD, GERD, prostate ca, 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 Wife Viki at bedside providing collateral information. Pt has been doing well until around 8:30 PM last night. He was very weak and was having difficulty getting up and walking due to the weakness.  Pt normally walks with a cane but was unable to get up last night. Daughter also noted that pt was having sweats but denies any fever or chills. Pt has been eating and drinking well. Pt denies any urinary urgency, frequency, dysuria or hematuria. Denies chest pain, palpitations, SOB, cough, abdominal pain, nausea, vomiting, diarrhea, headaches, dizziness.   Denies recent travel, recent antibiotic use, or sick contacts.    ED Course:   Vitals: BP: 123/53, HR: 74, Temp: 97.9 F, RR: 16, SpO2: 94% on RA   Labs: H/H: 12.9/37.9,  BUN/Cr: 25/1.80, GFR: 38, gluc: 228, lactate: 3.1  trop neg x1, COVID negative   UA: small bilirubin, trace ketone, 30 protein, moderate LE, 11-25 WBC, 3-5 RBC, few bacteria, few calcium oxalate   CXR: no acute lung pathology as per personal read, official read pending   EKG: junctional rhythm with PVCs, HR: 69   Received 1L NS bolus x1 and Rocephin in the ED  (05 May 2022 05:43)    INTERVAL HPI:  22: Patient was seen and examined at bedside. States he is feeling well. Denies any dysuria or polyuria. ABx dcd per ID reccs. PT recc VICTOR MANUEL. CT renal hunt pending.     OVERNIGHT EVENTS:  No acute events overnight.     Home Medications:  carbidopa-levodopa 25 mg-100 mg oral tablet: 1 tab(s) orally 2 times a day (05 May 2022 06:27)  citalopram 20 mg oral tablet: 1 tab(s) orally once a day (05 May 2022 06:27)  DilTIAZem (Eqv-Cardizem CD) 240 mg/24 hours oral capsule, extended release: 1 cap(s) orally once a day (05 May 2022 06:27)  enalapril 10 mg oral tablet: 1 tab(s) orally once a day (05 May 2022 06:27)  pantoprazole 40 mg oral delayed release tablet: 1 tab(s) orally once a day (before a meal) (05 May 2022 06:27)  primidone 50 mg oral tablet: 1 tab(s) orally once a day (05 May 2022 06:27)  simvastatin 40 mg oral tablet: 1 tab(s) orally once a day (at bedtime) (05 May 2022 06:27)  Vitamin C 1000 mg oral tablet: 1 tab(s) orally once a day (05 May 2022 06:27)      MEDICATIONS  (STANDING):  carbidopa/levodopa  25/100 1 Tablet(s) Oral daily  carbidopa/levodopa  25/100 1 Tablet(s) Oral at bedtime  citalopram 20 milliGRAM(s) Oral daily  diltiazem    milliGRAM(s) Oral daily  heparin   Injectable 5000 Unit(s) SubCutaneous every 8 hours  pantoprazole    Tablet 40 milliGRAM(s) Oral before breakfast  polyethylene glycol 3350 17 Gram(s) Oral daily  primidone 50 milliGRAM(s) Oral daily  senna 2 Tablet(s) Oral at bedtime  simvastatin 40 milliGRAM(s) Oral at bedtime  sodium chloride 0.9%. 1000 milliLiter(s) (60 mL/Hr) IV Continuous <Continuous>    MEDICATIONS  (PRN):  acetaminophen     Tablet .. 650 milliGRAM(s) Oral every 6 hours PRN Temp greater or equal to 38C (100.4F), Mild Pain (1 - 3)  melatonin 3 milliGRAM(s) Oral at bedtime PRN Insomnia  ondansetron Injectable 4 milliGRAM(s) IV Push every 8 hours PRN Nausea and/or Vomiting      Allergies    No Known Allergies    Intolerances        Social History:  Tobacco: Denies  EtOH: Denies   Recreational drug use: Denies   Lives with: daughter and grandkids   Ambulates: with a cane   ADLs: with assistance   Vaccinations: Moderna 3/3 (05 May 2022 05:43)      REVIEW OF SYSTEMS: i am OK  CONSTITUTIONAL: No fever, No chills, No fatigue, No myalgia, No Body ache, No Weakness  EYES: No eye pain,  No visual disturbances, No discharge, NO Redness  ENMT:  No ear pain, No nose bleed, No vertigo; No sinus pain, NO throat pain, No Congestion  NECK: No pain, No stiffness  RESPIRATORY: No cough, NO wheezing, No  hemoptysis, NO  shortness of breath  CARDIOVASCULAR: No chest pain, palpitations  GASTROINTESTINAL: No abdominal pain, NO epigastric pain. No nausea, No vomiting; No diarrhea, No constipation. [ x ] NO BM  GENITOURINARY: No dysuria, No frequency, No urgency, No hematuria, NO incontinence  NEUROLOGICAL: No headaches, No dizziness, No numbness, No tingling, No tremors, No weakness  EXT: No Swelling, No Pain, No Edema  SKIN:  [ x] No itching, burning, rashes, or lesions   MUSCULOSKELETAL: No joint pain ,No Jt swelling; No muscle pain, No back pain, No extremity pain  PSYCHIATRIC: No depression,  No anxiety,  No mood swings ,No difficulty sleeping at night  PAIN SCALE:  [ x] None  [  ] Other-  ROS Unable to obtain due to - [  ] Dementia  [  ] Lethargy [  ] Drowsy [  ] Sedated [  ] non verbal  REST OF REVIEW Of SYSTEM - [ x ] Normal     Vital Signs Last 24 Hrs  T(C): 36.4 (05 May 2022 08:30), Max: 36.6 (05 May 2022 03:20)  T(F): 97.6 (05 May 2022 08:30), Max: 97.9 (05 May 2022 03:20)  HR: 61 (05 May 2022 08:30) (60 - 74)  BP: 128/76 (05 May 2022 08:30) (100/47 - 128/76)  BP(mean): --  RR: 18 (05 May 2022 08:30) (15 - 18)  SpO2: 93% (05 May 2022 08:30) (93% - 99%)  Finger Stick        - @ 07:01  -  - @ 12:38  --------------------------------------------------------  IN: 0 mL / OUT: 300 mL / NET: -300 mL        PHYSICAL EXAM: i am good   GENERAL:  [x  ] NAD , [ x ] well appearing, [  ] Agitated, [  ] Drowsy,  [  ] Lethargy, [  ] confused   HEAD:  [x  ] Normal, [  ] Other  EYES:  [ x ] EOMI, [x  ] PERRLA, [  ] conjunctiva and sclera clear normal, [  ] Other,  [  ] Pallor,[  ] Discharge  ENMT:  [ x ] Normal, [ x ] Moist mucous membranes, [  ] Good dentition, [ x ] No Thrush + surgery   NECK:  [  x] Supple, [x  ] No JVD, [ x ] Normal thyroid, [  ] Lymphadenopathy [  ] Other  CHEST/LUNG:  [x  ] Clear to auscultation bilaterally, [x  ] Breath Sounds equal  [x  ] poor effort  [x  ] No rales, [x  ] No rhonchi  [ x ]  No wheezing,   HEART:  [ x ] Regular rate and rhythm, [  ] tachycardia, [  ] Bradycardia,  [  ] irregular  [x  ] No murmurs, No rubs, No gallops, [  ] PPM in place (Mfr:  )  ABDOMEN:  [ x ] Soft, [x  ] Nontender, [ x ] Nondistended, [ x ] No mass, [ x ] Bowel sounds present, [  ] obese  NERVOUS SYSTEM:  [x  ] Alert & Oriented X3, [x  ] Nonfocal  [  ] Confusion  [  ] Encephalopathic [  ] Sedated [  ] Unable to assess, [  ] Dementia [ x ] Other-Tremors hand   EXTREMITIES: [x  ] 2+ Peripheral Pulses, No clubbing, No cyanosis,  [  ] edema B/L lower EXT. [ x ] severe PVD stasis skin changes B/L Lower EXT, [  ] wound  LYMPH: No lymphadenopathy noted  SKIN:  [  x] No rashes or lesions, [  ] Pressure Ulcers, [  ] ecchymosis, [  ] Skin Tears, [  ] Other    DIET: Diet, DASH/TLC:   Sodium & Cholesterol Restricted (22 @ 12:33)      LABS:                        12.5   7.48  )-----------( 259      ( 05 May 2022 08:48 )             37.2     05 May 2022 08:48    143    |  109    |  19     ----------------------------<  112    3.6     |  26     |  1.10     Ca    9.5        05 May 2022 08:48  Phos  2.9       05 May 2022 08:48  Mg     2.4       05 May 2022 08:48    TPro  6.4    /  Alb  2.9    /  TBili  0.4    /  DBili  x      /  AST  11     /  ALT  18     /  AlkPhos  68     05 May 2022 08:48    PT/INR - ( 05 May 2022 02:15 )   PT: 12.4 sec;   INR: 1.06 ratio         PTT - ( 05 May 2022 02:15 )  PTT:28.7 sec  Urinalysis Basic - ( 05 May 2022 04:58 )    Color: Yellow / Appearance: Slightly Turbid / S.020 / pH: x  Gluc: x / Ketone: Trace  / Bili: Small / Urobili: Negative   Blood: x / Protein: 30 mg/dL / Nitrite: Negative   Leuk Esterase: Moderate / RBC: 3-5 /HPF / WBC 11-25   Sq Epi: x / Non Sq Epi: Occasional / Bacteria: Few       RADIOLOGY & ADDITIONAL TESTS:   < from: CT Renal Stone Hunt (22 @ 13:16) >  FINDINGS:  LOWER CHEST: Reidentified bilateral lower lobe, lingular and right middle   lobe patchy groundglass opacities.    LIVER: Within normal limits.  BILE DUCTS: Normal caliber.  GALLBLADDER: Within normal limits.  SPLEEN: Within normal limits.  PANCREAS: Within normal limits.  ADRENALS: Within normal limits.  KIDNEYS/URETERS: Bilateral renal cysts. Milk of calcium again layering in   a right lower pole cyst.. No hydroureteronephrosis or calculi. Trace left   hydronephrosis. For millimeter left UVJ calculus (2:88). No perinephric   edema. No right hydroureteronephrosis. 3 mm nonobstructing right lower   pole calculus.    BLADDER: Within normal limits.  REPRODUCTIVE ORGANS: Fiducial markers in the prostate.    BOWEL: No bowel obstruction. Appendix is not visualized. Mild colonic   fecal burden. Diverticulosis without diverticulitis.  PERITONEUM: No ascites.  VESSELS: Atherosclerotic changes.  RETROPERITONEUM/LYMPH NODES: No lymphadenopathy.  ABDOMINAL WALL: Within normal limits.  BONES: Scoliosis.    IMPRESSION:    Trace left hydronephrosis. 4 mm left UVJ calculus. Nonobstructing right   intrarenal calculus.    Fiducialmarkers in the prostate.    Stable patchy bilateral groundglass lung opacity consistent with   inflammatory or infectious process. Bibasilar dependent atelectasis.      < end of copied text >      HEALTH ISSUES - PROBLEM Dx:  Acute UTI    ÁNGEL (acute kidney injury)    Lactic acidosis    Anemia    HTN (hypertension)    HLD (hyperlipidemia)    GERD (gastroesophageal reflux disease)    Need for prophylactic measure    Parkinson disease    Acute UTI    Acute UTI        Consultant(s) Notes Reviewed:  [ x ] YES     Care Discussed with [X] Consultants  [ x ] Patient  [ x ] Family [  ] HCP [  ]   [  ] Social Service  [x  ] RN, [  ] Physical Therapy,[  ] Palliative care team  DVT PPX: [  ] Lovenox, [x  ] S C Heparin, [  ] Coumadin, [  ] Xarelto, [  ] Eliquis, [  ] Pradaxa, [  ] IV Heparin drip, [  ] SCD [  ] Contraindication 2 to GI Bleed,[  ] Ambulation [  ] Contraindicated 2 to  bleed [  ] Contraindicated 2 to Brain Bleed  Advanced directive: [ x ] None, [  ] DNR/DNI

## 2022-05-05 NOTE — H&P ADULT - PROBLEM SELECTOR PLAN 3
BUN/Cr: 25/1.80, GFR: 38,  most likely 2/2 decrease PO intake/dehydration   - Baseline Cr: .75  - IV fluids for gentle hydration - NS @60 x12 hours   - Monitor serum creatinine & urine output  - Avoid nephrotoxic medications  - Will HOLD home Lisinopril

## 2022-05-05 NOTE — H&P ADULT - NEUROLOGICAL DETAILS
alert and oriented x 3/responds to pain/responds to verbal commands/sensation intact alert and oriented x 3/responds to pain/responds to verbal commands/sensation intact/normal strength alert and oriented x 3/responds to pain/responds to verbal commands/sensation intact/cranial nerves intact/normal strength

## 2022-05-05 NOTE — H&P ADULT - NEGATIVE GASTROINTESTINAL SYMPTOMS
no nausea/no vomiting/no diarrhea/no constipation/no melena/no hematochezia no nausea/no vomiting/no diarrhea/no constipation/no change in bowel habits/no melena/no hematochezia

## 2022-05-05 NOTE — ED PROVIDER NOTE - OBJECTIVE STATEMENT
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  No URI symptoms.  No fever/chills.  No chest pain, sob, abd pain.

## 2022-05-05 NOTE — DISCHARGE NOTE PROVIDER - CARE PROVIDER_API CALL
HARRY ALONZO  Cardiovascular Diseases  [address] [place] [address]  Phone: [phone]  Fax: [phone]  Follow Up Time: 1 week   HARRY ALONZO  Cardiovascular Diseases  [address] [place] [address]  Phone: [phone]  Fax: [phone]  Follow Up Time: 1 week    Nigel Espinoza)  Cardio [facility]  [address] [place] [address]  Phone: [phone]  Fax: [phone]  Follow Up Time:    HARRY ALONZO  Cardiovascular Diseases  [address] [place] [address]  Phone: [phone]  Fax: [phone]  Follow Up Time: 1 week    Nigel Espinoza)  Cardio [facility]  [address] [place] [address]  Phone: [phone]  Fax: [phone]  Follow Up Time:     Arcenio Peterson  NEUROLOGY  [address] [place] [address]  Phone: [phone]  Fax: [phone]  Follow Up Time:     Issac Leach)  Urology  [address], [address]  [place], [place] [id number]  Phone: [phone]  Fax: [phone]  Follow Up Time:

## 2022-05-05 NOTE — H&P ADULT - PROBLEM SELECTOR PLAN 2
lactate: 3.1 on admission   likely 2/2 acute UTI infection   - s/p 1L NS bolus x1 and Rocephin in the ED   - F/u repeat lactate  - Continue Rocephin pending culture results   - F/u UCx and BCx x2  - Monitor leukocytosis and fever  - ID Dr. Connelly consulted, f/u recs

## 2022-05-05 NOTE — H&P ADULT - PROBLEM/PLAN-3
HOSPITALIST DISCHARGE INSTRUCTIONS  NAME: Erin Gibbs   :  1960   MRN:  [id number]     Date/Time:  2018 12:03 PM    ADMIT DATE: 5/3/2018     DISCHARGE DATE: 2018     DISCHARGE DIAGNOSIS:  Heart attack    MEDICATIONS:  · It is important that you take the medication exactly as they are prescribed. · Keep your medication in the bottles provided by the pharmacist and keep a list of the medication names, dosages, and times to be taken in your wallet. · Do not take other medications without consulting your doctor. Pain Management: per above medications    What to do at Home    Recommended diet:  Cardiac Diet    Recommended activity: Activity as tolerated    If you experience any of the following symptoms then please call your primary care physician or return to the emergency room if you cannot get hold of your doctor:  Fever, chills, nausea, vomiting, diarrhea, change in mentation, falling, bleeding, shortness of breath    Follow Up: Follow-up Information     Follow up With Details Comments [address] Kassi Wiley III, MD   [address] [address] [phone]              Information obtained by :  I understand that if any problems occur once I am at home I am to contact my physician. I understand and acknowledge receipt of the instructions indicated above.                                                                                                                                            [facility] or R.N.'s Signature                                                                  Date/Time                                                                                                                                              Patient or Representative Signature                                                          Date/Time         Heart Attack: Care Instructions  Your Care Instructions    A heart attack (myocardial infarction, or MI) occurs when one or more of the coronary arteries, which supply the heart with oxygen-rich blood, is blocked. A blockage usually occurs when plaque inside the artery breaks open and a blood clot forms in the artery. After a heart attack, you may be worried about your future. Over the next several weeks, your heart will start to heal. Though it can be hard to break old habits, you can prevent another heart attack by making some lifestyle changes and by taking medicines. You may use this information for ideas about what to do at home to speed your recovery. Follow-up care is a key part of your treatment and safety. Be sure to make and go to all appointments, and call your doctor if you are having problems. It's also a good idea to know your test results and keep a list of the medicines you take. How can you care for yourself at home? Activity  ? · Until your doctor says it is okay, do not do strenuous exercise. And do not lift, pull, or push anything heavy. Ask your doctor what types of activities are safe for you. ? · If your doctor has not set you up with a cardiac rehabilitation (rehab) program, talk to him or her about whether that is right for you. Cardiac rehab includes supervised exercise. It also includes help with diet and lifestyle changes and emotional support. It may reduce your risk of future heart problems. ? · Increase your activities slowly. Take short rest breaks when you get tired. ? · If your doctor recommends it, get more exercise. Walking is a good choice. Bit by bit, increase the amount you walk every day. Try for at least 30 minutes on most days of the week. You also may want to swim, bike, or do other activities. Talk with your doctor before you start an exercise program to make sure it is safe for you. ? · Ask your doctor when you can drive, go back to work, and do other daily activities again. ? · You can have sex as soon as you feel ready for it.  Often this means when you can easily walk around or climb stairs. Talk with your doctor if you have any concerns. If you are taking nitroglycerin, do not take erection-enhancing medicine such as sildenafil (Viagra), tadalafil (Cialis), or vardenafil (Levitra) . ? Lifestyle changes  ? · Do not smoke. Smoking increases your risk of another heart attack. If you need help quitting, talk to your doctor about stop-smoking programs and medicines. These can increase your chances of quitting for good. ? · Eat a heart-healthy diet that is low in saturated fat and salt, and is full of fruits, vegetables and whole-grains. Eat at least two servings of fish each week. You may get more details about how to eat healthy. But these tips can help you get started. ? · Stay at a healthy weight, or lose weight if you need to. Medicines  ? · Be safe with medicines. Take your medicines exactly as prescribed. Call your doctor if you think you are having a problem with your medicine. You will get more details on the specific medicines your doctor prescribes. Do not stop taking your medicine unless your doctor tells you to. Not taking your medicine might raise your risk of having another heart attack. ? · You may need several medicines to help lower your risk of another heart attack. These include:  ¨ Blood pressure medicines such as angiotensin-converting enzyme (ACE) inhibitors, ARBs (angiotensin II receptor blockers), and beta-blockers. ¨ Cholesterol medicine called statins. ¨ Aspirin and other blood thinners. These prevent blood clots that can cause a heart attack. ? · If your doctor has given you nitroglycerin, keep it with you at all times. If you have angina symptoms, such as chest pain or pressure, sit down and rest. Take the first dose of nitroglycerin as directed. If symptoms get worse or are not getting better within 5 minutes, call 911 right away. Stay on the phone. The emergency  will tell you what to do.    ? · Do not take any over-the-counter medicines, vitamins, or herbal products without talking to your doctor first.   ?Staying healthy  ? · Manage other health conditions such as high blood pressure and diabetes. ? · Avoid colds and flu. Get a pneumococcal vaccine shot. If you have had one before, ask your doctor whether you need another dose. Get the flu vaccine every year. If you must be around people with colds or flu, wash your hands often. ? · Be sure to tell your doctor about any angina symptoms you have had, even if they went away. Pay attention to your angina symptoms. Know what is typical for you and learn how to control it. Know when to call for help. ? · Talk to your family, friends, or a counselor about your feelings. It is normal to feel frightened, angry, hopeless, helpless, and even guilty. Talking openly about bad feelings can help you cope. If you have symptoms of depression, talk to your doctor. When should you call for help? Call 911 anytime you think you may need emergency care. For example, call if:  ? · You have symptoms of a heart attack. These may include:  ¨ Chest pain or pressure, or a strange feeling in the chest.  ¨ Sweating. ¨ Shortness of breath. ¨ Nausea or vomiting. ¨ Pain, pressure, or a strange feeling in the back, neck, jaw, or upper belly or in one or both shoulders or arms. ¨ Lightheadedness or sudden weakness. ¨ A fast or irregular heartbeat. After you call 911, the  may tell you to chew 1 adult-strength or 2 to 4 low-dose aspirin. Wait for an ambulance. Do not try to drive yourself. ? · You have angina symptoms (such as chest pain or pressure) that do not go away with rest or are not getting better within 5 minutes after you take a dose of nitroglycerin. ? · You passed out (lost consciousness). ? · You feel like you are having another heart attack.    ?Call your doctor now or seek immediate medical care if:  ? · You are having angina symptoms, such as chest pain or pressure, more often than usual, or the symptoms are different or worse than usual.   ? · You have new or increased shortness of breath. ? · You are dizzy or lightheaded, or you feel like you may faint. ? Watch closely for changes in your health, and be sure to contact your doctor if you have any problems. Where can you learn more? Go to http://megan-gonzalo.info/. Enter [id number] in the search box to learn more about \"Heart Attack: Care Instructions. \"  Current as of: September 21, 2016  Content Version: 11.4  © 3372-9718 [employer]. Care instructions adapted under license by [employer] (which disclaims liability or warranty for this information). If you have questions about a medical condition or this instruction, always ask your healthcare professional. [address] any warranty or liability for your use of this information. DISPLAY PLAN FREE TEXT

## 2022-05-05 NOTE — H&P ADULT - NEGATIVE NEUROLOGICAL SYMPTOMS
no transient paralysis/no generalized seizures/no focal seizures/no syncope/no vertigo no transient paralysis/no generalized seizures/no focal seizures/no syncope/no vertigo/no headache

## 2022-05-05 NOTE — H&P ADULT - PROBLEM SELECTOR PLAN 1
Pt presents with increasing weakness  likely 2/2 UTI   - Does not meets SIRS criteria  - lactate: 3.1 on admission  - UA: small bilirubin, trace ketone, 30 protein, moderate LE, 11-25 WBC, 3-5 RBC, few bacteria, few calcium oxalate  - CXR: no acute lung pathology as per personal read, f/u official read  - s/p 1L NS bolus x1 and Rocephin in the ED   - F/u repeat lactate  - Continue Rocephin pending culture results   - F/u UCx and BCx x2  - Monitor leukocytosis and fever  - ID Dr. Connelly consulted, f/u recs

## 2022-05-05 NOTE — PATIENT PROFILE ADULT - FALL HARM RISK - HARM RISK INTERVENTIONS

## 2022-05-05 NOTE — H&P ADULT - NSHPSOCIALHISTORY_GEN_ALL_CORE
Tobacco:   EtOH:   Recreational drug use:  Lives with:  Ambulates:  ADLs:  Occupation:  Vaccinations: Tobacco: Denies  EtOH: Denies   Recreational drug use: Denies   Lives with: daughter and grandkids   Ambulates: with a cane   ADLs: with assistance   Vaccinations: Moderna 3/3

## 2022-05-05 NOTE — DISCHARGE NOTE PROVIDER - PROVIDER TOKENS
PROVIDER:[TOKEN:[[id number]],FOLLOWUP:[1 week]] PROVIDER:[TOKEN:[[id number]],FOLLOWUP:[1 week]],PROVIDER:[TOKEN:[[id number]]] PROVIDER:[TOKEN:[[id number]],FOLLOWUP:[1 week]],PROVIDER:[TOKEN:[[id number]]],PROVIDER:[TOKEN:[[id number]]],PROVIDER:[TOKEN:[[id number]]]
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
## 2022-05-05 NOTE — H&P ADULT - PROBLEM SELECTOR PLAN 4
H/H: 12.9/37.9 on admission   - Hemoglobin on last admission-14.2  - Stable   - F/u iron studies   - Monitor for signs and symptoms of bleeding  - Transfuse prn (Hgb>7)   - F/u AM CBC

## 2022-05-05 NOTE — ED PROVIDER NOTE - NS ED ROS FT
Constitutional: - Fever, - Chills, - Anorexia, - Fatigue, - Night sweats  Eyes: - Discharge, - Irritation, - Redness, - Visual changes, - Light sensitivity, - Pain  EARS: - Ear Pain, - Tinnitus, - Decreased hearing  NOSE: - Congestion, - Epistaxis  MOUTH/THROAT: - Vocal Changes, - Drooling, - Sore throat  NECK: - Lumps, - Stiffness, - Pain  CV: - Palpitations, - Chest Pain, - Edema, - Syncope  RESP:  - Cough, - Shortness of Breath, - Dyspnea on Exertion, - Trouble speaking, - Pleuritic pain - Wheezing  GI: - Diarrhea, - Constipation, - Bloody stools, - Nausea, - Vomiting, - Abdominal Pain  : - Dysuria, -Frequency, - Hematuria, - Hesitancy, - Incontinence, - Saddle Anesthesia, - Abnormal discharge  MSK: - Myalgias, - Arthralgias, +Weakness, - Deformities, - Injuries  SKIN: - Color change, - Rash, - Swelling, - Ecchymosis, - Abrasion, - laceration  NEURO: - Change in behavior, - Dec. Alertness, - Headache, - Dizziness, - Change in speech, - Weakness, - Seizure-like activity, - Difficulty ambulating

## 2022-05-05 NOTE — PROVIDER CONTACT NOTE (CRITICAL VALUE NOTIFICATION) - TEST AND RESULT REPORTED:
Daily Progress Note:     Date of Service: 4/24/2020  Primary Team: [facility] Team   Attending: Akil Fulton M.D.   Senior Resident: Dr. Parra  Intern: Dr. Garcia  Contact:  [phone]    lactate 3.1

## 2022-05-05 NOTE — H&P ADULT - PROBLEM SELECTOR PLAN 8
VTE PPX: Heparin Chronic   - Continue home Carbidopa/Levdopa and Primidone Chronic   - Continue home Carbidopa/Levodopa and Primidone Chronic   - Continue home Carbidopa/Levodopa and Primidone daily  tremors  -On Celexa daily

## 2022-05-05 NOTE — PROGRESS NOTE ADULT - PROBLEM SELECTOR PLAN 5
Chronic  - Stable  - Continue with home Cardizem w/ hold parameters  - Will HOLD home Lisinopril given ÁNGEL   - Monitor BP & titrate BP meds PRN BUN/Cr: 25/1.80, GFR: 38,-RESOLVED   most likely 2/2 decrease PO intake/dehydration   - Baseline Cr: .75  - IV fluids for gentle hydration - NS @60 x12 hours  - CT renal stone hunt ordered given microscopic hematuria- Trace left hydronephrosis. 4 mm left UVJ calculus. Nonobstructing right intrarenal calculus.  - Urology consulted- f/u reccs   - BMP in AM & urine output  - Avoid nephrotoxic medications  - Will HOLD home Lisinopril

## 2022-05-05 NOTE — PROGRESS NOTE ADULT - PROBLEM SELECTOR PLAN 2
lactate: 3.1 on admission, repeat WNL- 1.5  likely 2/2 acute UTI infection   - s/p 1L NS bolus x1 and Rocephin in the ED   - F/u UCx and BCx x2  - Monitor leukocytosis and fever  - ID Dr. Connelly following- will monitor off abx Chronic  - Continue with home Cardizem  mg daily  - Will HOLD home Lisinopril given ÁNGEL   - Monitor BP & titrate BP meds PRN

## 2022-05-05 NOTE — DISCHARGE NOTE PROVIDER - NSDCCPCAREPLAN_GEN_ALL_CORE_FT
PRINCIPAL DISCHARGE DIAGNOSIS  Diagnosis: Acute UTI  Assessment and Plan of Treatment: You were admitted to the hospital with a urinary tract infection. You were started on IV antibiotics and seen by an infectious disease doctor. IV antibiotics were eventually transitioned to oral antibiotics****** and you improved.   Upon discharge, please:  Please follow up with your primary care doctor within 1 week of discharge from the hospital.  You or your family member are responsible for making all follow up appointments.  If you develop worsening confusion or weakness, chills, fevers, significant back pain , please return to the ED immediately.      SECONDARY DISCHARGE DIAGNOSES  Diagnosis: Generalized weakness  Assessment and Plan of Treatment: You presented to the hospital with generalized weakness and found to have a urinary tract infection. This infection was likely contributing to your symptoms. As your infection was treated, your weakness improved. You were evaluated by physical therapy who recommended ******    Diagnosis: ÁNGEL (acute kidney injury)  Assessment and Plan of Treatment: You were admitted to the hospital with elevated kidney function. This was likely due to your infection. Your kidney function returned to baseline after being treated with antibiotics and IV fluids.   Please follow up with your primary care doctor within 1 week of discharge from the hospital to monitor your kidney function.    Diagnosis: HTN (hypertension)  Assessment and Plan of Treatment: You have a history of high blood pressure. Your blood pressure was well controlled while in the hospital. Please continue your current medication regimen and follow up with your PCP as scheduled.    Diagnosis: Acute UTI  Assessment and Plan of Treatment:      PRINCIPAL DISCHARGE DIAGNOSIS  Diagnosis: Acute UTI  Assessment and Plan of Treatment: You were admitted to the hospital with a urinary tract infection. You were started on IV antibiotics and seen by an infectious disease doctor. Your infection resolved.  Please follow up with your primary care doctor within 1 week of discharge from   You or your family member are responsible for making all follow up appointments.  If you develop worsening confusion or weakness, chills, fevers, significant back pain , please return to the ED immediately.      SECONDARY DISCHARGE DIAGNOSES  Diagnosis: Generalized weakness  Assessment and Plan of Treatment: You presented to the hospital with generalized weakness and found to have a urinary tract infection. This infection was likely contributing to your symptoms. As your infection was treated, your weakness improved. You were also seen by a neurologist and imaging of your head showed you had a stroke. The exact day is undetermined, but there is evidence of stroke. You were started on various medications to prevent future strokes from  You were evaluated by physical therapy who recommended REHAB.   Upon discharge, please:  -START daily aspirin 81mg  -START statin    Diagnosis: ÁNGEL (acute kidney injury)  Assessment and Plan of Treatment: You were admitted to the hospital with elevated kidney function. This was likely due to your infection. Your kidney function returned to baseline after being treated with antibiotics and IV fluids.   Please follow up with your primary care doctor within 1 week of discharge from rehab to monitor your kidney function.    Diagnosis: HTN (hypertension)  Assessment and Plan of Treatment: You have a history of high blood pressure. Your blood pressure was well controlled while in the hospital. Please continue your current medication regimen and follow up with your PCP as scheduled.    Diagnosis: Parkinson disease  Assessment and Plan of Treatment: Continue current medication regimen as scheduled.    Diagnosis: Malignant neoplasm of tongue, unspecified  Assessment and Plan of Treatment: s/p surgery and radiation discontinued 7/21. Follow up with oncologist at your earliest convenience.    Diagnosis: Acute UTI  Assessment and Plan of Treatment:      PRINCIPAL DISCHARGE DIAGNOSIS  Diagnosis: Acute UTI  Assessment and Plan of Treatment: You were admitted to the hospital with a urinary tract infection. You were started on IV antibiotics and seen by an infectious disease doctor. Your infection resolved.  Please follow up with your primary care doctor within 1 week of discharge from   You or your family member are responsible for making all follow up appointments.  If you develop worsening confusion or weakness, chills, fevers, significant back pain , please return to the ED immediately.      SECONDARY DISCHARGE DIAGNOSES  Diagnosis: Generalized weakness  Assessment and Plan of Treatment: You presented to the hospital with generalized weakness and found to have a urinary tract infection. This infection was likely contributing to your symptoms. As your infection was treated, your weakness improved. You were also seen by a neurologist and imaging of your head showed you had a stroke. The exact day is undetermined, but there is evidence of stroke. You were started on various medications to prevent future strokes from  You were evaluated by physical therapy who recommended REHAB.   Upon discharge, please:  -START daily aspirin 81mg  -START statin    Diagnosis: ÁNGEL (acute kidney injury)  Assessment and Plan of Treatment: You were admitted to the hospital with elevated kidney function. This was likely due to your infection. Your kidney function returned to baseline after being treated with antibiotics and IV fluids.   Please follow up with your primary care doctor within 1 week of discharge from rehab to monitor your kidney function.    Diagnosis: HTN (hypertension)  Assessment and Plan of Treatment: You have a history of high blood pressure. Your blood pressure was well controlled while in the hospital. Please continue your current medication regimen and follow up with your PCP as scheduled.    Diagnosis: Parkinson disease  Assessment and Plan of Treatment: Continue current medication regimen as scheduled.    Diagnosis: Malignant neoplasm of tongue, unspecified  Assessment and Plan of Treatment: s/p surgery and radiation discontinued 7/21. Follow up with oncologist at your earliest convenience.     PRINCIPAL DISCHARGE DIAGNOSIS  Diagnosis: Acute UTI  Assessment and Plan of Treatment: You were admitted to the hospital with a urinary tract infection. You were started on IV antibiotics and seen by an infectious disease doctor. Your infection resolved.  Please follow up with your primary care doctor within 1 week of discharge from   Follow with cardiologist, Dr. Espinoza for further management and possible holter monitor.   You or your family member are responsible for making all follow up appointments.  If you develop worsening confusion or weakness, chills, fevers, significant back pain , please return to the ED immediately.      SECONDARY DISCHARGE DIAGNOSES  Diagnosis: Generalized weakness  Assessment and Plan of Treatment: You presented to the hospital with generalized weakness and found to have a urinary tract infection. This infection was likely contributing to your symptoms. As your infection was treated, your weakness improved. You were also seen by a neurologist and imaging of your head showed you had a stroke. The exact day is undetermined, but there is evidence of stroke. You were started on various medications to prevent future strokes from  You were evaluated by physical therapy who recommended REHAB.   Upon discharge, please:  -START daily aspirin 81mg  -START statin    Diagnosis: ÁNGEL (acute kidney injury)  Assessment and Plan of Treatment: You were admitted to the hospital with elevated kidney function. This was likely due to your infection. Your kidney function returned to baseline after being treated with antibiotics and IV fluids.   Please follow up with your primary care doctor within 1 week of discharge from rehab to monitor your kidney function.    Diagnosis: HTN (hypertension)  Assessment and Plan of Treatment: You have a history of high blood pressure. Your blood pressure was well controlled while in the hospital. Please continue your current medication regimen and follow up with your PCP as scheduled.    Diagnosis: Parkinson disease  Assessment and Plan of Treatment: Continue current medication regimen as scheduled.    Diagnosis: Malignant neoplasm of tongue, unspecified  Assessment and Plan of Treatment: s/p surgery and radiation discontinued 7/21. Follow up with oncologist at your earliest convenience.     PRINCIPAL DISCHARGE DIAGNOSIS  Diagnosis: Acute CVA (cerebrovascular accident)  Assessment and Plan of Treatment: You were admitted with weakness & was found to have Stroke on Brain MRI   -You were also seen by a neurologist and imaging of your head showed you had a stroke.  You were evaluated by physical therapy who recommended REHAB. You choose to go home with PT  Upon discharge, please:  -START  EC aspirin 81mg daily with food  -Continue  statin 1 tab daily  -Follow up with Neurology & Cardiology for Holter Monitor arrangements as out pt with Stroke.  -You were seen by Speech & Swallow Therapist -Follow the Diet recommended for aspiration  Precaution/prevention      SECONDARY DISCHARGE DIAGNOSES  Diagnosis: Malignant neoplasm of tongue, unspecified  Assessment and Plan of Treatment: s/p surgery and radiation discontinued 7/21.Now with recurrance of Cancer as per your ENT DR Boland , You have appointment with your Oncologist this week   - Follow up with oncologist on Thursday    Diagnosis: HTN (hypertension)  Assessment and Plan of Treatment: You have a history of high blood pressure.   . Please continue your current medication - Cardizem CD & Enalapril  daily  and follow up with your PCP as scheduled.    Diagnosis: Parkinson disease  Assessment and Plan of Treatment: Continue current medication Sinemet 2x day  On Primidon daily for Tremors  You MUST follow up with your Neurologist    Diagnosis: HLD (hyperlipidemia)  Assessment and Plan of Treatment: On Simvastatin daily    Diagnosis: GERD (gastroesophageal reflux disease)  Assessment and Plan of Treatment: On PPI daily    Diagnosis: Depression, reactive  Assessment and Plan of Treatment: on Citalopram daily    Diagnosis: Left ureteral stone  Assessment and Plan of Treatment: -CT Stone hunt showed :  Trace left hydronephrosis. 4 mm left UVJ calculus. Nonobstructing right   intrarenal calculus.  -Started on Flomax 0.4 mg daily at bed time  Follow up with Dr Leach in 1-2 weeks for further care & Treatment for your ureteral stone.       PRINCIPAL DISCHARGE DIAGNOSIS  Diagnosis: Acute CVA (cerebrovascular accident)  Assessment and Plan of Treatment: You were admitted with weakness & was found to have Stroke on Brain MRI   -There is evidence of abnormal T2 prolongation with restricted diffusion   seen involving the left brachium pontis and left lenticular   nucleus/corona radiata region.  -You were also seen by a neurologist and imaging of your head showed you had a stroke.  You were evaluated by physical therapy who recommended REHAB. You choose to go home with PT  Upon discharge, please:  -START  EC aspirin 81mg daily with food  -Continue  statin 1 tab daily  -Follow up with Neurology & Cardiology for Holter Monitor arrangements as out pt with Stroke.  -You were seen by Speech & Swallow Therapist -Follow the Diet recommended for aspiration  Precaution/prevention      SECONDARY DISCHARGE DIAGNOSES  Diagnosis: Malignant neoplasm of tongue, unspecified  Assessment and Plan of Treatment: s/p surgery and radiation discontinued 7/21.Now with recurrance of Cancer as per your ENT DR Boland , You have appointment with your Oncologist this week   - Follow up with oncologist on Thursday    Diagnosis: HTN (hypertension)  Assessment and Plan of Treatment: You have a history of high blood pressure.   . Please continue your current medication - Cardizem CD & Enalapril  daily  and follow up with your PCP as scheduled.    Diagnosis: Parkinson disease  Assessment and Plan of Treatment: Continue current medication Sinemet 2x day  On Primidon daily for Tremors  You MUST follow up with your Neurologist    Diagnosis: HLD (hyperlipidemia)  Assessment and Plan of Treatment: On Simvastatin daily    Diagnosis: GERD (gastroesophageal reflux disease)  Assessment and Plan of Treatment: On PPI daily    Diagnosis: Depression, reactive  Assessment and Plan of Treatment: on Citalopram daily    Diagnosis: Left ureteral stone  Assessment and Plan of Treatment: -CT Stone hunt showed :  Trace left hydronephrosis. 4 mm left UVJ calculus. Nonobstructing right   intrarenal calculus.  -Started on Flomax 0.4 mg daily at bed time  Follow up with Dr Leach in 1-2 weeks for further care & Treatment for your ureteral stone.

## 2022-05-05 NOTE — PROGRESS NOTE ADULT - PROBLEM SELECTOR PLAN 8
Chronic   - Continue home Carbidopa/Levodopa and Primidone VTE PPX: Heparin      #GOC  - Palliative consult     #Dispo   - PT eval

## 2022-05-05 NOTE — CONSULT NOTE ADULT - ASSESSMENT
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 Pt denies any urinary urgency, frequency, dysuria or hematuria, chest pain, palpitations, SOB, cough, abdominal pain, nausea, vomiting, diarrhea, headaches, dizziness.     RECOMMENDATIONS  PT with weakness, afebrile, no sig leukocytosis, but on exam does have crackles anteriorly, would be at risk for aspiration. Not convinced that admission is being driven by an acute infectious process that would benefit from abx and may have just been dehydrated.    Will dc abx and rec observation off abx.    Thank you for consulting us and involving us in the management of this most interesting and challenging case.  We will follow along in the care of this patient. Please call us at [phone] or text me directly on my cell# at [phone] with any concerns.

## 2022-05-05 NOTE — PROGRESS NOTE ADULT - PROBLEM SELECTOR PLAN 4
H/H: 12.9/37.9 on admission   - Hemoglobin on last admission-14.2  - Stable   - F/u iron studies   - Monitor for signs and symptoms of bleeding  - Transfuse prn (Hgb>7)   - F/u AM CBC < from: CT Neck Soft Tissue w/ IV Cont (04.07.22 @ 10:10) >    IMPRESSION:  pt had out pt CT neck with ENT in April 2022   -Results   Interval development of ill-defined enhancing lesion involving the   proximal right sternocleidomastoid and possibly the deep parotid gland,   just inferior to the mastoid tip, concerning for neoplasm recurrence.   Correlate with tissue sampling.  < end of copied text >  D/W Dtr at detail- Pt saw ENT Dr Boland  last week & has appointment with Oncologist next week for treatment options

## 2022-05-05 NOTE — ED PROVIDER NOTE - PHYSICAL EXAMINATION
Gen: Alert, NAD  Head/eyes: NC/AT, PERRL  ENT: airway patent  Neck: supple  Pulm/lung: Bilateral clear BS  CV/heart: RRR  GI/Abd: soft, NT/ND, +BS, no guarding/rebound tenderness  Musculoskeletal: no edema/erythema/cyanosis  Skin: no rash  Neuro: AAOx3, grossly intact

## 2022-05-05 NOTE — H&P ADULT - GASTROINTESTINAL DETAILS
normal/soft/nontender/no distention/no masses palpable/bowel sounds normal/no rebound tenderness/no guarding normal/soft/nontender/no distention/no masses palpable/bowel sounds normal/no bruit/no rebound tenderness/no guarding/no rigidity/no organomegaly

## 2022-05-05 NOTE — PROGRESS NOTE ADULT - PROBLEM SELECTOR PLAN 3
BUN/Cr: 25/1.80, GFR: 38,  most likely 2/2 decrease PO intake/dehydration   - Baseline Cr: .75  - IV fluids for gentle hydration - NS @60 x12 hours   - Monitor serum creatinine & urine output  - Avoid nephrotoxic medications  - Will HOLD home Lisinopril BUN/Cr: 25/1.80, GFR: 38,  most likely 2/2 decrease PO intake/dehydration   - Baseline Cr: .75  - IV fluids for gentle hydration - NS @60 x12 hours  - CT renal stone hunt ordered given microscopic hematuria- f/u results    - Monitor serum creatinine & urine output  - Avoid nephrotoxic medications  - Will HOLD home Lisinopril BUN/Cr: 25/1.80, GFR: 38,  most likely 2/2 decrease PO intake/dehydration   - Baseline Cr: .75  - IV fluids for gentle hydration - NS @60 x12 hours  - CT renal stone hunt ordered given microscopic hematuria- Trace left hydronephrosis. 4 mm left UVJ calculus. Nonobstructing right intrarenal calculus.  - Urology consulted- f/u reccs   - Monitor serum creatinine & urine output  - Avoid nephrotoxic medications  - Will HOLD home Lisinopril Chronic   - Continue home Carbidopa/Levodopa and Primidone  -On Celexa daily

## 2022-05-05 NOTE — H&P ADULT - PROBLEM SELECTOR PLAN 5
Chronic  - Stable  - Continue with home Cardizem w/ hold parameters  - Will HOLD home Lisinopril given ÁNGEL   - Monitor BP & titrate BP meds PRN Chronic  - Stable  - Continue with home Cardizem  mg daily  - Will HOLD home Lisinopril given ÁNGEL   - Monitor BP & titrate BP meds PRN

## 2022-05-05 NOTE — DISCHARGE NOTE PROVIDER - CARE PROVIDERS DIRECT ADDRESSES
,DirectAddress_Unknown ,DirectAddress_Unknown,[email or web address] ,DirectAddress_Unknown,[email or web address],DirectAddress_Unknown,[email or web address]

## 2022-05-05 NOTE — PATIENT PROFILE ADULT - FALL HARM RISK - FACTORS NURSING JUDGEMENT
Called patient left VM to move appt to 5/24/2021 at 4:15, requested a return call to confirm and also to check on current lid status   Yes

## 2022-05-05 NOTE — DISCHARGE NOTE PROVIDER - DETAILS OF MALNUTRITION DIAGNOSIS/DIAGNOSES
This patient has been assessed with a concern for Malnutrition and was treated during this hospitalization for the following Nutrition diagnosis/diagnoses:     -  05/06/2022: Severe protein-calorie malnutrition

## 2022-05-05 NOTE — CONSULT NOTE ADULT - SUBJECTIVE AND OBJECTIVE BOX
Patient is a 76y old  Male who presents with a chief complaint of weakness, UTI (05 May 2022 12:37)      HISTORY OF PRESENT ILLNESS:  77 y/o male with Parkinson's disease and prostate cancer treated 6 yrs ago with Cyberknife, admitted last night with weakness, incidentally found to have a 4mm left UVJ stone (See CT report below).  Pt's regular urologist is Dr. Leach.  Pt was seen in Dr. PARKER's office about 2 weeks ago for lower urinary tract symptoms.  No fever, no leukocytosis.  Pt denies having pain and wants to go home.    PAST MEDICAL & SURGICAL HISTORY:  Hypertension    Hyperlipidemia    GERD (gastroesophageal reflux disease)    Parkinson&#x27;s disease    Prostate cancer    Mitral valve prolapse    Malignant neoplasm of tongue, unspecified  s/p surgery and radiation discontinued     History of vasectomy    Prostate cancer  s/p cyber knife    S/P partial glossectomy        MEDICATIONS  (STANDING):  carbidopa/levodopa  25/100 1 Tablet(s) Oral daily  carbidopa/levodopa  25/100 1 Tablet(s) Oral at bedtime  citalopram 20 milliGRAM(s) Oral daily  diltiazem    milliGRAM(s) Oral daily  heparin   Injectable 5000 Unit(s) SubCutaneous every 8 hours  pantoprazole    Tablet 40 milliGRAM(s) Oral before breakfast  polyethylene glycol 3350 17 Gram(s) Oral daily  primidone 50 milliGRAM(s) Oral daily  senna 2 Tablet(s) Oral at bedtime  simvastatin 40 milliGRAM(s) Oral at bedtime  sodium chloride 0.9%. 1000 milliLiter(s) (60 mL/Hr) IV Continuous <Continuous>    MEDICATIONS  (PRN):  acetaminophen     Tablet .. 650 milliGRAM(s) Oral every 6 hours PRN Temp greater or equal to 38C (100.4F), Mild Pain (1 - 3)  melatonin 3 milliGRAM(s) Oral at bedtime PRN Insomnia  ondansetron Injectable 4 milliGRAM(s) IV Push every 8 hours PRN Nausea and/or Vomiting      Allergies    No Known Allergies    Intolerances      FAMILY HISTORY:  FH: heart disease  father, brothers    FH: type 2 diabetes  mother        Vital Signs Last 24 Hrs  T(C): 36.8 (05 May 2022 20:16), Max: 36.8 (05 May 2022 20:16)  T(F): 98.3 (05 May 2022 20:16), Max: 98.3 (05 May 2022 20:16)  HR: 91 (05 May 2022 20:16) (60 - 91)  BP: 165/75 (05 May 2022 20:16) (100/47 - 165/75)  BP(mean): --  RR: 18 (05 May 2022 20:16) (15 - 18)  SpO2: 95% (05 May 2022 20:16) (93% - 99%)    PHYSICAL EXAM:    Constitutional: NAD, thin, tremulous  HEENT: PERRLA, EOMI  Neck: Supple, full ROM  Back: No CVA tenderness  Respiratory: Normal repiratory effort  Cardiovascular: RRR  Abd: Soft, NT/ND  : Normal phallus,bilateral descended testes, non-tender  Extremities: No peripheral edema      LABS:                        12.5   7.48  )-----------( 259      ( 05 May 2022 08:48 )             37.2     05-    143  |  109<H>  |  19  ----------------------------<  112<H>  3.6   |  26  |  1.10    Ca    9.5      05 May 2022 08:48  Phos  2.9     05-05  Mg     2.4     05-05    TPro  6.4  /  Alb  2.9<L>  /  TBili  0.4  /  DBili  x   /  AST  11<L>  /  ALT  18  /  AlkPhos  68  05-05    PT/INR - ( 05 May 2022 02:15 )   PT: 12.4 sec;   INR: 1.06 ratio         PTT - ( 05 May 2022 02:15 )  PTT:28.7 sec  Urinalysis Basic - ( 05 May 2022 04:58 )    Color: Yellow / Appearance: Slightly Turbid / S.020 / pH: x  Gluc: x / Ketone: Trace  / Bili: Small / Urobili: Negative   Blood: x / Protein: 30 mg/dL / Nitrite: Negative   Leuk Esterase: Moderate / RBC: 3-5 /HPF / WBC 11-25   Sq Epi: x / Non Sq Epi: Occasional / Bacteria: Few      Urine Culture:       RADIOLOGY & ADDITIONAL STUDIES:  < from: CT Renal Stone Hunt (22 @ 13:16) >    PROCEDURE DATE:  2022          INTERPRETATION:  CLINICAL INFORMATION: 76 years  Male with hematuria.   History of squamous cell cancer of the tongue.    COMPARISON: PET/CT 2022    CONTRAST/COMPLICATIONS:  IV Contrast: NONE  Oral Contrast: NONE  Complications: None reported at time of study completion    PROCEDURE:  CT of the Abdomen and Pelvis was performed.  Sagittal and coronal reformats were performed.    LIMITATIONS: Evaluation of the solid organs, vascular structures and GI   tract is limited without oral and IV contrast.    FINDINGS:  LOWER CHEST: Reidentified bilateral lower lobe, lingular and right middle   lobe patchy groundglass opacities.    LIVER: Within normal limits.  BILE DUCTS: Normal caliber.  GALLBLADDER: Within normal limits.  SPLEEN: Within normal limits.  PANCREAS: Within normal limits.  ADRENALS: Within normal limits.  KIDNEYS/URETERS: Bilateral renal cysts. Milk of calcium again layering in   a right lower pole cyst.. No hydroureteronephrosis or calculi. Trace left   hydronephrosis. For millimeter left UVJ calculus (2:88). No perinephric   edema. No right hydroureteronephrosis. 3 mm nonobstructing right lower   pole calculus.    BLADDER: Within normal limits.  REPRODUCTIVE ORGANS: Fiducial markers in the prostate.    BOWEL: No bowel obstruction. Appendix is not visualized. Mild colonic   fecal burden. Diverticulosis without diverticulitis.  PERITONEUM: No ascites.  VESSELS: Atherosclerotic changes.  RETROPERITONEUM/LYMPH NODES: No lymphadenopathy.  ABDOMINAL WALL: Within normal limits.  BONES: Scoliosis.    IMPRESSION:    Trace left hydronephrosis. 4 mm left UVJ calculus. Nonobstructing right   intrarenal calculus.    Fiducialmarkers in the prostate.    Stable patchy bilateral groundglass lung opacity consistent with   inflammatory or infectious process. Bibasilar dependent atelectasis.

## 2022-05-06 NOTE — DIETITIAN INITIAL EVALUATION ADULT - PERTINENT LABORATORY DATA
05-05    143  |  109<H>  |  19  ----------------------------<  112<H>  3.6   |  26  |  1.10    Ca    9.5      05 May 2022 08:48  Phos  2.9     05-05  Mg     2.4     05-05    TPro  6.4  /  Alb  2.9<L>  /  TBili  0.4  /  DBili  x   /  AST  11<L>  /  ALT  18  /  AlkPhos  68  05-05

## 2022-05-06 NOTE — PROGRESS NOTE ADULT - NUTRITIONAL ASSESSMENT
This patient has been assessed with a concern for Malnutrition and has been determined to have a diagnosis/diagnoses of Severe protein-calorie malnutrition.    This patient is being managed with:   Diet DASH/TLC-  Sodium & Cholesterol Restricted  Entered: May  5 2022 12:33PM    The following pending diet order is being considered for treatment of Severe protein-calorie malnutrition:  Diet Regular-  Supplement Feeding Modality:  Oral  Ensure Clear Cans or Servings Per Day:  1       Frequency:  Two Times a day  Ensure Enlive Cans or Servings Per Day:  1       Frequency:  Two Times a day  Entered: May  6 2022 10:58AM

## 2022-05-06 NOTE — PROGRESS NOTE ADULT - PROBLEM SELECTOR PLAN 8
VTE PPX: Heparin      #GOC  - Palliative consult     #Dispo   - PT shaquille, rec VICTOR MANUEL, family wants d/c home Chronic   - Continue home PPI

## 2022-05-06 NOTE — PROGRESS NOTE ADULT - PROBLEM SELECTOR PLAN 2
Chronic  - Continue with home Cardizem  mg daily  - Will HOLD home Lisinopril one more day given ÁNGEL, ÁNGEL resolved today  - Monitor BP & titrate BP meds PRN Chronic  - Continue with home Cardizem  mg daily  -  ÁNGEL resolved today  - Monitor BP & titrate BP meds PRN. Pt presents with increasing weakness, +UA  - Patient did not meet sepsis criteria on admission, lactate downtrended  - CT renal stone hunt ordered given microscopic hematuria- Trace left hydronephrosis. 4 mm left UVJ calculus. Nonobstructing right intrarenal calculus.  -Continue IV Rocephin, short course last dose 5/7 AM  -F/u urine culture, blood cultures NGTD  - Urology DR Alcaraz - added tamsulosin 0.4 mg q HS ,out pt follow up with Dr Leach -Urology  - ID Dr. Connelly following- d/w -follow urine c/s -

## 2022-05-06 NOTE — PROGRESS NOTE ADULT - PROBLEM SELECTOR PLAN 3
Chronic   - Continue home Carbidopa/Levodopa and Primidone  -On Celexa daily Chronic  - Continue with home Cardizem  mg daily  -  ÁNGEL resolved today  - Monitor BP & titrate BP meds PRN.

## 2022-05-06 NOTE — PROGRESS NOTE ADULT - PROBLEM SELECTOR PLAN 4
< from: CT Neck Soft Tissue w/ IV Cont (04.07.22 @ 10:10) >    IMPRESSION:  pt had out pt CT neck with ENT in April 2022   -Results   Interval development of ill-defined enhancing lesion involving the   proximal right sternocleidomastoid and possibly the deep parotid gland,   just inferior to the mastoid tip, concerning for neoplasm recurrence.   Correlate with tissue sampling.  < end of copied text >  D/W Dtr at detail- Pt saw ENT Dr Boland  last week & has appointment with Oncologist next week for treatment options pt with Parkinson Disease & Tremors  Neurology-Dr Peterson called & d/w   Plan for CT head -non cont- as above- subacute lacunar infarct-Its new from last CT scan from 10/21  D/W Dr Peterson -plan for MRI Brain non cont   -Start Ec ASA 81 mg daily  -Pt is ON Statin Chronic   - Continue home Carbidopa/Levodopa and Primidone  -On Celexa daily

## 2022-05-06 NOTE — DIETITIAN NUTRITION RISK NOTIFICATION - TREATMENT: THE FOLLOWING DIET HAS BEEN RECOMMENDED
Diet, Regular:   Supplement Feeding Modality:  Oral  Ensure Clear Cans or Servings Per Day:  1       Frequency:  Two Times a day  Ensure Enlive Cans or Servings Per Day:  1       Frequency:  Two Times a day (05-06-22 @ 10:58) [Pending Verification By Attending]  Diet, DASH/TLC:   Sodium & Cholesterol Restricted (05-05-22 @ 12:33) [Active]

## 2022-05-06 NOTE — PROGRESS NOTE ADULT - PROBLEM SELECTOR PLAN 6
Chronic   - Continue home statin < from: CT Neck Soft Tissue w/ IV Cont (04.07.22 @ 10:10) >    IMPRESSION:  pt had out pt CT neck with ENT in April 2022   -Results   Interval development of ill-defined enhancing lesion involving the   proximal right sternocleidomastoid and possibly the deep parotid gland,   just inferior to the mastoid tip, concerning for neoplasm recurrence.   Correlate with tissue sampling.  < end of copied text >  D/W Dtr at detail- Pt saw ENT Dr Boland  last week & has appointment with Oncologist next week for treatment options

## 2022-05-06 NOTE — PROGRESS NOTE ADULT - PROBLEM SELECTOR PLAN 5
BUN/Cr: 25/1.80, GFR: 38,most likely 2/2 decrease PO intake/dehydration   - Baseline Cr: .75, RESOLVED s/p IVF  - CT renal stone hunt ordered given microscopic hematuria- Trace left hydronephrosis. 4 mm left UVJ calculus. Nonobstructing right intrarenal calculus.  - HOLD home Lisinopril one more day, can restart tomorrow if renal indices remain at baseline  -Monitor daily BMP  - Urology consulted, recs appreciated < from: CT Neck Soft Tissue w/ IV Cont (04.07.22 @ 10:10) >    IMPRESSION:  pt had out pt CT neck with ENT in April 2022   -Results   Interval development of ill-defined enhancing lesion involving the   proximal right sternocleidomastoid and possibly the deep parotid gland,   just inferior to the mastoid tip, concerning for neoplasm recurrence.   Correlate with tissue sampling.  < end of copied text >  D/W Dtr at detail- Pt saw ENT Dr Boland  last week & has appointment with Oncologist next week for treatment options pt with Parkinson Disease & Tremors  Neurology-Dr Peterson called & d/w   Plan for CT head -non cont- as above- subacute lacunar infarct-Its new from last CT scan from 10/21  D/W Dr Peterson -plan for MRI Brain non cont   -Start Ec ASA 81 mg daily  -Pt is ON Statin

## 2022-05-06 NOTE — PROGRESS NOTE ADULT - PROBLEM SELECTOR PLAN 1
Pt presents with increasing weakness, +UA  - Patient did not meet sepsis criteria on admission, lactate downtrended  - CT renal stone hunt ordered given microscopic hematuria- Trace left hydronephrosis. 4 mm left UVJ calculus. Nonobstructing right intrarenal calculus.  -Continue IV Rocephin, short course last dose 5/7 AM  -F/u urine culture, blood cultures NGTD  - Monitor leukocytosis and fever  - Urology consulted, added tamsulosin  - ID Dr. Connelly following- reccs appreciated Pt presents with increasing weakness, +UA  - Patient did not meet sepsis criteria on admission, lactate downtrended  - CT renal stone hunt ordered given microscopic hematuria- Trace left hydronephrosis. 4 mm left UVJ calculus. Nonobstructing right intrarenal calculus.  -Continue IV Rocephin, short course last dose 5/7 AM  -F/u urine culture, blood cultures NGTD  - Urology DR Alcaraz - added tamsulosin 0.4 mg q HS ,out pt follow up with Dr Leach -Urology  - ID Dr. Connelly following- d/w -follow urine c/s - < from: MR Head No Cont (05.06.22 @ 19:00) >  -Old lacunar infarct involving the right sublenticular nucleus region is   seen.  There is evidence of abnormal T2 prolongation with restricted diffusion   seen involving the left brachium pontis and left lenticular   nucleus/corona radiata region.  d/w Dr Peterson at detail.  PT followup, Speech & swallow eval  -ECHO, CD, Lipid panel, HbA1c   -Start Ec ASA 81 mg daily, Statin 40 mg daily OK to continue as per Dr Peterson  -Neuro Check q 4 hrs,  place on Remote Tele.   D/W Dtr at very detail about MRI Brain results & work up

## 2022-05-06 NOTE — DIETITIAN INITIAL EVALUATION ADULT - PERTINENT MEDS FT
MEDICATIONS  (STANDING):  carbidopa/levodopa  25/100 1 Tablet(s) Oral daily  carbidopa/levodopa  25/100 1 Tablet(s) Oral at bedtime  cefTRIAXone   IVPB 1000 milliGRAM(s) IV Intermittent every 24 hours  citalopram 20 milliGRAM(s) Oral daily  diltiazem    milliGRAM(s) Oral daily  heparin   Injectable 5000 Unit(s) SubCutaneous every 8 hours  pantoprazole    Tablet 40 milliGRAM(s) Oral before breakfast  polyethylene glycol 3350 17 Gram(s) Oral daily  primidone 50 milliGRAM(s) Oral daily  senna 2 Tablet(s) Oral at bedtime  simvastatin 40 milliGRAM(s) Oral at bedtime  sodium chloride 0.9%. 1000 milliLiter(s) (60 mL/Hr) IV Continuous <Continuous>  tamsulosin 0.4 milliGRAM(s) Oral at bedtime    MEDICATIONS  (PRN):  acetaminophen     Tablet .. 650 milliGRAM(s) Oral every 6 hours PRN Temp greater or equal to 38C (100.4F), Mild Pain (1 - 3)  melatonin 3 milliGRAM(s) Oral at bedtime PRN Insomnia  ondansetron Injectable 4 milliGRAM(s) IV Push every 8 hours PRN Nausea and/or Vomiting

## 2022-05-06 NOTE — PROGRESS NOTE ADULT - PROBLEM SELECTOR PLAN 9
VTE PPX: Heparin      #GOC  - Palliative consult     #Dispo   - PT shaquille, rec VICTOR MANUEL, family wants d/c home

## 2022-05-06 NOTE — PROGRESS NOTE ADULT - SUBJECTIVE AND OBJECTIVE BOX
[facility] DIVISION of INFECTIOUS DISEASE  Ariel Connelly MD PhD, Priscilla Carranza MD, Karen Real MD, Kyleigh Glass MD, Naga Orozco MD  and providing coverage with Pasquale Reynolds MD  Providing Infectious Disease Consultations at [facility], [facility], [facility]'s    Office# [phone] to schedule follow up appointments  Answering Service for urgent calls or New Consults [phone]  Cell# to text for urgent issues Ariel Connelyl [phone]     infectious diseases progress note:    ISIS CUETO is a 76y y. o. Male patient    No concerning overnight events    Allergies    No Known Allergies    Intolerances        ANTIBIOTICS/RELEVANT:  antimicrobials  cefTRIAXone   IVPB 1000 milliGRAM(s) IV Intermittent every 24 hours    immunologic:    OTHER:  acetaminophen     Tablet .. 650 milliGRAM(s) Oral every 6 hours PRN  carbidopa/levodopa  25/100 1 Tablet(s) Oral daily  carbidopa/levodopa  25/100 1 Tablet(s) Oral at bedtime  citalopram 20 milliGRAM(s) Oral daily  diltiazem    milliGRAM(s) Oral daily  heparin   Injectable 5000 Unit(s) SubCutaneous every 8 hours  melatonin 3 milliGRAM(s) Oral at bedtime PRN  ondansetron Injectable 4 milliGRAM(s) IV Push every 8 hours PRN  pantoprazole    Tablet 40 milliGRAM(s) Oral before breakfast  polyethylene glycol 3350 17 Gram(s) Oral daily  potassium chloride    Tablet ER 40 milliEquivalent(s) Oral every 4 hours  primidone 50 milliGRAM(s) Oral daily  senna 2 Tablet(s) Oral at bedtime  simvastatin 40 milliGRAM(s) Oral at bedtime  sodium chloride 0.9%. 1000 milliLiter(s) IV Continuous <Continuous>  tamsulosin 0.4 milliGRAM(s) Oral at bedtime      Objective:  Vital Signs Last 24 Hrs  T(C): 37 (06 May 2022 05:00), Max: 37 (06 May 2022 05:00)  T(F): 98.6 (06 May 2022 05:00), Max: 98.6 (06 May 2022 05:00)  HR: 88 (06 May 2022 05:00) (70 - 91)  BP: 132/77 (06 May 2022 05:00) (125/55 - 165/75)  BP(mean): --  RR: 18 (06 May 2022 05:00) (18 - 18)  SpO2: 94% (06 May 2022 05:00) (94% - 95%)    T(C): 37 (22 @ 05:00), Max: 37 (22 @ 05:00)  T(C): 37 (22 @ 05:00), Max: 37 (22 @ 05:00)  T(C): 37 (22 @ 05:00), Max: 37 (22 @ 05:00)    PHYSICAL EXAM:  HEENT: NC atraumatic  Neck: supple  Respiratory: no accessory muscle use, breathing comfortably  Cardiovascular: distant  Gastrointestinal: normal appearing, nondistended  Extremities: no clubbing, no cyanosis,        LABS:                          13.5   7.93  )-----------( 296      ( 06 May 2022 08:23 )             37.7       WBC  7.93  @ 08:23  7.48  @ 08:48  10.33  @ 02:15      05-06    140  |  106  |  11  ----------------------------<  111<H>  3.2<L>   |  26  |  0.69    Ca    9.9      06 May 2022 08:23  Phos  2.9     05-  Mg     2.4     05-    TPro  7.1  /  Alb  3.2<L>  /  TBili  0.6  /  DBili  x   /  AST  15  /  ALT  10<L>  /  AlkPhos  82  05-      Creatinine, Serum: 0.69 mg/dL (22 @ 08:23)  Creatinine, Serum: 1.10 mg/dL (22 @ 08:48)  Creatinine, Serum: 1.80 mg/dL (22 @ 02:15)      PT/INR - ( 05 May 2022 02:15 )   PT: 12.4 sec;   INR: 1.06 ratio         PTT - ( 05 May 2022 02:15 )  PTT:28.7 sec  Urinalysis Basic - ( 05 May 2022 04:58 )    Color: Yellow / Appearance: Slightly Turbid / S.020 / pH: x  Gluc: x / Ketone: Trace  / Bili: Small / Urobili: Negative   Blood: x / Protein: 30 mg/dL / Nitrite: Negative   Leuk Esterase: Moderate / RBC: 3-5 /HPF / WBC 11-25   Sq Epi: x / Non Sq Epi: Occasional / Bacteria: Few            INFLAMMATORY MARKERS  Auto Neutrophil #: 6.81 K/uL (22 @ 08:23)  Auto Lymphocyte #: 0.37 K/uL (22 @ 08:23)  Auto Neutrophil #: 6.18 K/uL (22 @ 08:48)  Auto Lymphocyte #: 0.41 K/uL (22 @ 08:48)  Auto Lymphocyte #: 0.31 K/uL (22 @ 02:15)  Auto Neutrophil #: 9.61 K/uL (22 @ 02:15)    Lactate, Blood: 1.5 mmol/L (22 @ 06:18)  Lactate, Blood: 3.1 mmol/L (22 @ 02:15)    Auto Eosinophil #: 0.02 K/uL (22 @ 08:23)  Auto Eosinophil #: 0.05 K/uL (22 @ 08:48)  Auto Eosinophil #: 0.00 K/uL (22 @ 02:15)      Procalcitonin, Serum: 0.06 ng/mL (22 @ 08:23)      INR: 1.06 ratio (22 @ 02:15)  Activated Partial Thromboplastin Time: 28.7 sec (22 @ 02:15)          MICROBIOLOGY:              RADIOLOGY & ADDITIONAL STUDIES:  < from: CT Renal Stone Hunt (22 @ 13:16) >    ACC: [id number] EXAM:  CT RENAL STONE HUNT                          PROCEDURE DATE:  2022          INTERPRETATION:  CLINICAL INFORMATION: 76 years  Male with hematuria.   History of squamous cell cancer of the tongue.    COMPARISON: PET/CT 2022    CONTRAST/COMPLICATIONS:  IV Contrast: NONE  Oral Contrast: NONE  Complications: None reported at time of study completion    PROCEDURE:  CT of the Abdomen and Pelvis was performed.  Sagittal and coronal reformats were performed.    LIMITATIONS: Evaluation of the solid organs, vascular structures and GI   tract is limited without oral and IV contrast.    FINDINGS:  LOWER CHEST: Reidentified bilateral lower lobe, lingular and right middle   lobe patchy groundglass opacities.    LIVER: Within normal limits.  BILE DUCTS: Normal caliber.  GALLBLADDER: Within normal limits.  SPLEEN: Within normal limits.  PANCREAS: Within normal limits.  ADRENALS: Within normal limits.  KIDNEYS/URETERS: Bilateral renal cysts. Milk of calcium again layering in   a right lower pole cyst.. No hydroureteronephrosis or calculi. Trace left   hydronephrosis. For millimeter left UVJ calculus (2:88). No perinephric   edema. No right hydroureteronephrosis. 3 mm nonobstructing right lower   pole calculus.    BLADDER: Within normal limits.  REPRODUCTIVE ORGANS: Fiducial markers in the prostate.    BOWEL: No bowel obstruction. Appendix is not visualized. Mild colonic   fecal burden. Diverticulosis without diverticulitis.  PERITONEUM: No ascites.  VESSELS: Atherosclerotic changes.  RETROPERITONEUM/LYMPH NODES: No lymphadenopathy.  ABDOMINAL WALL: Within normal limits.  BONES: Scoliosis.    IMPRESSION:    Trace left hydronephrosis. 4 mm left UVJ calculus. Nonobstructing right   intrarenal calculus.    Fiducialmarkers in the prostate.    Stable patchy bilateral groundglass lung opacity consistent with   inflammatory or infectious process. Bibasilar dependent atelectasis.

## 2022-05-06 NOTE — PROGRESS NOTE ADULT - ATTENDING COMMENTS
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 Admitted for possible  UTI.   Pt seen, examined, case & care plan d/w pt, residents at detail.  AM labs   ID Dr Connelly -on  IV Rocephin 1 gm daily ,last dose 5/7, follow urine culture  Palliative care eval done .pt wants full code.  PT Eval---> VICTOR MANUEL, pt & dtr does NOT want to go to [facility], wants to go home. with Home PT, d/w Case management.  Fall precautions.   D/W Dtr at detail about out pt follow up with Oncologist as per ENT next week. concern about Rx with recurrence of ca   Urology- Eval Dr Alcaraz - No further Urology Eval , out pt follow up with primary Urologist .  Total care time is 40 minutes.

## 2022-05-06 NOTE — PROGRESS NOTE ADULT - SUBJECTIVE AND OBJECTIVE BOX
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 Wife Viki at bedside providing collateral information. Pt has been doing well until around 8:30 PM last night. He was very weak and was having difficulty getting up and walking due to the weakness.  Pt normally walks with a cane but was unable to get up last night. Daughter also noted that pt was having sweats but denies any fever or chills. Pt has been eating and drinking well. Pt denies any urinary urgency, frequency, dysuria or hematuria. Denies chest pain, palpitations, SOB, cough, abdominal pain, nausea, vomiting, diarrhea, headaches, dizziness.   Denies recent travel, recent antibiotic use, or sick contacts.    ED Course:   Vitals: BP: 123/53, HR: 74, Temp: 97.9 F, RR: 16, SpO2: 94% on RA   Labs: H/H: 12.9/37.9,  BUN/Cr: 25/1.80, GFR: 38, gluc: 228, lactate: 3.1  trop neg x1, COVID negative   UA: small bilirubin, trace ketone, 30 protein, moderate LE, 11-25 WBC, 3-5 RBC, few bacteria, few calcium oxalate   CXR: no acute lung pathology as per personal read, official read pending   EKG: junctional rhythm with PVCs, HR: 69   Received 1L NS bolus x1 and Rocephin in the ED  (05 May 2022 05:43)    INTERVAL HPI:  22: Patient was seen and examined at bedside. States he is feeling well. Denies any dysuria or polyuria. ABx dcd per ID reccs. PT recc VICTOR MANUEL. CT renal hunt pending.   22: Patient seen and examined at bedside. Patient denies any acute complaints. Denies CP, SOB, abd pain, N/V/D. Seems slightly confused but answering questions appropriately.     OVERNIGHT EVENTS: None    Home Medications:  carbidopa-levodopa 25 mg-100 mg oral tablet: 1 tab(s) orally 2 times a day (05 May 2022 06:27)  citalopram 20 mg oral tablet: 1 tab(s) orally once a day (05 May 2022 06:27)  DilTIAZem (Eqv-Cardizem CD) 240 mg/24 hours oral capsule, extended release: 1 cap(s) orally once a day (05 May 2022 06:27)  enalapril 10 mg oral tablet: 1 tab(s) orally once a day (05 May 2022 06:27)  pantoprazole 40 mg oral delayed release tablet: 1 tab(s) orally once a day (before a meal) (05 May 2022 06:27)  primidone 50 mg oral tablet: 1 tab(s) orally once a day (05 May 2022 06:27)  simvastatin 40 mg oral tablet: 1 tab(s) orally once a day (at bedtime) (05 May 2022 06:27)  Vitamin C 1000 mg oral tablet: 1 tab(s) orally once a day (05 May 2022 06:27)      MEDICATIONS  (STANDING):  carbidopa/levodopa  25/100 1 Tablet(s) Oral daily  carbidopa/levodopa  25/100 1 Tablet(s) Oral at bedtime  cefTRIAXone   IVPB 1000 milliGRAM(s) IV Intermittent every 24 hours  citalopram 20 milliGRAM(s) Oral daily  diltiazem    milliGRAM(s) Oral daily  heparin   Injectable 5000 Unit(s) SubCutaneous every 8 hours  pantoprazole    Tablet 40 milliGRAM(s) Oral before breakfast  polyethylene glycol 3350 17 Gram(s) Oral daily  potassium chloride    Tablet ER 40 milliEquivalent(s) Oral every 4 hours  primidone 50 milliGRAM(s) Oral daily  senna 2 Tablet(s) Oral at bedtime  simvastatin 40 milliGRAM(s) Oral at bedtime  sodium chloride 0.9%. 1000 milliLiter(s) (60 mL/Hr) IV Continuous <Continuous>  tamsulosin 0.4 milliGRAM(s) Oral at bedtime    MEDICATIONS  (PRN):  acetaminophen     Tablet .. 650 milliGRAM(s) Oral every 6 hours PRN Temp greater or equal to 38C (100.4F), Mild Pain (1 - 3)  melatonin 3 milliGRAM(s) Oral at bedtime PRN Insomnia  ondansetron Injectable 4 milliGRAM(s) IV Push every 8 hours PRN Nausea and/or Vomiting      No Known Allergies      Social History:  Tobacco: Denies  EtOH: Denies   Recreational drug use: Denies   Lives with: daughter and grandkids   Ambulates: with a cane   ADLs: with assistance   Vaccinations: Moderna 3/3 (05 May 2022 05:43)      REVIEW OF SYSTEMS: i am OK  CONSTITUTIONAL: No fever, No chills, No fatigue, No myalgia, No Body ache, No Weakness  EYES: No eye pain,  No visual disturbances, No discharge, NO Redness  ENMT:  No ear pain, No nose bleed, No vertigo; No sinus pain, NO throat pain, No Congestion  NECK: No pain, No stiffness  RESPIRATORY: No cough, NO wheezing, No  hemoptysis, NO  shortness of breath  CARDIOVASCULAR: No chest pain, palpitations  GASTROINTESTINAL: No abdominal pain, NO epigastric pain. No nausea, No vomiting; No diarrhea, No constipation. [ x ] NO BM  GENITOURINARY: No dysuria, No frequency, No urgency, No hematuria, NO incontinence  NEUROLOGICAL: No headaches, No dizziness, No numbness, No tingling, No tremors, No weakness  EXT: No Swelling, No Pain, No Edema  SKIN:  [ x] No itching, burning, rashes, or lesions   MUSCULOSKELETAL: No joint pain ,No Jt swelling; No muscle pain, No back pain, No extremity pain  PSYCHIATRIC: No depression,  No anxiety,  No mood swings ,No difficulty sleeping at night  PAIN SCALE:  [ x] None  [  ] Other-  ROS Unable to obtain due to - [  ] Dementia  [  ] Lethargy [  ] Drowsy [  ] Sedated [  ] non verbal  REST OF REVIEW Of SYSTEM - [ x ] Normal     Vital Signs Last 24 Hrs  T(C): 37 (06 May 2022 05:00), Max: 37 (06 May 2022 05:00)  T(F): 98.6 (06 May 2022 05:00), Max: 98.6 (06 May 2022 05:00)  HR: 88 (06 May 2022 05:00) (70 - 91)  BP: 132/77 (06 May 2022 05:00) (125/55 - 165/75)  BP(mean): --  RR: 18 (06 May 2022 05:00) (18 - 18)  SpO2: 94% (06 May 2022 05:00) (94% - 95%)    CAPILLARY BLOOD GLUCOSE          I&O's Summary    05 May 2022 07:01  -  06 May 2022 07:00  --------------------------------------------------------  IN: 0 mL / OUT: 1400 mL / NET: -1400 mL      PHYSICAL EXAM: i am good   GENERAL:  [x  ] NAD , [ x ] well appearing, [  ] Agitated, [  ] Drowsy,  [  ] Lethargy, [  ] confused   HEAD:  [x  ] Normal, [  ] Other  EYES:  [ x ] EOMI, [x  ] PERRLA, [  ] conjunctiva and sclera clear normal, [  ] Other,  [  ] Pallor,[  ] Discharge  ENMT:  [ x ] Normal, [ x ] Moist mucous membranes, [  ] Good dentition, [ x ] No Thrush + surgery   NECK:  [  x] Supple, [x  ] No JVD, [ x ] Normal thyroid, [  ] Lymphadenopathy [  ] Other  CHEST/LUNG:  [x  ] Clear to auscultation bilaterally, [x  ] Breath Sounds equal  [x  ] poor effort  [x  ] No rales, [x  ] No rhonchi  [ x ]  No wheezing,   HEART:  [ x ] Regular rate and rhythm, [  ] tachycardia, [  ] Bradycardia,  [  ] irregular  [x  ] No murmurs, No rubs, No gallops, [  ] PPM in place (Mfr:  )  ABDOMEN:  [ x ] Soft, [x  ] Nontender, [ x ] Nondistended, [ x ] No mass, [ x ] Bowel sounds present, [  ] obese  NERVOUS SYSTEM:  [x  ] Alert & Oriented X3, [x  ] Nonfocal  [  ] Confusion  [  ] Encephalopathic [  ] Sedated [  ] Unable to assess, [  ] Dementia [ x ] Other-Tremors hand   EXTREMITIES: [x  ] 2+ Peripheral Pulses, No clubbing, No cyanosis,  [  ] edema B/L lower EXT. [ x ] severe PVD stasis skin changes B/L Lower EXT, [  ] wound  LYMPH: No lymphadenopathy noted  SKIN:  [  x] No rashes or lesions, [  ] Pressure Ulcers, [  ] ecchymosis, [  ] Skin Tears, [  ] Other    DIET: Diet, DASH/TLC:   Sodium & Cholesterol Restricted (22 @ 12:33)      LABS:                        13.5   7.93  )-----------( 296      ( 06 May 2022 08:23 )             37.7     06 May 2022 08:23    140    |  106    |  11     ----------------------------<  111    3.2     |  26     |  0.69     Ca    9.9        06 May 2022 08:23    TPro  7.1    /  Alb  3.2    /  TBili  0.6    /  DBili  x      /  AST  15     /  ALT  10     /  AlkPhos  82     06 May 2022 08:23    PT/INR - ( 05 May 2022 02:15 )   PT: 12.4 sec;   INR: 1.06 ratio         PTT - ( 05 May 2022 02:15 )  PTT:28.7 sec  Urinalysis Basic - ( 05 May 2022 04:58 )    Color: Yellow / Appearance: Slightly Turbid / S.020 / pH: x  Gluc: x / Ketone: Trace  / Bili: Small / Urobili: Negative   Blood: x / Protein: 30 mg/dL / Nitrite: Negative   Leuk Esterase: Moderate / RBC: 3-5 /HPF / WBC 11-25   Sq Epi: x / Non Sq Epi: Occasional / Bacteria: Few      Culture Results:   No growth to date. ( @ 09:18)  Culture Results:   No growth to date. ( @ 09:18)    culture blood  -- .Blood Blood  @ 09:18    culture urine  --   @ 09:18          Culture - Blood (collected 05 May 2022 09:18)  Source: .Blood Blood  Preliminary Report (06 May 2022 10:01):    No growth to date.    Culture - Blood (collected 05 May 2022 09:18)  Source: .Blood Blood  Preliminary Report (06 May 2022 10:01):    No growth to date.       Anemia Panel:  Iron Total, Serum: 56 ug/dL (22 @ 10:55)  Iron - Total Binding Capacity.: 254 ug/dL (22 @ 10:55)      Thyroid Panel:                RADIOLOGY & ADDITIONAL TESTS:  < from: CT Renal Stone Hunt (22 @ 13:16) >  ACC: [id number] EXAM:  CT RENAL STONE HUNT                          PROCEDURE DATE:  2022          INTERPRETATION:  CLINICAL INFORMATION: 76 years  Male with hematuria.   History of squamous cell cancer of the tongue.    COMPARISON: PET/CT 2022    CONTRAST/COMPLICATIONS:  IV Contrast: NONE  Oral Contrast: NONE  Complications: None reported at time of study completion    PROCEDURE:  CT of the Abdomen and Pelvis was performed.  Sagittal and coronal reformats were performed.    LIMITATIONS: Evaluation of the solid organs, vascular structures and GI   tract is limited without oral and IV contrast.    FINDINGS:  LOWER CHEST: Reidentified bilateral lower lobe, lingular and right middle   lobe patchy groundglass opacities.    LIVER: Within normal limits.  BILE DUCTS: Normal caliber.  GALLBLADDER: Within normal limits.  SPLEEN: Within normal limits.  PANCREAS: Within normal limits.  ADRENALS: Within normal limits.  KIDNEYS/URETERS: Bilateral renal cysts. Milk of calcium again layering in   a right lower pole cyst.. No hydroureteronephrosis or calculi. Trace left   hydronephrosis. For millimeter left UVJ calculus (2:88). No perinephric   edema. No right hydroureteronephrosis. 3 mm nonobstructing right lower   pole calculus.    BLADDER: Within normal limits.  REPRODUCTIVE ORGANS: Fiducial markers in the prostate.    BOWEL: No bowel obstruction. Appendix is not visualized. Mild colonic   fecal burden. Diverticulosis without diverticulitis.  PERITONEUM: No ascites.  VESSELS: Atherosclerotic changes.  RETROPERITONEUM/LYMPH NODES: No lymphadenopathy.  ABDOMINAL WALL: Within normal limits.  BONES: Scoliosis.    IMPRESSION:    Trace left hydronephrosis. 4 mm left UVJ calculus. Nonobstructing right   intrarenal calculus.    Fiducialmarkers in the prostate.    Stable patchy bilateral groundglass lung opacity consistent with   inflammatory or infectious process. Bibasilar dependent atelectasis.        --- End of Report ---            DARBY THEODORE MD; Attending Radiologist  This document has been electronically signed. May  5 2022  2:18PM    < end of copied text >      HEALTH ISSUES - PROBLEM Dx:  Acute UTI  Acute UTI  Acute UTI  HLD (hyperlipidemia)  GERD (gastroesophageal reflux disease)  Need for prophylactic measure  Parkinson disease  ÁNGEL (acute kidney injury)  HTN (hypertension)  Malignant neoplasm of tongue, unspecified  s/p surgery and radiation discontinued           Consultant(s) Notes Reviewed:  [  x] YES     Care Discussed with [ x ] Consultants, [ x ] Patient, [  ] Family, [  ] HCP, [  ] , [  ] Social Service, [  ] RN, [  ] Physical Therapy, [  ] Palliative Care Team  DVT PPX: [  ] Lovenox, [ x ] SC Heparin, [  ] Coumadin, [  ] Xarelto, [  ] Eliquis, [  ] Pradaxa, [  ] IV Heparin drip, [  ] SCD, [  ] Ambulation, [  ] Contraindicated 2/2 GI Bleed, [  ] Contraindicated 2/2  Bleed, [  ] Contraindicated 2/2 Brain Bleed  Advanced Directive: [ x ] None, [  ] DNR/DNI Patient is a 76y old  Male who presents with a chief complaint of weakness, UTI (06 May 2022 10:46)    HPI:  77 yo M with PMH parkinson's, HTN, HLD, GERD, prostate ca, 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 Wife Viki at bedside providing collateral information. Pt has been doing well until around 8:30 PM last night. He was very weak and was having difficulty getting up and walking due to the weakness.  Pt normally walks with a cane but was unable to get up last night. Daughter also noted that pt was having sweats but denies any fever or chills. Pt has been eating and drinking well. Pt denies any urinary urgency, frequency, dysuria or hematuria. Denies chest pain, palpitations, SOB, cough, abdominal pain, nausea, vomiting, diarrhea, headaches, dizziness.   Denies recent travel, recent antibiotic use, or sick contacts.    ED Course:   Vitals: BP: 123/53, HR: 74, Temp: 97.9 F, RR: 16, SpO2: 94% on RA   Labs: H/H: 12.9/37.9,  BUN/Cr: 25/1.80, GFR: 38, gluc: 228, lactate: 3.1  trop neg x1, COVID negative   UA: small bilirubin, trace ketone, 30 protein, moderate LE, 11-25 WBC, 3-5 RBC, few bacteria, few calcium oxalate   CXR: no acute lung pathology as per personal read, official read pending   EKG: junctional rhythm with PVCs, HR: 69   Received 1L NS bolus x1 and Rocephin in the ED  (05 May 2022 05:43)    INTERVAL HPI:  22: Patient was seen and examined at bedside. States he is feeling well. Denies any dysuria or polyuria. ABx dcd per ID reccs. PT recc VICTOR MANUEL. CT renal hunt pending.   22: Patient seen and examined at bedside. Patient denies any acute complaints. Denies CP, SOB, abd pain, N/V/D. Seems slightly confused but answering questions appropriately. Seen by Neurology-Dr Peterson -plan for CT scan Head.    OVERNIGHT EVENTS: None    Home Medications:  carbidopa-levodopa 25 mg-100 mg oral tablet: 1 tab(s) orally 2 times a day (05 May 2022 06:27)  citalopram 20 mg oral tablet: 1 tab(s) orally once a day (05 May 2022 06:27)  DilTIAZem (Eqv-Cardizem CD) 240 mg/24 hours oral capsule, extended release: 1 cap(s) orally once a day (05 May 2022 06:27)  enalapril 10 mg oral tablet: 1 tab(s) orally once a day (05 May 2022 06:27)  pantoprazole 40 mg oral delayed release tablet: 1 tab(s) orally once a day (before a meal) (05 May 2022 06:27)  primidone 50 mg oral tablet: 1 tab(s) orally once a day (05 May 2022 06:27)  simvastatin 40 mg oral tablet: 1 tab(s) orally once a day (at bedtime) (05 May 2022 06:27)  Vitamin C 1000 mg oral tablet: 1 tab(s) orally once a day (05 May 2022 06:27)      MEDICATIONS  (STANDING):  carbidopa/levodopa  25/100 1 Tablet(s) Oral daily  carbidopa/levodopa  25/100 1 Tablet(s) Oral at bedtime  cefTRIAXone   IVPB 1000 milliGRAM(s) IV Intermittent every 24 hours  citalopram 20 milliGRAM(s) Oral daily  diltiazem    milliGRAM(s) Oral daily  heparin   Injectable 5000 Unit(s) SubCutaneous every 8 hours  pantoprazole    Tablet 40 milliGRAM(s) Oral before breakfast  polyethylene glycol 3350 17 Gram(s) Oral daily  potassium chloride    Tablet ER 40 milliEquivalent(s) Oral every 4 hours  primidone 50 milliGRAM(s) Oral daily  senna 2 Tablet(s) Oral at bedtime  simvastatin 40 milliGRAM(s) Oral at bedtime  sodium chloride 0.9%. 1000 milliLiter(s) (60 mL/Hr) IV Continuous <Continuous>  tamsulosin 0.4 milliGRAM(s) Oral at bedtime    MEDICATIONS  (PRN):  acetaminophen     Tablet .. 650 milliGRAM(s) Oral every 6 hours PRN Temp greater or equal to 38C (100.4F), Mild Pain (1 - 3)  melatonin 3 milliGRAM(s) Oral at bedtime PRN Insomnia  ondansetron Injectable 4 milliGRAM(s) IV Push every 8 hours PRN Nausea and/or Vomiting      No Known Allergies      Social History:  Tobacco: Denies  EtOH: Denies   Recreational drug use: Denies   Lives with: daughter and grandkids   Ambulates: with a cane   ADLs: with assistance   Vaccinations: Moderna 3/3 (05 May 2022 05:43)      REVIEW OF SYSTEMS: i am OK  CONSTITUTIONAL: No fever, No chills, No fatigue, No myalgia, No Body ache, No Weakness  EYES: No eye pain,  No visual disturbances, No discharge, NO Redness  ENMT:  No ear pain, No nose bleed, No vertigo; No sinus pain, NO throat pain, No Congestion  NECK: No pain, No stiffness  RESPIRATORY: No cough, NO wheezing, No  hemoptysis, NO  shortness of breath  CARDIOVASCULAR: No chest pain, palpitations  GASTROINTESTINAL: No abdominal pain, NO epigastric pain. No nausea, No vomiting; No diarrhea, No constipation. [ x ] NO BM  GENITOURINARY: No dysuria, No frequency, No urgency, No hematuria, NO incontinence  NEUROLOGICAL: No headaches, No dizziness, No numbness, No tingling, No tremors, No weakness  EXT: No Swelling, No Pain, No Edema  SKIN:  [ x] No itching, burning, rashes, or lesions   MUSCULOSKELETAL: No joint pain ,No Jt swelling; No muscle pain, No back pain, No extremity pain  PSYCHIATRIC: No depression,  No anxiety,  No mood swings ,No difficulty sleeping at night  PAIN SCALE:  [ x] None  [  ] Other-  ROS Unable to obtain due to - [  ] Dementia  [  ] Lethargy [  ] Drowsy [  ] Sedated [  ] non verbal  REST OF REVIEW Of SYSTEM - [ x ] Normal     Vital Signs Last 24 Hrs  T(C): 37 (06 May 2022 05:00), Max: 37 (06 May 2022 05:00)  T(F): 98.6 (06 May 2022 05:00), Max: 98.6 (06 May 2022 05:00)  HR: 88 (06 May 2022 05:00) (70 - 91)  BP: 132/77 (06 May 2022 05:00) (125/55 - 165/75)  BP(mean): --  RR: 18 (06 May 2022 05:00) (18 - 18)  SpO2: 94% (06 May 2022 05:00) (94% - 95%)    CAPILLARY BLOOD GLUCOSE          I&O's Summary    05 May 2022 07:01  -  06 May 2022 07:00  --------------------------------------------------------  IN: 0 mL / OUT: 1400 mL / NET: -1400 mL      PHYSICAL EXAM: i am good   GENERAL:  [x  ] NAD , [ x ] well appearing, [  ] Agitated, [  ] Drowsy,  [  ] Lethargy, [  ] confused   HEAD:  [x  ] Normal, [  ] Other  EYES:  [ x ] EOMI, [x  ] PERRLA, [  ] conjunctiva and sclera clear normal, [  ] Other,  [  ] Pallor,[  ] Discharge  ENMT:  [ x ] Normal, [ x ] Moist mucous membranes, [  ] Good dentition, [ x ] No Thrush + surgery   NECK:  [  x] Supple, [x  ] No JVD, [ x ] Normal thyroid, [  ] Lymphadenopathy [  ] Other  CHEST/LUNG:  [x  ] Clear to auscultation bilaterally, [x  ] Breath Sounds equal  [x  ] poor effort  [x  ] No rales, [x  ] No rhonchi  [ x ]  No wheezing,   HEART:  [ x ] Regular rate and rhythm, [  ] tachycardia, [  ] Bradycardia,  [  ] irregular  [x  ] No murmurs, No rubs, No gallops, [  ] PPM in place (Mfr:  )  ABDOMEN:  [ x ] Soft, [x  ] Nontender, [ x ] Nondistended, [ x ] No mass, [ x ] Bowel sounds present, [  ] obese  NERVOUS SYSTEM:  [x  ] Alert & Oriented X3, [x  ] Nonfocal  [  ] Confusion  [  ] Encephalopathic [  ] Sedated [  ] Unable to assess, [  ] Dementia [ x ] Other-Tremors hand   EXTREMITIES: [x  ] 2+ Peripheral Pulses, No clubbing, No cyanosis,  [  ] edema B/L lower EXT. [ x ] severe PVD stasis skin changes B/L Lower EXT, [  ] wound  LYMPH: No lymphadenopathy noted  SKIN:  [  x] No rashes or lesions, [  ] Pressure Ulcers, [  ] ecchymosis, [  ] Skin Tears, [  ] Other    DIET: Diet, DASH/TLC:   Sodium & Cholesterol Restricted (22 @ 12:33)      LABS:                        13.5   7.93  )-----------( 296      ( 06 May 2022 08:23 )             37.7     06 May 2022 08:23    140    |  106    |  11     ----------------------------<  111    3.2     |  26     |  0.69     Ca    9.9        06 May 2022 08:23    TPro  7.1    /  Alb  3.2    /  TBili  0.6    /  DBili  x      /  AST  15     /  ALT  10     /  AlkPhos  82     06 May 2022 08:23    PT/INR - ( 05 May 2022 02:15 )   PT: 12.4 sec;   INR: 1.06 ratio         PTT - ( 05 May 2022 02:15 )  PTT:28.7 sec  Urinalysis Basic - ( 05 May 2022 04:58 )    Color: Yellow / Appearance: Slightly Turbid / S.020 / pH: x  Gluc: x / Ketone: Trace  / Bili: Small / Urobili: Negative   Blood: x / Protein: 30 mg/dL / Nitrite: Negative   Leuk Esterase: Moderate / RBC: 3-5 /HPF / WBC 11-25   Sq Epi: x / Non Sq Epi: Occasional / Bacteria: Few      Culture Results:   No growth to date. ( @ 09:18)  Culture Results:   No growth to date. ( @ 09:18)    culture blood  -- .Blood Blood  @ 09:18    culture urine  --   @ 09:18      Culture - Blood (collected 05 May 2022 09:18)  Source: .Blood Blood  Preliminary Report (06 May 2022 10:01):    No growth to date.    Culture - Blood (collected 05 May 2022 09:18)  Source: .Blood Blood  Preliminary Report (06 May 2022 10:01):    No growth to date.       Anemia Panel:  Iron Total, Serum: 56 ug/dL (22 @ 10:55)  Iron - Total Binding Capacity.: 254 ug/dL (22 @ 10:55)      Thyroid Panel:      RADIOLOGY & ADDITIONAL TESTS:  < from: CT Head No Cont (22 @ 15:18) >  COMPARISON: CT head 10/7/2021    FINDINGS:  There is mild diffuse parenchymal volume loss. There are areas of low   attenuation in the periventricular white matter likely related to mild   chronic microvascular ischemic changes.    There is a new age-indeterminate lacunar infarct left basal ganglia.    Chronic lacunar infarcts right basal ganglia    There is no acute intracranial hemorrhage, parenchymal mass, mass effect   or midline shift. There is no acute territorial infarct. There is no   hydrocephalus. Partial empty sella    The cranium is intact.    Retention cyst/polyp sphenoid sinus. Mucosal thickening paranasal sinuses.    IMPRESSION:  No acute intracranial hemorrhage or acuteterritorial infarct.  New    age-indeterminate lacunar infarct left basal ganglia. Correlate   clinically. MRI exam recommended as clinically warranted    --- End of Report ---    < end of copied text >        < from: CT Renal Stone Hunt (22 @ 13:16) >  ACC: [id number] EXAM:  CT RENAL STONE HUNT                          PROCEDURE DATE:  2022          INTERPRETATION:  CLINICAL INFORMATION: 76 years  Male with hematuria.   History of squamous cell cancer of the tongue.    COMPARISON: PET/CT 2022    CONTRAST/COMPLICATIONS:  IV Contrast: NONE  Oral Contrast: NONE  Complications: None reported at time of study completion    PROCEDURE:  CT of the Abdomen and Pelvis was performed.  Sagittal and coronal reformats were performed.    LIMITATIONS: Evaluation of the solid organs, vascular structures and GI   tract is limited without oral and IV contrast.    FINDINGS:  LOWER CHEST: Reidentified bilateral lower lobe, lingular and right middle   lobe patchy groundglass opacities.    LIVER: Within normal limits.  BILE DUCTS: Normal caliber.  GALLBLADDER: Within normal limits.  SPLEEN: Within normal limits.  PANCREAS: Within normal limits.  ADRENALS: Within normal limits.  KIDNEYS/URETERS: Bilateral renal cysts. Milk of calcium again layering in   a right lower pole cyst.. No hydroureteronephrosis or calculi. Trace left   hydronephrosis. For millimeter left UVJ calculus (2:88). No perinephric   edema. No right hydroureteronephrosis. 3 mm nonobstructing right lower   pole calculus.    BLADDER: Within normal limits.  REPRODUCTIVE ORGANS: Fiducial markers in the prostate.    BOWEL: No bowel obstruction. Appendix is not visualized. Mild colonic   fecal burden. Diverticulosis without diverticulitis.  PERITONEUM: No ascites.  VESSELS: Atherosclerotic changes.  RETROPERITONEUM/LYMPH NODES: No lymphadenopathy.  ABDOMINAL WALL: Within normal limits.  BONES: Scoliosis.    IMPRESSION:    Trace left hydronephrosis. 4 mm left UVJ calculus. Nonobstructing right   intrarenal calculus.    Fiducialmarkers in the prostate.    Stable patchy bilateral groundglass lung opacity consistent with   inflammatory or infectious process. Bibasilar dependent atelectasis.        --- End of Report ---      DARBY THEODORE MD; Attending Radiologist  This document has been electronically signed. May  5 2022  2:18PM    < end of copied text >      HEALTH ISSUES - PROBLEM Dx:  Acute UTI  Acute UTI  Acute UTI  HLD (hyperlipidemia)  GERD (gastroesophageal reflux disease)  Need for prophylactic measure  Parkinson disease  ÁNGEL (acute kidney injury)  HTN (hypertension)  Malignant neoplasm of tongue, unspecified  s/p surgery and radiation discontinued       Consultant(s) Notes Reviewed:  [  x] YES     Care Discussed with [ x ] Consultants, [ x ] Patient, [ x ] Family- dtr , [  ] HCP, [  ] , [  ] Social Service, [ x ] RN, [  ] Physical Therapy, [  ] Palliative Care Team  DVT PPX: [  ] Lovenox, [ x ] SC Heparin, [  ] Coumadin, [  ] Xarelto, [  ] Eliquis, [  ] Pradaxa, [  ] IV Heparin drip, [  ] SCD, [  ] Ambulation, [  ] Contraindicated 2/2 GI Bleed, [  ] Contraindicated 2/2  Bleed, [  ] Contraindicated 2/2 Brain Bleed  Advanced Directive: [ x ] None, [  ] DNR/DNI

## 2022-05-06 NOTE — DIETITIAN INITIAL EVALUATION ADULT - ORAL INTAKE PTA/DIET HISTORY
( via telephone) Per daughter Viki, has been eating a lot of pudding, applesauce, yogurt, carnation instant breakfast, Ensure products

## 2022-05-07 NOTE — PROGRESS NOTE ADULT - PROBLEM SELECTOR PLAN 6
< from: CT Neck Soft Tissue w/ IV Cont (04.07.22 @ 10:10) >    IMPRESSION:  pt had out pt CT neck with ENT in April 2022   -Results   Interval development of ill-defined enhancing lesion involving the   proximal right sternocleidomastoid and possibly the deep parotid gland,   just inferior to the mastoid tip, concerning for neoplasm recurrence.   Correlate with tissue sampling.  D/W Dtr at detail- Pt saw ENT Dr Boland  last week & has appointment with Oncologist next week for treatment options

## 2022-05-07 NOTE — PROGRESS NOTE ADULT - PROBLEM SELECTOR PLAN 2
Pt presents with increasing weakness, +UA  - Patient did not meet sepsis criteria on admission, lactate downtrended  - CT renal stone hunt ordered given microscopic hematuria- Trace left hydronephrosis. 4 mm left UVJ calculus. Nonobstructing right intrarenal calculus.  -Continue IV Rocephin, short course last dose 5/7 AM  -F/u urine culture, blood cultures NGTD  - Urology DR Alcaraz - added tamsulosin 0.4 mg q HS ,out pt follow up with Dr Leach -Urology  - ID Dr. Connelly following- d/w -follow urine c/s - Pt presents with increasing weakness, +UA  - Patient did not meet sepsis criteria on admission, lactate downtrended  - CT renal stone hunt ordered given microscopic hematuria- Trace left hydronephrosis. 4 mm left UVJ calculus. Nonobstructing right intrarenal calculus.  -Continue IV Rocephin, short course last dose 5/7 AM  -F/u urine culture, blood cultures NGTD  - Urology DR Alcaraz - added tamsulosin 0.4 mg q HS ,out pt follow up with Dr Leach -Urology  - ID Dr. Connelly following- d/w -follow urine c/s -Neg

## 2022-05-07 NOTE — PROGRESS NOTE ADULT - ATTENDING COMMENTS
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 Admitted for possible  UTI.   Pt seen, examined, case & care plan d/w pt, residents at detail.  AM labs   ID Dr Connelly -completed Rocephin , STOP Abx   Palliative care eval done .pt wants full code.  PT Eval---> VICTOR MANUEL, pt & dtr does NOT want to go to [facility], wants to go home. with Home PT, d/w Case management.  D/W Dtr at detail about out pt follow up with Oncologist as per ENT next week. concern about Rx with recurrence of ca   Neurology-Dr Peterson -Work up ECHO/CD , Remote Tele. PT follow up.  Total care time is 40 minutes.

## 2022-05-07 NOTE — PROGRESS NOTE ADULT - SUBJECTIVE AND OBJECTIVE BOX
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 Wife Viki at bedside providing collateral information. Pt has been doing well until around 8:30 PM last night. He was very weak and was having difficulty getting up and walking due to the weakness.  Pt normally walks with a cane but was unable to get up last night. Daughter also noted that pt was having sweats but denies any fever or chills. Pt has been eating and drinking well. Pt denies any urinary urgency, frequency, dysuria or hematuria. Denies chest pain, palpitations, SOB, cough, abdominal pain, nausea, vomiting, diarrhea, headaches, dizziness.   Denies recent travel, recent antibiotic use, or sick contacts.    ED Course:   Vitals: BP: 123/53, HR: 74, Temp: 97.9 F, RR: 16, SpO2: 94% on RA   Labs: H/H: 12.9/37.9,  BUN/Cr: 25/1.80, GFR: 38, gluc: 228, lactate: 3.1  trop neg x1, COVID negative   UA: small bilirubin, trace ketone, 30 protein, moderate LE, 11-25 WBC, 3-5 RBC, few bacteria, few calcium oxalate   CXR: no acute lung pathology as per personal read, official read pending   EKG: junctional rhythm with PVCs, HR: 69   Received 1L NS bolus x1 and Rocephin in the ED  (05 May 2022 05:43)      Interval History -no new weakness    Patient is seen, chart was reviewed and case was discussed with the treatment team.  Pt is not in any distress.   Lying on bed comfortably.       Vital Signs Last 24 Hrs  T(C): 36.6 (07 May 2022 13:37), Max: 36.7 (06 May 2022 21:00)  T(F): 97.9 (07 May 2022 13:37), Max: 98 (06 May 2022 21:00)  HR: 86 (07 May 2022 13:37) (59 - 86)  BP: 136/87 (07 May 2022 13:37) (136/87 - 162/84)  BP(mean): --  RR: 18 (07 May 2022 13:37) (18 - 18)  SpO2: 95% (07 May 2022 13:37) (92% - 95%)        REVIEW OF SYSTEMS:    Constitutional: No fever,   Eyes: No eye pain, visual disturbances, or discharge  ENT:  No difficulty hearing, tinnitus, vertigo; No sinus or throat pain  Neck: No pain or stiffness  Respiratory: No cough, wheezing, chills or hemoptysis  Cardiovascular: No chest pain, palpitations, shortness of breath, dizziness or leg swelling  Gastrointestinal: No abdominal or epigastric pain. No nausea, vomiting or hematemesis; No diarrhea or constipation  Genitourinary: No  frequency, hematuria or incontinence  Neurological: No headaches, , loss of strength, numbness or tremors  Psychiatric: No depression, anxiety, mood swings or difficulty sleeping  Musculoskeletal: No joint pain or swelling; No muscle,   Skin: No itching, burning, rashes or lesions   Lymph Nodes: No enlarged glands  Endocrine: No heat or cold intolerance; No hair loss,   Allergy and Immunologic: No hives or eczema    On Neurological Examination:    Mental Status - Pt is alert, awake, . Follows simple commands     Speech -  Pt has dysarthria.    Cranial Nerves - Pupils 3 mm equal and reactive to light, extraocular eye movements intact. Pt has no visual field deficit.  Pt has right  facial asymmetry. Facial sensation is intact.Tongue - is in midline.    Muscle tone -cogwheel rigidity    Motor Exam - RUE 3/5; LUE 4+  LE 3/5  right pronator  drift.     Sensory Exam - Pin prick, temperature, joint position and vibration are intact on either side. Pt withdraws all extremities equally on stimulation.         Deep tendon Reflexes - 2 plus all over.         Neck Supple -  Yes.     MEDICATIONS    acetaminophen     Tablet .. 650 milliGRAM(s) Oral every 6 hours PRN  aspirin enteric coated 81 milliGRAM(s) Oral daily  carbidopa/levodopa  25/100 1 Tablet(s) Oral daily  carbidopa/levodopa  25/100 1 Tablet(s) Oral at bedtime  citalopram 20 milliGRAM(s) Oral daily  diltiazem    milliGRAM(s) Oral daily  enalapril 10 milliGRAM(s) Oral daily  heparin   Injectable 5000 Unit(s) SubCutaneous every 8 hours  melatonin 3 milliGRAM(s) Oral at bedtime PRN  ondansetron Injectable 4 milliGRAM(s) IV Push every 8 hours PRN  pantoprazole    Tablet 40 milliGRAM(s) Oral before breakfast  polyethylene glycol 3350 17 Gram(s) Oral daily  primidone 50 milliGRAM(s) Oral daily  senna 2 Tablet(s) Oral at bedtime  simvastatin 40 milliGRAM(s) Oral at bedtime  tamsulosin 0.4 milliGRAM(s) Oral at bedtime      Allergies    No Known Allergies    Intolerances        LABS:  CBC Full  -  ( 07 May 2022 07:36 )  WBC Count : 6.24 K/uL  RBC Count : 4.04 M/uL  Hemoglobin : 13.3 g/dL  Hematocrit : 37.3 %  Platelet Count - Automated : 287 K/uL  Mean Cell Volume : 92.3 fl  Mean Cell Hemoglobin : 32.9 pg  Mean Cell Hemoglobin Concentration : 35.7 gm/dL  Auto Neutrophil # : 5.15 K/uL  Auto Lymphocyte # : 0.31 K/uL  Auto Monocyte # : 0.64 K/uL  Auto Eosinophil # : 0.07 K/uL  Auto Basophil # : 0.05 K/uL  Auto Neutrophil % : 82.5 %   %      05-07    142  |  109<H>  |  13  ----------------------------<  128<H>  3.9   |  24  |  0.74    Ca    10.3<H>      07 May 2022 07:36  Phos  2.9     05-07  Mg     2.3     05-07    TPro  7.1  /  Alb  3.2<L>  /  TBili  0.6  /  DBili  x   /  AST  15  /  ALT  10<L>  /  AlkPhos  82  05-06    Hemoglobin A1C:   Lipid Panel 05-07 @ 10:47  Total Cholesterol, Serum 155  LDL --  Triglycerides 87    Vitamin B12     RADIOLOGY  < from: MR Head No Cont (05.06.22 @ 19:00) >    ACC: [id number] EXAM:  MR BRAIN                          PROCEDURE DATE:  05/06/2022          INTERPRETATION:  Clinical indication: Subacute CVA.    MRI of the brain was performed using sagittal T1 axial T1 T2 T2 FLAIR   diffusion and Lupton sequence.    This exam is compared with prior head CT performed on May 6, 2022.    Extensive parenchymal volume loss and chronic microvascular ischemic   changes are identified    Old lacunar infarct involving the right sublenticular nucleus region is   seen.    There is evidence of abnormal T2 prolongation with restricted diffusion   seen involving the left brachium pontis and left lenticular   nucleus/corona radiata region.    These findings are compatible with areas of acute infarcts. No   hemorrhagic transformation is seen. No significant shift or herniation is   seen.    The large vessels demonstrate normal flow    The visualized paranasal sinuses mastoid and mastoid clear.    IMPRESSION: Acute infarcts as described above.              SHARON VILLA MD; Attending Radiologist  This document has been electronically signed. 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  OOB to chair/ambulation with assistance only.  Pain is accessed and addressed.  Would continue to follow.

## 2022-05-07 NOTE — PROGRESS NOTE ADULT - PROBLEM SELECTOR PLAN 1
< from: MR Head No Cont (05.06.22 @ 19:00) >  -Old lacunar infarct involving the right sublenticular nucleus region is   seen.  There is evidence of abnormal T2 prolongation with restricted diffusion   seen involving the left brachium pontis and left lenticular   nucleus/corona radiata region.  d/w Dr Peterson at detail.  PT followup, Speech & swallow eval  -ECHO, CD, Lipid panel, HbA1c   -Start Ec ASA 81 mg daily, Statin 40 mg daily OK to continue as per Dr Peterson  -Neuro Check q 4 hrs,  place on Remote Tele.   D/W Dtr at very detail about MRI Brain results & work up < from: MR Head No Cont (05.06.22 @ 19:00) >  -Old lacunar infarct involving the right sublenticular nucleus region is seen.  There is evidence of abnormal T2 prolongation with restricted diffusion   seen involving the left brachium pontis and left lenticular   nucleus/corona radiata region.  d/w Dr Peterson at detail.  PT followup, Speech & swallow eval  -ECHO, CD, Lipid panel, HbA1c   -Start Ec ASA 81 mg daily, Statin 40 mg daily OK to continue as per Dr Peterson  -Neuro Check q 4 hrs,  place on Remote Tele.   D/W Dtr at very detail about MRI Brain results & work up  -PT follow up

## 2022-05-07 NOTE — PROGRESS NOTE ADULT - PROBLEM SELECTOR PLAN 5
pt with Parkinson Disease & Tremors  Neurology-Dr Peterson called & d/w   Plan for CT head -non cont- as above- subacute lacunar infarct-Its new from last CT scan from 10/21  D/W Dr Peterosn -plan for MRI Brain non cont   -Start Ec ASA 81 mg daily  -Pt is ON Statin

## 2022-05-07 NOTE — PROGRESS NOTE ADULT - SUBJECTIVE AND OBJECTIVE BOX
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 Wife Viki at bedside providing collateral information. Pt has been doing well until around 8:30 PM last night. He was very weak and was having difficulty getting up and walking due to the weakness.  Pt normally walks with a cane but was unable to get up last night. Daughter also noted that pt was having sweats but denies any fever or chills. Pt has been eating and drinking well. Pt denies any urinary urgency, frequency, dysuria or hematuria. Denies chest pain, palpitations, SOB, cough, abdominal pain, nausea, vomiting, diarrhea, headaches, dizziness.   Denies recent travel, recent antibiotic use, or sick contacts.    ED Course:   Vitals: BP: 123/53, HR: 74, Temp: 97.9 F, RR: 16, SpO2: 94% on RA   Labs: H/H: 12.9/37.9,  BUN/Cr: 25/1.80, GFR: 38, gluc: 228, lactate: 3.1  trop neg x1, COVID negative   UA: small bilirubin, trace ketone, 30 protein, moderate LE, 11-25 WBC, 3-5 RBC, few bacteria, few calcium oxalate   CXR: no acute lung pathology as per personal read, official read pending   EKG: junctional rhythm with PVCs, HR: 69   Received 1L NS bolus x1 and Rocephin in the ED  (05 May 2022 05:43)    INTERVAL HPI:  5/5/22: Patient was seen and examined at bedside. States he is feeling well. Denies any dysuria or polyuria. ABx dcd per ID reccs. PT recc VICTOR MANUEL. CT renal hunt pending.   5/6/22: Patient seen and examined at bedside. Patient denies any acute complaints. Denies CP, SOB, abd pain, N/V/D. Seems slightly confused but answering questions appropriately. Seen by Neurology-Dr Peterson -plan for CT scan Head.  5/7/22: Patient seen and examined at bedside. Denies any acute complaints. CT head and MRI shows acute infarct. Patient now on remote tele.     OVERNIGHT EVENTS:  No acute overnight events.     Home Medications:  carbidopa-levodopa 25 mg-100 mg oral tablet: 1 tab(s) orally 2 times a day (05 May 2022 06:27)  citalopram 20 mg oral tablet: 1 tab(s) orally once a day (05 May 2022 06:27)  DilTIAZem (Eqv-Cardizem CD) 240 mg/24 hours oral capsule, extended release: 1 cap(s) orally once a day (05 May 2022 06:27)  enalapril 10 mg oral tablet: 1 tab(s) orally once a day (05 May 2022 06:27)  pantoprazole 40 mg oral delayed release tablet: 1 tab(s) orally once a day (before a meal) (05 May 2022 06:27)  primidone 50 mg oral tablet: 1 tab(s) orally once a day (05 May 2022 06:27)  simvastatin 40 mg oral tablet: 1 tab(s) orally once a day (at bedtime) (05 May 2022 06:27)  Vitamin C 1000 mg oral tablet: 1 tab(s) orally once a day (05 May 2022 06:27)      MEDICATIONS  (STANDING):  aspirin enteric coated 81 milliGRAM(s) Oral daily  carbidopa/levodopa  25/100 1 Tablet(s) Oral daily  carbidopa/levodopa  25/100 1 Tablet(s) Oral at bedtime  citalopram 20 milliGRAM(s) Oral daily  diltiazem    milliGRAM(s) Oral daily  heparin   Injectable 5000 Unit(s) SubCutaneous every 8 hours  pantoprazole    Tablet 40 milliGRAM(s) Oral before breakfast  polyethylene glycol 3350 17 Gram(s) Oral daily  primidone 50 milliGRAM(s) Oral daily  senna 2 Tablet(s) Oral at bedtime  simvastatin 40 milliGRAM(s) Oral at bedtime  sodium chloride 0.9%. 1000 milliLiter(s) (60 mL/Hr) IV Continuous <Continuous>  tamsulosin 0.4 milliGRAM(s) Oral at bedtime    MEDICATIONS  (PRN):  acetaminophen     Tablet .. 650 milliGRAM(s) Oral every 6 hours PRN Temp greater or equal to 38C (100.4F), Mild Pain (1 - 3)  melatonin 3 milliGRAM(s) Oral at bedtime PRN Insomnia  ondansetron Injectable 4 milliGRAM(s) IV Push every 8 hours PRN Nausea and/or Vomiting      Allergies    No Known Allergies    Intolerances        Social History:  Tobacco: Denies  EtOH: Denies   Recreational drug use: Denies   Lives with: daughter and grandkids   Ambulates: with a cane   ADLs: with assistance   Vaccinations: Moderna 3/3 (05 May 2022 05:43)      REVIEW OF SYSTEMS: i am OK  CONSTITUTIONAL: No fever, No chills, No fatigue, No myalgia, No Body ache, No Weakness  EYES: No eye pain,  No visual disturbances, No discharge, NO Redness  ENMT:  No ear pain, No nose bleed, No vertigo; No sinus pain, NO throat pain, No Congestion  NECK: No pain, No stiffness  RESPIRATORY: No cough, NO wheezing, No  hemoptysis, NO  shortness of breath  CARDIOVASCULAR: No chest pain, palpitations  GASTROINTESTINAL: No abdominal pain, NO epigastric pain. No nausea, No vomiting; No diarrhea, No constipation. [ x ] NO BM  GENITOURINARY: No dysuria, No frequency, No urgency, No hematuria, NO incontinence  NEUROLOGICAL: No headaches, No dizziness, No numbness, No tingling, No tremors, No weakness  EXT: No Swelling, No Pain, No Edema  SKIN:  [ x] No itching, burning, rashes, or lesions   MUSCULOSKELETAL: No joint pain ,No Jt swelling; No muscle pain, No back pain, No extremity pain  PSYCHIATRIC: No depression,  No anxiety,  No mood swings ,No difficulty sleeping at night  PAIN SCALE:  [ x] None  [  ] Other-  ROS Unable to obtain due to - [  ] Dementia  [  ] Lethargy [  ] Drowsy [  ] Sedated [  ] non verbal  REST OF REVIEW Of SYSTEM - [ x ] Normal     Vital Signs Last 24 Hrs  T(C): 36.6 (07 May 2022 04:59), Max: 36.7 (06 May 2022 12:32)  T(F): 97.9 (07 May 2022 04:59), Max: 98.1 (06 May 2022 12:32)  HR: 78 (07 May 2022 04:59) (59 - 90)  BP: 161/85 (07 May 2022 04:59) (131/78 - 162/84)  BP(mean): --  RR: 18 (07 May 2022 04:59) (18 - 18)  SpO2: 94% (07 May 2022 04:59) (92% - 94%)  Finger Stick        PHYSICAL EXAM: i am good   GENERAL:  [x  ] NAD , [ x ] well appearing, [  ] Agitated, [  ] Drowsy,  [  ] Lethargy, [  ] confused   HEAD:  [x  ] Normal, [  ] Other  EYES:  [ x ] EOMI, [x  ] PERRLA, [  ] conjunctiva and sclera clear normal, [  ] Other,  [  ] Pallor,[  ] Discharge  ENMT:  [ x ] Normal, [ x ] Moist mucous membranes, [  ] Good dentition, [ x ] No Thrush + surgery   NECK:  [  x] Supple, [x  ] No JVD, [ x ] Normal thyroid, [  ] Lymphadenopathy [  ] Other  CHEST/LUNG:  [x  ] Clear to auscultation bilaterally, [x  ] Breath Sounds equal  [x  ] poor effort  [x  ] No rales, [x  ] No rhonchi  [ x ]  No wheezing,   HEART:  [ x ] Regular rate and rhythm, [  ] tachycardia, [  ] Bradycardia,  [  ] irregular  [x  ] No murmurs, No rubs, No gallops, [  ] PPM in place (Mfr:  )  ABDOMEN:  [ x ] Soft, [x  ] Nontender, [ x ] Nondistended, [ x ] No mass, [ x ] Bowel sounds present, [  ] obese  NERVOUS SYSTEM:  [x  ] Alert & Oriented X3, [x  ] Nonfocal  [  ] Confusion  [  ] Encephalopathic [  ] Sedated [  ] Unable to assess, [  ] Dementia [ x ] Other-Tremors hand   EXTREMITIES: [x  ] 2+ Peripheral Pulses, No clubbing, No cyanosis,  [  ] edema B/L lower EXT. [ x ] severe PVD stasis skin changes B/L Lower EXT, [  ] wound  LYMPH: No lymphadenopathy noted  SKIN:  [  x] No rashes or lesions, [  ] Pressure Ulcers, [  ] ecchymosis, [  ] Skin Tears, [  ] Other    DIET: Diet, DASH/TLC:   Sodium & Cholesterol Restricted (05-05-22 @ 12:33)      LABS:                        13.3   6.24  )-----------( 287      ( 07 May 2022 07:36 )             37.3     07 May 2022 07:36    142    |  109    |  13     ----------------------------<  128    3.9     |  24     |  0.74     Ca    10.3       07 May 2022 07:36  Phos  2.9       07 May 2022 07:36  Mg     2.3       07 May 2022 07:36            Culture Results:   No growth to date. (05-05 @ 09:18)  Culture Results:   No growth to date. (05-05 @ 09:18)      culture blood  -- .Blood Blood 05-05 @ 09:18    culture urine  --  05-05 @ 09:18              Culture - Blood (collected 05 May 2022 09:18)  Source: .Blood Blood  Preliminary Report (06 May 2022 10:01):    No growth to date.    Culture - Blood (collected 05 May 2022 09:18)  Source: .Blood Blood  Preliminary Report (06 May 2022 10:01):    No growth to date.         Anemia Panel:  Folate, Serum: 12.7 ng/mL (05-06-22 @ 10:57)  Vitamin B12, Serum: 599 pg/mL (05-06-22 @ 10:57)  Ferritin, Serum: 169 ng/mL (05-06-22 @ 10:57)  Iron Total, Serum: 56 ug/dL (05-06-22 @ 10:55)  Iron - Total Binding Capacity.: 254 ug/dL (05-06-22 @ 10:55)      Thyroid Panel:        RADIOLOGY & ADDITIONAL TESTS:  < from: MR Head No Cont (05.06.22 @ 19:00) >  PROCEDURE DATE:  05/06/2022          INTERPRETATION:  Clinical indication: Subacute CVA.    MRI of the brain was performed using sagittal T1 axial T1 T2 T2 FLAIR   diffusion and [place] sequence.    This exam is compared with prior head CT performed on May 6, 2022.    Extensive parenchymal volume loss and chronic microvascular ischemic   changes are identified    Old lacunar infarct involving the right sublenticular nucleus region is   seen.    There is evidence of abnormal T2 prolongation with restricted diffusion   seen involving the left brachium pontis and left lenticular   nucleus/corona radiata region.    These findings are compatible with areas of acute infarcts. No   hemorrhagic transformation is seen. No significant shift or herniation is   seen.    The large vessels demonstrate normal flow    The visualized paranasal sinuses mastoid and mastoid clear.    IMPRESSION: Acute infarcts as described above.    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    Weakness    Acute CVA (cerebrovascular accident)            Consultant(s) Notes Reviewed:  [  x] YES     Care Discussed with [ x ] Consultants, [ x ] Patient, [ x ] Family- dtr , [  ] HCP, [  ] , [  ] Social Service, [ x ] RN, [  ] Physical Therapy, [  ] Palliative Care Team  DVT PPX: [  ] Lovenox, [ x ] SC Heparin, [  ] Coumadin, [  ] Xarelto, [  ] Eliquis, [  ] Pradaxa, [  ] IV Heparin drip, [  ] SCD, [  ] Ambulation, [  ] Contraindicated 2/2 GI Bleed, [  ] Contraindicated 2/2  Bleed, [  ] Contraindicated 2/2 Brain Bleed  Advanced Directive: [ x ] None, [  ] DNR/DNI Patient is a 76y old  Male who presents with a chief complaint of weakness, UTI (06 May 2022 11:39)    HPI:  75 yo M with PMH parkinson's, HTN, HLD, GERD, prostate ca, s/p Cyber knife Rx  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 Wife Viki at bedside providing collateral information. Pt has been doing well until around 8:30 PM last night. He was very weak and was having difficulty getting up and walking due to the weakness.  Pt normally walks with a cane but was unable to get up last night. Daughter also noted that pt was having sweats but denies any fever or chills. Pt has been eating and drinking well. Pt denies any urinary urgency, frequency, dysuria or hematuria. Denies chest pain, palpitations, SOB, cough, abdominal pain, nausea, vomiting, diarrhea, headaches, dizziness.   Denies recent travel, recent antibiotic use, or sick contacts.    ED Course:   Vitals: BP: 123/53, HR: 74, Temp: 97.9 F, RR: 16, SpO2: 94% on RA   Labs: H/H: 12.9/37.9,  BUN/Cr: 25/1.80, GFR: 38, gluc: 228, lactate: 3.1  trop neg x1, COVID negative   UA: small bilirubin, trace ketone, 30 protein, moderate LE, 11-25 WBC, 3-5 RBC, few bacteria, few calcium oxalate   CXR: no acute lung pathology as per personal read, official read pending   EKG: junctional rhythm with PVCs, HR: 69   Received 1L NS bolus x1 and Rocephin in the ED  (05 May 2022 05:43)    INTERVAL HPI:  5/5/22: Patient was seen and examined at bedside. States he is feeling well. Denies any dysuria or polyuria. ABx dcd per ID reccs. PT recc VICTOR MANUEL. CT renal hunt pending.   5/6/22: Patient seen and examined at bedside. Patient denies any acute complaints. Denies CP, SOB, abd pain, N/V/D. Seems slightly confused but answering questions appropriately. Seen by Neurology-Dr Peterson -plan for CT scan Head.  5/7/22: Patient seen and examined at bedside. Denies any acute complaints. CT head and MRI Brain shows acute infarct. Patient now on remote tele.     OVERNIGHT EVENTS:  No acute overnight events.     Home Medications:  carbidopa-levodopa 25 mg-100 mg oral tablet: 1 tab(s) orally 2 times a day (05 May 2022 06:27)  citalopram 20 mg oral tablet: 1 tab(s) orally once a day (05 May 2022 06:27)  DilTIAZem (Eqv-Cardizem CD) 240 mg/24 hours oral capsule, extended release: 1 cap(s) orally once a day (05 May 2022 06:27)  enalapril 10 mg oral tablet: 1 tab(s) orally once a day (05 May 2022 06:27)  pantoprazole 40 mg oral delayed release tablet: 1 tab(s) orally once a day (before a meal) (05 May 2022 06:27)  primidone 50 mg oral tablet: 1 tab(s) orally once a day (05 May 2022 06:27)  simvastatin 40 mg oral tablet: 1 tab(s) orally once a day (at bedtime) (05 May 2022 06:27)  Vitamin C 1000 mg oral tablet: 1 tab(s) orally once a day (05 May 2022 06:27)      MEDICATIONS  (STANDING):  aspirin enteric coated 81 milliGRAM(s) Oral daily  carbidopa/levodopa  25/100 1 Tablet(s) Oral daily  carbidopa/levodopa  25/100 1 Tablet(s) Oral at bedtime  citalopram 20 milliGRAM(s) Oral daily  diltiazem    milliGRAM(s) Oral daily  heparin   Injectable 5000 Unit(s) SubCutaneous every 8 hours  pantoprazole    Tablet 40 milliGRAM(s) Oral before breakfast  polyethylene glycol 3350 17 Gram(s) Oral daily  primidone 50 milliGRAM(s) Oral daily  senna 2 Tablet(s) Oral at bedtime  simvastatin 40 milliGRAM(s) Oral at bedtime  sodium chloride 0.9%. 1000 milliLiter(s) (60 mL/Hr) IV Continuous <Continuous>  tamsulosin 0.4 milliGRAM(s) Oral at bedtime    MEDICATIONS  (PRN):  acetaminophen     Tablet .. 650 milliGRAM(s) Oral every 6 hours PRN Temp greater or equal to 38C (100.4F), Mild Pain (1 - 3)  melatonin 3 milliGRAM(s) Oral at bedtime PRN Insomnia  ondansetron Injectable 4 milliGRAM(s) IV Push every 8 hours PRN Nausea and/or Vomiting      Allergies    No Known Allergies    Intolerances        Social History:  Tobacco: Denies  EtOH: Denies   Recreational drug use: Denies   Lives with: daughter and grandkids   Ambulates: with a cane   ADLs: with assistance   Vaccinations: Moderna 3/3 (05 May 2022 05:43)      REVIEW OF SYSTEMS: i am OK  CONSTITUTIONAL: No fever, No chills, No fatigue, No myalgia, No Body ache, No Weakness  EYES: No eye pain,  No visual disturbances, No discharge, NO Redness  ENMT:  No ear pain, No nose bleed, No vertigo; No sinus pain, NO throat pain, No Congestion  NECK: No pain, No stiffness  RESPIRATORY: No cough, NO wheezing, No  hemoptysis, NO  shortness of breath  CARDIOVASCULAR: No chest pain, palpitations  GASTROINTESTINAL: No abdominal pain, NO epigastric pain. No nausea, No vomiting; No diarrhea, No constipation. [ x ] NO BM  GENITOURINARY: No dysuria, No frequency, No urgency, No hematuria, NO incontinence  NEUROLOGICAL: No headaches, No dizziness, No numbness, No tingling, No tremors, No weakness  EXT: No Swelling, No Pain, No Edema  SKIN:  [ x] No itching, burning, rashes, or lesions   MUSCULOSKELETAL: No joint pain ,No Jt swelling; No muscle pain, No back pain, No extremity pain  PSYCHIATRIC: No depression,  No anxiety,  No mood swings ,No difficulty sleeping at night  PAIN SCALE:  [ x] None  [  ] Other-  ROS Unable to obtain due to - [  ] Dementia  [  ] Lethargy [  ] Drowsy [  ] Sedated [  ] non verbal  REST OF REVIEW Of SYSTEM - [ x ] Normal     Vital Signs Last 24 Hrs  T(C): 36.6 (07 May 2022 04:59), Max: 36.7 (06 May 2022 12:32)  T(F): 97.9 (07 May 2022 04:59), Max: 98.1 (06 May 2022 12:32)  HR: 78 (07 May 2022 04:59) (59 - 90)  BP: 161/85 (07 May 2022 04:59) (131/78 - 162/84)  BP(mean): --  RR: 18 (07 May 2022 04:59) (18 - 18)  SpO2: 94% (07 May 2022 04:59) (92% - 94%)  Finger Stick        PHYSICAL EXAM: i am good   GENERAL:  [x  ] NAD , [ x ] well appearing, [  ] Agitated, [  ] Drowsy,  [  ] Lethargy, [  ] confused   HEAD:  [x  ] Normal, [  ] Other  EYES:  [ x ] EOMI, [x  ] PERRLA, [  ] conjunctiva and sclera clear normal, [  ] Other,  [  ] Pallor,[  ] Discharge  ENMT:  [ x ] Normal, [ x ] Moist mucous membranes, [x  ] Good dentition, [ x ] No Thrush + surgery scar neck, mouth ,Mild rt facial droop  NECK:  [  x] Supple, [x  ] No JVD, [ x ] Normal thyroid, [  ] Lymphadenopathy [  ] Other  CHEST/LUNG:  [x  ] Clear to auscultation bilaterally, [x  ] Breath Sounds equal  [x  ] poor effort  [x  ] No rales, [x  ] No rhonchi  [ x ]  No wheezing,   HEART:  [ x ] Regular rate and rhythm, [  ] tachycardia, [  ] Bradycardia,  [  ] irregular  [x  ] No murmurs, No rubs, No gallops, [  ] PPM in place (Mfr:  )  ABDOMEN:  [ x ] Soft, [x  ] Nontender, [ x ] Nondistended, [ x ] No mass, [ x ] Bowel sounds present, [  ] obese  NERVOUS SYSTEM:  [x  ] Alert & Oriented X3, [x  ] Nonfocal  [  ] Confusion  [  ] Encephalopathic [  ] Sedated [  ] Unable to assess, [  ] Dementia [ x ] Other-Tremors hand   EXTREMITIES: [x  ] 2+ Peripheral Pulses, No clubbing, No cyanosis,  [  ] edema B/L lower EXT. [ x ] severe PVD stasis skin changes B/L Lower EXT, [  ] wound  LYMPH: No lymphadenopathy noted  SKIN:  [  x] No rashes or lesions, [  ] Pressure Ulcers, [  ] ecchymosis, [  ] Skin Tears, [  ] Other    DIET: Diet, DASH/TLC:   Sodium & Cholesterol Restricted (05-05-22 @ 12:33)      LABS:                        13.3   6.24  )-----------( 287      ( 07 May 2022 07:36 )             37.3     07 May 2022 07:36    142    |  109    |  13     ----------------------------<  128    3.9     |  24     |  0.74     Ca    10.3       07 May 2022 07:36  Phos  2.9       07 May 2022 07:36  Mg     2.3       07 May 2022 07:36      Culture Results:   No growth to date. (05-05 @ 09:18)  Culture Results:   No growth to date. (05-05 @ 09:18)      culture blood  -- .Blood Blood 05-05 @ 09:18    culture urine  --  05-05 @ 09:18    Culture - Blood (collected 05 May 2022 09:18)  Source: .Blood Blood  Preliminary Report (06 May 2022 10:01):    No growth to date.    Culture - Blood (collected 05 May 2022 09:18)  Source: .Blood Blood  Preliminary Report (06 May 2022 10:01):    No growth to date.         Anemia Panel:  Folate, Serum: 12.7 ng/mL (05-06-22 @ 10:57)  Vitamin B12, Serum: 599 pg/mL (05-06-22 @ 10:57)  Ferritin, Serum: 169 ng/mL (05-06-22 @ 10:57)  Iron Total, Serum: 56 ug/dL (05-06-22 @ 10:55)  Iron - Total Binding Capacity.: 254 ug/dL (05-06-22 @ 10:55)      Thyroid Panel:        RADIOLOGY & ADDITIONAL TESTS:  < from: MR Head No Cont (05.06.22 @ 19:00) >  PROCEDURE DATE:  05/06/2022          INTERPRETATION:  Clinical indication: Subacute CVA.    MRI of the brain was performed using sagittal T1 axial T1 T2 T2 FLAIR   diffusion and [place] sequence.    This exam is compared with prior head CT performed on May 6, 2022.    Extensive parenchymal volume loss and chronic microvascular ischemic   changes are identified    Old lacunar infarct involving the right sublenticular nucleus region is   seen.    There is evidence of abnormal T2 prolongation with restricted diffusion   seen involving the left brachium pontis and left lenticular   nucleus/corona radiata region.    These findings are compatible with areas of acute infarcts. No   hemorrhagic transformation is seen. No significant shift or herniation is   seen.    The large vessels demonstrate normal flow    The visualized paranasal sinuses mastoid and mastoid clear.    IMPRESSION: Acute infarcts as described above.    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

## 2022-05-07 NOTE — PROGRESS NOTE ADULT - PROBLEM SELECTOR PLAN 3
Chronic  - Continue with home Cardizem  mg daily  -  ÁNGEL resolved today  - Monitor BP & titrate BP meds PRN.

## 2022-05-08 NOTE — PROGRESS NOTE ADULT - ATTENDING COMMENTS
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 Admitted for possible  UTI.   Pt seen, examined, case & care plan d/w pt, residents at detail.  AM labs   ID Dr Connelly -completed Rocephin, STOP Abx   Palliative care eval done .pt wants full code.  PT Eval---> VICTOR MANUEL, pt & dtr does NOT want to go to [facility] [place], wants to go home. with Home PT, d/w Case management.  D/W Dtr at detail about out pt follow up with Oncologist as per ENT next week. concern about Rx with recurrence of ca   Neurology-Dr Peterson -Work up ECHO/CD , Remote Tele. PT follow up.  Total care time is 40 minutes.

## 2022-05-08 NOTE — SWALLOW BEDSIDE ASSESSMENT ADULT - SWALLOW EVAL: PROGNOSIS
DIAGNOSIS CONTINUED: 5. Recommend soft & bite-sized and mildly thick liquids, aspiration precautions, as tolerated. Facilitate upright position, slow pacing, single/small bites/sips, and alternate solids with liquids. Consider MBSS to assess safest diet level given hx of dysphagia/parkinson's and CVA. Continue to monitor and notify SLP if changes occur. PROGNOSIS: fair, continue to monitor closely

## 2022-05-08 NOTE — SWALLOW BEDSIDE ASSESSMENT ADULT - ADDITIONAL RECOMMENDATIONS
This department will continue to follow the patient for diet tolerance/advancement during this admission, as schedule permits

## 2022-05-08 NOTE — PROGRESS NOTE ADULT - PROBLEM SELECTOR PLAN 1
< from: MR Head No Cont (05.06.22 @ 19:00) >  -Old lacunar infarct involving the right sublenticular nucleus region is seen.  There is evidence of abnormal T2 prolongation with restricted diffusion   seen involving the left brachium pontis and left lenticular   nucleus/corona radiata region.  d/w Dr Peterson at detail.  PT followup, Speech & swallow eval  - f/u ECHO and carotid doppler results   - lipid panel WNL, A1c 5.7   - continue with Ec ASA 81 mg daily, Statin 40 mg daily OK to continue as per Dr Peterson  -Neuro Check q 4 hrs,  place on Remote Tele.   D/W Dtr at very detail about MRI Brain results & work up  -PT follow up < from: MR Head No Cont (05.06.22 @ 19:00) >  -Old lacunar infarct involving the right sublenticular nucleus region is seen.  There is evidence of abnormal T2 prolongation with restricted diffusion   seen involving the left brachium pontis and left lenticular   nucleus/corona radiata region.  d/w Dr Peterson at detail.  - PT followup  - Speech & swallow recc soft & bite-sized and mildly thick liquids, aspiration precautions, as tolerated  - f/u modified barium swallow   - f/u ECHO and carotid doppler results   - lipid panel WNL, A1c 5.7   - continue with Ec ASA 81 mg daily, Statin 40 mg daily OK to continue as per Dr Peterson  -Neuro Check q 4 hrs,  place on Remote Tele.   D/W Dtr at very detail about MRI Brain results & work up  -PT follow up

## 2022-05-08 NOTE — CHART NOTE - NSCHARTNOTEFT_GEN_A_CORE
Assessment: 77 y/o male adm with weakness, UTI, CVA. PMH GERD, HLD, HTN, malignant neoplasm of tongue, parkinson's disease, prostate ca.   Pt visited at bedside this am. Pt reports appetite is good. Pt drinking Ensure clear and Ensure Enlive. SLP eval. Pt reports no problems chewing or swallowing.     Factors impacting intake: [x ] none [ ] nausea  [ ] vomiting [ ] diarrhea [ ] constipation  [ ]chewing problems [ ] swallowing issues  [ ] other:     Diet Presciption: Diet, DASH/TLC:   Sodium & Cholesterol Restricted (05-05-22 @ 12:33)    Intake: 50-75%    Current Weight: 5/5 149.9# 5/8 140.8#  no edema    % Weight Change    Pertinent Medications: MEDICATIONS  (STANDING):  aspirin enteric coated 81 milliGRAM(s) Oral daily  carbidopa/levodopa  25/100 1 Tablet(s) Oral daily  carbidopa/levodopa  25/100 1 Tablet(s) Oral at bedtime  citalopram 20 milliGRAM(s) Oral daily  diltiazem    milliGRAM(s) Oral daily  enalapril 10 milliGRAM(s) Oral daily  heparin   Injectable 5000 Unit(s) SubCutaneous every 8 hours  pantoprazole    Tablet 40 milliGRAM(s) Oral before breakfast  polyethylene glycol 3350 17 Gram(s) Oral daily  primidone 50 milliGRAM(s) Oral daily  senna 2 Tablet(s) Oral at bedtime  simvastatin 40 milliGRAM(s) Oral at bedtime  tamsulosin 0.4 milliGRAM(s) Oral at bedtime    MEDICATIONS  (PRN):  acetaminophen     Tablet .. 650 milliGRAM(s) Oral every 6 hours PRN Temp greater or equal to 38C (100.4F), Mild Pain (1 - 3)  melatonin 3 milliGRAM(s) Oral at bedtime PRN Insomnia  ondansetron Injectable 4 milliGRAM(s) IV Push every 8 hours PRN Nausea and/or Vomiting    Pertinent Labs: 05-08 Na141 mmol/L Glu 108 mg/dL<H> K+ 3.5 mmol/L Cr  0.73 mg/dL BUN 15 mg/dL 05-07 Phos 2.9 mg/dL 05-08 Alb 3.2 g/dL<L> 05-07 Chol 155 mg/dL LDL --    HDL 52 mg/dL Trig 87 mg/dL     CAPILLARY BLOOD GLUCOSE        Skin: no pressure ulcers noted.     Estimated Needs:   [x ] no change since previous assessment  [ ] recalculated:     Previous Nutrition Diagnosis:   [ ] Inadequate Energy Intake [ ]Inadequate Oral Intake [ ] Excessive Energy Intake   [ ] Underweight [ ] Increased Nutrient Needs [ ] Overweight/Obesity   [ ] Altered GI Function [ ] Unintended Weight Loss [ ] Food & Nutrition Related Knowledge Deficit [x ] Malnutrition     Nutrition Diagnosis is [x ] ongoing  [ ] resolved [ ] not applicable     New Nutrition Diagnosis: [ x] not applicable       Interventions:   Recommend  [ ] Change Diet To:  [ ] Nutrition Supplement  [ ] Nutrition Support  [x ] Other: continue current diet order. awaiting SLP eval for diet consistency rx.     Monitoring and Evaluation:   [x ] PO intake [ x ] Tolerance to diet prescription [ x ] weights [ x ] labs[ x ] follow up per protocol  [ ] other:

## 2022-05-08 NOTE — SWALLOW BEDSIDE ASSESSMENT ADULT - ASR SWALLOW RECOMMEND DIAG
Consider MBSS to assess safest diet level given hx of dysphagia/Parkinson's/VFSS/MBS Consider MBSS to assess safest diet level given hx of dysphagia/Parkinson's and CVA/VFSS/MBS

## 2022-05-08 NOTE — PROGRESS NOTE ADULT - PROBLEM SELECTOR PLAN 5
pt with Parkinson Disease & Tremors  Neurology-Dr Peterson called & d/w   Plan for CT head -non cont- as above- subacute lacunar infarct-Its new from last CT scan from 10/21  D/W Dr Peterson -plan for MRI Brain non cont   - continue with Ec ASA 81 mg daily  -Pt is ON Statin

## 2022-05-08 NOTE — SWALLOW BEDSIDE ASSESSMENT ADULT - COMMENTS
Consult received and chart reviewed. Consult received and chart reviewed. Patient seen at bedside this PM for initial assessment of swallow function, at which time he was alert, cooperative, and denied pain. Patient demonstrates ability to follow simple commands independently. Vocal quality WFL and mildly dysarthric speech noted. Patient reported he tolerates a regular and thin liquid diet level at baseline.     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 Wife Viki at bedside providing collateral information. Pt has been doing well until around 8:30 PM last night. He was very weak and was having difficulty getting up and walking due to the weakness."     WBC WFL.   CXR 5/5/22 revealed " No radiographic evidence of active chest disease. Moderate thoracic dextroscoliosis."  MR Head 5/6/22 revealed "Extensive parenchymal volume loss and chronic microvascular ischemic changes are identified. Old lacunar infarct involving the right sublenticular nucleus region is seen. There is evidence of abnormal T2 prolongation with restricted diffusion seen involving the left brachium pontis and left lenticular nucleus/corona radiata region. These findings are compatible with areas of acute infarcts. No hemorrhagic transformation is seen. No significant shift or herniation is seen."    Per charting, patient seen at [facility] on 3/15/21, at which time a "puree and nectar" diet level was recommended (see report for details). Upon clinician questioning, patient endorsed he previously tolerated thicken liquids; however, has been tolerating thin liquids "for awhile."     Discussed results and recommendations with the patient, RN, and called out to MD/awaiting call back.

## 2022-05-08 NOTE — SWALLOW BEDSIDE ASSESSMENT ADULT - SWALLOW EVAL: DIAGNOSIS
1. Patient demonstrates a mild oral dysphagia for pureed, moderately thick, and mildly thick liquids marked by reduced stripping of bolus from oral cavity resulting in prolonged manipulation, delayed collection, transfer, and transport. 2. Patient demonstrates a mild-moderate oral dysphagia for regular solids and thin liquids marked by prolonged mastication/manipulation resulting in delayed collection, transfer, and transport. Trace stasis observed post swallow, which reduced with liquid wash. Suspected posterior loss at BOT noted with thin liquids. 3. Patient demonstrates a mild pharyngeal dysphagia for pureed, regular solids, moderately thick, and mildly thick liquids marked by suspected delayed pharyngeal swallow trigger and hyolaryngeal elevation noted by digital palpation without evidence of airway penetration/aspiration. 4. Patient demonstrates a severe pharyngeal dysphagia for thin liquids marked by suspected delayed pharyngeal swallow trigger and immediate coughing observed.

## 2022-05-08 NOTE — PROGRESS NOTE ADULT - PROBLEM SELECTOR PLAN 6
IMPRESSION:  pt had out pt CT neck with ENT in April 2022   -Results   Interval development of ill-defined enhancing lesion involving the   proximal right sternocleidomastoid and possibly the deep parotid gland,   just inferior to the mastoid tip, concerning for neoplasm recurrence.   Correlate with tissue sampling.  D/W Dtr at detail- Pt saw ENT Dr Boland  last week & has appointment with Oncologist next week for treatment options

## 2022-05-08 NOTE — PROGRESS NOTE ADULT - PROBLEM SELECTOR PLAN 2
Pt presents with increasing weakness, +UA  - Patient did not meet sepsis criteria on admission, lactate downtrended  - CT renal stone hunt ordered given microscopic hematuria- Trace left hydronephrosis. 4 mm left UVJ calculus. Nonobstructing right intrarenal calculus.  - s/p IV Rocephin, short course last dose 5/7 AM  - urine culture, blood cultures NGTD  - Urology DR Alcaraz - added tamsulosin 0.4 mg q HS ,out pt follow up with Dr Leach -Urology  - ID Dr. Connelly following- d/w -follow urine c/s -Neg

## 2022-05-08 NOTE — PROGRESS NOTE ADULT - SUBJECTIVE AND OBJECTIVE BOX
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 Wife Viki at bedside providing collateral information. Pt has been doing well until around 8:30 PM last night. He was very weak and was having difficulty getting up and walking due to the weakness.  Pt normally walks with a cane but was unable to get up last night. Daughter also noted that pt was having sweats but denies any fever or chills. Pt has been eating and drinking well. Pt denies any urinary urgency, frequency, dysuria or hematuria. Denies chest pain, palpitations, SOB, cough, abdominal pain, nausea, vomiting, diarrhea, headaches, dizziness.   Denies recent travel, recent antibiotic use, or sick contacts.    ED Course:   Vitals: BP: 123/53, HR: 74, Temp: 97.9 F, RR: 16, SpO2: 94% on RA   Labs: H/H: 12.9/37.9,  BUN/Cr: 25/1.80, GFR: 38, gluc: 228, lactate: 3.1  trop neg x1, COVID negative   UA: small bilirubin, trace ketone, 30 protein, moderate LE, 11-25 WBC, 3-5 RBC, few bacteria, few calcium oxalate   CXR: no acute lung pathology as per personal read, official read pending   EKG: junctional rhythm with PVCs, HR: 69   Received 1L NS bolus x1 and Rocephin in the ED  (05 May 2022 05:43)    INTERVAL HPI:  5/5/22: Patient was seen and examined at bedside. States he is feeling well. Denies any dysuria or polyuria. ABx dcd per ID reccs. PT recc VICTOR MANUEL. CT renal hunt pending.   5/6/22: Patient seen and examined at bedside. Patient denies any acute complaints. Denies CP, SOB, abd pain, N/V/D. Seems slightly confused but answering questions appropriately. Seen by Neurology-Dr Peterson -plan for CT scan Head.  5/7/22: Patient seen and examined at bedside. Denies any acute complaints. CT head and MRI Brain shows acute infarct. Patient now on remote tele.   5/8/22: Patient seen and examined at bedside. Denies any acute complaints. Pending Echo. No events on tele overnight.    OVERNIGHT EVENTS:  No acute events overnight.     Home Medications:  carbidopa-levodopa 25 mg-100 mg oral tablet: 1 tab(s) orally 2 times a day (05 May 2022 06:27)  citalopram 20 mg oral tablet: 1 tab(s) orally once a day (05 May 2022 06:27)  DilTIAZem (Eqv-Cardizem CD) 240 mg/24 hours oral capsule, extended release: 1 cap(s) orally once a day (05 May 2022 06:27)  enalapril 10 mg oral tablet: 1 tab(s) orally once a day (05 May 2022 06:27)  pantoprazole 40 mg oral delayed release tablet: 1 tab(s) orally once a day (before a meal) (05 May 2022 06:27)  primidone 50 mg oral tablet: 1 tab(s) orally once a day (05 May 2022 06:27)  simvastatin 40 mg oral tablet: 1 tab(s) orally once a day (at bedtime) (05 May 2022 06:27)  Vitamin C 1000 mg oral tablet: 1 tab(s) orally once a day (05 May 2022 06:27)      MEDICATIONS  (STANDING):  aspirin enteric coated 81 milliGRAM(s) Oral daily  carbidopa/levodopa  25/100 1 Tablet(s) Oral daily  carbidopa/levodopa  25/100 1 Tablet(s) Oral at bedtime  citalopram 20 milliGRAM(s) Oral daily  diltiazem    milliGRAM(s) Oral daily  enalapril 10 milliGRAM(s) Oral daily  heparin   Injectable 5000 Unit(s) SubCutaneous every 8 hours  pantoprazole    Tablet 40 milliGRAM(s) Oral before breakfast  polyethylene glycol 3350 17 Gram(s) Oral daily  primidone 50 milliGRAM(s) Oral daily  senna 2 Tablet(s) Oral at bedtime  simvastatin 40 milliGRAM(s) Oral at bedtime  tamsulosin 0.4 milliGRAM(s) Oral at bedtime    MEDICATIONS  (PRN):  acetaminophen     Tablet .. 650 milliGRAM(s) Oral every 6 hours PRN Temp greater or equal to 38C (100.4F), Mild Pain (1 - 3)  melatonin 3 milliGRAM(s) Oral at bedtime PRN Insomnia  ondansetron Injectable 4 milliGRAM(s) IV Push every 8 hours PRN Nausea and/or Vomiting      Allergies    No Known Allergies    Intolerances        Social History:  Tobacco: Denies  EtOH: Denies   Recreational drug use: Denies   Lives with: daughter and grandkids   Ambulates: with a cane   ADLs: with assistance   Vaccinations: Moderna 3/3 (05 May 2022 05:43)      REVIEW OF SYSTEMS: i am OK  CONSTITUTIONAL: No fever, No chills, No fatigue, No myalgia, No Body ache, No Weakness  EYES: No eye pain,  No visual disturbances, No discharge, NO Redness  ENMT:  No ear pain, No nose bleed, No vertigo; No sinus pain, NO throat pain, No Congestion  NECK: No pain, No stiffness  RESPIRATORY: No cough, NO wheezing, No  hemoptysis, NO  shortness of breath  CARDIOVASCULAR: No chest pain, palpitations  GASTROINTESTINAL: No abdominal pain, NO epigastric pain. No nausea, No vomiting; No diarrhea, No constipation. [ x ] NO BM  GENITOURINARY: No dysuria, No frequency, No urgency, No hematuria, NO incontinence  NEUROLOGICAL: No headaches, No dizziness, No numbness, No tingling, No tremors, No weakness  EXT: No Swelling, No Pain, No Edema  SKIN:  [ x] No itching, burning, rashes, or lesions   MUSCULOSKELETAL: No joint pain ,No Jt swelling; No muscle pain, No back pain, No extremity pain  PSYCHIATRIC: No depression,  No anxiety,  No mood swings ,No difficulty sleeping at night  PAIN SCALE:  [ x] None  [  ] Other-  ROS Unable to obtain due to - [  ] Dementia  [  ] Lethargy [  ] Drowsy [  ] Sedated [  ] non verbal  REST OF REVIEW Of SYSTEM - [ x ] Normal     Vital Signs Last 24 Hrs  T(C): 36.4 (08 May 2022 05:03), Max: 37 (07 May 2022 20:55)  T(F): 97.6 (08 May 2022 05:03), Max: 98.6 (07 May 2022 20:55)  HR: 78 (08 May 2022 05:03) (78 - 88)  BP: 149/74 (08 May 2022 05:03) (136/87 - 160/84)  BP(mean): --  RR: 18 (08 May 2022 05:03) (18 - 18)  SpO2: 94% (08 May 2022 05:03) (92% - 95%)  Finger Stick        PHYSICAL EXAM: i am good   GENERAL:  [x  ] NAD , [ x ] well appearing, [  ] Agitated, [  ] Drowsy,  [  ] Lethargy, [  ] confused   HEAD:  [x  ] Normal, [  ] Other  EYES:  [ x ] EOMI, [x  ] PERRLA, [  ] conjunctiva and sclera clear normal, [  ] Other,  [  ] Pallor,[  ] Discharge  ENMT:  [ x ] Normal, [ x ] Moist mucous membranes, [x  ] Good dentition, [ x ] No Thrush + surgery scar neck, mouth ,Mild rt facial droop  NECK:  [  x] Supple, [x  ] No JVD, [ x ] Normal thyroid, [  ] Lymphadenopathy [  ] Other  CHEST/LUNG:  [x  ] Clear to auscultation bilaterally, [x  ] Breath Sounds equal  [x  ] poor effort  [x  ] No rales, [x  ] No rhonchi  [ x ]  No wheezing,   HEART:  [ x ] Regular rate and rhythm, [  ] tachycardia, [  ] Bradycardia,  [  ] irregular  [x  ] No murmurs, No rubs, No gallops, [  ] PPM in place (Mfr:  )  ABDOMEN:  [ x ] Soft, [x  ] Nontender, [ x ] Nondistended, [ x ] No mass, [ x ] Bowel sounds present, [  ] obese  NERVOUS SYSTEM:  [x  ] Alert & Oriented X3, [x  ] Nonfocal  [  ] Confusion  [  ] Encephalopathic [  ] Sedated [  ] Unable to assess, [  ] Dementia [ x ] Other-Tremors hand   EXTREMITIES: [x  ] 2+ Peripheral Pulses, No clubbing, No cyanosis,  [  ] edema B/L lower EXT. [ x ] severe PVD stasis skin changes B/L Lower EXT, [  ] wound  LYMPH: No lymphadenopathy noted  SKIN:  [  x] No rashes or lesions, [  ] Pressure Ulcers, [  ] ecchymosis, [  ] Skin Tears, [  ] Other    DIET: Diet, DASH/TLC:   Sodium & Cholesterol Restricted (05-05-22 @ 12:33)      LABS:                        13.6   6.66  )-----------( 303      ( 08 May 2022 08:09 )             38.5     08 May 2022 08:09    141    |  106    |  15     ----------------------------<  108    3.5     |  25     |  0.73     Ca    10.4       08 May 2022 08:09    TPro  7.0    /  Alb  3.2    /  TBili  0.5    /  DBili  x      /  AST  17     /  ALT  18     /  AlkPhos  75     08 May 2022 08:09          Culture Results:   No growth to date. (05-05 @ 09:18)  Culture Results:   No growth to date. (05-05 @ 09:18)  Culture Results:   <10,000 CFU/mL Normal Urogenital Richa (05-05 @ 09:14)                  Culture - Blood (collected 05 May 2022 09:18)  Source: .Blood Blood  Preliminary Report (06 May 2022 10:01):    No growth to date.    Culture - Blood (collected 05 May 2022 09:18)  Source: .Blood Blood  Preliminary Report (06 May 2022 10:01):    No growth to date.    Culture - Urine (collected 05 May 2022 09:14)  Source: Clean Catch Clean Catch (Midstream)  Final Report (07 May 2022 10:45):    <10,000 CFU/mL Normal Urogenital Richa         Anemia Panel:  Folate, Serum: 12.7 ng/mL (05-06-22 @ 10:57)  Vitamin B12, Serum: 599 pg/mL (05-06-22 @ 10:57)  Ferritin, Serum: 169 ng/mL (05-06-22 @ 10:57)  Iron Total, Serum: 56 ug/dL (05-06-22 @ 10:55)  Iron - Total Binding Capacity.: 254 ug/dL (05-06-22 @ 10:55)      Thyroid Panel:                RADIOLOGY & ADDITIONAL TESTS:      HEALTH ISSUES - PROBLEM Dx:  Acute UTI    HTN (hypertension)    HLD (hyperlipidemia)    GERD (gastroesophageal reflux disease)    Need for prophylactic measure    Parkinson disease    Acute UTI    Acute UTI    Malignant neoplasm of tongue, 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    Weakness    Acute CVA (cerebrovascular accident)          Consultant(s) Notes Reviewed:  [  x] YES     Care Discussed with [ x ] Consultants, [ x ] Patient, [ x ] Family- dtr , [  ] HCP, [  ] , [  ] Social Service, [ x ] RN, [  ] Physical Therapy, [  ] Palliative Care Team  DVT PPX: [  ] Lovenox, [ x ] SC Heparin, [  ] Coumadin, [  ] Xarelto, [  ] Eliquis, [  ] Pradaxa, [  ] IV Heparin drip, [  ] SCD, [  ] Ambulation, [  ] Contraindicated 2/2 GI Bleed, [  ] Contraindicated 2/2  Bleed, [  ] Contraindicated 2/2 Brain Bleed  Advanced Directive: [ x ] None, [  ] DNR/DNI Patient is a 76y old  Male who presents with a chief complaint of weakness, UTI (07 May 2022 14:44)    HPI:  77 yo M with PMH parkinson's, HTN, HLD, GERD, prostate ca, s/p Cyber knife Rx  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 Wife Viki at bedside providing collateral information. Pt has been doing well until around 8:30 PM last night. He was very weak and was having difficulty getting up and walking due to the weakness.  Pt normally walks with a cane but was unable to get up last night. Daughter also noted that pt was having sweats but denies any fever or chills. Pt has been eating and drinking well. Pt denies any urinary urgency, frequency, dysuria or hematuria. Denies chest pain, palpitations, SOB, cough, abdominal pain, nausea, vomiting, diarrhea, headaches, dizziness.   Denies recent travel, recent antibiotic use, or sick contacts.    ED Course:   Vitals: BP: 123/53, HR: 74, Temp: 97.9 F, RR: 16, SpO2: 94% on RA   Labs: H/H: 12.9/37.9,  BUN/Cr: 25/1.80, GFR: 38, gluc: 228, lactate: 3.1  trop neg x1, COVID negative   UA: small bilirubin, trace ketone, 30 protein, moderate LE, 11-25 WBC, 3-5 RBC, few bacteria, few calcium oxalate   CXR: no acute lung pathology as per personal read, official read pending   EKG: junctional rhythm with PVCs, HR: 69   Received 1L NS bolus x1 and Rocephin in the ED  (05 May 2022 05:43)    INTERVAL HPI:  5/5/22: Patient was seen and examined at bedside. States he is feeling well. Denies any dysuria or polyuria. ABx dcd per ID reccs. PT recc VICTOR MANUEL. CT renal hunt pending.   5/6/22: Patient seen and examined at bedside. Patient denies any acute complaints. Denies CP, SOB, abd pain, N/V/D. Seems slightly confused but answering questions appropriately. Seen by Neurology-Dr Peterson -plan for CT scan Head.  5/7/22: Patient seen and examined at bedside. Denies any acute complaints. CT head and MRI Brain shows acute infarct. Patient now on remote tele.   5/8/22: Patient seen and examined at bedside. Denies any acute complaints. Pending Echo. No events on tele overnight.    OVERNIGHT EVENTS:  No acute events overnight.     Home Medications:  carbidopa-levodopa 25 mg-100 mg oral tablet: 1 tab(s) orally 2 times a day (05 May 2022 06:27)  citalopram 20 mg oral tablet: 1 tab(s) orally once a day (05 May 2022 06:27)  DilTIAZem (Eqv-Cardizem CD) 240 mg/24 hours oral capsule, extended release: 1 cap(s) orally once a day (05 May 2022 06:27)  enalapril 10 mg oral tablet: 1 tab(s) orally once a day (05 May 2022 06:27)  pantoprazole 40 mg oral delayed release tablet: 1 tab(s) orally once a day (before a meal) (05 May 2022 06:27)  primidone 50 mg oral tablet: 1 tab(s) orally once a day (05 May 2022 06:27)  simvastatin 40 mg oral tablet: 1 tab(s) orally once a day (at bedtime) (05 May 2022 06:27)  Vitamin C 1000 mg oral tablet: 1 tab(s) orally once a day (05 May 2022 06:27)      MEDICATIONS  (STANDING):  aspirin enteric coated 81 milliGRAM(s) Oral daily  carbidopa/levodopa  25/100 1 Tablet(s) Oral daily  carbidopa/levodopa  25/100 1 Tablet(s) Oral at bedtime  citalopram 20 milliGRAM(s) Oral daily  diltiazem    milliGRAM(s) Oral daily  enalapril 10 milliGRAM(s) Oral daily  heparin   Injectable 5000 Unit(s) SubCutaneous every 8 hours  pantoprazole    Tablet 40 milliGRAM(s) Oral before breakfast  polyethylene glycol 3350 17 Gram(s) Oral daily  primidone 50 milliGRAM(s) Oral daily  senna 2 Tablet(s) Oral at bedtime  simvastatin 40 milliGRAM(s) Oral at bedtime  tamsulosin 0.4 milliGRAM(s) Oral at bedtime    MEDICATIONS  (PRN):  acetaminophen     Tablet .. 650 milliGRAM(s) Oral every 6 hours PRN Temp greater or equal to 38C (100.4F), Mild Pain (1 - 3)  melatonin 3 milliGRAM(s) Oral at bedtime PRN Insomnia  ondansetron Injectable 4 milliGRAM(s) IV Push every 8 hours PRN Nausea and/or Vomiting      Allergies    No Known Allergies    Intolerances        Social History:  Tobacco: Denies  EtOH: Denies   Recreational drug use: Denies   Lives with: daughter and grandkids   Ambulates: with a cane   ADLs: with assistance   Vaccinations: Moderna 3/3 (05 May 2022 05:43)      REVIEW OF SYSTEMS: i am OK  CONSTITUTIONAL: No fever, No chills, No fatigue, No myalgia, No Body ache, No Weakness  EYES: No eye pain,  No visual disturbances, No discharge, NO Redness  ENMT:  No ear pain, No nose bleed, No vertigo; No sinus pain, NO throat pain, No Congestion  NECK: No pain, No stiffness  RESPIRATORY: No cough, NO wheezing, No  hemoptysis, NO  shortness of breath  CARDIOVASCULAR: No chest pain, palpitations  GASTROINTESTINAL: No abdominal pain, NO epigastric pain. No nausea, No vomiting; No diarrhea, No constipation. [ x ] NO BM  GENITOURINARY: No dysuria, No frequency, No urgency, No hematuria, NO incontinence  NEUROLOGICAL: No headaches, No dizziness, No numbness, No tingling, No tremors, No weakness  EXT: No Swelling, No Pain, No Edema  SKIN:  [ x] No itching, burning, rashes, or lesions   MUSCULOSKELETAL: No joint pain ,No Jt swelling; No muscle pain, No back pain, No extremity pain  PSYCHIATRIC: No depression,  No anxiety,  No mood swings ,No difficulty sleeping at night  PAIN SCALE:  [ x] None  [  ] Other-  ROS Unable to obtain due to - [  ] Dementia  [  ] Lethargy [  ] Drowsy [  ] Sedated [  ] non verbal  REST OF REVIEW Of SYSTEM - [ x ] Normal     Vital Signs Last 24 Hrs  T(C): 36.4 (08 May 2022 05:03), Max: 37 (07 May 2022 20:55)  T(F): 97.6 (08 May 2022 05:03), Max: 98.6 (07 May 2022 20:55)  HR: 78 (08 May 2022 05:03) (78 - 88)  BP: 149/74 (08 May 2022 05:03) (136/87 - 160/84)  BP(mean): --  RR: 18 (08 May 2022 05:03) (18 - 18)  SpO2: 94% (08 May 2022 05:03) (92% - 95%)  Finger Stick        PHYSICAL EXAM: i am good   GENERAL:  [x  ] NAD , [ x ] well appearing, [  ] Agitated, [  ] Drowsy,  [  ] Lethargy, [  ] confused   HEAD:  [x  ] Normal, [  ] Other  EYES:  [ x ] EOMI, [x  ] PERRLA, [ x ] conjunctiva and sclera clear normal, [  ] Other,  [  ] Pallor,[  ] Discharge  ENMT:  [ x ] Normal, [ x ] Moist mucous membranes, [x  ] Good dentition, [ x ] No Thrush + surgery scar neck, mouth ,Mild rt facial droop  NECK:  [  x] Supple, [x  ] No JVD, [ x ] Normal thyroid, [  ] Lymphadenopathy [  ] Other  CHEST/LUNG:  [x  ] Clear to auscultation bilaterally, [x  ] Breath Sounds equal  [x  ] poor effort  [x  ] No rales, [x  ] No rhonchi  [ x ]  No wheezing,   HEART:  [ x ] Regular rate and rhythm, [  ] tachycardia, [  ] Bradycardia,  [  ] irregular  [x  ] No murmurs, No rubs, No gallops, [  ] PPM in place (Mfr:  )  ABDOMEN:  [ x ] Soft, [x  ] Nontender, [ x ] Nondistended, [ x ] No mass, [ x ] Bowel sounds present, [  ] obese  NERVOUS SYSTEM:  [x  ] Alert & Oriented X3, [x  ] Nonfocal  [  ] Confusion  [  ] Encephalopathic [  ] Sedated [  ] Unable to assess, [  ] Dementia [ x ] Other-Tremors hand   EXTREMITIES: [x  ] 2+ Peripheral Pulses, No clubbing, No cyanosis,  [  ] edema B/L lower EXT. [ x ] severe PVD stasis skin changes B/L Lower EXT, [  ] wound  LYMPH: No lymphadenopathy noted  SKIN:  [  x] No rashes or lesions, [  ] Pressure Ulcers, [  ] ecchymosis, [  ] Skin Tears, [  ] Other    DIET: Diet, DASH/TLC:   Sodium & Cholesterol Restricted (05-05-22 @ 12:33)      LABS:                        13.6   6.66  )-----------( 303      ( 08 May 2022 08:09 )             38.5     08 May 2022 08:09    141    |  106    |  15     ----------------------------<  108    3.5     |  25     |  0.73     Ca    10.4       08 May 2022 08:09    TPro  7.0    /  Alb  3.2    /  TBili  0.5    /  DBili  x      /  AST  17     /  ALT  18     /  AlkPhos  75     08 May 2022 08:09    Culture Results:   No growth to date. (05-05 @ 09:18)  Culture Results:   No growth to date. (05-05 @ 09:18)  Culture Results:   <10,000 CFU/mL Normal Urogenital Richa (05-05 @ 09:14)      Culture - Blood (collected 05 May 2022 09:18)  Source: .Blood Blood  Preliminary Report (06 May 2022 10:01):    No growth to date.    Culture - Blood (collected 05 May 2022 09:18)  Source: .Blood Blood  Preliminary Report (06 May 2022 10:01):    No growth to date.    Culture - Urine (collected 05 May 2022 09:14)  Source: Clean Catch Clean Catch (Midstream)  Final Report (07 May 2022 10:45):    <10,000 CFU/mL Normal Urogenital Richa         Anemia Panel:  Folate, Serum: 12.7 ng/mL (05-06-22 @ 10:57)  Vitamin B12, Serum: 599 pg/mL (05-06-22 @ 10:57)  Ferritin, Serum: 169 ng/mL (05-06-22 @ 10:57)  Iron Total, Serum: 56 ug/dL (05-06-22 @ 10:55)  Iron - Total Binding Capacity.: 254 ug/dL (05-06-22 @ 10:55)    RADIOLOGY & ADDITIONAL TESTS: none      HEALTH ISSUES - PROBLEM Dx:  Acute UTI    HTN (hypertension)    HLD (hyperlipidemia)    GERD (gastroesophageal reflux disease)    Need for prophylactic measure    Parkinson disease    Acute UTI    Acute UTI    Malignant neoplasm of tongue, 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

## 2022-05-08 NOTE — PROGRESS NOTE ADULT - SUBJECTIVE AND OBJECTIVE BOX
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 Wife Viki at bedside providing collateral information. Pt has been doing well until around 8:30 PM last night. He was very weak and was having difficulty getting up and walking due to the weakness.  Pt normally walks with a cane but was unable to get up last night. Daughter also noted that pt was having sweats but denies any fever or chills. Pt has been eating and drinking well. Pt denies any urinary urgency, frequency, dysuria or hematuria. Denies chest pain, palpitations, SOB, cough, abdominal pain, nausea, vomiting, diarrhea, headaches, dizziness.   Denies recent travel, recent antibiotic use, or sick contacts.    ED Course:   Vitals: BP: 123/53, HR: 74, Temp: 97.9 F, RR: 16, SpO2: 94% on RA   Labs: H/H: 12.9/37.9,  BUN/Cr: 25/1.80, GFR: 38, gluc: 228, lactate: 3.1  trop neg x1, COVID negative   UA: small bilirubin, trace ketone, 30 protein, moderate LE, 11-25 WBC, 3-5 RBC, few bacteria, few calcium oxalate   CXR: no acute lung pathology as per personal read, official read pending   EKG: junctional rhythm with PVCs, HR: 69   Received 1L NS bolus x1 and Rocephin in the ED  (05 May 2022 05:43)      Interval History -no complaints    Patient is seen, chart was reviewed and case was discussed with the treatment team.  Pt is not in any distress.   Lying on bed comfortably.       Vital Signs Last 24 Hrs  T(C): 36.7 (08 May 2022 11:52), Max: 37 (07 May 2022 20:55)  T(F): 98.1 (08 May 2022 11:52), Max: 98.6 (07 May 2022 20:55)  HR: 84 (08 May 2022 11:52) (78 - 88)  BP: 126/77 (08 May 2022 11:52) (126/77 - 160/84)  BP(mean): --  RR: 18 (08 May 2022 11:52) (18 - 18)  SpO2: 92% (08 May 2022 11:52) (92% - 94%)        REVIEW OF SYSTEMS:    Constitutional: No fever,   Eyes: No eye pain, visual disturbances, or discharge  ENT:  No difficulty hearing, tinnitus, vertigo; No sinus or throat pain  Neck: No pain or stiffness  Respiratory: No cough, wheezing, chills or hemoptysis  Cardiovascular: No chest pain, palpitations, shortness of breath, dizziness or leg swelling  Gastrointestinal: No abdominal or epigastric pain. No nausea, vomiting or hematemesis; No diarrhea or constipation  Genitourinary: No  frequency, hematuria or incontinence  Neurological: No headaches, , loss of strength, numbness or tremors  Psychiatric: No depression, anxiety, mood swings or difficulty sleeping  Musculoskeletal: No joint pain or swelling; No muscle,   Skin: No itching, burning, rashes or lesions   Lymph Nodes: No enlarged glands  Endocrine: No heat or cold intolerance; No hair loss,   Allergy and Immunologic: No hives or eczema    On Neurological Examination:    Mental Status - Pt is alert, awake, . Follows simple commands     Speech -  Pt has dysarthria.    Cranial Nerves - Pupils 3 mm equal and reactive to light, extraocular eye movements intact. Pt has no visual field deficit.  Pt has right  facial asymmetry. Facial sensation is intact.Tongue - is in midline.    Muscle tone -cogwheel rigidity    Motor Exam - RUE 3/5; LUE 4+  LE 3/5  right pronator  drift.     Sensory Exam - Pin prick, temperature, joint position and vibration are intact on either side. Pt withdraws all extremities equally on stimulation.         Deep tendon Reflexes - 2 plus all over.         Neck Supple -  Yes.     MEDICATIONS  (STANDING):  aspirin enteric coated 81 milliGRAM(s) Oral daily  carbidopa/levodopa  25/100 1 Tablet(s) Oral daily  carbidopa/levodopa  25/100 1 Tablet(s) Oral at bedtime  citalopram 20 milliGRAM(s) Oral daily  diltiazem    milliGRAM(s) Oral daily  enalapril 10 milliGRAM(s) Oral daily  heparin   Injectable 5000 Unit(s) SubCutaneous every 8 hours                        13.6   6.66  )-----------( 303      ( 08 May 2022 08:09 )             38.5   pantoprazole    Tablet 40 milliGRAM(s) Oral before breakfast  polyethylene glycol 3350 17 Gram(s) Oral daily  primidone 50 milliGRAM(s) Oral daily  senna 2 Tablet(s) Oral at bedtime  simvastatin 40 milliGRAM(s) Oral at bedtime  tamsulosin 0.4 milliGRAM(s) Oral at bedtime    MEDICATIONS  (PRN):  acetaminophen     Tablet .. 650 milliGRAM(s) Oral every 6 hours PRN Temp greater or equal to 38C (100.4F), Mild Pain (1 - 3)  melatonin 3 milliGRAM(s) Oral at bedtime PRN Insomnia  ondansetron Injectable 4 milliGRAM(s) IV Push every 8 hours PRN Nausea and/or Vomiting        Allergies    No Known Allergies    Intolerances      05-08    141  |  106  |  15  ----------------------------<  108<H>  3.5   |  25  |  0.73    Ca    10.4<H>      08 May 2022 08:09  Phos  2.9     05-07  Mg     2.3     05-07    TPro  7.0  /  Alb  3.2<L>  /  TBili  0.5  /  DBili  x   /  AST  17  /  ALT  18  /  AlkPhos  75  05-08          Hemoglobin A1C:   Lipid Panel 05-07 @ 10:47  Total Cholesterol, Serum 155  LDL --  Triglycerides 87    Vitamin B12     RADIOLOGY  < from: MR Head No Cont (05.06.22 @ 19:00) >    ACC: [id number] EXAM:  MR BRAIN                          PROCEDURE DATE:  05/06/2022          INTERPRETATION:  Clinical indication: Subacute CVA.    MRI of the brain was performed using sagittal T1 axial T1 T2 T2 FLAIR   diffusion and [place] sequence.    This exam is compared with prior head CT performed on May 6, 2022.    Extensive parenchymal volume loss and chronic microvascular ischemic   changes are identified    Old lacunar infarct involving the right sublenticular nucleus region is   seen.    There is evidence of abnormal T2 prolongation with restricted diffusion   seen involving the left brachium pontis and left lenticular   nucleus/corona radiata region.    These findings are compatible with areas of acute infarcts. No   hemorrhagic transformation is seen. No significant shift or herniation is   seen.    The large vessels demonstrate normal flow    The visualized paranasal sinuses mastoid and mastoid clear.    IMPRESSION: Acute infarcts as described above.              SHARON WARSHALL MD; Attending Radiologist  This document has been electronically signed. 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  OOB to chair/ambulation with assistance only.  Pain is accessed and addressed.  Would continue to follow.

## 2022-05-09 NOTE — PROGRESS NOTE ADULT - NUTRITIONAL ASSESSMENT
This patient has been assessed with a concern for Malnutrition and has been determined to have a diagnosis/diagnoses of Severe protein-calorie malnutrition.    This patient is being managed with:   Diet Regular-  Supplement Feeding Modality:  Oral  Ensure Clear Cans or Servings Per Day:  1       Frequency:  Two Times a day  Ensure Enlive Cans or Servings Per Day:  1       Frequency:  Two Times a day  Entered: May  6 2022 10:58AM     This patient has been assessed with a concern for Malnutrition and has been determined to have a diagnosis/diagnoses of Severe protein-calorie malnutrition.    This patient is being managed with:   Diet Regular-  Supplement Feeding Modality:  Oral  Ensure Clear Cans or Servings Per Day:  1       Frequency:  Two Times a day  Ensure Enlive Cans or Servings Per Day:  1       Frequency:  Two Times a day  Entered: May  6 2022 10:58AM

## 2022-05-09 NOTE — CHART NOTE - NSCHARTNOTEFT_GEN_A_CORE
NOTE FOR TRANSFER CHAIR    Patient cannot use cane, walker or self propel standard or light wheelchair. Patient needs transport wheelchair for ADLs in the home due to gait abnormality. Patient has caregiver willing to assist.     Transport Wheelchair-  Dx Code R26.9  Ht 165.1cm  Wt 68kg  MARIA L = 99

## 2022-05-09 NOTE — PROGRESS NOTE ADULT - PROBLEM SELECTOR PLAN 3
Chronic  - /75 this AM  - Continue with home Cardizem  mg daily  - Monitor routine hemodynamics Chronic   - Continue home Carbidopa/Levodopa and Primidone  - Continue home Celexa daily

## 2022-05-09 NOTE — PROGRESS NOTE ADULT - PROBLEM SELECTOR PLAN 5
- hx parkinsons w/ tremors  - CT head non contrast: subacute lacunar infarct-Its new from last CT scan from 10/21  - MR head non contrast: as above  - continue asa 81mg po qd  - continue simvastatin 40mg po qd  - Neurology following, Dr Peterson - hx parkinsons w/ tremors  - CT head non contrast: subacute lacunar infarct-Its new from last CT scan from 10/21  - MR head non contrast: as above  - continue asa 81mg po qd  - continue simvastatin 40mg po qd  - carotid doppler results as above  - Neurology following, Dr Peterson - asymptomatic at this time, UTI ruled out   - does not meet sepsis criteria at this time  - CT renal stone hunt: trace left hydronephrosis. 4 mm left UVJ calculus. Nonobstructing right intrarenal calculus.  - s/p IV Rocephin, completed 5/7 AM  - urine culture, blood cultures NGTD  - Urology Dr Alcaraz - continue tamsulosin 0.4 mg qHS   - Urology Dr Leach - patient to f/u outpatient   - ID Dr. Connelly following, f/u recs - monitor off abx for now

## 2022-05-09 NOTE — SWALLOW VFSS/MBS ASSESSMENT ADULT - DIAGNOSTIC IMPRESSIONS
1. Mild oral dysphagia when given thin liquids, mildly thick liquids, moderately thick liquids, and regular solids marked by adequate retrieval and containment, mildly prolonged mastication of solids, reduced bolus cohesion across all liquids resulting in premature spillage to the level of the hypopharynx, mildly reduced posterior transfer with solids (more timely with liquids), and grossly adequate clearance post swallow.  2. Functional oral phase when given puree marked by adequate retrieval and containment, adequate bolus cohesion, timely posterior transfer, and adequate clearance post swallow.  3. Moderate pharyngeal dysphagia when given thin liquids and mildly thick liquids marked by delayed pharyngeal swallow trigger at the level of the pyriforms, reduced BOT retraction, reduced hyolaryngeal elevation/excursion, and incomplete epiglottic retroflexion. There was deep/SILENT penetration during the swallow w/o retrieval, residue in laryngeal vestibule was observed to migrate down to the level of the VFs. Pt was cued to cough; however, volitional cough was weak and resulted in only partial ejection of barium from airway, pt was not left in respiratory distress. Chin tuck maneuver was unsuccessful in consistently eliminating penetration during the swallow. Additional head postures not trialed, as pt c/o neck pain upon movements. There was trace BOT and PPW and mild valleculae residue post swallow.  4. Mild pharyngeal dysphagia when given moderately thick liquids, puree, and regular solids marked by delayed pharyngeal swallow trigger at the level of the valleculae with moderately thick liquids (timely with puree and solids), reduced BOT retraction, adequate hyolaryngeal elevation/excursion, and complete epiglottic retroflexion. There was no penetration and/or aspiration pre/during/post swallow. There was trace BOT and PPW and mild to moderate valleculae residue post swallow. Pt was cued to complete additional dry swallow, which resulted in partial clearance of

## 2022-05-09 NOTE — SWALLOW VFSS/MBS ASSESSMENT ADULT - COMMENTS
Clinical swallow assessment completed 5/8, at which time pt was recommended soft and bite sized with mildly thick liquids. MBS was recommended to assess safest diet level given hx of dysphagia/Parkinson's and CVA.    CXR 5/5: "No radiographic evidence of active chest disease. Moderate thoracic dextroscoliosis."

## 2022-05-09 NOTE — DISCHARGE NOTE NURSING/CASE MANAGEMENT/SOCIAL WORK - NSDCPEFALRISK_GEN_ALL_CORE
For information on Fall & Injury Prevention, visit: [email or web address] OR  [email or web address] OR  https://www.cdc.gov/steadi/patient.html

## 2022-05-09 NOTE — SWALLOW VFSS/MBS ASSESSMENT ADULT - ORAL PHASE
Delayed oral transit time Incomplete tongue to palate contact/Uncontrolled bolus / spillover in hypopharynx within functional limits

## 2022-05-09 NOTE — PROGRESS NOTE ADULT - PROBLEM SELECTOR PLAN 2
- asymptomatic at this time  - does not meet sepsis criteria at this time  - CT renal stone hunt: trace left hydronephrosis. 4 mm left UVJ calculus. Nonobstructing right intrarenal calculus.  - s/p IV Rocephin, completed 5/7 AM  - urine culture, blood cultures NGTD  - Urology Dr Alcaraz - continue tamsulosin 0.4 mg qHS   - Urology Dr Leach - patient to f/u outpatient   - ID Dr. Connelly following, f/u recs -- - asymptomatic at this time  - does not meet sepsis criteria at this time  - CT renal stone hunt: trace left hydronephrosis. 4 mm left UVJ calculus. Nonobstructing right intrarenal calculus.  - s/p IV Rocephin, completed 5/7 AM  - urine culture, blood cultures NGTD  - Urology Dr Alcaraz - continue tamsulosin 0.4 mg qHS   - Urology Dr Leach - patient to f/u outpatient   - ID Dr. Connelly following, f/u recs - monitor off abx for now Chronic  - /75 this AM  - Continue with home Cardizem  mg daily  - Monitor routine hemodynamics

## 2022-05-09 NOTE — DISCHARGE NOTE NURSING/CASE MANAGEMENT/SOCIAL WORK - PATIENT PORTAL LINK FT
You can access the FollowMyHealth Patient Portal offered by [facility] by registering at the following website: [email or web address]. By joining [employer]’s FollowMyHealth portal, you will also be able to view your health information using other applications (apps) compatible with our system.

## 2022-05-09 NOTE — PROGRESS NOTE ADULT - ATTENDING COMMENTS
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 Admitted for possible  UTI.   Pt seen, examined, case & care plan d/w pt, residents at detail.  AM labs   Palliative care eval done .pt wants full code.  PT Eval---> [facility], pt & dtr does NOT want to go to [facility], [place], wants to go home. with Home PT, d/w Case management today, .  D/W Dtr at detail about d/c plan tomorrow, dtr wants pt to go home for  out pt follow up with Oncologist as per ENT this  week for concern about Rx with recurrence of ca   D/W Neurology-Dr Peterson -Cardio Eval, Remote Tele- No events . pt may need out pt holter monitor,  PT follow up---> [facility], [place].  Cardiology-Dr Edward called.  Total care time is 40 minutes.

## 2022-05-09 NOTE — SWALLOW VFSS/MBS ASSESSMENT ADULT - RECOMMENDED FEEDING/EATING TECHNIQUES
allow for swallow between intakes/alternate food with liquid/maintain upright posture during/after eating for 30 mins/no straws/oral hygiene/position upright (90 degrees)/provide rest periods between swallows/small sips/bites

## 2022-05-09 NOTE — PROGRESS NOTE ADULT - SUBJECTIVE AND OBJECTIVE BOX
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 Wife Viki at bedside providing collateral information. Pt has been doing well until around 8:30 PM last night. He was very weak and was having difficulty getting up and walking due to the weakness.  Pt normally walks with a cane but was unable to get up last night. Daughter also noted that pt was having sweats but denies any fever or chills. Pt has been eating and drinking well. Pt denies any urinary urgency, frequency, dysuria or hematuria. Denies chest pain, palpitations, SOB, cough, abdominal pain, nausea, vomiting, diarrhea, headaches, dizziness.   Denies recent travel, recent antibiotic use, or sick contacts.    ED Course:   Vitals: BP: 123/53, HR: 74, Temp: 97.9 F, RR: 16, SpO2: 94% on RA   Labs: H/H: 12.9/37.9,  BUN/Cr: 25/1.80, GFR: 38, gluc: 228, lactate: 3.1  trop neg x1, COVID negative   UA: small bilirubin, trace ketone, 30 protein, moderate LE, 11-25 WBC, 3-5 RBC, few bacteria, few calcium oxalate   CXR: no acute lung pathology as per personal read, official read pending   EKG: junctional rhythm with PVCs, HR: 69   Received 1L NS bolus x1 and Rocephin in the ED  (05 May 2022 05:43)      Interval History -no new weakness    Patient is seen, chart was reviewed and case was discussed with the treatment team.  Pt is not in any distress.   Lying on bed comfortably.       Vital Signs Last 24 Hrs  T(C): 36.6 (09 May 2022 12:02), Max: 36.7 (08 May 2022 21:11)  T(F): 97.9 (09 May 2022 12:02), Max: 98 (08 May 2022 21:11)  HR: 81 (09 May 2022 12:02) (68 - 81)  BP: 93/58 (09 May 2022 12:02) (93/58 - 133/74)  BP(mean): --  RR: 18 (09 May 2022 12:02) (18 - 20)  SpO2: 92% (09 May 2022 12:02) (92% - 94%)        REVIEW OF SYSTEMS:    Constitutional: No fever,   Eyes: No eye pain, visual disturbances, or discharge  ENT:  No difficulty hearing, tinnitus, vertigo; No sinus or throat pain  Neck: No pain or stiffness  Respiratory: No cough, wheezing, chills or hemoptysis  Cardiovascular: No chest pain, palpitations, shortness of breath, dizziness or leg swelling  Gastrointestinal: No abdominal or epigastric pain. No nausea, vomiting or hematemesis; No diarrhea or constipation  Genitourinary: No  frequency, hematuria or incontinence  Neurological: No headaches, , loss of strength, numbness or tremors  Psychiatric: No depression, anxiety, mood swings or difficulty sleeping  Musculoskeletal: No joint pain or swelling; No muscle,   Skin: No itching, burning, rashes or lesions   Lymph Nodes: No enlarged glands  Endocrine: No heat or cold intolerance; No hair loss,   Allergy and Immunologic: No hives or eczema    On Neurological Examination:    Mental Status - Pt is alert, awake, . Follows simple commands     Speech -  Pt has dysarthria.    Cranial Nerves - Pupils 3 mm equal and reactive to light, extraocular eye movements intact. Pt has no visual field deficit.  Pt has right  facial asymmetry. Facial sensation is intact.Tongue - is in midline.    Muscle tone -cogwheel rigidity    Motor Exam - RUE 3/5; LUE 4+  LE 3/5  right pronator  drift.     Sensory Exam - Pin prick, temperature, joint position and vibration are intact on either side. Pt withdraws all extremities equally on stimulation.         Deep tendon Reflexes - 2 plus all over.         Neck Supple -  Yes.     MEDICATIONS  (STANDING):  aspirin enteric coated 81 milliGRAM(s) Oral daily  carbidopa/levodopa  25/100 1 Tablet(s) Oral daily  carbidopa/levodopa  25/100 1 Tablet(s) Oral at bedtime  citalopram 20 milliGRAM(s) Oral daily  diltiazem    milliGRAM(s) Oral daily  enalapril 10 milliGRAM(s) Oral daily  heparin   Injectable 5000 Unit(s) SubCutaneous every 8 hours            pantoprazole    Tablet 40 milliGRAM(s) Oral before breakfast  polyethylene glycol 3350 17 Gram(s) Oral daily  primidone 50 milliGRAM(s) Oral daily  senna 2 Tablet(s) Oral at bedtime  simvastatin 40 milliGRAM(s) Oral at bedtime  tamsulosin 0.4 milliGRAM(s) Oral at bedtime    MEDICATIONS  (PRN):  acetaminophen     Tablet .. 650 milliGRAM(s) Oral every 6 hours PRN Temp greater or equal to 38C (100.4F), Mild Pain (1 - 3)  melatonin 3 milliGRAM(s) Oral at bedtime PRN Insomnia  ondansetron Injectable 4 milliGRAM(s) IV Push every 8 hours PRN Nausea and/or Vomiting        Allergies    No Known Allergies    Intolerances                            12.9   6.07  )-----------( 289      ( 09 May 2022 07:41 )             37.4   05-09    140  |  105  |  19  ----------------------------<  104<H>  3.7   |  29  |  0.78    Ca    10.1      09 May 2022 07:41    TPro  6.6  /  Alb  2.9<L>  /  TBili  0.5  /  DBili  x   /  AST  19  /  ALT  15  /  AlkPhos  73  05-09            Hemoglobin A1C:   Lipid Panel 05-07 @ 10:47  Total Cholesterol, Serum 155  LDL --  Triglycerides 87    Vitamin B12     RADIOLOGY  < from: MR Head No Cont (05.06.22 @ 19:00) >    ACC: [id number] EXAM:  MR BRAIN                          PROCEDURE DATE:  05/06/2022          INTERPRETATION:  Clinical indication: Subacute CVA.    MRI of the brain was performed using sagittal T1 axial T1 T2 T2 FLAIR   diffusion and [place] sequence.    This exam is compared with prior head CT performed on May 6, 2022.    Extensive parenchymal volume loss and chronic microvascular ischemic   changes are identified    Old lacunar infarct involving the right sublenticular nucleus region is   seen.    There is evidence of abnormal T2 prolongation with restricted diffusion   seen involving the left brachium pontis and left lenticular   nucleus/corona radiata region.    These findings are compatible with areas of acute infarcts. No   hemorrhagic transformation is seen. No significant shift or herniation is   seen.    The large vessels demonstrate normal flow    The visualized paranasal sinuses mastoid and mastoid clear.    IMPRESSION: Acute infarcts as described above.              SHARON VILLA MD; Attending Radiologist  This document has been electronically signed. 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  OOB to chair/ambulation with assistance only.  Pain is accessed and addressed.  Would continue to follow.

## 2022-05-09 NOTE — SWALLOW VFSS/MBS ASSESSMENT ADULT - SLP PERTINENT HISTORY OF CURRENT PROBLEM
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 Admitted for UTI, found to have UVJ stone."

## 2022-05-09 NOTE — PROGRESS NOTE ADULT - PROBLEM SELECTOR PLAN 1
- MR Head without contrast (5/6/22): Old lacunar infarct involving the R sublenticular nucleus region. There is evidence of abnormal T2 prolongation with restricted diffusion seen involving the L brachium pontis and L lenticular nucleus/corona radiata region.  - S&S eval - soft and bite sized diet with mildly thick liquids, aspiration precautions   - continue aspirin 81mg po qd, simvastatin 40mg po qd  - neuro checks q4hrs, remote tele monitoring  - PT rec [facility] but patient and family want home with home PT   - f/u modified barium swallow   - f/u ECHO and carotid doppler results - performed, pending results  - Neurology following, Dr Peterson, f/u recs-- - MR Head without contrast (5/6/22): Old lacunar infarct involving the R sublenticular nucleus region. There is evidence of abnormal T2 prolongation with restricted diffusion seen involving the L brachium pontis and L lenticular nucleus/corona radiata region.  - S&S eval - soft and bite sized diet with mildly thick liquids, aspiration precautions   - continue aspirin 81mg po qd, simvastatin 40mg po qd  - neuro checks q4hrs, remote tele monitoring  - carotid dopplers: antegrade flow is noted within the L vertebral artery. The R vertebral artery is not visualized. IMPRESSION: Mild calcific plaque without duplex evidence of hemodynamically significant stenosis of either carotid artery. Nonvisualization of the R vertebral artery.    - PT rec [facility] but patient and family want home with home PT - rx for transport chair placed in pt chart   - f/u TTE, performed, results pending  - Neurology following, Dr Peterson, f/u recs--

## 2022-05-09 NOTE — SWALLOW VFSS/MBS ASSESSMENT ADULT - RECOMMENDED CONSISTENCY
IMPRESSIONS CONTINUED: residue. Pt also benefited from alternating bite/sip for further clearance.   5. Of note: there was retrograde observed to the level of the cervical esophagus post swallow across all PO, per radiologist report.    RECOMMENDATIONS:  1. Soft and bite sized with moderately thick liquids.  2. Aspiration/reflux precautions.  3. Complete x2 swallows per bolus and alternate bite/sip to assist with pharyngeal clearance.  4. Safe swallowing guidelines: feed only when fully awake/alert, position upright 90 degrees, small bite/sip, complete full mastication of solids, double swallow per bolus and alternate bite/sip, pace meal slowly, and remain upright ~1 hour post meals.  5. Ongoing oral care.  6. GI consult given observed retrograde to the level of the cervical esophagus post swallow across all PO.  7. Swallowing therapy while in house, as schedule permits, and s/p d/c to maximize oropharyngeal swallow mechanism.

## 2022-05-09 NOTE — PROGRESS NOTE ADULT - SUBJECTIVE AND OBJECTIVE BOX
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 Wife Viki at bedside providing collateral information. Pt has been doing well until around 8:30 PM last night. He was very weak and was having difficulty getting up and walking due to the weakness.  Pt normally walks with a cane but was unable to get up last night. Daughter also noted that pt was having sweats but denies any fever or chills. Pt has been eating and drinking well. Pt denies any urinary urgency, frequency, dysuria or hematuria. Denies chest pain, palpitations, SOB, cough, abdominal pain, nausea, vomiting, diarrhea, headaches, dizziness.   Denies recent travel, recent antibiotic use, or sick contacts.    ED Course:   Vitals: BP: 123/53, HR: 74, Temp: 97.9 F, RR: 16, SpO2: 94% on RA   Labs: H/H: 12.9/37.9,  BUN/Cr: 25/1.80, GFR: 38, gluc: 228, lactate: 3.1  trop neg x1, COVID negative   UA: small bilirubin, trace ketone, 30 protein, moderate LE, 11-25 WBC, 3-5 RBC, few bacteria, few calcium oxalate   CXR: no acute lung pathology as per personal read, official read pending   EKG: junctional rhythm with PVCs, HR: 69   Received 1L NS bolus x1 and Rocephin in the ED  (05 May 2022 05:43)    INTERVAL HPI:  5/5/22: Patient was seen and examined at bedside. States he is feeling well. Denies any dysuria or polyuria. ABx dcd per ID reccs. PT recc VICTOR MANUEL. CT renal hunt pending.   5/6/22: Patient seen and examined at bedside. Patient denies any acute complaints. Denies CP, SOB, abd pain, N/V/D. Seems slightly confused but answering questions appropriately. Seen by Neurology-Dr Peterson -plan for CT scan Head.  5/7/22: Patient seen and examined at bedside. Denies any acute complaints. CT head and MRI Brain shows acute infarct. Patient now on remote tele.   5/8/22: Patient seen and examined at bedside. Denies any acute complaints. Pending Echo. No events on tele overnight.  5/9/22: Patient seen and examined at bedside. Denies any acute complaints. States feels stronger than previous days. TTE performed, results pending. No events on tele overnight. NSR 62 this morning.      OVERNIGHT EVENTS: None    Home Medications:  carbidopa-levodopa 25 mg-100 mg oral tablet: 1 tab(s) orally 2 times a day (05 May 2022 06:27)  citalopram 20 mg oral tablet: 1 tab(s) orally once a day (05 May 2022 06:27)  DilTIAZem (Eqv-Cardizem CD) 240 mg/24 hours oral capsule, extended release: 1 cap(s) orally once a day (05 May 2022 06:27)  enalapril 10 mg oral tablet: 1 tab(s) orally once a day (05 May 2022 06:27)  pantoprazole 40 mg oral delayed release tablet: 1 tab(s) orally once a day (before a meal) (05 May 2022 06:27)  primidone 50 mg oral tablet: 1 tab(s) orally once a day (05 May 2022 06:27)  simvastatin 40 mg oral tablet: 1 tab(s) orally once a day (at bedtime) (05 May 2022 06:27)  Vitamin C 1000 mg oral tablet: 1 tab(s) orally once a day (05 May 2022 06:27)      MEDICATIONS  (STANDING):  aspirin enteric coated 81 milliGRAM(s) Oral daily  carbidopa/levodopa  25/100 1 Tablet(s) Oral daily  carbidopa/levodopa  25/100 1 Tablet(s) Oral at bedtime  citalopram 20 milliGRAM(s) Oral daily  diltiazem    milliGRAM(s) Oral daily  enalapril 10 milliGRAM(s) Oral daily  heparin   Injectable 5000 Unit(s) SubCutaneous every 8 hours  pantoprazole    Tablet 40 milliGRAM(s) Oral before breakfast  polyethylene glycol 3350 17 Gram(s) Oral daily  primidone 50 milliGRAM(s) Oral daily  senna 2 Tablet(s) Oral at bedtime  simvastatin 40 milliGRAM(s) Oral at bedtime  tamsulosin 0.4 milliGRAM(s) Oral at bedtime    MEDICATIONS  (PRN):  acetaminophen     Tablet .. 650 milliGRAM(s) Oral every 6 hours PRN Temp greater or equal to 38C (100.4F), Mild Pain (1 - 3)  melatonin 3 milliGRAM(s) Oral at bedtime PRN Insomnia  ondansetron Injectable 4 milliGRAM(s) IV Push every 8 hours PRN Nausea and/or Vomiting      Allergies    No Known Allergies    Intolerances        Social History:  Tobacco: Denies  EtOH: Denies   Recreational drug use: Denies   Lives with: daughter and grandkids   Ambulates: with a cane   ADLs: with assistance   Vaccinations: Moderna 3/3 (05 May 2022 05:43)      REVIEW OF SYSTEMS: i feel better  CONSTITUTIONAL: No fever, No chills, No fatigue, No myalgia, No Body ache, No Weakness  EYES: No eye pain,  No visual disturbances, No discharge, NO Redness  ENMT:  No ear pain, No nose bleed, No vertigo; No sinus pain, NO throat pain, No Congestion  NECK: No pain, No stiffness  RESPIRATORY: No cough, NO wheezing, No  hemoptysis, NO  shortness of breath  CARDIOVASCULAR: No chest pain, palpitations  GASTROINTESTINAL: No abdominal pain, NO epigastric pain. No nausea, No vomiting; No diarrhea, No constipation. [ x ] NO BM  GENITOURINARY: No dysuria, No frequency, No urgency, No hematuria, NO incontinence  NEUROLOGICAL: No headaches, No dizziness, No numbness, No tingling, No tremors, No weakness  EXT: No Swelling, No Pain, No Edema  SKIN:  [ x] No itching, burning, rashes, or lesions   MUSCULOSKELETAL: No joint pain, No Jt swelling; No muscle pain, No back pain, No extremity pain  PSYCHIATRIC: No depression,  No anxiety,  No mood swings ,No difficulty sleeping at night  PAIN SCALE:  [ x] None  [  ] Other-  ROS Unable to obtain due to - [  ] Dementia  [  ] Lethargy [  ] Drowsy [  ] Sedated [  ] non verbal  REST OF REVIEW Of SYSTEM - [ x ] Normal     Vital Signs Last 24 Hrs  T(C): 36.5 (09 May 2022 04:44), Max: 36.7 (08 May 2022 11:52)  T(F): 97.7 (09 May 2022 04:44), Max: 98.1 (08 May 2022 11:52)  HR: 68 (09 May 2022 04:44) (68 - 84)  BP: 119/75 (09 May 2022 04:44) (119/75 - 133/74)  BP(mean): --  RR: 18 (09 May 2022 04:44) (18 - 18)  SpO2: 94% (09 May 2022 04:44) (92% - 94%)  Finger Stick      PHYSICAL EXAM: I feel better today  GENERAL:  [ x ] NAD , [ x ] well appearing, [  ] Agitated, [  ] Drowsy,  [  ] Lethargy, [  ] confused   HEAD:  [ x  ] Normal, [  ] Other  EYES:  [ x ] EOMI, [x  ] PERRLA, [ x ] conjunctiva and sclera clear normal, [  ] Other,  [  ] Pallor,[  ] Discharge  ENMT:  [ x ] Normal, [ x ] Moist mucous membranes, [x  ] Good dentition, [ x ] No Thrush + surgery scar neck, mouth   NECK:  [ x ] Supple, [x  ] No JVD, [ x ] Normal thyroid, [  ] Lymphadenopathy [  ] Other  CHEST/LUNG:  [ x ] Clear to auscultation bilaterally, [x  ] Breath Sounds equal  [x  ] poor effort  [x  ] No rales, [x  ] No rhonchi  [ x ]  No wheezing,   HEART:  [ x ] Regular rate and rhythm, [  ] tachycardia, [  ] Bradycardia,  [  ] irregular  [x  ] No murmurs, No rubs, No gallops, [  ] PPM in place (Mfr:  )  ABDOMEN:  [ x ] Soft, [ x ] Nontender, [ x ] Nondistended, [ x ] No mass, [ x ] Bowel sounds present, [  ] obese  NERVOUS SYSTEM:  [x ] Alert & Oriented X3, [x  ] Nonfocal  [  ] Confusion  [  ] Encephalopathic [  ] Sedated [  ] Unable to assess, [  ] Dementia [ x ] Other-Tremors hand   EXTREMITIES: [x  ] 2+ Peripheral Pulses, No clubbing, No cyanosis,  [  ] edema B/L lower EXT. [ x ] severe PVD stasis skin changes B/L Lower EXT, [  ] wound  LYMPH: No lymphadenopathy noted  SKIN:  [ x] No rashes or lesions, [  ] Pressure Ulcers, [  ] ecchymosis, [  ] Skin Tears, [  ] Other      DIET: Diet, Regular:   Supplement Feeding Modality:  Oral  Ensure Clear Cans or Servings Per Day:  1       Frequency:  Two Times a day  Ensure Enlive Cans or Servings Per Day:  1       Frequency:  Two Times a day (05-06-22 @ 10:58)      LABS:                        12.9   6.07  )-----------( 289      ( 09 May 2022 07:41 )             37.4     09 May 2022 07:41    140    |  105    |  19     ----------------------------<  104    3.7     |  29     |  0.78     Ca    10.1       09 May 2022 07:41    TPro  6.6    /  Alb  2.9    /  TBili  0.5    /  DBili  x      /  AST  19     /  ALT  15     /  AlkPhos  73     09 May 2022 07:41          Culture Results:   No growth to date. (05-05 @ 09:18)  Culture Results:   No growth to date. (05-05 @ 09:18)  Culture Results:   <10,000 CFU/mL Normal Urogenital Richa (05-05 @ 09:14)                  Culture - Blood (collected 05 May 2022 09:18)  Source: .Blood Blood  Preliminary Report (06 May 2022 10:01):    No growth to date.    Culture - Blood (collected 05 May 2022 09:18)  Source: .Blood Blood  Preliminary Report (06 May 2022 10:01):    No growth to date.    Culture - Urine (collected 05 May 2022 09:14)  Source: Clean Catch Clean Catch (Midstream)  Final Report (07 May 2022 10:45):    <10,000 CFU/mL Normal Urogenital Richa         Anemia Panel:  Folate, Serum: 12.7 ng/mL (05-06-22 @ 10:57)  Vitamin B12, Serum: 599 pg/mL (05-06-22 @ 10:57)  Ferritin, Serum: 169 ng/mL (05-06-22 @ 10:57)  Iron Total, Serum: 56 ug/dL (05-06-22 @ 10:55)  Iron - Total Binding Capacity.: 254 ug/dL (05-06-22 @ 10:55)      Thyroid Panel:                RADIOLOGY & ADDITIONAL TESTS:      HEALTH ISSUES - PROBLEM Dx:  Acute UTI    HTN (hypertension)    HLD (hyperlipidemia)    GERD (gastroesophageal reflux disease)    Need for prophylactic measure    Parkinson disease    Acute UTI    Acute UTI    Malignant neoplasm of tongue, 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    Weakness    Acute CVA (cerebrovascular accident)        Consultant(s) Notes Reviewed:  [  x] YES     Care Discussed with [ x ] Consultants, [ x ] Patient, [ x ] Family- dtr , [  ] HCP, [  ] , [  ] Social Service, [ x ] RN, [  ] Physical Therapy, [  ] Palliative Care Team  DVT PPX: [  ] Lovenox, [ x ] SC Heparin, [  ] Coumadin, [  ] Xarelto, [  ] Eliquis, [  ] Pradaxa, [  ] IV Heparin drip, [  ] SCD, [  ] Ambulation, [  ] Contraindicated 2/2 GI Bleed, [  ] Contraindicated 2/2  Bleed, [  ] Contraindicated 2/2 Brain Bleed  Advanced Directive: [ x ] None, [  ] DNR/DNI Patient is a 76y old  Male who presents with a chief complaint of weakness, UTI (08 May 2022 15:03)    HPI:  75 yo M with PMH parkinson's, HTN, HLD, GERD, prostate ca, s/p Cyber knife Rx  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 Wife Viki at bedside providing collateral information. Pt has been doing well until around 8:30 PM last night. He was very weak and was having difficulty getting up and walking due to the weakness.  Pt normally walks with a cane but was unable to get up last night. Daughter also noted that pt was having sweats but denies any fever or chills. Pt has been eating and drinking well. Pt denies any urinary urgency, frequency, dysuria or hematuria. Denies chest pain, palpitations, SOB, cough, abdominal pain, nausea, vomiting, diarrhea, headaches, dizziness.   Denies recent travel, recent antibiotic use, or sick contacts.    ED Course:   Vitals: BP: 123/53, HR: 74, Temp: 97.9 F, RR: 16, SpO2: 94% on RA   Labs: H/H: 12.9/37.9,  BUN/Cr: 25/1.80, GFR: 38, gluc: 228, lactate: 3.1  trop neg x1, COVID negative   UA: small bilirubin, trace ketone, 30 protein, moderate LE, 11-25 WBC, 3-5 RBC, few bacteria, few calcium oxalate   CXR: no acute lung pathology as per personal read, official read pending   EKG: junctional rhythm with PVCs, HR: 69   Received 1L NS bolus x1 and Rocephin in the ED  (05 May 2022 05:43)    INTERVAL HPI:  5/5/22: Patient was seen and examined at bedside. States he is feeling well. Denies any dysuria or polyuria. ABx dcd per ID reccs. PT recc VICTOR MANUEL. CT renal hunt pending.   5/6/22: Patient seen and examined at bedside. Patient denies any acute complaints. Denies CP, SOB, abd pain, N/V/D. Seems slightly confused but answering questions appropriately. Seen by Neurology-Dr Peterson -plan for CT scan Head.  5/7/22: Patient seen and examined at bedside. Denies any acute complaints. CT head and MRI Brain shows acute infarct. Patient now on remote tele.   5/8/22: Patient seen and examined at bedside. Denies any acute complaints. Pending Echo. No events on tele overnight.  5/9/22: Patient seen and examined at bedside. Denies any acute complaints. States feels stronger than previous days. TTE performed, results pending. No events on tele overnight. NSR 62 this morning.      OVERNIGHT EVENTS: None    Home Medications:  carbidopa-levodopa 25 mg-100 mg oral tablet: 1 tab(s) orally 2 times a day (05 May 2022 06:27)  citalopram 20 mg oral tablet: 1 tab(s) orally once a day (05 May 2022 06:27)  DilTIAZem (Eqv-Cardizem CD) 240 mg/24 hours oral capsule, extended release: 1 cap(s) orally once a day (05 May 2022 06:27)  enalapril 10 mg oral tablet: 1 tab(s) orally once a day (05 May 2022 06:27)  pantoprazole 40 mg oral delayed release tablet: 1 tab(s) orally once a day (before a meal) (05 May 2022 06:27)  primidone 50 mg oral tablet: 1 tab(s) orally once a day (05 May 2022 06:27)  simvastatin 40 mg oral tablet: 1 tab(s) orally once a day (at bedtime) (05 May 2022 06:27)  Vitamin C 1000 mg oral tablet: 1 tab(s) orally once a day (05 May 2022 06:27)      MEDICATIONS  (STANDING):  aspirin enteric coated 81 milliGRAM(s) Oral daily  carbidopa/levodopa  25/100 1 Tablet(s) Oral daily  carbidopa/levodopa  25/100 1 Tablet(s) Oral at bedtime  citalopram 20 milliGRAM(s) Oral daily  diltiazem    milliGRAM(s) Oral daily  enalapril 10 milliGRAM(s) Oral daily  heparin   Injectable 5000 Unit(s) SubCutaneous every 8 hours  pantoprazole    Tablet 40 milliGRAM(s) Oral before breakfast  polyethylene glycol 3350 17 Gram(s) Oral daily  primidone 50 milliGRAM(s) Oral daily  senna 2 Tablet(s) Oral at bedtime  simvastatin 40 milliGRAM(s) Oral at bedtime  tamsulosin 0.4 milliGRAM(s) Oral at bedtime    MEDICATIONS  (PRN):  acetaminophen     Tablet .. 650 milliGRAM(s) Oral every 6 hours PRN Temp greater or equal to 38C (100.4F), Mild Pain (1 - 3)  melatonin 3 milliGRAM(s) Oral at bedtime PRN Insomnia  ondansetron Injectable 4 milliGRAM(s) IV Push every 8 hours PRN Nausea and/or Vomiting      Allergies    No Known Allergies    Intolerances        Social History:  Tobacco: Denies  EtOH: Denies   Recreational drug use: Denies   Lives with: daughter and grandkids   Ambulates: with a cane   ADLs: with assistance   Vaccinations: Moderna 3/3 (05 May 2022 05:43)      REVIEW OF SYSTEMS: i feel better  CONSTITUTIONAL: No fever, No chills, No fatigue, No myalgia, No Body ache, No Weakness  EYES: No eye pain,  No visual disturbances, No discharge, NO Redness  ENMT:  No ear pain, No nose bleed, No vertigo; No sinus pain, NO throat pain, No Congestion  NECK: No pain, No stiffness  RESPIRATORY: No cough, NO wheezing, No  hemoptysis, NO  shortness of breath  CARDIOVASCULAR: No chest pain, palpitations  GASTROINTESTINAL: No abdominal pain, NO epigastric pain. No nausea, No vomiting; No diarrhea, No constipation. [ x ] NO BM  GENITOURINARY: No dysuria, No frequency, No urgency, No hematuria, NO incontinence  NEUROLOGICAL: No headaches, No dizziness, No numbness, No tingling, No tremors, No weakness  EXT: No Swelling, No Pain, No Edema  SKIN:  [ x] No itching, burning, rashes, or lesions   MUSCULOSKELETAL: No joint pain, No Jt swelling; No muscle pain, No back pain, No extremity pain  PSYCHIATRIC: No depression,  No anxiety,  No mood swings ,No difficulty sleeping at night  PAIN SCALE:  [ x] None  [  ] Other-  ROS Unable to obtain due to - [  ] Dementia  [  ] Lethargy [  ] Drowsy [  ] Sedated [  ] non verbal  REST OF REVIEW Of SYSTEM - [ x ] Normal     Vital Signs Last 24 Hrs  T(C): 36.5 (09 May 2022 04:44), Max: 36.7 (08 May 2022 11:52)  T(F): 97.7 (09 May 2022 04:44), Max: 98.1 (08 May 2022 11:52)  HR: 68 (09 May 2022 04:44) (68 - 84)  BP: 119/75 (09 May 2022 04:44) (119/75 - 133/74)  BP(mean): --  RR: 18 (09 May 2022 04:44) (18 - 18)  SpO2: 94% (09 May 2022 04:44) (92% - 94%)  Finger Stick      PHYSICAL EXAM: I feel better today  GENERAL:  [ x ] NAD , [ x ] well appearing, [  ] Agitated, [  ] Drowsy,  [  ] Lethargy, [  ] confused   HEAD:  [ x  ] Normal, [  ] Other  EYES:  [ x ] EOMI, [x  ] PERRLA, [ x ] conjunctiva and sclera clear normal, [  ] Other,  [  ] Pallor,[  ] Discharge  ENMT:  [ x ] Normal, [ x ] Moist mucous membranes, [x  ] Good dentition, [ x ] No Thrush + surgery scar neck, mouth   NECK:  [ x ] Supple, [x  ] No JVD, [ x ] Normal thyroid, [  ] Lymphadenopathy [  ] Other  CHEST/LUNG:  [ x ] Clear to auscultation bilaterally, [x  ] Breath Sounds equal  [x  ] poor effort  [x  ] No rales, [x  ] No rhonchi  [ x ]  No wheezing,   HEART:  [ x ] Regular rate and rhythm, [  ] tachycardia, [  ] Bradycardia,  [  ] irregular  [x  ] No murmurs, No rubs, No gallops, [  ] PPM in place (Mfr:  )  ABDOMEN:  [ x ] Soft, [ x ] Nontender, [ x ] Nondistended, [ x ] No mass, [ x ] Bowel sounds present, [  ] obese  NERVOUS SYSTEM:  [x ] Alert & Oriented X3, [x  ] Nonfocal  [  ] Confusion  [  ] Encephalopathic [  ] Sedated [  ] Unable to assess, [  ] Dementia [ x ] Other-Tremors hand   EXTREMITIES: [x  ] 2+ Peripheral Pulses, No clubbing, No cyanosis,  [  ] edema B/L lower EXT. [ x ] severe PVD stasis skin changes B/L Lower EXT, [  ] wound  LYMPH: No lymphadenopathy noted  SKIN:  [ x] No rashes or lesions, [  ] Pressure Ulcers, [  ] ecchymosis, [  ] Skin Tears, [  ] Other      DIET: Diet, Regular:   Supplement Feeding Modality:  Oral  Ensure Clear Cans or Servings Per Day:  1       Frequency:  Two Times a day  Ensure Enlive Cans or Servings Per Day:  1       Frequency:  Two Times a day (05-06-22 @ 10:58)      LABS:                        12.9   6.07  )-----------( 289      ( 09 May 2022 07:41 )             37.4     09 May 2022 07:41    140    |  105    |  19     ----------------------------<  104    3.7     |  29     |  0.78     Ca    10.1       09 May 2022 07:41    TPro  6.6    /  Alb  2.9    /  TBili  0.5    /  DBili  x      /  AST  19     /  ALT  15     /  AlkPhos  73     09 May 2022 07:41    Culture Results:   No growth to date. (05-05 @ 09:18)  Culture Results:   No growth to date. (05-05 @ 09:18)  Culture Results:   <10,000 CFU/mL Normal Urogenital Richa (05-05 @ 09:14)    Culture - Blood (collected 05 May 2022 09:18)  Source: .Blood Blood  Preliminary Report (06 May 2022 10:01):    No growth to date.    Culture - Blood (collected 05 May 2022 09:18)  Source: .Blood Blood  Preliminary Report (06 May 2022 10:01):    No growth to date.    Culture - Urine (collected 05 May 2022 09:14)  Source: Clean Catch Clean Catch (Midstream)  Final Report (07 May 2022 10:45):    <10,000 CFU/mL Normal Urogenital Richa         Anemia Panel:  Folate, Serum: 12.7 ng/mL (05-06-22 @ 10:57)  Vitamin B12, Serum: 599 pg/mL (05-06-22 @ 10:57)  Ferritin, Serum: 169 ng/mL (05-06-22 @ 10:57)  Iron Total, Serum: 56 ug/dL (05-06-22 @ 10:55)  Iron - Total Binding Capacity.: 254 ug/dL (05-06-22 @ 10:55)      Thyroid Panel:      RADIOLOGY & ADDITIONAL TESTS:  < from: US Duplex Carotid Arteries Complete, Bilateral (05.07.22 @ 14:49) >    TECHNIQUE: Grayscale, color and spectral Doppler examination of both   carotid arteries was performed.    FINDINGS:    There is diffuse intimal thickening. Calcific plaque is present in the   left common carotid artery and in the right carotid bulb and bifurcation.   No disturbed color-flow or elevated blood flow velocities are encountered.    Peak systolic velocities are as follows:    RIGHT:  PROX CCA = 52 cm/s  DIST CCA = 69 cm/s  PROX ICA = 41 cm/s  DIST ICA = 84 cm/s  ECA = 152 cm/s    LEFT:  PROX CCA = 90 cm/s  DIST CCA = 74 cm/s  PROX ICA = 38 cm/s  DIST ICA = 46 cm/s  ECA = 66 cm/s    Antegrade flow is noted within the left vertebral artery. The right   vertebral artery is not visualized.    IMPRESSION: Mild calcific plaque without duplex evidence of   hemodynamically significant stenosis of either carotid artery.    Nonvisualization of the right vertebral artery.    Measurement of carotid stenosis is based on velocity parameters that   correlate the residual internal carotid diameter with that of the more   distal vessel in accordance with a method such as the [place] American   Symptomatic Carotid Endarterectomy Trial (NASCET).    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

## 2022-05-09 NOTE — PROGRESS NOTE ADULT - PROBLEM SELECTOR PLAN 4
Chronic   - Continue home Carbidopa/Levodopa and Primidone  - Continue home Celexa daily - CT neck (4/22): Interval development of ill-defined enhancing lesion involving the proximal right sternocleidomastoid and possibly the deep parotid gland, just inferior to the mastoid tip, concerning for neoplasm recurrence. Correlate with tissue sampling.  - D/W Dtr at detail- Pt saw ENT Dr Boland last week & has appointment with Oncologist next week for treatment options

## 2022-05-09 NOTE — SWALLOW VFSS/MBS ASSESSMENT ADULT - SLP GENERAL OBSERVATIONS
Pt received sitting upright in MBS chair, +lateral view. Pt was awake/cooperative and agreeable to study. Pt on room air. Pt was able to follow simple commands for purposes of study. Pt denied pain pre and post study.

## 2022-05-09 NOTE — PROGRESS NOTE ADULT - SUBJECTIVE AND OBJECTIVE BOX
[facility] DIVISION of INFECTIOUS DISEASE  Ariel Connelly MD PhD, Priscilla Carranza MD, Karen Real MD, Kyleigh Glass MD, Naga Orozco MD  and providing coverage with Pasquale Reynolds MD  Providing Infectious Disease Consultations at [facility], [facility], [facility]    Office# [phone] to schedule follow up appointments  Answering Service for urgent calls or New Consults [phone]  Cell# to text for urgent issues Ariel Connelly [phone]     infectious diseases progress note:    ISIS CUETO is a 76y y. o. Male patient    No concerning overnight events    Allergies    No Known Allergies    Intolerances        ANTIBIOTICS/RELEVANT:  antimicrobials    immunologic:    OTHER:  acetaminophen     Tablet .. 650 milliGRAM(s) Oral every 6 hours PRN  aspirin enteric coated 81 milliGRAM(s) Oral daily  carbidopa/levodopa  25/100 1 Tablet(s) Oral daily  carbidopa/levodopa  25/100 1 Tablet(s) Oral at bedtime  citalopram 20 milliGRAM(s) Oral daily  diltiazem    milliGRAM(s) Oral daily  enalapril 10 milliGRAM(s) Oral daily  heparin   Injectable 5000 Unit(s) SubCutaneous every 8 hours  melatonin 3 milliGRAM(s) Oral at bedtime PRN  ondansetron Injectable 4 milliGRAM(s) IV Push every 8 hours PRN  pantoprazole    Tablet 40 milliGRAM(s) Oral before breakfast  polyethylene glycol 3350 17 Gram(s) Oral daily  primidone 50 milliGRAM(s) Oral daily  senna 2 Tablet(s) Oral at bedtime  simvastatin 40 milliGRAM(s) Oral at bedtime  tamsulosin 0.4 milliGRAM(s) Oral at bedtime      Objective:  Vital Signs Last 24 Hrs  T(C): 36.6 (09 May 2022 12:02), Max: 36.7 (08 May 2022 21:11)  T(F): 97.9 (09 May 2022 12:02), Max: 98 (08 May 2022 21:11)  HR: 81 (09 May 2022 12:02) (68 - 81)  BP: 93/58 (09 May 2022 12:02) (93/58 - 133/74)  BP(mean): --  RR: 18 (09 May 2022 12:02) (18 - 20)  SpO2: 92% (09 May 2022 12:02) (92% - 94%)    T(C): 36.6 (05-09-22 @ 12:02), Max: 37 (05-07-22 @ 20:55)  T(C): 36.6 (05-09-22 @ 12:02), Max: 37 (05-07-22 @ 20:55)  T(C): 36.6 (05-09-22 @ 12:02), Max: 37 (05-06-22 @ 05:00)    PHYSICAL EXAM:  HEENT: NC atraumatic  Neck: supple  Respiratory: no accessory muscle use, breathing comfortably  Cardiovascular: distant  Gastrointestinal: normal appearing, nondistended  Extremities: no clubbing, no cyanosis,        LABS:                          12.9   6.07  )-----------( 289      ( 09 May 2022 07:41 )             37.4       WBC  6.07 05-09 @ 07:41  6.66 05-08 @ 08:09  6.24 05-07 @ 07:36  7.93 05-06 @ 08:23  7.48 05-05 @ 08:48  10.33 05-05 @ 02:15      05-09    140  |  105  |  19  ----------------------------<  104<H>  3.7   |  29  |  0.78    Ca    10.1      09 May 2022 07:41    TPro  6.6  /  Alb  2.9<L>  /  TBili  0.5  /  DBili  x   /  AST  19  /  ALT  15  /  AlkPhos  73  05-09      Creatinine, Serum: 0.78 mg/dL (05-09-22 @ 07:41)  Creatinine, Serum: 0.73 mg/dL (05-08-22 @ 08:09)  Creatinine, Serum: 0.74 mg/dL (05-07-22 @ 07:36)  Creatinine, Serum: 0.69 mg/dL (05-06-22 @ 08:23)  Creatinine, Serum: 1.10 mg/dL (05-05-22 @ 08:48)  Creatinine, Serum: 1.80 mg/dL (05-05-22 @ 02:15)                INFLAMMATORY MARKERS  Auto Neutrophil #: 4.69 K/uL (05-09-22 @ 07:41)  Auto Lymphocyte #: 0.37 K/uL (05-09-22 @ 07:41)  Auto Neutrophil #: 5.33 K/uL (05-08-22 @ 08:09)  Auto Lymphocyte #: 0.34 K/uL (05-08-22 @ 08:09)  Auto Neutrophil #: 5.15 K/uL (05-07-22 @ 07:36)  Auto Lymphocyte #: 0.31 K/uL (05-07-22 @ 07:36)  Auto Neutrophil #: 6.81 K/uL (05-06-22 @ 08:23)  Auto Lymphocyte #: 0.37 K/uL (05-06-22 @ 08:23)  Auto Neutrophil #: 6.18 K/uL (05-05-22 @ 08:48)  Auto Lymphocyte #: 0.41 K/uL (05-05-22 @ 08:48)  Auto Lymphocyte #: 0.31 K/uL (05-05-22 @ 02:15)  Auto Neutrophil #: 9.61 K/uL (05-05-22 @ 02:15)    Lactate, Blood: 1.5 mmol/L (05-05-22 @ 06:18)  Lactate, Blood: 3.1 mmol/L (05-05-22 @ 02:15)    Auto Eosinophil #: 0.30 K/uL (05-09-22 @ 07:41)  Auto Eosinophil #: 0.21 K/uL (05-08-22 @ 08:09)  Auto Eosinophil #: 0.07 K/uL (05-07-22 @ 07:36)  Auto Eosinophil #: 0.02 K/uL (05-06-22 @ 08:23)  Auto Eosinophil #: 0.05 K/uL (05-05-22 @ 08:48)  Auto Eosinophil #: 0.00 K/uL (05-05-22 @ 02:15)        Procalcitonin, Serum: 0.06 ng/mL (05-06-22 @ 08:23)            Ferritin, Serum: 169 ng/mL (05-06-22 @ 10:57)        INR: 1.06 ratio (05-05-22 @ 02:15)  Activated Partial Thromboplastin Time: 28.7 sec (05-05-22 @ 02:15)          MICROBIOLOGY:    Culture - Urine (05.05.22 @ 09:14)    Specimen Source: Clean Catch Clean Catch (Midstream)    Culture Results:   <10,000 CFU/mL Normal Urogenital Richa        RADIOLOGY & ADDITIONAL STUDIES:

## 2022-05-09 NOTE — PROGRESS NOTE ADULT - PROBLEM SELECTOR PLAN 6
- CT neck (4/22): Interval development of ill-defined enhancing lesion involving the proximal right sternocleidomastoid and possibly the deep parotid gland, just inferior to the mastoid tip, concerning for neoplasm recurrence. Correlate with tissue sampling.  - D/W Dtr at detail- Pt saw ENT Dr Boland last week & has appointment with Oncologist next week for treatment options - hx parkinson's w/ tremors  - CT head non contrast: subacute lacunar infarct-Its new from last CT scan from 10/21  - MR head non contrast: as above  - continue asa 81mg po qd  - continue simvastatin 40mg po qd  - carotid doppler results as above  - Neurology following, Dr Peterson

## 2022-05-09 NOTE — PROGRESS NOTE ADULT - PROBLEM SELECTOR PLAN 9
VTE PPX: Heparin 5000u q8hrs      #GOC  - Palliative consult     #Dispo   - PT eval, rec VICTOR MANUEL, family wants d/c home with home PT VTE PPX: Heparin 5000u q8hrs      #GOC  - Palliative consult     #Dispo   - PT eval, rec VICTOR MANUEL, family wants d/c home with home PT  - transport wheelchair rx printed and placed in pt chart, see chart note for transport wheelchair info

## 2022-05-10 NOTE — PROGRESS NOTE ADULT - PROBLEM SELECTOR PLAN 9
VTE PPX: Heparin 5000u q8hrs      #GOC  - Palliative consult     #Dispo   - PT eval, rec VICTOR MANUEL, family wants d/c home with home PT  - transport wheelchair rx printed and placed in pt chart, see chart note for transport wheelchair info

## 2022-05-10 NOTE — PROGRESS NOTE ADULT - ATTENDING COMMENTS
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 Admitted for possible  UTI.  UTI ruled Out .  Pt seen, examined, case & care plan d/w pt, residents at detail.  Palliative care eval done-pt wants full code.  PT Eval---> VICTOR MANUEL, pt & family wants pt to go home. HCPT arranged.  D/W Dtr at detail about out pt follow up with Oncologist as per ENT this  week to discuss about Rx with recurrence of ca   Urology- Eval Dr Alcaraz  about Left UVJ stone -on Flomax daily.  D/C Home today.  Total d/c  care time is 60 minutes.

## 2022-05-10 NOTE — PROGRESS NOTE ADULT - SUBJECTIVE AND OBJECTIVE BOX
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 Wife Viki at bedside providing collateral information. Pt has been doing well until around 8:30 PM last night. He was very weak and was having difficulty getting up and walking due to the weakness.  Pt normally walks with a cane but was unable to get up last night. Daughter also noted that pt was having sweats but denies any fever or chills. Pt has been eating and drinking well. Pt denies any urinary urgency, frequency, dysuria or hematuria. Denies chest pain, palpitations, SOB, cough, abdominal pain, nausea, vomiting, diarrhea, headaches, dizziness.   Denies recent travel, recent antibiotic use, or sick contacts.    ED Course:   Vitals: BP: 123/53, HR: 74, Temp: 97.9 F, RR: 16, SpO2: 94% on RA   Labs: H/H: 12.9/37.9,  BUN/Cr: 25/1.80, GFR: 38, gluc: 228, lactate: 3.1  trop neg x1, COVID negative   UA: small bilirubin, trace ketone, 30 protein, moderate LE, 11-25 WBC, 3-5 RBC, few bacteria, few calcium oxalate   CXR: no acute lung pathology as per personal read, official read pending   EKG: junctional rhythm with PVCs, HR: 69   Received 1L NS bolus x1 and Rocephin in the ED  (05 May 2022 05:43)      INTERVAL HPI:  5/5/22: Patient was seen and examined at bedside. States he is feeling well. Denies any dysuria or polyuria. ABx dcd per ID reccs. PT recc VICTOR MANUEL. CT renal hunt pending.   5/6/22: Patient seen and examined at bedside. Patient denies any acute complaints. Denies CP, SOB, abd pain, N/V/D. Seems slightly confused but answering questions appropriately. Seen by Neurology-Dr Peterson -plan for CT scan Head.  5/7/22: Patient seen and examined at bedside. Denies any acute complaints. CT head and MRI Brain shows acute infarct. Patient now on remote tele.   5/8/22: Patient seen and examined at bedside. Denies any acute complaints. Pending Echo. No events on tele overnight.  5/9/22: Patient seen and examined at bedside. Denies any acute complaints. States feels stronger than previous days. TTE performed, results pending. No events on tele overnight. NSR 62 this morning.  5/10/22: Patient seen and examined at bedside. Denies any acute concerns. Feels ok. TTE performed, LV diastolic dysfunction, normal EF.     OVERNIGHT EVENTS: No acute overnight events    Home Medications:  aspirin 81 mg oral delayed release tablet: 1 tab(s) orally once a day (09 May 2022 11:00)  carbidopa-levodopa 25 mg-100 mg oral tablet: 1 tab(s) orally 2 times a day (05 May 2022 06:27)  citalopram 20 mg oral tablet: 1 tab(s) orally once a day (05 May 2022 06:27)  DilTIAZem (Eqv-Cardizem CD) 240 mg/24 hours oral capsule, extended release: 1 cap(s) orally once a day (05 May 2022 06:27)  enalapril 10 mg oral tablet: 1 tab(s) orally once a day (05 May 2022 06:27)  Flomax 0.4 mg oral capsule: 1 cap(s) orally once a day (at bedtime) (09 May 2022 11:00)  pantoprazole 40 mg oral delayed release tablet: 1 tab(s) orally once a day (before a meal) (05 May 2022 06:27)  primidone 50 mg oral tablet: 1 tab(s) orally once a day (05 May 2022 06:27)  simvastatin 40 mg oral tablet: 1 tab(s) orally once a day (at bedtime) (05 May 2022 06:27)  Vitamin C 1000 mg oral tablet: 1 tab(s) orally once a day (05 May 2022 06:27)      MEDICATIONS  (STANDING):  aspirin enteric coated 81 milliGRAM(s) Oral daily  carbidopa/levodopa  25/100 1 Tablet(s) Oral daily  carbidopa/levodopa  25/100 1 Tablet(s) Oral at bedtime  citalopram 20 milliGRAM(s) Oral daily  diltiazem    milliGRAM(s) Oral daily  enalapril 10 milliGRAM(s) Oral daily  heparin   Injectable 5000 Unit(s) SubCutaneous every 8 hours  pantoprazole   Suspension 40 milliGRAM(s) Oral daily  polyethylene glycol 3350 17 Gram(s) Oral daily  primidone 50 milliGRAM(s) Oral daily  senna 2 Tablet(s) Oral at bedtime  simvastatin 40 milliGRAM(s) Oral at bedtime  tamsulosin 0.4 milliGRAM(s) Oral at bedtime    MEDICATIONS  (PRN):  acetaminophen     Tablet .. 650 milliGRAM(s) Oral every 6 hours PRN Temp greater or equal to 38C (100.4F), Mild Pain (1 - 3)  melatonin 3 milliGRAM(s) Oral at bedtime PRN Insomnia  ondansetron Injectable 4 milliGRAM(s) IV Push every 8 hours PRN Nausea and/or Vomiting      Allergies    No Known Allergies    Intolerances        Social History:  Tobacco: Denies  EtOH: Denies   Recreational drug use: Denies   Lives with: daughter and grandkids   Ambulates: with a cane   ADLs: with assistance   Vaccinations: Moderna 3/3 (05 May 2022 05:43)    REVIEW OF SYSTEMS: i feel ok  CONSTITUTIONAL: No fever, No chills, No fatigue, No myalgia, No Body ache, No Weakness  EYES: No eye pain,  No visual disturbances, No discharge, NO Redness  ENMT:  No ear pain, No nose bleed, No vertigo; No sinus pain, NO throat pain, No Congestion  NECK: No pain, No stiffness  RESPIRATORY: No cough, NO wheezing, No  hemoptysis, NO  shortness of breath  CARDIOVASCULAR: No chest pain, palpitations  GASTROINTESTINAL: No abdominal pain, NO epigastric pain. No nausea, No vomiting; No diarrhea, No constipation. [ x ] NO BM  GENITOURINARY: No dysuria, No frequency, No urgency, No hematuria, NO incontinence  NEUROLOGICAL: No headaches, No dizziness, No numbness, No tingling, No tremors, No weakness  EXT: No Swelling, No Pain, No Edema  SKIN:  [ x] No itching, burning, rashes, or lesions   MUSCULOSKELETAL: No joint pain, No Jt swelling; No muscle pain, No back pain, No extremity pain  PSYCHIATRIC: No depression,  No anxiety,  No mood swings ,No difficulty sleeping at night  PAIN SCALE:  [ x] None  [  ] Other-  ROS Unable to obtain due to - [  ] Dementia  [  ] Lethargy [  ] Drowsy [  ] Sedated [  ] non verbal  REST OF REVIEW Of SYSTEM - [ x ] Normal     Vital Signs Last 24 Hrs  T(C): 36.3 (10 May 2022 05:05), Max: 37.1 (09 May 2022 20:00)  T(F): 97.4 (10 May 2022 05:05), Max: 98.8 (09 May 2022 20:00)  HR: 76 (10 May 2022 05:05) (76 - 84)  BP: 134/72 (10 May 2022 05:05) (93/58 - 134/72)  BP(mean): --  RR: 19 (10 May 2022 05:05) (18 - 19)  SpO2: 92% (10 May 2022 05:05) (91% - 92%)    PHYSICAL EXAM: I feel better today  GENERAL:  [ x ] NAD , [ x ] well appearing, [  ] Agitated, [  ] Drowsy,  [  ] Lethargy, [  ] confused   HEAD:  [ x  ] Normal, [  ] Other  EYES:  [ x ] EOMI, [x  ] PERRLA, [ x ] conjunctiva and sclera clear normal, [  ] Other,  [  ] Pallor,[  ] Discharge  ENMT:  [ x ] Normal, [ x ] Moist mucous membranes, [x  ] Good dentition, [ x ] No Thrush + surgery scar neck, mouth   NECK:  [ x ] Supple, [x  ] No JVD, [ x ] Normal thyroid, [  ] Lymphadenopathy [  ] Other  CHEST/LUNG:  [ x ] Clear to auscultation bilaterally, [x  ] Breath Sounds equal  [x  ] poor effort  [x  ] No rales, [x  ] No rhonchi  [ x ]  No wheezing,   HEART:  [ x ] Regular rate and rhythm, [  ] tachycardia, [  ] Bradycardia,  [  ] irregular  [x  ] No murmurs, No rubs, No gallops, [  ] PPM in place (Mfr:  )  ABDOMEN:  [ x ] Soft, [ x ] Nontender, [ x ] Nondistended, [ x ] No mass, [ x ] Bowel sounds present, [  ] obese  NERVOUS SYSTEM:  [x ] Alert & Oriented X3, [x  ] Nonfocal  [  ] Confusion  [  ] Encephalopathic [  ] Sedated [  ] Unable to assess, [  ] Dementia [ x ] Other-Tremors hand   EXTREMITIES: [x  ] 2+ Peripheral Pulses, No clubbing, No cyanosis,  [  ] edema B/L lower EXT. [ x ] severe PVD stasis skin changes B/L Lower EXT, [  ] wound  LYMPH: No lymphadenopathy noted  SKIN:  [ x] No rashes or lesions, [  ] Pressure Ulcers, [  ] ecchymosis, [  ] Skin Tears, [  ] Other      DIET:     LABS:                        12.4   5.80  )-----------( 291      ( 10 May 2022 07:29 )             36.7     10 May 2022 07:29    140    |  104    |  25     ----------------------------<  97     3.6     |  30     |  0.90     Ca    9.9        10 May 2022 07:29    TPro  6.5    /  Alb  2.9    /  TBili  0.4    /  DBili  x      /  AST  18     /  ALT  14     /  AlkPhos  74     10 May 2022 07:29          Culture Results:   No Growth Final (05-05 @ 09:18)  Culture Results:   No Growth Final (05-05 @ 09:18)  Culture Results:   <10,000 CFU/mL Normal Urogenital Richa (05-05 @ 09:14)          Culture - Blood (collected 05 May 2022 09:18)  Source: .Blood Blood  Final Report (10 May 2022 10:00):    No Growth Final    Culture - Blood (collected 05 May 2022 09:18)  Source: .Blood Blood  Final Report (10 May 2022 10:00):    No Growth Final    Culture - Urine (collected 05 May 2022 09:14)  Source: Clean Catch Clean Catch (Midstream)  Final Report (07 May 2022 10:45):    <10,000 CFU/mL Normal Urogenital Richa         Anemia Panel:  Folate, Serum: 12.7 ng/mL (05-06-22 @ 10:57)  Vitamin B12, Serum: 599 pg/mL (05-06-22 @ 10:57)  Ferritin, Serum: 169 ng/mL (05-06-22 @ 10:57)  Iron Total, Serum: 56 ug/dL (05-06-22 @ 10:55)  Iron - Total Binding Capacity.: 254 ug/dL (05-06-22 @ 10:55)      RADIOLOGY & ADDITIONAL TESTS:      HEALTH ISSUES - PROBLEM Dx:  Acute UTI    HTN (hypertension)    HLD (hyperlipidemia)    GERD (gastroesophageal reflux disease)    Need for prophylactic measure    Parkinson disease    Acute UTI    Acute UTI    Malignant neoplasm of tongue, 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    Weakness    Acute CVA (cerebrovascular accident)        Consultant(s) Notes Reviewed:  [  x] YES     Care Discussed with [ x ] Consultants, [ x ] Patient, [ x ] Family- dtr , [  ] HCP, [  ] , [  ] Social Service, [ x ] RN, [  ] Physical Therapy, [  ] Palliative Care Team  DVT PPX: [  ] Lovenox, [ x ] SC Heparin, [  ] Coumadin, [  ] Xarelto, [  ] Eliquis, [  ] Pradaxa, [  ] IV Heparin drip, [  ] SCD, [  ] Ambulation, [  ] Contraindicated 2/2 GI Bleed, [  ] Contraindicated 2/2  Bleed, [  ] Contraindicated 2/2 Brain Bleed  Advanced Directive: [ x ] None, [  ] DNR/DNI   Patient is a 76y old  Male who presents with a chief complaint of weakness, UTI (10 May 2022 10:02)    HPI:  77 yo M with PMH parkinson's, HTN, HLD, GERD, prostate ca, s/p Cyber knife Rx  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 Wife Viki at bedside providing collateral information. Pt has been doing well until around 8:30 PM last night. He was very weak and was having difficulty getting up and walking due to the weakness.  Pt normally walks with a cane but was unable to get up last night. Daughter also noted that pt was having sweats but denies any fever or chills. Pt has been eating and drinking well. Pt denies any urinary urgency, frequency, dysuria or hematuria. Denies chest pain, palpitations, SOB, cough, abdominal pain, nausea, vomiting, diarrhea, headaches, dizziness.   Denies recent travel, recent antibiotic use, or sick contacts.    ED Course:   Vitals: BP: 123/53, HR: 74, Temp: 97.9 F, RR: 16, SpO2: 94% on RA   Labs: H/H: 12.9/37.9,  BUN/Cr: 25/1.80, GFR: 38, gluc: 228, lactate: 3.1  trop neg x1, COVID negative   UA: small bilirubin, trace ketone, 30 protein, moderate LE, 11-25 WBC, 3-5 RBC, few bacteria, few calcium oxalate   CXR: no acute lung pathology as per personal read, official read pending   EKG: junctional rhythm with PVCs, HR: 69   Received 1L NS bolus x1 and Rocephin in the ED  (05 May 2022 05:43)      INTERVAL HPI:  5/5/22: Patient was seen and examined at bedside. States he is feeling well. Denies any dysuria or polyuria. ABx dcd per ID reccs. PT recc VICTOR MANUEL. CT renal hunt pending.   5/6/22: Patient seen and examined at bedside. Patient denies any acute complaints. Denies CP, SOB, abd pain, N/V/D. Seems slightly confused but answering questions appropriately. Seen by Neurology-Dr Peterson -plan for CT scan Head.  5/7/22: Patient seen and examined at bedside. Denies any acute complaints. CT head and MRI Brain shows acute infarct. Patient now on remote tele.   5/8/22: Patient seen and examined at bedside. Denies any acute complaints. Pending Echo. No events on tele overnight.  5/9/22: Patient seen and examined at bedside. Denies any acute complaints. States feels stronger than previous days. TTE performed, results pending. No events on tele overnight. NSR 62 this morning.  5/10/22: Patient seen and examined at bedside. Denies any acute concerns. Feels ok. TTE performed, LV diastolic dysfunction, normal EF.  PT saw pt---> VICTOR MANUEL , pt & family wants to go home. HCPT arranged,    OVERNIGHT EVENTS: No acute overnight events    Home Medications:  aspirin 81 mg oral delayed release tablet: 1 tab(s) orally once a day (09 May 2022 11:00)  carbidopa-levodopa 25 mg-100 mg oral tablet: 1 tab(s) orally 2 times a day (05 May 2022 06:27)  citalopram 20 mg oral tablet: 1 tab(s) orally once a day (05 May 2022 06:27)  DilTIAZem (Eqv-Cardizem CD) 240 mg/24 hours oral capsule, extended release: 1 cap(s) orally once a day (05 May 2022 06:27)  enalapril 10 mg oral tablet: 1 tab(s) orally once a day (05 May 2022 06:27)  Flomax 0.4 mg oral capsule: 1 cap(s) orally once a day (at bedtime) (09 May 2022 11:00)  pantoprazole 40 mg oral delayed release tablet: 1 tab(s) orally once a day (before a meal) (05 May 2022 06:27)  primidone 50 mg oral tablet: 1 tab(s) orally once a day (05 May 2022 06:27)  simvastatin 40 mg oral tablet: 1 tab(s) orally once a day (at bedtime) (05 May 2022 06:27)  Vitamin C 1000 mg oral tablet: 1 tab(s) orally once a day (05 May 2022 06:27)      MEDICATIONS  (STANDING):  aspirin enteric coated 81 milliGRAM(s) Oral daily  carbidopa/levodopa  25/100 1 Tablet(s) Oral daily  carbidopa/levodopa  25/100 1 Tablet(s) Oral at bedtime  citalopram 20 milliGRAM(s) Oral daily  diltiazem    milliGRAM(s) Oral daily  enalapril 10 milliGRAM(s) Oral daily  heparin   Injectable 5000 Unit(s) SubCutaneous every 8 hours  pantoprazole   Suspension 40 milliGRAM(s) Oral daily  polyethylene glycol 3350 17 Gram(s) Oral daily  primidone 50 milliGRAM(s) Oral daily  senna 2 Tablet(s) Oral at bedtime  simvastatin 40 milliGRAM(s) Oral at bedtime  tamsulosin 0.4 milliGRAM(s) Oral at bedtime    MEDICATIONS  (PRN):  acetaminophen     Tablet .. 650 milliGRAM(s) Oral every 6 hours PRN Temp greater or equal to 38C (100.4F), Mild Pain (1 - 3)  melatonin 3 milliGRAM(s) Oral at bedtime PRN Insomnia  ondansetron Injectable 4 milliGRAM(s) IV Push every 8 hours PRN Nausea and/or Vomiting      Allergies    No Known Allergies    Intolerances        Social History:  Tobacco: Denies  EtOH: Denies   Recreational drug use: Denies   Lives with: daughter and grandkids   Ambulates: with a cane   ADLs: with assistance   Vaccinations: Moderna 3/3 (05 May 2022 05:43)    REVIEW OF SYSTEMS: i feel ok  CONSTITUTIONAL: No fever, No chills, No fatigue, No myalgia, No Body ache, No Weakness  EYES: No eye pain,  No visual disturbances, No discharge, NO Redness  ENMT:  No ear pain, No nose bleed, No vertigo; No sinus pain, NO throat pain, No Congestion  NECK: No pain, No stiffness  RESPIRATORY: No cough, NO wheezing, No  hemoptysis, NO  shortness of breath  CARDIOVASCULAR: No chest pain, palpitations  GASTROINTESTINAL: No abdominal pain, NO epigastric pain. No nausea, No vomiting; No diarrhea, No constipation. [ x ] NO BM  GENITOURINARY: No dysuria, No frequency, No urgency, No hematuria, NO incontinence  NEUROLOGICAL: No headaches, No dizziness, No numbness, No tingling, No tremors, No weakness  EXT: No Swelling, No Pain, No Edema  SKIN:  [ x] No itching, burning, rashes, or lesions   MUSCULOSKELETAL: No joint pain, No Jt swelling; No muscle pain, No back pain, No extremity pain  PSYCHIATRIC: No depression,  No anxiety,  No mood swings ,No difficulty sleeping at night  PAIN SCALE:  [ x] None  [  ] Other-  ROS Unable to obtain due to - [  ] Dementia  [  ] Lethargy [  ] Drowsy [  ] Sedated [  ] non verbal  REST OF REVIEW Of SYSTEM - [ x ] Normal     Vital Signs Last 24 Hrs  T(C): 36.3 (10 May 2022 05:05), Max: 37.1 (09 May 2022 20:00)  T(F): 97.4 (10 May 2022 05:05), Max: 98.8 (09 May 2022 20:00)  HR: 76 (10 May 2022 05:05) (76 - 84)  BP: 134/72 (10 May 2022 05:05) (93/58 - 134/72)  BP(mean): --  RR: 19 (10 May 2022 05:05) (18 - 19)  SpO2: 92% (10 May 2022 05:05) (91% - 92%)    PHYSICAL EXAM: I feel better today  GENERAL:  [ x ] NAD , [ x ] well appearing, [  ] Agitated, [  ] Drowsy,  [  ] Lethargy, [  ] confused   HEAD:  [ x  ] Normal, [  ] Other  EYES:  [ x ] EOMI, [x  ] PERRLA, [ x ] conjunctiva and sclera clear normal, [  ] Other,  [  ] Pallor,[  ] Discharge  ENMT:  [ x ] Normal, [ x ] Moist mucous membranes, [x  ] Good dentition, [ x ] No Thrush + surgery scar neck, mouth   NECK:  [ x ] Supple, [x  ] No JVD, [ x ] Normal thyroid, [  ] Lymphadenopathy [  ] Other  CHEST/LUNG:  [ x ] Clear to auscultation bilaterally, [x  ] Breath Sounds equal  [x ] poor effort  [x  ] No rales, [x  ] No rhonchi  [ x ]  No wheezing,   HEART:  [ x ] Regular rate and rhythm, [  ] tachycardia, [  ] Bradycardia,  [  ] irregular  [x  ] No murmurs, No rubs, No gallops, [  ] PPM in place (Mfr:  )  ABDOMEN:  [ x ] Soft, [ x ] Nontender, [ x ] Nondistended, [ x ] No mass, [ x ] Bowel sounds present, [  ] obese  NERVOUS SYSTEM:  [x ] Alert & Oriented X3, [x  ] Nonfocal  [  ] Confusion  [  ] Encephalopathic [  ] Sedated [  ] Unable to assess, [  ] Dementia [ x ] Other-Tremors hand   EXTREMITIES: [x  ] 2+ Peripheral Pulses, No clubbing, No cyanosis,  [  ] edema B/L lower EXT. [ x ] severe PVD stasis skin changes B/L Lower EXT, [  ] wound  LYMPH: No lymphadenopathy noted  SKIN:  [ x] No rashes or lesions, [  ] Pressure Ulcers, [  ] ecchymosis, [  ] Skin Tears, [  ] Other      DIET: Soft Bite size , Moderately thickened.     LABS:                        12.4   5.80  )-----------( 291      ( 10 May 2022 07:29 )             36.7     10 May 2022 07:29    140    |  104    |  25     ----------------------------<  97     3.6     |  30     |  0.90     Ca    9.9        10 May 2022 07:29    TPro  6.5    /  Alb  2.9    /  TBili  0.4    /  DBili  x      /  AST  18     /  ALT  14     /  AlkPhos  74     10 May 2022 07:29          Culture Results:   No Growth Final (05-05 @ 09:18)  Culture Results:   No Growth Final (05-05 @ 09:18)  Culture Results:   <10,000 CFU/mL Normal Urogenital Richa (05-05 @ 09:14)          Culture - Blood (collected 05 May 2022 09:18)  Source: .Blood Blood  Final Report (10 May 2022 10:00):    No Growth Final    Culture - Blood (collected 05 May 2022 09:18)  Source: .Blood Blood  Final Report (10 May 2022 10:00):    No Growth Final    Culture - Urine (collected 05 May 2022 09:14)  Source: Clean Catch Clean Catch (Midstream)  Final Report (07 May 2022 10:45):    <10,000 CFU/mL Normal Urogenital Richa         Anemia Panel:  Folate, Serum: 12.7 ng/mL (05-06-22 @ 10:57)  Vitamin B12, Serum: 599 pg/mL (05-06-22 @ 10:57)  Ferritin, Serum: 169 ng/mL (05-06-22 @ 10:57)  Iron Total, Serum: 56 ug/dL (05-06-22 @ 10:55)  Iron - Total Binding Capacity.: 254 ug/dL (05-06-22 @ 10:55)      RADIOLOGY & ADDITIONAL TESTS:  < from: TTE Echo Complete w/o Contrast w/ Doppler (05.08.22 @ 14:56) >      IMPRESSION:  Technically difficult study  Normal left ventricular internal dimensions and systolic function,   estimated LVEF of 60-65%.  Grossly normal RV size and systolic function.  Normal trileaflet aortic valve, without AI.  Trace physiologic MR  Mild TR.    < end of copied text >    < from: US Duplex Carotid Arteries Complete, Bilateral (05.07.22 @ 14:49) >  IMPRESSION: Mild calcific plaque without duplex evidence of   hemodynamically significant stenosis of either carotid artery.    Nonvisualization of the right vertebral artery.    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

## 2022-05-10 NOTE — PROGRESS NOTE ADULT - PROVIDER SPECIALTY LIST ADULT
Neurology
Infectious Disease
Neurology
Infectious Disease
Neurology
Neurology
Infectious Disease
Internal Medicine

## 2022-05-10 NOTE — PROGRESS NOTE ADULT - ASSESSMENT
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 Pt denies any urinary urgency, frequency, dysuria or hematuria, chest pain, palpitations, SOB, cough, abdominal pain, nausea, vomiting, diarrhea, headaches, dizziness.     RECOMMENDATIONS  PT with weakness, afebrile, no sig leukocytosis, but on exam does have crackles anteriorly, would be at risk for aspiration. Not convinced that admission is being driven by an acute infectious process that would benefit from abx and may have just been dehydrated.     PT with low procal, continues to be afebrile, all micro NGTD    rec obs off abx    Thank you for consulting us and involving us in the management of this most interesting and challenging case.  We will follow along in the care of this patient. Please call us at [phone] or text me directly on my cell# at [phone] with any concerns.     
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 Pt denies any urinary urgency, frequency, dysuria or hematuria, chest pain, palpitations, SOB, cough, abdominal pain, nausea, vomiting, diarrhea, headaches, dizziness.     RECOMMENDATIONS  PT with weakness, afebrile, no sig leukocytosis, but on exam does have crackles anteriorly, would be at risk for aspiration. Not convinced that admission is being driven by an acute infectious process that would benefit from abx and may have just been dehydrated.     PT with low procal, continues to be afebrile, all micro NGTD    discussed case with Dr Real after results of CT available and have continued ceftriaxone but anticipate this can be a short course with 5/7 am as potential last dose of abx  will discuss case with Dr Real    Thank you for consulting us and involving us in the management of this most interesting and challenging case.  We will follow along in the care of this patient. Please call us at [phone] or text me directly on my cell# at [phone] with any concerns.     
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 Pt denies any urinary urgency, frequency, dysuria or hematuria, chest pain, palpitations, SOB, cough, abdominal pain, nausea, vomiting, diarrhea, headaches, dizziness.     RECOMMENDATIONS  PT with weakness, afebrile, no sig leukocytosis, but on exam does have crackles anteriorly, would be at risk for aspiration.   Low suspicion for acute infectious process that would benefit from abx and may have just been dehydrated.     PT with low procal, continues to be afebrile, all micro NGTD    C/w monitoring off ABx    Infectious Diseases will continue to follow. Please call with any questions.   Karen Real M.D.  [facility], Division of Infectious Diseases [phone]  
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 Admitted for UTI, found to have UVJ stone.
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 Admitted for UTI, found to have UVJ stone.
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 Admitted for UTI. 
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 Admitted for UTI, found to have UVJ stone.
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 Admitted for UTI, found to have UVJ stone.
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 Admitted for UTI, found to have UVJ stone.

## 2022-05-10 NOTE — PROGRESS NOTE ADULT - PROBLEM SELECTOR PLAN 6
- hx parkinson's w/ tremors  - CT head non contrast: subacute lacunar infarct-Its new from last CT scan from 10/21  - MR head non contrast: as above  - continue asa 81mg po qd, simvastatin 40mg po qd  - carotid doppler results as above  - Neurology following, Dr Peterson - hx parkinson's w/ tremors  - CT head non contrast: subacute lacunar infarct-Its new from last CT scan from 10/21  - MR head non contrast: New CVA   - continue asa 81mg po qd, simvastatin 40mg po qd  - carotid doppler results as above  - Neurology following, Dr Peterson

## 2022-05-10 NOTE — PROGRESS NOTE ADULT - PROBLEM SELECTOR PLAN 4
- CT neck (4/22): Interval development of ill-defined enhancing lesion involving the proximal right sternocleidomastoid and possibly the deep parotid gland, just inferior to the mastoid tip, concerning for neoplasm recurrence. Correlate with tissue sampling.  - D/W Dtr at detail- Pt saw ENT Dr Boland last week & has appointment with Oncologist next week for treatment options CT renal stone hunt: trace L hydronephrosis. 4 mm left UVJ calculus. Nonobstructing right intrarenal calculus.  - s/p IV Rocephin, completed 5/7 AM  - urine culture, blood cultures NG   - Urology Dr Alcaraz - continue tamsulosin 0.4 mg qHS   - Urology Dr Leach - patient to f/u outpatient   - ID Dr. Connelly following, f/u recs - monitor off abx for now as UTI ruled out , Neg culture data

## 2022-05-10 NOTE — CONSULT NOTE ADULT - ASSESSMENT
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     - MR consistent with subacute CVA  - continue aspirin and statin  - EKG NSR 69bpm, 1st deg av block and PVCs; no acute ischemia  - no acute events on tele  - Patient is not complaining of any cardiac symptoms, denies cp, palpitations, sob  - No meaningful evidence of volume overload.  - TTE showing LVEF 60-65%, normal RV systolic function, diastolic dysfunction   - BP well controlled, monitor routine hemodynamics.  - Continue medications enalapril, cardizem, simvastatin, aspirin.  - Monitor and replete lytes, keep K>4, Mg>2.  - All other workup per primary team.  - CVA workup as per neuro; outpatient cardiology follow up

## 2022-05-10 NOTE — CONSULT NOTE ADULT - SUBJECTIVE AND OBJECTIVE BOX
[facility]         Lucio Romero, Cheyanne, Adam, Alexis, Kadeem Glass        [phone] (office)    Reason for Consult:    Interval HPI: Patient seen and examined at bedside. No acute events overnight.     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 Wife Viki at bedside providing collateral information. Pt has been doing well until around 8:30 PM last night. He was very weak and was having difficulty getting up and walking due to the weakness.  Pt normally walks with a cane but was unable to get up last night. Daughter also noted that pt was having sweats but denies any fever or chills. Pt has been eating and drinking well. Pt denies any urinary urgency, frequency, dysuria or hematuria. Denies chest pain, palpitations, SOB, cough, abdominal pain, nausea, vomiting, diarrhea, headaches, dizziness.   Denies recent travel, recent antibiotic use, or sick contacts.    ED Course:   Vitals: BP: 123/53, HR: 74, Temp: 97.9 F, RR: 16, SpO2: 94% on RA   Labs: H/H: 12.9/37.9,  BUN/Cr: 25/1.80, GFR: 38, gluc: 228, lactate: 3.1  trop neg x1, COVID negative   UA: small bilirubin, trace ketone, 30 protein, moderate LE, 11-25 WBC, 3-5 RBC, few bacteria, few calcium oxalate   CXR: no acute lung pathology as per personal read, official read pending   EKG: junctional rhythm with PVCs, HR: 69   Received 1L NS bolus x1 and Rocephin in the ED  (05 May 2022 05:43)      PAST MEDICAL & SURGICAL HISTORY:  Hypertension    Hyperlipidemia    GERD (gastroesophageal reflux disease)    Parkinson&#x27;s disease    Prostate cancer    Mitral valve prolapse    Malignant neoplasm of tongue, 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    History of vasectomy    Prostate cancer  s/p cyber knife    S/P partial glossectomy  4/21        SOCIAL HISTORY: No active tobacco, alcohol or illicit drug use    FAMILY HISTORY:  FH: heart disease  father, brothers    FH: type 2 diabetes  mother        Home Medications:  aspirin 81 mg oral delayed release tablet: 1 tab(s) orally once a day (09 May 2022 11:00)  carbidopa-levodopa 25 mg-100 mg oral tablet: 1 tab(s) orally 2 times a day (05 May 2022 06:27)  citalopram 20 mg oral tablet: 1 tab(s) orally once a day (05 May 2022 06:27)  DilTIAZem (Eqv-Cardizem CD) 240 mg/24 hours oral capsule, extended release: 1 cap(s) orally once a day (05 May 2022 06:27)  enalapril 10 mg oral tablet: 1 tab(s) orally once a day (05 May 2022 06:27)  Flomax 0.4 mg oral capsule: 1 cap(s) orally once a day (at bedtime) (09 May 2022 11:00)  pantoprazole 40 mg oral delayed release tablet: 1 tab(s) orally once a day (before a meal) (05 May 2022 06:27)  primidone 50 mg oral tablet: 1 tab(s) orally once a day (05 May 2022 06:27)  simvastatin 40 mg oral tablet: 1 tab(s) orally once a day (at bedtime) (05 May 2022 06:27)  Vitamin C 1000 mg oral tablet: 1 tab(s) orally once a day (05 May 2022 06:27)      MEDICATIONS  (STANDING):  aspirin enteric coated 81 milliGRAM(s) Oral daily  carbidopa/levodopa  25/100 1 Tablet(s) Oral daily  carbidopa/levodopa  25/100 1 Tablet(s) Oral at bedtime  citalopram 20 milliGRAM(s) Oral daily  diltiazem    milliGRAM(s) Oral daily  enalapril 10 milliGRAM(s) Oral daily  heparin   Injectable 5000 Unit(s) SubCutaneous every 8 hours  pantoprazole   Suspension 40 milliGRAM(s) Oral daily  polyethylene glycol 3350 17 Gram(s) Oral daily  primidone 50 milliGRAM(s) Oral daily  senna 2 Tablet(s) Oral at bedtime  simvastatin 40 milliGRAM(s) Oral at bedtime  tamsulosin 0.4 milliGRAM(s) Oral at bedtime    MEDICATIONS  (PRN):  acetaminophen     Tablet .. 650 milliGRAM(s) Oral every 6 hours PRN Temp greater or equal to 38C (100.4F), Mild Pain (1 - 3)  melatonin 3 milliGRAM(s) Oral at bedtime PRN Insomnia  ondansetron Injectable 4 milliGRAM(s) IV Push every 8 hours PRN Nausea and/or Vomiting      Allergies    No Known Allergies    Intolerances        REVIEW OF SYSTEMS: Negative except as per HPI.    VITAL SIGNS:   Vital Signs Last 24 Hrs  T(C): 36.3 (10 May 2022 05:05), Max: 37.1 (09 May 2022 20:00)  T(F): 97.4 (10 May 2022 05:05), Max: 98.8 (09 May 2022 20:00)  HR: 76 (10 May 2022 05:05) (74 - 84)  BP: 134/72 (10 May 2022 05:05) (93/58 - 134/72)  BP(mean): --  RR: 19 (10 May 2022 05:05) (18 - 20)  SpO2: 92% (10 May 2022 05:05) (91% - 93%)    I&O's Summary      PHYSICAL EXAM:  Constitutional: NAD, well-developed  HEENT NC/AT, moist mucous membranes  Pulmonary: Non-labored, breath sounds are clear bilaterally, no wheezing, rales or rhonchi  Cardiovascular: +S1, S2, RRR, no murmur  Gastrointestinal: Soft, nontender, nondistended, normoactive bowel sounds  Extremities: No peripheral edema   Neurological: Alert, strength and sensitivity are grossly intact  Skin: No obvious lesions/rashes  Psych: Mood & affect appropriate    LABS: All Labs Reviewed:                        12.4   5.80  )-----------( 291      ( 10 May 2022 07:29 )             36.7                         12.9   6.07  )-----------( 289      ( 09 May 2022 07:41 )             37.4                         13.6   6.66  )-----------( 303      ( 08 May 2022 08:09 )             38.5     10 May 2022 07:29    140    |  104    |  25     ----------------------------<  97     3.6     |  30     |  0.90   09 May 2022 07:41    140    |  105    |  19     ----------------------------<  104    3.7     |  29     |  0.78   08 May 2022 08:09    141    |  106    |  15     ----------------------------<  108    3.5     |  25     |  0.73     Ca    9.9        10 May 2022 07:29  Ca    10.1       09 May 2022 07:41  Ca    10.4       08 May 2022 08:09    TPro  6.5    /  Alb  2.9    /  TBili  0.4    /  DBili  x      /  AST  18     /  ALT  14     /  AlkPhos  74     10 May 2022 07:29  TPro  6.6    /  Alb  2.9    /  TBili  0.5    /  DBili  x      /  AST  19     /  ALT  15     /  AlkPhos  73     09 May 2022 07:41  TPro  7.0    /  Alb  3.2    /  TBili  0.5    /  DBili  x      /  AST  17     /  ALT  18     /  AlkPhos  75     08 May 2022 08:09           [facility]         Lucio Romero, Cheyanne, Adam, Alexis, Kadeem Glass        [phone] (office)    Reason for Consult: cva workup    Interval HPI: Patient seen and examined at bedside. denies cp, palpitations, sob, or any other symptoms. No acute events overnight.     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 Wife Viki at bedside providing collateral information. Pt has been doing well until around 8:30 PM last night. He was very weak and was having difficulty getting up and walking due to the weakness.  Pt normally walks with a cane but was unable to get up last night. Daughter also noted that pt was having sweats but denies any fever or chills. Pt has been eating and drinking well. Pt denies any urinary urgency, frequency, dysuria or hematuria. Denies chest pain, palpitations, SOB, cough, abdominal pain, nausea, vomiting, diarrhea, headaches, dizziness.   Denies recent travel, recent antibiotic use, or sick contacts.    ED Course:   Vitals: BP: 123/53, HR: 74, Temp: 97.9 F, RR: 16, SpO2: 94% on RA   Labs: H/H: 12.9/37.9,  BUN/Cr: 25/1.80, GFR: 38, gluc: 228, lactate: 3.1  trop neg x1, COVID negative   UA: small bilirubin, trace ketone, 30 protein, moderate LE, 11-25 WBC, 3-5 RBC, few bacteria, few calcium oxalate   CXR: no acute lung pathology as per personal read, official read pending   EKG: junctional rhythm with PVCs, HR: 69   Received 1L NS bolus x1 and Rocephin in the ED  (05 May 2022 05:43)      PAST MEDICAL & SURGICAL HISTORY:  Hypertension    Hyperlipidemia    GERD (gastroesophageal reflux disease)    Parkinson&#x27;s disease    Prostate cancer    Mitral valve prolapse    Malignant neoplasm of tongue, 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    History of vasectomy    Prostate cancer  s/p cyber knife    S/P partial glossectomy  4/21        SOCIAL HISTORY: No active tobacco, alcohol or illicit drug use    FAMILY HISTORY:  FH: heart disease  father, brothers    FH: type 2 diabetes  mother        Home Medications:  aspirin 81 mg oral delayed release tablet: 1 tab(s) orally once a day (09 May 2022 11:00)  carbidopa-levodopa 25 mg-100 mg oral tablet: 1 tab(s) orally 2 times a day (05 May 2022 06:27)  citalopram 20 mg oral tablet: 1 tab(s) orally once a day (05 May 2022 06:27)  DilTIAZem (Eqv-Cardizem CD) 240 mg/24 hours oral capsule, extended release: 1 cap(s) orally once a day (05 May 2022 06:27)  enalapril 10 mg oral tablet: 1 tab(s) orally once a day (05 May 2022 06:27)  Flomax 0.4 mg oral capsule: 1 cap(s) orally once a day (at bedtime) (09 May 2022 11:00)  pantoprazole 40 mg oral delayed release tablet: 1 tab(s) orally once a day (before a meal) (05 May 2022 06:27)  primidone 50 mg oral tablet: 1 tab(s) orally once a day (05 May 2022 06:27)  simvastatin 40 mg oral tablet: 1 tab(s) orally once a day (at bedtime) (05 May 2022 06:27)  Vitamin C 1000 mg oral tablet: 1 tab(s) orally once a day (05 May 2022 06:27)      MEDICATIONS  (STANDING):  aspirin enteric coated 81 milliGRAM(s) Oral daily  carbidopa/levodopa  25/100 1 Tablet(s) Oral daily  carbidopa/levodopa  25/100 1 Tablet(s) Oral at bedtime  citalopram 20 milliGRAM(s) Oral daily  diltiazem    milliGRAM(s) Oral daily  enalapril 10 milliGRAM(s) Oral daily  heparin   Injectable 5000 Unit(s) SubCutaneous every 8 hours  pantoprazole   Suspension 40 milliGRAM(s) Oral daily  polyethylene glycol 3350 17 Gram(s) Oral daily  primidone 50 milliGRAM(s) Oral daily  senna 2 Tablet(s) Oral at bedtime  simvastatin 40 milliGRAM(s) Oral at bedtime  tamsulosin 0.4 milliGRAM(s) Oral at bedtime    MEDICATIONS  (PRN):  acetaminophen     Tablet .. 650 milliGRAM(s) Oral every 6 hours PRN Temp greater or equal to 38C (100.4F), Mild Pain (1 - 3)  melatonin 3 milliGRAM(s) Oral at bedtime PRN Insomnia  ondansetron Injectable 4 milliGRAM(s) IV Push every 8 hours PRN Nausea and/or Vomiting      Allergies    No Known Allergies    Intolerances        REVIEW OF SYSTEMS: Negative except as per HPI.    VITAL SIGNS:   Vital Signs Last 24 Hrs  T(C): 36.3 (10 May 2022 05:05), Max: 37.1 (09 May 2022 20:00)  T(F): 97.4 (10 May 2022 05:05), Max: 98.8 (09 May 2022 20:00)  HR: 76 (10 May 2022 05:05) (74 - 84)  BP: 134/72 (10 May 2022 05:05) (93/58 - 134/72)  BP(mean): --  RR: 19 (10 May 2022 05:05) (18 - 20)  SpO2: 92% (10 May 2022 05:05) (91% - 93%)    I&O's Summary      PHYSICAL EXAM:  Constitutional: NAD, well-developed  HEENT NC/AT, moist mucous membranes  Pulmonary: Non-labored, breath sounds are clear bilaterally, no wheezing, rales or rhonchi  Cardiovascular: +S1, S2, RRR, no murmur  Gastrointestinal: Soft, nontender, nondistended, normoactive bowel sounds  Extremities: No peripheral edema   Neurological: Alert, strength and sensitivity are grossly intact  Skin: No obvious lesions/rashes  Psych: Mood & affect appropriate    LABS: All Labs Reviewed:                        12.4   5.80  )-----------( 291      ( 10 May 2022 07:29 )             36.7                         12.9   6.07  )-----------( 289      ( 09 May 2022 07:41 )             37.4                         13.6   6.66  )-----------( 303      ( 08 May 2022 08:09 )             38.5     10 May 2022 07:29    140    |  104    |  25     ----------------------------<  97     3.6     |  30     |  0.90   09 May 2022 07:41    140    |  105    |  19     ----------------------------<  104    3.7     |  29     |  0.78   08 May 2022 08:09    141    |  106    |  15     ----------------------------<  108    3.5     |  25     |  0.73     Ca    9.9        10 May 2022 07:29  Ca    10.1       09 May 2022 07:41  Ca    10.4       08 May 2022 08:09    TPro  6.5    /  Alb  2.9    /  TBili  0.4    /  DBili  x      /  AST  18     /  ALT  14     /  AlkPhos  74     10 May 2022 07:29  TPro  6.6    /  Alb  2.9    /  TBili  0.5    /  DBili  x      /  AST  19     /  ALT  15     /  AlkPhos  73     09 May 2022 07:41  TPro  7.0    /  Alb  3.2    /  TBili  0.5    /  DBili  x      /  AST  17     /  ALT  18     /  AlkPhos  75     08 May 2022 08:09

## 2022-05-10 NOTE — PROGRESS NOTE ADULT - NUTRITIONAL ASSESSMENT
This patient has been assessed with a concern for Malnutrition and has been determined to have a diagnosis/diagnoses of Severe protein-calorie malnutrition.    This patient is being managed with:   Diet Soft and Bite Sized-  Moderately Thick Liquids (MODTHICKLIQS)  Supplement Feeding Modality:  Oral  Ensure Clear Cans or Servings Per Day:  1       Frequency:  Two Times a day  Ensure Enlive Cans or Servings Per Day:  1       Frequency:  Two Times a day  Entered: May  9 2022 10:05AM

## 2022-05-10 NOTE — PROGRESS NOTE ADULT - PROBLEM SELECTOR PLAN 2
Chronic, stable  - Continue with home Cardizem  mg daily  - Monitor routine hemodynamics Chronic, stable  - Continue with home Cardizem  mg daily & Enalapril 10 mg daily   - on EC ASA 1 tab daily

## 2022-05-10 NOTE — PROGRESS NOTE ADULT - PROBLEM SELECTOR PLAN 5
- asymptomatic at this time, UTI ruled out   - does not meet sepsis criteria at this time  - CT renal stone hunt: trace L hydronephrosis. 4 mm left UVJ calculus. Nonobstructing right intrarenal calculus.  - s/p IV Rocephin, completed 5/7 AM  - urine culture, blood cultures NG   - Urology Dr Alcaarz - continue tamsulosin 0.4 mg qHS   - Urology Dr Leach - patient to f/u outpatient   - ID Dr. Connelly following, f/u recs - monitor off abx for now - CT neck (4/22): Interval development of ill-defined enhancing lesion involving the proximal right sternocleidomastoid and possibly the deep parotid gland, just inferior to the mastoid tip, concerning for neoplasm recurrence. Correlate with tissue sampling.  - D/W Dtr at detail- Pt saw ENT Dr Boland last week & has appointment with Oncologist this  week for treatment options

## 2022-05-10 NOTE — CONSULT NOTE ADULT - ATTENDING COMMENTS
-there is no evidence of acute ischemia.  -there is no evidence of significant arrhythmia.  -there is no evidence for meaningful  volume overload.  -cva, no clear source as of yet  -can facilitate outpt monitoring to search for af  -ilr can be considered if felt appropriate as per neuro

## 2022-05-10 NOTE — PROGRESS NOTE ADULT - REASON FOR ADMISSION
weakness, UTI

## 2022-05-10 NOTE — PROGRESS NOTE ADULT - SUBJECTIVE AND OBJECTIVE BOX
[facility], Division of Infectious Diseases  CORTNEY Oconnor Y. Patel, S. Shah, G. Ernesto  [phone]    Name: ISIS CUETO  Age: 76y  Gender: Male  MRN: [id number]    Interval History:  Patient seen and examined at bedside this morning  No acute overnight events. Afebrile  No complaints  Notes reviewed    Antibiotics:      Medications:  acetaminophen     Tablet .. 650 milliGRAM(s) Oral every 6 hours PRN  aspirin enteric coated 81 milliGRAM(s) Oral daily  carbidopa/levodopa  25/100 1 Tablet(s) Oral daily  carbidopa/levodopa  25/100 1 Tablet(s) Oral at bedtime  citalopram 20 milliGRAM(s) Oral daily  diltiazem    milliGRAM(s) Oral daily  enalapril 10 milliGRAM(s) Oral daily  heparin   Injectable 5000 Unit(s) SubCutaneous every 8 hours  melatonin 3 milliGRAM(s) Oral at bedtime PRN  ondansetron Injectable 4 milliGRAM(s) IV Push every 8 hours PRN  pantoprazole   Suspension 40 milliGRAM(s) Oral daily  polyethylene glycol 3350 17 Gram(s) Oral daily  primidone 50 milliGRAM(s) Oral daily  senna 2 Tablet(s) Oral at bedtime  simvastatin 40 milliGRAM(s) Oral at bedtime  tamsulosin 0.4 milliGRAM(s) Oral at bedtime      Review of Systems:  A 10-point review of systems was obtained.     Pertinent positives and negatives--  Constitutional: No fevers. No Chills. No Rigors.   Cardiovascular: No chest pain. No palpitations.  Respiratory: No shortness of breath. No cough.  Gastrointestinal: No nausea or vomiting. No diarrhea or constipation.   Psychiatric: Pleasant. Appropriate affect.    Review of systems otherwise negative except as previously noted.    Allergies: No Known Allergies    For details regarding the patient's past medical history, social history, family history, and other miscellaneous elements, please refer the initial infectious diseases consultation and/or the admitting history and physical examination for this admission.    Objective:  Vitals:   T(C): 36.3 (05-10-22 @ 05:05), Max: 37.1 (05-09-22 @ 20:00)  HR: 76 (05-10-22 @ 05:05) (76 - 84)  BP: 134/72 (05-10-22 @ 05:05) (93/58 - 134/72)  RR: 19 (05-10-22 @ 05:05) (18 - 19)  SpO2: 92% (05-10-22 @ 05:05) (91% - 92%)    Physical Examination:  General: no acute distress  HEENT: NC/AT, EOMI  Cardio: S1, S2 heard, RRR, no murmurs  Resp: breath sounds heard bilaterally, no rales, wheezes or rhonchi  Abd: soft, NT, ND,  Ext: no edema or cyanosis  Skin: warm, dry, no visible rash      Laboratory Studies:  CBC:                       12.4   5.80  )-----------( 291      ( 10 May 2022 07:29 )             36.7     CMP: 05-10    140  |  104  |  25<H>  ----------------------------<  97  3.6   |  30  |  0.90    Ca    9.9      10 May 2022 07:29    TPro  6.5  /  Alb  2.9<L>  /  TBili  0.4  /  DBili  x   /  AST  18  /  ALT  14  /  AlkPhos  74  05-10    LIVER FUNCTIONS - ( 10 May 2022 07:29 )  Alb: 2.9 g/dL / Pro: 6.5 g/dL / ALK PHOS: 74 U/L / ALT: 14 U/L / AST: 18 U/L / GGT: x               Microbiology: reviewed    Culture - Blood (collected 05-05-22 @ 09:18)  Source: .Blood Blood  Final Report (05-10-22 @ 10:00):    No Growth Final    Culture - Blood (collected 05-05-22 @ 09:18)  Source: .Blood Blood  Final Report (05-10-22 @ 10:00):    No Growth Final    Culture - Urine (collected 05-05-22 @ 09:14)  Source: Clean Catch Clean Catch (Midstream)  Final Report (05-07-22 @ 10:45):    <10,000 CFU/mL Normal Urogenital Richa        Radiology: reviewed

## 2022-05-10 NOTE — PROGRESS NOTE ADULT - PROBLEM SELECTOR PLAN 1
- MR Head no contrast: Old lacunar infarct R sublenticular nucleus region   - carotid dopplers: mild calcific plaque without duplex evidence of hemodynamically significant stenosis   - continue aspirin 81mg po qd, simvastatin 40mg po qd  - neuro checks q4hrs, remote tele monitoring  - TTE showing LVEF 60-65%, normal RV systolic function, diastolic dysfunction   - Neurology following, Dr Peterson  - PT rec [facility] but patient and family want home with home PT - rx for transport chair placed in pt chart - MR Head no contrast: Old lacunar infarct R sublenticular nucleus region   < from: MR Head No Cont (05.06.22 @ 19:00) >    There is evidence of abnormal T2 prolongation with restricted diffusion   seen involving the left brachium pontis and left lenticular   nucleus/corona radiata region.    < end of copied text >  - carotid dopplers: mild calcific plaque without duplex evidence of hemodynamically significant stenosis   - continue aspirin 81mg po qd, simvastatin 40mg po qd  - neuro checks q4hrs, remote tele monitoring  - TTE showing LVEF 60-65%, normal RV systolic function, diastolic dysfunction   - Neurology following, Dr Peterson d/w stable for d/c   - PT rec [facility] but patient and family want home with home PT - rx for transport chair placed in pt chart

## 2022-05-10 NOTE — PROGRESS NOTE ADULT - PROBLEM SELECTOR PLAN 3
Chronic   - Continue home Carbidopa/Levodopa and Primidone  - Continue home Celexa daily Chronic   - Continue home Carbidopa/Levodopa 2x day  and Primidone 1 tab daily  - Continue home Celexa daily

## 2022-05-12 PROBLEM — Z80.6 FAMILY HISTORY OF LEUKEMIA: Status: ACTIVE | Noted: 2022-01-01

## 2022-05-12 PROBLEM — Z80.0 FAMILY HISTORY OF MALIGNANT NEOPLASM OF ESOPHAGUS: Status: ACTIVE | Noted: 2022-01-01

## 2022-05-12 PROBLEM — Z84.1 FAMILY HISTORY OF KIDNEY STONES: Status: ACTIVE | Noted: 2022-01-01

## 2022-05-13 PROBLEM — R59.0 CERVICAL LYMPHADENOPATHY: Status: ACTIVE | Noted: 2021-01-01

## 2022-05-13 PROBLEM — Z78.9 CURRENT NON-SMOKER: Status: ACTIVE | Noted: 2021-02-05

## 2022-05-13 NOTE — CONSULT LETTER
[Dear  ___] : Dear  [unfilled], [Consult Letter:] : I had the pleasure of evaluating your patient, [unfilled]. [Please see my note below.] : Please see my note below. [Consult Closing:] : Thank you very much for allowing me to participate in the care of this patient.  If you have any questions, please do not hesitate to contact me. [Sincerely,] : Sincerely, [FreeTextEntry2] : Dr. Jayden Boland [FreeTextEntry3] : Kelly Jeffries MD\par \par  [DrJesus  ___] : Dr. PATEL

## 2022-05-13 NOTE — REVIEW OF SYSTEMS
[Fatigue] : fatigue [Dysphagia] : dysphagia [Difficulty Walking] : difficulty walking [Negative] : Psychiatric [Fever] : no fever [Chills] : no chills [FreeTextEntry2] : poor appetite  [FreeTextEntry4] : s/p surgery dysphagia, able to tolerate thick liquids and soft foods  [FreeTextEntry9] : +tremors due to Parkinsons  [de-identified] : +hard nodules to the R and left neck  [de-identified] : s/p stroke

## 2022-05-13 NOTE — HISTORY OF PRESENT ILLNESS
[de-identified] : 76 year old male with a PMH Parksinson's, HLD, HTN and recent stroke 5/2022. 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 Boland. He is now  s/p RT 30fx completed in July 2021. PET CT 11/27/202 and CT neck from same day showing enlarging right level II lymph node suspicious for a clau metastasis. Ultrasound-guided FNA 12/2021 was completed revealing consistent with squamous carcinoma. He is now s/p R sided neck dissection 1/2022 with pathology revealing 1/35 LN positive. He was subsequently referred here for evaluation of systemic therapy. He is never smoker and denies alcohol use. 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 \par \par 4/24/22  PET/CT: \par 1. New difficult delineate heterogeneous FDG avidity associated with surgical clips in the right lateral neck adjacent to the right sternocleidomastoid suspicious for disease recurrence. This is in the region where an FDG avid right level IIb cervical lymph node was seen on the prior study. This may be further evaluated with MRI if without contraindications.\par 2. Nonspecific new FDG avid cutaneous nodule in the LEFT lateral neck and additional FDG avid subcutaneous focus overlying the LEFT clavicle. Please correlate with direct visualization.\par 3. New mildly FDG avid groundglass and nodular opacities in the left upper and left lower lungs, also seen on CT from 4/7/2022 and probably not significantly changed allowing for differences in technique. This is nonspecific and may be inflammatory/infectious in nature. Please correlate clinically or with follow-up chest CT to assess for resolution.\par 4. New mild hypermetabolism in the sigmoid colon without CT correlate, physiologic versus inflammatory. Correlate clinically.\par  \par Today he is recovering from a stroke. He is s/p admission to [facility] 5/5-5/10/22 for stroke and UTI. He was treated with antibiotics for UTI and discharged on ASA 81 for R sided stroke without any rehab. Prior to his stroke he was independent and ambulating well with a walker and per daughter his functional status has declined. He complains of constant 8/10 R sided neck pain and stiffness not taking any oral analgesics. Also notes some new nodules on the R and L side of neck that are hard in nature and red about 3mm in size. Denies recent fever/chills, SOB, cough, abdominal pain, n/v/d.

## 2022-05-13 NOTE — PHYSICAL EXAM
[Capable of only limited self care, confined to bed or chair more than 50% of waking hours] : Status 3- Capable of only limited self care, confined to bed or chair more than 50% of waking hours [Thin] : thin [Normal] : affect appropriate [de-identified] : Multiple hard nodules white and red in nature on the L and R side of neck ~3-4mm in size. Fibrosis to the R neck.  [de-identified] : no lower extremity edema noted  [de-identified] : +tremors due to Parkinsons disease

## 2022-05-13 NOTE — ASSESSMENT
[FreeTextEntry1] : 76 year old male with a PMH Parksinson's, HLD, HTN and recent stroke 5/2022. 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 Boland. He is now  s/p RT 30fx completed inJuly 2021 with PET CT 11/27/2021, 11/27/2021 ct neck showing enlarging right level II lymph node suspicious for a clau metastasis. Ultrasound-guided FNA 12/2021 consistent with squamous carcinoma. He is now s/p R sided neck dissection 1/2022 with pathology revealing 1/35 LN. PET/CT 4/27/22 with evidence of spread. \par \par Patient was present with his daughter today. At baseline functional status has declined since recent CVA. \par -we discussed imaging, pathology, course thus far in detail\par -pt is not a candidate for chemotherapy\par We discussed the option to proceed with immunotherapy regimen for his disease. We reviewed irAEs and noted timely report of irAEs should we proceed with immunotherapy. \par - The decision to proceed with immunotherapy will depend on PDL1 status and improvement in functional status. I will email pathology to request this (as well as NGS) testing \par - Due to the his recent stroke and cancer diagnosis which puts him at a hypercoagulable state, will order bilateral carotid dopplers \par - TEB in 3 weeks \par -labs today\par

## 2022-05-14 NOTE — ED ADULT NURSE NOTE - OBJECTIVE STATEMENT
Patient received complaining of fall at home. Patient states fell in the bathroom, hit right side of head and body, abrasion noted to right elbow, denies LoC, denies taking anticoagulants. Patient is AOx4, safety precautions in place, awaiting evaluation. Patient received complaining of fall at home. Patient states fell in the bathroom, hit right side of head and body, abrasion noted to right elbow and forehead, denies LoC, denies taking anticoagulants. Patient is AOx4, safety precautions in place, awaiting evaluation.

## 2022-05-14 NOTE — ED PROVIDER NOTE - OBJECTIVE STATEMENT
Patient brought in by ambulance home as reported fell while in the bathroom complaining of back of neck pain abrasion to right upper arm  no LOC not on blood thinners  fall 77 y/o male c/c Patient brought in by ambulance home as reported fell while in the bathroom complaining of back of neck pain abrasion to forehead and  right upper arm  no LOC not on blood thinners, no HA, no CP, no sob, no focal neuro changes

## 2022-05-14 NOTE — ED PROVIDER NOTE - CONSTITUTIONAL, MLM
Well appearing, awake, alert, oriented to person, place, time/situation and in no apparent distress. fore head bruising normal...

## 2022-05-14 NOTE — ED PROVIDER NOTE - PATIENT PORTAL LINK FT
You can access the FollowMyHealth Patient Portal offered by [facility] by registering at the following website: [email or web address]. By joining [employer]’s FollowMyHealth portal, you will also be able to view your health information using other applications (apps) compatible with our system.

## 2022-05-14 NOTE — ED PROVIDER NOTE - CARE PROVIDER_API CALL
Arcenio Peterson  NEUROLOGY  [address] [place] [address]  Phone: [phone]  Fax: [phone]  Follow Up Time: 1-3 Days

## 2022-05-14 NOTE — ED ADULT TRIAGE NOTE - BMI (KG/M2)
December 13, 2018     Patient: Donna Dumas   YOB: 2007   Date of Visit: 12/13/2018       To Whom it May Concern:    Donna Dumas is under my professional care  She was seen in my office on 12/13/2018  She may return to school on 12/13/2018  If you have any questions or concerns, please don't hesitate to call           Sincerely,          Roni Hamm MD        CC: No Recipients
24

## 2022-05-14 NOTE — ED PROVIDER NOTE - NSFOLLOWUPINSTRUCTIONS_ED_ALL_ED_FT
1) Follow-up with your Primary Medical Doctor or referred doctor. Call today / next business day for prompt follow-up.  2) Return to Emergency room for any worsening or persistent pain, dizziness, difficulty walking, feeling unsteady, vomiting, visual changes, numbness, tingling, any unknown fluid coming out of nose or ears, any changes in your speech,  worsening or persistent headaches,  weakness, fever, or any other concerning symptoms.  3) See attached instruction sheets for additional information, including information regarding signs and symptoms to look out for, reasons to seek immediate care and other important instructions.  4) You should avoid any activities where you may hit your head until cleared by your doctor, at least two weeks.  5) Follow-up with Neurology, especially if your symptoms persist.  Dr Arcenio Peterson: [phone]  [facility]): [phone]  [facility]: [phone]  6) Tylenol as needed for pain

## 2022-05-14 NOTE — ED ADULT NURSE NOTE - IN THE PAST 12 MONTHS HAVE YOU USED DRUGS OTHER THAN THOSE REQUIRED FOR MEDICAL REASON?
Tramadol BID for severe pain.  Ambulate  Decline PT f/u..  Opioid contract signed.  Side effects of medication discussed.  PDMS checked prior to any refills.   No

## 2022-05-14 NOTE — ED PROVIDER NOTE - IV ALTEPLASE EXCL ABS HIDDEN
Child's Well Visit, 1 Week: Care Instructions  Your Care Instructions     You may wonder \"Am I doing this right? \" Trust your instincts. Cuddling, rocking, and talking to your baby are the right things to do. At this age, your new baby may respond to sounds by blinking, crying, or appearing to be startled. He or she may look at faces and follow an object with his or her eyes. Your baby may be moving his or her arms, legs, and head. Your next checkup is when your baby is 3to 2 weeks old. Follow-up care is a key part of your child's treatment and safety. Be sure to make and go to all appointments, and call your doctor if your child is having problems. It's also a good idea to know your child's test results and keep a list of the medicines your child takes. How can you care for your child at home? Feeding  · Feed your baby whenever he or she is hungry. In the first 2 weeks, your baby will breastfeed at least 8 times in a 24-hour period. This means you may need to wake your baby to breastfeed. · If you do not breastfeed, use a formula with iron. (Talk to your doctor if you are using a low-iron formula.) At this age, most babies feed about 1½ to 3 ounces of formula every 3 to 4 hours. · Do not warm bottles in the microwave. You could burn your baby's mouth. Always check the temperature of the formula by placing a few drops on your wrist.  · Never give your baby honey in the first year of life. Honey can make your baby sick.   Breastfeeding tips  · Offer the other breast when the first breast feels empty and your baby sucks more slowly, pulls off, or loses interest. Usually your baby will continue breastfeeding, though perhaps for less time than on the first breast. If your baby takes only one breast at a feeding, start the next feeding on the other breast.  · If your baby is sleepy when it is time to eat, try changing your baby's diaper, undressing your baby and taking your shirt off for skin-to-skin Patient called, needs to speak to [facility] regarding his blood work and medication    Please advise contact, or gently rubbing your fingers up and down your baby's back. · If your baby cannot latch on to your breast, try this:  ? Hold your baby's body facing your body (chest to chest). ? Support your breast with your fingers under your breast and your thumb on top. Keep your fingers and thumb off of the areola. ? Use your nipple to lightly tickle your baby's lower lip. When your baby opens his or her mouth wide, quickly pull your baby onto your breast.  ? Get as much of your breast into your baby's mouth as you can.  ? Call your doctor if you have problems. · By the third day of life, you should notice some breast fullness and milk dripping from the other breast while you nurse. · By the third day of life, your baby should be latching on to the breast well, having at least 3 stools a day, and wetting at least 6 diapers a day. Stools should be yellow and watery, not dark green and sticky. Healthy habits  · Stay healthy yourself by eating healthy foods and drinking plenty of fluids, especially water. Rest when your baby is sleeping. · Do not smoke or expose your baby to smoke. Smoking increases the risk of SIDS (crib death), ear infections, asthma, colds, and pneumonia. If you need help quitting, talk to your doctor about stop-smoking programs and medicines. These can increase your chances of quitting for good. · Wash your hands before you hold your baby. Keep your baby away from crowds and sick people. Be sure all visitors are up to date with their vaccinations. · Try to keep the umbilical cord dry until it falls off. · Keep babies younger than 6 months out of the sun. If you cannot avoid the sun, use hats and clothing to protect your child's skin. Safety  · Put your baby to sleep on his or her back, not on the side or tummy. This reduces the risk of SIDS. Use a firm, flat mattress. Do not put pillows in the crib. Do not use sleep positioners or crib bumpers.   · Put your baby in a car seat for every ride. Place the seat in the middle of the backseat, facing backward. For questions about car seats, call the [employer] at 7-186.248.8992. Parenting  · Never shake or spank your baby. This can cause serious injury and even death. · Many women get the \"baby blues\" during the first few days after childbirth. Ask for help with preparing food and other daily tasks. Family and friends are often happy to help a new mother. · If your moodiness or anxiety lasts for more than 2 weeks, or if you feel like life is not worth living, you may have postpartum depression. Talk to your doctor. · Dress your baby with one more layer of clothing than you are wearing, including a hat during the winter. Cold air or wind does not cause ear infections or pneumonia. Illness and fever  · Hiccups, sneezing, irregular breathing, sounding congested, and crossing of the eyes are all normal.  · Call your doctor if your baby has signs of jaundice, such as yellow- or orange-colored skin. · Take your baby's rectal temperature if you think he or she is ill. It is the most accurate. Armpit and ear temperatures are not as reliable at this age. ? A normal rectal temperature is from 97.5°F to 100.3°F.  ? Joseph Buckle your baby down on his or her stomach. Put some petroleum jelly on the end of the thermometer and gently put the thermometer about ¼ to ½ inch into the rectum. Leave it in for 2 minutes. To read the thermometer, turn it so you can see the display clearly. When should you call for help? Watch closely for changes in your baby's health, and be sure to contact your doctor if:  · You are concerned that your baby is not getting enough to eat or is not developing normally. · Your baby seems sick. · Your baby has a fever. · You need more information about how to care for your baby, or you have questions or concerns. Where can you learn more?   Go to http://pam-harsh.info/  Enter [id number] in the search box to learn more about \"Child's Well Visit, 1 Week: Care Instructions. \"  Current as of: August 22, 2019               Content Version: 12.5  © 0116-7944 [employer]. Care instructions adapted under license by [employer] (which disclaims liability or warranty for this information). If you have questions about a medical condition or this instruction, always ask your healthcare professional. [address] any warranty or liability for your use of this information. Vaccine Information Statement    Hepatitis B Vaccine: What You Need to Know    Many Vaccine Information Statements are available in [place] and other languages. See www.immunize.org/vis  Hojas de información sobre vacunas están disponibles en español y en muchos otros idiomas. Visite www.immunize.org/vis    1. Why get vaccinated? Hepatitis B vaccine can prevent hepatitis B. Hepatitis B is a liver disease that can cause mild illness lasting a few weeks, or it can lead to a serious, lifelong illness.  Acute hepatitis B infection is a short-term illness that can lead to fever, fatigue, loss of appetite, nausea, vomiting, jaundice (yellow skin or eyes, dark urine, laura-colored bowel movements), and pain in the muscles, joints, and stomach.  Chronic hepatitis B infection is a long-term illness that occurs when the hepatitis B virus remains in a persons body. Most people who go on to develop chronic hepatitis B do not have symptoms, but it is still very serious and can lead to liver damage (cirrhosis), liver cancer, and death. Chronically-infected people can spread hepatitis B virus to others, even if they do not feel or look sick themselves. Hepatitis B is spread when blood, semen, or other body fluid infected with the hepatitis B virus enters the body of a person who is not infected.  People can become infected through:  [address] [facility] [address] [facility] (if a mother has hepatitis B, her baby can become infected)   Sharing items such as razors or toothbrushes with an infected person   Contact with the blood or open sores of an infected person   Sex with an infected partner   Sharing needles, syringes, or other drug-injection equipment   Exposure to blood from needlesticks or other sharp instruments    Most people who are vaccinated with hepatitis B vaccine are immune for life. 2. Hepatitis B vaccine    Hepatitis B vaccine is usually given as 2, 3, or 4 shots. Infants should get their first dose of hepatitis B vaccine at birth and will usually complete the series at 10months of age (sometimes it will take longer than 6 months to complete the series). Children and adolescents younger than 23years of age who have not yet gotten the vaccine should also be vaccinated. Hepatitis B vaccine is also recommended for certain unvaccinated adults:     People whose sex partners have hepatitis B   Sexually active persons who are not in a long-term monogamous relationship   Persons seeking evaluation or treatment for a sexually transmitted disease   Men who have sexual contact with other men   People who share needles, syringes, or other drug-injection equipment   People who have household contact with someone infected with the hepatitis B virus  [address] care and public safety workers at risk for exposure to blood or body fluids   Residents and staff of facilities for developmentally disabled persons   Persons in correctional facilities   Victims of sexual assault or abuse   Travelers to regions with increased rates of hepatitis B   People with chronic liver disease, kidney disease, HIV infection, infection with hepatitis C, or diabetes   Anyone who wants to be protected from hepatitis B    Hepatitis B vaccine may be given at the same time as other vaccines.     3. Talk with your health care provider    Tell your vaccine provider if the person getting the vaccine:   Has had an allergic reaction after a previous dose of hepatitis B vaccine, or has any severe, life-threatening allergies. In some cases, your health care provider may decide to postpone hepatitis B vaccination to a future visit. People with minor illnesses, such as a cold, may be vaccinated. People who are moderately or severely ill should usually wait until they recover before getting hepatitis B vaccine. Your health care provider can give you more information. 4. Risks of a vaccine reaction     Soreness where the shot is given or fever can happen after hepatitis B vaccine. People sometimes faint after medical procedures, including vaccination. Tell your provider if you feel dizzy or have vision changes or ringing in the ears. As with any medicine, there is a very remote chance of a vaccine causing a severe allergic reaction, other serious injury, or death. 5. What if there is a serious problem? An allergic reaction could occur after the vaccinated person leaves the clinic. If you see signs of a severe allergic reaction (hives, swelling of the face and throat, difficulty breathing, a fast heartbeat, dizziness, or weakness), call 9-1-1 and get the person to the nearest hospital.    For other signs that concern you, call your health care provider. Adverse reactions should be reported to the Vaccine Adverse Event Reporting System (VAERS). Your health care provider will usually file this report, or you can do it yourself. Visit the VAERS website at www.vaers. hhs.gov or call 8-329.475.9071. VAERS is only for reporting reactions, and VAERS staff do not give medical advice. 6. The National Vaccine Injury Compensation Program    The [facility] Vaccine Injury Compensation Program (VICP) is a federal program that was created to compensate people who may have been injured by certain vaccines.  Visit the VICP website at www.hrsa.gov/vaccinecompensation or call 8-215.508.3963 to learn about the program and about filing a claim. There is a time limit to file a claim for compensation. 7. How can I learn more?  Ask your health care provider.  Call your local or state health department.  Contact the Centers for Disease Control and Prevention (CDC):  - Call 2-640.646.6259 (1-800-CDC-INFO) or  - Visit CDCs website at www.cdc.gov/vaccines    Vaccine Information Statement (Interim)  Hepatitis B Vaccine   8/15/2019  [address] 349HQ-24   Department of Health and Human Services  Centers for Disease Control and Prevention    Office Use Only show

## 2022-05-14 NOTE — ED PROVIDER NOTE - CARE PLAN
1 Principal Discharge DX:	Fall  Secondary Diagnosis:	Head trauma  Secondary Diagnosis:	Skin abrasion

## 2022-05-14 NOTE — ED ADULT NURSE NOTE - NSIMPLEMENTINTERV_GEN_ALL_ED
Implemented All Fall Risk Interventions:  [place] to call system. Call bell, personal items and telephone within reach. Instruct patient to call for assistance. Room bathroom lighting operational. Non-slip footwear when patient is off stretcher. Physically safe environment: no spills, clutter or unnecessary equipment. Stretcher in lowest position, wheels locked, appropriate side rails in place. Provide visual cue, wrist band, yellow gown, etc. Monitor gait and stability. Monitor for mental status changes and reorient to person, place, and time. Review medications for side effects contributing to fall risk. Reinforce activity limits and safety measures with patient and family.

## 2022-05-14 NOTE — ED ADULT TRIAGE NOTE - CHIEF COMPLAINT QUOTE
Patient brought in by ambulance home as reported fell while in the bathroom complaining of back of neck pain abrasion to right upper arm  no LOC not on blood thinners

## 2022-05-14 NOTE — ED PROVIDER NOTE - CLINICAL SUMMARY MEDICAL DECISION MAKING FREE TEXT BOX
mechanical fall head trauma, arm abrasion  CT head and neck are stable, patient with O/P neuro referral

## 2022-05-27 PROBLEM — C02.9 TONGUE CANCER: Status: ACTIVE | Noted: 2021-02-05

## 2022-05-27 PROBLEM — I63.9 CEREBROVASCULAR ACCIDENT (CVA): Status: ACTIVE | Noted: 2022-01-01

## 2022-05-27 NOTE — ASSESSMENT
[FreeTextEntry1] : Mandeep Naidu is a 77 yo m with difficulty with ambulation worse since recent cva\par -start rehab at home program for strengthening, mobility, endurance, balance, fall prevention (if home therapy not available will change to in person outpatient therapy)\par -carotid dopplers pending per Dr. Jeffries, patient has R facial and neck swelling but state mobility more bothersome\par -sees neurology for parkinson's disease\par -states he sees SLP, denies difficulty with swallowing\par -follow up in 4-6 weeks\par I spent a total of 45  minutes on this encounter including documentation, face to face time, care coordination and reviewing prior records.\par

## 2022-05-27 NOTE — PHYSICAL EXAM
[FreeTextEntry1] : GEN: AAOx3, NAD.  + cogwheel rigidity, + tremors b/l\par PSYCH: Normal mood and affect. Responds appropriately to commands.\par EYES: Sclerae Anicteric. No discharge. EOMI.\par RESP: Breathing unlabored.\par CV: Radial pulses 2+ and equal. No varicosities noted.\par EXT: No C/C/E. \par SKIN: Incisions well healed  \par GAIT: presents in wheelchair today\par STRENGTH: 3/5 bilateral upper extremities, with rigidity, limited ROM.  LE hip flexion 3+/5 b/l, KE 4/5 DF/PF 4/5\par REFLEXES: 2+ symmetric brachioradialis\par SENSATION: Grossly intact to light touch bilateral upper extremities.\par INSP: R facial swelling\par CERVICAL ROM: limited rotation\par SHOULDER ROM: limited to 90 degrees b/l\par PALP: + R face, jaw, neck ttp \par MOUTH OPENINcm\par \par \par

## 2022-05-27 NOTE — HISTORY OF PRESENT ILLNESS
[FreeTextEntry1] : 76 year old male with a PMH Parksinson's, HLD, HTN and recent stroke 5/2022. 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 Boland. He is now s/p RT 30fx completed in July 2021, now s/p R sided neck dissection 1/2022 with pathology revealing 1/35 LN positive.  \par \par PET/CT 4/27 with evidence of disease spread.  He is starting treatment with Dr. Jeffries and carotid dopplers pending.\par \par Accompanied by son today who reports he has had increased difficulty walking since his stroke.\par He has been unsteady with ambulation for the past year, had used a cane and after stroke began using RW with assistance to get up from low surfaces (toilet).    His daughter assists with bathing, dressing, toileting.  \par \par Last fall 3 weeks ago, attempting to ambulate by himself to the bathroom (went to ED and was discharged).

## 2022-05-28 NOTE — ED ADULT NURSE REASSESSMENT NOTE - NS ED NURSE REASSESS COMMENT FT1
Warm blankets placed on patient. 2nd IV placed to accommodate all medication orders per MD. Transport has arrived and report was given to Christina MIRANDA from [facility] ED.
Patient received IV, labs and Covid swab. Was then brought to CT as stroke code. Upon return, patient was noted to have a rectal temp of 96.1F, EKG was done, straight cath for urine was done, telemetry monitor was placed on and dysphagia screen was done. Dysphagia screen was a fail, MD Greene aware. Patient medicated appropriately per MD orders and will be transferred to [facility]. Awaiting transfer transport and transfer center for report.

## 2022-05-28 NOTE — ED PROVIDER NOTE - OBJECTIVE STATEMENT
75 y/o male with PMHx of HTN, HLD, Parkinsons presents to the ED as a transfer from [place] as code stroke. LKW 17:30, told his daughter at 18:30 he didn't fell well, had right sided facial droop. Pt had CT/CTA/CT perfusion at [place], telestroke was consulted, pt did not receive TPA.

## 2022-05-28 NOTE — ED ADULT NURSE NOTE - OBJECTIVE STATEMENT
Patient via EMS with c/o daughter called 911 for AMS, "patient not acting himself" since 6:30pm tonight. Per EMS report daughter started patient's baseline is AOx4, ambulatory. Per EMS report patient not ambulatory on scene.  upon arrival to ED, patient AOx person and place only. Patient reports "I do not feel right" unable to state why. Patient B/L upper extremities tremulous, h/o parkinsons. Per daughter report via phone conversation with MD Pang daughter reports she last saw patient at 6pm, was in normal state of health, reports patient had a stroke one month ago. Patient via EMS with c/o daughter called 911 for AMS, "patient not acting himself" since 6:30pm tonight. Per EMS report daughter started patient's baseline is AOx4, ambulatory. Per EMS report patient not ambulatory on scene.  upon arrival to ED, patient AOx person and place only. Patient reports "I do not feel right" unable to state why. Patient B/L upper extremities tremulous, h/o parkinsons. Per daughter report via phone conversation with MD Pang daughter reports she last saw patient at 6pm, was in normal state of health, daughter put patient to bed after eating dinner then at 6:30pm heard patient groaning. Daughter reports patient was anxious reporting "something is wrong". Patient tachypneic, follows some commands. Patient taken to CT scan as stroke note.

## 2022-05-28 NOTE — ED PROVIDER NOTE - CLINICAL SUMMARY MEDICAL DECISION MAKING FREE TEXT BOX
77 y/o male with PMHx of HTN, HLD, Parkinsons presents to the ED as a transfer from [place] as code stroke. 77 y/o male with PMHx of HTN, HLD, Parkinsons presents to the ED as a transfer from [place] as code stroke. Pt with NIHSS 3, was not a TPA candidate at [facility], not candidate for intervention, will admit for further management

## 2022-05-28 NOTE — ED PROVIDER NOTE - PROGRESS NOTE DETAILS
pt presenting with stroke like symptoms vs infectious process stroke alert made, pt sent to CT contact made with daughter history obtained is unsure if pt had previus facial droop on her way to the ED call placed to tele stroke spoke with Dr. Rm pt is not a systemic TPA candidate as he had a recent CVA. Would be an LVO candidate awaiting CTA and perfusion imaging Dr. Rm called back after reviewing films there appears to be stenosis in the MCA requests pt get Plavix and ASA if passes dysphagia screen. Will transfer to [place] pt failed dysphagia rectal ASA ordered elevated lactate noted receiving IVF abx ordered. Family at bedside reports facial droop new advised on plan of care will chelsie

## 2022-05-28 NOTE — ED ADULT NURSE NOTE - NSICDXPASTMEDICALHX_GEN_ALL_CORE_FT
PAST MEDICAL HISTORY:  GERD (gastroesophageal reflux disease)     Hyperlipidemia     Hypertension     Malignant neoplasm of tongue, unspecified s/p surgery and radiation discontinued 7/21    Mitral valve prolapse     Parkinson's disease     Prostate cancer     Throat cancer

## 2022-05-28 NOTE — ED PROVIDER NOTE - PHYSICAL EXAMINATION
Const: Awake, alert and oriented x1  HEENT: NC/AT. Moist mucous membranes.  Eyes: No scleral icterus. EOMI.  Neck:. Soft and supple. Full ROM without pain.  Cardiac: Regular rate and regular rhythm. +S1/S2. Peripheral pulses 2+ and symmetric. No LE edema.  Resp: Speaking in full sentences. No evidence of respiratory distress. No wheezes, rales or rhonchi.  Abd: Soft, non-tender, non-distended. Normal bowel sounds in all 4 quadrants. No guarding or rebound.  Back: Spine midline and non-tender. No CVAT.  Skin: No rashes, abrasions or lacerations.  Lymph: No cervical lymphadenopathy.  Neuro: Awake, alert & oriented x 1, see NIHSS

## 2022-05-28 NOTE — ED PROVIDER NOTE - OBJECTIVE STATEMENT
Pt is a 77 yo male who presents to the ED with a cc of AMS. 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 Pt was recently admitted to the hospital from 5/5-5/10 with weakness. Pt was initially found to have UTI and ÁNGEL. He was started on Rocephin and IV hydration. Urine culture was negative. CT renal stone hunt showed trace left hydronephrosis and 4 mm left UJV obstructing stone. Urology was consulted and per their recommendations no acute intervention required. It appears that pt mental status did not improve and so CT head was obtained which relieved no acute infarct. Neurology was consulted and MRI showed old lacunar infarct with what appeared to be acute infarct of the left brachium pontis and left lenticular nucleus/corona radiata. Pt was placed on ASA and statin. Pt was discharged home. Family reports that pt was in his normal state of health. He ate dinner at 5 pm and then went to his room around 6 pm again was in his normal state of health. Family then reports that around 6:30 pm they heard pt yelling out in hs room and when they went to check on him he had difficulty sitting up, was shaking worse then usual and was mumbling. Family endorses they were having difficult understanding the pt. When they were able to understand him they report he appears confused which is not baseline. EMS was called and pt was taken to the ED for further work up

## 2022-05-28 NOTE — CHART NOTE - NSCHARTNOTEFT_GEN_A_CORE
Patient is a 75 YO male who was transferred from [facility]. The case was discussed with me.   Patient was last seen well at 6 PM and at 6:30 PM was seen to be confused.  He had NIHSS of 4 per Dr Jc BAXTER at [facility]. He has history of a recent CVA on may 5th 202 hence he was excluded from IV tPA.  Radiology Reports are as below.    CT head  < from: CT Head No Cont (05.14.22 @ 06:15) >      IMPRESSION:    CT HEAD:  No intracranial bleed or depressed skull fracture.    CT CERVICAL SPINE: No acute fracture or acute subluxation.    Asymmetric soft tissue thickening mass involving the region of the right   sternocleidomastoid again seen.      UMBERTO GARCIA MD; Attending Radiologist  CTA   < from: CT Angio Neck w/ IV Cont (05.28.22 @ 20:26) >    IMPRESSION:    CT PERFUSION:Heterogeneous and technically limited.    CTA BRAIN: High-grade short segment stenosis of the mid to distal M1   segment of the right MCA.    High-grade focal stenosis of the mid P2 segment of the right PCA with   poststenotic dilatation.    Atheromatous irregularity and stenoses along the intradural segment of   the vertebral arteries bilaterally.    CTA NECK: Focal stenosis suggested at the origin left vertebral artery   due to calcified plaque.    Calcified plaque along the ICA origins bilaterally without significant   stenosis. Scattered patchy airspace opacity in the upper lobes   bilaterally likely reflects atypical pneumonia. Consider Covid.        JOVANNI SHAFFER MD; Attending Radiologist        IMP   R/O new R MCA stroke.  Symptomatic high grade R M1 stenosis.  Intracranial Atherosclerotic Disease.    REC  Admit to medicine stepdown unit  _ IV Fluids and permissive hypertension up to 220/120 X 48 hours.  - ASA 81 QD and Plavix 75 QD   - Lipitor for LDL goal of < 70  - TTE  - MRI brain Stroke protocol  - PT OT SLP evaluation.  - I have discussed the case with Dr Crowder Of Neuro IR  - Please call Neuro IR team for a formal consult as well.

## 2022-05-28 NOTE — ED ADULT NURSE NOTE - INTERVENTIONS DEFINITIONS
[place] to call system/Physically safe environment: no spills, clutter or unnecessary equipment/Monitor for mental status changes and reorient to person, place, and time

## 2022-05-28 NOTE — ED ADULT TRIAGE NOTE - CHIEF COMPLAINT QUOTE
Patient via EMS with c/o daughter called 911 for AMS, "patient not acting himself" since 6:30pm tonight. Per EMS report daughter started patient's baseline is AOx4, ambulatory. Per EMS report patient not ambulatory on scene.  upon arrival to ED, patient AOx person and place only. Patient reports "I do not feel right" unable to state why. Patient B/L upper extremities tremulous, h/o parkinsons.

## 2022-05-28 NOTE — ED PROVIDER NOTE - CLINICAL SUMMARY MEDICAL DECISION MAKING FREE TEXT BOX
Pt is a 77 yo male who presents to the ED with a cc of AMS. 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 Pt was recently admitted to the hospital from 5/5-5/10 with weakness. Pt was initially found to have UTI and ÁNGEL. He was started on Rocephin and IV hydration. Urine culture was negative. CT renal stone hunt showed trace left hydronephrosis and 4 mm left UJV obstructing stone. Urology was consulted and per their recommendations no acute intervention required. It appears that pt mental status did not improve and so CT head was obtained which relieved no acute infarct. Neurology was consulted and MRI showed old lacunar infarct with what appeared to be acute infarct of the left brachium pontis and left lenticular nucleus/corona radiata. Pt was placed on ASA and statin. Pt was discharged home. Family reports that pt was in his normal state of health. He ate dinner at 5 pm and then went to his room around 6 pm again was in his normal state of health. Family then reports that around 6:30 pm they heard pt yelling out in hs room and when they went to check on him he had difficulty sitting up, was shaking worse then usual and was mumbling. Family endorses they were having difficult understanding the pt. When they were able to understand him they report he appears confused which is not baseline. EMS was called and pt was taken to the ED for further work up. Pt presenting to the ED with AMS stroke alert made will obtain screening labs, CT stroke imaging and will consult wit neurology will monitor

## 2022-05-28 NOTE — ED ADULT NURSE NOTE - OBJECTIVE STATEMENT
Assumed care of pt at 2200. Pt A&Ox2 disoriented to time and year transfer from [facility]. As per paramedics pt last known well was 1800. Cardiac monitoring initiated, please see code stroke flow sheet.

## 2022-05-28 NOTE — ED ADULT NURSE NOTE - CHIEF COMPLAINT QUOTE
pt a&ox1, transferred from [place] for higher level of care. code stroke activated upon arrival. pt has R sided weakness and slight facial droop. +anticoag use, pmhx of parkinson's. NKA.

## 2022-05-28 NOTE — ED ADULT TRIAGE NOTE - CHIEF COMPLAINT QUOTE
pt a&ox1, transferred from [place] for higher level of care. code stroke activated upon arrival. pt has R sided weakness and slight facial droop. +anticoag use, pmhx of parkinson's. NKA.

## 2022-05-29 NOTE — OCCUPATIONAL THERAPY INITIAL EVALUATION ADULT - VISUAL ACUITY
+glasses (not present at time of eval); no complaints in vision offered. Pt able to correctly identify # of digits held in central field bilaterally. Pt unable to correctly identify # of digits held in peripheral field (including superior/inferior fields) with either eye

## 2022-05-29 NOTE — PHYSICAL THERAPY INITIAL EVALUATION ADULT - PATIENT/FAMILY AGREES WITH PLAN
5/15/2020         RE: Reyna Rios  [address] [place] [address]        Dear Colleague,    Thank you for referring your patient, Reyna Rios, to the [facility]. Please see a copy of my visit note below.    Podiatry / Foot and Ankle Surgery Progress Note    May 15, 2020    Subject: Patient was seen for pain to both feet.  Right foot is worse.  She notes it is on the outsides of her feet.  States that they are starting to get calluses to those areas.  Previously they were open but had closed.  Very sore with pressure.  She denies injury.  Denies fever, nausea, vomiting.    Objective:  Vitals:  /74     General:  Patient is alert and orientated.  NAD.    Vascular:  DP and PT pulses are faintly palpable.  Significant edema and varicosities noted to both feet.  CFT's < 3secs.  Skin temp is normal.    Neuro:  Light and gross touch sensation intact to digits, dorsum, and plantar aspects of the feet.    Derm: Full-thickness ulceration to the lateral aspect of the right fifth metatarsal base.  After debridement measures 0.2 x 0.2 x 0.1 cm.  No surrounding erythema purulent drainage or malodor noted.    Musculoskeletal: Significant lateral deviation of both great toes with prominent first metatarsal heads medially.  Decreased arch height..      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  We will have her put iodine on there with a Band-Aid daily.  Discussed wearing her heel suspension boot in bed to get pressure off of this area.  Recommend follow-up in 6 weeks for reassessment.  Was told to call with further questions or concerns.    Procedure: After verbal consent, excisional debridement was performed on ulcer.  #15 blade was used to debride ulcer down to and including  subcutaneous tissue. Bleeding controlled with light pressure.   No drainage noted.  No anesthesia was used due to neuropathy. Dry dressing applied to foot.  Patient tolerated procedure well.      Betsey Daly DPM, Podiatry/Foot and Ankle Surgery    Weight management plan: Patient was referred to their PCP to discuss a diet and exercise plan.    Recommended to Reyna Rios to follow up with Primary Care provider regarding elevated blood pressure.      Again, thank you for allowing me to participate in the care of your patient.        Sincerely,        Betsey Daly DPM, Podiatry/Foot and Ankle Surgery     yes

## 2022-05-29 NOTE — SWALLOW BEDSIDE ASSESSMENT ADULT - DIET PRIOR TO ADMI
Pt had MBS 5/9/22 with recommendation for Soft and bite sized with Moderately thick liquids, two swallows per bolus and alternating bite/sip

## 2022-05-29 NOTE — CONSULT NOTE ADULT - ASSESSMENT
75 yo male with PMH of prostate ca in remission, tongue SCC s/p partial section last year now recurrence not fit for chemo, planned for immunotherapy, parkinson's disease, HTn, HLD, GERD, recent multiple admissions for stroke like symptoms, weakness and fall. At recent admission was found to have subacute stroke and recommended dc to [facility] but patient was discharged home where he had a fall again. At  6: 30 PM on 5/28/22 he was noted to have worsening slurred speech.     - Admitted to medicine  - General neurology following  - ID also following for UTI  - Agree with Stroke workup Core Measures: A1C: 5.6, LDL: 89, Goal: < 70  - Passed for dysphagia diet,   - Continue antiplatelet and statin   - MRI pending  - Images show diffuse intracranial stenosis  - Agree with DAPT therapy, rec three month course, family should be made aware of risk vs benefit in setting of frequent falls  - No indication for neurointerventional procedure at this time as patient's symptoms seem to have resolved, patient has diffuse intracranial stenosis with baseline of Parkinsons disease/ ?dementia  - PT recommending VICTOR MANUEL  - Further care per primary team and neurology     Please recall as needed      77 yo male with PMH of prostate ca in remission, tongue SCC s/p partial section last year now recurrence not fit for chemo, planned for immunotherapy, parkinson's disease, HTn, HLD, GERD, recent multiple admissions for stroke like symptoms, weakness and fall. At recent admission was found to have subacute stroke and recommended dc to [facility] but patient was discharged home where he had a fall again. At  6: 30 PM on 5/28/22 he was noted to have worsening slurred speech.     - Admitted to medicine  - General neurology following  - ID also following for UTI  - Agree with Stroke workup Core Measures: A1C: 5.6, LDL: 89, Goal: < 70  - Passed for dysphagia diet,   - Continue antiplatelet and statin   - MRI pending  - Images show diffuse intracranial stenosis  - Agree with DAPT therapy, rec three month course, family should be made aware of risk vs benefit in setting of frequent falls  - No indication for neurointerventional procedure at this time as patient's symptoms seem to have resolved, patient has diffuse intracranial stenosis with baseline of Parkinsons disease/ ?dementia  - PT recommending VICTOR MANUEL  - Further care per primary team and neurology   - Please recall as needed or if there is change in neurologic exam

## 2022-05-29 NOTE — PHYSICAL THERAPY INITIAL EVALUATION ADULT - RANGE OF MOTION EXAMINATION, REHAB EVAL
R shoulder AAROM 80 deg flex, L shoulder 90 deg AROM flexion, otherwise WFL/bilateral lower extremity ROM was WFL (within functional limits)

## 2022-05-29 NOTE — CONSULT NOTE ADULT - SUBJECTIVE AND OBJECTIVE BOX
Patient is a 76y old  Male who presents with a chief complaint of Stroke (29 May 2022 12:53)    HPI: 77 yo male with PMH of prostate ca in remission, tongue SCC s/p partial section last year now recurrence not fit for chemo, planned for immunotherapy, parkinson's disease, HTn, HLD< GERD, recent multipel admission for stroke like symptoms , weakness and fall. found to have subacute stroke that time and recommended dc to [facility] but patient was discharged home he had a fall again but went back to home. yesterday ~6: 30 PM he was noted to ahve worsening slurry speech, worse rt facial weakness and droop. he was taken to [facility] initially where code stroke activated NIH was 3, no tPA given, ct angio revealed High-grade short segment stenosis of the mid and distal M1 segment of the right MCA. patient was then transferred to [facility] for further management. as per the chart review Neurology attending Dr Rm, case was reviewed with Neuro IR and recommended MR brain for now and stroke monitoring.  denies fall, head trauma, LOC seizure, chest pain, fever, cough, abdominal pain or nausea vomiting. (29 May 2022 00:30)    Interval: Neurointerventional Radiology consulted for stenosis mid-distal M1 right MCA. Patient presented to [facility] w right facial droop and dysarthria. No TPA given. CTA w right MCA M1 stenosis General neurology following, asking for neuro IR input.        PAST MEDICAL & SURGICAL HISTORY:  Hypertension  Hyperlipidemia  GERD (gastroesophageal reflux disease)  Parkinson disease  Prostate cancer  Mitral valve prolapse  Malignant neoplasm of tongue, unspecified  s/p surgery and radiation discontinued   Throat cancer  History of vasectomy  Prostate cancer  s/p cyber knife  S/P partial glossectomy      SOCIAL HISTORY:  non smoker     FAMILY HISTORY:  FH: heart disease  father, brothers  FH: type 2 diabetes  mother    Allergies    No Known Allergies    REVIEW OF SYSTEMS  CONSTITUTIONAL: No fever, weight loss, or fatigue  EYES: No eye pain, visual disturbances, or discharge  ENMT:  No difficulty hearing, tinnitus, vertigo; No sinus or throat pain  NECK: No pain or stiffness  BREASTS: No pain, masses, or nipple discharge  RESPIRATORY: No cough, wheezing, chills or hemoptysis; No shortness of breath  CARDIOVASCULAR: No chest pain, palpitations, dizziness, or leg swelling  GASTROINTESTINAL: No abdominal or epigastric pain. No nausea, vomiting, or hematemesis; No diarrhea or constipation. No melena or hematochezia.  GENITOURINARY: No dysuria, frequency, hematuria, or incontinence  NEUROLOGICAL: + facial droop + slurred speech, + tremors, no headaches, memory loss, loss of strength, numbness  SKIN: No itching, burning, rashes, or lesions   LYMPH NODES: No enlarged glands  ENDOCRINE: No heat or cold intolerance; No hair loss  MUSCULOSKELETAL: No joint pain or swelling; No muscle, back, or extremity pain  PSYCHIATRIC: No depression, anxiety, mood swings, or difficulty sleeping  HEME/LYMPH: No easy bruising, or bleeding gums  ALLERY AND IMMUNOLOGIC: No hives or eczema    HOME MEDICATIONS:  Home Medications:  carbidopa-levodopa 25 mg-100 mg oral tablet: 1 tab(s) orally 2 times a day (28 May 2022 23:51)  citalopram 20 mg oral tablet: 1 tab(s) orally once a day (28 May 2022 23:51)  DilTIAZem (Eqv-Cardizem CD) 240 mg/24 hours oral capsule, extended release: 1 cap(s) orally once a day (28 May 2022 23:51)  enalapril 10 mg oral tablet: 1 tab(s) orally once a day (28 May 2022 23:51)  pantoprazole 40 mg oral delayed release tablet: 1 tab(s) orally once a day (before a meal) (28 May 2022 23:51)  primidone 50 mg oral tablet: 1 tab(s) orally once a day (28 May 2022 23:51)  simvastatin 40 mg oral tablet: 1 tab(s) orally once a day (at bedtime) (28 May 2022 23:51)  Vitamin C 1000 mg oral tablet: 1 tab(s) orally once a day (28 May 2022 23:51)      MEDICATIONS:  Neuro:  aspirin Suppository 300 milliGRAM(s) Rectal daily  carbidopa/levodopa  25/100 1 Tablet(s) Oral <User Schedule>  citalopram 20 milliGRAM(s) Oral daily  primidone 50 milliGRAM(s) Oral daily    Anticoagulation:  enoxaparin Injectable 40 milliGRAM(s) SubCutaneous every 24 hours    OTHER:  atorvastatin 80 milliGRAM(s) Oral at bedtime  diltiazem    milliGRAM(s) Oral daily  enalapril 10 milliGRAM(s) Oral daily  pantoprazole    Tablet 40 milliGRAM(s) Oral before breakfast  tamsulosin 0.4 milliGRAM(s) Oral at bedtime    IVF:  ascorbic acid 500 milliGRAM(s) Oral daily      Vital Signs Last 24 Hrs  T(C): 36.7 (29 May 2022 15:16), Max: 37 (28 May 2022 19:46)  T(F): 98 (29 May 2022 15:16), Max: 98.6 (28 May 2022 19:46)  HR: 64 (29 May 2022 14:00) (64 - 117)  BP: 147/96 (29 May 2022 14:00) (119/74 - 150/73)  BP(mean): 86 (29 May 2022 08:39) (86 - 86)  RR: 16 (29 May 2022 14:00) (16 - 26)  SpO2: 99% (29 May 2022 14:00) (96% - 100%)      PHYSICAL EXAM:  Detailed Neurologic Exam:    Mental status: The patient is awake and alert and has normal attention span. The patient A & O x 2, does not know the year or month. The patient has a severe UE b/l resting tremor. The patient is able to name objects, follow commands, repeat sentences. The patient appears frail and generally deconditioned.      Cranial nerves: Pupils equal and react symmetrically to light. There is no visual field deficit to confrontation. Extraocular motion is full with no nystagmus. There is no ptosis. Facial sensation is intact. Facial musculature is symmetric. The patient has a gurgly voice, dysarthria appreciated, ?? baseline due to tongue excision surgery.      Motor: Strength is 3+ b/l UE, 4/5 b/l LE    Sensation: Intact to light touch in 4 extremities    Cerebellar: deferred    Gait : deferred      LABS:                        12.2   6.46  )-----------( 307      ( 29 May 2022 03:40 )             36.5     05-    140  |  103  |  15.3  ----------------------------<  104<H>  3.1<L>   |  27.0  |  0.67    Ca    10.3<H>      29 May 2022 03:40    TPro  6.5<L>  /  Alb  3.2<L>  /  TBili  0.4  /  DBili  x   /  AST  11  /  ALT  9   /  AlkPhos  76  05-    PT/INR - ( 29 May 2022 03:40 )   PT: 13.5 sec;   INR: 1.16 ratio         PTT - ( 28 May 2022 20:08 )  PTT:33.1 sec  Urinalysis Basic - ( 28 May 2022 23:28 )    Color: Yellow / Appearance: Slightly Turbid / S.005 / pH: x  Gluc: x / Ketone: Negative  / Bili: Negative / Urobili: Negative mg/dL   Blood: x / Protein: 15 / Nitrite: Negative   Leuk Esterase: Moderate / RBC: 6-10 /HPF / WBC 11-25 /HPF   Sq Epi: x / Non Sq Epi: Few / Bacteria: Occasional      RADIOLOGY & ADDITIONAL STUDIES:      < from: Xray Chest 1 View AP/PA (22 @ 21:19) >    IMPRESSION:    Unremarkable frontal chest x ray        < from: CT Perfusion w/ Maps w/ IV Cont (22 @ 20:26) >    IMPRESSION:    CT PERFUSION: Heterogeneous and technically limited.    CTA BRAIN: High-grade short segment stenosis of the mid to distal M1   segment of the right MCA.    High-grade focal stenosis of the mid P2 segment of the right PCA with   poststenotic dilatation.    Atheromatous irregularity and stenoses along the intradural segment of   the vertebral arteries bilaterally.    CTA NECK: Focal stenosis suggested at the origin left vertebral artery   due to calcified plaque.    Calcified plaque along the ICA origins bilaterally without significant   stenosis. Scattered patchy airspace opacity in the upper lobes   bilaterally likely reflects atypical pneumonia. Consider Covid.

## 2022-05-29 NOTE — PHYSICAL THERAPY INITIAL EVALUATION ADULT - ADDITIONAL COMMENTS
Pt questionable historian: Pt reports living with daughter (works from home) in a house with ALDA and steps inside (unable to quantify). Pt amb with RW and independent with functional mobility. Pt has assist for shower transfers and bathing with shower chair, dressing, and feeding. Pt also has assist for IADLs.

## 2022-05-29 NOTE — SWALLOW BEDSIDE ASSESSMENT ADULT - ADDITIONAL RECOMMENDATIONS
Close monitoring of PO tolerance and discontinue diet if any signs/symptoms of penetration/aspiration observed.

## 2022-05-29 NOTE — H&P ADULT - NSHPPHYSICALEXAM_GEN_ALL_CORE
Vital Signs Last 24 Hrs  T(C): 36.8 (28 May 2022 23:58), Max: 37 (28 May 2022 19:46)  T(F): 98.3 (28 May 2022 23:58), Max: 98.6 (28 May 2022 19:46)  HR: 65 (28 May 2022 23:58) (64 - 117)  BP: 150/73 (28 May 2022 23:58) (119/74 - 150/73)  BP(mean): --  RR: 18 (28 May 2022 23:58) (18 - 26)  SpO2: 96% (28 May 2022 23:58) (96% - 100%)    PHYSICAL EXAM:  GENERAL: NAD,   HEAD:  Atraumatic, Normocephalic  EYES: EOMI, PERRLA, conjunctiva and sclera clear  NECK: Supple, No JVD  CHEST/LUNG: scattered rhonchi, no wheeze  HEART: Regular rate and rhythm; No murmurs, rubs, or gallops  ABDOMEN: Soft, Nontender, Nondistended; Bowel sounds present  EXTREMITIES:  2+ Peripheral Pulses, No clubbing, cyanosis, or edema  PSYCH: AAOx3  NEUROLOGY: extremities gross motor and sensory intact, rt facial droop, slurry speech  SKIN: No rashes or lesions

## 2022-05-29 NOTE — PHYSICAL THERAPY INITIAL EVALUATION ADULT - PERTINENT HX OF CURRENT PROBLEM, REHAB EVAL
Pt transferred from [place] with CVA (acute on chronic vs acute), UTI, and history of multiple stroke- like events

## 2022-05-29 NOTE — OCCUPATIONAL THERAPY INITIAL EVALUATION ADULT - RANGE OF MOTION EXAMINATION, UPPER EXTREMITY
AROM L shoulder flexion ~90 degrees, elbow flex/ext, gross grasp WFL. AROM R shoulder flexion ~35 degrees, AAROM ~80 degrees, AROM elbow flex/ext, gross grasp WFL. All movements require increased effort.

## 2022-05-29 NOTE — SWALLOW BEDSIDE ASSESSMENT ADULT - ORAL PHASE
clears with subsequent swallow and liquid wash/Decreased anterior-posterior movement of the bolus/Delayed oral transit time/Lingual stasis Decreased anterior-posterior movement of the bolus/Delayed oral transit time

## 2022-05-29 NOTE — CONSULT NOTE ADULT - ASSESSMENT
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 Patient found to have subacute stroke that time and recommended discharge to [facility] but patient was discharged home he had a fall again. While at home patient was noted to have worsening slurred speech, Rt facial weakness and droop. He was taken to [facility] initially where code stroke activated NIH was 3, no tPA given, CT angio revealed High-grade short segment stenosis of the mid and distal M1 segment of the right MCA. Patient was then transferred to [facility] for further management. Here was seen by neurology. Also started on Zosyn for UTI.       ? UTI  CVA  Parkinson's Dz  Prostate Ca      - Blood cultures pending  - Urine culture pending  - RVP/COVID 19 PCR 5/28 negative   - UA  not convincing of UTI   - D/C Zosyn since no fever, leukocytosis or urinary symptoms   - Follow up cultures  - Trend Fever  - Trend WBC      Thank you for allowing me to participate in the care of your patient.   Will Follow    d/w Dr Real

## 2022-05-29 NOTE — OCCUPATIONAL THERAPY INITIAL EVALUATION ADULT - PERTINENT HX OF CURRENT PROBLEM, REHAB EVAL
PMH of prostate ca in remission, tongue SCC s/p partial section last year now recurrence not fit for chemo, planned for immunotherapy, parkinson's disease, HTN, HLD, GERD

## 2022-05-29 NOTE — CONSULT NOTE ADULT - SUBJECTIVE AND OBJECTIVE BOX
[facility]                                        Neurology at [place]                                  Jb Abdalla, & Sly                                      [address]                                           [place], [place], [address]                                                [phone]        CC: TIA/CVA and Parkinson's disease with recent admission with fall, found to have subacute stroke and subacute rehabilitation was recommended but patient went home.   He now presented to [facility] with slurred speech and right facial weakness. Stroke code was activated and he was transferred here for further management.    HISTORY:  The patient is a 76y Male     PAST MEDICAL & SURGICAL HISTORY:  Hypertension  Hyperlipidemia  GERD (gastroesophageal reflux disease)  Parkinson's disease  Prostate cancer  Mitral valve prolapse  Malignant neoplasm of tongue, 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  Throat cancer  History of vasectomy  Prostate cancer  s/p cyber knife  S/P partial glossectomy  4/21    MEDICATION PRIOR TO ADMISSION:  · 	aspirin 81 mg oral delayed release tablet: Last Dose Taken:  , 1 tab(s) orally once a day  · 	Flomax 0.4 mg oral capsule: Last Dose Taken:  , 1 cap(s) orally once a day (at bedtime) MDD:1  · 	pantoprazole 40 mg oral delayed release tablet: Last Dose Taken:  , 1 tab(s) orally once a day (before a meal)  · 	Vitamin C 1000 mg oral tablet: Last Dose Taken:  , 1 tab(s) orally once a day  · 	DilTIAZem (Eqv-Cardizem CD) 240 mg/24 hours oral capsule, extended release: Last Dose Taken:  , 1 cap(s) orally once a day  · 	carbidopa-levodopa 25 mg-100 mg oral tablet: Last Dose Taken:  , 1 tab(s) orally 2 times a day  · 	enalapril 10 mg oral tablet: Last Dose Taken:  , 1 tab(s) orally once a day  · 	citalopram 20 mg oral tablet: Last Dose Taken:  , 1 tab(s) orally once a day  · 	simvastatin 40 mg oral tablet: Last Dose Taken:  , 1 tab(s) orally once a day (at bedtime)  · 	primidone 50 mg oral tablet: Last Dose Taken:  , 1 tab(s) orally once a day        MEDICATIONS  (STANDING):  aspirin Suppository 300 milliGRAM(s) Rectal daily  enoxaparin Injectable 40 milliGRAM(s) SubCutaneous every 24 hours  lactated ringers. 1000 milliLiter(s) (50 mL/Hr) IV Continuous <Continuous>  piperacillin/tazobactam IVPB.. 3.375 Gram(s) IV Intermittent every 8 hours  potassium chloride  10 mEq/100 mL IVPB 10 milliEquivalent(s) IV Intermittent once    Allergies  No Known Allergies    SOCIAL HISTORY:  Non smoker.     FAMILY HISTORY:  FH: heart disease  father, brothers  FH: type 2 diabetes  mother  No known family history of stroke.     ROS:  Constitutional: The patient denies fevers or weight changes.  Neuro: As per HPI.  Eyes: Denies blurry vision.  Ears/nose/throat: Denies Tinnitus.   Cardiac: Denies chest pain. Denies palpitations.  Respiratory: Denies shortness of breath.  GI: Denies abdominal pain, nausea, or vomiting.  : Denies change in urinary pattern.  Integumentary: Denies rash.  Psych: Denies recent mood changes.  Heme: denies easy bleeding/bruising.    Exam:  Vital Signs Last 24 Hrs  T(C): 36.5 (29 May 2022 08:00), Max: 37 (28 May 2022 19:46)  T(F): 97.7 (29 May 2022 08:00), Max: 98.6 (28 May 2022 19:46)  HR: 67 (29 May 2022 07:51) (64 - 117)  BP: 138/72 (29 May 2022 07:51) (119/74 - 150/73)  RR: 18 (29 May 2022 07:51) (18 - 26)  SpO2: 99% (29 May 2022 07:51) (96% - 100%)  General: NAD.   Carotid bruits absent.     Mental status: The patient is awake, alert, and fully oriented. There is no aphasia. There is moderate dysarthria.     Cranial nerves: There is no papilledema. Pupils react symmetrically to light. There is no visual field deficit to confrontation. Extraocular motion is full with no nystagmus.  Facial sensation is intact. Facial musculature is symmetric. Palate elevates symmetrically. Tongue is midline.    Motor: There is increased tone with cogwheeling at the wrists and elbows bilaterally. There is tremor at rest which subsides with intention.  Strength is 5/5 in the right arm and leg.   Strength is 5/5 in the left arm and leg.    Sensation: Intact to light touch and pin. There is no extinction to double simultaneous stimulation.    Reflexes: 1+ throughout and plantar responses are flexor.    Cerebellar: There is no dysmetria on finger to nose testing.    [facility] SS:   Date: 5/29/22  Time:   1a) Level of consciousness (0-3):    1b) Questions (0-2): 1  1c) Commands (0-2): 0  2  ) Gaze (0-2): 0  3  ) Visual field (0-3): 0  4  ) Facial palsy (0-3): 0  Motor  5a) Left arm (0-4): 0  5b) Right arm (0-4): 0  6a) Left leg (0-4): 0  6b) Right leg (0-4): 0  7  ) Ataxia (0-2): 0  Sensory  8  ) Sensory (0-2): 0  Speech  9  ) Language (0-3): 0  10) Dysarthria (0-2): 1  Extinction  11) Extinction/inattention (0-2): 0    Total score: 2    Prestroke Modified [place]: 2    (0: No symptoms and no disability.  1: No significant disability despite symptoms; able to carry out all usual duties and activities.  2: Slight disability; unable to carry out all previous activity but able to look after own affairs without assistance.  3: Moderate disability; requiring some help but able to walk without assistance.   4: Moderately severe disability; unable to walk without assistance and unable to attend to own bodily needs without assistance.  5: Severe disability; bedridden, incontinent and requiring constant nursing care and attention.   6: Dead. )     LABS:                         12.2   6.46  )-----------( 307      ( 29 May 2022 03:40 )             36.5       05-29    140  |  103  |  15.3  ----------------------------<  104<H>  3.1<L>   |  27.0  |  0.67    Ca    10.3<H>      29 May 2022 03:40    TPro  6.5<L>  /  Alb  3.2<L>  /  TBili  0.4  /  DBili  x   /  AST  11  /  ALT  9   /  AlkPhos  76  05-29      PT/INR - ( 29 May 2022 03:40 )   PT: 13.5 sec;   INR: 1.16 ratio         PTT - ( 28 May 2022 20:08 )  PTT:33.1 sec    RADIOLOGY   CT head images reviewed (and concur with report): There is no acute pathology. Chronic infarcts bilateral basal ganglia.    CT-A: Distal Right M1 high grade stenosis.  Mid right P2 high grade stenosis.

## 2022-05-29 NOTE — H&P ADULT - HISTORY OF PRESENT ILLNESS
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 found to have subacute stroke that time and recommended dc to [facility] but patient was discharged home he had a fall again but went back to home. yesterday ~6: 30 PM he was noted to ahve worsening slurry sppech, worse rt facial weakness and droop. he was taken to [facility] initially where code stroke activated NIH was 3, no tPA given, ct angio revealed High-grade short segment stenosis of the mid and distal M1 segment of the right MCA. patient was then transferred to [facility] for further management. as per the chart review Neurology attending Dr Rm, case was reviewed with Neuro IR and recommended MR brain for now and stroke monitoring.  denies fall, head trauma, LOC seizure, chest pain, fever, cough, abdominal pain or nausea vomiting.

## 2022-05-29 NOTE — CONSULT NOTE ADULT - SUBJECTIVE AND OBJECTIVE BOX
[facility]  INFECTIOUS DISEASES AND INTERNAL MEDICINE at [place]  =======================================================                               Denton Arechiga MD#  Mg Garrido MD*                                     Pati Hidalgo MD*    Ijeoma Azevedo MD*            Diplomates American Board of Internal Medicine & Infectious Diseases                # [place] Office - Appt - Tel  [phone] Fax [phone]                * [place] Office - Appt - Tel [phone] Fax [phone]                                  Hospital Consult line:  [phone]  =======================================================      [id number]  ISIS CUETO    CC: Patient is a 76y old  Male who presents with a chief complaint of Stroke (29 May 2022 00:30)      76y  Male with h/o prostate Ca in remission, tongue SCC s/p partial resection last year now recurrence not fit for chemo, planned for immunotherapy, parkinson's disease, HTN, HLD, GERD, recent admission for stroke like symptoms and fall. Patient found to have subacute stroke that time and recommended discharge to [facility] but patient was discharged home he had a fall again. While at home patient was noted to have worsening slurred speech, Rt facial weakness and droop. He was taken to [facility] initially where code stroke activated NIH was 3, no tPA given, CT angio revealed High-grade short segment stenosis of the mid and distal M1 segment of the right MCA. Patient was then transferred to [facility] for further management. Here was seen by neurology. Also started on Zosyn for UTI. ID input requested.       Past Medical & Surgical Hx:  Hypertension  Hyperlipidemia  GERD (gastroesophageal reflux disease)  Parkinsons disease  Prostate cancer  Mitral valve prolapse  Malignant neoplasm of tongue, unspecified  s/p surgery and radiation discontinued   Throat cancer  History of vasectomy  Prostate cancer  s/p cyber knife  S/P partial glossectomy,       Social Hx:  Denies smoking       FAMILY HISTORY:  FH: heart disease, father, brothers  FH: type 2 diabetes, mother      Allergies  No Known Allergies       REVIEW OF SYSTEMS:  CONSTITUTIONAL:  No Fever or chills  HEENT:  No diplopia or blurred vision.  No earache, sore throat or runny nose.  CARDIOVASCULAR:  No chest pain   RESPIRATORY:  No cough, shortness of breath  GASTROINTESTINAL:  No nausea, vomiting or diarrhea.  GENITOURINARY:  No dysuria, frequency or urgency.   MUSCULOSKELETAL:  no joint aches, no muscle pain  SKIN:  No change in skin, hair or nails.  NEUROLOGIC:  No Headaches, seizures   PSYCHIATRIC:  No disorder of thought or mood.  ENDOCRINE:  No heat or cold intolerance  HEMATOLOGICAL:  No easy bruising or bleeding.       Physical Exam:  GEN: NAD  HEENT: normocephalic and atraumatic. EOMI. PERRL.    NECK: Supple.   LUNGS: CTA B/L.  HEART: RRR  ABDOMEN: Soft, NT, ND.  +BS.    : No CVA tenderness  EXTREMITIES: Without  edema.  MSK: No joint swelling  NEUROLOGIC: Awake, alert and oriented x3  PSYCHIATRIC: Appropriate affect .  SKIN: No rash      Height (cm): 165.1 ( @ 21:57), 165.1 ( @ 19:46)  Weight (kg): 59 ( @ 21:57), 62.2 ( @ 21:10)  BMI (kg/m2): 21.6 ( @ 21:57), 22.8 ( @ 21:10)  BSA (m2): 1.65 ( @ 21:57), 1.68 ( @ 21:10)      Vitals:  T(F): 97.7 (29 May 2022 08:00), Max: 98.6 (28 May 2022 19:46)  HR: 67 (29 May 2022 07:51)  BP: 138/72 (29 May 2022 07:51)  RR: 18 (29 May 2022 07:51)  SpO2: 99% (29 May 2022 07:51) (96% - 100%)  temp max in last 48H T(F): , Max: 98.6 (22 @ 19:46)      Current Antibiotics:  piperacillin/tazobactam IVPB.. 3.375 Gram(s) IV Intermittent every 8 hours    Other medications:  aspirin Suppository 300 milliGRAM(s) Rectal daily  enoxaparin Injectable 40 milliGRAM(s) SubCutaneous every 24 hours  lactated ringers. 1000 milliLiter(s) IV Continuous <Continuous>  potassium chloride  10 mEq/100 mL IVPB 10 milliEquivalent(s) IV Intermittent once                 12.2   6.46  )-----------( 307      ( 29 May 2022 03:40 )             36.5         140  |  103  |  15.3  ----------------------------<  104<H>  3.1<L>   |  27.0  |  0.67    Ca    10.3<H>      29 May 2022 03:40    TPro  6.5<L>  /  Alb  3.2<L>  /  TBili  0.4  /  DBili  x   /  AST  11  /  ALT  9   /  AlkPhos  76      WBC Count: 6.46 K/uL (22 @ 03:40)  WBC Count: 8.48 K/uL (22 @ 20:08)    Creatinine, Serum: 0.67 mg/dL (22 @ 03:40)  Creatinine, Serum: 1.10 mg/dL (22 @ 20:08)    SARS-CoV-2 Result: NotDetec (22 @ 20:07)  SARS-CoV-2 Result: NotDetec (22 @ 02:15)      Urinalysis Basic - ( 28 May 2022 23:28 )  Color: Yellow / Appearance: Slightly Turbid / S.005 / pH: x  Gluc: x / Ketone: Negative  / Bili: Negative / Urobili: Negative mg/dL   Blood: x / Protein: 15 / Nitrite: Negative   Leuk Esterase: Moderate / RBC: 6-10 /HPF / WBC 11-25 /HPF   Sq Epi: x / Non Sq Epi: Few / Bacteria: Occasional

## 2022-05-29 NOTE — PROGRESS NOTE ADULT - ASSESSMENT
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 found to have subacute stroke that time and recommended dc to [facility] but patient was discharged home he had a fall again but went back to home. 5/28 ~6: 30 PM he was noted to have worsening confusion, worsening slurry speech, worse rt facial weakness and droop. taken to 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 all questions answered

## 2022-05-29 NOTE — SWALLOW BEDSIDE ASSESSMENT ADULT - SWALLOW EVAL: DIAGNOSIS
Render In Strict Bullet Format?: No
Mild to moderate oral dysphagia for puree characterized by increased oral transit time and mild lingual stasis, reduced with subsequent swallows and liquid wash. Pharyngeal stage appears functional for consistencies administered (puree and moderately thick liquids via teaspoon). Pt with known pharyngeal dysphagia as per 5/9/22 MBS, therefore additional liquid consistencies not administered.
Detail Level: Zone
Initiate Treatment: mometasone 0.1 % topical cream \\nQuantity: 45.0 g  Days Supply: 30\\nSig: Apply daily for one month to back of scalp\\n\\ndoxycycline hyclate 50 mg capsule \\nQuantity: 30.0 Capsule\\nSig: QHS with food
Initiate Treatment: tazarotene 0.1 % topical cream \\nQuantity: 60.0 g  Days Supply: 30\\nSig: Apply daily to AA at night only

## 2022-05-29 NOTE — OCCUPATIONAL THERAPY INITIAL EVALUATION ADULT - DIAGNOSIS, OT EVAL
76 year old Male with recent multiple admission for stroke like symptoms , weakness and fall. Found to have subacute stroke and recommended dc to [facility] but patient was discharged home. Pt had a fall again but went back to home. 5/28 ~6: 30 PM he was noted to have worsening confusion, worsening slurry speech, worse R facial weakness and droop. Pt taken to [facility] and transferred to [facility] for further care; R/O stroke

## 2022-05-29 NOTE — H&P ADULT - ASSESSMENT
77 yo male with PMH of prostate ca in remission, tongue SCC s/p partial section last year now recurrence not fit for chemo, planned for immunotherapy, parkinson's disease, HTN, HLD< GERD, recent multiple admission for stroke like symptoms , weakness and fall. found to have subacute stroke that time and recommended dc to [facility] but patient was discharged home he had a fall again but went back to home. yesterday ~6: 30 PM he was noted to have worsening confusion, worsening slurry sppech, worse rt facial weakness and droop. he was taken to [facility] initially where code stroke activated NIH was 4, no tPA given, ct angio revealed High-grade short segment stenosis of the mid and distal M1 segment of the right MCA. patient was then transferred to [facility] for further management. as per the chart review Neurology attending Dr Rm, case was reviewed with Neuro IR and recommended MR brain for now and stroke monitoring.  denies fall, head trauma, LOC seizure, chest pain, fever, cough, abdominal pain or nausea vomiting.    Impression:    CVA  unclear is acute on chronic vs acute   NIH on presentation 4  tPA not given due to recent h/o stroke  stroke unit monitoring  mr brain stroke protocol  aspirin administered rectally at plain view  perform bedside swall eval and administer po meds including plavix  formal swallow eval  pt/ot eval  dvt ppx  neuro on board  please consult neuro IR in AM    UTI:  start on zosyn,   recent hospital admission  urine culture sent  ultrasound kidney bladder ordered, h/o uvj stone  id consulted      B/l upper lobe GGO:  likey aspiration induced pneumonitis  monitor for now  not hypoxic    PD:  continue levo-carbidopa  continue primidone    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 found to have subacute stroke that time and recommended dc to [facility] but patient was discharged home he had a fall again but went back to home. yesterday ~6: 30 PM he was noted to have worsening confusion, worsening slurry sppech, worse rt facial weakness and droop. he was taken to [facility] initially where code stroke activated NIH was 4, no tPA given, ct angio revealed High-grade short segment stenosis of the mid and distal M1 segment of the right MCA. patient was then transferred to [facility] for further management. as per the chart review Neurology attending Dr Rm, case was reviewed with Neuro IR and recommended MR brain for now and stroke monitoring.  denies fall, head trauma, LOC seizure, chest pain, fever, cough, abdominal pain or nausea vomiting.    Impression:    CVA  unclear is acute on chronic vs acute   NIH on presentation 4  tPA not given due to recent h/o stroke  stroke unit monitoring  mr brain stroke protocol  aspirin administered rectally at plain view  perform bedside swall eval and administer po meds including plavix  formal swallow eval  pt/ot eval  dvt ppx  neuro on board  please consult neuro IR in AM    UTI:  start on zosyn,   recent hospital admission  urine culture sent  ultrasound kidney bladder ordered, h/o uvj stone  id consulted    B/l upper lobe GGO:  monitor for now  not hypoxic    PD:  continue levo-carbidopa  continue primidone    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

## 2022-05-29 NOTE — OCCUPATIONAL THERAPY INITIAL EVALUATION ADULT - ADDITIONAL COMMENTS
Pt poor/questionable historian. Recommend social work to clarify all info including PLOF, set up of home/bathroom, DME owned/used, level of assist needed PTA and level of assist available upon discharge. Pt reports ambulates with RW, has assist for all transfers and ADLs (dressing, bathing, toileting and feeding) and IADLs. Pt reports a shower with curtains and grab bars. Pt states has hospital bed, shower chair, commode and was using all devices. Pt reports is right handed and does not drive

## 2022-05-29 NOTE — PATIENT PROFILE ADULT - FALL HARM RISK - HARM RISK INTERVENTIONS

## 2022-05-29 NOTE — H&P ADULT - CONVERSATION DETAILS
Patient had discussion with his daughter in the past and wants to receive aggressive care and perform cardiopulmonary resuscitation however does not want to be in prolonged vegetative state.

## 2022-05-29 NOTE — PHYSICAL THERAPY INITIAL EVALUATION ADULT - GAIT DEVIATIONS NOTED, PT EVAL
decreased katarzyna/decreased stride length/increased stride width/decreased weight-shifting ability

## 2022-05-29 NOTE — OCCUPATIONAL THERAPY INITIAL EVALUATION ADULT - GENERAL OBSERVATIONS, REHAB EVAL
Left pt as found, NAD semifowler in bed, +alarm, +IV, +Tele//BP, +on RA with CBWR. Pre/post pain 0/10. Patient agreeable to OT evaluation.

## 2022-05-29 NOTE — CONSULT NOTE ADULT - ASSESSMENT
The patient is a 76y Male with Parkinson's disease now with possible stroke.     Stroke.  A1C: 5.6  LDL: 89  Goal: < 70  Continue antiplatelet and statin (when tolerating PO).   No objection to brain MRI.  Suggest neuro-vascular team evaluation.    Parkinson's disease   Resume Sinemet when tolerating PO.  Mobilize with physical therapy.     Case discussed with Dr NEETU Real.

## 2022-05-29 NOTE — PROGRESS NOTE ADULT - SUBJECTIVE AND OBJECTIVE BOX
Patient is a 76y old  Male who presents with a chief complaint of Stroke (29 May 2022 11:36)    Patient seen and examined at bedside.     ALLERGIES:  No Known Allergies    MEDICATIONS  (STANDING):  ascorbic acid 500 milliGRAM(s) Oral daily  aspirin Suppository 300 milliGRAM(s) Rectal daily  atorvastatin 80 milliGRAM(s) Oral at bedtime  carbidopa/levodopa  25/100 1 Tablet(s) Oral <User Schedule>  citalopram 20 milliGRAM(s) Oral daily  diltiazem    milliGRAM(s) Oral daily  enalapril 10 milliGRAM(s) Oral daily  enoxaparin Injectable 40 milliGRAM(s) SubCutaneous every 24 hours  pantoprazole    Tablet 40 milliGRAM(s) Oral before breakfast  potassium chloride  10 mEq/100 mL IVPB 10 milliEquivalent(s) IV Intermittent every 1 hour  primidone 50 milliGRAM(s) Oral daily  tamsulosin 0.4 milliGRAM(s) Oral at bedtime    MEDICATIONS  (PRN):    Vital Signs Last 24 Hrs  T(F): 97.9 (29 May 2022 12:00), Max: 98.6 (28 May 2022 19:46)  HR: 65 (29 May 2022 08:39) (64 - 117)  BP: 136/73 (29 May 2022 08:39) (119/74 - 150/73)  RR: 17 (29 May 2022 08:39) (17 - 26)  SpO2: 96% (29 May 2022 08:39) (96% - 100%)  I&O's Summary    PHYSICAL EXAM:  General: NAD, Alert  ENT: MMM, no thrush  Neck: Supple, No JVD  Lungs: Clear to auscultation bilaterally, good air entry, non-labored breathing  Cardio: +s1/s2  Abdomen: Soft, Nontender, Nondistended; Bowel sounds present  Extremities: No calf tenderness,  Neuro +right facial droop    LABS:                        12.2   6.46  )-----------( 307      ( 29 May 2022 03:40 )             36.5     -    140  |  103  |  15.3  ----------------------------<  104  3.1   |  27.0  |  0.67    Ca    10.3      29 May 2022 03:40    TPro  6.5  /  Alb  3.2  /  TBili  0.4  /  DBili  x   /  AST  11  /  ALT  9   /  AlkPhos  76        Lipase, Serum: 81 U/L (22 @ 20:08)      PT/INR - ( 29 May 2022 03:40 )   PT: 13.5 sec;   INR: 1.16 ratio         PTT - ( 28 May 2022 20:08 )  PTT:33.1 sec  Lactate, Blood: 4.9 mmol/L ( @ 20:08)    CARDIAC MARKERS ( 28 May 2022 20:08 )  x     / 8.0 ng/L / x     / x     / x         Chol 148 mg/dL LDL -- HDL 45 mg/dL Trig 70 mg/dL    23:05 - VBG - pH: 7.370 | pCO2: 47    | pO2: 53    | Lactate: 0.80     Glucose  POCT Blood Glucose.: 239 mg/dL (28 May 2022 21:55)  POCT Blood Glucose.: 178 mg/dL (28 May 2022 19:44)    Urinalysis Basic - ( 28 May 2022 23:28 )  Color: Yellow / Appearance: Slightly Turbid / S.005 / pH: x  Gluc: x / Ketone: Negative  / Bili: Negative / Urobili: Negative mg/dL   Blood: x / Protein: 15 / Nitrite: Negative   Leuk Esterase: Moderate / RBC: 6-10 /HPF / WBC 11-25 /HPF   Sq Epi: x / Non Sq Epi: Few / Bacteria: Occasional    RADIOLOGY & ADDITIONAL TESTS:  - no new tests    Care Discussed with Consultants/Other Providers:   Neurology  ID

## 2022-05-29 NOTE — SWALLOW BEDSIDE ASSESSMENT ADULT - SLP PERTINENT HISTORY OF CURRENT PROBLEM
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 found to have subacute stroke that time and recommended dc to [facility] but patient was discharged home he had a fall again but went back to home. yesterday ~6: 30 PM he was noted to ahve worsening slurry sppech, worse rt facial weakness and droop. he was taken to [facility] initially where code stroke activated NIH was 3, no tPA given, ct angio revealed High-grade short segment stenosis of the mid and distal M1 segment of the right MCA. patient was then transferred to [facility] for further management. as per the chart review Neurology attending Dr Rm, case was reviewed with Neuro IR and recommended MR brain for now and stroke monitoring.

## 2022-05-30 NOTE — PROGRESS NOTE ADULT - SUBJECTIVE AND OBJECTIVE BOX
Patient is a 76y old  Male who presents with a chief complaint of Stroke (29 May 2022 17:25)    Patient seen and examined at bedside.     ALLERGIES:  No Known Allergies    MEDICATIONS  (STANDING):  ascorbic acid 500 milliGRAM(s) Oral daily  aspirin Suppository 300 milliGRAM(s) Rectal daily  atorvastatin 80 milliGRAM(s) Oral at bedtime  carbidopa/levodopa  25/100 1 Tablet(s) Oral <User Schedule>  citalopram 20 milliGRAM(s) Oral daily  diltiazem    milliGRAM(s) Oral daily  enalapril 10 milliGRAM(s) Oral daily  enoxaparin Injectable 40 milliGRAM(s) SubCutaneous every 24 hours  pantoprazole    Tablet 40 milliGRAM(s) Oral before breakfast  primidone 50 milliGRAM(s) Oral daily  tamsulosin 0.4 milliGRAM(s) Oral at bedtime    MEDICATIONS  (PRN):    Vital Signs Last 24 Hrs  T(F): 98 (30 May 2022 07:53), Max: 98 (29 May 2022 15:16)  HR: 78 (30 May 2022 04:00) (64 - 82)  BP: 142/83 (30 May 2022 04:00) (117/79 - 153/83)  RR: 19 (30 May 2022 04:00) (16 - 21)  SpO2: 100% (30 May 2022 04:00) (97% - 100%)  I&O's Summary    29 May 2022 07:01  -  30 May 2022 07:00  --------------------------------------------------------  IN: 0 mL / OUT: 300 mL / NET: -300 mL      PHYSICAL EXAM:  General: NAD, Alert  ENT: MMM, no thrush  Neck: Supple, No JVD  Lungs: Clear to auscultation bilaterally, good air entry, non-labored breathing  Cardio: +s1/s2  Abdomen: Soft, Nontender, Nondistended; Bowel sounds present  Extremities: No calf tenderness,  Neuro +right facial droop      LABS:                        13.4   8.01  )-----------( 322      ( 30 May 2022 09:07 )             39.6     05-29    140  |  103  |  15.3  ----------------------------<  104  3.1   |  27.0  |  0.67    Ca    10.3      29 May 2022 03:40    TPro  6.5  /  Alb  3.2  /  TBili  0.4  /  DBili  x   /  AST  11  /  ALT  9   /  AlkPhos  76        Lipase, Serum: 81 U/L (22 @ 20:08)      PT/INR - ( 29 May 2022 03:40 )   PT: 13.5 sec;   INR: 1.16 ratio         PTT - ( 28 May 2022 20:08 )  PTT:33.1 sec  Lactate, Blood: 4.9 mmol/L ( @ 20:08)      CARDIAC MARKERS ( 28 May 2022 20:08 )  x     / 8.0 ng/L / x     / x     / x           Chol 148 mg/dL LDL -- HDL 45 mg/dL Trig 70 mg/dL    23:05 - VBG - pH: 7.370 | pCO2: 47    | pO2: 53    | Lactate: 0.80     Urinalysis Basic - ( 30 May 2022 06:28 )  Color: Yellow / Appearance: Slightly Turbid / S.010 / pH: x  Gluc: x / Ketone: Negative  / Bili: Negative / Urobili: Negative mg/dL   Blood: x / Protein: 15 / Nitrite: Negative   Leuk Esterase: Moderate / RBC: 6-10 /HPF / WBC >50 /HPF   Sq Epi: x / Non Sq Epi: Occasional / Bacteria: Occasional    Cultures  Culture - Blood (collected 29 May 2022 00:40)  Source: .Blood Blood-Venous  Preliminary Report (30 May 2022 01:02):    No growth to date.    Culture - Blood (collected 29 May 2022 00:40)  Source: .Blood Blood-Venous  Preliminary Report (30 May 2022 01:02):    No growth to date.    RADIOLOGY & ADDITIONAL TESTS:  - no new tests

## 2022-05-30 NOTE — CONSULT NOTE ADULT - SUBJECTIVE AND OBJECTIVE BOX
UROLOGY CONSULT NOTE  --------------------------------------------------------------------------------------------    Patient is a 76y old  Male who presents with a chief complaint of Stroke (30 May 2022 09:39)      HPI: 76ym, admitted for stroke, has been on condom cath and noted to have decreased output since this am, pt went for renal ultrasound and was noted to have distended bladder with b/l hydro, primary team ordered banks but multiple attempts unsuccesful by nursing staff. Urology consulted for difficult banks placement.   ROS: 10-system review is otherwise negative except HPI above.      PAST MEDICAL & SURGICAL HISTORY:  Hypertension      Hyperlipidemia      GERD (gastroesophageal reflux disease)      Parkinson&#x27;s disease      Prostate cancer      Mitral valve prolapse      Malignant neoplasm of tongue, unspecified  s/p surgery and radiation discontinued       Throat cancer      History of vasectomy      Prostate cancer  s/p cyber knife      S/P partial glossectomy          FAMILY HISTORY:  FH: heart disease  father, brothers    FH: type 2 diabetes  mother      [] Family history not pertinent as reviewed with the patient and family    ALLERGIES: No Known Allergies    CURRENT MEDICATIONS  MEDICATIONS (STANDING): ascorbic acid 500 milliGRAM(s) Oral daily  aspirin  chewable 81 milliGRAM(s) Oral daily  atorvastatin 80 milliGRAM(s) Oral at bedtime  carbidopa/levodopa  25/100 1 Tablet(s) Oral <User Schedule>  citalopram 20 milliGRAM(s) Oral daily  diltiazem    milliGRAM(s) Oral daily  enalapril 10 milliGRAM(s) Oral daily  enoxaparin Injectable 40 milliGRAM(s) SubCutaneous every 24 hours  pantoprazole    Tablet 40 milliGRAM(s) Oral before breakfast  primidone 50 milliGRAM(s) Oral daily  tamsulosin 0.4 milliGRAM(s) Oral at bedtime    MEDICATIONS (PRN):acetaminophen     Tablet .. 650 milliGRAM(s) Oral every 6 hours PRN Temp greater or equal to 38C (100.4F), Mild Pain (1 - 3)    --------------------------------------------------------------------------------------------    Vitals:   T(C): 36.4 (22 @ 15:42), Max: 36.7 (22 @ 20:00)  HR: 63 (22 16:00) (60 - 79)  BP: 108/54 (22 @ 16:00) (100/51 - 153/83)  RR: 18 (22 @ 16:00) (16 - 19)  SpO2: 100% (22 @ 16:00) (97% - 100%)  CAPILLARY BLOOD GLUCOSE        CAPILLARY BLOOD GLUCOSE     @ 07:01  -   @ 07:00  --------------------------------------------------------  IN:  Total IN: 0 mL    OUT:    Voided (mL): 300 mL  Total OUT: 300 mL    Total NET: -300 mL       @ 07:01  -   @ 17:32  --------------------------------------------------------  IN:  Total IN: 0 mL    OUT:    Voided (mL): 600 mL  Total OUT: 600 mL    Total NET: -600 mL    Height (cm): 165.1 ( @ 21:57)  Weight (kg): 59 ( @ 21:57)  BMI (kg/m2): 21.6 ( @ 21:57)  BSA (m2): 1.65 ( 21:57)    PHYSICAL EXAM:   General: NAD, Lying in bed comfortably  Cardio: RRR, nml S1/S2  Resp: non laboreddrainage  GI/Abd: Soft, NT/ND, no rebound/guarding, no masses palpated  gu: BANKS PLACED , urine noted to have sediment and pus    --------------------------------------------------------------------------------------------    LABS  CBC ( @ 09:07)                              13.4                           8.01    )----------------(  322        --    % Neutrophils, --    % Lymphocytes, ANC: --                                  39.6      BMP ( @ 09:07)             142     |  103     |  12.4  		Ca++ --      Ca 11.3<H>             ---------------------------------( 112<H>		Mg 2.0                3.2<L>  |  27.0    |  0.70  			Ph 2.6         --------------------------------------------------------------------------------------------    MICROBIOLOGY  Urinalysis ( @ 06:28):     Color: Yellow / Appearance: Slightly Turbid<!> / S.010 / pH: 7.0 / Gluc: Negative / Ketones: Negative / Bili: Negative / Urobili: Negative / Protein :15<!> / Nitrites: Negative / Leuk.Est: Moderate<!> / RBC: 6-10<!> / WBC: >50<!> / Sq Epi:  / Non Sq Epi: Occasional / Bacteria Occasional<!>       -> Clean Catch Clean Catch (Midstream) Culture ( @ 06:31)     NG    NG    >100,000 CFU/ml Enterococcus species  <10,000 CFU/ml Normal Urogenital yaquelin present    -> .Blood Blood-Venous Culture ( @ 00:40)     NG    NG    No growth to date.      --------------------------------------------------------------------------------------------    IMAGING  < from: US Kidney and Bladder (22 @ 15:58) >  FINDINGS:  Right kidney: 10.4 cm. Normal in size. There is mild to moderate   hydronephrosis which is new as compared with the prior CT of 2022.   There is 2.8 cm simple appearing cyst in the lower pole.    Left kidney: 8.3 cm. The left kidney is not well visualized. There is   suggestion of mild to moderate hydronephrosis, new. A 5.6 cm simple   appearing cyst is noted in the lower pole.    Urinary bladder: Urinary bladder is distended with prevoid volume of 670   cc. Layering debris are noted dependently within the urinary bladder.    IMPRESSION:  Mild to moderate bilateral hydronephrosis, new as compared with the prior   CT of 2022.    The urinary bladder is distended.    < end of copied text >      ASSESSMENT: Patient is a 76y old m with urinary retention, b/l hydronephrosis 2/2 retention .     PLAN:   - 18f coude banks placed- urine initially dark yellow, then noted to have sediment and pus . urine cx with enterococcus species. recommend abx per primary team for uti   - keep banks until patient more ambulatory, stabilized for tov  - care per primary team   - will discuss with Dr. Heller

## 2022-05-30 NOTE — PROGRESS NOTE ADULT - ASSESSMENT
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 found to have subacute stroke that time and recommended dc to [facility] but patient was discharged home he had a fall again but went back to home. 5/28 ~6: 30 PM he was noted to have worsening confusion, worsening slurry speech, worse rt facial weakness and droop. taken to [facility] and transferred to [facility] for further care     #Facial Droop and Right side weakness  - r/o cva  - neurovascular consult pending   - neurology consult appreciated  - tPA not given due to recent h/o stroke  - neurochecks  - aspirin statin   - mr pending     #hypokalemia  - repleted  - monitor    #PD  - sinemet and primidone    #Tongue SCC with extension to anterior neck:  - follow up outpatient with heme/onc     spoke to patient daughter [phone]. all questions answered

## 2022-05-30 NOTE — PROGRESS NOTE ADULT - ASSESSMENT
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 Patient found to have subacute stroke that time and recommended discharge to [facility] but patient was discharged home he had a fall again. While at home patient was noted to have worsening slurred speech, Rt facial weakness and droop. He was taken to [facility] initially where code stroke activated NIH was 3, no tPA given, CT angio revealed High-grade short segment stenosis of the mid and distal M1 segment of the right MCA. Patient was then transferred to [facility] for further management. Here was seen by neurology. Also started on Zosyn for UTI.       ? UTI  CVA  Parkinson's Dz  Prostate Ca      - Blood cultures pending  - Urine culture pending  - RVP/COVID 19 PCR 5/28 negative   - UA 5/28 not convincing of UTI   - UA repeated on 5/30 with pyuria   - Unclear why it was repeated and how it was collected, No fevers   - Has been off antibiotics   - Will follow up culture and decide on UTI treatment.   - Follow up cultures  - Trend Fever  - Trend WBC      Will Follow    d/w Dr Real

## 2022-05-30 NOTE — PROGRESS NOTE ADULT - SUBJECTIVE AND OBJECTIVE BOX
CC: TIA/CVA and Parkinson's disease with recent admission with fall, found to have subacute stroke and subacute rehabilitation was recommended but patient went home.   He now presented to [facility] with slurred speech and right facial weakness. Stroke code was activated and he was transferred here for further management.    HISTORY:  The patient is a 76y Male     PAST MEDICAL & SURGICAL HISTORY:  Hypertension  Hyperlipidemia  GERD (gastroesophageal reflux disease)  Parkinson's disease  Prostate cancer  Mitral valve prolapse  Malignant neoplasm of tongue, unspecified  s/p surgery and radiation discontinued   Throat cancer  History of vasectomy  Prostate cancer  s/p cyber knife  S/P partial glossectomy      MEDICATION PRIOR TO ADMISSION:  · 	aspirin 81 mg oral delayed release tablet: Last Dose Taken:  , 1 tab(s) orally once a day  · 	Flomax 0.4 mg oral capsule: Last Dose Taken:  , 1 cap(s) orally once a day (at bedtime) MDD:1  · 	pantoprazole 40 mg oral delayed release tablet: Last Dose Taken:  , 1 tab(s) orally once a day (before a meal)  · 	Vitamin C 1000 mg oral tablet: Last Dose Taken:  , 1 tab(s) orally once a day  · 	DilTIAZem (Eqv-Cardizem CD) 240 mg/24 hours oral capsule, extended release: Last Dose Taken:  , 1 cap(s) orally once a day  · 	carbidopa-levodopa 25 mg-100 mg oral tablet: Last Dose Taken:  , 1 tab(s) orally 2 times a day  · 	enalapril 10 mg oral tablet: Last Dose Taken:  , 1 tab(s) orally once a day  · 	citalopram 20 mg oral tablet: Last Dose Taken:  , 1 tab(s) orally once a day  · 	simvastatin 40 mg oral tablet: Last Dose Taken:  , 1 tab(s) orally once a day (at bedtime)  · 	primidone 50 mg oral tablet: Last Dose Taken:  , 1 tab(s) orally once a day      Allergies  No Known Allergies    SOCIAL HISTORY:  Non smoker.     FAMILY HISTORY:  FH: heart disease  father, brothers  FH: type 2 diabetes  mother  No known family history of stroke.         Vital Signs Last 24 Hrs  T(C): 36.4 (30 May 2022 15:42), Max: 36.7 (30 May 2022 07:53)  T(F): 97.5 (30 May 2022 15:42), Max: 98.1 (30 May 2022 12:06)  HR: 63 (30 May 2022 16:00) (60 - 79)  BP: 108/54 (30 May 2022 16:00) (100/51 - 153/83)  BP(mean): 66 (30 May 2022 16:00) (61 - 96)  RR: 18 (30 May 2022 16:00) (16 - 19)  SpO2: 100% (30 May 2022 16:00) (99% - 100%)    MEDICATIONS    acetaminophen     Tablet .. 650 milliGRAM(s) Oral every 6 hours PRN  ascorbic acid 500 milliGRAM(s) Oral daily  aspirin  chewable 81 milliGRAM(s) Oral daily  atorvastatin 80 milliGRAM(s) Oral at bedtime  carbidopa/levodopa  25/100 1 Tablet(s) Oral <User Schedule>  citalopram 20 milliGRAM(s) Oral daily  diltiazem    milliGRAM(s) Oral daily  enalapril 10 milliGRAM(s) Oral daily  enoxaparin Injectable 40 milliGRAM(s) SubCutaneous every 24 hours  pantoprazole    Tablet 40 milliGRAM(s) Oral before breakfast  primidone 50 milliGRAM(s) Oral daily  tamsulosin 0.4 milliGRAM(s) Oral at bedtime         LABS:  CBC Full  -  ( 30 May 2022 09:07 )  WBC Count : 8.01 K/uL  RBC Count : 4.19 M/uL  Hemoglobin : 13.4 g/dL  Hematocrit : 39.6 %  Platelet Count - Automated : 322 K/uL  Mean Cell Volume : 94.5 fl  Mean Cell Hemoglobin : 32.0 pg  Mean Cell Hemoglobin Concentration : 33.8 gm/dL  Auto Neutrophil # : x  Auto Lymphocyte # : x  Auto Monocyte # : x  Auto Eosinophil # : x  Auto Basophil # : x  Auto Neutrophil % : x  Auto Lymphocyte % : x  Auto Monocyte % : x  Auto Eosinophil % : x  Auto Basophil % : x    Urinalysis Basic - ( 30 May 2022 06:28 )    Color: Yellow / Appearance: Slightly Turbid / S.010 / pH: x  Gluc: x / Ketone: Negative  / Bili: Negative / Urobili: Negative mg/dL   Blood: x / Protein: 15 / Nitrite: Negative   Leuk Esterase: Moderate / RBC: 6-10 /HPF / WBC >50 /HPF   Sq Epi: x / Non Sq Epi: Occasional / Bacteria: Occasional      05-30    142  |  103  |  12.4  ----------------------------<  112<H>  3.2<L>   |  27.0  |  0.70    Ca    11.3<H>      30 May 2022 09:07  Phos  2.6     05-  Mg     2.0     -30    TPro  6.5<L>  /  Alb  3.2<L>  /  TBili  0.4  /  DBili  x   /  AST  11  /  ALT  9   /  AlkPhos  76  05-29    LIVER FUNCTIONS - ( 29 May 2022 03:40 )  Alb: 3.2 g/dL / Pro: 6.5 g/dL / ALK PHOS: 76 U/L / ALT: 9 U/L / AST: 11 U/L / GGT: x           Hemoglobin A1C:       PT/INR - ( 29 May 2022 03:40 )   PT: 13.5 sec;   INR: 1.16 ratio         PTT - ( 28 May 2022 20:08 )  PTT:33.1 sec    Neurological Exam:    Mental status: The patient is awake, alert, and fully oriented. There is no aphasia. There is moderate dysarthria.     Cranial nerves: There is no papilledema. Pupils react symmetrically to light. There is no visual field deficit to confrontation. Extraocular motion is full with no nystagmus.  Facial sensation is intact. Facial musculature is symmetric. Palate elevates symmetrically. Tongue is midline.    Motor: There is increased tone with cogwheeling at the wrists and elbows bilaterally. There is tremor at rest which subsides with intention.  Strength is 5/5 in the right arm and leg.   Strength is 5/5 in the left arm and leg.    Sensation: Intact to light touch and pin. There is no extinction to double simultaneous stimulation.      Cerebellar: There is no dysmetria on finger to nose testing.    RADIOLOGY   CT head images reviewed (and concur with report): There is no acute pathology. Chronic infarcts bilateral basal ganglia.    CT-A: Distal Right M1 high grade stenosis.  Mid right P2 high grade stenosis.

## 2022-05-30 NOTE — PROGRESS NOTE ADULT - SUBJECTIVE AND OBJECTIVE BOX
[facility]  INFECTIOUS DISEASES AND INTERNAL MEDICINE at [place] and [place]  =======================================================                               Denton Arechiga MD#  Mg Garrido MD*                                     Pati Hidalgo MD*    Ijeoma Azevedo MD*            Diplomates American Board of Internal Medicine & Infectious Diseases                # [place] Office - Appt - Tel  [phone] Fax [phone]                * [employer] Office - Appt - Tel [phone] Fax [phone]                                  Hospital Consult line:  [phone]  =======================================================    ISIS CUETO [id number]    Follow up:      Allergies:  No Known Allergies           REVIEW OF SYSTEMS:  CONSTITUTIONAL:  No Fever or chills  HEENT:   No diplopia or blurred vision.  No earache, sore throat or runny nose.  CARDIOVASCULAR:  No pressure, squeezing, strangling, tightness, heaviness or aching about the chest, neck, axilla or epigastrium.  RESPIRATORY:  No cough, shortness of breath  GASTROINTESTINAL:  No nausea, vomiting or diarrhea.  GENITOURINARY:  No dysuria, frequency or urgency. No Blood in urine  MUSCULOSKELETAL:  no joint aches, no muscle pain  SKIN:  No change in skin, hair or nails.  NEUROLOGIC:  No Headaches, seizures or weakness.  PSYCHIATRIC:  No disorder of thought or mood.  ENDOCRINE:  No heat or cold intolerance  HEMATOLOGICAL:  No easy bruising or bleeding.       Physical Exam:  GEN: NAD  HEENT: normocephalic and atraumatic. EOMI. PERRL.    NECK: Supple.   LUNGS: CTA B/L.  HEART: RRR  ABDOMEN: Soft, NT, ND.  +BS.    : No CVA tenderness  EXTREMITIES: Without  edema.  MSK: No joint swelling  NEUROLOGIC: No Focal Deficits   PSYCHIATRIC: Appropriate affect .  SKIN: No rash      Vitals:  T(F): 98 (30 May 2022 07:53), Max: 98 (29 May 2022 15:16)  HR: 78 (30 May 2022 04:00)  BP: 142/83 (30 May 2022 04:00)  RR: 19 (30 May 2022 04:00)  SpO2: 100% (30 May 2022 04:00) (97% - 100%)  temp max in last 48H T(F): , Max: 98.6 (22 @ 19:46)      Current Antibiotics:    Other medications:  ascorbic acid 500 milliGRAM(s) Oral daily  aspirin Suppository 300 milliGRAM(s) Rectal daily  atorvastatin 80 milliGRAM(s) Oral at bedtime  carbidopa/levodopa  25/100 1 Tablet(s) Oral <User Schedule>  citalopram 20 milliGRAM(s) Oral daily  diltiazem    milliGRAM(s) Oral daily  enalapril 10 milliGRAM(s) Oral daily  enoxaparin Injectable 40 milliGRAM(s) SubCutaneous every 24 hours  pantoprazole    Tablet 40 milliGRAM(s) Oral before breakfast  primidone 50 milliGRAM(s) Oral daily  tamsulosin 0.4 milliGRAM(s) Oral at bedtime                 12.2   6.46  )-----------( 307      ( 29 May 2022 03:40 )             36.5         140  |  103  |  15.3  ----------------------------<  104<H>  3.1<L>   |  27.0  |  0.67    Ca    10.3<H>      29 May 2022 03:40    TPro  6.5<L>  /  Alb  3.2<L>  /  TBili  0.4  /  DBili  x   /  AST  11  /  ALT  9   /  AlkPhos  76      RECENT CULTURES:   @ 00:40 .Blood Blood-Venous     No growth to date.      WBC Count: 6.46 K/uL (22 @ 03:40)  WBC Count: 8.48 K/uL (22 @ 20:08)    Creatinine, Serum: 0.67 mg/dL (22 @ 03:40)  Creatinine, Serum: 1.10 mg/dL (22 @ 20:08)    Ferritin, Serum: 169 ng/mL (22 @ 10:57)    SARS-CoV-2 Result: NotDetec (22 @ 20:07)  SARS-CoV-2 Result: NotDetec (22 @ 02:15)    Urinalysis Basic - ( 30 May 2022 06:28 )    Color: Yellow / Appearance: Slightly Turbid / S.010 / pH: x  Gluc: x / Ketone: Negative  / Bili: Negative / Urobili: Negative mg/dL   Blood: x / Protein: 15 / Nitrite: Negative   Leuk Esterase: Moderate / RBC: 6-10 /HPF / WBC >50 /HPF   Sq Epi: x / Non Sq Epi: Occasional / Bacteria: Occasional

## 2022-05-31 NOTE — PROGRESS NOTE ADULT - ASSESSMENT
The patient is a 76y Male with Parkinson's disease with a recent   left brachium pontis and left lenticular   nucleus/corona radiata region and now with possible new  stroke.     Mechanism of stroke is not clear  Could be Cardioembolic versus hypercoagulable state of malignancy versus intracranial atherosclerosis.    Stroke.  A1C: 5.6  LDL: 89  Goal: < 70  Continue ASA 81 QD  Add Plavix 75 QD  for R MCA stenosis.  Cardiology consultation regarding ILR  Parkinson's disease  - On Sinemet for PD.  Mobilize with physical therapy.

## 2022-05-31 NOTE — DISCHARGE NOTE NURSING/CASE MANAGEMENT/SOCIAL WORK - PATIENT PORTAL LINK FT
You can access the FollowMyHealth Patient Portal offered by [facility] by registering at the following website: [email or web address]. By joining [employer]’s FollowMyHealth portal, you will also be able to view your health information using other applications (apps) compatible with our system.

## 2022-05-31 NOTE — PROGRESS NOTE ADULT - SUBJECTIVE AND OBJECTIVE BOX
UROLOGY F/U    Patient seen and examined , resting in bed in no acute distress.  Afebrile, VSS /86, P 73, R 18    abdomen; soft, n/d, n/t nop supra-pubic discomfort at present  :  banks catheter in place - good output, yellow/ dark .         900cc last shift/ 1500 cc 24 hrs.    Labs:  5/31/22    cbc-wbc:  6.9/ hg 12.8/ hct 38.1/ plats 315   Lytes:  140/3.0-L/ 100/ 29/glu 105  bun 14.4  creat. 0.83    Impression:  urinary retention  discussed present situation and continued care with surgeon  Plan:  continue banks catheter and  will follow.       UROLOGY F/U  Above notes noted:    Patient seen and examined , resting in bed in no acute distress.  Afebrile, VSS /86, P 73, R 18    abdomen; soft, n/d, n/t nop supra-pubic discomfort at present  :  banks catheter in place - good output, yellow/ dark .         900cc last shift/ 1500 cc 24 hrs.    Labs:  5/31/22    cbc-wbc:  6.9/ hg 12.8/ hct 38.1/ plats 315   Lytes:  140/3.0-L/ 100/ 29/glu 105  bun 14.4  creat. 0.83    Impression: s/p stroke  urinary retention  discussed present situation and continued care with surgeon  Plan:  continue care and management with AWAIS kearney and medicine, continue banks catheter at present and  will follow.              There are no Wet Read(s) to document.

## 2022-05-31 NOTE — CHART NOTE - NSCHARTNOTEFT_GEN_A_CORE
MRI screening form completed with daughter via telephone - patient with ? surgical clip placement from hemiglossectomy (3/2021). Patient & daughter unsure about clip make/model/MR compatibility, patient w/o hardware compatibility card or documentation. Upon review of operative note from 3/2021, no documentation of surgical clip placement. Of note, patient underwent MRI on 5/6/2022 without issue. D/w MRI, who report that patient should be able to go for MRI without issue given no adverse reactions on previous recent MR. MRI screening form completed with daughter via telephone - patient with ? surgical clip placement from partial glossectomy (3/2021). Patient & daughter unsure about clip make/model/MR compatibility, patient w/o hardware compatibility card or documentation. Upon review of operative note from 3/2021, no documentation of surgical clip placement. Of note, patient underwent MRI on 5/6/2022 without issue. D/w MRI, who report that patient should be able to go for MRI without issue given no adverse reactions on previous recent MR.

## 2022-05-31 NOTE — DISCHARGE NOTE NURSING/CASE MANAGEMENT/SOCIAL WORK - NSDCVIVACCINE_GEN_ALL_CORE_FT
Tdap; 28-Jun-2021 17:24; Alexsandra Musa); [employer]; [id number] (Exp. Date: 25-Nov-2022); IntraMuscular; Deltoid Left.; 0.5 milliLiter(s); VIS (VIS Published: 09-May-2013, VIS Presented: 28-Jun-2021);   
[address] Exp Problem Focused - Mod. Complex

## 2022-05-31 NOTE — PROGRESS NOTE ADULT - SUBJECTIVE AND OBJECTIVE BOX
[facility]  INFECTIOUS DISEASES AND INTERNAL MEDICINE at [place] and [place]  =======================================================                               Denton Arechiga MD#  Mg Garrido MD*                                     Pati Hidalgo MD*    Ijeoma Azevedo MD*            Diplomates American Board of Internal Medicine & Infectious Diseases                # [place] Office - Appt - Tel  [phone] Fax [phone]                * [place] Office - Appt - Tel [phone] Fax [phone]                                  Hospital Consult line:  [phone]  =======================================================    ROM ISIS [id number]    Follow up: Urine culture     No fevers       Allergies:  No Known Allergies       REVIEW OF SYSTEMS:  CONSTITUTIONAL:  No Fever or chills  HEENT:   No diplopia or blurred vision.  No earache, sore throat or runny nose.  CARDIOVASCULAR:  No pressure, squeezing, strangling, tightness, heaviness or aching about the chest, neck, axilla or epigastrium.  RESPIRATORY:  No cough, shortness of breath  GASTROINTESTINAL:  No nausea, vomiting or diarrhea.  GENITOURINARY:  No dysuria, frequency or urgency. No Blood in urine  MUSCULOSKELETAL:  no joint aches, no muscle pain  SKIN:  No change in skin, hair or nails.  NEUROLOGIC:  No Headaches, seizures or weakness.  PSYCHIATRIC:  No disorder of thought or mood.  ENDOCRINE:  No heat or cold intolerance  HEMATOLOGICAL:  No easy bruising or bleeding.       Physical Exam:  GEN: NAD  HEENT: normocephalic and atraumatic. EOMI. PERRL.    NECK: Supple.   LUNGS: CTA B/L.  HEART: RRR  ABDOMEN: Soft, NT, ND.  +BS.    : No CVA tenderness  EXTREMITIES: Without  edema.  MSK: No joint swelling  NEUROLOGIC: No Focal Deficits   PSYCHIATRIC: Appropriate affect .  SKIN: No rash      Vitals:  T(F): 97.8 (30 May 2022 20:00), Max: 98.1 (30 May 2022 12:06)  HR: 70 (31 May 2022 04:00)  BP: 135/83 (31 May 2022 04:00)  RR: 17 (31 May 2022 04:00)  SpO2: 99% (31 May 2022 04:00) (98% - 100%)  temp max in last 48H T(F): , Max: 98.1 (22 @ 12:06)    Current Antibiotics:    Other medications:  ascorbic acid 500 milliGRAM(s) Oral daily  aspirin  chewable 81 milliGRAM(s) Oral daily  atorvastatin 80 milliGRAM(s) Oral at bedtime  carbidopa/levodopa  25/100 1 Tablet(s) Oral <User Schedule>  citalopram 20 milliGRAM(s) Oral daily  diltiazem    milliGRAM(s) Oral daily  enalapril 10 milliGRAM(s) Oral daily  enoxaparin Injectable 40 milliGRAM(s) SubCutaneous every 24 hours  pantoprazole    Tablet 40 milliGRAM(s) Oral before breakfast  potassium chloride   Powder 40 milliEquivalent(s) Oral once  potassium chloride  10 mEq/100 mL IVPB 10 milliEquivalent(s) IV Intermittent every 1 hour  primidone 50 milliGRAM(s) Oral daily  tamsulosin 0.4 milliGRAM(s) Oral at bedtime                            12.8   6.92  )-----------( 315      ( 31 May 2022 06:08 )             38.1         140  |  100  |  14.4  ----------------------------<  105<H>  3.0<L>   |  29.0  |  0.83    Ca    10.9<H>      31 May 2022 06:08  Phos  2.4       Mg     1.9           RECENT CULTURES:   @ 06:31 Clean Catch Clean Catch (Midstream)     >100,000 CFU/ml Enterococcus species  <10,000 CFU/ml Normal Urogenital yaquelin present     @ 04:55 .Blood Blood-Venous     No growth at 48 hours     @ 00:40 .Blood Blood-Venous     No growth to date.      WBC Count: 6.92 K/uL (22 @ 06:08)  WBC Count: 8.01 K/uL (22 @ 09:07)  WBC Count: 6.46 K/uL (22 @ 03:40)  WBC Count: 8.48 K/uL (22 @ 20:08)    Creatinine, Serum: 0.83 mg/dL (22 @ 06:08)  Creatinine, Serum: 0.70 mg/dL (22 @ 09:07)  Creatinine, Serum: 0.67 mg/dL (22 @ 03:40)  Creatinine, Serum: 1.10 mg/dL (22 @ 20:08)    SARS-CoV-2 Result: NotDetec (22 @ 20:07)  SARS-CoV-2 Result: NotDetec (22 @ 02:15)    Urinalysis Basic - ( 30 May 2022 06:28 )    Color: Yellow / Appearance: Slightly Turbid / S.010 / pH: x  Gluc: x / Ketone: Negative  / Bili: Negative / Urobili: Negative mg/dL   Blood: x / Protein: 15 / Nitrite: Negative   Leuk Esterase: Moderate / RBC: 6-10 /HPF / WBC >50 /HPF   Sq Epi: x / Non Sq Epi: Occasional / Bacteria: Occasional

## 2022-05-31 NOTE — DISCHARGE NOTE NURSING/CASE MANAGEMENT/SOCIAL WORK - NSDCFUADDAPPT_GEN_ALL_CORE_FT
STAR PATIENT DOCUMENTATION FOR CVA:  Neuro Appointment made with Dr. Grullon 6/22/22 at 1:0. If you are unable to attend your pre-scheduled appointment, please contact the office directly at [phone] to reschedule    PT IS FOR [facility] [employer] [employer]ACEMENT  MEDS TO BED NOT APPLICABLE;FACILITY WILL MANAGE PT'S MEDICATIONS

## 2022-05-31 NOTE — PROGRESS NOTE ADULT - SUBJECTIVE AND OBJECTIVE BOX
CC: TIA/CVA and Parkinson's disease with recent admission with fall, found to have subacute stroke and subacute rehabilitation was recommended but patient went home.   He now presented to [facility] with slurred speech and right facial weakness. Stroke code was activated and he was transferred here for further management.    HISTORY:  The patient is a 76y Male     PAST MEDICAL & SURGICAL HISTORY:  Hypertension  Hyperlipidemia  GERD (gastroesophageal reflux disease)  Parkinson's disease  Prostate cancer  Mitral valve prolapse  Malignant neoplasm of tongue, unspecified  s/p surgery and radiation discontinued   Throat cancer  History of vasectomy  Prostate cancer  s/p cyber knife  S/P partial glossectomy      MEDICATION PRIOR TO ADMISSION:  · 	aspirin 81 mg oral delayed release tablet: Last Dose Taken:  , 1 tab(s) orally once a day  · 	Flomax 0.4 mg oral capsule: Last Dose Taken:  , 1 cap(s) orally once a day (at bedtime) MDD:1  · 	pantoprazole 40 mg oral delayed release tablet: Last Dose Taken:  , 1 tab(s) orally once a day (before a meal)  · 	Vitamin C 1000 mg oral tablet: Last Dose Taken:  , 1 tab(s) orally once a day  · 	DilTIAZem (Eqv-Cardizem CD) 240 mg/24 hours oral capsule, extended release: Last Dose Taken:  , 1 cap(s) orally once a day  · 	carbidopa-levodopa 25 mg-100 mg oral tablet: Last Dose Taken:  , 1 tab(s) orally 2 times a day  · 	enalapril 10 mg oral tablet: Last Dose Taken:  , 1 tab(s) orally once a day  · 	citalopram 20 mg oral tablet: Last Dose Taken:  , 1 tab(s) orally once a day  · 	simvastatin 40 mg oral tablet: Last Dose Taken:  , 1 tab(s) orally once a day (at bedtime)  · 	primidone 50 mg oral tablet: Last Dose Taken:  , 1 tab(s) orally once a day      Allergies  No Known Allergies    SOCIAL HISTORY:  Non smoker.     FAMILY HISTORY:  FH: heart disease  father, brothers  FH: type 2 diabetes  mother  No known family history of stroke.       Vital Signs Last 24 Hrs  T(C): 36.5 (31 May 2022 08:56), Max: 36.7 (30 May 2022 12:06)  T(F): 97.7 (31 May 2022 08:56), Max: 98.1 (30 May 2022 12:06)  HR: 73 (31 May 2022 08:56) (60 - 73)  BP: 132/86 (31 May 2022 08:56) (100/51 - 135/83)  BP(mean): 98 (31 May 2022 08:56) (61 - 98)  RR: 18 (31 May 2022 08:56) (17 - 18)  SpO2: 100% (31 May 2022 08:56) (98% - 100%)    MEDICATIONS    acetaminophen     Tablet .. 650 milliGRAM(s) Oral every 6 hours PRN  ascorbic acid 500 milliGRAM(s) Oral daily  aspirin  chewable 81 milliGRAM(s) Oral daily  atorvastatin 80 milliGRAM(s) Oral at bedtime  carbidopa/levodopa  25/100 1 Tablet(s) Oral <User Schedule>  citalopram 20 milliGRAM(s) Oral daily  diltiazem    milliGRAM(s) Oral daily  enalapril 10 milliGRAM(s) Oral daily  enoxaparin Injectable 40 milliGRAM(s) SubCutaneous every 24 hours  pantoprazole    Tablet 40 milliGRAM(s) Oral before breakfast  potassium chloride  10 mEq/100 mL IVPB 10 milliEquivalent(s) IV Intermittent every 1 hour  primidone 50 milliGRAM(s) Oral daily  tamsulosin 0.4 milliGRAM(s) Oral at bedtime         LABS:  CBC Full  -  ( 31 May 2022 06:08 )  WBC Count : 6.92 K/uL  RBC Count : 4.01 M/uL  Hemoglobin : 12.8 g/dL  Hematocrit : 38.1 %  Platelet Count - Automated : 315 K/uL  Mean Cell Volume : 95.0 fl  Mean Cell Hemoglobin : 31.9 pg  Mean Cell Hemoglobin Concentration : 33.6 gm/dL  Auto Neutrophil # : x  Auto Lymphocyte # : x  Auto Monocyte # : x  Auto Eosinophil # : x  Auto Basophil # : x  Auto Neutrophil % : x  Auto Lymphocyte % : x  Auto Monocyte % : x  Auto Eosinophil % : x  Auto Basophil % : x    Urinalysis Basic - ( 30 May 2022 06:28 )    Color: Yellow / Appearance: Slightly Turbid / S.010 / pH: x  Gluc: x / Ketone: Negative  / Bili: Negative / Urobili: Negative mg/dL   Blood: x / Protein: 15 / Nitrite: Negative   Leuk Esterase: Moderate / RBC: 6-10 /HPF / WBC >50 /HPF   Sq Epi: x / Non Sq Epi: Occasional / Bacteria: Occasional          140  |  100  |  14.4  ----------------------------<  105<H>  3.0<L>   |  29.0  |  0.83    Ca    10.9<H>      31 May 2022 06:08  Phos  2.4       Mg     1.9         Hemoglobin A1C:     Neurological Exam:    Mental status: The patient is awake, alert, and fully oriented. There is no aphasia. There is moderate dysarthria.     Cranial nerves: There is no papilledema. Pupils react symmetrically to light. There is no visual field deficit to confrontation. Extraocular motion is full with no nystagmus.  Facial sensation is intact. Facial musculature is symmetric. Palate elevates symmetrically. Tongue is midline.    Motor: There is increased tone with cogwheeling at the wrists and elbows bilaterally. There is tremor at rest which subsides with intention.  Strength is 5/5 in the right arm and leg.   Strength is 5/5 in the left arm and leg.    Sensation: Intact to light touch and pin. There is no extinction to double simultaneous stimulation.      Cerebellar: There is no dysmetria on finger to nose testing.    RADIOLOGY   CT head images reviewed (and concur with report): There is no acute pathology. Chronic infarcts bilateral basal ganglia.    CT-A: Distal Right M1 high grade stenosis.  Mid right P2 high grade stenosis.      MR Head No Cont (22 @ 19:00) >    INTERPRETATION:  Clinical indication: Subacute CVA.    MRI of the brain was performed using sagittal T1 axial T1 T2 T2 FLAIR   diffusion and [place] sequence.    This exam is compared with prior head CT performed on May 6, 2022.    Extensive parenchymal volume loss and chronic microvascular ischemic   changes are identified    Old lacunar infarct involving the right sublenticular nucleus region is   seen.    There is evidence of abnormal T2 prolongation with restricted diffusion   seen involving the left brachium pontis and left lenticular   nucleus/corona radiata region.    These findings are compatible with areas of acute infarcts. No   hemorrhagic transformation is seen. No significant shift or herniation is   seen.    The large vessels demonstrate normal flow    The visualized paranasal sinuses mastoid and mastoid clear.    IMPRESSION: Acute infarcts as described above.    TTE Echo Complete w/o Contrast w/ Doppler (22 @ 14:56) >    IMPRESSION:  Technically difficult study  Normal left ventricular internal dimensions and systolic function,   estimated LVEF of 60-65%.  Grossly normal RV size and systolic function.  Normal trileaflet aortic valve, without AI.  Trace physiologic MR  Mild TR.    TOMMY WILDER MD; Attending Cardiologist

## 2022-05-31 NOTE — PROGRESS NOTE ADULT - ASSESSMENT
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 found to have subacute stroke that time and recommended dc to [facility] but patient was discharged home he had a fall again but went back to home. 5/28 ~6: 30 PM he was noted to have worsening confusion, worsening slurry speech, worse rt facial weakness and droop. taken to [facility] and transferred to [facility] for further care     #Facial Droop and Right side weakness  - r/o cva  - neurovascular attending recommends DAPT - Plavix ordered.  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

## 2022-05-31 NOTE — DISCHARGE NOTE NURSING/CASE MANAGEMENT/SOCIAL WORK - NSDCPEFALRISK_GEN_ALL_CORE
For information on Fall & Injury Prevention, visit: [email or web address] OR  [email or web address] OR  https://www.cdc.gov/steadi/patient.html

## 2022-05-31 NOTE — PROGRESS NOTE ADULT - ASSESSMENT
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 Patient found to have subacute stroke that time and recommended discharge to [facility] but patient was discharged home he had a fall again. While at home patient was noted to have worsening slurred speech, Rt facial weakness and droop. He was taken to [facility] initially where code stroke activated NIH was 3, no tPA given, CT angio revealed High-grade short segment stenosis of the mid and distal M1 segment of the right MCA. Patient was then transferred to [facility] for further management. Here was seen by neurology. Also started on Zosyn for UTI.       ? UTI  CVA  Parkinson's Dz  Prostate Ca      - Blood cultures 5/29 no growth   - Urine culture enterococcus and normal yaquelin   - RVP/COVID 19 PCR 5/28 negative   - UA 5/28 not convincing of UTI   - UA repeated on 5/30 with pyuria   - Unclear why it was repeated and how it was collected, No fevers   - Has been off antibiotics   - Will follow up culture and decide on UTI treatment.   - Follow up cultures  - Trend Fever  - Trend WBC      Will Follow    d/w Dr Real

## 2022-06-01 NOTE — PROGRESS NOTE ADULT - SUBJECTIVE AND OBJECTIVE BOX
CC: TIA/CVA and Parkinson's disease with recent admission with fall, found to have subacute stroke and subacute rehabilitation was recommended but patient went home.   He now presented to [facility] with slurred speech and right facial weakness. Stroke code was activated and he was transferred here for further management.    HISTORY:  The patient is a 76y Male     PAST MEDICAL & SURGICAL HISTORY:  Hypertension  Hyperlipidemia  GERD (gastroesophageal reflux disease)  Parkinson's disease  Prostate cancer  Mitral valve prolapse  Malignant neoplasm of tongue, 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  Throat cancer  History of vasectomy  Prostate cancer  s/p cyber knife  S/P partial glossectomy  4/21    MEDICATION PRIOR TO ADMISSION:  · 	aspirin 81 mg oral delayed release tablet: Last Dose Taken:  , 1 tab(s) orally once a day  · 	Flomax 0.4 mg oral capsule: Last Dose Taken:  , 1 cap(s) orally once a day (at bedtime) MDD:1  · 	pantoprazole 40 mg oral delayed release tablet: Last Dose Taken:  , 1 tab(s) orally once a day (before a meal)  · 	Vitamin C 1000 mg oral tablet: Last Dose Taken:  , 1 tab(s) orally once a day  · 	DilTIAZem (Eqv-Cardizem CD) 240 mg/24 hours oral capsule, extended release: Last Dose Taken:  , 1 cap(s) orally once a day  · 	carbidopa-levodopa 25 mg-100 mg oral tablet: Last Dose Taken:  , 1 tab(s) orally 2 times a day  · 	enalapril 10 mg oral tablet: Last Dose Taken:  , 1 tab(s) orally once a day  · 	citalopram 20 mg oral tablet: Last Dose Taken:  , 1 tab(s) orally once a day  · 	simvastatin 40 mg oral tablet: Last Dose Taken:  , 1 tab(s) orally once a day (at bedtime)  · 	primidone 50 mg oral tablet: Last Dose Taken:  , 1 tab(s) orally once a day      Allergies  No Known Allergies    SOCIAL HISTORY:  Non smoker.     FAMILY HISTORY:  FH: heart disease  father, brothers  FH: type 2 diabetes  mother  No known family history of stroke.         Vital Signs Last 24 Hrs  T(C): 37.3 (01 Jun 2022 16:52), Max: 37.3 (01 Jun 2022 16:52)  T(F): 99.1 (01 Jun 2022 16:52), Max: 99.1 (01 Jun 2022 16:52)  HR: 83 (01 Jun 2022 16:52) (78 - 88)  BP: 144/60 (01 Jun 2022 16:52) (115/73 - 160/90)  BP(mean): --  RR: 18 (01 Jun 2022 16:52) (18 - 18)  SpO2: 97% (01 Jun 2022 16:52) (97% - 100%)    MEDICATIONS    acetaminophen     Tablet .. 650 milliGRAM(s) Oral every 6 hours PRN  ampicillin  IVPB 2 Gram(s) IV Intermittent every 6 hours  ascorbic acid 500 milliGRAM(s) Oral daily  aspirin  chewable 81 milliGRAM(s) Oral daily  atorvastatin 80 milliGRAM(s) Oral at bedtime  carbidopa/levodopa  25/100 1 Tablet(s) Oral <User Schedule>  citalopram 20 milliGRAM(s) Oral daily  clopidogrel Tablet 75 milliGRAM(s) Oral daily  diltiazem    milliGRAM(s) Oral daily  enalapril 10 milliGRAM(s) Oral daily  enoxaparin Injectable 40 milliGRAM(s) SubCutaneous every 24 hours  mupirocin 2% Ointment 1 Application(s) Topical two times a day  pantoprazole    Tablet 40 milliGRAM(s) Oral before breakfast  primidone 50 milliGRAM(s) Oral daily  tamsulosin 0.4 milliGRAM(s) Oral at bedtime         LABS:  CBC Full  -  ( 31 May 2022 06:08 )  WBC Count : 6.92 K/uL  RBC Count : 4.01 M/uL  Hemoglobin : 12.8 g/dL  Hematocrit : 38.1 %  Platelet Count - Automated : 315 K/uL  Mean Cell Volume : 95.0 fl  Mean Cell Hemoglobin : 31.9 pg  Mean Cell Hemoglobin Concentration : 33.6 gm/dL  Auto Neutrophil # : x  Auto Lymphocyte # : x  Auto Monocyte # : x  Auto Eosinophil # : x  Auto Basophil # : x  Auto Neutrophil % : x  Auto Lymphocyte % : x  Auto Monocyte % : x  Auto Eosinophil % : x  Auto Basophil % : x      06-01    140  |  103  |  15.1  ----------------------------<  105<H>  3.7   |  25.0  |  0.90    Ca    11.2<H>      01 Jun 2022 11:39  Phos  2.4     05-31  Mg     2.0     06-01      Neurological Exam:    Mental status: The patient is awake, alert, and fully oriented. There is no aphasia. There is moderate dysarthria.     Cranial nerves: There is no papilledema. Pupils react symmetrically to light. There is no visual field deficit to confrontation. Extraocular motion is full with no nystagmus.  Facial sensation is intact. Facial musculature is symmetric. Palate elevates symmetrically. Tongue is midline.    Motor: There is increased tone with cogwheeling at the wrists and elbows bilaterally. There is tremor at rest which subsides with intention.  Strength is 5/5 in the right arm and leg.   Strength is 5/5 in the left arm and leg.    Sensation: Intact to light touch and pin. There is no extinction to double simultaneous stimulation.      Cerebellar: There is no dysmetria on finger to nose testing.    RADIOLOGY   CT head images reviewed (and concur with report): There is no acute pathology. Chronic infarcts bilateral basal ganglia.    CT-A: Distal Right M1 high grade stenosis.  Mid right P2 high grade stenosis.    MRI Brain 6-1-22   MR Head No Cont (06.01.22 @ 10:56) >    IMPRESSION:    1.  Compared to 5/6/2022 MRI, expected interval evolution of previously   seen infarcts within the left corona radiata/lentiform nucleus and left   middle cerebellar peduncle, which are now subacute to chronic in nature.   No new acute infarct or intracranial hemorrhage is identified.   Nonspecific findings, suggestive of prior chronic lacunar infarcts and   chronic small vessel ischemia noted.    2.  T2/FLAIR hyperintense signal within right transverse/sigmoid sinuses   and exiting internal jugular vein, similar to 5/6/2022 MRI. This may   reflect slow/turbulent venous flow, although dural venous sinus   thrombosis cannot be entirely excluded. Consider MRV evaluation as   clinically indicated. No corresponding acute infarct or parenchymal edema.          MR Head No Cont (05.06.22 @ 19:00) >    INTERPRETATION:  Clinical indication: Subacute CVA.    MRI of the brain was performed using sagittal T1 axial T1 T2 T2 FLAIR   diffusion and [place] sequence.    This exam is compared with prior head CT performed on May 6, 2022.    Extensive parenchymal volume loss and chronic microvascular ischemic   changes are identified    Old lacunar infarct involving the right sublenticular nucleus region is   seen.    There is evidence of abnormal T2 prolongation with restricted diffusion   seen involving the left brachium pontis and left lenticular   nucleus/corona radiata region.    These findings are compatible with areas of acute infarcts. No   hemorrhagic transformation is seen. No significant shift or herniation is   seen.    The large vessels demonstrate normal flow    The visualized paranasal sinuses mastoid and mastoid clear.    IMPRESSION: Acute infarcts as described above.    TTE Echo Complete w/o Contrast w/ Doppler (05.08.22 @ 14:56) >    IMPRESSION:  Technically difficult study  Normal left ventricular internal dimensions and systolic function,   estimated LVEF of 60-65%.  Grossly normal RV size and systolic function.  Normal trileaflet aortic valve, without AI.  Trace physiologic MR  Mild TR.    TOMMY WILDER MD; Attending Cardiologist

## 2022-06-01 NOTE — PROGRESS NOTE ADULT - ASSESSMENT
The patient is a 76y Male with Parkinson's disease with a recent   left brachium pontis and left lenticular   nucleus/corona radiata region and now with possible new  stroke.     Mechanism of stroke is not clear  Could be Cardioembolic versus hypercoagulable state of malignancy versus intracranial atherosclerosis.    Stroke.  A1C: 5.6  LDL: 89  Goal: < 70  Continue ASA 81 QD  Added  Plavix 75 QD  for R MCA stenosis.  Cardiology consultation regarding ILR appreciated.  MRI Brain of 6-1-22 -images and reports were reviewed.   Parkinson's disease  - On Sinemet for PD.  Mobilize with physical therapy.

## 2022-06-01 NOTE — PROGRESS NOTE ADULT - SUBJECTIVE AND OBJECTIVE BOX
ISIS CUETO    [id number]    76y      Male    CC: cva    INTERVAL HPI/OVERNIGHT EVENTS: pt seen and examined. no reported acute events o/n. pt confused but declines pain, cp, sob, abd pain    REVIEW OF SYSTEMS:    CONSTITUTIONAL: No fever, weight loss  RESPIRATORY: No cough, wheezing, hemoptysis; No shortness of breath  CARDIOVASCULAR: No chest pain, palpitations  GASTROINTESTINAL: No abdominal or epigastric pain. No nausea, vomiting  NEUROLOGICAL: No headaches    Vital Signs Last 24 Hrs  T(C): 36.6 (01 Jun 2022 10:14), Max: 37.1 (01 Jun 2022 05:18)  T(F): 97.9 (01 Jun 2022 10:14), Max: 98.7 (01 Jun 2022 05:18)  HR: 78 (01 Jun 2022 10:14) (65 - 88)  BP: 115/73 (01 Jun 2022 10:14) (109/60 - 160/90)  BP(mean): --  RR: 18 (01 Jun 2022 10:14) (18 - 18)  SpO2: 97% (01 Jun 2022 10:14) (97% - 100%)    PHYSICAL EXAM:    GENERAL: NAD  HEENT: PERRL  NECK: soft, supple  CHEST/LUNG: Clear to auscultation bilaterally  HEART: S1S2+, Regular rate and rhythm  ABDOMEN: Soft, Nontender, Nondistended; Bowel sounds present  : +banks  SKIN: warm, dry  NEURO: AAOX2; +resting tremor; strength equal b/l to upper and lower extremities   PSYCH: calm, cooperative     LABS:                        12.8   6.92  )-----------( 315      ( 31 May 2022 06:08 )             38.1     06-01    140  |  103  |  15.1  ----------------------------<  105<H>  3.7   |  25.0  |  0.90    Ca    11.2<H>      01 Jun 2022 11:39  Phos  2.4     05-31  Mg     2.0     06-01              MEDICATIONS  (STANDING):  ampicillin  IVPB 2 Gram(s) IV Intermittent every 6 hours  ascorbic acid 500 milliGRAM(s) Oral daily  aspirin  chewable 81 milliGRAM(s) Oral daily  atorvastatin 80 milliGRAM(s) Oral at bedtime  carbidopa/levodopa  25/100 1 Tablet(s) Oral <User Schedule>  citalopram 20 milliGRAM(s) Oral daily  clopidogrel Tablet 75 milliGRAM(s) Oral daily  diltiazem    milliGRAM(s) Oral daily  enalapril 10 milliGRAM(s) Oral daily  enoxaparin Injectable 40 milliGRAM(s) SubCutaneous every 24 hours  mupirocin 2% Ointment 1 Application(s) Topical two times a day  pantoprazole    Tablet 40 milliGRAM(s) Oral before breakfast  primidone 50 milliGRAM(s) Oral daily  tamsulosin 0.4 milliGRAM(s) Oral at bedtime    MEDICATIONS  (PRN):  acetaminophen     Tablet .. 650 milliGRAM(s) Oral every 6 hours PRN Temp greater or equal to 38C (100.4F), Mild Pain (1 - 3)      RADIOLOGY & ADDITIONAL TESTS:  < from: MR Head No Cont (06.01.22 @ 10:56) >    IMPRESSION:    1.  Compared to 5/6/2022 MRI, expected interval evolution of previously   seen infarcts within the left corona radiata/lentiform nucleus and left   middle cerebellar peduncle, which are now subacute to chronic in nature.   No new acute infarct or intracranial hemorrhage is identified.   Nonspecific findings, suggestive of prior chronic lacunar infarcts and   chronic small vessel ischemia noted.    2.  T2/FLAIR hyperintense signal within right transverse/sigmoid sinuses   and exiting internal jugular vein, similar to 5/6/2022 MRI. This may   reflect slow/turbulent venous flow, although dural venous sinus   thrombosis cannot be entirely excluded. Consider MRV evaluation as   clinically indicated. No corresponding acute infarct or parenchymal edema.    --- End of Report ---    < end of copied text >

## 2022-06-01 NOTE — CONSULT NOTE ADULT - SUBJECTIVE AND OBJECTIVE BOX
[facility]                                              CARDIOLOGY AT [facility]                                                   [address], [place] [address]                                             Telephone: [phone]. 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 found to have subacute stroke that time and recommended dc to [facility] but patient was discharged home he had a fall again but went back to home. yesterday ~6: 30 PM he was noted to ahve worsening slurry sppech, worse rt facial weakness and droop. he was taken to [facility] initially where code stroke activated NIH was 3, no tPA given, ct angio revealed High-grade short segment stenosis of the mid and distal M1 segment of the right MCA.   patient was then transferred to [facility] for further management. as per the chart review Neurology attending Dr Rm, case was reviewed with Neuro IR and recommended MR brain for now and stroke monitoring.  denies fall, head trauma, LOC seizure, chest pain, fever, cough, abdominal pain or nausea vomiting. (29 May 2022 00:30)    6/1/212 Cardiology called for embolic eval. above verified and chart reviewed. pt seen and examined denies complaints of chest pain/sob/dizziness/palps, cardiac history , arrthmias. Pt alert oriented , speech is slow,       CARDIAC TESTING   ECHO:  < from: TTE Echo Complete w/o Contrast w/ Doppler (05.08.22 @ 14:56) >  MPRESSION:  Technically difficult study  Normal left ventricular internal dimensions and systolic function,   estimated LVEF of 60-65%.  Grossly normal RV size and systolic function.  Normal trileaflet aortic valve, without AI.  Trace physiologic MR  Mild TR.    < end of copied text >    PAST MEDICAL HISTORY  Hypertension  Hyperlipidemia  GERD (gastroesophageal reflux disease)  Parkinson&#x27;s disease  Prostate cancer  Mitral valve prolapse  Malignant neoplasm of tongue, unspecified  Throat cancer        PAST SURGICAL HISTORY  History of vasectomy  Prostate cancer  S/P partial glossectomy        SOCIAL HISTORY:  Denies smoking/alcohol/drugs    FAMILY HISTORY:  FH: heart disease  father, brothers    FH: type 2 diabetes  mother      HOME MEDICATIONS:  carbidopa-levodopa 25 mg-100 mg oral tablet: 1 tab(s) orally 2 times a day (28 May 2022 23:51)  citalopram 20 mg oral tablet: 1 tab(s) orally once a day (28 May 2022 23:51)  DilTIAZem (Eqv-Cardizem CD) 240 mg/24 hours oral capsule, extended release: 1 cap(s) orally once a day (28 May 2022 23:51)  enalapril 10 mg oral tablet: 1 tab(s) orally once a day (28 May 2022 23:51)  pantoprazole 40 mg oral delayed release tablet: 1 tab(s) orally once a day (before a meal) (28 May 2022 23:51)  primidone 50 mg oral tablet: 1 tab(s) orally once a day (28 May 2022 23:51)  simvastatin 40 mg oral tablet: 1 tab(s) orally once a day (at bedtime) (28 May 2022 23:51)  Vitamin C 1000 mg oral tablet: 1 tab(s) orally once a day (28 May 2022 23:51)      CURRENT CARDIAC MEDICATIONS:  diltiazem    milliGRAM(s) Oral daily  enalapril 10 milliGRAM(s) Oral daily  tamsulosin 0.4 milliGRAM(s) Oral at bedtime      CURRENT OTHER MEDICATIONS:  acetaminophen     Tablet .. 650 milliGRAM(s) Oral every 6 hours PRN Temp greater or equal to 38C (100.4F), Mild Pain (1 - 3)  carbidopa/levodopa  25/100 1 Tablet(s) Oral <User Schedule>  citalopram 20 milliGRAM(s) Oral daily  primidone 50 milliGRAM(s) Oral daily  pantoprazole    Tablet 40 milliGRAM(s) Oral before breakfast  ampicillin  IVPB 2 Gram(s) IV Intermittent every 6 hours, Stop order after: 5 Days  ascorbic acid 500 milliGRAM(s) Oral daily  aspirin  chewable 81 milliGRAM(s) Oral daily  atorvastatin 80 milliGRAM(s) Oral at bedtime  clopidogrel Tablet 75 milliGRAM(s) Oral daily  enoxaparin Injectable 40 milliGRAM(s) SubCutaneous every 24 hours  mupirocin 2% Ointment 1 Application(s) Topical two times a day      ALLERGIES:   No Known Allergies      REVIEW OF SYMPTOMS:   CONSTITUTIONAL: No fever, no chills, no weight loss, no weight gain, no fatigue   ENMT:  No vertigo; No sinus or throat pain  NECK: No pain or stiffness  CARDIOVASCULAR: No chest pain, no dyspnea, no syncope/presyncope, no palpitations, no dizziness, no Orthopnea, no Paroxsymal nocturnal dyspnea  RESPIRATORY: no Shortness of breath, no cough, no wheezing  : No dysuria, no hematuria   GI: No dark color stool, no nausea, no diarrhea, no constipation, no abdominal pain   NEURO: No headache, no slurred speech   MUSCULOSKELETAL: No joint pain or swelling; No muscle, back, or extremity pain  PSYCH: No agitation, no anxiety.    ALL OTHER REVIEW OF SYSTEMS ARE NEGATIVE.    VITAL SIGNS:  T(C): 37.1 (06-01-22 @ 05:18), Max: 37.1 (06-01-22 @ 05:18)  T(F): 98.7 (06-01-22 @ 05:18), Max: 98.7 (06-01-22 @ 05:18)  HR: 79 (06-01-22 @ 05:18) (65 - 88)  BP: 160/90 (06-01-22 @ 05:18) (109/60 - 160/90)  RR: 18 (06-01-22 @ 05:18) (18 - 18)  SpO2: 100% (06-01-22 @ 05:18) (97% - 100%)    INTAKE AND OUTPUT:     05-31 @ 07:01  -  06-01 @ 07:00  --------------------------------------------------------  IN: 500 mL / OUT: 325 mL / NET: 175 mL        PHYSICAL EXAM:  Constitutional: Comfortable . No acute distress.   HEENT: Atraumatic and normocephalic , neck is supple . no JVD. No carotid bruit.  CNS: A&Ox3. No focal deficits.   Respiratory: CTAB, unlabored   Cardiovascular: RRR normal s1 s2. No murmur. No rubs or gallop.  Gastrointestinal: Soft, non-tender. +Bowel sounds.   Extremities: 2+ Peripheral Pulses, No clubbing, cyanosis, or edema  Psychiatric: Calm . no agitation.   Skin: Warm and dry, no ulcers on extremities     LABS:                            12.8   6.92  )-----------( 315      ( 31 May 2022 06:08 )             38.1     06-01    140  |  103  |  15.1  ----------------------------<  105<H>  3.7   |  25.0  |  0.90    Ca    11.2<H>      01 Jun 2022 11:39  Phos  2.4     05-31  Mg     2.0     06-01                  INTERPRETATION OF TELEMETRY:     ECG:   Prior ECG: Yes [  ] No [  ]    RADIOLOGY & ADDITIONAL STUDIES:    X-ray:    CT scan:   MRI:   US:                                                [facility]                                              CARDIOLOGY AT [facility]                                                   [address], [place] [address]                                             Telephone: [phone]. 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 found to have subacute stroke that time and recommended dc to [facility] but patient was discharged home he had a fall again but went back to home. yesterday ~6: 30 PM he was noted to ahve worsening slurry sppech, worse rt facial weakness and droop. he was taken to [facility] initially where code stroke activated NIH was 3, no tPA given, ct angio revealed High-grade short segment stenosis of the mid and distal M1 segment of the right MCA.   patient was then transferred to [facility] for further management. as per the chart review Neurology attending Dr Rm, case was reviewed with Neuro IR and recommended MR brain for now and stroke monitoring.  denies fall, head trauma, LOC seizure, chest pain, fever, cough, abdominal pain or nausea vomiting. (29 May 2022 00:30)    6/1/212 Cardiology called for embolic eval. above verified and chart reviewed. pt seen and examined denies complaints of chest pain/sob/dizziness/palps, cardiac history , arrthmias. Pt alert oriented , speech is slow,       CARDIAC TESTING   ECHO:  < from: TTE Echo Complete w/o Contrast w/ Doppler (05.08.22 @ 14:56) >  MPRESSION:  Technically difficult study  Normal left ventricular internal dimensions and systolic function,   estimated LVEF of 60-65%.  Grossly normal RV size and systolic function.  Normal trileaflet aortic valve, without AI.  Trace physiologic MR  Mild TR.    < end of copied text >    PAST MEDICAL HISTORY  Hypertension  Hyperlipidemia  GERD (gastroesophageal reflux disease)  Parkinson&#x27;s disease  Prostate cancer  Mitral valve prolapse  Malignant neoplasm of tongue, unspecified  Throat cancer        PAST SURGICAL HISTORY  History of vasectomy  Prostate cancer  S/P partial glossectomy        SOCIAL HISTORY:  Denies smoking/alcohol/drugs    FAMILY HISTORY:  FH: heart disease  father, brothers    FH: type 2 diabetes  mother      HOME MEDICATIONS:  carbidopa-levodopa 25 mg-100 mg oral tablet: 1 tab(s) orally 2 times a day (28 May 2022 23:51)  citalopram 20 mg oral tablet: 1 tab(s) orally once a day (28 May 2022 23:51)  DilTIAZem (Eqv-Cardizem CD) 240 mg/24 hours oral capsule, extended release: 1 cap(s) orally once a day (28 May 2022 23:51)  enalapril 10 mg oral tablet: 1 tab(s) orally once a day (28 May 2022 23:51)  pantoprazole 40 mg oral delayed release tablet: 1 tab(s) orally once a day (before a meal) (28 May 2022 23:51)  primidone 50 mg oral tablet: 1 tab(s) orally once a day (28 May 2022 23:51)  simvastatin 40 mg oral tablet: 1 tab(s) orally once a day (at bedtime) (28 May 2022 23:51)  Vitamin C 1000 mg oral tablet: 1 tab(s) orally once a day (28 May 2022 23:51)      CURRENT CARDIAC MEDICATIONS:  diltiazem    milliGRAM(s) Oral daily  enalapril 10 milliGRAM(s) Oral daily  tamsulosin 0.4 milliGRAM(s) Oral at bedtime      CURRENT OTHER MEDICATIONS:  acetaminophen     Tablet .. 650 milliGRAM(s) Oral every 6 hours PRN Temp greater or equal to 38C (100.4F), Mild Pain (1 - 3)  carbidopa/levodopa  25/100 1 Tablet(s) Oral <User Schedule>  citalopram 20 milliGRAM(s) Oral daily  primidone 50 milliGRAM(s) Oral daily  pantoprazole    Tablet 40 milliGRAM(s) Oral before breakfast  ampicillin  IVPB 2 Gram(s) IV Intermittent every 6 hours, Stop order after: 5 Days  ascorbic acid 500 milliGRAM(s) Oral daily  aspirin  chewable 81 milliGRAM(s) Oral daily  atorvastatin 80 milliGRAM(s) Oral at bedtime  clopidogrel Tablet 75 milliGRAM(s) Oral daily  enoxaparin Injectable 40 milliGRAM(s) SubCutaneous every 24 hours  mupirocin 2% Ointment 1 Application(s) Topical two times a day      ALLERGIES:   No Known Allergies      REVIEW OF SYMPTOMS:   CONSTITUTIONAL: No fever, no chills, no weight loss, no weight gain, no fatigue   ENMT:  No vertigo; No sinus or throat pain  NECK: No pain or stiffness  CARDIOVASCULAR: No chest pain, no dyspnea, no syncope/presyncope, no palpitations, no dizziness, no Orthopnea, no Paroxsymal nocturnal dyspnea  RESPIRATORY: no Shortness of breath, no cough, no wheezing  : No dysuria, no hematuria   GI: No dark color stool, no nausea, no diarrhea, no constipation, no abdominal pain   NEURO: No headache, speech garbled and    MUSCULOSKELETAL: No joint pain or swelling; No muscle, back, or extremity pain mild left sided wkness   PSYCH: No agitation, no anxiety.    ALL OTHER REVIEW OF SYSTEMS ARE NEGATIVE.    VITAL SIGNS:  T(C): 37.1 (06-01-22 @ 05:18), Max: 37.1 (06-01-22 @ 05:18)  T(F): 98.7 (06-01-22 @ 05:18), Max: 98.7 (06-01-22 @ 05:18)  HR: 79 (06-01-22 @ 05:18) (65 - 88)  BP: 160/90 (06-01-22 @ 05:18) (109/60 - 160/90)  RR: 18 (06-01-22 @ 05:18) (18 - 18)  SpO2: 100% (06-01-22 @ 05:18) (97% - 100%)    INTAKE AND OUTPUT:     05-31 @ 07:01  -  06-01 @ 07:00  --------------------------------------------------------  IN: 500 mL / OUT: 325 mL / NET: 175 mL        PHYSICAL EXAM:  Constitutional:  Frail cachectic / Comfortable . No acute distress.   HEENT: Atraumatic and normocephalic , neck is supple . no JVD. No carotid bruit.  CNS: A&Ox3.   Respiratory: CTAB, unlabored   Cardiovascular: RRR normal s1 s2. RRR 76  No murmur. No rubs or gallop.  Gastrointestinal: Soft, non-tender. +Bowel sounds.   Extremities: 2+ Peripheral Pulses, No clubbing, cyanosis, or edema  Psychiatric: Calm . no agitation.   Skin: Warm and dry, no ulcers on extremities     LABS:                            12.8   6.92  )-----------( 315      ( 31 May 2022 06:08 )             38.1     06-01    140  |  103  |  15.1  ----------------------------<  105<H>  3.7   |  25.0  |  0.90    Ca    11.2<H>      01 Jun 2022 11:39  Phos  2.4     05-31  Mg     2.0     06-01                  INTERPRETATION OF TELEMETRY: RSR 70's  no events noted no arrhythmias noted     ECG:     RADIOLOGY & ADDITIONAL STUDIES:    < from: CT Head No Cont (05.30.22 @ 12:38) >  IMPRESSION:  No evidence of acute transcortical infarct, acute intracranial   hemorrhage, or mass effect.    >< from: MR Head No Cont (06.01.22 @ 10:56) >    Compared to 5/6/2022 MRI, expected interval evolution of previously   seen infarcts within the left corona radiata/lentiform nucleus and left   middle cerebellar peduncle, which are now subacute to chronic in nature.   No new acute infarct or intracranial hemorrhage is identified.   Nonspecific findings, suggestive of prior chronic lacunar infarcts and   chronic small vessel ischemia noted.    2.  T2/FLAIR hyperintense signal within right transverse/sigmoid sinuses   and exiting internal jugular vein, similar to 5/6/2022 MRI. This may   reflect slow/turbulent venous flow, although dural venous sinus   thrombosis cannot be entirely excluded. Consider MRV evaluation as   clinically indicated. No corresponding acute infarct or parenchymal edema.                                                        [facility]                                              CARDIOLOGY AT [facility]                                                   [address], [place] [address]                                             Telephone: [phone]. 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 found to have subacute stroke that time and recommended dc to [facility] but patient was discharged home he had a fall again but went back to home. yesterday ~6: 30 PM he was noted to ahve worsening slurry sppech, worse rt facial weakness and droop. he was taken to [facility] initially where code stroke activated NIH was 3, no tPA given, ct angio revealed High-grade short segment stenosis of the mid and distal M1 segment of the right MCA.   patient was then transferred to [facility] for further management. as per the chart review Neurology attending Dr Rm, case was reviewed with Neuro IR and recommended MR brain for now and stroke monitoring.  denies fall, head trauma, LOC seizure, chest pain, fever, cough, abdominal pain or nausea vomiting. (29 May 2022 00:30)    6/1/212 Cardiology called for embolic eval. above verified and chart reviewed. pt seen and examined denies complaints of chest pain/sob/dizziness/palps, cardiac history , arrthmias. Pt alert oriented , speech is slow,       CARDIAC TESTING   ECHO:  < from: TTE Echo Complete w/o Contrast w/ Doppler (05.08.22 @ 14:56) >  MPRESSION:  Technically difficult study  Normal left ventricular internal dimensions and systolic function,   estimated LVEF of 60-65%.  Grossly normal RV size and systolic function.  Normal trileaflet aortic valve, without AI.  Trace physiologic MR  Mild TR.    < end of copied text >    PAST MEDICAL HISTORY  Hypertension  Hyperlipidemia  GERD (gastroesophageal reflux disease)  Parkinson&#x27;s disease  Prostate cancer  Mitral valve prolapse  Malignant neoplasm of tongue, unspecified  Throat cancer        PAST SURGICAL HISTORY  History of vasectomy  Prostate cancer  S/P partial glossectomy        SOCIAL HISTORY:  Denies smoking/alcohol/drugs    FAMILY HISTORY:  FH: heart disease  father, brothers    FH: type 2 diabetes  mother      HOME MEDICATIONS:  carbidopa-levodopa 25 mg-100 mg oral tablet: 1 tab(s) orally 2 times a day (28 May 2022 23:51)  citalopram 20 mg oral tablet: 1 tab(s) orally once a day (28 May 2022 23:51)  DilTIAZem (Eqv-Cardizem CD) 240 mg/24 hours oral capsule, extended release: 1 cap(s) orally once a day (28 May 2022 23:51)  enalapril 10 mg oral tablet: 1 tab(s) orally once a day (28 May 2022 23:51)  pantoprazole 40 mg oral delayed release tablet: 1 tab(s) orally once a day (before a meal) (28 May 2022 23:51)  primidone 50 mg oral tablet: 1 tab(s) orally once a day (28 May 2022 23:51)  simvastatin 40 mg oral tablet: 1 tab(s) orally once a day (at bedtime) (28 May 2022 23:51)  Vitamin C 1000 mg oral tablet: 1 tab(s) orally once a day (28 May 2022 23:51)      CURRENT CARDIAC MEDICATIONS:  diltiazem    milliGRAM(s) Oral daily  enalapril 10 milliGRAM(s) Oral daily  tamsulosin 0.4 milliGRAM(s) Oral at bedtime      CURRENT OTHER MEDICATIONS:  acetaminophen     Tablet .. 650 milliGRAM(s) Oral every 6 hours PRN Temp greater or equal to 38C (100.4F), Mild Pain (1 - 3)  carbidopa/levodopa  25/100 1 Tablet(s) Oral <User Schedule>  citalopram 20 milliGRAM(s) Oral daily  primidone 50 milliGRAM(s) Oral daily  pantoprazole    Tablet 40 milliGRAM(s) Oral before breakfast  ampicillin  IVPB 2 Gram(s) IV Intermittent every 6 hours, Stop order after: 5 Days  ascorbic acid 500 milliGRAM(s) Oral daily  aspirin  chewable 81 milliGRAM(s) Oral daily  atorvastatin 80 milliGRAM(s) Oral at bedtime  clopidogrel Tablet 75 milliGRAM(s) Oral daily  enoxaparin Injectable 40 milliGRAM(s) SubCutaneous every 24 hours  mupirocin 2% Ointment 1 Application(s) Topical two times a day      ALLERGIES:   No Known Allergies      REVIEW OF SYMPTOMS:   CONSTITUTIONAL: No fever, no chills, no weight loss, no weight gain, no fatigue   ENMT:  No vertigo; No sinus or throat pain  NECK: No pain or stiffness  CARDIOVASCULAR: No chest pain, no dyspnea, no syncope/presyncope, no palpitations, no dizziness, no Orthopnea, no Paroxsymal nocturnal dyspnea  RESPIRATORY: no Shortness of breath, no cough, no wheezing  : No dysuria, no hematuria   GI: No dark color stool, no nausea, no diarrhea, no constipation, no abdominal pain   NEURO: No headache, speech garbled and    MUSCULOSKELETAL: No joint pain or swelling; No muscle, back, or extremity pain mild left sided wkness   PSYCH: No agitation, no anxiety.    ALL OTHER REVIEW OF SYSTEMS ARE NEGATIVE.    VITAL SIGNS:  T(C): 37.1 (06-01-22 @ 05:18), Max: 37.1 (06-01-22 @ 05:18)  T(F): 98.7 (06-01-22 @ 05:18), Max: 98.7 (06-01-22 @ 05:18)  HR: 79 (06-01-22 @ 05:18) (65 - 88)  BP: 160/90 (06-01-22 @ 05:18) (109/60 - 160/90)  RR: 18 (06-01-22 @ 05:18) (18 - 18)  SpO2: 100% (06-01-22 @ 05:18) (97% - 100%)    INTAKE AND OUTPUT:     05-31 @ 07:01  -  06-01 @ 07:00  --------------------------------------------------------  IN: 500 mL / OUT: 325 mL / NET: 175 mL        PHYSICAL EXAM:  Constitutional:  Frail cachectic / Comfortable . No acute distress.   HEENT: Atraumatic and normocephalic , neck is supple . no JVD. No carotid bruit.  CNS: A&Ox3.   Respiratory: CTAB, unlabored   Cardiovascular: RRR normal s1 s2. RRR 76  No murmur. No rubs or gallop.  Gastrointestinal: Soft, non-tender. +Bowel sounds.   Extremities: 2+ Peripheral Pulses, No clubbing, cyanosis, or edema  Psychiatric: Calm . no agitation.   Skin: Warm and dry, no ulcers on extremities     LABS:                            12.8   6.92  )-----------( 315      ( 31 May 2022 06:08 )             38.1     06-01    140  |  103  |  15.1  ----------------------------<  105<H>  3.7   |  25.0  |  0.90    Ca    11.2<H>      01 Jun 2022 11:39  Phos  2.4     05-31  Mg     2.0     06-01                  INTERPRETATION OF TELEMETRY: RSR 70's  no events noted no arrhythmias noted     ECG: RSR 67/ APC's NSSTTW / no acute STTW changes    RADIOLOGY & ADDITIONAL STUDIES:    < from: CT Head No Cont (05.30.22 @ 12:38) >  IMPRESSION:  No evidence of acute transcortical infarct, acute intracranial   hemorrhage, or mass effect.    >< from: MR Head No Cont (06.01.22 @ 10:56) >    Compared to 5/6/2022 MRI, expected interval evolution of previously   seen infarcts within the left corona radiata/lentiform nucleus and left   middle cerebellar peduncle, which are now subacute to chronic in nature.   No new acute infarct or intracranial hemorrhage is identified.   Nonspecific findings, suggestive of prior chronic lacunar infarcts and   chronic small vessel ischemia noted.    2.  T2/FLAIR hyperintense signal within right transverse/sigmoid sinuses   and exiting internal jugular vein, similar to 5/6/2022 MRI. This may   reflect slow/turbulent venous flow, although dural venous sinus   thrombosis cannot be entirely excluded. Consider MRV evaluation as   clinically indicated. No corresponding acute infarct or parenchymal edema.

## 2022-06-01 NOTE — PROGRESS NOTE ADULT - ASSESSMENT
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 Patient found to have subacute stroke that time and recommended discharge to [facility] but patient was discharged home he had a fall again. While at home patient was noted to have worsening slurred speech, Rt facial weakness and droop. He was taken to [facility] initially where code stroke activated NIH was 3, no tPA given, CT angio revealed High-grade short segment stenosis of the mid and distal M1 segment of the right MCA. Patient was then transferred to [facility] for further management. Here was seen by neurology. Also started on Zosyn for UTI.       ? UTI  CVA  Parkinson's Dz  Prostate Ca      - Blood cultures 5/29 no growth   - Urine culture enterococcus faecalis and normal yaquelin   E faecalis SS to ampicillin      - UA 5/28 not convincing of UTI; UA repeated on 5/30 with pyuria;   but patient self reports dysuria    had been off antibiotics     will start 5 days of Ampicillin in view of banks and symptoms.       - follow up all outstanding cultures  - trend temperature and WBC curve  - repeat cultures from blood and all sources if febrile.

## 2022-06-01 NOTE — PROGRESS NOTE ADULT - SUBJECTIVE AND OBJECTIVE BOX
[facility]  INFECTIOUS DISEASES AND INTERNAL MEDICINE at [place] and [place]  =======================================================                               Denton Arechiga MD#  Mg Garrido MD*                                     Pati Hidalgo MD*    Ijeoma Azevedo MD*            Diplomates American Board of Internal Medicine & Infectious Diseases                # [place] Office - Appt - Tel  [phone] Fax [phone]                * [place] Office - Appt - Tel [phone] Fax [phone]                                  Hospital Consult line:  [phone]  =======================================================    ISIS CUETO [id number]  Follow up: Urine culture     no fevers  urine cx with + E faecalis SS to ampicillin  patient reports some pain on urination.     Allergies:  No Known Allergies       REVIEW OF SYSTEMS:  CONSTITUTIONAL:  No Fever or chills  HEENT:   No diplopia or blurred vision.  No earache, sore throat or runny nose.  CARDIOVASCULAR:  No pressure, squeezing, strangling, tightness, heaviness or aching about the chest, neck, axilla or epigastrium.  RESPIRATORY:  No cough, shortness of breath  GASTROINTESTINAL:  No nausea, vomiting or diarrhea.  GENITOURINARY:  as per HPI  MUSCULOSKELETAL:  no joint aches, no muscle pain  SKIN:  No change in skin, hair or nails.  NEUROLOGIC:  No Headaches, seizures or weakness.  PSYCHIATRIC:  No disorder of thought or mood.  ENDOCRINE:  No heat or cold intolerance  HEMATOLOGICAL:  No easy bruising or bleeding.       Physical Exam:  GEN: NAD  HEENT: normocephalic and atraumatic. EOMI. PERRL.    NECK: Supple.   LUNGS: CTA B/L.  HEART: RRR  ABDOMEN: Soft, NT, ND.  +BS.    :  + banks with TURBID urine  EXTREMITIES: Without  edema.  MSK: No joint swelling  NEUROLOGIC: No Focal Deficits   PSYCHIATRIC: Appropriate affect .  SKIN: No rash        Vitals:  ============  T(F): 98.7 (01 Jun 2022 05:18), Max: 98.7 (01 Jun 2022 05:18)  HR: 79 (01 Jun 2022 05:18)  BP: 160/90 (01 Jun 2022 05:18)  RR: 18 (01 Jun 2022 05:18)  SpO2: 100% (01 Jun 2022 05:18) (97% - 100%)  temp max in last 48H T(F): , Max: 98.7 (06-01-22 @ 05:18)    =======================================================  Current Antibiotics:    Other medications:  ascorbic acid 500 milliGRAM(s) Oral daily  aspirin  chewable 81 milliGRAM(s) Oral daily  atorvastatin 80 milliGRAM(s) Oral at bedtime  carbidopa/levodopa  25/100 1 Tablet(s) Oral <User Schedule>  citalopram 20 milliGRAM(s) Oral daily  clopidogrel Tablet 75 milliGRAM(s) Oral daily  diltiazem    milliGRAM(s) Oral daily  enalapril 10 milliGRAM(s) Oral daily  enoxaparin Injectable 40 milliGRAM(s) SubCutaneous every 24 hours  mupirocin 2% Ointment 1 Application(s) Topical two times a day  pantoprazole    Tablet 40 milliGRAM(s) Oral before breakfast  primidone 50 milliGRAM(s) Oral daily  tamsulosin 0.4 milliGRAM(s) Oral at bedtime      =======================================================  Labs:                        12.8   6.92  )-----------( 315      ( 31 May 2022 06:08 )             38.1     06-01    140  |  103  |  15.1  ----------------------------<  105<H>  3.7   |  25.0  |  0.90    Ca    11.2<H>      01 Jun 2022 11:39  Phos  2.4     05-31  Mg     2.0     06-01        Culture - Urine (collected 05-29-22 @ 06:31)  Source: Clean Catch Clean Catch (Midstream)  Final Report (05-31-22 @ 21:45):    >100,000 CFU/ml Enterococcus faecalis    <10,000 CFU/ml Normal Urogenital yaquelin present  Organism: Enterococcus faecalis (05-31-22 @ 21:45)  Organism: Enterococcus faecalis (05-31-22 @ 21:45)    Sensitivities:      -  Ampicillin: S <=2 Predicts results to ampicillin/sulbactam, amoxacillin-clavulanate and  piperacillin-tazobactam.      -  Ciprofloxacin: S <=1      -  Levofloxacin: S <=1      -  Nitrofurantoin: S <=32 Should not be used to treat pyelonephritis.      -  Tetra/Doxy: R >8      -  Vancomycin: S 1      Method Type: SAMY    Culture - Blood (collected 05-29-22 @ 04:55)  Source: .Blood Blood-Venous    Culture - Blood (collected 05-29-22 @ 00:40)  Source: .Blood Blood-Venous    Culture - Blood (collected 05-29-22 @ 00:40)  Source: .Blood Blood-Venous         SARS-CoV-2 Result: NotDetec (05-28-22 @ 20:07)  SARS-CoV-2 Result: NotDetec (05-05-22 @ 02:15)      =======================================================

## 2022-06-01 NOTE — CONSULT NOTE ADULT - PROBLEM SELECTOR RECOMMENDATION 9
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

## 2022-06-01 NOTE — PROGRESS NOTE ADULT - ASSESSMENT
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 Pt found to have subacute cva previously, rec dc to [facility], but pt dc'ed home and had fall. 5/28 with worsening confusion, slurred speech, R facial droop, taken to [place], subsequently transferred to [facility]     CVA   -no tPA due to recent hx cva   -DAPT   -neuro recs appreciated   -asa, statin   -MRI brain with expected evolution of previously seen infarcts L corona radiata/lentiform nucleus and L middle cerebellar peduncle, now subacute to chronic; no new acute infarct or hemorrhage compared to 5/6; poss slow/turbulent venous flow through R transverse/sigmoid sinuses and exiting internal jugular vein, dural sinus thrombosis cannot be entirely excluded   -cardio recs appreciated   -f/u TTE with bubble study     Urinary retention  -banks in place   -ucx with enterococcus faecalis   -ID recs appreciated   -ampicillin x5 days     Hypokalemia   -replaced   -monitor     Parkinson's   -sinemet, primidone     Tongue SCC with extension to anterior neck   -f/u heme/onc outpt       dispo: [facility] pending TTE

## 2022-06-01 NOTE — CONSULT NOTE ADULT - ASSESSMENT
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 found to have subacute stroke that time and recommended dc to [facility] but patient was discharged home he had a fall again but went back to home. yesterday ~6: 30 PM he was noted to ahve worsening slurry sppech, worse rt facial weakness and droop. he was taken to [facility] initially where code stroke activated NIH was 3, no tPA given, ct angio revealed High-grade short segment stenosis of the mid and distal M1 segment of the right MCA.   patient was then transferred to [facility] for further management. as per the chart review Neurology attending Dr Rm, case was reviewed with Neuro IR and recommended MR brain for now and stroke monitoring.  denies fall, head trauma, LOC seizure, chest pain, fever, cough, abdominal pain or nausea vomiting. (29 May 2022 00:30)    6/1/212 Cardiology called for embolic eval. above verified and chart reviewed. pt seen and examined denies complaints of chest pain/sob/dizziness/palps, cardiac history , arrthmias. Pt alert oriented , speech is slow, Left sided wkness noted

## 2022-06-02 NOTE — DISCHARGE NOTE PROVIDER - NSDCFUADDAPPT_GEN_ALL_CORE_FT
STAR PATIENT DOCUMENTATION FOR CVA:  Neuro Appointment made with Dr. Grullon 6/22/22 at 1:0. If you are unable to attend your pre-scheduled appointment, please contact the office directly at [phone] to reschedule    PT IS FOR [facility] PLACEMENT  MEDS TO BED NOT APPLICABLE;FACILITY WILL MANAGE PT'S MEDICATIONS

## 2022-06-02 NOTE — PROGRESS NOTE ADULT - PROBLEM SELECTOR PLAN 1
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  ILR  today and prior to d/c as well  Neuro following

## 2022-06-02 NOTE — DIETITIAN INITIAL EVALUATION ADULT - OTHER INFO
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 found to have subacute stroke that time and recommended dc to [facility] but patient was discharged home he had a fall again but went back to home. yesterday ~6: 30 PM he was noted to ahve worsening slurry sppech, worse rt facial weakness and droop. he was taken to [facility] initially where code stroke activated NIH was 3, no tPA given, ct angio revealed High-grade short segment stenosis of the mid and distal M1 segment of the right MCA. patient was then transferred to [facility] for further management

## 2022-06-02 NOTE — DIETITIAN INITIAL EVALUATION ADULT - ETIOLOGY
related to inadequate protein and energy intake with increased needs in setting of hx cancer, parkinson's disease, dysphagia and advanced age

## 2022-06-02 NOTE — DIETITIAN INITIAL EVALUATION ADULT - PERTINENT MEDS FT
MEDICATIONS  (STANDING):  ampicillin  IVPB 2 Gram(s) IV Intermittent every 6 hours  ascorbic acid 500 milliGRAM(s) Oral daily  aspirin  chewable 81 milliGRAM(s) Oral daily  atorvastatin 80 milliGRAM(s) Oral at bedtime  carbidopa/levodopa  25/100 1 Tablet(s) Oral <User Schedule>  citalopram 20 milliGRAM(s) Oral daily  clopidogrel Tablet 75 milliGRAM(s) Oral daily  diltiazem    milliGRAM(s) Oral daily  enalapril 10 milliGRAM(s) Oral daily  enoxaparin Injectable 40 milliGRAM(s) SubCutaneous every 24 hours  mupirocin 2% Ointment 1 Application(s) Topical two times a day  pantoprazole    Tablet 40 milliGRAM(s) Oral before breakfast  primidone 50 milliGRAM(s) Oral daily  tamsulosin 0.4 milliGRAM(s) Oral at bedtime    MEDICATIONS  (PRN):  acetaminophen     Tablet .. 650 milliGRAM(s) Oral every 6 hours PRN Temp greater or equal to 38C (100.4F), Mild Pain (1 - 3)

## 2022-06-02 NOTE — PROGRESS NOTE ADULT - SUBJECTIVE AND OBJECTIVE BOX
CC: TIA/CVA and Parkinson's disease with recent admission with fall, found to have subacute stroke and subacute rehabilitation was recommended but patient went home.   He now presented to [facility] with slurred speech and right facial weakness. Stroke code was activated and he was transferred here for further management.    HISTORY:  The patient is a 76y Male     PAST MEDICAL & SURGICAL HISTORY:  Hypertension  Hyperlipidemia  GERD (gastroesophageal reflux disease)  Parkinson's disease  Prostate cancer  Mitral valve prolapse  Malignant neoplasm of tongue, 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  Throat cancer  History of vasectomy  Prostate cancer  s/p cyber knife  S/P partial glossectomy  4/21    MEDICATION PRIOR TO ADMISSION:  · 	aspirin 81 mg oral delayed release tablet: Last Dose Taken:  , 1 tab(s) orally once a day  · 	Flomax 0.4 mg oral capsule: Last Dose Taken:  , 1 cap(s) orally once a day (at bedtime) MDD:1  · 	pantoprazole 40 mg oral delayed release tablet: Last Dose Taken:  , 1 tab(s) orally once a day (before a meal)  · 	Vitamin C 1000 mg oral tablet: Last Dose Taken:  , 1 tab(s) orally once a day  · 	DilTIAZem (Eqv-Cardizem CD) 240 mg/24 hours oral capsule, extended release: Last Dose Taken:  , 1 cap(s) orally once a day  · 	carbidopa-levodopa 25 mg-100 mg oral tablet: Last Dose Taken:  , 1 tab(s) orally 2 times a day  · 	enalapril 10 mg oral tablet: Last Dose Taken:  , 1 tab(s) orally once a day  · 	citalopram 20 mg oral tablet: Last Dose Taken:  , 1 tab(s) orally once a day  · 	simvastatin 40 mg oral tablet: Last Dose Taken:  , 1 tab(s) orally once a day (at bedtime)  · 	primidone 50 mg oral tablet: Last Dose Taken:  , 1 tab(s) orally once a day      Allergies  No Known Allergies    SOCIAL HISTORY:  Non smoker.     FAMILY HISTORY:  FH: heart disease  father, brothers  FH: type 2 diabetes  mother  No known family history of stroke.         Vital Signs Last 24 Hrs  T(C): 36.7 (02 Jun 2022 16:22), Max: 36.7 (02 Jun 2022 16:22)  T(F): 98 (02 Jun 2022 16:22), Max: 98 (02 Jun 2022 16:22)  HR: 70 (02 Jun 2022 16:22) (64 - 88)  BP: 119/70 (02 Jun 2022 16:22) (104/63 - 145/65)  BP(mean): --  RR: 18 (02 Jun 2022 16:22) (15 - 18)  SpO2: 88% (02 Jun 2022 16:22) (88% - 98%)    MEDICATIONS    acetaminophen     Tablet .. 650 milliGRAM(s) Oral every 6 hours PRN  ampicillin  IVPB 2 Gram(s) IV Intermittent every 6 hours  ascorbic acid 500 milliGRAM(s) Oral daily  atorvastatin 80 milliGRAM(s) Oral at bedtime  carbidopa/levodopa  25/100 1 Tablet(s) Oral <User Schedule>  citalopram 20 milliGRAM(s) Oral daily  diltiazem    milliGRAM(s) Oral daily  enalapril 10 milliGRAM(s) Oral daily  enoxaparin Injectable 60 milliGRAM(s) SubCutaneous every 12 hours  mupirocin 2% Ointment 1 Application(s) Topical two times a day  pantoprazole    Tablet 40 milliGRAM(s) Oral before breakfast  primidone 50 milliGRAM(s) Oral daily  tamsulosin 0.4 milliGRAM(s) Oral at bedtime         LABS:  CBC Full  -  ( 02 Jun 2022 06:30 )  WBC Count : 6.76 K/uL  RBC Count : 3.86 M/uL  Hemoglobin : 12.5 g/dL  Hematocrit : 37.5 %  Platelet Count - Automated : 292 K/uL  Mean Cell Volume : 97.2 fl  Mean Cell Hemoglobin : 32.4 pg  Mean Cell Hemoglobin Concentration : 33.3 gm/dL  Auto Neutrophil # : x  Auto Lymphocyte # : x  Auto Monocyte # : x  Auto Eosinophil # : x  Auto Basophil # : x  Auto Neutrophil % : x  Auto Lymphocyte % : x  Auto Monocyte % : x  Auto Eosinophil % : x  Auto Basophil % : x      06-02    140  |  104  |  15.5  ----------------------------<  118<H>  4.0   |  25.0  |  0.81    Ca    11.2<H>      02 Jun 2022 06:30  Mg     2.1     06-02      Neurological Exam:    Mental status: The patient is awake, alert, and fully oriented. There is no aphasia. There is moderate dysarthria.     Cranial nerves: There is no papilledema. Pupils react symmetrically to light. There is no visual field deficit to confrontation. Extraocular motion is full with no nystagmus.  Facial sensation is intact. Facial musculature is symmetric. Palate elevates symmetrically. Tongue is midline.    Motor: There is increased tone with cogwheeling at the wrists and elbows bilaterally. There is tremor at rest which subsides with intention.  Strength is 5/5 in the right arm and leg.   Strength is 5/5 in the left arm and leg.    Sensation: Intact to light touch and pin. There is no extinction to double simultaneous stimulation.      Cerebellar: There is no dysmetria on finger to nose testing.    RADIOLOGY   CT head images reviewed (and concur with report): There is no acute pathology. Chronic infarcts bilateral basal ganglia.    CT-A: Distal Right M1 high grade stenosis.  Mid right P2 high grade stenosis.    MRI Brain 6-1-22   MR Head No Cont (06.01.22 @ 10:56) >    IMPRESSION:    1.  Compared to 5/6/2022 MRI, expected interval evolution of previously   seen infarcts within the left corona radiata/lentiform nucleus and left   middle cerebellar peduncle, which are now subacute to chronic in nature.   No new acute infarct or intracranial hemorrhage is identified.   Nonspecific findings, suggestive of prior chronic lacunar infarcts and   chronic small vessel ischemia noted.    2.  T2/FLAIR hyperintense signal within right transverse/sigmoid sinuses   and exiting internal jugular vein, similar to 5/6/2022 MRI. This may   reflect slow/turbulent venous flow, although dural venous sinus   thrombosis cannot be entirely excluded. Consider MRV evaluation as   clinically indicated. No corresponding acute infarct or parenchymal edema.          MR Head No Cont (05.06.22 @ 19:00) >    INTERPRETATION:  Clinical indication: Subacute CVA.    MRI of the brain was performed using sagittal T1 axial T1 T2 T2 FLAIR   diffusion and [place] sequence.    This exam is compared with prior head CT performed on May 6, 2022.    Extensive parenchymal volume loss and chronic microvascular ischemic   changes are identified    Old lacunar infarct involving the right sublenticular nucleus region is   seen.    There is evidence of abnormal T2 prolongation with restricted diffusion   seen involving the left brachium pontis and left lenticular   nucleus/corona radiata region.    These findings are compatible with areas of acute infarcts. No   hemorrhagic transformation is seen. No significant shift or herniation is   seen.    The large vessels demonstrate normal flow    The visualized paranasal sinuses mastoid and mastoid clear.    IMPRESSION: Acute infarcts as described above.    TTE Echo Complete w/o Contrast w/ Doppler (05.08.22 @ 14:56) >    IMPRESSION:  Technically difficult study  Normal left ventricular internal dimensions and systolic function,   estimated LVEF of 60-65%.  Grossly normal RV size and systolic function.  Normal trileaflet aortic valve, without AI.  Trace physiologic MR  Mild TR.    TOMMY WILDER MD; Attending Cardiologist

## 2022-06-02 NOTE — PROGRESS NOTE ADULT - SUBJECTIVE AND OBJECTIVE BOX
Procedure: Loop Recorder Implant  Electrophysiologist: Ana Lilia Bennett MD    Pt doing well s/p MDT loop recorder implant. Denies complaint.     Incision: Dermabond C/D/I; no bleeding, hematoma, erythema, exudate or edema    Plan:    Pain control with PO analgesia PRN.   Keep incision site dry for 3 days.  Discharge planning per primary team.  Outpt f/up with Dr. Bennett in 1-2 weeks

## 2022-06-02 NOTE — DIETITIAN INITIAL EVALUATION ADULT - ADD RECOMMEND
1. Monitor and encourage PO intake  2. Strict Is&Os, trend wts 2x/week  3. Provide HBV protein food sources

## 2022-06-02 NOTE — DISCHARGE NOTE PROVIDER - NSDCCPTREATMENT_GEN_ALL_CORE_FT
PRINCIPAL PROCEDURE  Procedure: Insertion, loop recorder  Findings and Treatment: Loop Recorder Incision Care:     - Do not touch the incision until it is completely healed.   - There are steri-strips on your incision, which will start to flake off on its own over the next 2-3 weeks. Do not pick at or peel off.   - Do not apply soaps, creams, lotions, ointments or powders to the incision until it is completely healed.  - You should call the doctor if you notice redness, drainage, swelling, increased tenderness, hot sensation around the  incision, bleeding or incision edges pulling apart.         PRINCIPAL PROCEDURE  Procedure: Insertion, loop recorder  Findings and Treatment: Loop Recorder Incision Care:     - Do not touch the incision until it is completely healed.   - There is Dermabond on your incision, which will start to flake off on its own over the next 2-3 weeks. Do not pick at or peel off.   - Do not apply soaps, creams, lotions, ointments or powders to the incision until it is completely healed.  - You should call the doctor if you notice redness, drainage, swelling, increased tenderness, hot sensation around the  incision, bleeding or incision edges pulling apart.

## 2022-06-02 NOTE — PROGRESS NOTE ADULT - ASSESSMENT
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 Pt found to have subacute cva previously, rec dc to [facility], but pt dc'ed home and had fall. 5/28 with worsening confusion, slurred speech, R facial droop, taken to [place], subsequently transferred to [facility]     CVA   -no tPA due to recent hx cva   -DAPT --> d/w Dr. Rm, change to full dose lovenox due to suspected hypercoagulability from malignancy   -neuro recs appreciated   -asa, statin   -MRI brain with expected evolution of previously seen infarcts L corona radiata/lentiform nucleus and L middle cerebellar peduncle, now subacute to chronic; no new acute infarct or hemorrhage compared to 5/6; poss slow/turbulent venous flow through R transverse/sigmoid sinuses and exiting internal jugular vein, dural sinus thrombosis cannot be entirely excluded   -cardio recs appreciated   -TTE with bubble study :     Urinary retention  -banks in place   -ucx with enterococcus faecalis   -ID recs appreciated   -ampicillin x5 days     Hypokalemia   -replaced   -monitor     Parkinson's   -sinemet, primidone     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 Pt found to have subacute cva previously, rec dc to [facility], but pt dc'ed home and had fall. 5/28 with worsening confusion, slurred speech, R facial droop, taken to [place], subsequently transferred to [facility]     CVA   -no tPA due to recent hx cva   -DAPT --> d/w Dr. Rm, change to full dose lovenox due to suspected hypercoagulability from malignancy   -neuro recs appreciated   -asa, statin   -MRI brain with expected evolution of previously seen infarcts L corona radiata/lentiform nucleus and L middle cerebellar peduncle, now subacute to chronic; no new acute infarct or hemorrhage compared to 5/6; poss slow/turbulent venous flow through R transverse/sigmoid sinuses and exiting internal jugular vein, dural sinus thrombosis cannot be entirely excluded   -cardio recs appreciated   -TTE with bubble study : LVEF 55-60%, no pfo  -f/u MRV   -EP to see    Urinary retention  -banks in place   -ucx with enterococcus faecalis   -ID recs appreciated   -ampicillin x5 days     Hypokalemia   -replaced   -monitor     Parkinson's   -sinemet, primidone     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

## 2022-06-02 NOTE — PROGRESS NOTE ADULT - ASSESSMENT
The patient is a 76y Male with Parkinson's disease with a recent   left brachium pontis and left lenticular   nucleus/corona radiata region and now with possible new  stroke.     Mechanism of stroke is not clear  Could be Cardioembolic versus hypercoagulable state of malignancy versus intracranial atherosclerosis.    Stroke.  A1C: 5.6  LDL: 89  Goal: < 70  Given the likelyhood of strokes being due to hypercoagulable state of cancer and as there is a suspicion of cerebral venous sinus thrombosis, switched to full dose Lovenox instead of DAPT  Cardiology consultation regarding ILR appreciated.  MRI Brain of 6-1-22 -images and reports were reviewed.   MRV to confirm CVS thrombosis.  Parkinson's disease  - On Sinemet for PD.  Mobilize with physical therapy.

## 2022-06-02 NOTE — PROGRESS NOTE ADULT - ASSESSMENT
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 Patient found to have subacute stroke that time and recommended discharge to [facility] but patient was discharged home he had a fall again. While at home patient was noted to have worsening slurred speech, Rt facial weakness and droop. He was taken to [facility] initially where code stroke activated NIH was 3, no tPA given, CT angio revealed High-grade short segment stenosis of the mid and distal M1 segment of the right MCA. Patient was then transferred to [facility] for further management. Here was seen by neurology. Also started on Zosyn for UTI.       ? UTI  CVA  Parkinson's Dz  Prostate Ca      - Blood cultures 5/29 no growth   - Urine culture enterococcus and normal yaquelin   - RVP/COVID 19 PCR 5/28 negative   - UA 5/28 not convincing of UTI   - UA repeated on 5/30 with pyuria   - Unclear why it was repeated and how it was collected, No fevers   - Enterococcus sensitive to ampicillin  - Started on ampicillin 6/1  - Can switch to PO on D/C   - Trend Fever  - Trend WBC      Will sign off. Please call PRN.     d/w Dr Meier

## 2022-06-02 NOTE — PROGRESS NOTE ADULT - SUBJECTIVE AND OBJECTIVE BOX
ISIS CUETO    [id number]    76y      Male    CC: cva    INTERVAL HPI/OVERNIGHT EVENTS: pt seen and examined. no acute events reported o/n    REVIEW OF SYSTEMS:    CONSTITUTIONAL: No fever  RESPIRATORY: No cough, wheezing, hemoptysis; No shortness of breath  CARDIOVASCULAR: No chest pain, palpitations  GASTROINTESTINAL: No abdominal or epigastric pain. No nausea, vomiting  NEUROLOGICAL: No headaches    Vital Signs Last 24 Hrs  T(C): 36.6 (02 Jun 2022 08:59), Max: 37.3 (01 Jun 2022 16:52)  T(F): 97.9 (02 Jun 2022 08:59), Max: 99.1 (01 Jun 2022 16:52)  HR: 68 (02 Jun 2022 08:59) (68 - 88)  BP: 104/63 (02 Jun 2022 08:59) (104/63 - 144/60)  BP(mean): --  RR: 18 (02 Jun 2022 08:59) (18 - 18)  SpO2: 97% (02 Jun 2022 08:59) (95% - 98%)    PHYSICAL EXAM:    GENERAL: NAD  HEENT: PERRL  NECK: soft, supple  CHEST/LUNG: Clear to auscultation bilaterally  HEART: S1S2+, Regular rate and rhythm  ABDOMEN: Soft, Nontender, Nondistended; Bowel sounds present  : +banks  SKIN: warm, dry  NEURO: AAOX2-3; +resting tremor; strength equal b/l to upper and lower extremities   PSYCH: calm, cooperative     LABS:                        12.5   6.76  )-----------( 292      ( 02 Jun 2022 06:30 )             37.5     06-02    140  |  104  |  15.5  ----------------------------<  118<H>  4.0   |  25.0  |  0.81    Ca    11.2<H>      02 Jun 2022 06:30  Mg     2.1     06-02              MEDICATIONS  (STANDING):  ampicillin  IVPB 2 Gram(s) IV Intermittent every 6 hours  ascorbic acid 500 milliGRAM(s) Oral daily  atorvastatin 80 milliGRAM(s) Oral at bedtime  carbidopa/levodopa  25/100 1 Tablet(s) Oral <User Schedule>  citalopram 20 milliGRAM(s) Oral daily  diltiazem    milliGRAM(s) Oral daily  enalapril 10 milliGRAM(s) Oral daily  enoxaparin Injectable 60 milliGRAM(s) SubCutaneous every 12 hours  mupirocin 2% Ointment 1 Application(s) Topical two times a day  pantoprazole    Tablet 40 milliGRAM(s) Oral before breakfast  primidone 50 milliGRAM(s) Oral daily  tamsulosin 0.4 milliGRAM(s) Oral at bedtime    MEDICATIONS  (PRN):  acetaminophen     Tablet .. 650 milliGRAM(s) Oral every 6 hours PRN Temp greater or equal to 38C (100.4F), Mild Pain (1 - 3)      RADIOLOGY & ADDITIONAL TESTS:   ISIS CUETO    [id number]    76y      Male    CC: cva    INTERVAL HPI/OVERNIGHT EVENTS: pt seen and examined. no acute events reported o/n    REVIEW OF SYSTEMS:    CONSTITUTIONAL: No fever  RESPIRATORY: No cough, wheezing, hemoptysis; No shortness of breath  CARDIOVASCULAR: No chest pain, palpitations  GASTROINTESTINAL: No abdominal or epigastric pain. No nausea, vomiting  NEUROLOGICAL: No headaches    Vital Signs Last 24 Hrs  T(C): 36.6 (02 Jun 2022 08:59), Max: 37.3 (01 Jun 2022 16:52)  T(F): 97.9 (02 Jun 2022 08:59), Max: 99.1 (01 Jun 2022 16:52)  HR: 68 (02 Jun 2022 08:59) (68 - 88)  BP: 104/63 (02 Jun 2022 08:59) (104/63 - 144/60)  BP(mean): --  RR: 18 (02 Jun 2022 08:59) (18 - 18)  SpO2: 97% (02 Jun 2022 08:59) (95% - 98%)    PHYSICAL EXAM:    GENERAL: NAD  HEENT: PERRL  NECK: soft, supple  CHEST/LUNG: Clear to auscultation bilaterally  HEART: S1S2+, Regular rate and rhythm  ABDOMEN: Soft, Nontender, Nondistended; Bowel sounds present  : +banks  SKIN: warm, dry  NEURO: AAOX2-3; +resting tremor; strength equal b/l to upper and lower extremities   PSYCH: calm, cooperative     LABS:                        12.5   6.76  )-----------( 292      ( 02 Jun 2022 06:30 )             37.5     06-02    140  |  104  |  15.5  ----------------------------<  118<H>  4.0   |  25.0  |  0.81    Ca    11.2<H>      02 Jun 2022 06:30  Mg     2.1     06-02              MEDICATIONS  (STANDING):  ampicillin  IVPB 2 Gram(s) IV Intermittent every 6 hours  ascorbic acid 500 milliGRAM(s) Oral daily  atorvastatin 80 milliGRAM(s) Oral at bedtime  carbidopa/levodopa  25/100 1 Tablet(s) Oral <User Schedule>  citalopram 20 milliGRAM(s) Oral daily  diltiazem    milliGRAM(s) Oral daily  enalapril 10 milliGRAM(s) Oral daily  enoxaparin Injectable 60 milliGRAM(s) SubCutaneous every 12 hours  mupirocin 2% Ointment 1 Application(s) Topical two times a day  pantoprazole    Tablet 40 milliGRAM(s) Oral before breakfast  primidone 50 milliGRAM(s) Oral daily  tamsulosin 0.4 milliGRAM(s) Oral at bedtime    MEDICATIONS  (PRN):  acetaminophen     Tablet .. 650 milliGRAM(s) Oral every 6 hours PRN Temp greater or equal to 38C (100.4F), Mild Pain (1 - 3)      RADIOLOGY & ADDITIONAL TESTS:    < from: TTE Echo Complete w/o Contrast w/ Doppler (06.02.22 @ 10:07) >    Summary:   1. Technically difficult study.   2. Left ventricular ejection fraction, by visual estimation, is 55 to   60%.   3. Normal global left ventricular systolic function.   4. The left ventricular diastolic function could not be assessed in this   study.   5. Normal right ventricular size andfunction, estimated PASP least 17   mmHg.   6. Mild thickening and calcification of the anterior and posterior   mitral valve leaflets.   7. Moderate mitral annular calcification.   8. Trace mitral valve regurgitation.   9. Mild tricuspid regurgitation.  10. Sclerotic aortic valve with normal opening.  11. Color flow doppler and intravenous injection of agitated saline   demonstrates the presence of an intact intra atrial septum.  12. There is no evidence of pericardial effusion.    Home Garrison DO Electronically signed on 6/2/2022 at 1:29:56 PM    < end of copied text >

## 2022-06-02 NOTE — PROGRESS NOTE ADULT - SUBJECTIVE AND OBJECTIVE BOX
[facility]  INFECTIOUS DISEASES AND INTERNAL MEDICINE at [place] and [place]  =======================================================                               Denton Arechiga MD#  Mg Garrido MD*                                     Pati Hidalgo MD*    Ijeoma Azevedo MD*            Diplomates American Board of Internal Medicine & Infectious Diseases                # [place] Office - Appt - Tel  [phone] Fax [phone]                * [place] Office - Appt - Tel [phone] Fax [phone]                                  Hospital Consult line:  [phone]  =======================================================    ISIS CUETO [id number]    Follow up: Urine culture     No fevers       Allergies:  No Known Allergies       REVIEW OF SYSTEMS:  CONSTITUTIONAL:  No Fever or chills  HEENT:   No diplopia or blurred vision.  No earache, sore throat or runny nose.  CARDIOVASCULAR:  No chest pain   RESPIRATORY:  No cough, shortness of breath  GASTROINTESTINAL:  No nausea, vomiting or diarrhea.  GENITOURINARY: No flank pain   MUSCULOSKELETAL:  no joint aches, no muscle pain  SKIN:  No change in skin, hair or nails.  NEUROLOGIC:  No Headaches, seizures   PSYCHIATRIC:  No disorder of thought or mood.  ENDOCRINE:  No heat or cold intolerance  HEMATOLOGICAL:  No easy bruising or bleeding.       Physical Exam:  GEN: NAD  HEENT: normocephalic and atraumatic. EOMI. PERRL.    NECK: Supple.   LUNGS: CTA B/L.  HEART: RRR  ABDOMEN: Soft, NT, ND.  +BS.    : No CVA tenderness  EXTREMITIES: Without  edema.  MSK: No joint swelling  NEUROLOGIC: No Focal Deficits   PSYCHIATRIC: Appropriate affect .  SKIN: No rash      Vitals:  T(F): 97.9 (02 Jun 2022 08:59), Max: 99.1 (01 Jun 2022 16:52)  HR: 68 (02 Jun 2022 08:59)  BP: 104/63 (02 Jun 2022 08:59)  RR: 18 (02 Jun 2022 08:59)  SpO2: 97% (02 Jun 2022 08:59) (95% - 98%)  temp max in last 48H T(F): , Max: 99.1 (06-01-22 @ 16:52)    Current Antibiotics:  ampicillin  IVPB 2 Gram(s) IV Intermittent every 6 hours    Other medications:  ascorbic acid 500 milliGRAM(s) Oral daily  aspirin  chewable 81 milliGRAM(s) Oral daily  atorvastatin 80 milliGRAM(s) Oral at bedtime  carbidopa/levodopa  25/100 1 Tablet(s) Oral <User Schedule>  citalopram 20 milliGRAM(s) Oral daily  clopidogrel Tablet 75 milliGRAM(s) Oral daily  diltiazem    milliGRAM(s) Oral daily  enalapril 10 milliGRAM(s) Oral daily  enoxaparin Injectable 40 milliGRAM(s) SubCutaneous every 24 hours  mupirocin 2% Ointment 1 Application(s) Topical two times a day  pantoprazole    Tablet 40 milliGRAM(s) Oral before breakfast  primidone 50 milliGRAM(s) Oral daily  tamsulosin 0.4 milliGRAM(s) Oral at bedtime                            12.5   6.76  )-----------( 292      ( 02 Jun 2022 06:30 )             37.5     06-02    140  |  104  |  15.5  ----------------------------<  118<H>  4.0   |  25.0  |  0.81    Ca    11.2<H>      02 Jun 2022 06:30  Mg     2.1     06-02      RECENT CULTURES:  05-29 @ 06:31 Clean Catch Clean Catch (Midstream) Enterococcus faecalis    >100,000 CFU/ml Enterococcus faecalis  <10,000 CFU/ml Normal Urogenital yaquelin present    05-29 @ 04:55 .Blood Blood-Venous     No growth at 48 hours    05-29 @ 00:40 .Blood Blood-Venous     No growth to date.      WBC Count: 6.76 K/uL (06-02-22 @ 06:30)  WBC Count: 6.92 K/uL (05-31-22 @ 06:08)  WBC Count: 8.01 K/uL (05-30-22 @ 09:07)  WBC Count: 6.46 K/uL (05-29-22 @ 03:40)  WBC Count: 8.48 K/uL (05-28-22 @ 20:08)    Creatinine, Serum: 0.81 mg/dL (06-02-22 @ 06:30)  Creatinine, Serum: 0.90 mg/dL (06-01-22 @ 11:39)  Creatinine, Serum: 0.83 mg/dL (05-31-22 @ 06:08)  Creatinine, Serum: 0.70 mg/dL (05-30-22 @ 09:07)  Creatinine, Serum: 0.67 mg/dL (05-29-22 @ 03:40)  Creatinine, Serum: 1.10 mg/dL (05-28-22 @ 20:08)     COVID-19 PCR: NotDetec (06-01-22 @ 16:45)  SARS-CoV-2 Result: NotDetec (05-28-22 @ 20:07)  SARS-CoV-2 Result: NotDetec (05-05-22 @ 02:15)

## 2022-06-02 NOTE — PROGRESS NOTE ADULT - SUBJECTIVE AND OBJECTIVE BOX
UROLOGY F/U :  Final Note:    above notes noted:  Patient lying in bed no acute distress  Afebrile, VS, /63, P 68, R 18    :  banks in place and functionincc last shift/ 1400cc 24 hrs.    Labs: 22    CBC: wbc- 6.6/ hg 12.5/ hct 37 / plats 292  Lytes: 140/ 4.0/ 104/ 25  bun:   15.5  creat.: 0.81    Impression:  Urinary retention with difficult Banks placement.    Plan: note discharge planning in progress for [facility], recommend Banks.  remain in place and when improvement of motor activity be it at rehab would then attempt TOV .  please recall if necessary

## 2022-06-02 NOTE — DISCHARGE NOTE PROVIDER - CARE PROVIDER_API CALL
Ana Lilia Bennett)  Cardiac Electrophysiology; Cardiovascular Disease; Internal Medicine  [address] [place] [address]  Phone: [phone]  Fax: [phone]  Follow Up Time:

## 2022-06-02 NOTE — DIETITIAN INITIAL EVALUATION ADULT - PERTINENT LABORATORY DATA
06-02    140  |  104  |  15.5  ----------------------------<  118<H>  4.0   |  25.0  |  0.81    Ca    11.2<H>      02 Jun 2022 06:30  Mg     2.1     06-02    A1C with Estimated Average Glucose Result: 5.6 % (05-29-22 @ 03:40)  A1C with Estimated Average Glucose Result: 5.7 % (05-07-22 @ 10:42)   06-02    140  |  104  |  15.5  ----------------------------<  118<H>  4.0   |  25.0  |  0.81    Ca    11.2<H>      02 Jun 2022 06:30  Mg     2.1     06-02    A1C with Estimated Average Glucose Result: 5.6 % (05-29-22 @ 03:40)  A1C with Estimated Average Glucose Result: 5.7 % (05-07-22 @ 10:42)       06-02    140  |  104  |  15.5  ----------------------------<  118<H>  4.0   |  25.0  |  0.81    Ca    11.2<H>      02 Jun 2022 06:30  Mg     2.1     06-02    A1C with Estimated Average Glucose Result: 5.6 % (05-29-22 @ 03:40)  A1C with Estimated Average Glucose Result: 5.7 % (05-07-22 @ 10:42)    06-02 Na140 mmol/L Glu 118 mg/dL<H> K+ 4.0 mmol/L Cr  0.81 mg/dL BUN 15.5 mg/dL Phos n/a   Alb n/a   PAB n/a

## 2022-06-02 NOTE — PROGRESS NOTE ADULT - SUBJECTIVE AND OBJECTIVE BOX
[facility]                                                         CARDIOLOGY AT [facility]                                                                  [address] [facility] [place] [place] [address]                                                         Telephone: [phone]. Fax:[phone]                                                                             PROGRESS NOTE    Reason for follow up:    Stroke  6/1/212 Cardiology called for embolic eval  Thin frail elderly cachectic male, speech clear.   Plan is for EP to place a loop today as well as a bubble study TTE.     Review of symptoms:   Cardiac:  No chest pain. No dyspnea. No palpitations.  Respiratory: no cough. No dyspnea  Gastrointestinal: No diarrhea. No abdominal pain. No bleeding.   Neuro: No focal neuro complaints.    Vitals:  T(C): 36.6 (06-02-22 @ 08:59), Max: 37.3 (06-01-22 @ 16:52)  HR: 68 (06-02-22 @ 08:59) (68 - 88)  BP: 104/63 (06-02-22 @ 08:59) (104/63 - 144/60)  RR: 18 (06-02-22 @ 08:59) (18 - 18)  SpO2: 97% (06-02-22 @ 08:59) (95% - 98%)    I&O's Summary  01 Jun 2022 07:01  -  02 Jun 2022 07:00  --------------------------------------------------------  IN: 0 mL / OUT: 1400 mL / NET: -1400 mL    Weight (kg): 59 (05-28 @ 21:57), 62.2 (05-28 @ 21:10)    PHYSICAL EXAM:  Appearance: Comfortable. No acute distress, thin cachetic    HEENT:  Atraumatic. Normocephalic.  Normal oral mucosa  Neurologic: A & O x 2, weaken elderly male.    Cardiovascular: RRR S1 S2, No murmur, no rubs/gallops. No JVD  Respiratory: Lungs clear to auscultation, unlabored   Gastrointestinal:  Soft, Non-tender, + BS  Lower Extremities: 2+ Peripheral Pulses, No clubbing, cyanosis, or edema  Psychiatry: Patient is calm. No agitation.   Skin: warm and dry.    CURRENT CARDIAC MEDICATIONS:  diltiazem    milliGRAM(s) Oral daily  enalapril 10 milliGRAM(s) Oral daily  tamsulosin 0.4 milliGRAM(s) Oral at bedtime    CURRENT OTHER MEDICATIONS:  ampicillin  IVPB 2 Gram(s) IV Intermittent every 6 hours  acetaminophen     Tablet .. 650 milliGRAM(s) Oral every 6 hours PRN Temp greater or equal to 38C (100.4F), Mild Pain (1 - 3)  carbidopa/levodopa  25/100 1 Tablet(s) Oral <User Schedule>  citalopram 20 milliGRAM(s) Oral daily  primidone 50 milliGRAM(s) Oral daily  pantoprazole    Tablet 40 milliGRAM(s) Oral before breakfast  atorvastatin 80 milliGRAM(s) Oral at bedtime  ascorbic acid 500 milliGRAM(s) Oral daily  enoxaparin Injectable 60 milliGRAM(s) SubCutaneous every 12 hours  mupirocin 2% Ointment 1 Application(s) Topical two times a day    LABS:	 	                          12.5   6.76  )-----------( 292      ( 02 Jun 2022 06:30 )             37.5     06-02    140  |  104  |  15.5  ----------------------------<  118<H>  4.0   |  25.0  |  0.81    Ca    11.2<H>      02 Jun 2022 06:30  Mg     2.1     06-02      PT/INR/PTT ( 29 May 2022 03:40 )                       :                       :      13.5         :       X                     .        .                   .              .           .       1.16        .                                       Lipid Profile: Date: 05-29 @ 03:40  Total cholesterol 148; Direct LDL: --; HDL: 45; Triglycerides:70  Date: 05-29 @ 00:20  Total cholesterol 178; Direct LDL: --; HDL: 55; Triglycerides:96  Date: 05-07 @ 10:47  Total cholesterol 155; Direct LDL: --; HDL: 52; Triglycerides:87    HgA1c:   TSH:     TELEMETRY:  SR 66, no acute

## 2022-06-02 NOTE — DISCHARGE NOTE PROVIDER - NSDCCPCAREPLAN_GEN_ALL_CORE_FT
PRINCIPAL DISCHARGE DIAGNOSIS  Diagnosis: CVA (cerebrovascular accident)  Assessment and Plan of Treatment:       SECONDARY DISCHARGE DIAGNOSES  Diagnosis: Rectal bleed  Assessment and Plan of Treatment:     Diagnosis: Hypertension  Assessment and Plan of Treatment:     Diagnosis: Hematuria  Assessment and Plan of Treatment:     Diagnosis: Parkinsons  Assessment and Plan of Treatment:     Diagnosis: Hypokalemia  Assessment and Plan of Treatment:

## 2022-06-02 NOTE — DISCHARGE NOTE PROVIDER - NSDCMRMEDTOKEN_GEN_ALL_CORE_FT
aspirin 81 mg oral delayed release tablet: 1 tab(s) orally once a day  carbidopa-levodopa 25 mg-100 mg oral tablet: 1 tab(s) orally 2 times a day  citalopram 20 mg oral tablet: 1 tab(s) orally once a day  DilTIAZem (Eqv-Cardizem CD) 240 mg/24 hours oral capsule, extended release: 1 cap(s) orally once a day  enalapril 10 mg oral tablet: 1 tab(s) orally once a day  Flomax 0.4 mg oral capsule: 1 cap(s) orally once a day (at bedtime) MDD:1  pantoprazole 40 mg oral delayed release tablet: 1 tab(s) orally once a day (before a meal)  primidone 50 mg oral tablet: 1 tab(s) orally once a day  simvastatin 40 mg oral tablet: 1 tab(s) orally once a day (at bedtime)  Vitamin C 1000 mg oral tablet: 1 tab(s) orally once a day   atorvastatin 80 mg oral tablet: 1 tab(s) orally once a day (at bedtime)  carbidopa-levodopa 25 mg-100 mg oral tablet: 1 tab(s) orally 2 times a day  citalopram 20 mg oral tablet: 1 tab(s) orally once a day  DilTIAZem (Eqv-Cardizem CD) 240 mg/24 hours oral capsule, extended release: 1 cap(s) orally once a day  enalapril 10 mg oral tablet: 1 tab(s) orally once a day  ferrous sulfate 325 mg (65 mg elemental iron) oral delayed release tablet: 1 tab(s) orally once a day   Lovenox 60 mg/0.6 mL injectable solution: 60 milligram(s) subcutaneously 2 times a day   pantoprazole 40 mg oral delayed release tablet: 1 tab(s) orally once a day (before a meal)  primidone 50 mg oral tablet: 1 tab(s) orally once a day  tamsulosin 0.4 mg oral capsule: 1 cap(s) orally once a day (at bedtime)  Vitamin C 1000 mg oral tablet: 1 tab(s) orally once a day

## 2022-06-02 NOTE — DISCHARGE NOTE PROVIDER - NSDCFUSCHEDAPPT_GEN_ALL_CORE_FT
Jayden Boland  [facility]  [address]  Scheduled Appointment: 06/20/2022    Jaron Grullon  [facility]  NEUROLOGY [address]  Scheduled Appointment: 06/22/2022    Jayden Boland  [facility]  [address]NG [address] [place] [address] R  Scheduled Appointment: 07/19/2022    Tricia Herrera  [address]  Scheduled Appointment: 07/22/2022

## 2022-06-02 NOTE — DISCHARGE NOTE PROVIDER - HOSPITAL COURSE
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 Pt found to have subacute cva previously, rec dc to [facility], but pt dc'ed home and had fall. 5/28 with worsening confusion, slurred speech, R facial droop, taken to [place], subsequently transferred to [facility] Patient admitted to medicine and had no TPA due to recent cva. Patient seen by neuro and had a mri as below. Patients mrv showed sinus venous thrombosis    patient started on full dose lovenox and statin and no ASA> Patient had loop placed on 6/2 and is now cleared for dc.     CVA   no tPA due to recent hx cva   DAPT --> changed to full dose lovenox   neuro recs appreciated   statin (NO ASA)  MRI brain with expected evolution of previously seen infarcts L corona radiata/lentiform nucleus and L middle cerebellar peduncle, now subacute to chronic; no new acute infarct or hemorrhage compared to 5/6; poss slow/turbulent venous flow through R transverse/sigmoid sinuses and exiting internal jugular vein, dural sinus thrombosis cannot be entirely excluded   cardio recs appreciated   TTE with bubble study : LVEF 55-60%, no pfo  s/p ILR 6/2  MRV suspicious for sinus venous thrombosis.     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

## 2022-06-02 NOTE — DISCHARGE NOTE PROVIDER - ATTENDING DISCHARGE PHYSICAL EXAMINATION:
GENERAL: NAD  HEENT: PERRL, NC/AT  CHEST/LUNG: Clear to auscultation bilaterally  HEART: S1S2+, RRR no m/g/r  ABDOMEN: Soft, Nontender, Nondistended; Bowel sounds present  : +Amato  SKIN: warm, dry  NEURO: Awake, +resting tremor; strength equal in all extremities   PSYCH: calm, cooperative

## 2022-06-02 NOTE — DISCHARGE NOTE PROVIDER - DETAILS OF MALNUTRITION DIAGNOSIS/DIAGNOSES
This patient has been assessed with a concern for Malnutrition and was treated during this hospitalization for the following Nutrition diagnosis/diagnoses:     -  06/02/2022: Severe protein-calorie malnutrition

## 2022-06-02 NOTE — DIETITIAN INITIAL EVALUATION ADULT - ORAL INTAKE PTA/DIET HISTORY
Pt seen at [facility] for nutrition assessment. Pt currently on pureed, moderately thick liq diet. Noted Mild to mod dysphagia hx throat cancer and malignant neoplasm of tongue. Breakfast tray demonstrates 50-75% PO intake. Pt reports no recent changes in appetite or wt,  lbs. Physical examination displays moderate-to-severe muscle wasting and fat depletion.  Pt seen at [facility] for nutrition assessment. Pt currently on pureed, moderately thick liq diet. Noted Mild to mod dysphagia hx throat cancer and malignant neoplasm of tongue. Breakfast tray demonstrates 50-75% PO intake. Pt reports no recent changes in appetite or wt,  lbs. Suspect fair intake > 1 month and not meeting estimated needs in setting of Hx cancer, parkinson's disease and advanced age. Physical examination displays moderate-to-severe muscle wasting and fat depletion.

## 2022-06-02 NOTE — DIETITIAN INITIAL EVALUATION ADULT - NS FNS DIET ORDER
Diet, Pureed:   Moderately Thick Liquids (MODTHICKLIQS) (05-29-22 @ 12:17) [Active]         Diet, Pureed:   Moderately Thick Liquids (MODTHICKLIQS) (05-29-22 @ 12:17) [Active]

## 2022-06-02 NOTE — PROGRESS NOTE ADULT - ASSESSMENT
77 yo male with PMH of prostate ca in remission, parkinson's disease, HTn, HLD< GERD, recent multiple admission for stroke like symptoms, weakness and fall.    Found to have subacute stroke that time and recommended dc to [facility] but patient was discharged home he had a fall.    Patient readmitted due to worsening slurry speech, worse rt facial weakness and droop.    He was taken to [facility] initially where code stroke activated NIH was 3, no tPA given, ct angio revealed High-grade short segment stenosis of the mid and distal M1 segment of the right MCA.    Patient was then transferred to [facility] for further management.   Neurology attending Dr Rm following patient as well.  6/1/212 Cardiology called for embolic eval.

## 2022-06-03 NOTE — CHART NOTE - NSCHARTNOTEFT_GEN_A_CORE
Called by RN at 18:00 that patient had 2 BMs with questionable BRBPR and melena once at ~15:30 and another at ~17:15.  Per RN patient is in NAD, VSS, resting comfortably in bed without complaints. Patient with 1 BM this morning with minimal BRBPR with some melena ~ no more than 10-20cc.  Patient also in NAD, VSS, resting comfortably in bed without complaints.  H/H Repeated without any change.      Vital Signs Last 24 Hrs  T(C): 36.6 (03 Jun 2022 17:51), Max: 36.9 (03 Jun 2022 05:03)  T(F): 97.9 (03 Jun 2022 17:51), Max: 98.4 (03 Jun 2022 05:03)  HR: 84 (03 Jun 2022 17:51) (69 - 86)  BP: 126/74 (03 Jun 2022 17:51) (113/70 - 138/77)  RR: 18 (03 Jun 2022 17:51) (18 - 19)  SpO2: 94% (03 Jun 2022 17:51) (94% - 97%)    A/P:  -Per MD continue to monitor  -Repeat Hemoglobin / Hematocrit ordered for 18:30  -Per MD if there is a decrease in H/H - Night Medicine team to stop Lovenox, call GI consult and consider switching PO PPI to IV  -RN to continue to monitor  -RN to call provider with any acute changes / questions / concerns    The above patient and plan was d/w Dr. Meier, 5T Day RN Constance and Signed out to the Night Medicine team. Called by RN at 18:02 that patient had 2 BMs with questionable BRBPR and melena once at ~15:30 and another at ~17:15.  Per RN patient is in NAD, VSS, resting comfortably in bed without complaints. Patient with 1 BM this morning with minimal BRBPR with some melena ~ no more than 10-20cc seen by myself.   H/H Repeated without any change at noon. Patient assessed by provider and noted to be in NAD, VSS, resting comfortably in bed without complaints.    Additionally RN made provider aware that banks was putting out bright red blood.  Urology PA is at bedside and irrigating banks.     Plan:   -Per MD continue to monitor  -Repeat Hemoglobin / Hematocrit ordered for 18:30  -Per MD if there is a decrease in H/H - Night Medicine team to stop Lovenox, call GI consult and consider switching PO PPI to IV  -Hematuria via banks - being managed by Urology PA.  Urology PA at bedside irrigating banks and will place a 3-way banks to start CBI.   -RN to continue to monitor  -RN to call provider with any acute changes / questions / concerns    The above patient and plan was d/w Dr. Meier, 5T Day RN Constance and Signed out to the Night Medicine team.

## 2022-06-03 NOTE — CHART NOTE - NSCHARTNOTEFT_GEN_A_CORE
Medicine PA-          Cd for pt that had bloody BM x1 this morning. Pt denied abd pain, no prior hx GIB.    Vital Signs Last 24 Hrs  T(C): 36.9 (03 Jun 2022 05:03), Max: 36.9 (03 Jun 2022 05:03)  T(F): 98.4 (03 Jun 2022 05:03), Max: 98.4 (03 Jun 2022 05:03)  HR: 82 (03 Jun 2022 05:03) (64 - 86)  BP: 136/78 (03 Jun 2022 05:03) (104/63 - 145/65)  BP(mean): --  RR: 18 (03 Jun 2022 05:03) (15 - 19)  SpO2: 97% (03 Jun 2022 05:03) (88% - 97%)                          12.3   6.82  )-----------( 296      ( 03 Jun 2022 04:10 )             36.7   Stool + occult blood    >Hematochezia  -T+S ordered  -GI consult  -day team to f/u

## 2022-06-03 NOTE — CONSULT NOTE ADULT - SUBJECTIVE AND OBJECTIVE BOX
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 found to have subacute stroke that time and recommended dc to [facility] but patient was discharged home he had a fall again but went back to home. yesterday ~6: 30 PM he was noted to ahve worsening slurry sppech, worse rt facial weakness and droop. he was taken to [facility] initially where code stroke activated NIH was 3, no tPA given, ct angio revealed High-grade short segment stenosis of the mid and distal M1 segment of the right MCA. patient was then transferred to [facility] for further management. as per the chart review Neurology attending Dr Rm, case was reviewed with Neuro IR and recommended MR brain for now and stroke monitoring.denies fall, head trauma, LOC seizure, chest pain, fever, cough, abdominal pain or nausea vomiting. (29 May 2022 00:30)      UROLOGY: Consult called for clots noted in banks bag. It is noted that today patient also had an episode of BRBPR and melena. H/H is stable. Started on Lovenox yesterday. Patient was seen and examined at bedside, c/o of pain in his penis, but no other complaints at this time.   Banks was irrigated, then switched to a 3 way F coude for CBI. Banks was irrigated continuously, with clot retrieval.      PAST MEDICAL & SURGICAL HISTORY:  Hypertension  Hyperlipidemia  GERD (gastroesophageal reflux disease)  Parkinson&#x27;s disease  Prostate cancer  Mitral valve prolapse  Malignant neoplasm of tongue, 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  Throat cancer  History of vasectomy  Prostate cancer  s/p cyber knife  S/P partial glossectomy  4/21    Home Medications:  carbidopa-levodopa 25 mg-100 mg oral tablet: 1 tab(s) orally 2 times a day (28 May 2022 23:51)  citalopram 20 mg oral tablet: 1 tab(s) orally once a day (28 May 2022 23:51)  DilTIAZem (Eqv-Cardizem CD) 240 mg/24 hours oral capsule, extended release: 1 cap(s) orally once a day (28 May 2022 23:51)  enalapril 10 mg oral tablet: 1 tab(s) orally once a day (28 May 2022 23:51)  pantoprazole 40 mg oral delayed release tablet: 1 tab(s) orally once a day (before a meal) (28 May 2022 23:51)  primidone 50 mg oral tablet: 1 tab(s) orally once a day (28 May 2022 23:51)  simvastatin 40 mg oral tablet: 1 tab(s) orally once a day (at bedtime) (28 May 2022 23:51)  Vitamin C 1000 mg oral tablet: 1 tab(s) orally once a day (28 May 2022 23:51)    Review of Systems: All negative except where noted in HPI    MEDICATIONS  (STANDING):  ampicillin  IVPB 2 Gram(s) IV Intermittent every 6 hours  ascorbic acid 500 milliGRAM(s) Oral daily  atorvastatin 80 milliGRAM(s) Oral at bedtime  carbidopa/levodopa  25/100 1 Tablet(s) Oral <User Schedule>  citalopram 20 milliGRAM(s) Oral daily  diltiazem    milliGRAM(s) Oral daily  enalapril 10 milliGRAM(s) Oral daily  enoxaparin Injectable 60 milliGRAM(s) SubCutaneous every 12 hours  mupirocin 2% Ointment 1 Application(s) Topical two times a day  pantoprazole    Tablet 40 milliGRAM(s) Oral before breakfast  primidone 50 milliGRAM(s) Oral daily  tamsulosin 0.4 milliGRAM(s) Oral at bedtime    MEDICATIONS  (PRN):  acetaminophen     Tablet .. 650 milliGRAM(s) Oral every 6 hours PRN Temp greater or equal to 38C (100.4F), Mild Pain (1 - 3)    SOCIAL HISTORY:    Vital Signs Last 24 Hrs  T(C): 36.6 (03 Jun 2022 17:51), Max: 36.9 (03 Jun 2022 05:03)  T(F): 97.9 (03 Jun 2022 17:51), Max: 98.4 (03 Jun 2022 05:03)  HR: 84 (03 Jun 2022 17:51) (69 - 86)  BP: 126/74 (03 Jun 2022 17:51) (113/70 - 138/77)  BP(mean): --  RR: 18 (03 Jun 2022 17:51) (18 - 19)  SpO2: 94% (03 Jun 2022 17:51) (94% - 97%)    Physical Exam:  General: NAD  HEENT: PERRL  Neck: Supple, no JVD, FROM without pain  Pulm: Respirations non labored, no accessory muscle use  Abdomen: Soft, NT/ND, without rebound or guarding  Neuro: Alert and oriented x3, no focal deficits  Urology: Circumcised penis, banks in place, draining red bloody urine, clots noted.     LABS:                 12.3   x     )-----------( x        ( 03 Jun 2022 12:36 )             36.6   06-02  140  |  104  |  15.5  ----------------------------<  118<H>  4.0   |  25.0  |  0.81    Ca    11.2<H>      02 Jun 2022 06:30  Mg     2.1     06-02    RADIOLOGY & ADDITIONAL STUDIES    Assessment: Patient is a 75 yo M s/p difficult banks placement, reconsulted for hematuria/clots in banks bag.     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 found to have subacute stroke that time and recommended dc to [facility] but patient was discharged home he had a fall again but went back to home. yesterday ~6: 30 PM he was noted to ahve worsening slurry sppech, worse rt facial weakness and droop. he was taken to [facility] initially where code stroke activated NIH was 3, no tPA given, ct angio revealed High-grade short segment stenosis of the mid and distal M1 segment of the right MCA. patient was then transferred to [facility] for further management. as per the chart review Neurology attending Dr Rm, case was reviewed with Neuro IR and recommended MR brain for now and stroke monitoring.denies fall, head trauma, LOC seizure, chest pain, fever, cough, abdominal pain or nausea vomiting. (29 May 2022 00:30)      UROLOGY: Consult called for clots noted in banks bag. Patient has a hx of vasectomy and prostate cancer. It is noted that today patient also had an episode of BRBPR and melena. H/H is stable. Started on Lovenox yesterday. Patient was seen and examined at bedside, c/o of pain in his penis, but no other complaints at this time.   Banks was irrigated, then switched to a 3 way F coude for CBI. Banks was irrigated continuously, with clot retrieval.      PAST MEDICAL & SURGICAL HISTORY:  Hypertension  Hyperlipidemia  GERD (gastroesophageal reflux disease)  Parkinson&#x27;s disease  Prostate cancer  Mitral valve prolapse  Malignant neoplasm of tongue, 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  Throat cancer  History of vasectomy  Prostate cancer  s/p cyber knife  S/P partial glossectomy  4/21    Home Medications:  carbidopa-levodopa 25 mg-100 mg oral tablet: 1 tab(s) orally 2 times a day (28 May 2022 23:51)  citalopram 20 mg oral tablet: 1 tab(s) orally once a day (28 May 2022 23:51)  DilTIAZem (Eqv-Cardizem CD) 240 mg/24 hours oral capsule, extended release: 1 cap(s) orally once a day (28 May 2022 23:51)  enalapril 10 mg oral tablet: 1 tab(s) orally once a day (28 May 2022 23:51)  pantoprazole 40 mg oral delayed release tablet: 1 tab(s) orally once a day (before a meal) (28 May 2022 23:51)  primidone 50 mg oral tablet: 1 tab(s) orally once a day (28 May 2022 23:51)  simvastatin 40 mg oral tablet: 1 tab(s) orally once a day (at bedtime) (28 May 2022 23:51)  Vitamin C 1000 mg oral tablet: 1 tab(s) orally once a day (28 May 2022 23:51)    Review of Systems: All negative except where noted in HPI    MEDICATIONS  (STANDING):  ampicillin  IVPB 2 Gram(s) IV Intermittent every 6 hours  ascorbic acid 500 milliGRAM(s) Oral daily  atorvastatin 80 milliGRAM(s) Oral at bedtime  carbidopa/levodopa  25/100 1 Tablet(s) Oral <User Schedule>  citalopram 20 milliGRAM(s) Oral daily  diltiazem    milliGRAM(s) Oral daily  enalapril 10 milliGRAM(s) Oral daily  enoxaparin Injectable 60 milliGRAM(s) SubCutaneous every 12 hours  mupirocin 2% Ointment 1 Application(s) Topical two times a day  pantoprazole    Tablet 40 milliGRAM(s) Oral before breakfast  primidone 50 milliGRAM(s) Oral daily  tamsulosin 0.4 milliGRAM(s) Oral at bedtime    MEDICATIONS  (PRN):  acetaminophen     Tablet .. 650 milliGRAM(s) Oral every 6 hours PRN Temp greater or equal to 38C (100.4F), Mild Pain (1 - 3)    SOCIAL HISTORY:    Vital Signs Last 24 Hrs  T(C): 36.6 (03 Jun 2022 17:51), Max: 36.9 (03 Jun 2022 05:03)  T(F): 97.9 (03 Jun 2022 17:51), Max: 98.4 (03 Jun 2022 05:03)  HR: 84 (03 Jun 2022 17:51) (69 - 86)  BP: 126/74 (03 Jun 2022 17:51) (113/70 - 138/77)  BP(mean): --  RR: 18 (03 Jun 2022 17:51) (18 - 19)  SpO2: 94% (03 Jun 2022 17:51) (94% - 97%)    Physical Exam:  General: NAD  HEENT: PERRL  Neck: Supple, no JVD, FROM without pain  Pulm: Respirations non labored, no accessory muscle use  Abdomen: Soft, NT/ND, without rebound or guarding  Neuro: Alert and oriented x3, no focal deficits  Urology: Circumcised penis, banks in place, draining red bloody urine, clots noted.     LABS:                 12.3   x     )-----------( x        ( 03 Jun 2022 12:36 )             36.6   06-02  140  |  104  |  15.5  ----------------------------<  118<H>  4.0   |  25.0  |  0.81    Ca    11.2<H>      02 Jun 2022 06:30  Mg     2.1     06-02    RADIOLOGY & ADDITIONAL STUDIES    Assessment: Patient is a 75 yo M s/p difficult banks placement, reconsulted for hematuria/clots in banks bag.     Plan:   - 22F 3 way coude was placed at bedside, CBI ordered   - Continue to monitor CBI   - Continue to trend H/H

## 2022-06-03 NOTE — PROVIDER CONTACT NOTE (CHANGE IN STATUS NOTIFICATION) - ASSESSMENT
Pt had 2 more stools with cher blood and jocelyn now has cher red blood in tubing and collection bag. Pt VSS.

## 2022-06-03 NOTE — PROGRESS NOTE ADULT - SUBJECTIVE AND OBJECTIVE BOX
ISIS CUETO    [id number]    76y      Male    CC: cva    INTERVAL HPI/OVERNIGHT EVENTS: pt seen and examined. o/n with reported brbpr. hgb stable, continuing to monitor    REVIEW OF SYSTEMS:    CONSTITUTIONAL: No fever  RESPIRATORY: No cough, wheezing, hemoptysis; No shortness of breath  CARDIOVASCULAR: No chest pain, palpitations  GASTROINTESTINAL: No abdominal or epigastric pain. No nausea, vomiting  NEUROLOGICAL: No headaches    Vital Signs Last 24 Hrs  T(C): 36.8 (03 Jun 2022 08:18), Max: 36.9 (03 Jun 2022 05:03)  T(F): 98.2 (03 Jun 2022 08:18), Max: 98.4 (03 Jun 2022 05:03)  HR: 72 (03 Jun 2022 08:18) (69 - 86)  BP: 113/70 (03 Jun 2022 08:18) (113/70 - 138/77)  BP(mean): --  RR: 18 (03 Jun 2022 08:18) (18 - 19)  SpO2: 97% (03 Jun 2022 08:18) (88% - 97%)    PHYSICAL EXAM:    GENERAL: NAD  HEENT: PERRL  NECK: soft, supple  CHEST/LUNG: Clear to auscultation bilaterally  HEART: S1S2+, Regular rate and rhythm  ABDOMEN: Soft, Nontender, Nondistended; Bowel sounds present  : +banks  SKIN: warm, dry  NEURO: Awake, alert; +resting tremor; strength equal b/l to upper and lower extremities   PSYCH: calm, cooperative     LABS:                        12.3   x     )-----------( x        ( 03 Jun 2022 12:36 )             36.6     06-02    140  |  104  |  15.5  ----------------------------<  118<H>  4.0   |  25.0  |  0.81    Ca    11.2<H>      02 Jun 2022 06:30  Mg     2.1     06-02              MEDICATIONS  (STANDING):  ampicillin  IVPB 2 Gram(s) IV Intermittent every 6 hours  ascorbic acid 500 milliGRAM(s) Oral daily  atorvastatin 80 milliGRAM(s) Oral at bedtime  carbidopa/levodopa  25/100 1 Tablet(s) Oral <User Schedule>  citalopram 20 milliGRAM(s) Oral daily  diltiazem    milliGRAM(s) Oral daily  enalapril 10 milliGRAM(s) Oral daily  enoxaparin Injectable 60 milliGRAM(s) SubCutaneous every 12 hours  mupirocin 2% Ointment 1 Application(s) Topical two times a day  pantoprazole    Tablet 40 milliGRAM(s) Oral before breakfast  primidone 50 milliGRAM(s) Oral daily  tamsulosin 0.4 milliGRAM(s) Oral at bedtime    MEDICATIONS  (PRN):  acetaminophen     Tablet .. 650 milliGRAM(s) Oral every 6 hours PRN Temp greater or equal to 38C (100.4F), Mild Pain (1 - 3)      RADIOLOGY & ADDITIONAL TESTS:

## 2022-06-03 NOTE — DIETITIAN INITIAL EVALUATION ADULT - RD TO REMAIN AVAILABLE
For information on Fall & Injury Prevention, visit: [email or web address] OR  [email or web address] OR  https://www.cdc.gov/steadi/patient.html yes

## 2022-06-03 NOTE — CHART NOTE - NSCHARTNOTEFT_GEN_A_CORE
Medicine PA-  Cd for pt that is confused at baseline and pulled out his IV and is tugging at I tubing, VSS, 1:1 ordered for safety.

## 2022-06-03 NOTE — PROGRESS NOTE ADULT - ASSESSMENT
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 Pt found to have subacute cva previously, rec dc to [facility], but pt dc'ed home and had fall. 5/28 with worsening confusion, slurred speech, R facial droop, taken to [place], subsequently transferred to [facility]     CVA   -no tPA due to recent hx cva   -DAPT --> d/w Dr. Rm, change to full dose lovenox due to suspected hypercoagulability from malignancy   -neuro recs appreciated   -asa, statin   -MRI brain with expected evolution of previously seen infarcts L corona radiata/lentiform nucleus and L middle cerebellar peduncle, now subacute to chronic; no new acute infarct or hemorrhage compared to 5/6; poss slow/turbulent venous flow through R transverse/sigmoid sinuses and exiting internal jugular vein, dural sinus thrombosis cannot be entirely excluded   -cardio recs appreciated   -TTE with bubble study : LVEF 55-60%, no pfo  -f/u MRV   -EP to see    Urinary retention  -banks in place   -ucx with enterococcus faecalis   -ID recs appreciated   -ampicillin x5 days     Hypokalemia   -replaced   -monitor     Parkinson's   -sinemet, primidone     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 Pt found to have subacute cva previously, rec dc to [facility], but pt dc'ed home and had fall. 5/28 with worsening confusion, slurred speech, R facial droop, taken to [place], subsequently transferred to [facility]     CVA   -no tPA due to recent hx cva   -DAPT --> d/w Dr. Rm, change to full dose lovenox due to suspected hypercoagulability from malignancy   -neuro recs appreciated   -asa, statin   -MRI brain with expected evolution of previously seen infarcts L corona radiata/lentiform nucleus and L middle cerebellar peduncle, now subacute to chronic; no new acute infarct or hemorrhage compared to 5/6; poss slow/turbulent venous flow through R transverse/sigmoid sinuses and exiting internal jugular vein, dural sinus thrombosis cannot be entirely excluded   -cardio recs appreciated   -TTE with bubble study : LVEF 55-60%, no pfo  -s/p ILR 6/2  -f/u MRV     Urinary retention  -banks in place   -ucx with enterococcus faecalis   -ID recs appreciated   -ampicillin x5 days     Hypokalemia   -replaced   -monitor     Parkinson's   -sinemet, primidone     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

## 2022-06-04 NOTE — PROGRESS NOTE ADULT - SUBJECTIVE AND OBJECTIVE BOX
[facility] Division of Hospital Medicine  Jeffery Billingsley MD    Chief Complaint:  Patient is a 76y old  Male who presents with a chief complaint of Stroke (04 Jun 2022 10:38)      SUBJECTIVE / OVERNIGHT EVENTS:  Patient seen and examined at bedside. Pt with reported rectal bleeding overnight. Hgb downtrended from 12.1 -> 10.7. Lovenox held this AM. GI consulted.     Patient denies chest pain, SOB, abd pain, N/V, fever, chills, dysuria or any other complaints. All remainder ROS negative.     MEDICATIONS  (STANDING):  ampicillin  IVPB 2 Gram(s) IV Intermittent every 6 hours  ascorbic acid 500 milliGRAM(s) Oral daily  atorvastatin 80 milliGRAM(s) Oral at bedtime  carbidopa/levodopa  25/100 1 Tablet(s) Oral <User Schedule>  citalopram 20 milliGRAM(s) Oral daily  diltiazem    milliGRAM(s) Oral daily  enalapril 10 milliGRAM(s) Oral daily  mupirocin 2% Ointment 1 Application(s) Topical two times a day  pantoprazole  Injectable 40 milliGRAM(s) IV Push every 12 hours  primidone 50 milliGRAM(s) Oral daily  tamsulosin 0.4 milliGRAM(s) Oral at bedtime    MEDICATIONS  (PRN):  acetaminophen     Tablet .. 650 milliGRAM(s) Oral every 6 hours PRN Temp greater or equal to 38C (100.4F), Mild Pain (1 - 3)        I&O's Summary    03 Jun 2022 07:01  -  04 Jun 2022 07:00  --------------------------------------------------------  IN: 2000 mL / OUT: 2000 mL / NET: 0 mL    04 Jun 2022 07:01  -  04 Jun 2022 10:57  --------------------------------------------------------  IN: 0 mL / OUT: 3100 mL / NET: -3100 mL        PHYSICAL EXAM:  Vital Signs Last 24 Hrs  T(C): 36.7 (04 Jun 2022 09:36), Max: 36.9 (04 Jun 2022 05:31)  T(F): 98.1 (04 Jun 2022 09:36), Max: 98.4 (04 Jun 2022 05:31)  HR: 101 (04 Jun 2022 09:36) (84 - 101)  BP: 90/60 (04 Jun 2022 09:36) (90/60 - 126/74)  BP(mean): --  RR: 18 (04 Jun 2022 09:36) (18 - 18)  SpO2: 95% (04 Jun 2022 09:36) (94% - 98%)      GENERAL: NAD  HEENT: PERRL  NECK: soft, supple  CHEST/LUNG: Clear to auscultation bilaterally  HEART: S1S2+, Regular rate and rhythm  ABDOMEN: Soft, Nontender, Nondistended; Bowel sounds present  : +banks  SKIN: warm, dry  NEURO: Awake, alert; +resting tremor; strength equal b/l to upper and lower extremities   PSYCH: calm, cooperative    LABS:                        10.7   6.73  )-----------( 309      ( 04 Jun 2022 06:32 )             31.3                     CAPILLARY BLOOD GLUCOSE            RADIOLOGY & ADDITIONAL TESTS:  Results Reviewed:   Imaging Personally Reviewed:  Electrocardiogram Personally Reviewed:                                           [facility] Division of Hospital Medicine  Jeffery Billingsley MD    Chief Complaint:  Patient is a 76y old  Male who presents with a chief complaint of Stroke (04 Jun 2022 10:38)      SUBJECTIVE / OVERNIGHT EVENTS:  Patient seen and examined at bedside. Pt with reported rectal bleeding overnight. Hgb downtrended from 12.1 -> 10.7. Lovenox held this AM. GI consulted.     Patient denies chest pain, SOB, abd pain, N/V, fever, chills, dysuria or any other complaints. All remainder ROS negative.     MEDICATIONS  (STANDING):  ampicillin  IVPB 2 Gram(s) IV Intermittent every 6 hours  ascorbic acid 500 milliGRAM(s) Oral daily  atorvastatin 80 milliGRAM(s) Oral at bedtime  carbidopa/levodopa  25/100 1 Tablet(s) Oral <User Schedule>  citalopram 20 milliGRAM(s) Oral daily  diltiazem    milliGRAM(s) Oral daily  enalapril 10 milliGRAM(s) Oral daily  mupirocin 2% Ointment 1 Application(s) Topical two times a day  pantoprazole  Injectable 40 milliGRAM(s) IV Push every 12 hours  primidone 50 milliGRAM(s) Oral daily  tamsulosin 0.4 milliGRAM(s) Oral at bedtime    MEDICATIONS  (PRN):  acetaminophen     Tablet .. 650 milliGRAM(s) Oral every 6 hours PRN Temp greater or equal to 38C (100.4F), Mild Pain (1 - 3)        I&O's Summary    03 Jun 2022 07:01  -  04 Jun 2022 07:00  --------------------------------------------------------  IN: 2000 mL / OUT: 2000 mL / NET: 0 mL    04 Jun 2022 07:01  -  04 Jun 2022 10:57  --------------------------------------------------------  IN: 0 mL / OUT: 3100 mL / NET: -3100 mL        PHYSICAL EXAM:  Vital Signs Last 24 Hrs  T(C): 36.7 (04 Jun 2022 09:36), Max: 36.9 (04 Jun 2022 05:31)  T(F): 98.1 (04 Jun 2022 09:36), Max: 98.4 (04 Jun 2022 05:31)  HR: 101 (04 Jun 2022 09:36) (84 - 101)  BP: 90/60 (04 Jun 2022 09:36) (90/60 - 126/74)  BP(mean): --  RR: 18 (04 Jun 2022 09:36) (18 - 18)  SpO2: 95% (04 Jun 2022 09:36) (94% - 98%)      GENERAL: NAD  HEENT: PERRL, NC/AT  CHEST/LUNG: Clear to auscultation bilaterally  HEART: S1S2+, RRR no m/g/r  ABDOMEN: Soft, Nontender, Nondistended; Bowel sounds present  : +Amato, CBI.  SKIN: warm, dry  NEURO: Awake, alert; +resting tremor; strength equal in all extremitis   PSYCH: calm, cooperative    LABS:                        10.7   6.73  )-----------( 309      ( 04 Jun 2022 06:32 )             31.3                     CAPILLARY BLOOD GLUCOSE            RADIOLOGY & ADDITIONAL TESTS:  Results Reviewed:   Imaging Personally Reviewed:  Electrocardiogram Personally Reviewed:

## 2022-06-04 NOTE — CONSULT NOTE ADULT - PROBLEM SELECTOR RECOMMENDATION 9
Of unclear etiology and likely resolving. 2 episodes of BMs questionable BRBPR and melena yesterday afternoon.   As per nursing staff, patient had 2 brown nonbloody BMs this morning.   FAITH revealed nonbloody brown stool. VSS.   Slight drop in Hemoglobin could possibly be due to hematuria/clots in pt with CBI. Hgb remains stable at 10.7gm. Urology following.     Plan:  Continue to trend CBC, transfuse as needed   Continue IV PPI BID Of unclear etiology and likely resolving. 2 episodes of BMs questionable BRBPR and melena yesterday afternoon.   As per nursing staff, patient had 2 brown nonbloody BMs this morning.   FAITH revealed nonbloody brown stool. VSS.   Slight drop in Hemoglobin could possibly be due to hemorrhoidal bleed vs. hematuria/clots in pt with CBI. Hgb remains stable at 10.7gm. Urology following.     Plan:  Continue to trend CBC, transfuse as needed   Continue IV PPI BID  Consider Anusol suppository PRN

## 2022-06-04 NOTE — CONSULT NOTE ADULT - NS ATTEND AMEND GEN_ALL_CORE FT
patient examined at bedside  agree with assessment and plan
Patient with therapeutic Lovenox due to the cerebrovascular events and now with rectal bleeding which has stopped.  Blood drop due to hematuria. Monitor CBC.  Anusol suppository twice daily.  No need for any endoscopic evaluation at this time.  Resumption of the anticoagulation will be dependent on stability of the blood count and no recurrence of bleeding.
75 y/o M admitted with acute CVA   Cardiology consulted for further CVA workup  Would defer RADHAMES due to prior head and neck surgery. Can be done as outpatient if deemed necessary by neuro and patient is cleared by ENT  TTE with bubble study   EP to evaluate for ILR   Continue ASA, statin   Further management as above

## 2022-06-04 NOTE — PROGRESS NOTE ADULT - ASSESSMENT
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 Pt found to have subacute cva previously, rec dc to [facility], but pt dc'ed home and had fall. 5/28 with worsening confusion, slurred speech, R facial droop, taken to [place], subsequently transferred to [facility]     CVA   - no tPA due to recent hx cva   - DAPT --> d/w Dr. Rm, change to full dose lovenox due to suspected hypercoagulability from malignancy   - neuro recs appreciated   - asa, statin   - MRI brain with expected evolution of previously seen infarcts L corona radiata/lentiform nucleus and L middle cerebellar peduncle, now subacute to chronic; no new acute infarct or hemorrhage compared to 5/6; poss slow/turbulent venous flow through R transverse/sigmoid sinuses and exiting internal jugular vein, dural sinus thrombosis cannot be entirely excluded   - cardio recs appreciated   - TTE with bubble study : LVEF 55-60%, no pfo  - s/p ILR 6/2  - f/u MRV     Acute blood loss anemia secondary to rectal bleeding and hematuria  - D/valery Lovenox, hold until safe per GI  - Start SCDs  - GI consulted  - PPI started  - Urology following  - On CBI  - Monitor CBC, transfuse prn    Urinary retention  - banks in place   - ucx with enterococcus faecalis   - ID recs appreciated   - ampicillin x5 days     Hypokalemia   - replaced   - monitor     Parkinson's   - sinemet, primidone     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 Pt found to have subacute cva previously, rec dc to [facility], but pt dc'ed home and had fall. 5/28 with worsening confusion, slurred speech, R facial droop, taken to [place], subsequently transferred to [facility]     CVA   - no tPA due to recent hx cva   - DAPT --> d/w Dr. Rm, change to full dose lovenox due to suspected hypercoagulability from malignancy   - neuro recs appreciated   - asa, statin   - MRI brain with expected evolution of previously seen infarcts L corona radiata/lentiform nucleus and L middle cerebellar peduncle, now subacute to chronic; no new acute infarct or hemorrhage compared to 5/6; poss slow/turbulent venous flow through R transverse/sigmoid sinuses and exiting internal jugular vein, dural sinus thrombosis cannot be entirely excluded   - cardio recs appreciated   - TTE with bubble study : LVEF 55-60%, no pfo  - s/p ILR 6/2  - f/u MRV     Acute blood loss anemia secondary to rectal bleeding and hematuria  - D/valery Lovenox, hold until safe per GI  - Start SCDs  - GI consulted  - PPI started  - Urology following  - On CBI  - Monitor CBC, transfuse prn    Urinary retention  - banks in place   - ucx with enterococcus faecalis   - ID recs appreciated   - ampicillin x5 days     Hypokalemia   - replaced   - monitor     Parkinson's   - sinemet, primidone     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

## 2022-06-04 NOTE — PROGRESS NOTE ADULT - SUBJECTIVE AND OBJECTIVE BOX
INTERVAL HPI/OVERNIGHT EVENTS/SUBJECTIVE:  Urology reconsulted overnight for hematuria. Pt started on CBI overnight. noted drop in hg this am, remains hd stable. therapeutic lovenox held by primary team. CBI running at fast rate, urine red tinged, clot in tubing.     ICU Vital Signs Last 24 Hrs  T(C): 36.9 (04 Jun 2022 05:31), Max: 36.9 (04 Jun 2022 05:31)  T(F): 98.4 (04 Jun 2022 05:31), Max: 98.4 (04 Jun 2022 05:31)  HR: 100 (04 Jun 2022 05:31) (84 - 100)  BP: 107/61 (04 Jun 2022 05:31) (107/61 - 126/74)  BP(mean): --  ABP: --  ABP(mean): --  RR: 18 (04 Jun 2022 05:31) (18 - 18)  SpO2: 94% (04 Jun 2022 05:31) (94% - 98%)      I&O's Detail    03 Jun 2022 07:01  -  04 Jun 2022 07:00  --------------------------------------------------------  IN:    Modular Fluid: 2000 mL  Total IN: 2000 mL    OUT:    Indwelling Catheter - Urethral (mL): 2000 mL  Total OUT: 2000 mL    Total NET: 0 mL    MEDICATIONS  (STANDING):  ampicillin  IVPB 2 Gram(s) IV Intermittent every 6 hours  ascorbic acid 500 milliGRAM(s) Oral daily  atorvastatin 80 milliGRAM(s) Oral at bedtime  carbidopa/levodopa  25/100 1 Tablet(s) Oral <User Schedule>  citalopram 20 milliGRAM(s) Oral daily  diltiazem    milliGRAM(s) Oral daily  enalapril 10 milliGRAM(s) Oral daily  mupirocin 2% Ointment 1 Application(s) Topical two times a day  pantoprazole  Injectable 40 milliGRAM(s) IV Push every 12 hours  primidone 50 milliGRAM(s) Oral daily  tamsulosin 0.4 milliGRAM(s) Oral at bedtime    MEDICATIONS  (PRN):  acetaminophen     Tablet .. 650 milliGRAM(s) Oral every 6 hours PRN Temp greater or equal to 38C (100.4F), Mild Pain (1 - 3)      MISC:     PHYSICAL EXAM:    Gen: at baseline mental status  Pulmonary: non labored, no accessory muscle use  Cardiovascular: RRR  Gastrointestinal: soft, ntnd  Genitourinary: CBI running, hematuria with 1 clot in tubing       LABS:  CBC Full  -  ( 04 Jun 2022 06:32 )  WBC Count : 6.73 K/uL  RBC Count : 3.27 M/uL  Hemoglobin : 10.7 g/dL  Hematocrit : 31.3 %  Platelet Count - Automated : 309 K/uL  Mean Cell Volume : 95.7 fl  Mean Cell Hemoglobin : 32.7 pg  Mean Cell Hemoglobin Concentration : 34.2 gm/dL  Auto Neutrophil # : x  Auto Lymphocyte # : x  Auto Monocyte # : x  Auto Eosinophil # : x  Auto Basophil # : x  Auto Neutrophil % : x  Auto Lymphocyte % : x  Auto Monocyte % : x  Auto Eosinophil % : x  Auto Basophil % : x    RECENT CULTURES:  05-29 Clean Catch Clean Catch (Midstream) Enterococcus faecalis XXXX   >100,000 CFU/ml Enterococcus faecalis  <10,000 CFU/ml Normal Urogenital yaquelin present    05-29 .Blood Blood-Venous XXXX XXXX   No growth at 5 days.    05-29 .Blood Blood-Venous XXXX XXXX   No Growth Final    ASSESSMENT/PLAN:  76yMale presenting with CVA, UTI, banks placed 1 week ago, began having hematuria last night. AC on hold. slight drop in h/h this am  -continue CBI  -abx for UTI per primary team, ID recs appreciated. urine cx ampicillin   -monitor h/h, transfuse as needed  -rest of care per primary team  -seen and discussed with Dr. Hutton

## 2022-06-05 NOTE — PROGRESS NOTE ADULT - ASSESSMENT
IMPRESSION:  The patient is a 76y Male with Parkinson's disease with a recent   left brachium pontis and left lenticular   nucleus/corona radiata region and now with cerebral venous sinus thrombosis.    Mechanism of stroke is not clear  Could be Cardioembolic versus hypercoagulable state of malignancy versus intracranial atherosclerosis.    ASSESSMENT/ PLAN:       - Neuro checks and vital signs Q 2 hours.  - SBP goal keep normotensive..  - Continue Enoxaparin 60 BID ( if risk of bleeding is high then consider Heparin gtt )  - A1C: 5.6 LDL: 89 Goal: < 70  - Telemetry monitoring.  - MRI Brain  and MRV  -images and reports were reviewed.   - CTV or Contrast enhanced MRV is not essential as the risk of contrast is not justified given that he needs anticoagulation barring major bleeding problem.  - TTE  of 5-08-22  -Reports as above were reviewed.   - Parkinson's disease  - On Sinemet for PD.  - PT OT following.  - SCD/ SQ Lovenox for DVT prophylaxis.

## 2022-06-05 NOTE — PROGRESS NOTE ADULT - ASSESSMENT
GI bleeding which has stopped. Monitor CBC. Amato catheter is still in place.  Hematuria. No need for GI work up.

## 2022-06-05 NOTE — PROGRESS NOTE ADULT - ASSESSMENT
76M presenting with CVA, UTI, banks placed 1 week ago, began having hematuria.    -continue CBI, if remains clear to light pink would stop CBI at 4am for day team to assess off  - rest of care per primary team  - d/w Dr. Hutton

## 2022-06-05 NOTE — PROGRESS NOTE ADULT - SUBJECTIVE AND OBJECTIVE BOX
INTERVAL HPI/OVERNIGHT EVENTS: No complaints or overnight events, occasional dark red output from CBI otherwise mostly clear to light pink       MEDICATIONS  (STANDING):  ampicillin  IVPB 2 Gram(s) IV Intermittent every 6 hours  ascorbic acid 500 milliGRAM(s) Oral daily  atorvastatin 80 milliGRAM(s) Oral at bedtime  carbidopa/levodopa  25/100 1 Tablet(s) Oral <User Schedule>  citalopram 20 milliGRAM(s) Oral daily  diltiazem    milliGRAM(s) Oral daily  enalapril 10 milliGRAM(s) Oral daily  enoxaparin Injectable 60 milliGRAM(s) SubCutaneous every 12 hours  mupirocin 2% Ointment 1 Application(s) Topical two times a day  pantoprazole  Injectable 40 milliGRAM(s) IV Push every 12 hours  primidone 50 milliGRAM(s) Oral daily  tamsulosin 0.4 milliGRAM(s) Oral at bedtime    MEDICATIONS  (PRN):  acetaminophen     Tablet .. 650 milliGRAM(s) Oral every 6 hours PRN Temp greater or equal to 38C (100.4F), Mild Pain (1 - 3)  hydrocortisone hemorrhoidal Suppository 1 Suppository(s) Rectal daily PRN hemorrhoids      Vital Signs Last 24 Hrs  T(C): 36.9 (05 Jun 2022 10:32), Max: 36.9 (05 Jun 2022 10:32)  T(F): 98.4 (05 Jun 2022 10:32), Max: 98.4 (05 Jun 2022 10:32)  HR: 70 (05 Jun 2022 10:32) (70 - 102)  BP: 130/70 (05 Jun 2022 10:32) (121/54 - 130/70)  BP(mean): --  RR: 18 (05 Jun 2022 10:32) (18 - 18)  SpO2: 98% (05 Jun 2022 10:32) (90% - 98%)    PHYSICAL EXAM:      Constitutional: NAD    Respiratory: no accessory muscle use    Cardiovascular: RRR    Gastrointestinal: soft, NT/ND    Genitourinary: 3-way bnaks in place draining light pinkish urine, no suprapubic distention          I&O's Detail    04 Jun 2022 07:01  -  05 Jun 2022 07:00  --------------------------------------------------------  IN:  Total IN: 0 mL    OUT:    Indwelling Catheter - Urethral (mL): 5800 mL  Total OUT: 5800 mL    Total NET: -5800 mL      05 Jun 2022 07:01  -  05 Jun 2022 15:41  --------------------------------------------------------  IN:  Total IN: 0 mL    OUT:    Indwelling Catheter - Urethral (mL): 800 mL  Total OUT: 800 mL    Total NET: -800 mL          LABS:                        10.6   6.77  )-----------( 271      ( 05 Jun 2022 06:13 )             32.0     06-05    148<H>  |  110<H>  |  21.8<H>  ----------------------------<  112<H>  3.7   |  29.0  |  1.12    Ca    11.6<H>      05 Jun 2022 06:13    TPro  6.7  /  Alb  3.3  /  TBili  0.4  /  DBili  x   /  AST  12  /  ALT  <5  /  AlkPhos  72  06-05          RADIOLOGY & ADDITIONAL STUDIES:

## 2022-06-05 NOTE — PROGRESS NOTE ADULT - SUBJECTIVE AND OBJECTIVE BOX
INTERVAL HPI/OVERNIGHT EVENTS: FU for rectal bleeding. No more bleeding.      MEDICATIONS  (STANDING):  ampicillin  IVPB 2 Gram(s) IV Intermittent every 6 hours  ascorbic acid 500 milliGRAM(s) Oral daily  atorvastatin 80 milliGRAM(s) Oral at bedtime  carbidopa/levodopa  25/100 1 Tablet(s) Oral <User Schedule>  citalopram 20 milliGRAM(s) Oral daily  diltiazem    milliGRAM(s) Oral daily  enalapril 10 milliGRAM(s) Oral daily  enoxaparin Injectable 60 milliGRAM(s) SubCutaneous every 12 hours  mupirocin 2% Ointment 1 Application(s) Topical two times a day  pantoprazole  Injectable 40 milliGRAM(s) IV Push every 12 hours  primidone 50 milliGRAM(s) Oral daily  tamsulosin 0.4 milliGRAM(s) Oral at bedtime    MEDICATIONS  (PRN):  acetaminophen     Tablet .. 650 milliGRAM(s) Oral every 6 hours PRN Temp greater or equal to 38C (100.4F), Mild Pain (1 - 3)  hydrocortisone hemorrhoidal Suppository 1 Suppository(s) Rectal daily PRN hemorrhoids      Allergies    No Known Allergies    Intolerances        Vital Signs Last 24 Hrs  T(C): 36.9 (05 Jun 2022 10:32), Max: 36.9 (05 Jun 2022 10:32)  T(F): 98.4 (05 Jun 2022 10:32), Max: 98.4 (05 Jun 2022 10:32)  HR: 70 (05 Jun 2022 10:32) (70 - 102)  BP: 130/70 (05 Jun 2022 10:32) (121/54 - 130/70)  BP(mean): --  RR: 18 (05 Jun 2022 10:32) (18 - 18)  SpO2: 98% (05 Jun 2022 10:32) (90% - 98%)    LABS:                        10.6   6.77  )-----------( 271      ( 05 Jun 2022 06:13 )             32.0     06-05    148<H>  |  110<H>  |  21.8<H>  ----------------------------<  112<H>  3.7   |  29.0  |  1.12    Ca    11.6<H>      05 Jun 2022 06:13    TPro  6.7  /  Alb  3.3  /  TBili  0.4  /  DBili  x   /  AST  12  /  ALT  <5  /  AlkPhos  72  06-05          RADIOLOGY & ADDITIONAL TESTS:

## 2022-06-05 NOTE — PROGRESS NOTE ADULT - SUBJECTIVE AND OBJECTIVE BOX
[facility] Division of Hospital Medicine  Jeffery Billingsley MD    Chief Complaint:  Patient is a 76y old  Male who presents with a chief complaint of Stroke (04 Jun 2022 10:38)      SUBJECTIVE / OVERNIGHT EVENTS:  MRV reviewed.   Minimally conversant   No acute overnight events  MRV noted, H/H stable.   Restarted on Lovenox   ROS limited by pts communication / cognitive status     MEDICATIONS  (STANDING):  ampicillin  IVPB 2 Gram(s) IV Intermittent every 6 hours  ascorbic acid 500 milliGRAM(s) Oral daily  atorvastatin 80 milliGRAM(s) Oral at bedtime  carbidopa/levodopa  25/100 1 Tablet(s) Oral <User Schedule>  citalopram 20 milliGRAM(s) Oral daily  diltiazem    milliGRAM(s) Oral daily  enalapril 10 milliGRAM(s) Oral daily  mupirocin 2% Ointment 1 Application(s) Topical two times a day  pantoprazole  Injectable 40 milliGRAM(s) IV Push every 12 hours  primidone 50 milliGRAM(s) Oral daily  tamsulosin 0.4 milliGRAM(s) Oral at bedtime    MEDICATIONS  (PRN):  acetaminophen     Tablet .. 650 milliGRAM(s) Oral every 6 hours PRN Temp greater or equal to 38C (100.4F), Mild Pain (1 - 3)        I&O's Summary    03 Jun 2022 07:01  -  04 Jun 2022 07:00  --------------------------------------------------------  IN: 2000 mL / OUT: 2000 mL / NET: 0 mL    04 Jun 2022 07:01  -  04 Jun 2022 10:57  --------------------------------------------------------  IN: 0 mL / OUT: 3100 mL / NET: -3100 mL        PHYSICAL EXAM:  Vital Signs Last 24 Hrs  T(C): 36.9 (05 Jun 2022 10:32), Max: 36.9 (05 Jun 2022 10:32)  T(F): 98.4 (05 Jun 2022 10:32), Max: 98.4 (05 Jun 2022 10:32)  HR: 70 (05 Jun 2022 10:32) (70 - 102)  BP: 130/70 (05 Jun 2022 10:32) (121/54 - 130/70)  BP(mean): --  RR: 18 (05 Jun 2022 10:32) (18 - 18)  SpO2: 98% (05 Jun 2022 10:32) (90% - 98%)    GENERAL: NAD  HEENT: PERRL, NC/AT  CHEST/LUNG: Clear to auscultation bilaterally  HEART: S1S2+, RRR no m/g/r  ABDOMEN: Soft, Nontender, Nondistended; Bowel sounds present  : +Amato CBI.  SKIN: warm, dry  NEURO: Awake, alert; +resting tremor; strength equal in all extremities   PSYCH: calm, cooperative    LABS:                        10.7   6.73  )-----------( 309      ( 04 Jun 2022 06:32 )             31.3                     CAPILLARY BLOOD GLUCOSE            RADIOLOGY & ADDITIONAL TESTS:  Results Reviewed:   Imaging Personally Reviewed:  Electrocardiogram Personally Reviewed:                                           [facility] Division of Hospital Medicine  Jeffery Billingsley MD    Chief Complaint:  Patient is a 76y old  Male who presents with a chief complaint of Stroke (04 Jun 2022 10:38)      SUBJECTIVE / OVERNIGHT EVENTS:  MRV reviewed.   Minimally conversant   No acute overnight events  MRV noted, H/H stable.   Restarted on Lovenox   ROS limited by pts communication / cognitive status     MEDICATIONS  (STANDING):  ampicillin  IVPB 2 Gram(s) IV Intermittent every 6 hours  ascorbic acid 500 milliGRAM(s) Oral daily  atorvastatin 80 milliGRAM(s) Oral at bedtime  carbidopa/levodopa  25/100 1 Tablet(s) Oral <User Schedule>  citalopram 20 milliGRAM(s) Oral daily  diltiazem    milliGRAM(s) Oral daily  enalapril 10 milliGRAM(s) Oral daily  mupirocin 2% Ointment 1 Application(s) Topical two times a day  pantoprazole  Injectable 40 milliGRAM(s) IV Push every 12 hours  primidone 50 milliGRAM(s) Oral daily  tamsulosin 0.4 milliGRAM(s) Oral at bedtime    MEDICATIONS  (PRN):  acetaminophen     Tablet .. 650 milliGRAM(s) Oral every 6 hours PRN Temp greater or equal to 38C (100.4F), Mild Pain (1 - 3)        PHYSICAL EXAM:  Vital Signs Last 24 Hrs  T(C): 36.9 (05 Jun 2022 10:32), Max: 36.9 (05 Jun 2022 10:32)  T(F): 98.4 (05 Jun 2022 10:32), Max: 98.4 (05 Jun 2022 10:32)  HR: 70 (05 Jun 2022 10:32) (70 - 102)  BP: 130/70 (05 Jun 2022 10:32) (121/54 - 130/70)  BP(mean): --  RR: 18 (05 Jun 2022 10:32) (18 - 18)  SpO2: 98% (05 Jun 2022 10:32) (90% - 98%)    GENERAL: NAD  HEENT: PERRL, NC/AT  CHEST/LUNG: Clear to auscultation bilaterally  HEART: S1S2+, RRR no m/g/r  ABDOMEN: Soft, Nontender, Nondistended; Bowel sounds present  : +Amato, CBI.  SKIN: warm, dry  NEURO: Awake, alert; +resting tremor; strength equal in all extremities   PSYCH: calm, cooperative    LABS:                        10.7   6.73  )-----------( 309      ( 04 Jun 2022 06:32 )             31.3                     CAPILLARY BLOOD GLUCOSE            RADIOLOGY & ADDITIONAL TESTS:  Results Reviewed:   Imaging Personally Reviewed:  Electrocardiogram Personally Reviewed:

## 2022-06-05 NOTE — PROGRESS NOTE ADULT - SUBJECTIVE AND OBJECTIVE BOX
Neurology                        CC: TIA/CVA and Parkinson's disease with recent admission with fall, found to have subacute stroke and subacute rehabilitation was recommended but patient went home.   He now presented to [facility] with slurred speech and right facial weakness. Stroke code was activated and he was transferred here for further management.    HISTORY:  The patient is a 76y Male     PAST MEDICAL & SURGICAL HISTORY:  Hypertension  Hyperlipidemia  GERD (gastroesophageal reflux disease)  Parkinson's disease  Prostate cancer  Mitral valve prolapse  Malignant neoplasm of tongue, 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  Throat cancer  History of vasectomy  Prostate cancer  s/p cyber knife  S/P partial glossectomy  4/21    MEDICATION PRIOR TO ADMISSION:  · 	aspirin 81 mg oral delayed release tablet: Last Dose Taken:  , 1 tab(s) orally once a day  · 	Flomax 0.4 mg oral capsule: Last Dose Taken:  , 1 cap(s) orally once a day (at bedtime) MDD:1  · 	pantoprazole 40 mg oral delayed release tablet: Last Dose Taken:  , 1 tab(s) orally once a day (before a meal)  · 	Vitamin C 1000 mg oral tablet: Last Dose Taken:  , 1 tab(s) orally once a day  · 	DilTIAZem (Eqv-Cardizem CD) 240 mg/24 hours oral capsule, extended release: Last Dose Taken:  , 1 cap(s) orally once a day  · 	carbidopa-levodopa 25 mg-100 mg oral tablet: Last Dose Taken:  , 1 tab(s) orally 2 times a day  · 	enalapril 10 mg oral tablet: Last Dose Taken:  , 1 tab(s) orally once a day  · 	citalopram 20 mg oral tablet: Last Dose Taken:  , 1 tab(s) orally once a day  · 	simvastatin 40 mg oral tablet: Last Dose Taken:  , 1 tab(s) orally once a day (at bedtime)  · 	primidone 50 mg oral tablet: Last Dose Taken:  , 1 tab(s) orally once a day      Allergies  No Known Allergies    SOCIAL HISTORY:  Non smoker.     FAMILY HISTORY:  FH: heart disease  father, brothers  FH: type 2 diabetes  mother  No known family history of stroke.         Vital Signs Last 24 Hrs  T(C): 36.8 (05 Jun 2022 17:05), Max: 36.9 (05 Jun 2022 10:32)  T(F): 98.3 (05 Jun 2022 17:05), Max: 98.4 (05 Jun 2022 10:32)  HR: 71 (05 Jun 2022 17:05) (70 - 102)  BP: 121/67 (05 Jun 2022 17:05) (121/67 - 130/70)  BP(mean): --  RR: 18 (05 Jun 2022 17:05) (18 - 18)  SpO2: 97% (05 Jun 2022 17:05) (97% - 98%)    MEDICATIONS    acetaminophen     Tablet .. 650 milliGRAM(s) Oral every 6 hours PRN  ampicillin  IVPB 2 Gram(s) IV Intermittent every 6 hours  ascorbic acid 500 milliGRAM(s) Oral daily  atorvastatin 80 milliGRAM(s) Oral at bedtime  carbidopa/levodopa  25/100 1 Tablet(s) Oral <User Schedule>  citalopram 20 milliGRAM(s) Oral daily  diltiazem    milliGRAM(s) Oral daily  enalapril 10 milliGRAM(s) Oral daily  enoxaparin Injectable 60 milliGRAM(s) SubCutaneous every 12 hours  hydrocortisone hemorrhoidal Suppository 1 Suppository(s) Rectal daily PRN  mupirocin 2% Ointment 1 Application(s) Topical two times a day  pantoprazole  Injectable 40 milliGRAM(s) IV Push every 12 hours  primidone 50 milliGRAM(s) Oral daily  tamsulosin 0.4 milliGRAM(s) Oral at bedtime       LABS:  CBC Full  -  ( 05 Jun 2022 06:13 )  WBC Count : 6.77 K/uL  RBC Count : 3.28 M/uL  Hemoglobin : 10.6 g/dL  Hematocrit : 32.0 %  Platelet Count - Automated : 271 K/uL  Mean Cell Volume : 97.6 fl  Mean Cell Hemoglobin : 32.3 pg  Mean Cell Hemoglobin Concentration : 33.1 gm/dL  Auto Neutrophil # : x  Auto Lymphocyte # : x  Auto Monocyte # : x  Auto Eosinophil # : x  Auto Basophil # : x  Auto Neutrophil % : x  Auto Lymphocyte % : x  Auto Monocyte % : x  Auto Eosinophil % : x  Auto Basophil % : x      06-05    148<H>  |  110<H>  |  21.8<H>  ----------------------------<  112<H>  3.7   |  29.0  |  1.12    Ca    11.6<H>      05 Jun 2022 06:13    TPro  6.7  /  Alb  3.3  /  TBili  0.4  /  DBili  x   /  AST  12  /  ALT  <5  /  AlkPhos  72  06-05    LIVER FUNCTIONS - ( 05 Jun 2022 06:13 )  Alb: 3.3 g/dL / Pro: 6.7 g/dL / ALK PHOS: 72 U/L / ALT: <5 U/L / AST: 12 U/L / GGT: x           Hemoglobin A1C:   Neurological Exam:    Mental status: The patient is awake, alert, and fully oriented. There is no aphasia. There is moderate dysarthria.     Cranial nerves: There is no papilledema. Pupils react symmetrically to light. There is no visual field deficit to confrontation. Extraocular motion is full with no nystagmus.  Facial sensation is intact. Facial musculature is symmetric. Palate elevates symmetrically. Tongue is midline.    Motor: There is increased tone with cogwheeling at the wrists and elbows bilaterally. There is tremor at rest which subsides with intention.  Strength is 5/5 in the right arm and leg.   Strength is 5/5 in the left arm and leg.    Sensation: Intact to light touch and pin. There is no extinction to double simultaneous stimulation.      Cerebellar: There is no dysmetria on finger to nose testing.    RADIOLOGY   CT head images reviewed (and concur with report): There is no acute pathology. Chronic infarcts bilateral basal ganglia.    CT-A: Distal Right M1 high grade stenosis.  Mid right P2 high grade stenosis.    MRI Brain 6-1-22   MR Head No Cont (06.01.22 @ 10:56) >    IMPRESSION:    1.  Compared to 5/6/2022 MRI, expected interval evolution of previously   seen infarcts within the left corona radiata/lentiform nucleus and left   middle cerebellar peduncle, which are now subacute to chronic in nature.   No new acute infarct or intracranial hemorrhage is identified.   Nonspecific findings, suggestive of prior chronic lacunar infarcts and   chronic small vessel ischemia noted.    2.  T2/FLAIR hyperintense signal within right transverse/sigmoid sinuses   and exiting internal jugular vein, similar to 5/6/2022 MRI. This may   reflect slow/turbulent venous flow, although dural venous sinus   thrombosis cannot be entirely excluded. Consider MRV evaluation as   clinically indicated. No corresponding acute infarct or parenchymal edema.          MR Head No Cont (05.06.22 @ 19:00) >    INTERPRETATION:  Clinical indication: Subacute CVA.    MRI of the brain was performed using sagittal T1 axial T1 T2 T2 FLAIR   diffusion and [place] sequence.    This exam is compared with prior head CT performed on May 6, 2022.    Extensive parenchymal volume loss and chronic microvascular ischemic   changes are identified    Old lacunar infarct involving the right sublenticular nucleus region is   seen.    There is evidence of abnormal T2 prolongation with restricted diffusion   seen involving the left brachium pontis and left lenticular   nucleus/corona radiata region.    These findings are compatible with areas of acute infarcts. No   hemorrhagic transformation is seen. No significant shift or herniation is   seen.    The large vessels demonstrate normal flow    The visualized paranasal sinuses mastoid and mastoid clear.    IMPRESSION: Acute infarcts as described above.    TTE Echo Complete w/o Contrast w/ Doppler (05.08.22 @ 14:56) >    IMPRESSION:  Technically difficult study  Normal left ventricular internal dimensions and systolic function,   estimated LVEF of 60-65%.  Grossly normal RV size and systolic function.  Normal trileaflet aortic valve, without AI.  Trace physiologic MR  Mild TR.    TOMMY WILDER MD; Attending Cardiologist      MR Venogram Head No Cont (06.04.22 @ 20:49) >    IMPRESSION:  Absent flow related signal in the medialaspect of the right transverse   sinus.  Poor flow related signal in the lateral aspect of the right   transverse sinus and right sigmoid sinus.  Absent flow related signal in   the right upper internal jugular vein.  The findings are suspicious for   dural venous sinus thrombosis.  CT venogram is recommended for more   definitive assessment.    SHARON COLEMAN MD; Attending Radiologist

## 2022-06-05 NOTE — CHART NOTE - NSCHARTNOTEFT_GEN_A_CORE
F/U MR venogram suspicious for dural venous sinus thrombosis.    < from: MR Venogram Head No Cont (06.04.22 @ 20:49) >  IMPRESSION:  Absent flow related signal in the medialaspect of the right transverse   sinus.  Poor flow related signal in the lateral aspect of the right   transverse sinus and right sigmoid sinus.  Absent flow related signal in   the right upper internal jugular vein.  The findings are suspicious for   dural venous sinus thrombosis.  CT venogram is recommended for more   definitive assessment.    Discussed case with Dr. Knight.   CT venogram with IV contrast ordered to confirm dural venous sinus thrombosis.   Pt with recent GI bleed and gross hematuria but likely resolving as per GI, likely only due to hemorrhoids   Advise to f/u with neuro as to whether pt be put back on heparin. F/U MR venogram suspicious for dural venous sinus thrombosis.    < from: MR Venogram Head No Cont (06.04.22 @ 20:49) >  IMPRESSION:  Absent flow related signal in the medialaspect of the right transverse   sinus.  Poor flow related signal in the lateral aspect of the right   transverse sinus and right sigmoid sinus.  Absent flow related signal in   the right upper internal jugular vein.  The findings are suspicious for   dural venous sinus thrombosis.  CT venogram is recommended for more   definitive assessment.    Discussed case with Dr. Knight.   CT venogram with IV contrast ordered to confirm dural venous sinus thrombosis.   Pt with recent GI bleed and gross hematuria but likely resolving as per GI, likely only due to hemorrhoids   Advise to f/u with neuro as to whether pt be put back on heparin.    ---------------------------------------------------------------------    0715: Spoke with Dr. Rm who will come see pt again today

## 2022-06-05 NOTE — PROGRESS NOTE ADULT - ASSESSMENT
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 Pt found to have subacute cva previously, rec dc to [facility], but pt dc'ed home and had fall. 5/28 with worsening confusion, slurred speech, R facial droop, taken to [place], subsequently transferred to [facility]     CVA   no tPA due to recent hx cva   DAPT --> d/w Dr. Rm, change to full dose lovenox due to suspected hypercoagulability from malignancy   neuro recs appreciated   asa, statin   MRI brain with expected evolution of previously seen infarcts L corona radiata/lentiform nucleus and L middle cerebellar peduncle, now subacute to chronic; no new acute infarct or hemorrhage compared to 5/6; poss slow/turbulent venous flow through R transverse/sigmoid sinuses and exiting internal jugular vein, dural sinus thrombosis cannot be entirely excluded   cardio recs appreciated   TTE with bubble study : LVEF 55-60%, no pfo  s/p ILR 6/2  f/u MRV     Acute blood loss anemia secondary to rectal bleeding and hematuria  Restart Lovenox (stable H/H)  Repeat at 1800   GI consulted  PPI started  Urology following  On CBI  Monitor CBC, transfuse prn    Urinary retention  banks in place   ucx with enterococcus faecalis   ID recs appreciated   ampicillin x5 days     Hypokalemia   replaced   monitor     Parkinson's   sinemet, primidone     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 Pt found to have subacute cva previously, rec dc to [facility], but pt dc'ed home and had fall. 5/28 with worsening confusion, slurred speech, R facial droop, taken to [place], subsequently transferred to [facility]     CVA   no tPA due to recent hx cva   DAPT --> d/w Dr. Rm, change to full dose lovenox due to suspected hypercoagulability from malignancy   neuro recs appreciated   asa, statin   MRI brain with expected evolution of previously seen infarcts L corona radiata/lentiform nucleus and L middle cerebellar peduncle, now subacute to chronic; no new acute infarct or hemorrhage compared to 5/6; poss slow/turbulent venous flow through R transverse/sigmoid sinuses and exiting internal jugular vein, dural sinus thrombosis cannot be entirely excluded   cardio recs appreciated   TTE with bubble study : LVEF 55-60%, no pfo  s/p ILR 6/2  MRV suspicious for sinus venous thrombosis. 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

## 2022-06-06 NOTE — PROGRESS NOTE ADULT - SUBJECTIVE AND OBJECTIVE BOX
ISIS CUETO    [id number]    76y      Male    INTERVAL HPI/OVERNIGHT EVENTS: patient being seen for cva. Patient seen at bedside and is mildly confused. Patient has banks     REVIEW OF SYSTEMS:    unable to obtain secondary to mental status    Vital Signs Last 24 Hrs  T(C): 36.6 (06 Jun 2022 09:18), Max: 36.9 (06 Jun 2022 05:06)  T(F): 97.9 (06 Jun 2022 09:18), Max: 98.4 (06 Jun 2022 05:06)  HR: 76 (06 Jun 2022 09:18) (65 - 90)  BP: 127/76 (06 Jun 2022 09:18) (121/67 - 155/81)  BP(mean): --  RR: 18 (06 Jun 2022 09:18) (18 - 18)  SpO2: 96% (06 Jun 2022 09:18) (95% - 97%)    PHYSICAL EXAM:    GENERAL: NAD  HEENT: PERRL, NC/AT  CHEST/LUNG: Clear to auscultation bilaterally  HEART: S1S2+, RRR no m/g/r  ABDOMEN: Soft, Nontender, Nondistended; Bowel sounds present  : +Banks  SKIN: warm, dry  NEURO: Awake, +resting tremor; strength equal in all extremities   PSYCH: calm, cooperative      LABS:                        11.5   7.56  )-----------( 285      ( 06 Jun 2022 06:49 )             35.2     06-06    151<H>  |  111<H>  |  19.7  ----------------------------<  107<H>  3.2<L>   |  29.0  |  1.03    Ca    11.3<H>      06 Jun 2022 06:49    TPro  6.7  /  Alb  3.3  /  TBili  0.4  /  DBili  x   /  AST  12  /  ALT  <5  /  AlkPhos  72  06-05            MEDICATIONS  (STANDING):  ascorbic acid 500 milliGRAM(s) Oral daily  atorvastatin 80 milliGRAM(s) Oral at bedtime  carbidopa/levodopa  25/100 1 Tablet(s) Oral <User Schedule>  citalopram 20 milliGRAM(s) Oral daily  dextrose 5%. 1000 milliLiter(s) (75 mL/Hr) IV Continuous <Continuous>  diltiazem    milliGRAM(s) Oral daily  enalapril 10 milliGRAM(s) Oral daily  enoxaparin Injectable 60 milliGRAM(s) SubCutaneous every 12 hours  mupirocin 2% Ointment 1 Application(s) Topical two times a day  pantoprazole  Injectable 40 milliGRAM(s) IV Push every 12 hours  primidone 50 milliGRAM(s) Oral daily  tamsulosin 0.4 milliGRAM(s) Oral at bedtime    MEDICATIONS  (PRN):  acetaminophen     Tablet .. 650 milliGRAM(s) Oral every 6 hours PRN Temp greater or equal to 38C (100.4F), Mild Pain (1 - 3)  hydrocortisone hemorrhoidal Suppository 1 Suppository(s) Rectal daily PRN hemorrhoids      RADIOLOGY & ADDITIONAL TESTS:

## 2022-06-06 NOTE — CONSULT NOTE ADULT - CONSULT REQUESTED DATE/TIME
04-Jun-2022 10:39
29-May-2022 17:26
03-Jun-2022
29-May-2022 11:31
30-May-2022 17:32
01-Jun-2022 13:00
29-May-2022 11:36
06-Jun-2022 17:36

## 2022-06-06 NOTE — CONSULT NOTE ADULT - ASSESSMENT
Assessment/Plan    The patient is a 76y Male with Parkinson's disease with a recent   left brachium pontis and left Venticular   nucleus/corona radiata region and now with cerebral venous sinus thrombosis.    Subacute and Chronic Infarcts  Neurology following   stroke is not clear  Could be Cardioembolic versus hypercoagulable state of malignancy  or Intercranial Atherosclerosis  PLAN  Neuro checks and vital signs Q 4 hours.  Control HTN  Continue Enoxaparin 60 BID ( if risk of bleeding is high then consider Heparin gtt )  SCD/ SQ Lovenox for DVT prophylaxis.  Continue to monitor Telemetry   MRI Brain  and MRV  -images and reports were reviewed.      IMP: CTV or Contrast enhanced MRV is not essential as the risk of contrast is not justified given that he needs anticoagulation barring major bleeding problem.     Parkinson's disease  -    Sinemet for PD.   PT OT following.    Urinary retention  Indwelling Amato  UTI (-) 6/6 Enterococcus  Ampicillin x 5 days  Hematuria  CBI  Amato placed clear yellow urine no clots    GOC  Prior discussions with daughter  Wants to continue Full Code status  Will call her tomorrow try phone conversation or schedule meeting sometime tomororw

## 2022-06-06 NOTE — CONSULT NOTE ADULT - CONSULT REASON
UTI
CVA
Goals of Care
urinary retention, difficult banks placement
BRBPR/Melena
Clots in banks
R M1 stenosis
TIA and Parkinson's disease

## 2022-06-06 NOTE — CONSULT NOTE ADULT - NSCONSULTADDITIONALINFOA_GEN_ALL_CORE
Total Time Spent_40___ minutes  This includes chart review, patient assessment, discussion and collaboration with interdisciplinary team members, ACP planning    COUNSELING:  Face to face meeting to discuss Advanced Care Planning - Time Spent ______Minutes.      Thank you for the opportunity to assist with the care of this patient.   [facility] Palliative Medicine Consult Service [phone].

## 2022-06-06 NOTE — PROGRESS NOTE ADULT - SUBJECTIVE AND OBJECTIVE BOX
Neurology                        HISTORY:  The patient is a 76y Male with history of TIA/CVA and Parkinson's disease with recent admission with fall, found to have subacute stroke and subacute rehabilitation was recommended but patient went home.   He now presented to [facility] with slurred speech and right facial weakness. Stroke code was activated and he was transferred here for further management.        PAST MEDICAL & SURGICAL HISTORY:  Hypertension  Hyperlipidemia  GERD (gastroesophageal reflux disease)  Parkinson's disease  Prostate cancer  Mitral valve prolapse  Malignant neoplasm of tongue, 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  Throat cancer  History of vasectomy  Prostate cancer  s/p cyber knife  S/P partial glossectomy  4/21    MEDICATION PRIOR TO ADMISSION:  · 	aspirin 81 mg oral delayed release tablet: Last Dose Taken:  , 1 tab(s) orally once a day  · 	Flomax 0.4 mg oral capsule: Last Dose Taken:  , 1 cap(s) orally once a day (at bedtime) MDD:1  · 	pantoprazole 40 mg oral delayed release tablet: Last Dose Taken:  , 1 tab(s) orally once a day (before a meal)  · 	Vitamin C 1000 mg oral tablet: Last Dose Taken:  , 1 tab(s) orally once a day  · 	DilTIAZem (Eqv-Cardizem CD) 240 mg/24 hours oral capsule, extended release: Last Dose Taken:  , 1 cap(s) orally once a day  · 	carbidopa-levodopa 25 mg-100 mg oral tablet: Last Dose Taken:  , 1 tab(s) orally 2 times a day  · 	enalapril 10 mg oral tablet: Last Dose Taken:  , 1 tab(s) orally once a day  · 	citalopram 20 mg oral tablet: Last Dose Taken:  , 1 tab(s) orally once a day  · 	simvastatin 40 mg oral tablet: Last Dose Taken:  , 1 tab(s) orally once a day (at bedtime)  · 	primidone 50 mg oral tablet: Last Dose Taken:  , 1 tab(s) orally once a day      Allergies  No Known Allergies    SOCIAL HISTORY:  Non smoker.     FAMILY HISTORY:  FH: heart disease  father, brothers  FH: type 2 diabetes  mother  No known family history of stroke.             Vital Signs Last 24 Hrs  T(C): 36.6 (06 Jun 2022 09:18), Max: 36.9 (06 Jun 2022 05:06)  T(F): 97.9 (06 Jun 2022 09:18), Max: 98.4 (06 Jun 2022 05:06)  HR: 76 (06 Jun 2022 09:18) (65 - 90)  BP: 127/76 (06 Jun 2022 09:18) (121/67 - 155/81)  BP(mean): --  RR: 18 (06 Jun 2022 09:18) (18 - 18)  SpO2: 96% (06 Jun 2022 09:18) (95% - 97%)    MEDICATIONS    acetaminophen     Tablet .. 650 milliGRAM(s) Oral every 6 hours PRN  ascorbic acid 500 milliGRAM(s) Oral daily  atorvastatin 80 milliGRAM(s) Oral at bedtime  carbidopa/levodopa  25/100 1 Tablet(s) Oral <User Schedule>  citalopram 20 milliGRAM(s) Oral daily  dextrose 5%. 1000 milliLiter(s) IV Continuous <Continuous>  diltiazem    milliGRAM(s) Oral daily  enalapril 10 milliGRAM(s) Oral daily  enoxaparin Injectable 60 milliGRAM(s) SubCutaneous every 12 hours  hydrocortisone hemorrhoidal Suppository 1 Suppository(s) Rectal daily PRN  mupirocin 2% Ointment 1 Application(s) Topical two times a day  pantoprazole  Injectable 40 milliGRAM(s) IV Push every 12 hours  primidone 50 milliGRAM(s) Oral daily  tamsulosin 0.4 milliGRAM(s) Oral at bedtime         LABS:  CBC Full  -  ( 06 Jun 2022 06:49 )  WBC Count : 7.56 K/uL  RBC Count : 3.57 M/uL  Hemoglobin : 11.5 g/dL  Hematocrit : 35.2 %  Platelet Count - Automated : 285 K/uL  Mean Cell Volume : 98.6 fl  Mean Cell Hemoglobin : 32.2 pg  Mean Cell Hemoglobin Concentration : 32.7 gm/dL  Auto Neutrophil # : x  Auto Lymphocyte # : x  Auto Monocyte # : x  Auto Eosinophil # : x  Auto Basophil # : x  Auto Neutrophil % : x  Auto Lymphocyte % : x  Auto Monocyte % : x  Auto Eosinophil % : x  Auto Basophil % : x      06-06    151<H>  |  111<H>  |  18.7  ----------------------------<  131<H>  3.7   |  29.0  |  1.05    Ca    11.0<H>      06 Jun 2022 15:07    TPro  6.7  /  Alb  3.3  /  TBili  0.4  /  DBili  x   /  AST  12  /  ALT  <5  /  AlkPhos  72  06-05    LIVER FUNCTIONS - ( 05 Jun 2022 06:13 )  Alb: 3.3 g/dL / Pro: 6.7 g/dL / ALK PHOS: 72 U/L / ALT: <5 U/L / AST: 12 U/L / GGT: x           Hemoglobin A1C:       Neurological Exam:    Mental status: The patient is awake, alert, and oriented X 2 . There is no aphasia. There is moderate dysarthria. Her has a masked facies.    Cranial nerves: Pupils react symmetrically to light. There is no visual field deficit to confrontation. Extraocular motion is full with no nystagmus.  Facial sensation is intact. Facial musculature is symmetric. Palate elevates symmetrically. Tongue is midline.    Motor: There is increased tone with cogwheeling at the wrists and elbows bilaterally. There is tremor at rest which subsides with intention.  Strength is 5/5 in the right arm and leg.   Strength is 5/5 in the left arm and leg.    Sensation: Intact to light touch and pin. There is no extinction to double simultaneous stimulation.      Cerebellar: There is no dysmetria on finger to nose testing.    RADIOLOGY   CT head images reviewed (and concur with report): There is no acute pathology. Chronic infarcts bilateral basal ganglia.    CT-A: Distal Right M1 high grade stenosis.  Mid right P2 high grade stenosis.    MRI Brain 6-1-22   MR Head No Cont (06.01.22 @ 10:56) >    IMPRESSION:    1.  Compared to 5/6/2022 MRI, expected interval evolution of previously   seen infarcts within the left corona radiata/lentiform nucleus and left   middle cerebellar peduncle, which are now subacute to chronic in nature.   No new acute infarct or intracranial hemorrhage is identified.   Nonspecific findings, suggestive of prior chronic lacunar infarcts and   chronic small vessel ischemia noted.    2.  T2/FLAIR hyperintense signal within right transverse/sigmoid sinuses   and exiting internal jugular vein, similar to 5/6/2022 MRI. This may   reflect slow/turbulent venous flow, although dural venous sinus   thrombosis cannot be entirely excluded. Consider MRV evaluation as   clinically indicated. No corresponding acute infarct or parenchymal edema.          MR Head No Cont (05.06.22 @ 19:00) >    INTERPRETATION:  Clinical indication: Subacute CVA.    MRI of the brain was performed using sagittal T1 axial T1 T2 T2 FLAIR   diffusion and [place] sequence.    This exam is compared with prior head CT performed on May 6, 2022.    Extensive parenchymal volume loss and chronic microvascular ischemic   changes are identified    Old lacunar infarct involving the right sublenticular nucleus region is   seen.    There is evidence of abnormal T2 prolongation with restricted diffusion   seen involving the left brachium pontis and left lenticular   nucleus/corona radiata region.    These findings are compatible with areas of acute infarcts. No   hemorrhagic transformation is seen. No significant shift or herniation is   seen.    The large vessels demonstrate normal flow    The visualized paranasal sinuses mastoid and mastoid clear.    IMPRESSION: Acute infarcts as described above.    TTE Echo Complete w/o Contrast w/ Doppler (05.08.22 @ 14:56) >    IMPRESSION:  Technically difficult study  Normal left ventricular internal dimensions and systolic function,   estimated LVEF of 60-65%.  Grossly normal RV size and systolic function.  Normal trileaflet aortic valve, without AI.  Trace physiologic MR  Mild TR.    TOMMY WILDER MD; Attending Cardiologist      MR Venogram Head No Cont (06.04.22 @ 20:49) >    IMPRESSION:  Absent flow related signal in the medialaspect of the right transverse   sinus.  Poor flow related signal in the lateral aspect of the right   transverse sinus and right sigmoid sinus.  Absent flow related signal in   the right upper internal jugular vein.  The findings are suspicious for   dural venous sinus thrombosis.  CT venogram is recommended for more   definitive assessment.    SHARON COLEMAN MD; Attending Radiologist

## 2022-06-06 NOTE — CONSULT NOTE ADULT - SUBJECTIVE AND OBJECTIVE BOX
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 Found to have recurrence not physically able to tolerate chemotherapy.  Hem On planning for Immunotherapy, Parkinson's disease, HTN, HLD< GERD. He has had frequent multiple admissions for stroke like symptoms, weakness and falls. He was admitted here with Subacute Stroke. NO TPA given, CT angio revealed High-grade short segment stenosis of the mid and distal M1 segment of the right MCA. MRI showing infarcts At time of this consult Patient was awake, having difficulty verbalizing B/L Upper extremities weak R.<L. Dysphagia screening: Pureed moderately thick liquids, Not able to feed himself-needs assistance and aspiration precautions.  He has PMH of Urinary Retention-Amato catheter placed-had episode of hematuria, needed CBI irrigation. Now Amato draining clear yellow urine without clots. + Enterococcus, Cystitis. He also  had a rectal bleed. No fever or chills. No SOB CP Palpitations, N/V/D headache, other pain, seizure intermittent cough-nonproductive    HPI:  77 yo male with PMH of prostate ca in remission, tongue SCC s/p partial section last year now recurrence not fit for chemo, planned for immunotherapy, parkinson's disease, HTn, HLD< GERD, recent multipel admission for stroke like symptoms , weakness and fall. found to have subacute stroke that time and recommended dc to [facility] but patient was discharged home he had a fall again but went back to home. yesterday ~6: 30 PM he was noted to have worsening slurry speech, worse rt facial weakness and droop. he was taken to [facility] initially where code stroke activated NIH was 3, no tPA given, ct angio revealed High-grade short segment stenosis of the mid and distal M1 segment of the right MCA. patient was then transferred to [facility] for further management. as per the chart review Neurology attending Dr Rm, case was reviewed with Neuro IR and recommended MR brain for now and stroke monitoring.  denies fall, head trauma, LOC seizure, chest pain, fever, cough, abdominal pain or nausea vomiting. (29 May 2022 00:30)      PERTINENT PMH REVIEWED: Yes     PAST MEDICAL & SURGICAL HISTORY:  Hypertension      Hyperlipidemia      GERD (gastroesophageal reflux disease)      Parkinson&#x27;s disease      Prostate cancer      Mitral valve prolapse      Malignant neoplasm of tongue, 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    Throat cancer      History of vasectomy    Prostate cancer  s/p cyber knife    S/P partial glossectomy  4/21    SOCIAL HISTORY:                      Substance history: none                    Admitted from: [facility] transfer                    Yazdanism/spirituality:                    Cultural concerns:                      HCP- Daughter  Viki Naidu : Phone#:[phone]    FAMILY HISTORY:  FH: heart disease  father, brothers    FH: type 2 diabetes  mother    No Known Allergies    ADVANCE DIRECTIVES/TREATMENT PREFERENCES:  Full code, all aggressive measures desired     Baseline ADLs (prior to admission):  Dependent      Karnofsky/Palliative Performance Status Version 20:  %    Present Symptoms:     Dyspnea: Mild   Nausea/Vomiting: No  Anxiety:  Yes   Depression: Yes   Fatigue: Yes   Loss of appetite: Yes   Constipation:     Pain: denies            Character-            Duration-            Effect-            Factors-            Frequency-            Location-            Severity-    Review of Systems: Reviewed                    All others negative    MEDICATIONS  (STANDING):  ascorbic acid 500 milliGRAM(s) Oral daily  atorvastatin 80 milliGRAM(s) Oral at bedtime  carbidopa/levodopa  25/100 1 Tablet(s) Oral <User Schedule>  citalopram 20 milliGRAM(s) Oral daily  dextrose 5%. 1000 milliLiter(s) (75 mL/Hr) IV Continuous <Continuous>  diltiazem    milliGRAM(s) Oral daily  enalapril 10 milliGRAM(s) Oral daily  enoxaparin Injectable 60 milliGRAM(s) SubCutaneous every 12 hours  mupirocin 2% Ointment 1 Application(s) Topical two times a day  pantoprazole  Injectable 40 milliGRAM(s) IV Push every 12 hours  primidone 50 milliGRAM(s) Oral daily  tamsulosin 0.4 milliGRAM(s) Oral at bedtime    MEDICATIONS  (PRN):  acetaminophen     Tablet .. 650 milliGRAM(s) Oral every 6 hours PRN Temp greater or equal to 38C (100.4F), Mild Pain (1 - 3)  hydrocortisone hemorrhoidal Suppository 1 Suppository(s) Rectal daily PRN hemorrhoids    PHYSICAL EXAM:    Vital Signs Last 24 Hrs  T(C): 36.8 (06 Jun 2022 09:18), Max: 36.9 (06 Jun 2022 05:06)  T(F): 98.2 (06 Jun 2022 09:18), Max: 98.4 (06 Jun 2022 05:06)  HR: 102 (06 Jun 2022 09:18) (65 - 102)  BP: 135/73 (06 Jun 2022 09:18) (135/73 - 155/81)  BP(mean): --  RR: 18 (06 Jun 2022 09:18) (18 - 18)  SpO2: 95% (06 Jun 2022 09:18) (95% - 97%)    General: alert  oriented x __1__awake responding to verbal stimuli-speech unintelligible               HEENT:   dry mouth      Lungs: comfortable tachypnea/labored breathing      CV:   tachycardia    GI: normal                 constipation  last BM: Friday    :  jocelyn    MSK:   weakness  R suded weakness            bedbound/wheelchair bound    Neuro:  cognitive impairment dysphagia dysarthria     Skin: _  no rash    LABS:                        11.5   < from: MR Head No Cont (06.01.22 @ 10:56) >    1.  Compared to 5/6/2022 MRI, expected interval evolution of previously   seen infarcts within the left corona radiata/lentiform nucleus and left   middle cerebellar peduncle, which are now subacute to chronic in nature.   No new acute infarct or intracranial hemorrhage is identified.   Nonspecific findings, suggestive of prior chronic lacunar infarcts and   chronic small vessel ischemia noted.    2.  T2/FLAIR hyperintense signal within right transverse/sigmoid sinuses   and exiting internal jugular vein, similar to 5/6/2022 MRI. This may   reflect slow/turbulent venous flow, although dural venous sinus   thrombosis cannot be entirely excluded. Consider MRV evaluation as   clinically indicated. No corresponding acute infarct or parenchymal edema.    < end of copied text >  7.56  )-----------( 285      ( 06 Jun 2022 06:49 )             35.2     06-06    151<H>  |  111<H>  |  18.7  ----------------------------<  131<H>  3.7   |  29.0  |  1.05    Ca    11.0<H>      06 Jun 2022 15:07    TPro  6.7  /  Alb  3.3  /  TBili  0.4  /  DBili  x   /  AST  12  /  ALT  <5  /  AlkPhos  72  06-05    I&O's Summary    05 Jun 2022 07:01  -  06 Jun 2022 07:00  --------------------------------------------------------  IN: 0 mL / OUT: 2400 mL / NET: -2400 mL    06 Jun 2022 07:01  -  06 Jun 2022 17:36  --------------------------------------------------------  IN: 0 mL / OUT: 1300 mL / NET: -1300 mL    RADIOLOGY & ADDITIONAL STUDIES:  < from: MR Venogram Head No Cont (06.04.22 @ 20:49) >  IMPRESSION:  Absent flow related signal in the medialaspect of the right transverse   sinus.  Poor flow related signal in the lateral aspect of the right   transverse sinus and right sigmoid sinus.  Absent flow related signal in   the right upper internal jugular vein.  The findings are suspicious for   dural venous sinus thrombosis.  CT venogram is recommended for more   definitive assessment.    < end of copied text >

## 2022-06-06 NOTE — CONSULT NOTE ADULT - PROVIDER SPECIALTY LIST ADULT
Cardiology
Infectious Disease
Intervent Radiology
Palliative Care
Urology
Urology
Neurology
Gastroenterology

## 2022-06-06 NOTE — PROGRESS NOTE ADULT - ASSESSMENT
75 yo male CVA, pt with multiple medical issues, h/o prostate CA, urinary retention banks, hematuria, possible from pt pulling on catehter  - CBI held, urine yellow  - monitor UO color

## 2022-06-06 NOTE — PROGRESS NOTE ADULT - SUBJECTIVE AND OBJECTIVE BOX
Subjective:76yMale with CVA, pt with multiple medical issues, gross hematuria 2 days ago, 22Fr 3 way banks placed, CBI started.  Pt may have pulled on banks.  Pt resting comfortably this am.  Urine yellow, CBI held earlier this am.    Banks: yellow    Vital Signs Last 24 Hrs  T(C): 36.9 (06 Jun 2022 05:06), Max: 36.9 (05 Jun 2022 10:32)  T(F): 98.4 (06 Jun 2022 05:06), Max: 98.4 (05 Jun 2022 10:32)  HR: 90 (06 Jun 2022 05:06) (65 - 90)  BP: 155/81 (06 Jun 2022 05:06) (121/67 - 155/81)  BP(mean): --  RR: 18 (06 Jun 2022 05:06) (18 - 18)  SpO2: 97% (06 Jun 2022 05:06) (95% - 98%)  I&O's Detail    05 Jun 2022 07:01  -  06 Jun 2022 07:00  --------------------------------------------------------  IN:  Total IN: 0 mL    OUT:    Indwelling Catheter - Urethral (mL): 1800 mL    Voided (mL): 600 mL  Total OUT: 2400 mL    Total NET: -2400 mL          Labs:                        11.5   7.56  )-----------( 285      ( 06 Jun 2022 06:49 )             35.2     06-06    151<H>  |  111<H>  |  19.7  ----------------------------<  107<H>  3.2<L>   |  29.0  |  1.03    Ca    11.3<H>      06 Jun 2022 06:49    TPro  6.7  /  Alb  3.3  /  TBili  0.4  /  DBili  x   /  AST  12  /  ALT  <5  /  AlkPhos  72  06-05

## 2022-06-06 NOTE — PROGRESS NOTE ADULT - ASSESSMENT
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 Pt found to have subacute cva previously, rec dc to [facility], but pt dc'ed home and had fall. 5/28 with worsening confusion, slurred speech, R facial droop, taken to [place], subsequently transferred to [facility]     CVA   no tPA due to recent hx cva   DAPT --> changed to full dose lovenox   neuro recs appreciated   statin (NO ASA)  MRI brain with expected evolution of previously seen infarcts L corona radiata/lentiform nucleus and L middle cerebellar peduncle, now subacute to chronic; no new acute infarct or hemorrhage compared to 5/6; poss slow/turbulent venous flow through R transverse/sigmoid sinuses and exiting internal jugular vein, dural sinus thrombosis cannot be entirely excluded   cardio recs appreciated   TTE with bubble study : LVEF 55-60%, no pfo  s/p ILR 6/2  MRV suspicious for sinus venous thrombosis.     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  pallaitive consult placed

## 2022-06-07 NOTE — PROGRESS NOTE ADULT - SUBJECTIVE AND OBJECTIVE BOX
Neurology                        HISTORY:  The patient is a 76y Male with history of TIA/CVA and Parkinson's disease with recent admission with fall, found to have subacute stroke and subacute rehabilitation was recommended but patient went home.   He now presented to [facility] with slurred speech and right facial weakness. Stroke code was activated and he was transferred here for further management.        PAST MEDICAL & SURGICAL HISTORY:  Hypertension  Hyperlipidemia  GERD (gastroesophageal reflux disease)  Parkinson's disease  Prostate cancer  Mitral valve prolapse  Malignant neoplasm of tongue, 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  Throat cancer  History of vasectomy  Prostate cancer  s/p cyber knife  S/P partial glossectomy  4/21    MEDICATION PRIOR TO ADMISSION:  · 	aspirin 81 mg oral delayed release tablet: Last Dose Taken:  , 1 tab(s) orally once a day  · 	Flomax 0.4 mg oral capsule: Last Dose Taken:  , 1 cap(s) orally once a day (at bedtime) MDD:1  · 	pantoprazole 40 mg oral delayed release tablet: Last Dose Taken:  , 1 tab(s) orally once a day (before a meal)  · 	Vitamin C 1000 mg oral tablet: Last Dose Taken:  , 1 tab(s) orally once a day  · 	DilTIAZem (Eqv-Cardizem CD) 240 mg/24 hours oral capsule, extended release: Last Dose Taken:  , 1 cap(s) orally once a day  · 	carbidopa-levodopa 25 mg-100 mg oral tablet: Last Dose Taken:  , 1 tab(s) orally 2 times a day  · 	enalapril 10 mg oral tablet: Last Dose Taken:  , 1 tab(s) orally once a day  · 	citalopram 20 mg oral tablet: Last Dose Taken:  , 1 tab(s) orally once a day  · 	simvastatin 40 mg oral tablet: Last Dose Taken:  , 1 tab(s) orally once a day (at bedtime)  · 	primidone 50 mg oral tablet: Last Dose Taken:  , 1 tab(s) orally once a day      Allergies  No Known Allergies    SOCIAL HISTORY:  Non smoker.     FAMILY HISTORY:  FH: heart disease  father, brothers  FH: type 2 diabetes  mother  No known family history of stroke.         Vital Signs Last 24 Hrs  T(C): 36.6 (07 Jun 2022 10:16), Max: 36.9 (07 Jun 2022 00:00)  T(F): 97.9 (07 Jun 2022 10:16), Max: 98.5 (07 Jun 2022 00:00)  HR: 69 (07 Jun 2022 10:16) (69 - 75)  BP: 99/60 (07 Jun 2022 10:16) (99/60 - 130/66)  BP(mean): --  RR: 18 (07 Jun 2022 10:16) (18 - 18)  SpO2: 94% (07 Jun 2022 10:16) (94% - 98%)    MEDICATIONS    acetaminophen     Tablet .. 650 milliGRAM(s) Oral every 6 hours PRN  ascorbic acid 500 milliGRAM(s) Oral daily  atorvastatin 80 milliGRAM(s) Oral at bedtime  carbidopa/levodopa  25/100 1 Tablet(s) Oral <User Schedule>  citalopram 20 milliGRAM(s) Oral daily  dextrose 5%. 1000 milliLiter(s) IV Continuous <Continuous>  diltiazem    milliGRAM(s) Oral daily  enalapril 10 milliGRAM(s) Oral daily  enoxaparin Injectable 60 milliGRAM(s) SubCutaneous every 12 hours  hydrocortisone hemorrhoidal Suppository 1 Suppository(s) Rectal daily PRN  mupirocin 2% Ointment 1 Application(s) Topical two times a day  pantoprazole  Injectable 40 milliGRAM(s) IV Push every 12 hours  potassium chloride   Powder 40 milliEquivalent(s) Oral every 4 hours  primidone 50 milliGRAM(s) Oral daily  tamsulosin 0.4 milliGRAM(s) Oral at bedtime         LABS:  CBC Full  -  ( 07 Jun 2022 06:31 )  WBC Count : 6.69 K/uL  RBC Count : 2.65 M/uL  Hemoglobin : 8.7 g/dL  Hematocrit : 27.0 %  Platelet Count - Automated : 240 K/uL  Mean Cell Volume : 101.9 fl  Mean Cell Hemoglobin : 32.8 pg  Mean Cell Hemoglobin Concentration : 32.2 gm/dL  Auto Neutrophil # : 5.57 K/uL  Auto Lymphocyte # : 0.53 K/uL  Auto Monocyte # : 0.29 K/uL  Auto Eosinophil # : 0.17 K/uL  Auto Basophil # : 0.00 K/uL  Auto Neutrophil % : 83.3 %  Auto Lymphocyte % : 7.9 %  Auto Monocyte % : 4.4 %  Auto Eosinophil % : 2.6 %  Auto Basophil % : 0.0 %      06-07    144  |  107  |  14.7  ----------------------------<  169<H>  3.3<L>   |  28.0  |  0.94    Ca    10.8<H>      07 Jun 2022 06:31  Mg     2.1     06-07    TPro  6.3<L>  /  Alb  3.1<L>  /  TBili  0.3<L>  /  DBili  x   /  AST  10  /  ALT  <5  /  AlkPhos  67  06-07    LIVER FUNCTIONS - ( 07 Jun 2022 06:31 )  Alb: 3.1 g/dL / Pro: 6.3 g/dL / ALK PHOS: 67 U/L / ALT: <5 U/L / AST: 10 U/L / GGT: x           Hemoglobin A1C:     I  Neurological Exam:    Mental status: The patient is awake, alert, and oriented X 2 . There is no aphasia. There is moderate dysarthria. Her has a masked facies.    Cranial nerves: Pupils react symmetrically to light. There is no visual field deficit to confrontation. Extraocular motion is full with no nystagmus.  Facial sensation is intact. Facial musculature is symmetric. Palate elevates symmetrically. Tongue is midline.    Motor: There is increased tone with cogwheeling at the wrists and elbows bilaterally. There is tremor at rest which subsides with intention.  Strength is 5/5 in the right arm and leg.   Strength is 5/5 in the left arm and leg.    Sensation: Intact to light touch and pin. There is no extinction to double simultaneous stimulation.      Cerebellar: There is no dysmetria on finger to nose testing.    RADIOLOGY   CT head images reviewed (and concur with report): There is no acute pathology. Chronic infarcts bilateral basal ganglia.    CT-A: Distal Right M1 high grade stenosis.  Mid right P2 high grade stenosis.    MRI Brain 6-1-22   MR Head No Cont (06.01.22 @ 10:56) >    IMPRESSION:    1.  Compared to 5/6/2022 MRI, expected interval evolution of previously   seen infarcts within the left corona radiata/lentiform nucleus and left   middle cerebellar peduncle, which are now subacute to chronic in nature.   No new acute infarct or intracranial hemorrhage is identified.   Nonspecific findings, suggestive of prior chronic lacunar infarcts and   chronic small vessel ischemia noted.    2.  T2/FLAIR hyperintense signal within right transverse/sigmoid sinuses   and exiting internal jugular vein, similar to 5/6/2022 MRI. This may   reflect slow/turbulent venous flow, although dural venous sinus   thrombosis cannot be entirely excluded. Consider MRV evaluation as   clinically indicated. No corresponding acute infarct or parenchymal edema.          MR Head No Cont (05.06.22 @ 19:00) >    INTERPRETATION:  Clinical indication: Subacute CVA.    MRI of the brain was performed using sagittal T1 axial T1 T2 T2 FLAIR   diffusion and [place] sequence.    This exam is compared with prior head CT performed on May 6, 2022.    Extensive parenchymal volume loss and chronic microvascular ischemic   changes are identified    Old lacunar infarct involving the right sublenticular nucleus region is   seen.    There is evidence of abnormal T2 prolongation with restricted diffusion   seen involving the left brachium pontis and left lenticular   nucleus/corona radiata region.    These findings are compatible with areas of acute infarcts. No   hemorrhagic transformation is seen. No significant shift or herniation is   seen.    The large vessels demonstrate normal flow    The visualized paranasal sinuses mastoid and mastoid clear.    IMPRESSION: Acute infarcts as described above.    TTE Echo Complete w/o Contrast w/ Doppler (05.08.22 @ 14:56) >    IMPRESSION:  Technically difficult study  Normal left ventricular internal dimensions and systolic function,   estimated LVEF of 60-65%.  Grossly normal RV size and systolic function.  Normal trileaflet aortic valve, without AI.  Trace physiologic MR  Mild TR.    TOMMY WILDER MD; Attending Cardiologist      MR Venogram Head No Cont (06.04.22 @ 20:49) >    IMPRESSION:  Absent flow related signal in the medialaspect of the right transverse   sinus.  Poor flow related signal in the lateral aspect of the right   transverse sinus and right sigmoid sinus.  Absent flow related signal in   the right upper internal jugular vein.  The findings are suspicious for   dural venous sinus thrombosis.  CT venogram is recommended for more   definitive assessment.    SHARON COLEMAN MD; Attending Radiologist

## 2022-06-07 NOTE — PROGRESS NOTE ADULT - NS ATTEND AMEND GEN_ALL_CORE FT
patient examined at bedside  agree with assessment and plan  will monitor color and labs
agree with assessment  patient examined at bedside  seems stable and urine color improving  will monitor
agree with assessment  patient examined at bedside  seems stable and urine color improving  will monitor color  if remains clear will consider changing banks to a smaller size tomorrow
agree with assessment  no need for further intervention

## 2022-06-07 NOTE — CHART NOTE - NSCHARTNOTEFT_GEN_A_CORE
Palliative care NP Note: Patient seen earlier this morning. Very tired just having had physical care. Amato changed from 3way to 2 way catheter by urology. No new events. Patient wants to be aggressively treated  and Code status to remain Full Code. Chart reviewed; will follow along peripherally during his hospitalization. May be able to meet with daughter near time of discharge to discuss big picture-have directives completed  at time of discharge. Mariza Pineda NP

## 2022-06-07 NOTE — PROGRESS NOTE ADULT - SUBJECTIVE AND OBJECTIVE BOX
ISIS CUETO    [id number]    76y      Male    INTERVAL HPI/OVERNIGHT EVENTS: patient being seen for cva. Patient seen at bedside and is in nad      REVIEW OF SYSTEMS:    CONSTITUTIONAL: No fever, weight loss, or fatigue  RESPIRATORY: No cough, wheezing, hemoptysis; No shortness of breath  CARDIOVASCULAR: No chest pain, palpitations  GASTROINTESTINAL: No abdominal or epigastric pain. No nausea, vomiting  NEUROLOGICAL: No headaches, memory loss, loss of strength.  MISCELLANEOUS:      Vital Signs Last 24 Hrs  T(C): 36.6 (07 Jun 2022 10:16), Max: 36.9 (07 Jun 2022 00:00)  T(F): 97.9 (07 Jun 2022 10:16), Max: 98.5 (07 Jun 2022 00:00)  HR: 69 (07 Jun 2022 10:16) (69 - 75)  BP: 99/60 (07 Jun 2022 10:16) (99/60 - 130/66)  BP(mean): --  RR: 18 (07 Jun 2022 10:16) (18 - 18)  SpO2: 94% (07 Jun 2022 10:16) (94% - 98%)    PHYSICAL EXAM:    GENERAL: NAD  HEENT: PERRL, NC/AT  CHEST/LUNG: Clear to auscultation bilaterally  HEART: S1S2+, RRR no m/g/r  ABDOMEN: Soft, Nontender, Nondistended; Bowel sounds present  : +Amato  SKIN: warm, dry  NEURO: Awake, +resting tremor; strength equal in all extremities   PSYCH: calm, cooperative      LABS:                        8.7    6.69  )-----------( 240      ( 07 Jun 2022 06:31 )             27.0     06-07    144  |  107  |  14.7  ----------------------------<  169<H>  3.3<L>   |  28.0  |  0.94    Ca    10.8<H>      07 Jun 2022 06:31  Mg     2.1     06-07    TPro  6.3<L>  /  Alb  3.1<L>  /  TBili  0.3<L>  /  DBili  x   /  AST  10  /  ALT  <5  /  AlkPhos  67  06-07            MEDICATIONS  (STANDING):  ascorbic acid 500 milliGRAM(s) Oral daily  atorvastatin 80 milliGRAM(s) Oral at bedtime  carbidopa/levodopa  25/100 1 Tablet(s) Oral <User Schedule>  citalopram 20 milliGRAM(s) Oral daily  dextrose 5%. 1000 milliLiter(s) (75 mL/Hr) IV Continuous <Continuous>  diltiazem    milliGRAM(s) Oral daily  enalapril 10 milliGRAM(s) Oral daily  enoxaparin Injectable 60 milliGRAM(s) SubCutaneous every 12 hours  mupirocin 2% Ointment 1 Application(s) Topical two times a day  pantoprazole  Injectable 40 milliGRAM(s) IV Push every 12 hours  potassium chloride   Powder 40 milliEquivalent(s) Oral every 4 hours  primidone 50 milliGRAM(s) Oral daily  tamsulosin 0.4 milliGRAM(s) Oral at bedtime    MEDICATIONS  (PRN):  acetaminophen     Tablet .. 650 milliGRAM(s) Oral every 6 hours PRN Temp greater or equal to 38C (100.4F), Mild Pain (1 - 3)  hydrocortisone hemorrhoidal Suppository 1 Suppository(s) Rectal daily PRN hemorrhoids      RADIOLOGY & ADDITIONAL TESTS:

## 2022-06-07 NOTE — PROCEDURE NOTE - NSSITEPREP_SKIN_A_CORE
povidone iodine (if allergic to chlorhexidine)
povidone iodine (if allergic to chlorhexidine)
chlorhexidine

## 2022-06-07 NOTE — PROCEDURE NOTE - NSFINDINGS_GEN_A_CORE
positive return of urine in the collection bag
positive return of urine in the collection bag
hematuria/positive return of urine in the collection bag

## 2022-06-07 NOTE — PROCEDURE NOTE - ADDITIONAL PROCEDURE DETAILS
Hematuria resolved, pt no longer needs 3 way 22Fr banks  3 way removed  pt with recent h/o difficult catheterization  urojet used  16 Fr banks inserted successfully

## 2022-06-07 NOTE — PROGRESS NOTE ADULT - ASSESSMENT
75 yo male s/p CVA, urinary retention, hematuria  - hematuria resolved  - will d/c 3 way banks and reinsert 2 way banks  - pt with urinary retention, recommend keeping banks cath

## 2022-06-07 NOTE — PROGRESS NOTE ADULT - ASSESSMENT
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 Pt found to have subacute cva previously, rec dc to [facility], [place], but pt dc'ed home and had fall. 5/28 with worsening confusion, slurred speech, R facial droop, taken to [place], subsequently transferred to [facility]     CVA   no tPA due to recent hx cva   DAPT --> changed to full dose lovenox   neuro recs appreciated   statin (NO ASA)  MRI brain with expected evolution of previously seen infarcts L corona radiata/lentiform nucleus and L middle cerebellar peduncle, now subacute to chronic; no new acute infarct or hemorrhage compared to 5/6; poss slow/turbulent venous flow through R transverse/sigmoid sinuses and exiting internal jugular vein, dural sinus thrombosis cannot be entirely excluded   cardio recs appreciated   TTE with bubble study : LVEF 55-60%, no pfo  s/p ILR 6/2  MRV suspicious for sinus venous thrombosis.     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

## 2022-06-07 NOTE — PROCEDURE NOTE - NSINDICATIONS_GEN_A_CORE
altered anatomy/urinry obstruction or retention
clots, hematuria/history of difficult urethral catheterization
history of difficult urethral catheterization/urinry obstruction or retention

## 2022-06-07 NOTE — PROGRESS NOTE ADULT - SUBJECTIVE AND OBJECTIVE BOX
Subjective:76yMale CVA, urinary retention, developed hematuria.  pt resting comfortably, CBI has been off since yesterday, urine remains clear, yellow.    Amato: yellow    Vital Signs Last 24 Hrs  T(C): 36.6 (07 Jun 2022 05:06), Max: 36.9 (07 Jun 2022 00:00)  T(F): 97.9 (07 Jun 2022 05:06), Max: 98.5 (07 Jun 2022 00:00)  HR: 73 (07 Jun 2022 05:06) (73 - 75)  BP: 127/80 (07 Jun 2022 05:06) (103/63 - 130/66)  BP(mean): --  RR: 18 (07 Jun 2022 05:06) (18 - 18)  SpO2: 98% (07 Jun 2022 05:06) (94% - 98%)  I&O's Detail    06 Jun 2022 07:01  -  07 Jun 2022 07:00  --------------------------------------------------------  IN:  Total IN: 0 mL    OUT:    Indwelling Catheter - Urethral (mL): 1800 mL    Voided (mL): 400 mL  Total OUT: 2200 mL    Total NET: -2200 mL          Labs:                        8.7    6.69  )-----------( 240      ( 07 Jun 2022 06:31 )             27.0     06-07    144  |  107  |  14.7  ----------------------------<  169<H>  3.3<L>   |  28.0  |  0.94    Ca    10.8<H>      07 Jun 2022 06:31  Mg     2.1     06-07    TPro  6.3<L>  /  Alb  3.1<L>  /  TBili  0.3<L>  /  DBili  x   /  AST  10  /  ALT  <5  /  AlkPhos  67  06-07

## 2022-06-07 NOTE — PROVIDER CONTACT NOTE (OTHER) - ACTION/TREATMENT ORDERED:
Dr. Lopez to see pt at bedside, GI consult placed and possible fluid bolus.
No new orders or interventions at this time

## 2022-06-07 NOTE — CHART NOTE - NSCHARTNOTESELECT_GEN_ALL_CORE
Event Note
Nutrition Services
Urology/Event Note

## 2022-06-07 NOTE — CHART NOTE - NSCHARTNOTEFT_GEN_A_CORE
Pt's urine remains clear  3 way banks removed, new 16 Fr 2 way banks placed  see procedure note  keep banks for urinary retention  may consider TOV if pt able to get OOB to chair and is ambulatory  cont care as per primary team

## 2022-06-07 NOTE — PROCEDURE NOTE - NSURITECHNIQUE_GU_A_CORE
Proper hand hygiene was performed/Sterile gloves were worn for the duration of the procedure/A sterile drape was used to cover all adjacent areas/The site was cleaned with soap/water and sterile solution (betadine)/The catheter was appropriately lubricated/The catheter was secured with a securement device (e.g. StatLock)/The urinary drainage system is closed at the end of the procedure/The collection bag is below the level of the patient and urinary bladder/All applicable medical record documentation is completed
Proper hand hygiene was performed/Sterile gloves were worn for the duration of the procedure/A sterile drape was used to cover all adjacent areas/The site was cleaned with soap/water and sterile solution (betadine)/The catheter was appropriately lubricated/The catheter was secured with a securement device (e.g. StatLock)/The urinary drainage system is closed at the end of the procedure/The collection bag is below the level of the patient and urinary bladder/All applicable medical record documentation is completed
Proper hand hygiene was performed/Sterile gloves were worn for the duration of the procedure/A sterile drape was used to cover all adjacent areas/The site was cleaned with soap/water and sterile solution (betadine)/The catheter was appropriately lubricated/The catheter was secured with a securement device (e.g. StatLock)/The urinary drainage system is closed at the end of the procedure/The collection bag is below the level of the patient and urinary bladder

## 2022-06-07 NOTE — CHART NOTE - NSCHARTNOTEFT_GEN_A_CORE
Source: Patient [x ]  Family [ ]   other [CNA ]    Current Diet: Diet, Pureed:   Moderately Thick Liquids (MODTHICKLIQS) (05-29-22 @ 12:17)    Patient reports: some difficulty tolerating some foods on tray (farina); some difficulties drinking through straw - success with spoon fed liquids    PO intake:  < 50% [ ]   50-75%  [ X ]   %  [ ]      Source for PO intake [x ] Patient [ ] family [ ] chart [x ] staff [ ] other    Current Weight: 130lbs (5/28)  no new weights    Pertinent Medications: MEDICATIONS  (STANDING):  ascorbic acid 500 milliGRAM(s) Oral daily  atorvastatin 80 milliGRAM(s) Oral at bedtime  carbidopa/levodopa  25/100 1 Tablet(s) Oral <User Schedule>  citalopram 20 milliGRAM(s) Oral daily  dextrose 5%. 1000 milliLiter(s) (75 mL/Hr) IV Continuous <Continuous>  diltiazem    milliGRAM(s) Oral daily  enalapril 10 milliGRAM(s) Oral daily  enoxaparin Injectable 60 milliGRAM(s) SubCutaneous every 12 hours  mupirocin 2% Ointment 1 Application(s) Topical two times a day  pantoprazole  Injectable 40 milliGRAM(s) IV Push every 12 hours  potassium chloride   Powder 40 milliEquivalent(s) Oral every 4 hours  primidone 50 milliGRAM(s) Oral daily  tamsulosin 0.4 milliGRAM(s) Oral at bedtime    MEDICATIONS  (PRN):  acetaminophen     Tablet .. 650 milliGRAM(s) Oral every 6 hours PRN Temp greater or equal to 38C (100.4F), Mild Pain (1 - 3)  hydrocortisone hemorrhoidal Suppository 1 Suppository(s) Rectal daily PRN hemorrhoids    Pertinent Labs: CBC Full  -  ( 07 Jun 2022 06:31 )  WBC Count : 6.69 K/uL  RBC Count : 2.65 M/uL  Hemoglobin : 8.7 g/dL  Hematocrit : 27.0 %  Platelet Count - Automated : 240 K/uL  Mean Cell Volume : 101.9 fl  Mean Cell Hemoglobin : 32.8 pg  Mean Cell Hemoglobin Concentration : 32.2 gm/dL  Auto Neutrophil # : 5.57 K/uL  Auto Lymphocyte # : 0.53 K/uL  Auto Monocyte # : 0.29 K/uL  Auto Eosinophil # : 0.17 K/uL  Auto Basophil # : 0.00 K/uL  Auto Neutrophil % : 83.3 %  Auto Lymphocyte % : 7.9 %  Auto Monocyte % : 4.4 %  Auto Eosinophil % : 2.6 %  Auto Basophil % : 0.0 %    06-07 Na144 mmol/L Glu 169 mg/dL<H> K+ 3.3 mmol/L<L> Cr  0.94 mg/dL BUN 14.7 mg/dL Phos n/a   Alb 3.1 g/dL<L> PAB n/a         Skin: no skin breakdown  Edema: no edema noted    Nutrition focused physical exam previously conducted - found signs of malnutrition [ ]absent [ x ]present    Subcutaneous fat loss: [x sev] Orbital fat pads region, [ x mod]Buccal fat region, [ ]Triceps region,  [ ]Ribs region    Muscle wasting: [x sev ]Temples region, [x sev ]Clavicle region, [ ]Shoulder region, [ ]Scapula region, [ ]Interosseous region,  [ ]thigh region, [ ]Calf region    Estimated Needs:   [ x ] no change since previous assessment  [ ] recalculated:     Current Nutrition Diagnosis: Patient remains at high nutrition risk secondary to severe protein calorie malnutrition related to inadequate protein and energy intake with increased physiological needs in the setting of hx cancer, parkinson's disease, dysphagia and advanced age.     Patient eating breakfast at time of interview with assistance by CNA. Having some difficulties with farina. CNA reported patient tolerated mod thick liquids better when fed by spoon. PO intake ~50-75%. Will continue to monitor tolerance to diet consistencies. Supervision required. RD to followup prn.      Recommendations:   1) Recommend spoon fed liquids as tolerated  2) Continue to provide encouragement at meals with adequate fluids for hydration  3) monitor PO intake; monitor tolerance; monitor nutrition related labs    Monitoring and Evaluation:   [ x] PO intake [ x] Tolerance to diet prescription [X] Weights  [X] Follow up per protocol [X] Labs:

## 2022-06-07 NOTE — PROGRESS NOTE ADULT - ASSESSMENT
IMPRESSION:  The patient is a 76y Male with Parkinson's disease with a recent   left brachium pontis and left lenticular   nucleus/corona radiata region and now with cerebral venous sinus thrombosis.    Mechanism of stroke is not clear  Could be Cardioembolic versus hypercoagulable state of malignancy versus intracranial atherosclerosis.    ASSESSMENT/ PLAN:       - Neuro checks and vital signs Q 2 hours.  - SBP goal keep normotensive..  - Continue Enoxaparin 60 BID ( if risk of bleeding is high then consider Heparin gtt )  - A1C: 5.6 LDL: 89 Goal: < 70  - Telemetry monitoring.  - MRI Brain  and MRV  -images and reports were reviewed.   - CTV or Contrast enhanced MRV is not essential as the risk of contrast is not justified given that he needs anticoagulation barring major bleeding problem.  - TTE  of 5-08-22  -Reports as above were reviewed.   - Parkinson's disease  - On Sinemet for PD.  - PT OT following.  - SCD/ SQ Lovenox for DVT prophylaxis.               60 F with PMH of COPD (on home O2 (3L), hypothyroidism, T2DM (HbA1c 8s 11/2018, on insulin), RA on prednisone (10 mg daily x 30 years), HTN,  admitted for SBO s/p ex lap with lysis of adhesions, complicated by poor glycemic control with periods of hyperglycemia and hypoglycemia on home regimen as well as inpatient with no oral intake in setting of SBO.

## 2022-06-07 NOTE — PROCEDURE NOTE - NSPROCDETAILS_GEN_ALL_CORE
sterile technique, indwelling urinary device inserted
sterile technique, indwelling urinary device inserted
sterile technique, old cysto removed, new tube inserted

## 2022-06-08 NOTE — PROGRESS NOTE ADULT - REASON FOR ADMISSION
Stroke

## 2022-06-08 NOTE — PROGRESS NOTE ADULT - SUBJECTIVE AND OBJECTIVE BOX
Neurology                        HISTORY:  The patient is a 76y Male with history of TIA/CVA and Parkinson's disease with recent admission with fall, found to have subacute stroke and subacute rehabilitation was recommended but patient went home.   He now presented to [facility] with slurred speech and right facial weakness. Stroke code was activated and he was transferred here for further management.        PAST MEDICAL & SURGICAL HISTORY:  Hypertension  Hyperlipidemia  GERD (gastroesophageal reflux disease)  Parkinson's disease  Prostate cancer  Mitral valve prolapse  Malignant neoplasm of tongue, 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  Throat cancer  History of vasectomy  Prostate cancer  s/p cyber knife  S/P partial glossectomy  4/21    MEDICATION PRIOR TO ADMISSION:  · 	aspirin 81 mg oral delayed release tablet: Last Dose Taken:  , 1 tab(s) orally once a day  · 	Flomax 0.4 mg oral capsule: Last Dose Taken:  , 1 cap(s) orally once a day (at bedtime) MDD:1  · 	pantoprazole 40 mg oral delayed release tablet: Last Dose Taken:  , 1 tab(s) orally once a day (before a meal)  · 	Vitamin C 1000 mg oral tablet: Last Dose Taken:  , 1 tab(s) orally once a day  · 	DilTIAZem (Eqv-Cardizem CD) 240 mg/24 hours oral capsule, extended release: Last Dose Taken:  , 1 cap(s) orally once a day  · 	carbidopa-levodopa 25 mg-100 mg oral tablet: Last Dose Taken:  , 1 tab(s) orally 2 times a day  · 	enalapril 10 mg oral tablet: Last Dose Taken:  , 1 tab(s) orally once a day  · 	citalopram 20 mg oral tablet: Last Dose Taken:  , 1 tab(s) orally once a day  · 	simvastatin 40 mg oral tablet: Last Dose Taken:  , 1 tab(s) orally once a day (at bedtime)  · 	primidone 50 mg oral tablet: Last Dose Taken:  , 1 tab(s) orally once a day      Allergies  No Known Allergies    SOCIAL HISTORY:  Non smoker.     FAMILY HISTORY:  FH: heart disease  father, brothers  FH: type 2 diabetes  mother  No known family history of stroke.           Vital Signs Last 24 Hrs  T(C): 36.6 (08 Jun 2022 09:07), Max: 37.1 (07 Jun 2022 20:31)  T(F): 97.8 (08 Jun 2022 09:07), Max: 98.8 (07 Jun 2022 20:31)  HR: 74 (08 Jun 2022 09:07) (70 - 85)  BP: 114/68 (08 Jun 2022 09:07) (114/68 - 153/75)  BP(mean): --  RR: 18 (08 Jun 2022 09:07) (18 - 18)  SpO2: 96% (08 Jun 2022 09:07) (94% - 97%)    MEDICATIONS    acetaminophen     Tablet .. 650 milliGRAM(s) Oral every 6 hours PRN  ascorbic acid 500 milliGRAM(s) Oral daily  atorvastatin 80 milliGRAM(s) Oral at bedtime  carbidopa/levodopa  25/100 1 Tablet(s) Oral <User Schedule>  citalopram 20 milliGRAM(s) Oral daily  dextrose 5%. 1000 milliLiter(s) IV Continuous <Continuous>  diltiazem    milliGRAM(s) Oral daily  enalapril 10 milliGRAM(s) Oral daily  enoxaparin Injectable 60 milliGRAM(s) SubCutaneous every 12 hours  hydrocortisone hemorrhoidal Suppository 1 Suppository(s) Rectal daily PRN  mupirocin 2% Ointment 1 Application(s) Topical two times a day  pantoprazole  Injectable 40 milliGRAM(s) IV Push every 12 hours  primidone 50 milliGRAM(s) Oral daily  tamsulosin 0.4 milliGRAM(s) Oral at bedtime         LABS:  CBC Full  -  ( 08 Jun 2022 06:47 )  WBC Count : 8.05 K/uL  RBC Count : 3.00 M/uL  Hemoglobin : 9.6 g/dL  Hematocrit : 29.5 %  Platelet Count - Automated : 245 K/uL  Mean Cell Volume : 98.3 fl  Mean Cell Hemoglobin : 32.0 pg  Mean Cell Hemoglobin Concentration : 32.5 gm/dL  Auto Neutrophil # : 6.94 K/uL  Auto Lymphocyte # : 0.34 K/uL  Auto Monocyte # : 0.42 K/uL  Auto Eosinophil # : 0.28 K/uL  Auto Basophil # : 0.05 K/uL  Auto Neutrophil % : 86.3 %  Auto Lymphocyte % : 4.2 %  Auto Monocyte % : 5.2 %  Auto Eosinophil % : 3.5 %  Auto Basophil % : 0.6 %      06-08    139  |  105  |  11.6  ----------------------------<  122<H>  3.9   |  24.0  |  0.91    Ca    10.8<H>      08 Jun 2022 06:47  Mg     2.0     06-08    TPro  6.2<L>  /  Alb  2.8<L>  /  TBili  0.4  /  DBili  x   /  AST  10  /  ALT  7   /  AlkPhos  68  06-08    LIVER FUNCTIONS - ( 08 Jun 2022 06:47 )  Alb: 2.8 g/dL / Pro: 6.2 g/dL / ALK PHOS: 68 U/L / ALT: 7 U/L / AST: 10 U/L / GGT: x           Neurological Exam:    Mental status: The patient is awake, alert, and oriented X 2 . There is no aphasia. There is moderate dysarthria. Her has a masked facies.    Cranial nerves: Pupils react symmetrically to light. There is no visual field deficit to confrontation. Extraocular motion is full with no nystagmus.  Facial sensation is intact. Facial musculature is symmetric. Palate elevates symmetrically. Tongue is midline.    Motor: There is increased tone with cogwheeling at the wrists and elbows bilaterally. There is tremor at rest which subsides with intention.  Strength is 5/5 in the right arm and leg.   Strength is 5/5 in the left arm and leg.    Sensation: Intact to light touch and pin. There is no extinction to double simultaneous stimulation.      Cerebellar: There is no dysmetria on finger to nose testing.    RADIOLOGY   CT head images reviewed (and concur with report): There is no acute pathology. Chronic infarcts bilateral basal ganglia.    CT-A: Distal Right M1 high grade stenosis.  Mid right P2 high grade stenosis.    MRI Brain 6-1-22   MR Head No Cont (06.01.22 @ 10:56) >    IMPRESSION:    1.  Compared to 5/6/2022 MRI, expected interval evolution of previously   seen infarcts within the left corona radiata/lentiform nucleus and left   middle cerebellar peduncle, which are now subacute to chronic in nature.   No new acute infarct or intracranial hemorrhage is identified.   Nonspecific findings, suggestive of prior chronic lacunar infarcts and   chronic small vessel ischemia noted.    2.  T2/FLAIR hyperintense signal within right transverse/sigmoid sinuses   and exiting internal jugular vein, similar to 5/6/2022 MRI. This may   reflect slow/turbulent venous flow, although dural venous sinus   thrombosis cannot be entirely excluded. Consider MRV evaluation as   clinically indicated. No corresponding acute infarct or parenchymal edema.          MR Head No Cont (05.06.22 @ 19:00) >    INTERPRETATION:  Clinical indication: Subacute CVA.    MRI of the brain was performed using sagittal T1 axial T1 T2 T2 FLAIR   diffusion and [place] sequence.    This exam is compared with prior head CT performed on May 6, 2022.    Extensive parenchymal volume loss and chronic microvascular ischemic   changes are identified    Old lacunar infarct involving the right sublenticular nucleus region is   seen.    There is evidence of abnormal T2 prolongation with restricted diffusion   seen involving the left brachium pontis and left lenticular   nucleus/corona radiata region.    These findings are compatible with areas of acute infarcts. No   hemorrhagic transformation is seen. No significant shift or herniation is   seen.    The large vessels demonstrate normal flow    The visualized paranasal sinuses mastoid and mastoid clear.    IMPRESSION: Acute infarcts as described above.    TTE Echo Complete w/o Contrast w/ Doppler (05.08.22 @ 14:56) >    IMPRESSION:  Technically difficult study  Normal left ventricular internal dimensions and systolic function,   estimated LVEF of 60-65%.  Grossly normal RV size and systolic function.  Normal trileaflet aortic valve, without AI.  Trace physiologic MR  Mild TR.    TOMMY WILDER MD; Attending Cardiologist      MR Venogram Head No Cont (06.04.22 @ 20:49) >    IMPRESSION:  Absent flow related signal in the medialaspect of the right transverse   sinus.  Poor flow related signal in the lateral aspect of the right   transverse sinus and right sigmoid sinus.  Absent flow related signal in   the right upper internal jugular vein.  The findings are suspicious for   dural venous sinus thrombosis.  CT venogram is recommended for more   definitive assessment.    SHARON COLEMAN MD; Attending Radiologist

## 2022-06-08 NOTE — PROGRESS NOTE ADULT - PROVIDER SPECIALTY LIST ADULT
Electrophysiology
Hospitalist
Infectious Disease
Neurology
Infectious Disease
Neurology
Urology
Hospitalist
Hospitalist
Infectious Disease
Infectious Disease
Internal Medicine
Neurology
Urology
Urology
Hospitalist
Hospitalist
Neurology
Neurology
Cardiology
Hospitalist
Hospitalist
Neurology
Urology
Gastroenterology

## 2022-06-08 NOTE — PROGRESS NOTE ADULT - NUTRITIONAL ASSESSMENT
This patient has been assessed with a concern for Malnutrition and has been determined to have a diagnosis/diagnoses of Severe protein-calorie malnutrition.    This patient is being managed with:   Diet Pureed-  Moderately Thick Liquids (MODTHICKLIQS)  Entered: May 29 2022 12:17PM    

## 2022-06-17 PROBLEM — C14.0 MALIGNANT NEOPLASM OF PHARYNX, UNSPECIFIED: Chronic | Status: ACTIVE | Noted: 2022-01-01

## 2022-06-17 NOTE — ED PROVIDER NOTE - NS ED MD TWO NIGHTS YN
Thank you for choosing [facility]) Physicians for your Hand and Upper Extremity needs. If we can be of any further assistance to you, please do not hesitate to contact us.     Office Phone Number:  [phone]  or  [phone]
Yes

## 2022-06-17 NOTE — ED ADULT NURSE REASSESSMENT NOTE - NS ED NURSE REASSESS COMMENT FT1
Vent settings changed to 80% FiO2 and PEEP 5 by ICU MD. Vent settings changed to 80% FiO2 and PEEP 5 by ICU NP Amanda

## 2022-06-17 NOTE — ED PROVIDER NOTE - PHYSICAL EXAMINATION
Gen: ill appearing, unrsponsive, resp distress, cachectic   Head: NC/AT  Neck: trachea midline  cv: tachycardic   Resp:  resp distress, using accessory muscles   Ext: no deformities  Neuro:  unresponsive  Skin:  Warm and dry as visualized  Psych:  Normal affect and mood

## 2022-06-17 NOTE — H&P ADULT - NSHPPHYSICALEXAM_GEN_ALL_CORE
T(C): 38.3 (06-17-22 @ 13:35), Max: 38.9 (06-17-22 @ 11:56)  HR: 95 (06-17-22 @ 13:39) (90 - 122)  BP: 94/57 (06-17-22 @ 13:39) (86/52 - 143/79)  RR: 14 (06-17-22 @ 13:39) (14 - 14)  SpO2: 100% (06-17-22 @ 13:39) (79% - 100%)    GENERAL: Ill-appearing. Intubated and sedated.  LUNGS: good air entry bilaterally, clear to auscultation, symmetric breath sounds, no wheezing or rhonchi appreciated  HEART: soft S1/S2, regular rate and rhythm, no murmurs noted, no lower extremity edema  GASTROINTESTINAL: abdomen is soft, concave abdomen, normoactive bowel sounds, no palpable masses  INTEGUMENT: good skin turgor, warm skin, appears well perfused  MUSCULOSKELETAL: no clubbing or cyanosis, no obvious deformity  NEUROLOGIC: Unable to obtain mental status as patient is intubated, good muscle tone in 4 extremities, no obvious sensory deficits

## 2022-06-17 NOTE — H&P ADULT - PROBLEM SELECTOR PLAN 5
On enalapril + Cardizem? for HTN.  -Hold antihypertensives as pt is hypotensive on admission.  -Hold ACE in the setting of ÁNGEL  -Continue pressors  -Mgt as per ICU On enalapril + Cardizem for HTN.  -Hold antihypertensives as pt is hypotensive on admission.  -Hold ACE in the setting of ÁNGEL  -Continue pressors  -Mgt as per ICU

## 2022-06-17 NOTE — PROGRESS NOTE ADULT - ASSESSMENT
76 year old male with history of prostate cancer, SCC of tongue s/p partial resection now with recurrence, parkinson's disease, hypertension, hyperlipidemia, recent CVA, and oropharyngeal dysphagia here with unresponsiveness and hypotension requiring intubation.     Problems  Acute metabolic encephalopathy  Acute respiratory failure  Bilateral lower lobe pneumonia  Septic shock  Acute renal failure, ATN  Hypernatremia    Plan  NEURO: Continue sedation for comfort, ventilator synchrony  CVS: Vasopressor support. Maintain MAP > 65. Stress dose steroids.  PULM: Lung protective ventilation.  GI: NPO  RENAL: Monitor UOP, Cr. Renally dose meds.  ID: Empiric cefepime, vancomycin  PPX: DVT PPX  DNR  Prognosis guarded

## 2022-06-17 NOTE — H&P ADULT - PROBLEM SELECTOR PLAN 1
Pt emergently intubated, now on ventilator. Current vent settings:   Vent Settings: 350/14/80/5  AB.34/43/52/23 post-intubation  Suspect aspiration event vs PNA given pt hx with dysphagia s/p CVAs + found to be gurgling.CXR shows LLL consolidation, however WBC wnl.  -Adjust vent settings as per Blood Gas, Labs, & SpO2  -Propofol for sedation + vent synchrony  -Mgt as per ICU Pt emergently intubated, now on ventilator. Current vent settings:   Vent Settings: 350/14/80/5  AB.34/43/52/23 post-intubation  Suspect aspiration event vs PNA given pt hx with dysphagia s/p CVAs + found to be gurgling.CXR shows LLL consolidation, however WBC wnl.  -continue IV abx  -Adjust vent settings as per Blood Gas, Labs, & SpO2  -Propofol for sedation + vent synchrony  -Mgt as per ICU

## 2022-06-17 NOTE — H&P ADULT - NSHPSOCIALHISTORY_GEN_ALL_CORE
Tobacco: Denies  EtOH: Denies   Recreational drug use: Denies   Lives with: [facility]   Ambulates: with a cane   ADLs: with assistance   Vaccinations: Moderna 3/3

## 2022-06-17 NOTE — PATIENT PROFILE ADULT - NSPROPTRIGHTNOTIFY_GEN_A_NUR
Problem: Physical Therapy Goal  Goal: Physical Therapy Goal  Description: Goals to be met by: 3/4/22     Patient will increase functional independence with mobility by performin. Supine <> sit with supervision  2. Sit to stand transfer with supervision  3. Bed to chair transfer with Supervision using Rolling Walker  4. Gait  x 50 feet with Supervision using Rolling Walker.   5. Lower extremity exercise program x 10 reps per handout, with supervision    Outcome: Ongoing, Progressing     Pt reporting dizziness this session and increased in sitting/standing. Pt requires Maureen for transfers and side steps R toward HOB with RW. No body jerking observed this date, only dizziness. Pt is impulsive at times and easily distracted. Mod cueing to stay on task and for sequencing side steps. Pt returned supine 2/2 dizziness and therex completed. Recommending SNF placement upon d/c.    information could not be obtained

## 2022-06-17 NOTE — H&P ADULT - HISTORY OF PRESENT ILLNESS
77 yo male with PMH of prostate ca in remission, tongue SCC s/p partial section last year now recurrence not fit for chemo, planned for immunotherapy, parkinson's disease, HTN, HLD, GERD, recent multiple admission for stroke like symptoms, weakness, and falls, recently admitted to [facility] for CVA (no TPA given due to recent hx of CVA), who presented to the ED unresponsive, hypoxic, hypotensive and in respiratory distress.    In the ED:  T 102F Rectal,  /80 RR Unknown SpO2 79% on NRB  Hgb 11.6 INR 1.73 Na 149 K 3.1 Cl 116  Cr 2.5 Glucose 125 Ca 12.5  Albumin 2.2 (corrected calcium 13.9) Lactate 4.8  AB.34/43/52/23  Vanc x1, Zosyn x1, 1L NS Bolus x1, 20mg Etomidate, 100mg Succ, 50mcg Fentanyl, 1g IV Tylenol, Levophed gtt  Vent Settings: 350/14/80/5 75 yo male with PMH of prostate ca in remission, tongue SCC s/p partial section last year now recurrence not fit for chemo, planned for immunotherapy, parkinson's disease, HTN, HLD, GERD, recent multiple admission for stroke like symptoms, weakness, and falls, recently admitted to [facility] for CVA (no TPA given due to recent hx of CVA), Dysphagia who presented to the ED from [facility] unresponsive, hypoxic, hypotensive and in respiratory distress. As per the facility note, EMS was called emergently for increased respiratory difficulties this morning, gurgling, AMS, and significant change from baseline. Pt was evaluated yesterday for PEG placement due to dysphagia, scheduled for placement on . Unable to perform ROS as per patient mental status.    EMS Vitals:  Temp: NA BP 72/52  RR 24 Unable to obtain SpO2 reading, placed on 15L NRB    In the ED:  T 102F Rectal,  /80 RR Unknown SpO2 79% on NRB  Hgb 11.6 INR 1.73 Na 149 K 3.1 Cl 116  Cr 2.5 Glucose 125 Ca 12.5  Albumin 2.2 (corrected calcium 13.9) Lactate 4.8  AB.34/43/52/23  Vanc x1, Zosyn x1, 1L NS Bolus x1, 20mg Etomidate, 100mg Succ, 50mcg Fentanyl, 1g IV Tylenol, Levophed gtt  Vent Settings: 350/14/80/5 77 yo male with PMH of prostate ca in remission, tongue SCC s/p partial section last year now recurrence not fit for chemo, planned for immunotherapy, parkinson's disease, HTN, HLD, GERD, recent multiple admission for stroke like symptoms, weakness, and falls, recently admitted to [facility] for CVA (no TPA given due to recent hx of CVA), Dysphagia who presented to the ED from [facility] unresponsive, hypoxic, hypotensive and in respiratory distress. As per the facility note, EMS was called emergently for increased respiratory difficulties this morning, gurgling, AMS, and significant change from baseline. Pt was evaluated yesterday for PEG placement due to dysphagia, scheduled for placement on . Unable to perform ROS as per patient mental status and he is intubated and sedated.     EMS Vitals:  Temp: NA BP 72/52  RR 24 Unable to obtain SpO2 reading, placed on 15L NRB    In the ED:  T 102F Rectal,  /80 RR Unknown SpO2 79% on NRB  Hgb 11.6 INR 1.73 Na 149 K 3.1 Cl 116  Cr 2.5 Glucose 125 Ca 12.5  Albumin 2.2 (corrected calcium 13.9) Lactate 4.8  AB.34/43/52/23  Vanc x1, Zosyn x1, 1L NS Bolus x1, 20mg Etomidate, 100mg Succ, 50mcg Fentanyl, 1g IV Tylenol, Levophed gtt  Vent Settings: 350/14/80/5

## 2022-06-17 NOTE — H&P ADULT - PROBLEM SELECTOR PLAN 3
Lactate 4.8  s/p 1L NS Bolus  Likely 2/2 vascular hypoperfusion given hypotension requiring pressors in ED  -Trend lactate  -Mgt as per ICU

## 2022-06-17 NOTE — H&P ADULT - ASSESSMENT
77 yo male with PMH of prostate ca in remission, tongue SCC s/p partial section last year now recurrence not fit for chemo, planned for immunotherapy, parkinson's disease, HTN, HLD, GERD, recent multiple admission for stroke like symptoms, weakness, and falls, recently admitted to [facility] for CVA (no TPA given due to recent hx of CVA), Dysphagia who presented to the ED from [facility] unresponsive, hypoxic, hypotensive and in respiratory distress. He is being admitted to the ICU for mgt of Acute Hypoxic Respiratory Failure

## 2022-06-17 NOTE — ED PROVIDER NOTE - CRITICAL CARE ATTENDING CONTRIBUTION TO CARE
Upon my evaluation, this patient had a high probability of imminent or life-threatening deterioration due to __acute hypoxic resp failure ___septic shock____, which required my direct attention, intervention, and personal management.  The patient has a  medical condition that impairs one or more vital organ systems.  Frequent personal assessment and adjustment of medical interventions was performed.      I have personally provided 60___ minutes of critical care time exclusive of time spent on separately billable procedures. Time includes review of laboratory data, radiology results, discussion with consultants, patient and family; monitoring for potential decompensation, as well as time spent retrieving data and reviewing the chart and documenting the visit. Interventions were performed as documented above.

## 2022-06-17 NOTE — ED ADULT NURSE NOTE - OBJECTIVE STATEMENT
Patient BIBA unresponsive and with labored breathing from [facility] [place]. Patient hot to touch and unresponsive to verbal or painful stimuli. Entire team of RNs, MDs, ANM and anesthesia brought to bedside for RSI. Patient with hx of oral and throat CA. Right arm PICC noted. Left AC 20G placed by EMS with fluids NS 1L infusing. Amato noted and changed in ED. Sacral redness blanchable noted with allevyn in place. Patient intubated with 6.5 Ett, NG placed to R nare. Siderails up for safety. IV, labs and EKG done. Telemetry monitor on. Suction at bedside.

## 2022-06-17 NOTE — ED PROVIDER NOTE - CLINICAL SUMMARY MEDICAL DECISION MAKING FREE TEXT BOX
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 Found to have recurrence not physically able to tolerate chemotherapy.  Hem On planning for Immunotherapy, Parkinson's disease, HTN, HLD< GERD. He has had frequent multiple admissions for stroke like symptoms, weakness and falls. He was admitted here with Subacute Stroke.  pw ams, unresponsive, resp distress, sepsis with hypotension  intubated for acute hypoxic resp failure

## 2022-06-17 NOTE — H&P ADULT - PROBLEM SELECTOR PLAN 2
Baseline Cr 0.9-1.0 as per chart review. Cr 2.5 on admission  Given other concurrent electrolyte abnormalities with dark brown urine in banks, suspect 2/2 decreased PO intake in the setting of dysphagia + hypoperfusion 2/2 hypotension  -s/p 1L NS Bolus in ED  -Trend BUN/Cr daily  -I&Os  -Hold nephrotoxic medications  -Mgt as per ICU Baseline Cr 0.9-1.0 as per chart review. Cr 2.5 on admission  Given other concurrent electrolyte abnormalities with dark brown urine in banks, suspect 2/2 decreased PO intake in the setting of dysphagia + hypoperfusion 2/2 hypotension  -s/p 1L NS Bolus in ED  -Trend BUN/Cr daily  -I&Os  -Hold nephrotoxic medications  -monitor urine output. Continue IVF  -Mgt as per ICU

## 2022-06-17 NOTE — ED ADULT NURSE NOTE - COVID-19  TEST TYPE
Arm Swelling Follow Up     Date of Service: 2022     Date last seen by Dr. Cantor: 21               PCP: Berna Morel MD     Impression:    1. L arm swelling- stable,slight increase in aching proximally  2. Bilateral leg swelling-improved  3. Secondary post mastectomy lymphedema  4. Bilateral shoulder contractures-left continues  5. Venous stasis with hypertension in the legs bilaterally  6. Bilateral breast carcinoma (bilateral: R lumpectomy-mucinous carcinoma treated with radiation,  R simple  and L mod rad +2 LN ER/KS + lobular, radiation and hormone)  7. Morbid obesity       Plan:   1. Questions were answered.  2. An orthotics order was placed to have patient fitted for new compression both lower extremity compression and left upper extremity compression and gauntlet due to difficulties with current fit.    3. Measures are also up, and patient would benefit from some structured lymphedema therapy to help aid with reduction of swelling.  This order was placed at today's visit.    4.  Increase exercise and range of motion/stretching.  5. Follow up with me 7 months, or when needed.  If any questions or concerns she will contact the clinic.       On day of encounter time spent in chart review and with patient in consultation, exam, education and coordination of care, excluding procedures:  50 minutes          ---------------------------------------------------------------------------------------------------------------------     History of Present Illness:   Camilla Wilson returns to the [facility] for left arm swelling due to post mastectomy lymphedema after being diagnosed 20 with left breast lobular carcinoma ER/KS + Her2 - stage IIIa,  treated with bilateral mastectomies 9/15/20, (R simple and L mod rad with +2 positive LN) complicated by previous right breast mucinous cancer many years ago treated with lumpectomy, radiation and hormones.  She sees Dr. Beckman (radiation) and Dr. Laguna (oncology). She also had long standing leg swelling with restless legs.  Dr. Cantor sent her to therapy for work on the chest wall fibrosis and for the leg swelling which helped.  Bilateral compression sleeves were ordered with review on wearing indications.  She was to continue wearing her compression daily. Most recent follow up with oncology was on 1/10/22  and she was doing well with no signs of recurrence of her cancer. She has switched to anastrozole in Dec 21 and was initially tolerating it well but at the time of the visit was having some arthralgias and myalgias, and continuing with ibuprofen and tylenol. Plan to continue anastrozole for now . Plan for a 6 month follow up with Oncology.    At follow up today she reports that she feels increased heaviness in her left upper extremity that has been present for for a few months.  She has not been wearing compression all the time, but only when needed.  She has also not been wearing it mainly due to activities during the day that require her to use and wash her hands quite a bit.  She also states that the gauntlet has caused her some pain and constriction resulting in numbness near some of her digits.  So she cannot tolerate it and does not wear it anymore.  She also feels that her upper extremity compression is tight at the wrist.  She has only been wearing it at night and not during the day.  In terms of lower extremity compression, she orders it herself and has about 5-6 pairs.  She complains that the top of the garment rolls down and gets tight under her knees causing an area of constriction.  She has been experimenting with different brands and has not found one that works well for her.  She is also unsure of what size she should be going with.  She has been wearing them every day when up and active in spite of all these issues.  In terms of her arm range of motion, she states that it is pretty good and she  is able to raise her arms all the way above her head on both sides.  She continues to exercise daily at home.      There has been no new numbness, tingling or weakness.  There have been no new masses, rashes, or swellings of any other joints. There have been no infections.  There has been no new unexplained weight loss, loss of appetite, nausea and/or vomiting, shortness of breath, weight loss and chest pain.       Past Medical History:    Past Medical History:   Diagnosis Date     Breast cancer (H) 2000     Breast cyst      GERD (gastroesophageal reflux disease)      Hx of radiation therapy      Moderate persistent asthma without complication 8/14/2020     PONV (postoperative nausea and vomiting)      Uterine fibroid     embolized        Surgical History:   Past Surgical History:   Procedure Laterality Date     BIOPSY BREAST Left      EXCISE BREAST CYST/FIBROADENOMA/TUMOR/DUCT LESION/NIPPLE LESION/AREOLAR LESION      Description: Breast Surgery Lumpectomy;  Recorded: 12/18/2007;      SHLDR ARTHROSCOP,SURG,W/REMOVAL,LOOSE/FB      Description: Shoulder Arthroscopy;  Recorded: 02/25/2009;  Comments: right 2004; left 2007     IR MISCELLANEOUS PROCEDURE  9/26/2000     IR MISCELLANEOUS PROCEDURE  9/26/2000     IR MISCELLANEOUS PROCEDURE  9/26/2000     IR PELVIC ANGIOGRAM  9/26/2000     IR PELVIC ANGIOGRAM  9/26/2000     LUMPECTOMY BREAST Right 2007     LUMPECTOMY BREAST Left 8/21/2020    Procedure: Left Lumpectomy after Wire Localization: [place] Lymph Node Biopsy;  Surgeon: Margarita Cook MD;  Location: [facility];  Service: General     MASTECTOMY Bilateral 09/14/2020    Dr. Cook     IL EMBOLIZATION UTERINE FIBROID      Description: Uterine Fibroid Embolization;  Proc Date: 09/06/2000;     IL MASTECTOMY, SIMPLE, COMPLETE Bilateral 9/14/2020    Procedure: Bilateral Mastectomies;  Surgeon: Margarita Cook MD;  Location: [facility];  Service: General     US BREAST CORE BIOPSY LEFT Left 7/22/2020      US BREAST LOC W SENT NODE INJ LEFT Left 2020        Medications:    Current Outpatient Medications   Medication     anastrozole (ARIMIDEX) 1 MG tablet     azelaic acid (FINACIA) 15 % external gel     calcium citrate 250 mg calcium Tab     cetirizine (ZYRTEC) 10 MG tablet     cholecalciferol, vitamin D3, (VITAMIN D3) 2,000 unit cap     COQ10, UBIQUINOL, ORAL     famotidine (FOR PEPCID) 10 MG tablet     melatonin 5 mg Tab tablet     Multiple Vitamins-Minerals (AIRBORNE PO)     naproxen sodium 220 MG capsule     peg 400-propylene glycol (SYSTANE) 0.4-0.3 % Drop     SF 5000 PLUS 1.1 % CREA     sodium fluoride-pot nitrate (PREVIDENT 5000 SENSITIVE) 1.1-5 % Pste     vit C,L-Zd-opkdm-lutein-zeaxan (OCUVITE LUTEIN & ZEAXANTHIN) 60 mg-30 unit- 15 mg-2 mg-6 mg cap     No current facility-administered medications for this visit.        Allergies:    Allergies   Allergen Reactions     Atorvastatin Unknown     Memory  Impairment        Family history:   Family History   Problem Relation Age of Onset     Colon Cancer Maternal Aunt      Breast Cancer Maternal Aunt         40s     Kidney Disease Mother      Pneumonia Father      Hyperlipidemia Father      Cancer Cousin      Cancer Cousin         Social History:   Social History     Tobacco Use     Smoking status: Former Smoker     Packs/day: 0.50     Years: 15.00     Pack years: 7.50     Quit date: 1982     Years since quittin.9     Smokeless tobacco: Never Used   Substance Use Topics     Alcohol use: Yes     Alcohol/week: 5.0 standard drinks     Drug use: Never          Review of Systems:     ROS negative except as noted in HPI.     Labs:      I personally reviewed the following lab results today and those on care everywhere, if indicated     C-Reactive Protein High Sensitivity   Date Value Ref Range Status   2021 0.8 0.0 - 3.0 mg/L Final     Comment:     Low risk < 1.0 mg/L  Average risk 1.0 to 3.0 mg/L  High risk > 3.0 mg/L  Acute inflamation > 10.0 mg/L       No results found for: SED   Last Renal Panel:  Sodium   Date Value Ref Range Status   11/22/2021 141 136 - 145 mmol/L Final     Potassium   Date Value Ref Range Status   11/22/2021 4.2 3.5 - 5.0 mmol/L Final     Chloride   Date Value Ref Range Status   11/22/2021 106 98 - 107 mmol/L Final     Carbon Dioxide (CO2)   Date Value Ref Range Status   11/22/2021 26 22 - 31 mmol/L Final     Anion Gap   Date Value Ref Range Status   11/22/2021 9 5 - 18 mmol/L Final     Glucose   Date Value Ref Range Status   11/22/2021 99 70 - 125 mg/dL Final     Urea Nitrogen   Date Value Ref Range Status   11/22/2021 16 8 - 28 mg/dL Final     Creatinine   Date Value Ref Range Status   11/22/2021 0.85 0.60 - 1.10 mg/dL Final     GFR Estimate   Date Value Ref Range Status   11/22/2021 68 >60 mL/min/1.73m2 Final     Comment:     As of July 11, 2021, eGFR is calculated by the CKD-EPI creatinine equation, without race adjustment. eGFR can be influenced by muscle mass, exercise, and diet. The reported eGFR is an estimation only and is only applicable if the renal function is stable.   11/19/2020 >60 >60 mL/min/1.73m2 Final     Calcium   Date Value Ref Range Status   11/22/2021 9.7 8.5 - 10.5 mg/dL Final     Albumin   Date Value Ref Range Status   11/22/2021 3.7 3.5 - 5.0 g/dL Final      Lab Results   Component Value Date    WBC 5.9 08/14/2020     Lab Results   Component Value Date    RBC 4.03 08/14/2020     Lab Results   Component Value Date    HGB 12.9 08/14/2020     Lab Results   Component Value Date    HCT 39.3 08/14/2020     No components found for: MCT  Lab Results   Component Value Date    MCV 98 08/14/2020     Lab Results   Component Value Date    MCH 32.0 08/14/2020     Lab Results   Component Value Date    MCHC 32.8 08/14/2020     Lab Results   Component Value Date    RDW 13.3 08/14/2020     Lab Results   Component Value Date     08/14/2020      Lab Results   Component Value Date    A1C 5.8 11/22/2021    A1C 5.4 11/19/2020     A1C 5.6 03/16/2016      TSH   Date Value Ref Range Status   11/22/2021 1.75 0.30 - 5.00 uIU/mL Final      Lab Results   Component Value Date    VITDT 52 11/22/2021         Imaging:     I personally reviewed the following imaging results today and those on care everywhere, if indicated     No results found for this visit on 03/24/22.        Physical Exam:     Vital Signs: BP (!) 142/80   Pulse 60   Temp 98  F (36.7  C)   Wt 119.4 kg (263 lb 3.2 oz)   BMI 44.83 kg/m   Body mass index is 44.83 kg/m .   Wt Readings from Last 2 Encounters:   03/24/22 119.4 kg (263 lb 3.2 oz)   01/10/22 115.7 kg (255 lb)        Circumferential volume measures:    Circumferential Measures 10/15/2020 2/11/2021 3/24/2022   Right-just above MCP 19.3 19.3 20.8   Right Wrist 19 18.2 18   Right Up 10cm 28.3 27.5 25.2   Right Up 10cm From Elbow 43.8 44 46   Left-just above MCP 18.6 19.3 21.2   Left Wrist 19 18.8 19.2   Left Up 10cm 28.5 28 28.7   Left Up 10cm From Elbow 44.3 41.5 47.3   Right just above MTP 22.4 22.5 22.7   Right Ankle 32 26.7 26.5   Right Widest Calf 48.8 49.5 50.8   Right Thigh Up 10cm 64 - -   Right Knee to Ankle 33 - -   Left - just above MTP 22.2 23 22.8   Left Ankle 31.5 27.5 27   Left Widest Calf 51.3 52.5 52.3   Left Thigh Up 10cm 64.5 - -   Left Knee to Ankle 32 - -       General:  71 y.o. female in no apparent distress.       Psych: Alert and oriented x 3.  Cooperative. Affect normal.     HEENT: Atraumatic, normocephalic     Range of Motion:  Range of motion of shoulders, elbows, wrists, knees and ankles is normal bilaterally without active joint synovitis, erythema, joint swelling, crepitus or joint laxity. .     Sensation: Intact to light touch in the upper and lower extremities bilaterally.       Strength:  Normal strength testing in shoulder abduction, elbow flexion, elbow extension, wrist extension, forearm supination, forearm pronation, hand intrinsics  bilaterally.  Normal strength testing in hip flexion,  knee flexion, knee extension, ankle dorsiflexion bilaterally.     Lymph nodes: No cervical, supraclavicular, infraclavicular, or axillary lymphadenopathy palpated.     Vascular: No unusual venous distention. Dorsalis pedis and posterior tib pulses strong and equal in the feet bilaterally and to hand-held Doppler.  Numerous telangiectasias, ankle flaring and spider veins noted in both legs from the thighs on down.     Skin: No unusual rubor, calor, masses or rashes along the skin in either arm or in either leg.  No significant fibrosity or scarring.  No pain to palpation.  Slight darkening of the left axillary anterior chest wall area without open ulcerations or unusual lesions.       Mitali Ramesh, MS3    Physician Attestation   I, Susu Espinoza, was present with the medical/[employer] student who participated in the service and in the documentation of the note.  I have verified the history and personally performed the physical exam and medical decision making.  I agree with the assessment and plan of care as documented in the note.      Items personally reviewed: vitals, labs and imaging and agree with the interpretation documented in the note.    Susu Espinoza MD  Physical Medicine & Rehabilitation       MOLECULAR PCR

## 2022-06-17 NOTE — PROCEDURE NOTE - ADDITIONAL PROCEDURE DETAILS
Patient admitted with acute hypoxic resp failure, shock, pna. On multi pressor.   Art line placed for accurate BP monitoring and titration purposes. Needed for frequent ABG.    Done under ultrasound. Wave form appropriate.

## 2022-06-17 NOTE — CONSULT NOTE ADULT - SUBJECTIVE AND OBJECTIVE BOX
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 Patient was emergently intubated by anesthesia in ED. Patient was found to be in profound shock with lactic acidosis. Initial work up showing WBC 3.45 (prior in June 8.05), , K 3.1, Chloride 116, creat 2.5 (baseline 0.95), and lactate 4.8. Initial post intubation ABG showing 7.34/43/52/23.  ICU consulted for admission and further management.  Upon assessment patient is hypotensive with SBP in 60's being started on levophed infusion. Patient ill appearing, fio2 increased to 80%. Patient remains unresponsive. Cachectic. Breath sounds diminished b/l.     Events last 24 hours: Patient arrived to ED unresponsive, intubated, shock, pressors, ICU consult.     PAST MEDICAL & SURGICAL HISTORY:  Hypertension  Hyperlipidemia  GERD (gastroesophageal reflux disease)  Parkinson&#x27;s disease  Prostate cancer  Mitral valve prolapse  Malignant neoplasm of tongue, 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  Throat cancer  History of vasectomy  Prostate cancer  s/p cyber knife  S/P partial glossectomy  4/21    Review of Systems:  Unable to be obtain d/t clinical status.      Medications:  norepinephrine Infusion 0.05 MICROgram(s)/kG/Min IV Continuous <Continuous>  propofol Infusion 20 MICROgram(s)/kG/Min IV Continuous <Continuous>  heparin   Injectable 5000 Unit(s) SubCutaneous every 8 hours  lactated ringers Bolus 500 milliLiter(s) IV Bolus once  potassium chloride  10 mEq/100 mL IVPB 10 milliEquivalent(s) IV Intermittent every 1 hour  chlorhexidine 0.12% Liquid 15 milliLiter(s) Oral Mucosa every 12 hours  chlorhexidine 2% Cloths 1 Application(s) Topical <User Schedule>    Mode: AC/ CMV (Assist Control/ Continuous Mandatory Ventilation)  RR (machine): 14  TV (machine): 350  FiO2: 80  PEEP: 5  ITime: 1  MAP: 9  PIP: 35    ICU Vital Signs Last 24 Hrs  T(C): 38.3 (17 Jun 2022 13:35), Max: 38.9 (17 Jun 2022 11:56)  T(F): 100.9 (17 Jun 2022 13:35), Max: 102 (17 Jun 2022 11:56)  HR: 95 (17 Jun 2022 13:39) (90 - 122)  BP: 94/57 (17 Jun 2022 13:39) (86/52 - 143/79)  BP(mean): 70 (17 Jun 2022 13:39) (64 - 70)  ABP: --  ABP(mean): --  RR: 14 (17 Jun 2022 13:39) (14 - 14)  SpO2: 100% (17 Jun 2022 13:39) (79% - 100%)      ABG - ( 17 Jun 2022 13:14 )  pH, Arterial: 7.34  pH, Blood: x     /  pCO2: 43    /  pO2: 52    / HCO3: 23    / Base Excess: -2.6  /  SaO2: 82.8      I&O's Detail    LABS:                        11.6   x     )-----------( 282      ( 17 Jun 2022 12:23 )             34.2     06-17    149<H>  |  116<H>  |  35<H>  ----------------------------<  125<H>  3.1<L>   |  25  |  2.50<H>    Ca    12.5<H>      17 Jun 2022 12:23    TPro  6.4  /  Alb  2.2<L>  /  TBili  0.7  /  DBili  x   /  AST  31  /  ALT  6<L>  /  AlkPhos  55  06-17    CAPILLARY BLOOD GLUCOSE    POCT Blood Glucose.: 116 mg/dL (17 Jun 2022 11:46)    PT/INR - ( 17 Jun 2022 12:23 )   PT: 20.4 sec;   INR: 1.73 ratio      PTT - ( 17 Jun 2022 12:23 )  PTT:43.1 sec    CULTURES:    Physical Examination:    General: Cachectic, unresponsive, ill appearing.      HEENT: Pupils equal, 4mm reactive to light.  Symmetric.    PULM: Diminished breath sounds b/l, chest expansion symmetrical.     CVS: Regular rate and rhythm, no murmurs, rubs, or gallops    ABD: Soft, nondistended, hypoactive bowel sounds    EXT: No edema    SKIN: Cool, dry. Noted ecchymotic area with ? Trauma to L lower sternum.     NEURO: Unresponsive, obtunded, intubated.       RADIOLOGY: < from: Xray Chest 1 View- PORTABLE-Urgent (06.17.22 @ 12:11) >  INTERPRETATION:  AP supine chest on June 17, 2022 at 11:55 AM. Patient   has sepsis.    Moderate to severe right thoracic curve again noted.    Heart magnified by technique.    Presently there is a left loop recorder new since May 28. Present film   shows endotracheal tube and nasogastric tube in place.    Present film shows a significant right base infiltrate and significant   scattered lung or pleural findings on the left which are also new since   May 28.    Follow-up AP chest on June 17, 2022 at 12:56 PM.    Some improvement in the basilar lung findings.    IMPRESSION: Tubes in place.    Significant lower lung field lung and pleural findings somewhat improved   on the latest film.    CRITICAL CARE TIME SPENT: 54 minutes  Evaluating/treating patient, reviewing data/labs/imaging, discussing case with multidisciplinary team, discussing plan/goals of care with patient/family. Non-inclusive of procedure time.   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 Patient was emergently intubated by anesthesia in ED. Patient was found to be in profound shock with lactic acidosis. Initial work up showing WBC 3.45 (prior in June 8.05), , K 3.1, Chloride 116, creat 2.5 (baseline 0.95), and lactate 4.8. Initial post intubation ABG showing 7.34/43/52/23.  ICU consulted for admission and further management.  Upon assessment patient is hypotensive with SBP in 60's being started on levophed infusion. Patient ill appearing, fio2 increased to 80%. Patient remains unresponsive. Cachectic. Breath sounds diminished b/l.     Events last 24 hours: Patient arrived to ED unresponsive, intubated, shock, pressors, ICU consult.     PAST MEDICAL & SURGICAL HISTORY:  Hypertension  Hyperlipidemia  GERD (gastroesophageal reflux disease)  Parkinson&#x27;s disease  Prostate cancer  Mitral valve prolapse  Malignant neoplasm of tongue, 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  Throat cancer  History of vasectomy  Prostate cancer  s/p cyber knife  S/P partial glossectomy  4/21    Review of Systems:  Unable to be obtain d/t clinical status.      Medications:  norepinephrine Infusion 0.05 MICROgram(s)/kG/Min IV Continuous <Continuous>  propofol Infusion 20 MICROgram(s)/kG/Min IV Continuous <Continuous>  heparin   Injectable 5000 Unit(s) SubCutaneous every 8 hours  lactated ringers Bolus 500 milliLiter(s) IV Bolus once  potassium chloride  10 mEq/100 mL IVPB 10 milliEquivalent(s) IV Intermittent every 1 hour  chlorhexidine 0.12% Liquid 15 milliLiter(s) Oral Mucosa every 12 hours  chlorhexidine 2% Cloths 1 Application(s) Topical <User Schedule>    Mode: AC/ CMV (Assist Control/ Continuous Mandatory Ventilation)  RR (machine): 14  TV (machine): 350  FiO2: 80  PEEP: 5  ITime: 1  MAP: 9  PIP: 35    ICU Vital Signs Last 24 Hrs  T(C): 38.3 (17 Jun 2022 13:35), Max: 38.9 (17 Jun 2022 11:56)  T(F): 100.9 (17 Jun 2022 13:35), Max: 102 (17 Jun 2022 11:56)  HR: 95 (17 Jun 2022 13:39) (90 - 122)  BP: 94/57 (17 Jun 2022 13:39) (86/52 - 143/79)  BP(mean): 70 (17 Jun 2022 13:39) (64 - 70)  ABP: --  ABP(mean): --  RR: 14 (17 Jun 2022 13:39) (14 - 14)  SpO2: 100% (17 Jun 2022 13:39) (79% - 100%)      ABG - ( 17 Jun 2022 13:14 )  pH, Arterial: 7.34  pH, Blood: x     /  pCO2: 43    /  pO2: 52    / HCO3: 23    / Base Excess: -2.6  /  SaO2: 82.8      I&O's Detail    LABS:                        11.6   x     )-----------( 282      ( 17 Jun 2022 12:23 )             34.2     06-17    149<H>  |  116<H>  |  35<H>  ----------------------------<  125<H>  3.1<L>   |  25  |  2.50<H>    Ca    12.5<H>      17 Jun 2022 12:23    TPro  6.4  /  Alb  2.2<L>  /  TBili  0.7  /  DBili  x   /  AST  31  /  ALT  6<L>  /  AlkPhos  55  06-17    CAPILLARY BLOOD GLUCOSE    POCT Blood Glucose.: 116 mg/dL (17 Jun 2022 11:46)    PT/INR - ( 17 Jun 2022 12:23 )   PT: 20.4 sec;   INR: 1.73 ratio      PTT - ( 17 Jun 2022 12:23 )  PTT:43.1 sec    CULTURES:    Physical Examination:    General: Cachectic, unresponsive, ill appearing.      HEENT: Pupils equal, 4mm reactive to light.  Symmetric.    PULM: Diminished breath sounds b/l, chest expansion symmetrical.     CVS: Regular rate and rhythm, no murmurs, rubs, or gallops    ABD: Soft, nondistended, hypoactive bowel sounds    EXT: No edema    SKIN: Cool, dry. Noted ecchymotic area with ? Trauma to L lower sternum.     NEURO: Unresponsive, obtunded, intubated.       RADIOLOGY: < from: Xray Chest 1 View- PORTABLE-Urgent (06.17.22 @ 12:11) >  INTERPRETATION:  AP supine chest on June 17, 2022 at 11:55 AM. Patient   has sepsis.    Moderate to severe right thoracic curve again noted.    Heart magnified by technique.    Presently there is a left loop recorder new since May 28. Present film   shows endotracheal tube and nasogastric tube in place.    Present film shows a significant right base infiltrate and significant   scattered lung or pleural findings on the left which are also new since   May 28.    Follow-up AP chest on June 17, 2022 at 12:56 PM.    Some improvement in the basilar lung findings.    IMPRESSION: Tubes in place.    Significant lower lung field lung and pleural findings somewhat improved   on the latest film.    CRITICAL CARE TIME SPENT: 68 minutes  Evaluating/treating patient, reviewing data/labs/imaging, discussing case with multidisciplinary team, discussing plan/goals of care with patient/family. Non-inclusive of procedure time.

## 2022-06-17 NOTE — H&P ADULT - PROBLEM SELECTOR PLAN 4
Na 149 K 3.1  Ca 12.5  Albumin 2.2 (corrected calcium 13.9)  s/p 1L NS bolus in the ED  -Likely 2/2 decreased PO intake given hx of dysphagia  -Will need IVF and supplemental K  -Trend BMP  -Mgt as per ICU

## 2022-06-17 NOTE — CHART NOTE - NSCHARTNOTEFT_GEN_A_CORE
: Grace    INDICATION: Acute respiratory failure, shock      PROCEDURE:    [x] LIMITED ECHO    [x] LIMITED CHEST    FINDINGS:  Grossly normal LV systolic function  RV size < LV  No pericardial effusion    A-line predominant pattern anterior lung fields  No pleural effusions  Left basilar consolidation    INTERPRETATION:  Basilar consolidation suggestive of pneumonia vs atelectasis  Distributive shock

## 2022-06-17 NOTE — ED PROVIDER NOTE - OBJECTIVE STATEMENT
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 Found to have recurrence not physically able to tolerate chemotherapy.  Hem On planning for Immunotherapy, Parkinson's disease, HTN, HLD< GERD. He has had frequent multiple admissions for stroke like symptoms, weakness and falls. He was admitted here with Subacute Stroke. 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 Found to have recurrence not physically able to tolerate chemotherapy.  Hem On planning for Immunotherapy, Parkinson's disease, HTN, HLD< GERD. He has had frequent multiple admissions for stroke like symptoms, weakness and falls. He was admitted here with Subacute Stroke. recent admission for stroke presents from NH for AMS< unresponsiveness, hypoxia, brought in ED unresponsive, hypoxic, hypotensive, resp distress

## 2022-06-17 NOTE — ED PROVIDER NOTE - CARE PLAN
1 Principal Discharge DX:	Sepsis with acute hypoxic respiratory failure  Secondary Diagnosis:	ÁNGEL (acute kidney injury)

## 2022-06-17 NOTE — H&P ADULT - ATTENDING COMMENTS
77 yo male with PMH of prostate ca in remission, tongue SCC s/p partial section last year now recurrence not fit for chemo, planned for immunotherapy, parkinson's disease, HTN, HLD, GERD, recent multiple admission for stroke like symptoms, weakness, and falls, recently admitted to [facility] for CVA (no TPA given due to recent hx of CVA), Dysphagia who presented to the ED from [facility] unresponsive, hypoxic, hypotensive and in respiratory distress. He is being admitted to the ICU for mgt of Acute Hypoxic Respiratory Failure    HPI as above. All hx obtained from EMS run sheet and medical records    T(C): 38.3 (06-17-22 @ 13:35), Max: 38.9 (06-17-22 @ 11:56)  HR: 95 (06-17-22 @ 14:22) (90 - 122)  BP: 94/57 (06-17-22 @ 13:39) (86/52 - 143/79)  RR: 14 (06-17-22 @ 13:39) (14 - 14)  SpO2: 100% (06-17-22 @ 14:22) (79% - 100%)  Wt(kg): --    Physical Exam:   GENERAL: intubated and sedated  HEENT: head NC/AT; PERRLA, conjunctiva & sclera clear; hearing cannot assess, dry mucous membranes  NECK: supple, no JVD, scarring on external neck  RESPIRATORY: CTA B/L, no wheezing, rales, rhonchi or rubs  CARDIOVASCULAR: S1&S2, RRR, no murmurs or gallops  ABDOMEN: soft, non-tender, non-distended, + Bowel sounds x4 quadrants, no guarding, rebound or rigidity  MUSCULOSKELETAL:  no clubbing, cyanosis or edema of all 4 extremities  : + banks  LYMPH: no cervical lymphadenopathy  VASCULAR: Radial pulses 2+ bilaterally, no varicose veins   SKIN: warm and dry, color normal  NEUROLOGIC:intubated and sedated, withdraws extremities to pain  Psych: cannot assess mood or affect    Plan:   Continue IV abx  -vent mgmt per ICU  -f/u culture results  -monitor urine output, electrolytes and renal fxn  -pressors per ICU team.   -remainder as above.,

## 2022-06-17 NOTE — PATIENT PROFILE ADULT - FALL HARM RISK - HARM RISK INTERVENTIONS
Communicate Risk of Fall with Harm to all staff/Reinforce activity limits and safety measures with patient and family/Review medications for side effects contributing to fall risk/Tailored Fall Risk Interventions/Visual Cue: Yellow wristband and red socks/Bed in lowest position, wheels locked, appropriate side rails in place/Call bell, personal items and telephone in reach/Instruct patient to call for assistance before getting out of bed or chair/Non-slip footwear when patient is out of bed/[place] to call system/Physically safe environment - no spills, clutter or unnecessary equipment/Purposeful Proactive Rounding/Room/bathroom lighting operational, light cord in reach

## 2022-06-17 NOTE — CONSULT NOTE ADULT - ASSESSMENT
Patient is a 77 yo male with a pmh of prostate CA in remission, reoccurring tongue SCC s/p partial resection last year (currently not candidate for chemo, though planned for immunotherapy), parkinson's disease, HTN, HLD, GERD, multiple admissions for stroke like symptoms, most recently admitted to [facility] for CVA who presented unresponsive and emergently intubated.     Problem:  - Shock ; likely septic   - Acute hypoxic resp failure  - AMS  - Lactic acidosis   - ÁNGEL   - ? Diachromatic hernia   - LLL consolidation   - B/l infiltrates ; pneumonia  - Bandemia   - Tongue SCC  Patient is a 77 yo male with a pmh of prostate CA in remission, reoccurring tongue SCC s/p partial resection last year (currently not candidate for chemo, though planned for immunotherapy), parkinson's disease, HTN, HLD, GERD, multiple admissions for stroke like symptoms, most recently admitted to [facility] for CVA who presented unresponsive and emergently intubated.     Problem:  - Shock ; likely septic   - Acute hypoxic resp failure  - AMS  - Lactic acidosis   - ÁNGEL   - LLL consolidation   - B/l infiltrates ; pneumonia  - Bandemia   - ? Diaphragmatic hernia   - Tongue SCC    Plan:  Neuro: Altered on arrival, now dyssynchronous with ventilator, will start propofol for sedation and compliance. Head CT obtained, pending official read. Sedate for vent compliance.   Respiratory: Patient currently on Full vent support, vent setting 14/450/10/100 . Titrate settings to maintain SaO2 >90%, and pH >7.25, consider low tidal volume ventilation strategy w/ goal Tv 4-8 cc/kg of ideal body weight, plateau pressure goal <30. Peridex oral care. CT chest obtained, pending official read. CXR showing b/l infiltrates, appears worse on R side. Ground glass noted on CT. Will treat for PNA at this time.   Cardiac: shock requiring vasopressors. Currently on levophed, rapidly escalating, will add vaso. Actively titrating to maintain MAP >65 for adequate tissue perfusion. Art line to be placed. POCUS pending with attending.   GI: NGT to LIS. Protonix for ppx. NPO. CT abd obtained, pending read.  /Renal: Strict I&O via Amato catheter. Creatine elevated, 2.5, continue to trend. Likely ÁNGEL from sepsis / ATN d/t hypotension. Goal UO 0.5-1.5cc/kg/hr. Trend electrolytes and replace as needed. Goal K >4, mag >2. Avoid nephrotoxic agents.  ID: Panculture, likely pna. will get stat UA. Will start on empiric zosyn, given vanco, will need level in am and adjust per trough with renal failure. Bandemia. Trend WBC, assess for fevers. Lactate elevated, will trend, give additional 500 ml to make 30 cc/kg for sepsis.   Hem/Onc: No s/s active bleed. Will start sub q heparin for dvt ppx.   Endo: Stress dose steroids with solu cortef given increasing pressor requirements.  Dispo: Full code, GOC to be addressed. ICU.     Patient is a 77 yo male with a pmh of prostate CA in remission, reoccurring tongue SCC s/p partial resection last year (currently not candidate for chemo, though planned for immunotherapy), parkinson's disease, HTN, HLD, GERD, multiple admissions for stroke like symptoms, most recently admitted to [facility] for CVA who presented unresponsive and emergently intubated.     Problem:  - Shock ; likely septic   - Acute hypoxic resp failure  - AMS  - Lactic acidosis   - ÁNGEL   - LLL consolidation   - B/l infiltrates ; pneumonia  - Bandemia   - ? Diaphragmatic hernia   - Tongue SCC  - Parkinsons    Plan:  Neuro: Altered on arrival, now dyssynchronous with ventilator, will start propofol for sedation and compliance. Head CT obtained, pending official read. Sedate for vent compliance. C/w home Sinemet dosing.   Respiratory: Patient currently on Full vent support, vent setting 14/450/10/100 . Titrate settings to maintain SaO2 >90%, and pH >7.25, consider low tidal volume ventilation strategy w/ goal Tv 4-8 cc/kg of ideal body weight, plateau pressure goal <30. Peridex oral care. CT chest obtained, pending official read. CXR showing b/l infiltrates, appears worse on R side. Ground glass noted on CT. Will treat for PNA at this time.   Cardiac: shock requiring vasopressors. Currently on levophed, rapidly escalating, will add vaso. Actively titrating to maintain MAP >65 for adequate tissue perfusion. Art line to be placed. POCUS pending with attending. C/w home Lipitor  Home Diltiazem to be held d/t hypotension, monitor for irregular HR / tachy arrythmia. Hold home enalapril d/t hypotension and renal failure.  GI: NGT to LIS. Protonix for ppx. NPO. CT abd obtained, pending read.  /Renal: Strict I&O via Amato catheter. Creatine elevated, 2.5, continue to trend. Likely ÁNGEL from sepsis / ATN d/t hypotension. Goal UO 0.5-1.5cc/kg/hr. Trend electrolytes and replace as needed. Goal K >4, mag >2. Avoid nephrotoxic agents.  ID: Panculture, likely pna. will get stat UA. Will start on empiric zosyn, given vanco, will need level in am and adjust per trough with renal failure. Bandemia. Trend WBC, assess for fevers. Lactate elevated, will trend, give additional 500 ml to make 30 cc/kg for sepsis.   Hem/Onc: No s/s active bleed. Will start sub q heparin for dvt ppx.   Endo: Stress dose steroids with solu cortef given increasing pressor requirements.  Dispo: Full code, GOC to be addressed. ICU.     Patient is a 75 yo male with a pmh of prostate CA in remission, reoccurring tongue SCC s/p partial resection last year (currently not candidate for chemo, though planned for immunotherapy), parkinson's disease, HTN, HLD, GERD, multiple admissions for stroke like symptoms, most recently admitted to [facility] for CVA who presented unresponsive and emergently intubated.     Problem:  - Shock ; likely septic   - Acute hypoxic resp failure  - AMS  - Lactic acidosis   - ÁNGEL   - B/l infiltrates ; pneumonia  - UTI?  - Bandemia   - Tongue SCC  - Parkinson's    Plan:  Neuro: Altered on arrival, now dyssynchronous with ventilator, will start propofol for sedation and compliance. Head CT obtained, pending official read. Sedate for vent compliance. C/w home Sinemet dosing.   Respiratory: Patient currently on Full vent support, vent setting 14/450/10/100 . Titrate settings to maintain SaO2 >90%, and pH >7.25, consider low tidal volume ventilation strategy w/ goal Tv 4-8 cc/kg of ideal body weight, plateau pressure goal <30. Peridex oral care. CT chest obtained, pending official read. CXR showing b/l infiltrates, appears worse on R side. Ground glass noted on CT. Will treat for PNA at this time.   Cardiac: shock requiring vasopressors. Currently on levophed, rapidly escalating, will add vaso. Actively titrating to maintain MAP >65 for adequate tissue perfusion. Art line to be placed. POCUS pending with attending. C/w home Lipitor  Home Diltiazem to be held d/t hypotension, monitor for irregular HR / tachy arrythmia. Hold home enalapril d/t hypotension and renal failure.  GI: NGT to LIS. Protonix for ppx. NPO. CT abd obtained, pending read.  /Renal: Strict I&O via Amato catheter. Creatine elevated, 2.5, continue to trend. Likely ÁNGEL from sepsis / ATN d/t hypotension. Goal UO 0.5-1.5cc/kg/hr. Trend electrolytes and replace as needed. Goal K >4, mag >2. Avoid nephrotoxic agents.  ID: Panculture, likely pna. will get stat UA. Will start on empiric zosyn, given vanco, will need level in am and adjust per trough with renal failure. Bandemia. Trend WBC, assess for fevers. Lactate elevated, will trend, give additional 500 ml to make 30 cc/kg for sepsis.   Hem/Onc: No s/s active bleed. Will start sub q heparin for dvt ppx.   Endo: Stress dose steroids with solu cortef given increasing pressor requirements.  Dispo: Full code, GOC to be addressed. ICU.     Patient is a 77 yo male with a pmh of prostate CA in remission, reoccurring tongue SCC s/p partial resection last year (currently not candidate for chemo, though planned for immunotherapy), parkinson's disease, HTN, HLD, GERD, multiple admissions for stroke like symptoms, most recently admitted to [facility] for CVA who presented unresponsive and emergently intubated.     Problem:  - Shock ; likely septic   - Acute hypoxic resp failure  - AMS  - Lactic acidosis   - ÁNGEL   - B/l infiltrates ; pneumonia  - UTI?  - Bandemia   - Tongue SCC  - Parkinson's    Plan:  Neuro: Altered on arrival, now dyssynchronous with ventilator, will start propofol for sedation and compliance. Head CT obtained, pending official read. Sedate for vent compliance. C/w home Sinemet dosing.   Respiratory: Patient currently on Full vent support, vent setting 14/450/10/100 . Titrate settings to maintain SaO2 >90%, and pH >7.25, consider low tidal volume ventilation strategy w/ goal Tv 4-8 cc/kg of ideal body weight, plateau pressure goal <30. Peridex oral care. CT chest obtained, pending official read. CXR showing b/l infiltrates, appears worse on R side. Ground glass noted on CT. Will treat for PNA at this time.   Cardiac: shock requiring vasopressors. Currently on levophed, rapidly escalating, will add vaso. Actively titrating to maintain MAP >65 for adequate tissue perfusion. Art line to be placed. POCUS pending with attending. C/w home Lipitor  Home Diltiazem to be held d/t hypotension, monitor for irregular HR / tachy arrythmia. Hold home enalapril d/t hypotension and renal failure.  GI: NGT to LIS. Protonix for ppx. NPO. CT abd obtained, pending read.  /Renal: Strict I&O via Amato catheter. Creatine elevated, 2.5, continue to trend. Likely ÁNGEL from sepsis / ATN d/t hypotension. Goal UO 0.5-1.5cc/kg/hr. Trend electrolytes and replace as needed. Goal K >4, mag >2. Avoid nephrotoxic agents.  ID: Panculture, likely pna. will get stat UA. Will start on empiric zosyn, given vanco, will need level in am and adjust per trough with renal failure. Bandemia. Trend WBC, assess for fevers. Lactate elevated, will trend, give additional 500 ml to make 30 cc/kg for sepsis.   Hem/Onc: No s/s active bleed. Will start sub q heparin for dvt ppx.   Endo: Stress dose steroids with solu cortef given increasing pressor requirements.  Dispo: Full code, GOC to be addressed. ICU.     GOC: Spoke to HCP Vikileslee Naidu about patients home life. States he was at rehab post stroke a few weeks ago. Patient has had steady decline over past several weeks. Prior to last hospitalization, patient alert, confused, though able to interact and walked with cane. At rehab patient was bed bound, unable to eat. Explained that patient was critical ill with high ventilator settings along with in septic shock. We discussed how patient is on multiple modes of life support including x2 vasopressor agents. HCP understanding severity of situation and critical status. I discussed DNR status and explained this means no CPR if patient were to have cardiac arrest. HCP stating she does not want patient to undergo CPR and would like to place DNR. DNR placed verbally over phone and witnessed. HCP would like aggressive care at this time with DNR in place. All questions answered, will continue to update as needed.

## 2022-06-18 NOTE — DIETITIAN NUTRITION RISK NOTIFICATION - TREATMENT: THE FOLLOWING DIET HAS BEEN RECOMMENDED
Diet, NPO with Tube Feed:   Tube Feeding Modality: Nasogastric  Vital 1.5 [place]  Total Volume for 24 Hours (mL): 1300  Continuous  Starting Tube Feed Rate {mL per Hour}: 20  Increase Tube Feed Rate by (mL): 15     Every 8 hours  Until Goal Tube Feed Rate (mL per Hour): 65  Tube Feed Duration (in Hours): 20  Tube Feed Start Time: 06:00  Free Water Flush  Pump   Rate (mL per Hour): 50   Frequency: Every Hour    Duration (Hours): 20    Start Time: 06:00 (06-18-22 @ 10:28) [Pending Verification By Attending]  Diet, NPO:   Except Medications (06-17-22 @ 15:01) [Active]

## 2022-06-18 NOTE — DIETITIAN INITIAL EVALUATION ADULT - NSFNSGIIOFT_GEN_A_CORE
06-17-22 @ 07:01  -  06-18-22 @ 07:00  --------------------------------------------------------  OUT:    Nasogastric/Oral tube (mL): 100 mL  Total OUT: 100 mL    Total NET: -100 mL

## 2022-06-18 NOTE — PROGRESS NOTE ADULT - SUBJECTIVE AND OBJECTIVE BOX
Patient is a 76y old  Male who presents with a chief complaint of Acute Hypoxic Respiratory Failure (2022 17:54)      BRIEF HOSPITAL COURSE: 76 year old male with history of prostate cancer, SCC of tongue s/p partial resection now with recurrence, parkinson's disease, hypertension, hyperlipidemia, recent CVA, and oropharyngeal dysphagia here with unresponsiveness and hypotension requiring intubation.     Events last 24 hours: Remains on Levo with Vaso as adjunct therapy, Intubated and sedated synchronous with the vent    PAST MEDICAL & SURGICAL HISTORY:  Hypertension      Hyperlipidemia      GERD (gastroesophageal reflux disease)      Parkinson&#x27;s disease      Prostate cancer      Mitral valve prolapse      Malignant neoplasm of tongue, unspecified  s/p surgery and radiation discontinued       Throat cancer      History of vasectomy      Prostate cancer  s/p cyber knife      S/P partial glossectomy            Review of Systems:  RACHELLE due to clinical condition    Medications:  cefepime   IVPB 1000 milliGRAM(s) IV Intermittent every 12 hours    norepinephrine Infusion 0.05 MICROgram(s)/kG/Min IV Continuous <Continuous>      carbidopa/levodopa  25/100 1 Tablet(s) Oral <User Schedule>  [id number]    Injectable 25 MICROGram(s) IV Push every 2 hours PRN  propofol Infusion 15.893 MICROgram(s)/kG/Min IV Continuous <Continuous>      heparin   Injectable 5000 Unit(s) SubCutaneous every 8 hours    pantoprazole  Injectable 40 milliGRAM(s) IV Push daily      atorvastatin 80 milliGRAM(s) Oral at bedtime  hydrocortisone sodium succinate Injectable 50 milliGRAM(s) IV Push every 8 hours  vasopressin Infusion 0.04 Unit(s)/Min IV Continuous <Continuous>        chlorhexidine 0.12% Liquid 15 milliLiter(s) Oral Mucosa every 12 hours  chlorhexidine 2% Cloths 1 Application(s) Topical <User Schedule>        Mode: AC/ CMV (Assist Control/ Continuous Mandatory Ventilation)  RR (machine): 14  TV (machine): 400  FiO2: 80  PEEP: 10  ITime: 1  MAP: 14  PIP: 25      ICU Vital Signs Last 24 Hrs  T(C): 37.1 (2022 23:30), Max: 38.9 (2022 11:56)  T(F): 98.8 (2022 23:30), Max: 102 (2022 11:56)  HR: 68 (2022 23:30) (68 - 122)  BP: 95/53 (2022 16:00) (67/47 - 143/79)  BP(mean): 69 (2022 16:00) (53 - 84)  ABP: 111/48 (2022 23:30) (97/41 - 127/55)  ABP(mean): 71 (2022 23:30) (61 - 82)  RR: 20 (2022 23:30) (14 - 29)  SpO2: 100% (2022 23:30) (79% - 100%)      ABG - ( 2022 17:06 )  pH, Arterial: 7.24  pH, Blood: x     /  pCO2: 54    /  pO2: 134   / HCO3: 23    / Base Excess: -4.3  /  SaO2: 100.0               I&O's Detail    2022 07:01  -  2022 00:10  --------------------------------------------------------  IN:    IV PiggyBack: 600 mL    IV PiggyBack: 50 mL    Lactated Ringers Bolus: 500 mL    Lactated Ringers Bolus: 1000 mL    Norepinephrine: 661.3 mL    Propofol: 28 mL    Propofol: 35 mL    Vasopressin: 16.8 mL    Vasopressin: 2.4 mL  Total IN: 2893.5 mL    OUT:    Indwelling Catheter - Urethral (mL): 135 mL    Nasogastric/Oral tube (mL): 50 mL  Total OUT: 185 mL    Total NET: 2708.5 mL          LABS:                        11.6   4.61  )-----------( 282      ( 2022 12:23 )             34.2     06-17    149<H>  |  115<H>  |  38<H>  ----------------------------<  146<H>  3.1<L>   |  26  |  2.60<H>    Ca    12.0<H>      2022 16:53  Phos  3.2     06-17  Mg     2.1     06-17    TPro  6.2  /  Alb  2.1<L>  /  TBili  0.7  /  DBili  x   /  AST  31  /  ALT  7<L>  /  AlkPhos  49            CAPILLARY BLOOD GLUCOSE      POCT Blood Glucose.: 116 mg/dL (2022 11:46)    PT/INR - ( 2022 12:23 )   PT: 20.4 sec;   INR: 1.73 ratio         PTT - ( 2022 12:23 )  PTT:43.1 sec  Urinalysis Basic - ( 2022 16:54 )    Color: Yellow / Appearance: Turbid / S.015 / pH: x  Gluc: x / Ketone: Trace  / Bili: Small / Urobili: 1   Blood: x / Protein: 100 / Nitrite: Negative   Leuk Esterase: Moderate / RBC: 25-50 /HPF / WBC 26-50   Sq Epi: x / Non Sq Epi: Few / Bacteria: TNTC      CULTURES:  Rapid RVP Result: NotDetec ( @ 12:21)      Physical Examination:    General: Cachetic, ill appearing      HEENT: Pupils equal, reactive to light. Symmetric. No scleral icterus or injection.    PULM: Clear diminished to auscultation B/L. No wheezes, rales, or rhonchi apprecaited. No significant sputum production or increased respiratory effort.    NECK: Supple, no lymphadenopathy, trachea midline.    CVS: Regular rate and rhythm, no murmurs appreciated, +s1/s2.    ABD: Soft, nondistended, nontender, normoactive bowel sounds.    EXT: No edema, nontender.    SKIN: Warm and well perfused, no rashes noted.    NEURO: RACHELLE intubated and sedated      RADIOLOGY: < from: CT Abdomen and Pelvis No Cont (22 @ 14:15) >    ACC: [id number] EXAM:  CT ABDOMEN AND PELVIS                        ACC: [id number] EXAM:  CT CHEST                          PROCEDURE DATE:  2022          INTERPRETATION:  CLINICAL INFORMATION: Sepsis.  Acute hypoxic respiratory failure.  History of recurrent tongue squamous cell carcinoma.    COMPARISON: CT scan of the chest 2022 and CT scan of the abdomen   pelvis 2022.    CONTRAST/COMPLICATIONS:  IV Contrast: NONE  Oral Contrast: NONE  Complications: None reported at time of study completion    PROCEDURE:  CT of the Chest, Abdomen and Pelvis was performed.  Sagittal and coronal reformats were performed.    FINDINGS:  Streak artifact degrades image quality limiting evaluation.    CHEST:    LUNGS AND LARGE AIRWAYS: PLEURA:  Endotracheal tube, below the level of the margy.  The central airways remain patent.    Patchy bibasilar airspace consolidation may reflect atelectasis and/or   pneumonia in the appropriate clinical setting.    Bilateral mosaic groundglass attenuation which may reflect small vessel   or small airway disease.  Poorly defined groundglass centrilobular nodular opacities, most   pronounced in the left upper and right lower lobe; which may reflect   infectious/inflammatory small airway disease.    Trace bilateral pleural effusions.    VESSELS: Atherosclerotic changes thoracic aorta and coronary artery   calcifications.    HEART: Heart size is normal.  Aortic and mitral valvular calcification.  No pericardial effusion.    MEDIASTINUM AND STEPHANIE: No lymphadenopathy.    CHEST WALL AND LOWER NECK: Within normal limits.    ABDOMEN AND PELVIS:    Streak artifact degrades image quality limiting evaluation.    The evaluation of the solid organ parenchyma is limited without   intravenous contrast.    LIVER:Within normal limits.  BILE DUCTS: Normal caliber.  GALLBLADDER: Distended with biliary sludge. No gallbladder wall   thickening.  SPLEEN: Within normal limits.  PANCREAS: Within normal limits.  ADRENALS: Within normal limits.  KIDNEYS/URETERS:  3 mmnonobstructing intrarenal calcification lower pole right kidney.  Right renal cyst with layering milk of calcium, stable.  Left renal cyst.    BLADDER: Amato catheter, nondistended bladder.  REPRODUCTIVE ORGANS: Prostate fiducial markers.    BOWEL:  Colonic fecal retention, without bowel obstruction.  Appendix not visualized.  PERITONEUM: No ascites.    VESSELS: Atherosclerotic changes.  RETROPERITONEUM/LYMPH NODES: No lymphadenopathy.    ABDOMINAL WALL: Cachexia.    BONES:  Prominent S-shaped thoracolumbar scoliosis.  Degenerative changes.    IMPRESSION:    Technically limited study.    Bibasilar airspace consolidation which may reflect atelectasis and/or   pneumonia.  Bilateral groundglass centrilobular nodular opacities which may reflect   infectious and/or inflammatory airway disease.    Colonic fecal retention without bowel obstruction.    Other chronic findings as above.    --- End of Report ---            VALENTINE SHERIDAN MD; Attending Radiologist  This document has been electronically signed. 2022  3:11PM    < end of copied text >

## 2022-06-18 NOTE — PROGRESS NOTE ADULT - SUBJECTIVE AND OBJECTIVE BOX
CHARTING IN PROGRESS     Patient is a 76y old  Male who presents with a chief complaint of Acute Hypoxic Respiratory Failure (2022 00:09)    24 hour events: ***    REVIEW OF SYSTEMS  Constitutional: No fever, chills, fatigue  Neuro: No headache, numbness, weakness  Resp: No cough, wheezing, shortness of breath  CVS: No chest pain, palpitations, leg swelling  GI: No abdominal pain, nausea, vomiting, diarrhea   : No dysuria, frequency, incontinence  Skin: No itching, burning, rashes, or lesions   Msk: No joint pain or swelling  Psych: No depression, anxiety, mood swings  Heme: No bleeding    T(F): 98.6 (22 @ 04:00), Max: 102 (22 @ 11:56)  HR: 74 (22 @ 07:38) (62 - 122)  BP: 95/53 (22 @ 16:00) (67/47 - 143/79)  RR: 28 (22 @ 07:30) (14 - 31)  SpO2: 98% (22 @ 07:38) (79% - 100%)  Wt(kg): --    Mode: AC/ CMV (Assist Control/ Continuous Mandatory Ventilation), RR (machine): 14, TV (machine): 400, FiO2: 45, PEEP: 10        I&O's Summary     @ 07:01  -   @ 07:00  --------------------------------------------------------  IN: 3471.7 mL / OUT: 510 mL / NET: 2961.7 mL      PHYSICAL EXAM  General:   CNS:   HEENT:   Resp:   CVS:   Abd:   Ext:   Skin:     MEDICATIONS  cefepime   IVPB IV Intermittent    norepinephrine Infusion IV Continuous    atorvastatin Oral  hydrocortisone sodium succinate Injectable IV Push  vasopressin Infusion IV Continuous      carbidopa/levodopa  25/100 Oral  fentaNYL    Injectable IV Push PRN  propofol Infusion IV Continuous      heparin   Injectable SubCutaneous    pantoprazole  Injectable IV Push          chlorhexidine 0.12% Liquid Oral Mucosa  chlorhexidine 2% Cloths Topical                            10.0   12.10 )-----------( 233      ( 2022 05:42 )             30.6     Bands 27.0    18    148<H>  |  115<H>  |  43<H>  ----------------------------<  212<H>  3.6   |  23  |  2.20<H>    Ca    11.2<H>      2022 05:42  Phos  3.9       Mg     2.0     18    TPro  5.9<L>  /  Alb  1.8<L>  /  TBili  0.4  /  DBili  x   /  AST  26  /  ALT  7<L>  /  AlkPhos  50      Lactate 2.3            @ 02:47    Lactate 2.5            @ 23:57    Lactate 2.6            @ 16:51    Lactate 4.8            @ 12:23          PT/INR - ( 2022 12:23 )   PT: 20.4 sec;   INR: 1.73 ratio         PTT - ( 2022 12:23 )  PTT:43.1 sec  Urinalysis Basic - ( 2022 16:54 )    Color: Yellow / Appearance: Turbid / S.015 / pH: x  Gluc: x / Ketone: Trace  / Bili: Small / Urobili: 1   Blood: x / Protein: 100 / Nitrite: Negative   Leuk Esterase: Moderate / RBC: 25-50 /HPF / WBC 26-50   Sq Epi: x / Non Sq Epi: Few / Bacteria: TNTC          Rapid RVP Result: NotDetec ( @ 12:21)    Radiology: ***  Bedside lung ultrasound: ***  Bedside ECHO: ***    CENTRAL LINE: Y/N          DATE INSERTED:              REMOVE: Y/N  MENJIVAR: Y/N                        DATE INSERTED:              REMOVE: Y/N  A-LINE: Y/N                       DATE INSERTED:              REMOVE: Y/N    GLOBAL ISSUE/BEST PRACTICE  Analgesia:   Sedation:   CAM-ICU:   HOB elevation: yes  Stress ulcer prophylaxis:   VTE prophylaxis:   Glycemic control:   Nutrition:     CODE STATUS: ***  [facility] discussion: Y       CHARTING IN PROGRESS     Patient is a 76y old  Male who presents with a chief complaint of Acute Hypoxic Respiratory Failure (2022 00:09)    24 hour events: Patient presented to ED    REVIEW OF SYSTEMS  Constitutional: No fever, chills, fatigue  Neuro: No headache, numbness, weakness  Resp: No cough, wheezing, shortness of breath  CVS: No chest pain, palpitations, leg swelling  GI: No abdominal pain, nausea, vomiting, diarrhea   : No dysuria, frequency, incontinence  Skin: No itching, burning, rashes, or lesions   Msk: No joint pain or swelling  Psych: No depression, anxiety, mood swings  Heme: No bleeding    T(F): 98.6 (22 @ 04:00), Max: 102 (22 @ 11:56)  HR: 74 (22 @ 07:38) (62 - 122)  BP: 95/53 (22 @ 16:00) (67/47 - 143/79)  RR: 28 (22 @ 07:30) (14 - 31)  SpO2: 98% (22 @ 07:38) (79% - 100%)  Wt(kg): --    Mode: AC/ CMV (Assist Control/ Continuous Mandatory Ventilation), RR (machine): 14, TV (machine): 400, FiO2: 45, PEEP: 10        I&O's Summary     @ 07:01  -   @ 07:00  --------------------------------------------------------  IN: 3471.7 mL / OUT: 510 mL / NET: 2961.7 mL      PHYSICAL EXAM  General:   CNS:   HEENT:   Resp:   CVS:   Abd:   Ext:   Skin:     MEDICATIONS  cefepime   IVPB IV Intermittent    norepinephrine Infusion IV Continuous    atorvastatin Oral  hydrocortisone sodium succinate Injectable IV Push  vasopressin Infusion IV Continuous      carbidopa/levodopa  25/100 Oral  fentaNYL    Injectable IV Push PRN  propofol Infusion IV Continuous      heparin   Injectable SubCutaneous    pantoprazole  Injectable IV Push          chlorhexidine 0.12% Liquid Oral Mucosa  chlorhexidine 2% Cloths Topical                            10.0   12.10 )-----------( 233      ( 2022 05:42 )             30.6     Bands 27.0    18    148<H>  |  115<H>  |  43<H>  ----------------------------<  212<H>  3.6   |  23  |  2.20<H>    Ca    11.2<H>      2022 05:42  Phos  3.9       Mg     2.0     18    TPro  5.9<L>  /  Alb  1.8<L>  /  TBili  0.4  /  DBili  x   /  AST  26  /  ALT  7<L>  /  AlkPhos  50      Lactate 2.3            @ 02:47    Lactate 2.5            @ 23:57    Lactate 2.6            @ 16:51    Lactate 4.8            @ 12:23          PT/INR - ( 2022 12:23 )   PT: 20.4 sec;   INR: 1.73 ratio         PTT - ( 2022 12:23 )  PTT:43.1 sec  Urinalysis Basic - ( 2022 16:54 )    Color: Yellow / Appearance: Turbid / S.015 / pH: x  Gluc: x / Ketone: Trace  / Bili: Small / Urobili: 1   Blood: x / Protein: 100 / Nitrite: Negative   Leuk Esterase: Moderate / RBC: 25-50 /HPF / WBC 26-50   Sq Epi: x / Non Sq Epi: Few / Bacteria: TNTC          Rapid RVP Result: NotDetec ( @ 12:21)    Radiology: ***  Bedside lung ultrasound: ***  Bedside ECHO: ***    CENTRAL LINE: Y/N          DATE INSERTED:              REMOVE: Y/N  MENJIVAR: Y/N                        DATE INSERTED:              REMOVE: Y/N  A-LINE: Y/N                       DATE INSERTED:              REMOVE: Y/N    GLOBAL ISSUE/BEST PRACTICE  Analgesia:   Sedation:   CAM-ICU:   HOB elevation: yes  Stress ulcer prophylaxis:   VTE prophylaxis:   Glycemic control:   Nutrition:     CODE STATUS: ***  [facility] discussion: Y       Patient is a 76y old  Male who presents with a chief complaint of Acute Hypoxic Respiratory Failure (2022 00:09)    24 hour events: Patient presented to ED from [facility] unresponsive, hypoxic SpO2 79% on NRB and hypotensive with BP 67/47. Found to be in acute hypoxic respiratory failure in the setting of b/l lower lobe PNA and UTI. Patient was intubated and started on pressor support. Patient seen and examined at bedside. Unable to obtain ROS as patient is sedated and intubated.     REVIEW OF SYSTEMS: Unable to obtain as per HPI      T(F): 98.6 (22 @ 04:00), Max: 102 (22 @ 11:56)  HR: 74 (22 @ 07:38) (62 - 122)  BP: 95/53 (22 @ 16:00) (67/47 - 143/79)  RR: 28 (22 @ 07:30) (14 - 31)  SpO2: 98% (22 @ 07:38) (79% - 100%)  Wt(kg): --    Mode: AC/ CMV (Assist Control/ Continuous Mandatory Ventilation), RR (machine): 14, TV (machine): 400, FiO2: 45, PEEP: 10        I&O's Summary     @ 07:01  -   @ 07:00  --------------------------------------------------------  IN: 3471.7 mL / OUT: 510 mL / NET: 2961.7 mL      PHYSICAL EXAM  General: sedated, intubated, cachectic  CNS: minimally responsive to verbal commands (attempts to open eyes and wiggle toes when prompted)  HEENT: NCAT, +NGT  Resp: breath sounds diminished b/l, increased work of breathing   CVS: RRR, no murmurs  Abd: concave abdomen, no palpable masses  Ext: cool extremities, chronic podiatric skin changes    MEDICATIONS  cefepime   IVPB IV Intermittent    norepinephrine Infusion IV Continuous    atorvastatin Oral  hydrocortisone sodium succinate Injectable IV Push  vasopressin Infusion IV Continuous      carbidopa/levodopa  25/100 Oral  fentaNYL    Injectable IV Push PRN  propofol Infusion IV Continuous      heparin   Injectable SubCutaneous    pantoprazole  Injectable IV Push          chlorhexidine 0.12% Liquid Oral Mucosa  chlorhexidine 2% Cloths Topical                            10.0   12.10 )-----------( 233      ( 2022 05:42 )             30.6     Bands 27.0    06-18    148<H>  |  115<H>  |  43<H>  ----------------------------<  212<H>  3.6   |  23  |  2.20<H>    Ca    11.2<H>      2022 05:42  Phos  3.9     06-18  Mg     2.0     06-18    TPro  5.9<L>  /  Alb  1.8<L>  /  TBili  0.4  /  DBili  x   /  AST  26  /  ALT  7<L>  /  AlkPhos  50  -18    Lactate 2.3           18 @ 02:47    Lactate 2.5            @ 23:57    Lactate 2.6            @ 16:51    Lactate 4.8            @ 12:23          PT/INR - ( 2022 12:23 )   PT: 20.4 sec;   INR: 1.73 ratio         PTT - ( 2022 12:23 )  PTT:43.1 sec  Urinalysis Basic - ( 2022 16:54 )    Color: Yellow / Appearance: Turbid / S.015 / pH: x  Gluc: x / Ketone: Trace  / Bili: Small / Urobili: 1   Blood: x / Protein: 100 / Nitrite: Negative   Leuk Esterase: Moderate / RBC: 25-50 /HPF / WBC 26-50   Sq Epi: x / Non Sq Epi: Few / Bacteria: TNTC          Rapid RVP Result: NotDetec ( @ 12:21)    Radiology: < from: CT Abdomen and Pelvis No Cont (22 @ 14:15) >  Technically limited study.    Bibasilar airspace consolidation which may reflect atelectasis and/or   pneumonia.  Bilateral groundglass centrilobular nodular opacities which may reflect   infectious and/or inflammatory airway disease.    Colonic fecal retention without bowel obstruction.    Other chronic findings as above.    < end of copied text >  < from: CT Head No Cont (22 @ 14:14) >  Multiple axial sections were performed from base skull to vertex without   contrast. Coronal and sagittal reconstructions were performed as well.    This exam is somewhat limited by motion    This exam is compared prior head CT performed on May 30, 2022.    Parenchymal volume loss and chronic microvascular ischemic changes are   identified.    Abnormal low-attenuation involving the right basal ganglia/anterior   corona radiata region is again seen which is likely compatible with an   old infarct. Old lacunar infarct involving the anterior right lenticular   nucleus region is also again seen and unchanged    Grossly, there is no acute hemorrhage mass or mass effect seen    Evaluation of osseous structures with the appropriate window appears   normal    Right-sided NG tube is seen.    Both mastoid and middle ear regions clear.    Impression: Stable exam.    < end of copied text >  < from: Xray Chest 1 View- PORTABLE-Urgent (Xray Chest 1 View- PORTABLE-Urgent .) (22 @ 13:00) >  Tubes in place.    Significant lower lung field lung and pleural findings somewhat improved   on the latest film.    < end of copied text >      CENTRAL LINE: N            MENJIVAR: Y                        A-LINE: Y, L radial  Midline: Y                         GLOBAL ISSUE/BEST PRACTICE  Analgesia: Y  Sedation: Y  HOB elevation: yes  Stress ulcer prophylaxis: Y  VTE prophylaxis: Y  Glycemic control: Y  Nutrition: N    CODE STATUS: DNR  [facility] discussion: Y       Patient is a 76y old  Male who presents with a chief complaint of Acute Hypoxic Respiratory Failure (2022 00:09)    24 hour events: Patient presented to ED from [place] [facility] unresponsive, hypoxic SpO2 79% on NRB and hypotensive with BP 67/47. Found to be in acute hypoxic respiratory failure in the setting of b/l lower lobe PNA and UTI. Patient was intubated and started on pressor support. Tm 102 at noon yesterday. Patient seen and examined at bedside. Unable to obtain ROS as patient is sedated and intubated.     REVIEW OF SYSTEMS: Unable to obtain as per HPI      T(F): 98.6 (22 @ 04:00), Max: 102 (22 @ 11:56)  HR: 74 (22 @ 07:38) (62 - 122)  BP: 95/53 (22 @ 16:00) (67/47 - 143/79)  RR: 28 (22 @ 07:30) (14 - 31)  SpO2: 98% (22 @ 07:38) (79% - 100%)  Wt(kg): --    Mode: AC/ CMV (Assist Control/ Continuous Mandatory Ventilation), RR (machine): 14, TV (machine): 400, FiO2: 45, PEEP: 10        I&O's Summary     @ 07:01  -   @ 07:00  --------------------------------------------------------  IN: 3471.7 mL / OUT: 510 mL / NET: 2961.7 mL      PHYSICAL EXAM  General: sedated, intubated, cachectic  CNS: minimally responsive to verbal commands (attempts to open eyes and wiggle toes when prompted)  HEENT: NCAT, +NGT, PERRL  Resp: breath sounds diminished b/l, increased work of breathing   CVS: RRR, no murmurs  Abd: scaffoid abdomen, no palpable masses  Ext: cool extremities, chronic podiatric skin changes    MEDICATIONS  cefepime   IVPB IV Intermittent    norepinephrine Infusion IV Continuous    atorvastatin Oral  hydrocortisone sodium succinate Injectable IV Push  vasopressin Infusion IV Continuous      carbidopa/levodopa  25/100 Oral  fentaNYL    Injectable IV Push PRN  propofol Infusion IV Continuous      heparin   Injectable SubCutaneous    pantoprazole  Injectable IV Push          chlorhexidine 0.12% Liquid Oral Mucosa  chlorhexidine 2% Cloths Topical                            10.0   12.10 )-----------( 233      ( 2022 05:42 )             30.6     Bands 27.0    06-18    148<H>  |  115<H>  |  43<H>  ----------------------------<  212<H>  3.6   |  23  |  2.20<H>    Ca    11.2<H>      2022 05:42  Phos  3.9       Mg     2.0     18    TPro  5.9<L>  /  Alb  1.8<L>  /  TBili  0.4  /  DBili  x   /  AST  26  /  ALT  7<L>  /  AlkPhos  50      Lactate 2.3            @ 02:47    Lactate 2.5            @ 23:57    Lactate 2.6            @ 16:51    Lactate 4.8            @ 12:23          PT/INR - ( 2022 12:23 )   PT: 20.4 sec;   INR: 1.73 ratio         PTT - ( 2022 12:23 )  PTT:43.1 sec  Urinalysis Basic - ( 2022 16:54 )    Color: Yellow / Appearance: Turbid / S.015 / pH: x  Gluc: x / Ketone: Trace  / Bili: Small / Urobili: 1   Blood: x / Protein: 100 / Nitrite: Negative   Leuk Esterase: Moderate / RBC: 25-50 /HPF / WBC 26-50   Sq Epi: x / Non Sq Epi: Few / Bacteria: TNTC          Rapid RVP Result: NotDetec ( @ 12:21)    Radiology: < from: CT Abdomen and Pelvis No Cont (22 @ 14:15) >  Technically limited study.    Bibasilar airspace consolidation which may reflect atelectasis and/or   pneumonia.  Bilateral groundglass centrilobular nodular opacities which may reflect   infectious and/or inflammatory airway disease.    Colonic fecal retention without bowel obstruction.    Other chronic findings as above.    < end of copied text >  < from: CT Head No Cont (22 @ 14:14) >  Multiple axial sections were performed from base skull to vertex without   contrast. Coronal and sagittal reconstructions were performed as well.    This exam is somewhat limited by motion    This exam is compared prior head CT performed on May 30, 2022.    Parenchymal volume loss and chronic microvascular ischemic changes are   identified.    Abnormal low-attenuation involving the right basal ganglia/anterior   corona radiata region is again seen which is likely compatible with an   old infarct. Old lacunar infarct involving the anterior right lenticular   nucleus region is also again seen and unchanged    Grossly, there is no acute hemorrhage mass or mass effect seen    Evaluation of osseous structures with the appropriate window appears   normal    Right-sided NG tube is seen.    Both mastoid and middle ear regions clear.    Impression: Stable exam.    < end of copied text >  < from: Xray Chest 1 View- PORTABLE-Urgent (Xray Chest 1 View- PORTABLE-Urgent .) (22 @ 13:00) >  Tubes in place.    Significant lower lung field lung and pleural findings somewhat improved   on the latest film.    < end of copied text >    POCUS--B lines at bases b/l, no pleural effusions  normal LV contractility, trace pericardial effusion, variable IVC diameter    CENTRAL LINE: N            MENJIVAR: Y                        A-LINE: Y, L radial  Midline: Y                         GLOBAL ISSUE/BEST PRACTICE  Analgesia: Y  Sedation: Y  HOB elevation: yes  Stress ulcer prophylaxis: Y  VTE prophylaxis: Y  Glycemic control: Y  Nutrition: N    CODE STATUS: DNR  [facility] discussion: Y

## 2022-06-18 NOTE — DIETITIAN INITIAL EVALUATION ADULT - PERTINENT LABORATORY DATA
06-18    148<H>  |  115<H>  |  43<H>  ----------------------------<  212<H>  3.6   |  23  |  2.20<H>    Ca    11.2<H>      18 Jun 2022 05:42  Phos  3.9     06-18  Mg     2.0     06-18    TPro  5.9<L>  /  Alb  1.8<L>  /  TBili  0.4  /  DBili  x   /  AST  26  /  ALT  7<L>  /  AlkPhos  50  06-18  POCT Blood Glucose.: 116 mg/dL (06-17-22 @ 11:46)  A1C with Estimated Average Glucose Result: 5.6 % (05-29-22 @ 03:40)  A1C with Estimated Average Glucose Result: 5.7 % (05-07-22 @ 10:42)

## 2022-06-18 NOTE — DIETITIAN INITIAL EVALUATION ADULT - ENTERAL
vital 1.5 at 20 ml/hr, advance by 15 ml increments every 8 hours to goal rate of 65 ml/hr ( over 20 hours/day 1950 kcals and ~ 88 gm pro) 988 ml water  + free water 50 ml /hr over 20 hrs/day

## 2022-06-18 NOTE — PROGRESS NOTE ADULT - ASSESSMENT
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     1. Acute Hypoxic Respiratory Failure  2. Septic Shock  3. Bilateral Lower Lobe PNA  4. Acute Renal Failure, ATN  5. Metabolic Encephalopathy  6. UTI  7. Severe Protein Malnutrition    Neuro:  - Propofol gtt for sedation and vent compliance  - Fentanyl prn  - On home Sinemet for Parkinson's     CV:  - Hypotension 2/2 septic shock  - On Levo and Vasopressin     Pulm:  - Continue lung protective ventilation  - Stress dose steroids - Hydrocortisone 50mg q8h                   GI:  - NPO   - NGT   - IV Protonix 40mg qd    Renal:  - ÁNGEL, likely ATN due to shock  - Cr downtrending  - Monitor I&Os and renal indices     Heme:  - Heparin for DVT prophylaxis    ID:  - Leukocytosis today  - Continue Cefepime and Vanco    Endo:  - ISS 76 year old male with history of prostate cancer, SCC of tongue s/p partial resection now with recurrence, parkinson's disease, hypertension, hyperlipidemia, recent CVA, and oropharyngeal dysphagia admitted for septic shock and acute hypoxic respiratory failure in the setting of b/l lower lobe PNA and UTI.     1. Acute Hypoxic Respiratory Failure  2. Septic Shock  3. Bilateral Lower Lobe PNA  4. Acute Renal Failure, ATN  5. Metabolic Encephalopathy  6. UTI  7. Severe Protein Malnutrition    Neuro:  - Propofol gtt for sedation and vent compliance  - Fentanyl prn  - Continue home Sinemet for Parkinson's     CV:  - Hypotension 2/2 septic shock  - Continue Levophed and Vasopressin pressor support  - Continue stress dose steroids Hydrocortisone 50mg q8h     Pulm:  - Continue lung protective ventilation for respiratory acidosis                  GI:  - NPO   - NGT   - IV Protonix 40mg qd    Renal:  - ÁNGEL, likely ATN due to shock  - Cr downtrending  - Monitor I&Os and renal indices     Heme:  - Heparin for DVT prophylaxis  - Per prior note t record from 6/2 pt full dose AC for recent stroke and hypercoaguable state. Will not full dose AC now given renal function    ID:  - Leukocytosis today  - Continue Cefepime and Vanco    Endo:  - ISS 76 year old male with history of prostate cancer, SCC of tongue s/p partial resection now with recurrence, parkinson's disease, hypertension, hyperlipidemia, recent CVA, and oropharyngeal dysphagia admitted for septic shock and acute hypoxic respiratory failure in the setting of b/l lower lobe PNA and UTI.     Neuro:  - Propofol gtt for sedation and vent compliance  - Fentanyl prn  - Continue home Sinemet, Primidone and Celexa for Parkinson's     CV:  - Hypotension 2/2 septic shock  - Continue Levophed and Vasopressin pressor support, wean as tolerated  - Continue stress dose steroids Hydrocortisone 50mg q8h     Pulm:  - Continue lung protective ventilation, titrate settings as tolerated                  GI:  - Start TF via NGT  - IV Protonix 40mg qd    Renal:  - ÁNGEL, likely prerenal, now improving  - Cr downtrending, urine output improving  - IV Albumin 250 x1 as hypovolemic on POCUS  - Monitor I&Os and renal indices     Heme:  - Heparin for DVT prophylaxis  - Per prior Hospitalist note from 6/2, pt was on full dose AC for recent stroke and hypercoagulable state. Will hold off on full dose AC for now     ID:  - B/l lower lobe PNA and UTI  - Bandemia resolved, now with leukocytosis  - Switch Cefepime to Zosyn given previous E. faecalis UTI  - MRSA negative, d/c Vanco  - F/u blood, urine, sputum cx    Endo:  - Hypercalcemia likely 2/2 malignancy   - F/u PTH, vit D    Tubes/lines:  - Midline, L radial A Line, NGT, banks 76 year old male with history of prostate cancer, SCC of tongue s/p partial resection now with recurrence, parkinson's disease, hypertension, hyperlipidemia, recent CVA, and oropharyngeal dysphagia admitted for septic shock and acute hypoxic respiratory failure in the setting of b/l lower lobe PNA and UTI.     Neuro:  - Propofol gtt for sedation and vent compliance  - Fentanyl prn  - Continue home Sinemet, Primidone and Celexa for Parkinson's     CV:  - Hypotension 2/2 septic shock  - Continue Levophed and Vasopressin pressor support, wean as tolerated  - Continue stress dose steroids Hydrocortisone 50mg q8h     Pulm:  - Continue lung protective ventilation, titrate settings as tolerated                  GI:  - Start TF via NGT  - IV Protonix 40mg qd    Renal:  - ÁNGEL, likely prerenal, now improving  - Cr downtrending, urine output improving  - IV Albumin 250 x1  - Monitor I&Os and renal indices     Heme:  - Heparin for DVT prophylaxis  - Per prior Hospitalist note from 6/2, pt was on full dose AC for recent stroke and hypercoagulable state. Will hold off on full dose AC for now     ID:  - B/l lower lobe PNA and UTI  - Bandemia resolved, now with leukocytosis  - Switch Cefepime to Zosyn given previous E. faecalis UTI  - MRSA negative, d/c Vanco  - F/u blood, urine, sputum cx    Endo:  - Hypercalcemia likely 2/2 malignancy   - F/u PTH, vit D    Tubes/lines:  - Midline, L radial A Line, NGT, banks

## 2022-06-18 NOTE — PROGRESS NOTE ADULT - ASSESSMENT
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     Acute hypoxic resp failure   Secondary to tee pneumonia   Sepsis with hypotension and shock   Vent support   Sedation with propofol , fentanyl  Levophed   Solucortef   IV abx Zosyn   follow blood cx, urine culture, sputum cultures     ÁNGEL   Fluid support per ICU   Monitor I/Os   Renal indices       -Hypercalcemia   likely secondary to occult malignancy   Trended from 11.6 to 10.8  Intact PTH 19   Vit D pending   Will require IVF support as can tolerate    Parkinsonism   - Continue home Sinemet, Primidone and Celexa    Need for prophylactic measure    IV protonix for GI protection  Heparin subcut q8

## 2022-06-18 NOTE — DIETITIAN INITIAL EVALUATION ADULT - PERTINENT MEDS FT
MEDICATIONS  (STANDING):  atorvastatin 80 milliGRAM(s) Oral at bedtime  carbidopa/levodopa  25/100 1 Tablet(s) Oral <User Schedule>  cefepime   IVPB 1000 milliGRAM(s) IV Intermittent every 12 hours  chlorhexidine 0.12% Liquid 15 milliLiter(s) Oral Mucosa every 12 hours  chlorhexidine 2% Cloths 1 Application(s) Topical <User Schedule>  heparin   Injectable 5000 Unit(s) SubCutaneous every 8 hours  hydrocortisone sodium succinate Injectable 50 milliGRAM(s) IV Push every 8 hours  norepinephrine Infusion 0.05 MICROgram(s)/kG/Min (4.17 mL/Hr) IV Continuous <Continuous>  pantoprazole  Injectable 40 milliGRAM(s) IV Push daily  propofol Infusion 15.893 MICROgram(s)/kG/Min (5.34 mL/Hr) IV Continuous <Continuous>  vasopressin Infusion 0.04 Unit(s)/Min (2.4 mL/Hr) IV Continuous <Continuous>    MEDICATIONS  (PRN):  fentaNYL    Injectable 25 MICROGram(s) IV Push every 2 hours PRN Moderate Pain (4 - 6)

## 2022-06-18 NOTE — PROGRESS NOTE ADULT - SUBJECTIVE AND OBJECTIVE BOX
CC:  Acute hypoxic resp failure secondary to tee pneumonia, on vent support.   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 As per the facility note, EMS was called emergently for increased respiratory difficulties this morning, gurgling, AMS, and significant change from baseline. Pt was evaluated yesterday for PEG placement due to dysphagia, scheduled for placement on . Unable to perform ROS as per patient mental status and he is intubated and sedated.     EMS Vitals:  Temp: NA BP 72/52  RR 24 Unable to obtain SpO2 reading, placed on 15L NRB    In the ED:  T 102F Rectal,  /80 RR Unknown SpO2 79% on NRB  Hgb 11.6 INR 1.73 Na 149 K 3.1 Cl 116  Cr 2.5 Glucose 125 Ca 12.5  Albumin 2.2 (corrected calcium 13.9) Lactate 4.8  AB.34/43/52/23  Vanc x1, Zosyn x1, 1L NS Bolus x1, 20mg Etomidate, 100mg Succ, 50mcg Fentanyl, 1g IV Tylenol, Levophed gtt  Vent Settings: 350/14/80/5 (2022 13:40)    REVIEW OF SYSTEMS:    Patient denied fever, chills, abdominal pain, nausea, vomiting, cough, shortness of breath, chest pain or palpitations    Vital Signs Last 24 Hrs  T(C): 36.8 (2022 12:00), Max: 37.3 (2022 19:30)  T(F): 98.2 (2022 12:00), Max: 99.1 (2022 19:30)  HR: 67 (2022 17:30) (60 - 83)  BP: --  BP(mean): --  RR: 29 (2022 17:30) (10 - 40)  SpO2: 96% (2022 17:30) (94% - 100%)I&O's Summary    2022 07:01  -  2022 07:00  --------------------------------------------------------  IN: 3471.7 mL / OUT: 510 mL / NET: 2961.7 mL    2022 07:01  -  2022 18:05  --------------------------------------------------------  IN: 1205 mL / OUT: 370 mL / NET: 835 mL      PHYSICAL EXAM:  GENERAL: NAD,   HEENT: PERRL, +EOMI, anicteric, no [place]  NECK: Supple, No JVD   CHEST/LUNG: Vent support   HEART: S1S2 Normal intensity  ABDOMEN: Soft, BS Normoactive, NT, ND, no HSM noted  EXTREMITIES: No cyanosis, or edema noted, Bed bound   SKIN: No rashes or lesions noted  NEURO: Sedated on vent support . no focal deficits noted, CN II-XII intact  PSYCH: Non verbal. Sedated on vent support   LABS:                        10.0   12.10 )-----------( 233      ( 2022 05:42 )             30.6     06-18    148<H>  |  115<H>  |  43<H>  ----------------------------<  212<H>  3.6   |  23  |  2.20<H>    Ca    11.2<H>      2022 05:42  Phos  3.9     06-18  Mg     2.0     06-18    TPro  5.9<L>  /  Alb  1.8<L>  /  TBili  0.4  /  DBili  x   /  AST  26  /  ALT  7<L>  /  AlkPhos  50  06-18    PT/INR - ( 2022 12:23 )   PT: 20.4 sec;   INR: 1.73 ratio         PTT - ( 2022 12:23 )  PTT:43.1 sec  Urinalysis Basic - ( 2022 16:54 )    Color: Yellow / Appearance: Turbid / S.015 / pH: x  Gluc: x / Ketone: Trace  / Bili: Small / Urobili: 1   Blood: x / Protein: 100 / Nitrite: Negative   Leuk Esterase: Moderate / RBC: 25-50 /HPF / WBC 26-50   Sq Epi: x / Non Sq Epi: Few / Bacteria: TNTC      RADIOLOGY & ADDITIONAL TESTS:    MEDICATIONS:  MEDICATIONS  (STANDING):  atorvastatin 80 milliGRAM(s) Oral at bedtime  carbidopa/levodopa  25/100 1 Tablet(s) Oral <User Schedule>  chlorhexidine 0.12% Liquid 15 milliLiter(s) Oral Mucosa every 12 hours  chlorhexidine 2% Cloths 1 Application(s) Topical <User Schedule>  citalopram 20 milliGRAM(s) Oral daily  heparin   Injectable 5000 Unit(s) SubCutaneous every 8 hours  hydrocortisone sodium succinate Injectable 50 milliGRAM(s) IV Push every 8 hours  norepinephrine Infusion 0.05 MICROgram(s)/kG/Min (4.17 mL/Hr) IV Continuous <Continuous>  pantoprazole  Injectable 40 milliGRAM(s) IV Push daily  piperacillin/tazobactam IVPB.. 3.375 Gram(s) IV Intermittent every 8 hours  primidone 50 milliGRAM(s) Oral daily  propofol Infusion 15.893 MICROgram(s)/kG/Min (5.34 mL/Hr) IV Continuous <Continuous>  vasopressin Infusion 0.04 Unit(s)/Min (2.4 mL/Hr) IV Continuous <Continuous>    MEDICATIONS  (PRN):  fentaNYL    Injectable 25 MICROGram(s) IV Push every 2 hours PRN Moderate Pain (4 - 6)

## 2022-06-18 NOTE — PROGRESS NOTE ADULT - ASSESSMENT
76 year old male with history of prostate cancer, SCC of tongue s/p partial resection now with recurrence, parkinson's disease, hypertension, hyperlipidemia, recent CVA, and oropharyngeal dysphagia here with unresponsiveness and hypotension requiring intubation.     1. Acute Hypoxic Respiratory Failure  2. Septic Shock  3. Bilateral Lower Lobe PNA  4. Acute Renal Failure, ATN  5. Metabolic Encephalopathy  6. UTI    Neuro:  - Propofol gtt in SItu for sedation and vent compliance, no indication for further respirolytic medications at this time, pt synchronous with the vent  - Sinemet    CV:  - 2 pressor shock actively titrating Levo to maintain MAP >65 with Vaso as Adjunct therapy  - Assess for limb ischemia, in the event of will switch Vaso to Sukh  - Lipitor    Pulm:  - continue hydrocortisone for enhanced anti-inflammatory effect  - Low TV lung protective strategies 4-6mL/kg,  - will utilize PEEP for alveolar recruitment is pt desats  - Actively titrating ventilator settings to maintain SpO2 > 92%,  - Daily spontaneous breathing trial if clinical condition warrants  - Vent Bundle in SITU  - End Tidal CO2 monitoring                  GI:  - Cont IV Protonix 40mg IVP Daily,   - Enteral feeds as tolerated to avoid Stress ulceration and optimize nutritional state when indicated    Renal:  - Even to net negative fluid balance as tolerated by hemodynamics and renal fx.    - Cr 2.40, down trending, Continue to monitor Bun/Cr and UOP  - Replacing electrolytes as needed with Goal K> 4, PO> 3, Mg> 2               - Strict I&O's, Amato to BSD  - Avoid Nephro toxic medication  - Renally dose meds    Heme:  - Heparin for DVT prophylaxis    ID:  - WBC 4.61,  remains afebrile with no antipyretics administered   - Continue Cefepime  - Microbiology and Radiology reviewed   - trend CBC with diff, CMP  and fever curve    Endo:  - ISS for aggressive glycemic control to limit FS glucose to < 180mg/dl.    Critical Care Time (EXCLUSIVE of any non bundled procedures) :  45 minutes were spent assessing the patient's presenting problems of acute illness that pose a high probability of life threatening  deterioration or end organ damage / dysfunction.  Medical decision making includes initiation / continuation of plan or care review data/ lab work radiographic study, direct patient care bedside ,  discussions with  consultants regarding care,  evaluation and interpretation of vital signs, any necessary ventilator management,   NIV or BIPAP changes  or initiation,    discussions with multidisciplinary team,  am or pm rounds, discussions of goals of care with patient and family all non-inclusive of procedures.    Plan discussed with Dr Edwards 76 year old male with history of prostate cancer, SCC of tongue s/p partial resection now with recurrence, parkinson's disease, hypertension, hyperlipidemia, recent CVA, and oropharyngeal dysphagia here with unresponsiveness and hypotension requiring intubation.     1. Acute Hypoxic Respiratory Failure  2. Septic Shock  3. Bilateral Lower Lobe PNA  4. Acute Renal Failure, ATN  5. Metabolic Encephalopathy  6. UTI  7. Severe Protein Malnutrition    Neuro:  - Propofol gtt in SItu for sedation and vent compliance, no indication for further respirolytic medications at this time, pt synchronous with the vent  - Sinemet    CV:  - 2 pressor shock actively titrating Levo to maintain MAP >65 with Vaso as Adjunct therapy  - Assess for limb ischemia, in the event of will switch Vaso to Sukh  - Lipitor    Pulm:  - continue hydrocortisone for enhanced anti-inflammatory effect  - Low TV lung protective strategies 4-6mL/kg,  - will utilize PEEP for alveolar recruitment is pt desats  - Actively titrating ventilator settings to maintain SpO2 > 92%,  - Daily spontaneous breathing trial if clinical condition warrants  - Vent Bundle in SITU  - End Tidal CO2 monitoring                  GI:  - Cont IV Protonix 40mg IVP Daily,   - Enteral feeds as tolerated to avoid Stress ulceration and optimize nutritional state when indicated    Renal:  - Even to net negative fluid balance as tolerated by hemodynamics and renal fx.    - Cr 2.40, down trending, Continue to monitor Bun/Cr and UOP  - Replacing electrolytes as needed with Goal K> 4, PO> 3, Mg> 2               - Strict I&O's, Amato to BSD  - Avoid Nephro toxic medication  - Renally dose meds    Heme:  - Heparin for DVT prophylaxis    ID:  - WBC 4.61,  remains afebrile with no antipyretics administered   - Continue Cefepime  - Microbiology and Radiology reviewed   - trend CBC with diff, CMP  and fever curve    Endo:  - ISS for aggressive glycemic control to limit FS glucose to < 180mg/dl.    Critical Care Time (EXCLUSIVE of any non bundled procedures) :  45 minutes were spent assessing the patient's presenting problems of acute illness that pose a high probability of life threatening  deterioration or end organ damage / dysfunction.  Medical decision making includes initiation / continuation of plan or care review data/ lab work radiographic study, direct patient care bedside ,  discussions with  consultants regarding care,  evaluation and interpretation of vital signs, any necessary ventilator management,   NIV or BIPAP changes  or initiation,    discussions with multidisciplinary team,  am or pm rounds, discussions of goals of care with patient and family all non-inclusive of procedures.    Plan discussed with Dr Edwards 76 year old male with history of prostate cancer, SCC of tongue s/p partial resection now with recurrence, parkinson's disease, hypertension, hyperlipidemia, recent CVA, and oropharyngeal dysphagia here with unresponsiveness and hypotension requiring intubation.     1. Acute Hypoxic Respiratory Failure  2. Septic Shock  3. Bilateral Lower Lobe PNA  4. Acute Renal Failure, ATN  5. Metabolic Encephalopathy  6. UTI  7. Severe Protein Malnutrition    Neuro:  - Propofol gtt in SItu for sedation and vent compliance, no indication for further respirolytic medications at this time, pt synchronous with the vent  - Sinemet    CV:  - 2 pressor shock actively titrating Levo to maintain MAP >65 with Vaso as Adjunct therapy  - Assess for limb ischemia, in the event of will switch Vaso to Sukh  - Lipitor    Pulm:  - continue hydrocortisone for enhanced anti-inflammatory effect  - Low TV lung protective strategies 4-6mL/kg,  - will utilize PEEP for alveolar recruitment is pt desats  - Actively titrating ventilator settings to maintain SpO2 > 92%,  - Daily spontaneous breathing trial if clinical condition warrants  - Vent Bundle in SITU  - End Tidal CO2 monitoring                  GI:  - Cont IV Protonix 40mg IVP Daily,   - Enteral feeds as tolerated to avoid Stress ulceration and optimize nutritional state when indicated, pt in catabolic state    Renal:  - Even to net negative fluid balance as tolerated by hemodynamics and renal fx.    - Cr 2.40, down trending, Continue to monitor Bun/Cr and UOP  - Replacing electrolytes as needed with Goal K> 4, PO> 3, Mg> 2               - Strict I&O's, Amato to BSD  - Avoid Nephro toxic medication  - Renally dose meds    Heme:  - Heparin for DVT prophylaxis    ID:  - WBC 4.61,  remains afebrile with no antipyretics administered   - Continue Cefepime  - Microbiology and Radiology reviewed   - trend CBC with diff, CMP  and fever curve    Endo:  - ISS for aggressive glycemic control to limit FS glucose to < 180mg/dl.    Critical Care Time (EXCLUSIVE of any non bundled procedures) :  45 minutes were spent assessing the patient's presenting problems of acute illness that pose a high probability of life threatening  deterioration or end organ damage / dysfunction.  Medical decision making includes initiation / continuation of plan or care review data/ lab work radiographic study, direct patient care bedside ,  discussions with  consultants regarding care,  evaluation and interpretation of vital signs, any necessary ventilator management,   NIV or BIPAP changes  or initiation,    discussions with multidisciplinary team,  am or pm rounds, discussions of goals of care with patient and family all non-inclusive of procedures.    Plan discussed with Dr Edwards 76 year old male with history of prostate cancer, SCC of tongue s/p partial resection now with recurrence, parkinson's disease, hypertension, hyperlipidemia, recent CVA, and oropharyngeal dysphagia here with unresponsiveness and hypotension requiring intubation.     1. Acute Hypoxic Respiratory Failure  2. Septic Shock  3. Bilateral Lower Lobe PNA  4. Acute Renal Failure, ATN  5. Metabolic Encephalopathy  6. UTI  7. Severe Protein Malnutrition    Neuro:  - Propofol gtt in SItu for sedation and vent compliance, no indication for further respirolytic medications at this time, pt synchronous with the vent  - Sinemet    CV:  - 2 pressor shock actively titrating Levo to maintain MAP >65 with Vaso as Adjunct therapy  - Assess for limb ischemia, in the event of will switch Vaso to Sukh  - Lipitor    Pulm:  - continue hydrocortisone for enhanced anti-inflammatory effect  - Low TV lung protective strategies 4-6mL/kg,  - will utilize PEEP for alveolar recruitment is pt desats  - Actively titrating ventilator settings to maintain SpO2 > 92%,  - Daily spontaneous breathing trial if clinical condition warrants  - Vent Bundle in SITU  - End Tidal CO2 monitoring                  GI:  - Cont IV Protonix 40mg IVP Daily,   - Enteral feeds as tolerated to avoid Stress ulceration and optimize nutritional state when indicated, pt in acute catabolic phase on high dose pressors    Renal:  - Even to net negative fluid balance as tolerated by hemodynamics and renal fx.    - Cr 2.40, down trending, Continue to monitor Bun/Cr and UOP  - Replacing electrolytes as needed with Goal K> 4, PO> 3, Mg> 2               - Strict I&O's, Amato to BSD  - Avoid Nephro toxic medication  - Renally dose meds    Heme:  - Heparin for DVT prophylaxis    ID:  - WBC 4.61,  remains afebrile with no antipyretics administered   - Continue Cefepime  - Microbiology and Radiology reviewed   - trend CBC with diff, CMP  and fever curve    Endo:  - ISS for aggressive glycemic control to limit FS glucose to < 180mg/dl.    Critical Care Time (EXCLUSIVE of any non bundled procedures) :  45 minutes were spent assessing the patient's presenting problems of acute illness that pose a high probability of life threatening  deterioration or end organ damage / dysfunction.  Medical decision making includes initiation / continuation of plan or care review data/ lab work radiographic study, direct patient care bedside ,  discussions with  consultants regarding care,  evaluation and interpretation of vital signs, any necessary ventilator management,   NIV or BIPAP changes  or initiation,    discussions with multidisciplinary team,  am or pm rounds, discussions of goals of care with patient and family all non-inclusive of procedures.    Plan discussed with Dr Edwards 76 year old male with history of prostate cancer, SCC of tongue s/p partial resection now with recurrence, parkinson's disease, hypertension, hyperlipidemia, recent CVA, and oropharyngeal dysphagia here with unresponsiveness and hypotension requiring intubation.     1. Acute Hypoxic Respiratory Failure  2. Septic Shock  3. Bilateral Lower Lobe PNA  4. Acute Renal Failure, ATN  5. Metabolic Encephalopathy  6. UTI  7. Severe Protein Malnutrition    Neuro:  - Propofol gtt in SItu for sedation and vent compliance, no indication for further respirolytic medications at this time, pt synchronous with the vent  - Sinemet    CV:  - 2 pressor shock actively titrating Levo to maintain MAP >65 with Vaso as Adjunct therapy  - Assess for digital/limb ischemia, in the event of will switch Vaso to Sukh  - Lipitor    Pulm:  - continue hydrocortisone for enhanced anti-inflammatory effect  - Low TV lung protective strategies 4-6mL/kg,  - will utilize PEEP for alveolar recruitment is pt desats  - Actively titrating ventilator settings to maintain SpO2 > 92%,  - Daily spontaneous breathing trial if clinical condition warrants  - Vent Bundle in SITU  - End Tidal CO2 monitoring                  GI:  - Cont IV Protonix 40mg IVP Daily,   - Enteral feeds as tolerated to avoid Stress ulceration and optimize nutritional state when indicated, pt in acute catabolic phase on high dose pressors    Renal:  - Even to net negative fluid balance as tolerated by hemodynamics and renal fx.    - Cr 2.40, down trending, Continue to monitor Bun/Cr and UOP  - Replacing electrolytes as needed with Goal K> 4, PO> 3, Mg> 2               - Strict I&O's, Amato to BSD  - Avoid Nephro toxic medication  - Renally dose meds    Heme:  - Heparin for DVT prophylaxis    ID:  - WBC 4.61,  remains afebrile with no antipyretics administered   - Continue Cefepime  - Microbiology and Radiology reviewed   - trend CBC with diff, CMP  and fever curve    Endo:  - ISS for aggressive glycemic control to limit FS glucose to < 180mg/dl.    Critical Care Time (EXCLUSIVE of any non bundled procedures) :  45 minutes were spent assessing the patient's presenting problems of acute illness that pose a high probability of life threatening  deterioration or end organ damage / dysfunction.  Medical decision making includes initiation / continuation of plan or care review data/ lab work radiographic study, direct patient care bedside ,  discussions with  consultants regarding care,  evaluation and interpretation of vital signs, any necessary ventilator management,   NIV or BIPAP changes  or initiation,    discussions with multidisciplinary team,  am or pm rounds, discussions of goals of care with patient and family all non-inclusive of procedures.    Plan discussed with Dr Edwards

## 2022-06-18 NOTE — DIETITIAN INITIAL EVALUATION ADULT - NSFNSPHYEXAMSKINFT_GEN_A_CORE
Pressure Injury 1: sacrum, Stage I  Pressure Injury 2: Bilateral:,heel, Stage I  Pressure Injury 3: none, none  Pressure Injury 4: none, none  Pressure Injury 5: none, none  Pressure Injury 6: none, none  Pressure Injury 7: none, none  Pressure Injury 8: none, none  Pressure Injury 9: none, none  Pressure Injury 10: none, none  Pressure Injury 11: none, none, Pressure Injury 1: sacrum, Stage I  Pressure Injury 2: Bilateral:,heel, Stage I  Pressure Injury 3: none, none  Pressure Injury 4: none, none  Pressure Injury 5: none, none  Pressure Injury 6: none, none  Pressure Injury 7: none, none  Pressure Injury 8: none, none  Pressure Injury 9: none, none  Pressure Injury 10: none, none  Pressure Injury 11: none, none Pressure Injury 1: sacrum, Stage I  Pressure Injury 2: Bilateral:,heel, Stage I

## 2022-06-18 NOTE — PROGRESS NOTE ADULT - ATTENDING COMMENTS
76 year old male with history of prostate cancer, SCC of tongue s/p partial resection now with recurrence, parkinson's disease, hypertension, hyperlipidemia, recent CVA, and oropharyngeal dysphagia with acute hypoxemic respiratory failure and septic shock due to b/l lower lobe pneumonia v UTI, resulting in acute metabolic encephalopathy, lactic acidosis, ÁNGEL.      Hypernatremia    Plan  NEURO: sedated with propofol and prn fentanyl, wean as tolerates  Parkinsons disease, continue home sinemiet, primidone, celexa  Given Hx of recent CVA and suspected hypercoaguable state was on lovenox AC as outpt, will hold for now until more stable  CVS: septic shock, continue levo, vaso, wean as tolerates  trial of albumin, as may be fluid responsive based on POCUS  stress dose steroids  CAD, continue ASA  PULM: acute hypoxemic respiratory failure  increase ventilation for metabolic acidosis  wean FiO2 as tolerates  GI: NPO, start TF  PPI for ppx  RENAL: ÁNGEL slightly improved, likely prerenal, cont IVF as above  hypernatremia, IVF as above  ID: septic shock from b/l lower lobe pnemonia v UTI  MRSA swab neg, no further vanco  change back to zosyn pending Cx data  ENDO: hypercalcemia likely related to malignancy  check PTH and VitD  PPX: DVT PPX with heparin SC  DNR  Daughter updated at bedside

## 2022-06-19 NOTE — PROGRESS NOTE ADULT - ASSESSMENT
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     Neuro:  - Propofol gtt for sedation and vent compliance  - Fentanyl prn  - Continue home Sinemet, Primidone and Celexa for Parkinson's     CV:  - Hypotension 2/2 septic shock  - Continue Levophed and Vasopressin pressor support, wean as tolerated  - Continue stress dose steroids Hydrocortisone 50mg q8h     Pulm:  - Continue lung protective ventilation, titrate settings as tolerated  - F/u ABG                  GI:  - TF via NGT  - IV Protonix 40mg qd for GI ppx    Renal:  - ÁNGEL, likely prerenal, markedly improved  - Monitor I&Os and renal indices     Heme:  - Heparin for DVT ppx  - Per prior Hospitalist note from 6/2, pt was on full dose AC for recent stroke and hypercoagulable state. Will hold off on full dose AC for now   - F/u PTT, PT/INR     ID:  - B/l lower lobe PNA and UTI  - Urine cx positive for Pseudomonas  - Continue IV Zosyn  - Bandemia and leukocytosis resolved  - Blood and sputum cx negative    Endo:  - Hypercalcemia likely 2/2 malignancy   - PTH wnl, f/u PTHrP, vit D,25,hydroxy and vit D,1,25 dihydroxy    Tubes/lines:  - Midline, L radial A Line, NGT, banks

## 2022-06-19 NOTE — PROGRESS NOTE ADULT - ASSESSMENT
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     Acute hypoxic resp failure   Secondary to tee pneumonia   Sepsis with hypotension and shock   Vent support   Sedation with propofol , fentanyl  Levophed   Solucortef   IV abx Zosyn   follow blood cx, urine culture, sputum cultures     ÁNGEL   Fluid support per ICU   Monitor I/Os   Monitor Renal indices     -Hypercalcemia   likely secondary to occult malignancy   Trended from 11.6 to 10.8  Intact PTH 19   Vit D 80.7  Will require IVF support as can tolerate    Parkinsonism   - Continue home Sinemet, Primidone and Celexa    Need for prophylactic measure    IV protonix for GI protection  Heparin subcut q8     Supportive care

## 2022-06-19 NOTE — PROGRESS NOTE ADULT - ASSESSMENT
77 y/o M w/ pmh of prostate ca, SCC of tounge s/p partial resection now w/ recurrence, PD, hypertension, hld, recent CVA and oropharyngeal dysphagia now admitted to the MICU for acute hypoxemic rsep failure, septic shoch 2/2 to pna +/- UTI, metabolic encephalopathy, lactic acidosis, ÁNGEL and hyperNa.    -Neuro: Intubated and sedated on precedex titrating down prop/fent for vent weaning in the AM, PD on some dose of sinemiet/primidone/celexa  -Cardiac: Septic Shock on levo/vaso, levo requirement coming down, will attempt to wean of vaso tonight, cont stress dose steroids, add midodrine 5mg q8h for weaning pressors, will titrate pressors as needed to maintain MAP >65  -Resp: Acute Hypoxic resp failure cont lung protective ventilation, will titrate vent setting as needed to maintain o2 >90%  -GI: NPO on TF cont as ordered/tolerated, GI ppx w/ Protonix, hld on statins  -Renal: ÁNGEL and hyperNa gradually improving, start free water 200ml q6h, monitor I&O closely  -ID: Septic Shock 2/2 to pna +/- UTI on IVAB f/u on final cx data  -Endo: Hypercal currently being worked up f/u on VitD and PTH levels  -Heme: DVT ppx w/ heparin  -Dispo: Pt remains in the MICU, currently DNR, GOC ongoing by day team, dispo pending hospital course

## 2022-06-19 NOTE — PROGRESS NOTE ADULT - SUBJECTIVE AND OBJECTIVE BOX
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 As per the facility note, EMS was called emergently for increased respiratory difficulties this morning, gurgling, AMS, and significant change from baseline. Pt was evaluated yesterday for PEG placement due to dysphagia, scheduled for placement on . Unable to perform ROS as per patient mental status and he is intubated and sedated.     EMS Vitals:  Temp: NA BP 72/52  RR 24 Unable to obtain SpO2 reading, placed on 15L NRB    In the ED:  T 102F Rectal,  /80 RR Unknown SpO2 79% on NRB  Hgb 11.6 INR 1.73 Na 149 K 3.1 Cl 116  Cr 2.5 Glucose 125 Ca 12.5  Albumin 2.2 (corrected calcium 13.9) Lactate 4.8  AB.34/43/52/23  Vanc x1, Zosyn x1, 1L NS Bolus x1, 20mg Etomidate, 100mg Succ, 50mcg Fentanyl, 1g IV Tylenol, Levophed gtt  Vent Settings: 350/14/80/5 (2022 13:40)    REVIEW OF SYSTEMS:    Patient denied fever, chills, abdominal pain, nausea, vomiting, cough, shortness of breath, chest pain or palpitations    Vital Signs Last 24 Hrs  T(C): 37.3 (2022 07:49), Max: 37.3 (2022 00:00)  T(F): 99.1 (2022 07:49), Max: 99.1 (2022 00:00)  HR: 76 (2022 13:00) (66 - 94)  BP: --  BP(mean): --  RR: 28 (2022 13:00) (10 - 40)  SpO2: 97% (2022 13:00) (95% - 99%)I&O's Summary    2022 07:01  -  2022 07:00  --------------------------------------------------------  IN: 2143.3 mL / OUT: 1080 mL / NET: 1063.3 mL    2022 07:01  -  2022 15:43  --------------------------------------------------------  IN: 618.4 mL / OUT: 0 mL / NET: 618.4 mL      PHYSICAL EXAM:  GENERAL: NAD,   HEENT: PERRL, +EOMI, anicteric, no [place]  NECK: Supple, No JVD   CHEST/LUNG: CTA bilaterally; Normal effort  HEART: S1S2 Normal intensity,   ABDOMEN: Soft, BS Normoactive,  EXTREMITIES:  2+ radial and DP pulses noted, no clubbing, cyanosis, or edema noted, Bed bound   SKIN: No rashes or lesions noted  NEURO: None verbal on vent , CN II-XII intact  PSYCH: None verbal on vent   LABS:                        9.1    9.91  )-----------( 190      ( 2022 06:06 )             27.3     06-19    148<H>  |  118<H>  |  46<H>  ----------------------------<  192<H>  3.8   |  25  |  1.60<H>    Ca    11.0<H>      2022 06:06  Phos  2.9     06-19  Mg     2.3     06-19    TPro  6.1  /  Alb  1.9<L>  /  TBili  0.4  /  DBili  x   /  AST  24  /  ALT  8<L>  /  AlkPhos  68  06-19      Urinalysis Basic - ( 2022 16:54 )    Color: Yellow / Appearance: Turbid / S.015 / pH: x  Gluc: x / Ketone: Trace  / Bili: Small / Urobili: 1   Blood: x / Protein: 100 / Nitrite: Negative   Leuk Esterase: Moderate / RBC: 25-50 /HPF / WBC 26-50   Sq Epi: x / Non Sq Epi: Few / Bacteria: TNTC      RADIOLOGY & ADDITIONAL TESTS:    MEDICATIONS:  MEDICATIONS  (STANDING):  atorvastatin 80 milliGRAM(s) Oral at bedtime  carbidopa/levodopa  25/100 1 Tablet(s) Oral <User Schedule>  chlorhexidine 0.12% Liquid 15 milliLiter(s) Oral Mucosa every 12 hours  chlorhexidine 2% Cloths 1 Application(s) Topical <User Schedule>  citalopram 20 milliGRAM(s) Oral daily  heparin   Injectable 5000 Unit(s) SubCutaneous every 8 hours  hydrocortisone sodium succinate Injectable 50 milliGRAM(s) IV Push every 8 hours  norepinephrine Infusion 0.05 MICROgram(s)/kG/Min (4.17 mL/Hr) IV Continuous <Continuous>  pantoprazole  Injectable 40 milliGRAM(s) IV Push daily  piperacillin/tazobactam IVPB.. 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 As per the facility note, EMS was called emergently for increased respiratory difficulties this morning, gurgling, AMS, and significant change from baseline. Pt was evaluated yesterday for PEG placement due to dysphagia, scheduled for placement on . Unable to perform ROS as per patient mental status and he is intubated and sedated.     EMS Vitals:  Temp: NA BP 72/52  RR 24 Unable to obtain SpO2 reading, placed on 15L NRB    In the ED:  T 102F Rectal,  /80 RR Unknown SpO2 79% on NRB  Hgb 11.6 INR 1.73 Na 149 K 3.1 Cl 116  Cr 2.5 Glucose 125 Ca 12.5  Albumin 2.2 (corrected calcium 13.9) Lactate 4.8  AB.34/43/52/23  Vanc x1, Zosyn x1, 1L NS Bolus x1, 20mg Etomidate, 100mg Succ, 50mcg Fentanyl, 1g IV Tylenol, Levophed gtt  Vent Settings: 350/14/80/5 (2022 13:40)    REVIEW OF SYSTEMS:    Patient denied fever, chills, abdominal pain, nausea, vomiting, cough, shortness of breath, chest pain or palpitations    Vital Signs Last 24 Hrs  T(C): 37.3 (2022 07:49), Max: 37.3 (2022 00:00)  T(F): 99.1 (2022 07:49), Max: 99.1 (2022 00:00)  HR: 76 (2022 13:00) (66 - 94)  BP: --  BP(mean): --  RR: 28 (2022 13:00) (10 - 40)  SpO2: 97% (2022 13:00) (95% - 99%)I&O's Summary    2022 07:01  -  2022 07:00  --------------------------------------------------------  IN: 2143.3 mL / OUT: 1080 mL / NET: 1063.3 mL    2022 07:01  -  2022 15:43  --------------------------------------------------------  IN: 618.4 mL / OUT: 0 mL / NET: 618.4 mL      PHYSICAL EXAM:  GENERAL: NAD,   HEENT: PERRL, +EOMI, anicteric, no [place]  NECK: Supple, No JVD   CHEST/LUNG: CTA bilaterally; Normal effort  HEART: S1S2 Normal intensity,   ABDOMEN: Soft, BS Normoactive,  EXTREMITIES:  2+ radial and DP pulses noted, no clubbing, cyanosis, or edema noted, Bed bound   SKIN: No rashes or lesions noted  NEURO: None verbal on vent , CN II-XII intact  PSYCH: None verbal on vent   LABS:                        9.1    9.91  )-----------( 190      ( 2022 06:06 )             27.3     06-19    148<H>  |  118<H>  |  46<H>  ----------------------------<  192<H>  3.8   |  25  |  1.60<H>    Ca    11.0<H>      2022 06:06  Phos  2.9     06-19  Mg     2.3     -19    TPro  6.1  /  Alb  1.9<L>  /  TBili  0.4  /  DBili  x   /  AST  24  /  ALT  8<L>  /  AlkPhos  68  06-19      Urinalysis Basic - ( 2022 16:54 )    Color: Yellow / Appearance: Turbid / S.015 / pH: x  Gluc: x / Ketone: Trace  / Bili: Small / Urobili: 1   Blood: x / Protein: 100 / Nitrite: Negative   Leuk Esterase: Moderate / RBC: 25-50 /HPF / WBC 26-50   Sq Epi: x / Non Sq Epi: Few / Bacteria: TNTC      RADIOLOGY & ADDITIONAL TESTS:    MEDICATIONS:  MEDICATIONS  (STANDING):  atorvastatin 80 milliGRAM(s) Oral at bedtime  carbidopa/levodopa  25/100 1 Tablet(s) Oral <User Schedule>  chlorhexidine 0.12% Liquid 15 milliLiter(s) Oral Mucosa every 12 hours  chlorhexidine 2% Cloths 1 Application(s) Topical <User Schedule>  citalopram 20 milliGRAM(s) Oral daily  heparin   Injectable 5000 Unit(s) SubCutaneous every 8 hours  hydrocortisone sodium succinate Injectable 50 milliGRAM(s) IV Push every 8 hours  norepinephrine Infusion 0.05 MICROgram(s)/kG/Min (4.17 mL/Hr) IV Continuous <Continuous>  pantoprazole  Injectable 40 milliGRAM(s) IV Push daily  piperacillin/tazobactam IVPB.. 3.375 Gram(s) IV Intermittent every 8 hours  primidone 50 milliGRAM(s) Oral daily  propofol Infusion 15.893 MICROgram(s)/kG/Min (5.34 mL/Hr) IV Continuous <Continuous>  vasopressin Infusion 0.04 Unit(s)/Min (2.4 mL/Hr) IV Continuous <Continuous>    MEDICATIONS  (PRN):  fentaNYL    Injectable 25 MICROGram(s) IV Push every 2 hours PRN Moderate Pain (4 - 6)

## 2022-06-19 NOTE — PROGRESS NOTE ADULT - SUBJECTIVE AND OBJECTIVE BOX
Patient is a 76y old  Male who presents with a chief complaint of Acute Hypoxic Respiratory Failure (2022 00:06)    24 hour events: No acute events overnight. Patient seen and examined at bedside. Unable to obtain ROS as patient is sedated and intubated.     REVIEW OF SYSTEMS: Unable to obtain as per HPI      T(F): 99.1 (22 @ 07:49), Max: 99.1 (22 @ 00:00)  HR: 69 (22 @ 12:05) (62 - 94)  BP: --  RR: 28 (22 @ 11:00) (10 - 40)  SpO2: 97% (22 @ 12:05) (95% - 99%)  Wt(kg): --    Mode: AC/ CMV (Assist Control/ Continuous Mandatory Ventilation), RR (machine): 14, TV (machine): 440, FiO2: 40, PEEP: 8        I&O's Summary     @ 07:01  -   @ 07:00  --------------------------------------------------------  IN: 2143.3 mL / OUT: 1080 mL / NET: 1063.3 mL     @ 07:01  -   @ 12:14  --------------------------------------------------------  IN: 352.8 mL / OUT: 0 mL / NET: 352.8 mL      PHYSICAL EXAM  General: intubated, cachectic  CNS: sedated  HEENT: NCAT, +NGT  Resp: breath sounds diminished b/l   CVS: RRR, no murmurs  Abd: scaphoid abdomen, no palpable masses  Ext: cool extremities, chronic podiatric skin changes    MEDICATIONS  piperacillin/tazobactam IVPB.. IV Intermittent    norepinephrine Infusion IV Continuous    atorvastatin Oral  hydrocortisone sodium succinate Injectable IV Push  vasopressin Infusion IV Continuous      carbidopa/levodopa  25/100 Oral  citalopram Oral  fentaNYL    Injectable IV Push PRN  primidone Oral  propofol Infusion IV Continuous      heparin   Injectable SubCutaneous    pantoprazole  Injectable IV Push          chlorhexidine 0.12% Liquid Oral Mucosa  chlorhexidine 2% Cloths Topical                            9.1    9.91  )-----------( 190      ( 2022 06:06 )             27.3       06-19    148<H>  |  118<H>  |  46<H>  ----------------------------<  192<H>  3.8   |  25  |  1.60<H>    Ca    11.0<H>      2022 06:06  Phos  2.9       Mg     2.3         TPro  6.1  /  Alb  1.9<L>  /  TBili  0.4  /  DBili  x   /  AST  24  /  ALT  8<L>  /  AlkPhos  68  19          PT/INR - ( 2022 12:23 )   PT: 20.4 sec;   INR: 1.73 ratio         PTT - ( 2022 12:23 )  PTT:43.1 sec  Urinalysis Basic - ( 2022 16:54 )    Color: Yellow / Appearance: Turbid / S.015 / pH: x  Gluc: x / Ketone: Trace  / Bili: Small / Urobili: 1   Blood: x / Protein: 100 / Nitrite: Negative   Leuk Esterase: Moderate / RBC: 25-50 /HPF / WBC 26-50   Sq Epi: x / Non Sq Epi: Few / Bacteria: TNTC      ET Tube ET Tube --   Moderate polymorphonuclear leukocytes per low power field  Rare Squamous epithelial cells per low power field  Few Gram Positive Rods per oil power field  Rare Yeast per oil power field  @ 20:58  Clean Catch Clean Catch (Midstream)   >100,000 CFU/ml Pseudomonas aeruginosa --  @ 00:58  .Blood Blood-Peripheral   No growth to date. --  @ 18:45      Rapid RVP Result: NotDetec ( @ 12:21)    CENTRAL LINE: N            MENJIVAR: Y                        A-LINE: Y, L radial  Midline: Y                         GLOBAL ISSUE/BEST PRACTICE  Analgesia: Y  Sedation: Y  HOB elevation: yes  Stress ulcer prophylaxis: Y  VTE prophylaxis: Y  Glycemic control: Y  Nutrition: N    CODE STATUS: DNR  GOC discussion: Y     Patient is a 76y old  Male who presents with a chief complaint of Acute Hypoxic Respiratory Failure (2022 00:06)    24 hour events: No acute events overnight. Patient seen and examined at bedside. Unable to obtain ROS as patient is sedated and intubated.     REVIEW OF SYSTEMS: Unable to obtain as per HPI      T(F): 99.1 (22 @ 07:49), Max: 99.1 (22 @ 00:00)  HR: 69 (22 @ 12:05) (62 - 94)  BP: --  RR: 28 (22 @ 11:00) (10 - 40)  SpO2: 97% (22 @ 12:05) (95% - 99%)  Wt(kg): --    Mode: AC/ CMV (Assist Control/ Continuous Mandatory Ventilation), RR (machine): 14, TV (machine): 440, FiO2: 40, PEEP: 8        I&O's Summary     @ 07:01  -   @ 07:00  --------------------------------------------------------  IN: 2143.3 mL / OUT: 1080 mL / NET: 1063.3 mL     @ 07:01  -   @ 12:14  --------------------------------------------------------  IN: 352.8 mL / OUT: 0 mL / NET: 352.8 mL      PHYSICAL EXAM  General: intubated, cachectic  CNS: sedated  HEENT: NCAT, +NGT  Resp: breath sounds diminished b/l   CVS: RRR, no murmurs  Abd: scaphoid abdomen, no palpable masses  Ext: cool extremities, chronic podiatric skin changes    MEDICATIONS  piperacillin/tazobactam IVPB.. IV Intermittent    norepinephrine Infusion IV Continuous    atorvastatin Oral  hydrocortisone sodium succinate Injectable IV Push  vasopressin Infusion IV Continuous      carbidopa/levodopa  25/100 Oral  citalopram Oral  fentaNYL    Injectable IV Push PRN  primidone Oral  propofol Infusion IV Continuous      heparin   Injectable SubCutaneous    pantoprazole  Injectable IV Push          chlorhexidine 0.12% Liquid Oral Mucosa  chlorhexidine 2% Cloths Topical                            9.1    9.91  )-----------( 190      ( 2022 06:06 )             27.3       06-19    148<H>  |  118<H>  |  46<H>  ----------------------------<  192<H>  3.8   |  25  |  1.60<H>    Ca    11.0<H>      2022 06:06  Phos  2.9       Mg     2.3         TPro  6.1  /  Alb  1.9<L>  /  TBili  0.4  /  DBili  x   /  AST  24  /  ALT  8<L>  /  AlkPhos  68            PT/INR - ( 2022 12:23 )   PT: 20.4 sec;   INR: 1.73 ratio         PTT - ( 2022 12:23 )  PTT:43.1 sec  Urinalysis Basic - ( 2022 16:54 )    Color: Yellow / Appearance: Turbid / S.015 / pH: x  Gluc: x / Ketone: Trace  / Bili: Small / Urobili: 1   Blood: x / Protein: 100 / Nitrite: Negative   Leuk Esterase: Moderate / RBC: 25-50 /HPF / WBC 26-50   Sq Epi: x / Non Sq Epi: Few / Bacteria: TNTC      ET Tube ET Tube --   Moderate polymorphonuclear leukocytes per low power field  Rare Squamous epithelial cells per low power field  Few Gram Positive Rods per oil power field  Rare Yeast per oil power field  @ 20:58  Clean Catch Clean Catch (Midstream)   >100,000 CFU/ml Pseudomonas aeruginosa --  @ 00:58  .Blood Blood-Peripheral   No growth to date. --  @ 18:45      Rapid RVP Result: NotDetec ( @ 12:21)    POCUS--normal LV function, enlarged IVC without variation, B lines b/l    CENTRAL LINE: N            MENJIVAR: Y                        A-LINE: Y, L radial  Midline: Y                         GLOBAL ISSUE/BEST PRACTICE  Analgesia: Y  Sedation: Y  HOB elevation: yes  Stress ulcer prophylaxis: Y  VTE prophylaxis: Y  Glycemic control: Y  Nutrition: N    CODE STATUS: DNR  GOC discussion: Y

## 2022-06-19 NOTE — PROGRESS NOTE ADULT - ASSESSMENT
75 y/o M w/ pmh of prostate ca, SCC of tounge s/p partial resection now w/ recurrence, PD, hypertension, hld, recent CVA and oropharyngeal dysphagia now admitted to the MICU for acute hypoxemic rsep failure, septic shoch 2/2 to pna +/- UTI, metabolic encephalopathy, lactic acidosis, ÁNGEL and hyperNa.    -Neuro: Intubated and sedated on prop/fentalyn, PD on some dose of sinemiet/primidone/celexa, recent CTH w/ no acute finding  -Cardiac: Septic Shock on levo/vaso, on albumin for weaning pressors, cont stress dose steroids, will titrate pressors as needed to maintain MAP >65  -Resp: Acute Hypoxic resp failure cont lung protective ventilation, will titrate vent setting as needed to maintain o2 >90%  -GI: NPO on TF cont as ordered/tolerated, GI ppx w/ Protonix, hld on statins  -Renal: ÁNGEL and hyperNa gradually improving cont to monitor closely on IVF, monitor I&O closely  -ID: Septic Shock 2/2 to pna +/- UTI on IVAB f/u on final cx data  -Endo: Hypercal currently being worked up f/u on VitD and PTH levels  -Heme: DVT ppx w/ heparin  -Dispo: Pt remains in the MICU, currently DNR, GOC ongoing by day team, dispo pending hospital course

## 2022-06-19 NOTE — PROGRESS NOTE ADULT - SUBJECTIVE AND OBJECTIVE BOX
HPI: 77 y/o M w/ pmh of prostate ca, SCC of tounge s/p partial resection now w/ recurrence, PD, hypertension, hld, recent CVA and oropharyngeal dysphagia now admitted to the MICU for acute hypoxemic rsep failure, septic shock 2/2 to pna +/- UTI, metabolic encephalopathy, lactic acidosis, ÁNGEL and hyperNa.    24 hour events: vent setting being titrated down, switched to precedex for possible weaning trials in the AM    Review of Systems: pt intubated and sedated      T(F): 99.1 (06-19-22 @ 07:49), Max: 99.1 (06-19-22 @ 00:00)  HR: 66 (06-19-22 @ 21:00) (66 - 94)  BP: --  RR: 24 (06-19-22 @ 21:00) (20 - 28)  SpO2: 96% (06-19-22 @ 21:00) (95% - 99%)  Wt(kg): --    Mode: AC/ CMV (Assist Control/ Continuous Mandatory Ventilation), RR (machine): 14, TV (machine): 440, FiO2: 40, PEEP: 8  06-18-22 @ 07:01  -  06-19-22 @ 07:00  --------------------------------------------------------  IN: 2143.2 mL / OUT: 1080 mL / NET: 1063.2 mL    06-19-22 @ 07:01  -  06-19-22 @ 23:10  --------------------------------------------------------  IN: 1498.4 mL / OUT: 396 mL / NET: 1102.4 mL        CAPILLARY BLOOD GLUCOSE      POCT Blood Glucose.: 177 mg/dL (18 Jun 2022 17:30)      I&O's Summary    18 Jun 2022 07:01  -  19 Jun 2022 07:00  --------------------------------------------------------  IN: 2143.2 mL / OUT: 1080 mL / NET: 1063.2 mL    19 Jun 2022 07:01  -  19 Jun 2022 23:10  --------------------------------------------------------  IN: 1498.4 mL / OUT: 396 mL / NET: 1102.4 mL        Physical Exam:   Gen: Comfortable in bed in NAD  Neuro: intubated and sedated  Resp: good air entry b/l  CVS: +RRR  Abd: BSx4, soft, nt/nd  Ext: no edema   Skin: warm/dry    Meds:  piperacillin/tazobactam IVPB.. IV Intermittent    norepinephrine Infusion IV Continuous    atorvastatin Oral  hydrocortisone sodium succinate Injectable IV Push  vasopressin Infusion IV Continuous      carbidopa/levodopa  25/100 Oral  citalopram Oral  dexMEDEtomidine Infusion IV Continuous  fentaNYL    Injectable IV Push PRN  primidone Oral      heparin   Injectable SubCutaneous    pantoprazole  Injectable IV Push  polyethylene glycol 3350 Oral          chlorhexidine 0.12% Liquid Oral Mucosa  chlorhexidine 2% Cloths Topical                              9.1    9.91  )-----------( 190      ( 19 Jun 2022 06:06 )             27.3       06-19    148<H>  |  118<H>  |  46<H>  ----------------------------<  192<H>  3.8   |  25  |  1.60<H>    Ca    11.0<H>      19 Jun 2022 06:06  Phos  2.9     06-19  Mg     2.3     06-19    TPro  6.1  /  Alb  1.9<L>  /  TBili  0.4  /  DBili  x   /  AST  24  /  ALT  8<L>  /  AlkPhos  68  06-19              ET Tube ET Tube   Moderate Escherichia coli   Moderate polymorphonuclear leukocytes per low power field  Rare Squamous epithelial cells per low power field  Few Gram Positive Rods per oil power field  Rare Yeast per oil power field 06-18 @ 20:58  Clean Catch Clean Catch (Midstream)   >100,000 CFU/ml Pseudomonas aeruginosa -- 06-18 @ 00:58  .Blood Blood-Peripheral   No growth to date. -- 06-17 @ 18:45      Rapid RVP Result: NotDetec (06-17 @ 12:21)    Radiology:    < from: CT Abdomen and Pelvis No Cont (06.17.22 @ 14:15) >    ACC: [id number] EXAM:  CT ABDOMEN AND PELVIS                        ACC: [id number] EXAM:  CT CHEST                          PROCEDURE DATE:  06/17/2022          INTERPRETATION:  CLINICAL INFORMATION: Sepsis.  Acute hypoxic respiratory failure.  History of recurrent tongue squamous cell carcinoma.    COMPARISON: CT scan of the chest 04/7/2022 and CT scan of the abdomen   pelvis 5/5/2022.    CONTRAST/COMPLICATIONS:  IV Contrast: NONE  Oral Contrast: NONE  Complications: None reported at time of study completion    PROCEDURE:  CT of the Chest, Abdomen and Pelvis was performed.  Sagittal and coronal reformats were performed.    FINDINGS:  Streak artifact degrades image quality limiting evaluation.    CHEST:    LUNGS AND LARGE AIRWAYS: PLEURA:  Endotracheal tube, below the level of the margy.  The central airways remain patent.    Patchy bibasilar airspace consolidation may reflect atelectasis and/or   pneumonia in the appropriate clinical setting.    Bilateral mosaic groundglass attenuation which may reflect small vessel   or small airway disease.  Poorly defined groundglass centrilobular nodular opacities, most   pronounced in the left upper and right lower lobe; which may reflect   infectious/inflammatory small airway disease.    Trace bilateral pleural effusions.    VESSELS: Atherosclerotic changes thoracic aorta and coronary artery   calcifications.    HEART: Heart size is normal.  Aortic and mitral valvular calcification.  No pericardial effusion.    MEDIASTINUM AND STEPHANIE: No lymphadenopathy.    CHEST WALL AND LOWER NECK: Within normal limits.    ABDOMEN AND PELVIS:    Streak artifact degrades image quality limiting evaluation.    The evaluation of the solid organ parenchyma is limited without   intravenous contrast.    LIVER:Within normal limits.  BILE DUCTS: Normal caliber.  GALLBLADDER: Distended with biliary sludge. No gallbladder wall   thickening.  SPLEEN: Within normal limits.  PANCREAS: Within normal limits.  ADRENALS: Within normal limits.  KIDNEYS/URETERS:  3 mmnonobstructing intrarenal calcification lower pole right kidney.  Right renal cyst with layering milk of calcium, stable.  Left renal cyst.    BLADDER: Menjivar catheter, nondistended bladder.  REPRODUCTIVE ORGANS: Prostate fiducial markers.    BOWEL:  Colonic fecal retention, without bowel obstruction.  Appendix not visualized.  PERITONEUM: No ascites.    VESSELS: Atherosclerotic changes.  RETROPERITONEUM/LYMPH NODES: No lymphadenopathy.    ABDOMINAL WALL: Cachexia.    BONES:  Prominent S-shaped thoracolumbar scoliosis.  Degenerative changes.    IMPRESSION:    Technically limited study.    Bibasilar airspace consolidation which may reflect atelectasis and/or   pneumonia.  Bilateral groundglass centrilobular nodular opacities which may reflect   infectious and/or inflammatory airway disease.    Colonic fecal retention without bowel obstruction.    Other chronic findings as above.    --- End of Report ---      VALENTINE SHERIDAN MD; Attending Radiologist  This document has been electronically signed. Jun 17 2022  3:11PM    < end of copied text >        CENTRAL LINE: N  MENJIVAR: Y  A-LINE: Y    GLOBAL ISSUE/BEST PRACTICE:  Analgesia: Y  Sedation: Y  HOB elevation: Y  Stress ulcer prophylaxis: Y   VTE prophylaxis: Y  Glycemic control: Y  Nutrition: Y    CODE STATUS: DNR    CRITICAL CARE TIME SPENT: 35 mins  (Assessing presenting problems of acute illness, which pose high probability of life threatening deterioration or end organ damage/dysfunction, as well as medical decision making including initiating plan of care, reviewing data, reviewing radiologic exams, discussing with multidisciplinary team,  discussing goals of care with patient/family, and writing this note.  Non-inclusive of procedures performed)

## 2022-06-19 NOTE — PROGRESS NOTE ADULT - ATTENDING COMMENTS
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        Plan  NEURO: sedated with propofol and prn fentanyl  transition to precedex to facilitate vent weaning  Parkinsons disease, continue home sinemiet, primidone, celexa  Given Hx of recent CVA and suspected hypercoaguable state was on lovenox AC as outpt, will hold for now until more stable  CVS: septic shock, continue levo, vaso, decreasing requirements  continue stress dose steroids  CAD, continue ASA  PULM: acute hypoxemic respiratory failure  wean FiO2 to 40% today  GI: NPO, TF via NGT  PPI for ppx  RENAL: ÁNGEL improving  hypernatremia, continue free water flushes started yesterday  ID: septic shock from b/l lower lobe pnemonia v pseudomonas UTI  continue zosyn pending Cx data  ENDO: hypercalcemia likely related to malignancy, f/u PTHrp  PPX: DVT PPX with heparin SC  DNR

## 2022-06-19 NOTE — PROGRESS NOTE ADULT - SUBJECTIVE AND OBJECTIVE BOX
HPI: 77 y/o M w/ pmh of prostate ca, SCC of tounge s/p partial resection now w/ recurrence, PD, hypertension, hld, recent CVA and oropharyngeal dysphagia now admitted to the MICU for acute hypoxemic rsep failure, septic shoch 2/2 to pna +/- UTI, metabolic encephalopathy, lactic acidosis, ÁNGEL and hyperNa.    24 hour events: day time events noted, pt seen and examined will titrate pressors and vent as needed    Review of Systems: pt intubated and sedated    T(F): 98.2 (22 @ 18:00), Max: 98.8 (22 @ 23:30)  HR: 80 (22 @ 20:37) (60 - 83)  BP: --  RR: 27 (22 @ 20:00) (10 - 40)  SpO2: 96% (22 @ 20:37) (94% - 100%)  Wt(kg): --    Mode: AC/ CMV (Assist Control/ Continuous Mandatory Ventilation), RR (machine): 14, TV (machine): 440, FiO2: 60, PEEP: 8  22 @ 07:01  -  22 @ 07:00  --------------------------------------------------------  IN: 3471.7 mL / OUT: 510 mL / NET: 2961.7 mL    22 @ 07:01  -  22 @ 22:16  --------------------------------------------------------  IN: 1466.2 mL / OUT: 510 mL / NET: 956.2 mL        CAPILLARY BLOOD GLUCOSE      POCT Blood Glucose.: 177 mg/dL (2022 17:30)      I&O's Summary    2022 07:01  -  2022 07:00  --------------------------------------------------------  IN: 3471.7 mL / OUT: 510 mL / NET: 2961.7 mL    2022 07:01  -  2022 22:16  --------------------------------------------------------  IN: 1466.2 mL / OUT: 510 mL / NET: 956.2 mL        Physical Exam:   Gen: Comfortable in bed in NAD  Neuro: intubated and sedated  Resp: good air entry b/l  CVS: +RRR  Abd: BSx4, soft, nt/nd  Ext: no edema   Skin: warm/dry    Meds:  piperacillin/tazobactam IVPB.. IV Intermittent    norepinephrine Infusion IV Continuous    atorvastatin Oral  hydrocortisone sodium succinate Injectable IV Push  vasopressin Infusion IV Continuous      carbidopa/levodopa  25/100 Oral  citalopram Oral  fentaNYL    Injectable IV Push PRN  primidone Oral  propofol Infusion IV Continuous      heparin   Injectable SubCutaneous    pantoprazole  Injectable IV Push          chlorhexidine 0.12% Liquid Oral Mucosa  chlorhexidine 2% Cloths Topical                              10.0   12.10 )-----------( 233      ( 2022 05:42 )             30.6       06-18    148<H>  |  115<H>  |  43<H>  ----------------------------<  212<H>  3.6   |  23  |  2.20<H>    Ca    11.2<H>      2022 05:42  Phos  3.9     06-18  Mg     2.0     06-18    TPro  5.9<L>  /  Alb  1.8<L>  /  TBili  0.4  /  DBili  x   /  AST  26  /  ALT  7<L>  /  AlkPhos  50  06-18    Lactate 2.3           06-18 @ 02:47    Lactate 2.5           06-17 @ 23:57          PT/INR - ( 2022 12:23 )   PT: 20.4 sec;   INR: 1.73 ratio         PTT - ( 2022 12:23 )  PTT:43.1 sec  Urinalysis Basic - ( 2022 16:54 )    Color: Yellow / Appearance: Turbid / S.015 / pH: x  Gluc: x / Ketone: Trace  / Bili: Small / Urobili: 1   Blood: x / Protein: 100 / Nitrite: Negative   Leuk Esterase: Moderate / RBC: 25-50 /HPF / WBC 26-50   Sq Epi: x / Non Sq Epi: Few / Bacteria: TNTC      .Blood Blood-Peripheral   No growth to date. --  @ 18:45      Rapid RVP Result: NotDetec ( @ 12:21)          Radiology:     < from: CT Abdomen and Pelvis No Cont (22 @ 14:15) >  ACC: [id number] EXAM:  CT ABDOMEN AND PELVIS                        ACC: [id number] EXAM:  CT CHEST                          PROCEDURE DATE:  2022          INTERPRETATION:  CLINICAL INFORMATION: Sepsis.  Acute hypoxic respiratory failure.  History of recurrent tongue squamous cell carcinoma.    COMPARISON: CT scan of the chest 2022 and CT scan of the abdomen   pelvis 2022.    CONTRAST/COMPLICATIONS:  IV Contrast: NONE  Oral Contrast: NONE  Complications: None reported at time of study completion    PROCEDURE:  CT of the Chest, Abdomen and Pelvis was performed.  Sagittal and coronal reformats were performed.    FINDINGS:  Streak artifact degrades image quality limiting evaluation.    CHEST:    LUNGS AND LARGE AIRWAYS: PLEURA:  Endotracheal tube, below the level of the margy.  The central airways remain patent.    Patchy bibasilar airspace consolidation may reflect atelectasis and/or   pneumonia in the appropriate clinical setting.    Bilateral mosaic groundglass attenuation which may reflect small vessel   or small airway disease.  Poorly defined groundglass centrilobular nodular opacities, most   pronounced in the left upper and right lower lobe; which may reflect   infectious/inflammatory small airway disease.    Trace bilateral pleural effusions.    VESSELS: Atherosclerotic changes thoracic aorta and coronary artery   calcifications.    HEART: Heart size is normal.  Aortic and mitral valvular calcification.  No pericardial effusion.    MEDIASTINUM AND STEPHANIE: No lymphadenopathy.    CHEST WALL AND LOWER NECK: Within normal limits.    ABDOMEN AND PELVIS:    Streak artifact degrades image quality limiting evaluation.    The evaluation of the solid organ parenchyma is limited without   intravenous contrast.    LIVER:Within normal limits.  BILE DUCTS: Normal caliber.  GALLBLADDER: Distended with biliary sludge. No gallbladder wall   thickening.  SPLEEN: Within normal limits.  PANCREAS: Within normal limits.  ADRENALS: Within normal limits.  KIDNEYS/URETERS:  3 mmnonobstructing intrarenal calcification lower pole right kidney.  Right renal cyst with layering milk of calcium, stable.  Left renal cyst.    BLADDER: Menjivar catheter, nondistended bladder.  REPRODUCTIVE ORGANS: Prostate fiducial markers.    BOWEL:  Colonic fecal retention, without bowel obstruction.  Appendix not visualized.  PERITONEUM: No ascites.    VESSELS: Atherosclerotic changes.  RETROPERITONEUM/LYMPH NODES: No lymphadenopathy.    ABDOMINAL WALL: Cachexia.    BONES:  Prominent S-shaped thoracolumbar scoliosis.  Degenerative changes.    IMPRESSION:    Technically limited study.    Bibasilar airspace consolidation which may reflect atelectasis and/or   pneumonia.  Bilateral groundglass centrilobular nodular opacities which may reflect   infectious and/or inflammatory airway disease.    Colonic fecal retention without bowel obstruction.    Other chronic findings as above.    --- End of Report ---      VALENTINE SHERIDAN MD; Attending Radiologist  This document has been electronically signed. 2022  3:11PM    < end of copied text >    < from: CT Head No Cont (22 @ 14:14) >  ACC: [id number] EXAM:  CT BRAIN                          PROCEDURE DATE:  2022          INTERPRETATION:  Clinical indications: Sepsis.    Multiple axial sections were performed from base skull to vertex without   contrast. Coronal and sagittal reconstructions were performed as well.    This exam is somewhat limited by motion    This exam is compared prior head CT performed on May 30, 2022.    Parenchymal volume loss and chronic microvascular ischemic changes are   identified.    Abnormal low-attenuation involving the right basal ganglia/anterior   corona radiata region is again seen which is likely compatible with an   old infarct. Old lacunar infarct involving the anterior right lenticular   nucleus region is also again seen and unchanged    Grossly, there is no acute hemorrhage mass or mass effect seen    Evaluation of osseous structures with the appropriate window appears   normal    Right-sided NG tube is seen.    Both mastoid and middle ear regions clear.    Impression: Stable exam.    --- End of Report ---            SHARON VILLA MD; Attending Radiologist  This document has been electronically signed. 2022  2:28PM    < end of copied text >        CENTRAL LINE: N  MENJIVAR: Y  A-LINE: Y    GLOBAL ISSUE/BEST PRACTICE:  Analgesia: Y  Sedation: Y  HOB elevation: Y  Stress ulcer prophylaxis: Y   VTE prophylaxis: Y  Glycemic control: Y  Nutrition: Y    CODE STATUS: DNR    CRITICAL CARE TIME SPENT: 35 mins  (Assessing presenting problems of acute illness, which pose high probability of life threatening deterioration or end organ damage/dysfunction, as well as medical decision making including initiating plan of care, reviewing data, reviewing radiologic exams, discussing with multidisciplinary team,  discussing goals of care with patient/family, and writing this note.  Non-inclusive of procedures performed)

## 2022-06-20 NOTE — CONSULT NOTE ADULT - SUBJECTIVE AND OBJECTIVE BOX
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       Allergies:  No Known Allergies      Medications:  albuterol/ipratropium for Nebulization 3 milliLiter(s) Nebulizer every 6 hours  atorvastatin 80 milliGRAM(s) Oral at bedtime  carbidopa/levodopa  25/100 1 Tablet(s) Oral <User Schedule>  chlorhexidine 0.12% Liquid 15 milliLiter(s) Oral Mucosa every 12 hours  chlorhexidine 2% Cloths 1 Application(s) Topical <User Schedule>  citalopram 20 milliGRAM(s) Oral daily  dexMEDEtomidine Infusion 0.3 MICROgram(s)/kG/Hr IV Continuous <Continuous>  fentaNYL    Injectable 25 MICROGram(s) IV Push every 2 hours PRN  heparin   Injectable 5000 Unit(s) SubCutaneous every 8 hours  hydrocortisone sodium succinate Injectable 50 milliGRAM(s) IV Push every 8 hours  midodrine 5 milliGRAM(s) Oral every 8 hours  norepinephrine Infusion 0.05 MICROgram(s)/kG/Min IV Continuous <Continuous>  pantoprazole  Injectable 40 milliGRAM(s) IV Push daily  piperacillin/tazobactam IVPB.. 3.375 Gram(s) IV Intermittent every 8 hours  polyethylene glycol 3350 17 Gram(s) Oral every 24 hours  primidone 50 milliGRAM(s) Oral daily  vasopressin Infusion 0.04 Unit(s)/Min IV Continuous <Continuous>      PMHX/PSHX:  Hypertension    Hyperlipidemia    GERD (gastroesophageal reflux disease)    Parkinson&#x27;s disease    Prostate cancer    Mitral valve prolapse    Malignant neoplasm of tongue, unspecified    Throat cancer    History of vasectomy    Prostate cancer    S/P partial glossectomy        Family history:  FH: heart disease    FH: type 2 diabetes        Social History:     ROS:     unable to obtain        PHYSICAL EXAM:   Vital Signs:  Vital Signs Last 24 Hrs  T(C): 36.7 (2022 08:00), Max: 37.1 (2022 20:00)  T(F): 98.1 (2022 08:00), Max: 98.7 (2022 20:00)  HR: 79 (2022 14:00) (58 - 86)  BP: --  BP(mean): --  RR: 31 (2022 14:00) (19 - 34)  SpO2: 94% (2022 14:00) (93% - 97%)  Daily     Daily Weight in k (2022 04:47)    intubated  sedated  frail  soft, nt  no edema      LABS:                        8.7    9.72  )-----------( 162      ( 2022 06:22 )             26.7     06-20    153<H>  |  119<H>  |  46<H>  ----------------------------<  177<H>  3.2<L>   |  28  |  1.20    Ca    10.6<H>      2022 06:22  Phos  1.5     06-20  Mg     2.4     06-20    TPro  6.0  /  Alb  1.8<L>  /  TBili  0.3  /  DBili  x   /  AST  27  /  ALT  15  /  AlkPhos  86  06-20    LIVER FUNCTIONS - ( 2022 06:22 )  Alb: 1.8 g/dL / Pro: 6.0 g/dL / ALK PHOS: 86 U/L / ALT: 15 U/L / AST: 27 U/L / GGT: x           PT/INR - ( 2022 06:22 )   PT: 11.9 sec;   INR: 1.02 ratio         PTT - ( 2022 06:22 )  PTT:36.2 sec        Imaging:

## 2022-06-20 NOTE — PROGRESS NOTE ADULT - ASSESSMENT
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     Neuro:  - Propofol d/valery   - Titrated down on Precedex gtt  - Fentanyl prn  - Continue home Citalopran, Primidone and Sinemet for Parkinson's     CV:  - Hypotension 2/2 septic shock  - Weaned off Levophed and Vasopressin, will continue to monitor   - Continue stress dose steroids Hydrocortisone 50mg q8h   - Continue midodrine 5mg Q8H     Pulm:  - Continue lung protective ventilation, titrate settings as tolerated  - Duoneb Tx Q6H   - F/u morning ABG                   GI:  - TF via NGT  - Keep NPO after midnight for scheduled PEG placement tomorrow   - IV Protonix 40mg qd for GI ppx    Renal:  - Cr 1.20 this AM from 1.60   - Monitor I&Os and renal indices     Heme:  - Heparin for DVT ppx; Hold after midnight for PEG placement tomorrow   - Per prior Hospitalist note from 6/2, pt was on full dose AC for recent stroke and hypercoagulable state. Will hold off on full dose AC for now     ID:  - B/l lower lobe PNA and UTI  - Urine cx positive for Pseudomonas  - Continue IV Zosyn  - ET positive for E.Coli f/u sensitivities     Endo:  - Hypercalcemia likely 2/2 malignancy; Start on Pamidronate   - PTH wnl, vit D,25,hydroxy 39.2 and vit D,1,25 dihydroxy 89.9  - F/u PTHrP   - Hyponatremia 153 this AM; Continue with free water    - Hypokalemia 3.2 this AM; Replete   - Hypophosphatemia 1.5; Replete     Tubes/lines:  - Midline, L radial A Line, NGT, banks 76 year old male with history of prostate cancer, SCC of tongue s/p partial resection now with recurrence, parkinson's disease, hypertension, hyperlipidemia, recent CVA, and oropharyngeal dysphagia admitted for septic shock and acute hypoxic respiratory failure in the setting of b/l lower lobe PNA and UTI.     Neuro:  - Propofol d/valery   - Titrated down on Precedex gtt  - Fentanyl prn  - Continue home Citalopran, Primidone and Sinemet for Parkinson's     CV:  - Hypotension 2/2 septic shock  - Weaned off Levophed and Vasopressin, will continue to monitor   - Continue stress dose steroids Hydrocortisone 50mg q8h   - Continue midodrine 5mg Q8H     Pulm:  - Continue lung protective ventilation, titrate settings as tolerated  - Duoneb Tx Q6H   - F/u morning ABG                   GI:  - TF via NGT  - Keep NPO after midnight for scheduled PEG placement tomorrow   - IV Protonix 40mg qd for GI ppx    Renal:  - Cr 1.20 this AM from 1.60   - Monitor I&Os and renal indices     Heme:  - Heparin for DVT ppx; Hold after midnight for PEG placement tomorrow   - Per prior Hospitalist note from 6/2, pt was on full dose AC for recent stroke and hypercoagulable state. Will hold off on full dose AC for now     ID:  - B/l lower lobe PNA and UTI  - Urine cx positive for Pseudomonas  - Continue IV Zosyn  - ET positive for E.Coli f/u sensitivities     Endo:  - Hypercalcemia likely 2/2 malignancy; Start on Pamidronate + Endocrine consulted   - PTH wnl, vit D,25,hydroxy 39.2 and vit D,1,25 dihydroxy 89.9  - F/u PTHrP   - Hyponatremia 153 this AM; Continue with free water    - Hypokalemia 3.2 this AM; Replete   - Hypophosphatemia 1.5; Replete     Tubes/lines:  - Midline, L radial A Line, NGT, banks

## 2022-06-20 NOTE — PROGRESS NOTE ADULT - SUBJECTIVE AND OBJECTIVE BOX
HPI: 75 y/o M w/ pmh of prostate ca, SCC of tounge s/p partial resection now w/ recurrence, PD, hypertension, hld, recent CVA and oropharyngeal dysphagia now admitted to the MICU for acute hypoxemic rsep failure, septic shock 2/2 to pna +/- UTI, metabolic encephalopathy, lactic acidosis, ÁNGEL and hyperNa.    24 hour events: vent setting being titrated down, switched to precedex for possible weaning trials in the AM    Review of Systems: pt intubated and sedated      T(F): 99.1 (06-19-22 @ 07:49), Max: 99.1 (06-19-22 @ 00:00)  HR: 66 (06-19-22 @ 21:00) (66 - 94)  BP: --  RR: 24 (06-19-22 @ 21:00) (20 - 28)  SpO2: 96% (06-19-22 @ 21:00) (95% - 99%)  Wt(kg): --    Mode: AC/ CMV (Assist Control/ Continuous Mandatory Ventilation), RR (machine): 14, TV (machine): 440, FiO2: 40, PEEP: 8  06-18-22 @ 07:01  -  06-19-22 @ 07:00  --------------------------------------------------------  IN: 2143.2 mL / OUT: 1080 mL / NET: 1063.2 mL    06-19-22 @ 07:01  -  06-19-22 @ 23:10  --------------------------------------------------------  IN: 1498.4 mL / OUT: 396 mL / NET: 1102.4 mL        CAPILLARY BLOOD GLUCOSE      POCT Blood Glucose.: 177 mg/dL (18 Jun 2022 17:30)      I&O's Summary    18 Jun 2022 07:01  -  19 Jun 2022 07:00  --------------------------------------------------------  IN: 2143.2 mL / OUT: 1080 mL / NET: 1063.2 mL    19 Jun 2022 07:01  -  19 Jun 2022 23:10  --------------------------------------------------------  IN: 1498.4 mL / OUT: 396 mL / NET: 1102.4 mL        Physical Exam:   Gen: Comfortable in bed in NAD  Neuro: intubated and sedated  Resp: good air entry b/l  CVS: +RRR  Abd: BSx4, soft, nt/nd  Ext: no edema   Skin: warm/dry    Meds:  piperacillin/tazobactam IVPB.. IV Intermittent    norepinephrine Infusion IV Continuous    atorvastatin Oral  hydrocortisone sodium succinate Injectable IV Push  vasopressin Infusion IV Continuous      carbidopa/levodopa  25/100 Oral  citalopram Oral  dexMEDEtomidine Infusion IV Continuous  fentaNYL    Injectable IV Push PRN  primidone Oral      heparin   Injectable SubCutaneous    pantoprazole  Injectable IV Push  polyethylene glycol 3350 Oral          chlorhexidine 0.12% Liquid Oral Mucosa  chlorhexidine 2% Cloths Topical                              9.1    9.91  )-----------( 190      ( 19 Jun 2022 06:06 )             27.3       06-19    148<H>  |  118<H>  |  46<H>  ----------------------------<  192<H>  3.8   |  25  |  1.60<H>    Ca    11.0<H>      19 Jun 2022 06:06  Phos  2.9     06-19  Mg     2.3     06-19    TPro  6.1  /  Alb  1.9<L>  /  TBili  0.4  /  DBili  x   /  AST  24  /  ALT  8<L>  /  AlkPhos  68  06-19              ET Tube ET Tube   Moderate Escherichia coli   Moderate polymorphonuclear leukocytes per low power field  Rare Squamous epithelial cells per low power field  Few Gram Positive Rods per oil power field  Rare Yeast per oil power field 06-18 @ 20:58  Clean Catch Clean Catch (Midstream)   >100,000 CFU/ml Pseudomonas aeruginosa -- 06-18 @ 00:58  .Blood Blood-Peripheral   No growth to date. -- 06-17 @ 18:45      Rapid RVP Result: NotDetec (06-17 @ 12:21)    Radiology:    < from: CT Abdomen and Pelvis No Cont (06.17.22 @ 14:15) >    ACC: [id number] EXAM:  CT ABDOMEN AND PELVIS                        ACC: [id number] EXAM:  CT CHEST                          PROCEDURE DATE:  06/17/2022          INTERPRETATION:  CLINICAL INFORMATION: Sepsis.  Acute hypoxic respiratory failure.  History of recurrent tongue squamous cell carcinoma.    COMPARISON: CT scan of the chest 04/7/2022 and CT scan of the abdomen   pelvis 5/5/2022.    CONTRAST/COMPLICATIONS:  IV Contrast: NONE  Oral Contrast: NONE  Complications: None reported at time of study completion    PROCEDURE:  CT of the Chest, Abdomen and Pelvis was performed.  Sagittal and coronal reformats were performed.    FINDINGS:  Streak artifact degrades image quality limiting evaluation.    CHEST:    LUNGS AND LARGE AIRWAYS: PLEURA:  Endotracheal tube, below the level of the margy.  The central airways remain patent.    Patchy bibasilar airspace consolidation may reflect atelectasis and/or   pneumonia in the appropriate clinical setting.    Bilateral mosaic groundglass attenuation which may reflect small vessel   or small airway disease.  Poorly defined groundglass centrilobular nodular opacities, most   pronounced in the left upper and right lower lobe; which may reflect   infectious/inflammatory small airway disease.    Trace bilateral pleural effusions.    VESSELS: Atherosclerotic changes thoracic aorta and coronary artery   calcifications.    HEART: Heart size is normal.  Aortic and mitral valvular calcification.  No pericardial effusion.    MEDIASTINUM AND STEPHANIE: No lymphadenopathy.    CHEST WALL AND LOWER NECK: Within normal limits.    ABDOMEN AND PELVIS:    Streak artifact degrades image quality limiting evaluation.    The evaluation of the solid organ parenchyma is limited without   intravenous contrast.    LIVER:Within normal limits.  BILE DUCTS: Normal caliber.  GALLBLADDER: Distended with biliary sludge. No gallbladder wall   thickening.  SPLEEN: Within normal limits.  PANCREAS: Within normal limits.  ADRENALS: Within normal limits.  KIDNEYS/URETERS:  3 mmnonobstructing intrarenal calcification lower pole right kidney.  Right renal cyst with layering milk of calcium, stable.  Left renal cyst.    BLADDER: Menjivar catheter, nondistended bladder.  REPRODUCTIVE ORGANS: Prostate fiducial markers.    BOWEL:  Colonic fecal retention, without bowel obstruction.  Appendix not visualized.  PERITONEUM: No ascites.    VESSELS: Atherosclerotic changes.  RETROPERITONEUM/LYMPH NODES: No lymphadenopathy.    ABDOMINAL WALL: Cachexia.    BONES:  Prominent S-shaped thoracolumbar scoliosis.  Degenerative changes.    IMPRESSION:    Technically limited study.    Bibasilar airspace consolidation which may reflect atelectasis and/or   pneumonia.  Bilateral groundglass centrilobular nodular opacities which may reflect   infectious and/or inflammatory airway disease.    Colonic fecal retention without bowel obstruction.    Other chronic findings as above.    --- End of Report ---      VALENTINE SHERIDAN MD; Attending Radiologist  This document has been electronically signed. Jun 17 2022  3:11PM    < end of copied text >        CENTRAL LINE: N  MENJIVAR: Y  A-LINE: Y    GLOBAL ISSUE/BEST PRACTICE:  Analgesia: Y  Sedation: Y  HOB elevation: Y  Stress ulcer prophylaxis: Y   VTE prophylaxis: Y  Glycemic control: Y  Nutrition: Y    CODE STATUS: DNR    CRITICAL CARE TIME SPENT: 35 mins  (Assessing presenting problems of acute illness, which pose high probability of life threatening deterioration or end organ damage/dysfunction, as well as medical decision making including initiating plan of care, reviewing data, reviewing radiologic exams, discussing with multidisciplinary team,  discussing goals of care with patient/family, and writing this note.  Non-inclusive of procedures performed)

## 2022-06-20 NOTE — PROGRESS NOTE ADULT - SUBJECTIVE AND OBJECTIVE BOX
Interval Events:   Patient seen and examined at bedside. Overnight, patient was started on Precedex and now stopped. Propofol was also d/valery. Patient still remains unresponsive and sedated. Patient vent setting titrated down currently at RR of 14, TV: 470, FiO2: 30%, PEEP 5. Unable to obtain meaningful history at this time as patient intubated and sedated.     Review of Systems:  Unable to obtain     ICU Vital Signs Last 24 Hrs  T(C): 36.7 (20 Jun 2022 08:00), Max: 37.1 (19 Jun 2022 20:00)  T(F): 98.1 (20 Jun 2022 08:00), Max: 98.7 (19 Jun 2022 20:00)  HR: 70 (20 Jun 2022 12:42) (58 - 86)  BP: --  BP(mean): --  ABP: 116/52 (20 Jun 2022 12:00) (103/47 - 150/64)  ABP(mean): 70 (20 Jun 2022 12:00) (62 - 92)  RR: 29 (20 Jun 2022 12:00) (19 - 34)  SpO2: 95% (20 Jun 2022 12:42) (93% - 97%)      Mode: CPAP with PS, FiO2: 30, PEEP: 5, PS: 15  06-19-22 @ 07:01  -  06-20-22 @ 07:00  --------------------------------------------------------  IN: 2306.6 mL / OUT: 1126 mL / NET: 1180.6 mL    06-20-22 @ 07:01  -  06-20-22 @ 13:39  --------------------------------------------------------  IN: 1288.8 mL / OUT: 350 mL / NET: 938.8 mL        CAPILLARY BLOOD GLUCOSE      POCT Blood Glucose.: 177 mg/dL (18 Jun 2022 17:30)      I&O's Summary    19 Jun 2022 07:01  -  20 Jun 2022 07:00  --------------------------------------------------------  IN: 2306.6 mL / OUT: 1126 mL / NET: 1180.6 mL    20 Jun 2022 07:01  -  20 Jun 2022 13:39  --------------------------------------------------------  IN: 1288.8 mL / OUT: 350 mL / NET: 938.8 mL        Physical Exam:   Gen: Sedated and intubated   Neuro: Sedated   Resp: CTA B/L   CVS: S1, S2, No rubs, gallops or murmurs   Abd: Soft and non-tender   Ext: No edema     Meds:  piperacillin/tazobactam IVPB.. IV Intermittent    midodrine Oral  norepinephrine Infusion IV Continuous    atorvastatin Oral  hydrocortisone sodium succinate Injectable IV Push  pamidronate IVPB IV Intermittent  vasopressin Infusion IV Continuous    albuterol/ipratropium for Nebulization Nebulizer    carbidopa/levodopa  25/100 Oral  citalopram Oral  dexMEDEtomidine Infusion IV Continuous  fentaNYL    Injectable IV Push PRN  primidone Oral      heparin   Injectable SubCutaneous    pantoprazole  Injectable IV Push  polyethylene glycol 3350 Oral      potassium chloride   Powder Oral      chlorhexidine 0.12% Liquid Oral Mucosa  chlorhexidine 2% Cloths Topical                              8.7    9.72  )-----------( 162      ( 20 Jun 2022 06:22 )             26.7     Bands 6.0    06-20    153<H>  |  119<H>  |  46<H>  ----------------------------<  177<H>  3.2<L>   |  28  |  1.20    Ca    10.6<H>      20 Jun 2022 06:22  Phos  1.5     06-20  Mg     2.4     06-20    TPro  6.0  /  Alb  1.8<L>  /  TBili  0.3  /  DBili  x   /  AST  27  /  ALT  15  /  AlkPhos  86  06-20          PT/INR - ( 20 Jun 2022 06:22 )   PT: 11.9 sec;   INR: 1.02 ratio         PTT - ( 20 Jun 2022 06:22 )  PTT:36.2 sec    ET Tube ET Tube   Moderate Escherichia coli   Moderate polymorphonuclear leukocytes per low power field  Rare Squamous epithelial cells per low power field  Few Gram Positive Rods per oil power field  Rare Yeast per oil power field 06-18 @ 20:58  Clean Catch Clean Catch (Midstream)   >100,000 CFU/ml Pseudomonas aeruginosa -- 06-18 @ 00:58  .Blood Blood-Peripheral   No growth to date. -- 06-17 @ 18:45      Rapid RVP Result: NotDetec (06-17 @ 12:21)          Radiology: No new imaging in last 24hrs     Bedside Ultrasound:    CENTRAL LINE: N            MENJIVAR: Y                        A-LINE: Y, L radial  Midline: Y                     GLOBAL ISSUE/BEST PRACTICE:  Analgesia: Y   Sedation: Y   HOB elevation: Y  Stress ulcer prophylaxis: Y  VTE prophylaxis: Y  Glycemic control: N  Nutrition: Y    CODE STATUS: DNR

## 2022-06-20 NOTE — PROGRESS NOTE ADULT - ATTENDING COMMENTS
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 coli pneumonia (likely aspiration) + Pseudomonas UTI, resulting in acute metabolic encephalopathy, lactic acidosis, septic shock, and ÁNGEL.      1. Neuro: on dexmedetomidine, sedation vacation today to assess mental status. Continue home sinemet, primidone, and citalopram  2. CV: resolved shock, remains off pressors. Taper hydrocortisone to 25 mg q8h. Hold midodrine. If BP rises, can restart home diltiazem. Hold enalapril given resolving ÁNGEL. Continue statin  3. Pulm: continue lung protective ventilation, decrease FiO2 to 30%, PEEP 5. Daily SBT as tolerates, currently on 15/5 with Vt 300 mL. Start airway clearance therapy with albuterol, ipratropium, and hypertonic saline  4. GI: NPO with Vital NGT feeds. NPO after midnight for PEG tomorrow at bedside. Continue PPI as per home regimen  5. Renal: ÁNGEL 2/2 ATN slowly resolving. Strict I/O's, close monitoring of kidney function and lytes. Increase free water to 350 q4h. Replete Mg/Phos  6. ID: E. coli pneumonia and Pseudomonas UTI. Continue Zosyn (day 3/7)  7. Heme: hold HSQ for PEG tomorrow. Then will need to resume therapeutic enoxaparin given recent CVA and suspected hypercoagulable state  8. Endo: hypercalcemia likely related to malignancy, f/up PTHrP. Interestingly the 1,25-diOH vitamin D level is elevated, although patient does not have history of granulomatous inflammation. Administer pamidronic acid. Hold off on calcitonin given corrected calcium is 12.36  9. Skin: L radial A-line placed 6/17, d/c after PEG tomorrow. Keep banks for strict I/O's  10. Dispo: DNR, discussed with daughter Viki by phone  CC time spent: 35 min

## 2022-06-20 NOTE — PROGRESS NOTE ADULT - ASSESSMENT
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     Acute hypoxic resp failure   Secondary to tee pneumonia   Sepsis with hypotension and shock   Vent support   Sedation with propofol , fentanyl  Levophed   Solucortef   IV abx Zosyn   follow blood cx, urine culture, sputum cultures     ÁNGEL   Fluid support per ICU   Monitor I/Os   Monitor Renal indices     -Hypercalcemia   likely secondary to occult malignancy   Trended from 11.6 to 10.8  Intact PTH 19   Vit D 80.7  Will require IVF support as can tolerate    Parkinsonism   - Continue home Sinemet, Primidone and Celexa    Need for prophylactic measure    IV protonix for GI protection  Heparin subcut q8     Supportive care                        76 year old male with history of prostate cancer, SCC of tongue s/p partial resection now with recurrence, parkinson's disease, hypertension, hyperlipidemia, recent CVA, and oropharyngeal dysphagia admitted for septic shock and acute hypoxic respiratory failure in the setting of b/l lower lobe PNA and UTI.     Acute hypoxic resp failure   Secondary to tee pneumonia   Sepsis with hypotension and shock   Vent support   Sedation with propofol , fentanyl -- sedation holiday PRN to assess mentation  Levophed   Solucortef   IV abx Zosyn   follow blood cx, urine culture, sputum cultures - ETT w/ e.coli, pansensitive    ÁNGEL   Fluid support per ICU   Monitor I/Os   Monitor Renal indices     -Hypercalcemia   likely secondary to occult malignancy   Trended from 11.6 to 10.8  Intact PTH 19   Vit D 80.7  Will require IVF support as can tolerate  pamidronic acid per icu    Parkinsonism   - Continue home Sinemet, Primidone and Celexa    Need for prophylactic measure    IV protonix for GI protection  Heparin subcut q8     Supportive care     plan for PEG tomorrow w/ GI

## 2022-06-20 NOTE — PROGRESS NOTE ADULT - ASSESSMENT
75 y/o M w/ pmh of prostate ca, SCC of tounge s/p partial resection now w/ recurrence, PD, hypertension, hld, recent CVA and oropharyngeal dysphagia now admitted to the MICU for acute hypoxemic rsep failure, septic shoch 2/2 to pna +/- UTI, metabolic encephalopathy, lactic acidosis, ÁNGEL and hyperNa.    -Neuro: Intubated and sedated on precedex titrating down prop/fent for vent weaning in the AM, PD on some dose of sinemiet/primidone/celexa  -Cardiac: Septic Shock on levo/vaso, levo requirement coming down, will attempt to wean of vaso tonight, cont stress dose steroids, add midodrine 5mg q8h for weaning pressors, will titrate pressors as needed to maintain MAP >65  -Resp: Acute Hypoxic resp failure cont lung protective ventilation, will titrate vent setting as needed to maintain o2 >90%  -GI: NPO on TF cont as ordered/tolerated, GI ppx w/ Protonix, hld on statins  -Renal: ÁNGEL and hyperNa gradually improving, start free water 200ml q6h, monitor I&O closely  -ID: Septic Shock 2/2 to pna +/- UTI on IVAB f/u on final cx data  -Endo: Hypercal currently being worked up f/u on VitD and PTH levels  -Heme: DVT ppx w/ heparin  -Dispo: Pt remains in the MICU, currently DNR, GOC ongoing by day team, dispo pending hospital course

## 2022-06-20 NOTE — PROGRESS NOTE ADULT - SUBJECTIVE AND OBJECTIVE BOX
Patient is a 76y old  Male who presents with a chief complaint of Acute Hypoxic Respiratory Failure (20 Jun 2022 14:57)      INTERVAL HPI/OVERNIGHT EVENTS:    MEDICATIONS  (STANDING):  albuterol/ipratropium for Nebulization 3 milliLiter(s) Nebulizer every 6 hours  atorvastatin 80 milliGRAM(s) Oral at bedtime  carbidopa/levodopa  25/100 1 Tablet(s) Oral <User Schedule>  chlorhexidine 0.12% Liquid 15 milliLiter(s) Oral Mucosa every 12 hours  chlorhexidine 2% Cloths 1 Application(s) Topical <User Schedule>  citalopram 20 milliGRAM(s) Oral daily  dexMEDEtomidine Infusion 0.3 MICROgram(s)/kG/Hr (4.2 mL/Hr) IV Continuous <Continuous>  hydrocortisone sodium succinate Injectable 25 milliGRAM(s) IV Push every 8 hours  pantoprazole  Injectable 40 milliGRAM(s) IV Push daily  piperacillin/tazobactam IVPB.. 3.375 Gram(s) IV Intermittent every 8 hours  polyethylene glycol 3350 17 Gram(s) Oral every 24 hours  primidone 50 milliGRAM(s) Oral daily    MEDICATIONS  (PRN):  fentaNYL    Injectable 25 MICROGram(s) IV Push every 2 hours PRN Moderate Pain (4 - 6)      Allergies    No Known Allergies    Intolerances        REVIEW OF SYSTEMS:  CONSTITUTIONAL: No fever or chills  HEENT:  No headache, no sore throat  RESPIRATORY: No cough, wheezing, or shortness of breath  CARDIOVASCULAR: No chest pain, palpitations, or leg swelling  GASTROINTESTINAL: No abd pain, nausea, vomiting, or diarrhea  GENITOURINARY: No dysuria, frequency, or hematuria  NEUROLOGICAL: no focal weakness or dizziness  MUSCULOSKELETAL: no myalgias     Vital Signs Last 24 Hrs  T(C): 37.1 (20 Jun 2022 16:00), Max: 37.1 (19 Jun 2022 20:00)  T(F): 98.8 (20 Jun 2022 16:00), Max: 98.8 (20 Jun 2022 16:00)  HR: 74 (20 Jun 2022 17:00) (58 - 86)  BP: 152/81 (20 Jun 2022 17:00) (152/81 - 152/81)  BP(mean): 111 (20 Jun 2022 17:00) (111 - 111)  RR: 28 (20 Jun 2022 17:00) (19 - 34)  SpO2: 93% (20 Jun 2022 17:00) (93% - 97%)    PHYSICAL EXAM:  GENERAL: NAD  HEENT:  NC/AT, EOMI, moist mucous membranes  CHEST/LUNG:  CTA b/l, no rales, wheezes, or rhonchi  HEART:  RRR, S1, S2  ABDOMEN:  BS+, soft, nontender, nondistended  EXTREMITIES: no edema, cyanosis, or calf tenderness  NERVOUS SYSTEM: AA&Ox3    LABS:                        8.7    9.72  )-----------( 162      ( 20 Jun 2022 06:22 )             26.7     CBC Full  -  ( 20 Jun 2022 06:22 )  WBC Count : 9.72 K/uL  Hemoglobin : 8.7 g/dL  Hematocrit : 26.7 %  Platelet Count - Automated : 162 K/uL  Mean Cell Volume : 98.2 fl  Mean Cell Hemoglobin : 32.0 pg  Mean Cell Hemoglobin Concentration : 32.6 gm/dL  Auto Neutrophil # : 9.14 K/uL  Auto Lymphocyte # : 0.19 K/uL  Auto Monocyte # : 0.39 K/uL  Auto Eosinophil # : 0.00 K/uL  Auto Basophil # : 0.00 K/uL  Auto Neutrophil % : 88.0 %  Auto Lymphocyte % : 2.0 %  Auto Monocyte % : 4.0 %  Auto Eosinophil % : 0.0 %  Auto Basophil % : 0.0 %    20 Jun 2022 06:22    153    |  119    |  46     ----------------------------<  177    3.2     |  28     |  1.20     Ca    10.6       20 Jun 2022 06:22  Phos  1.5       20 Jun 2022 06:22  Mg     2.4       20 Jun 2022 06:22    TPro  6.0    /  Alb  1.8    /  TBili  0.3    /  DBili  x      /  AST  27     /  ALT  15     /  AlkPhos  86     20 Jun 2022 06:22    PT/INR - ( 20 Jun 2022 06:22 )   PT: 11.9 sec;   INR: 1.02 ratio         PTT - ( 20 Jun 2022 06:22 )  PTT:36.2 sec    CAPILLARY BLOOD GLUCOSE            Culture - Sputum (collected 06-18-22 @ 20:58)  Source: ET Tube ET Tube  Gram Stain (06-18-22 @ 23:57):    Moderate polymorphonuclear leukocytes per low power field    Rare Squamous epithelial cells per low power field    Few Gram Positive Rods per oil power field    Rare Yeast per oil power field  Final Report (06-20-22 @ 14:19):    Moderate Escherichia coli    Normal Respiratory Richa present  Organism: Escherichia coli (06-20-22 @ 14:19)  Organism: Escherichia coli (06-20-22 @ 14:19)      -  Amikacin: S <=16      -  Amoxicillin/Clavulanic Acid: S <=8/4      -  Ampicillin: S <=8 These ampicillin results predict results for amoxicillin      -  Ampicillin/Sulbactam: S <=4/2 Enterobacter, Klebsiella aerogenes, Citrobacter, and Serratia may develop resistance during prolonged therapy (3-4 days)      -  Aztreonam: S <=4      -  Cefazolin: S <=2 Enterobacter, Klebsiella aerogenes, Citrobacter, and Serratia may develop resistance during prolonged therapy (3-4 days)      -  Cefepime: S <=2      -  Cefoxitin: S <=8      -  Ceftriaxone: S <=1 Enterobacter, Klebsiella aerogenes, Citrobacter, and Serratia may develop resistance during prolonged therapy      -  Ciprofloxacin: S <=0.25      -  Ertapenem: S <=0.5      -  Gentamicin: S <=2      -  Imipenem: S <=1      -  Levofloxacin: S <=0.5      -  Meropenem: S <=1      -  Piperacillin/Tazobactam: S <=8      -  Tobramycin: S <=2      -  Trimethoprim/Sulfamethoxazole: S <=0.5/9.5      Method Type: SAMY    Culture - Urine (collected 06-18-22 @ 00:58)  Source: Clean Catch Clean Catch (Midstream)  Final Report (06-20-22 @ 11:07):    >100,000 CFU/ml Pseudomonas aeruginosa  Organism: Pseudomonas aeruginosa (06-20-22 @ 11:07)  Organism: Pseudomonas aeruginosa (06-20-22 @ 11:07)      -  Amikacin: S <=16      -  Aztreonam: S 8      -  Cefepime: I 16      -  Ceftazidime: I 16      -  Ciprofloxacin: S <=0.25      -  Gentamicin: S <=2      -  Imipenem: S 2      -  Levofloxacin: S <=0.5      -  Meropenem: S <=1      -  Piperacillin/Tazobactam: S 16      -  Tobramycin: S <=2      Method Type: SAMY    Culture - Blood (collected 06-17-22 @ 18:45)  Source: .Blood Blood-Peripheral  Preliminary Report (06-18-22 @ 19:01):    No growth to date.    Culture - Blood (collected 06-17-22 @ 18:45)  Source: .Blood Blood-Peripheral  Preliminary Report (06-18-22 @ 19:01):    No growth to date.        RADIOLOGY & ADDITIONAL TESTS:    Personally reviewed.     Consultant(s) Notes Reviewed:  [x] YES  [ ] NO    Care Discussed with [x] Consultants  [x] Patient  [ ] Family  [ ]      [ ] Other; RN  DVT ppx   Patient is a 76y old  Male who presents with a chief complaint of Acute Hypoxic Respiratory Failure (20 Jun 2022 14:57)      INTERVAL HPI/OVERNIGHT EVENTS: Pt seen and examined at bedside. sedated, unable to obtain ROS.    MEDICATIONS  (STANDING):  albuterol/ipratropium for Nebulization 3 milliLiter(s) Nebulizer every 6 hours  atorvastatin 80 milliGRAM(s) Oral at bedtime  carbidopa/levodopa  25/100 1 Tablet(s) Oral <User Schedule>  chlorhexidine 0.12% Liquid 15 milliLiter(s) Oral Mucosa every 12 hours  chlorhexidine 2% Cloths 1 Application(s) Topical <User Schedule>  citalopram 20 milliGRAM(s) Oral daily  dexMEDEtomidine Infusion 0.3 MICROgram(s)/kG/Hr (4.2 mL/Hr) IV Continuous <Continuous>  hydrocortisone sodium succinate Injectable 25 milliGRAM(s) IV Push every 8 hours  pantoprazole  Injectable 40 milliGRAM(s) IV Push daily  piperacillin/tazobactam IVPB.. 3.375 Gram(s) IV Intermittent every 8 hours  polyethylene glycol 3350 17 Gram(s) Oral every 24 hours  primidone 50 milliGRAM(s) Oral daily    MEDICATIONS  (PRN):  fentaNYL    Injectable 25 MICROGram(s) IV Push every 2 hours PRN Moderate Pain (4 - 6)      Allergies    No Known Allergies    Intolerances        REVIEW OF SYSTEMS: unable to obtain 2/2 sedation    Vital Signs Last 24 Hrs  T(C): 37.1 (20 Jun 2022 16:00), Max: 37.1 (19 Jun 2022 20:00)  T(F): 98.8 (20 Jun 2022 16:00), Max: 98.8 (20 Jun 2022 16:00)  HR: 74 (20 Jun 2022 17:00) (58 - 86)  BP: 152/81 (20 Jun 2022 17:00) (152/81 - 152/81)  BP(mean): 111 (20 Jun 2022 17:00) (111 - 111)  RR: 28 (20 Jun 2022 17:00) (19 - 34)  SpO2: 93% (20 Jun 2022 17:00) (93% - 97%)    PHYSICAL EXAM:  GENERAL: M in NAD  HEENT:  NC/AT, intubated  CHEST/LUNG: scant rhonchi b/l lung fields  HEART:  RRR, S1, S2  ABDOMEN:  BS+, soft, nontender, nondistended  EXTREMITIES: no edema, cyanosis, or calf tenderness  NERVOUS SYSTEM: sedated, opens eyes on command    LABS:                        8.7    9.72  )-----------( 162      ( 20 Jun 2022 06:22 )             26.7     CBC Full  -  ( 20 Jun 2022 06:22 )  WBC Count : 9.72 K/uL  Hemoglobin : 8.7 g/dL  Hematocrit : 26.7 %  Platelet Count - Automated : 162 K/uL  Mean Cell Volume : 98.2 fl  Mean Cell Hemoglobin : 32.0 pg  Mean Cell Hemoglobin Concentration : 32.6 gm/dL  Auto Neutrophil # : 9.14 K/uL  Auto Lymphocyte # : 0.19 K/uL  Auto Monocyte # : 0.39 K/uL  Auto Eosinophil # : 0.00 K/uL  Auto Basophil # : 0.00 K/uL  Auto Neutrophil % : 88.0 %  Auto Lymphocyte % : 2.0 %  Auto Monocyte % : 4.0 %  Auto Eosinophil % : 0.0 %  Auto Basophil % : 0.0 %    20 Jun 2022 06:22    153    |  119    |  46     ----------------------------<  177    3.2     |  28     |  1.20     Ca    10.6       20 Jun 2022 06:22  Phos  1.5       20 Jun 2022 06:22  Mg     2.4       20 Jun 2022 06:22    TPro  6.0    /  Alb  1.8    /  TBili  0.3    /  DBili  x      /  AST  27     /  ALT  15     /  AlkPhos  86     20 Jun 2022 06:22    PT/INR - ( 20 Jun 2022 06:22 )   PT: 11.9 sec;   INR: 1.02 ratio         PTT - ( 20 Jun 2022 06:22 )  PTT:36.2 sec    CAPILLARY BLOOD GLUCOSE            Culture - Sputum (collected 06-18-22 @ 20:58)  Source: ET Tube ET Tube  Gram Stain (06-18-22 @ 23:57):    Moderate polymorphonuclear leukocytes per low power field    Rare Squamous epithelial cells per low power field    Few Gram Positive Rods per oil power field    Rare Yeast per oil power field  Final Report (06-20-22 @ 14:19):    Moderate Escherichia coli    Normal Respiratory Richa present  Organism: Escherichia coli (06-20-22 @ 14:19)  Organism: Escherichia coli (06-20-22 @ 14:19)      -  Amikacin: S <=16      -  Amoxicillin/Clavulanic Acid: S <=8/4      -  Ampicillin: S <=8 These ampicillin results predict results for amoxicillin      -  Ampicillin/Sulbactam: S <=4/2 Enterobacter, Klebsiella aerogenes, Citrobacter, and Serratia may develop resistance during prolonged therapy (3-4 days)      -  Aztreonam: S <=4      -  Cefazolin: S <=2 Enterobacter, Klebsiella aerogenes, Citrobacter, and Serratia may develop resistance during prolonged therapy (3-4 days)      -  Cefepime: S <=2      -  Cefoxitin: S <=8      -  Ceftriaxone: S <=1 Enterobacter, Klebsiella aerogenes, Citrobacter, and Serratia may develop resistance during prolonged therapy      -  Ciprofloxacin: S <=0.25      -  Ertapenem: S <=0.5      -  Gentamicin: S <=2      -  Imipenem: S <=1      -  Levofloxacin: S <=0.5      -  Meropenem: S <=1      -  Piperacillin/Tazobactam: S <=8      -  Tobramycin: S <=2      -  Trimethoprim/Sulfamethoxazole: S <=0.5/9.5      Method Type: SAMY    Culture - Urine (collected 06-18-22 @ 00:58)  Source: Clean Catch Clean Catch (Midstream)  Final Report (06-20-22 @ 11:07):    >100,000 CFU/ml Pseudomonas aeruginosa  Organism: Pseudomonas aeruginosa (06-20-22 @ 11:07)  Organism: Pseudomonas aeruginosa (06-20-22 @ 11:07)      -  Amikacin: S <=16      -  Aztreonam: S 8      -  Cefepime: I 16      -  Ceftazidime: I 16      -  Ciprofloxacin: S <=0.25      -  Gentamicin: S <=2      -  Imipenem: S 2      -  Levofloxacin: S <=0.5      -  Meropenem: S <=1      -  Piperacillin/Tazobactam: S 16      -  Tobramycin: S <=2      Method Type: SAMY    Culture - Blood (collected 06-17-22 @ 18:45)  Source: .Blood Blood-Peripheral  Preliminary Report (06-18-22 @ 19:01):    No growth to date.    Culture - Blood (collected 06-17-22 @ 18:45)  Source: .Blood Blood-Peripheral  Preliminary Report (06-18-22 @ 19:01):    No growth to date.        RADIOLOGY & ADDITIONAL TESTS:    Personally reviewed.     Consultant(s) Notes Reviewed:  [x] YES  [ ] NO    Care Discussed with [x] Consultants  [x] Patient  [ ] Family  [ ]      [ ] Other; RN  DVT ppx

## 2022-06-20 NOTE — CONSULT NOTE ADULT - ASSESSMENT
dysphagia  FTT    planned for PEG as outpatient today  was admitted to ICU over weekend for respiratory failure  currently intubated  i spoke with dtr, they would like to proceed with PEG  [facility]  d/w [facility]    I reviewed the overnight course of events on the unit, re-confirming the patient history. I discussed the care with the patient and their family  The plan of care was discussed with the physician assistant and modifications were made to the notation where appropriate.   Differential diagnosis and plan of care discussed with patient after the evaluation  35 minutes spent on total encounter of which more than fifty percent of the encounter was spent counseling and/or coordinating care by the attending physician.  Advanced care planning was discussed with patient and family.  Advanced care planning forms were reviewed and discussed.  Risks, benefits and alternatives of gastroenterologic procedures were discussed in detail and all questions were answered.

## 2022-06-21 NOTE — CHART NOTE - NSCHARTNOTEFT_GEN_A_CORE
Assessment/Follow up: Pt remains intubated/sedated. NGT feeds on hold for peg placement today. On D5@125cc/hr. Previously receiving Vital 1.5@ goal rate 65cc/hr x 20hrs- well tolerated. GI wdl, fecal incontinence noted. BM 6/21. Miralax rx.      Factors impacting intake: [ ] none [ ] nausea  [ ] vomiting [ ] diarrhea [ ] constipation  [ ]chewing problems [x ] swallowing issues  [x ] other: NPO with NGT, for peg    Diet Presciption: Diet, NPO after Midnight:      NPO Start Date: 20-Jun-2022,   NPO Start Time: 23:59  Except Medications (06-20-22 @ 10:39)  Diet, NPO with Tube Feed:   Tube Feeding Modality: Nasogastric  Vital 1.5 [place]  Total Volume for 24 Hours (mL): 1300  Continuous  Starting Tube Feed Rate {mL per Hour}: 20  Increase Tube Feed Rate by (mL): 15     Every 8 hours  Until Goal Tube Feed Rate (mL per Hour): 65  Tube Feed Duration (in Hours): 20  Tube Feed Start Time: 06:00  Free Water Flush  Pump   Rate (mL per Hour): 50   Frequency: Every Hour    Duration (Hours): 20    Start Time: 06:00 (06-18-22 @ 10:28)      Current Weight: Weight (kg): 56 (06-17 @ 14:20); 135.1lbs(6/21), tee feet 1+edema      Pertinent Medications: MEDICATIONS  (STANDING):  albuterol/ipratropium for Nebulization 3 milliLiter(s) Nebulizer every 6 hours  atorvastatin 80 milliGRAM(s) Oral at bedtime  carbidopa/levodopa  25/100 1 Tablet(s) Oral <User Schedule>  chlorhexidine 0.12% Liquid 15 milliLiter(s) Oral Mucosa every 12 hours  chlorhexidine 2% Cloths 1 Application(s) Topical <User Schedule>  citalopram 20 milliGRAM(s) Oral daily  dexMEDEtomidine Infusion 0.3 MICROgram(s)/kG/Hr (4.2 mL/Hr) IV Continuous <Continuous>  dextrose 5%. 1000 milliLiter(s) (125 mL/Hr) IV Continuous <Continuous>  hydrocortisone sodium succinate Injectable 25 milliGRAM(s) IV Push every 12 hours  pantoprazole  Injectable 40 milliGRAM(s) IV Push daily  piperacillin/tazobactam IVPB.. 3.375 Gram(s) IV Intermittent every 8 hours  polyethylene glycol 3350 17 Gram(s) Oral every 24 hours  primidone 50 milliGRAM(s) Oral daily    MEDICATIONS  (PRN):  fentaNYL    Injectable 25 MICROGram(s) IV Push every 2 hours PRN Moderate Pain (4 - 6)    Pertinent Labs: 06-21 Na154 mmol/L<H> Glu 225 mg/dL<H> K+ 4.8 mmol/L Cr  1.00 mg/dL BUN 33 mg/dL<H> 06-21 Phos 1.8 mg/dL<L> 06-21 Alb 1.7 g/dL<L> 05-29 Chol 148 mg/dL LDL --    HDL 45 mg/dL Trig 70 mg/dL     CAPILLARY BLOOD GLUCOSE      POCT Blood Glucose.: 208 mg/dL (21 Jun 2022 11:27)    Skin: sacrum S.DTI, tee heel I. ronda 10.    Estimated Needs:   [ x] no change since previous assessment  [ ] recalculated:     Previous Nutrition Diagnosis:   [ ] Inadequate Energy Intake [ ]Inadequate Oral Intake [ ] Excessive Energy Intake   [ ] Underweight [x ] Increased Nutrient Needs [ ] Overweight/Obesity   [ ] Altered GI Function [ ] Unintended Weight Loss [ ] Food & Nutrition Related Knowledge Deficit [x ] Malnutrition     Nutrition Diagnosis is [x ] ongoing  [ ] resolved [ ] not applicable     New Nutrition Diagnosis: [x] not applicable       Interventions:   Recommend  [x ] Change Diet To: Resume prior TF s/p peg placement.   [ ] Nutrition Supplement  [ ] Nutrition Support  [x ] Other: MVI with minerals daily (liquid via peg). 500mg VIt C BID.    Monitoring and Evaluation:   [ ] PO intake [ x ] Tolerance to diet prescription [ x ] weights [ x ] labs[ x ] follow up per protocol  [ ] other:

## 2022-06-21 NOTE — PROGRESS NOTE ADULT - ASSESSMENT
Assessment:  1. Septic shock --> resolved  2. E. coli pna   3. Pseudomonal UTI   4. Aspiration pneumonitis   5. Acute hypoxic respiratory failure   6. ÁNGEL  7. Acute metabolic encephalopathy   8. Hypernatremia   9. Hypercalcemia     Plan:  Neuro: C/w Precedex w/daily SAT. C/w home Celexa, Primidone, and Sinemet   CV: Shock state resolved. Monitor HR and BP. Taper down stress dose steroids    Pulm: Patient currently on Full vent support. Titrate settings to maintain SaO2 >90%, or pH >7.25. Consider low tidal volume ventilation strategy w/ goal Tv 4-6 cc/kg of ideal body weight. Plateau pressure goal <30. Peridex oral care. Aggressive pulmonary toilet. Daily sedation vacation with spontaneous breathing trial if clinical condition warrants, discuss with respiratory therapy  Renal: ÁNGEL improving. Hypernatremia, increase free water flushes. Strict I/Os, goal UOP >0.5cc/kg/hr. Trend renal function and electrolytes, replete as needed. Avoid nephrotoxic agents  GI: Will require PEG tube. PPI   ID: Zosyn for E coli PNA and Pseudomonal UTI. Trend WBC/fevers/procalcitonin   Heme: Holding chemical DVT prophylaxis, will have PEG placed today. Will then continue full dose AC given recent CVA   Endo: Goal blood glucose <180. Hypercalcemia, suspect 2/2 malignancy, f/u PTHrP. Received 1 dose pamidronate f/u calcium levels     CRITICAL CARE TIME SPENT: 35 minutes assessing presenting problems of acute illness, which pose high probability of life threatening deterioration or end organ damage/dysfunction, as well as medical decision making including initiating plan of care, reviewing data, reviewing radiologic exams, discussing with multidisciplinary team,  discussing goals of care with patient/family, and writing this note.  Non-inclusive of procedures performed

## 2022-06-21 NOTE — PROGRESS NOTE ADULT - SUBJECTIVE AND OBJECTIVE BOX
Interval Events:   Patient seen and examined at bedside. Appears comfortable and in NAD. Unable to obtain history given patient intubated and sedated with Precedex gtt. Overnight, patient was extubated and tolerated SBT for 4hrs though required re-intubation. Currently on vasopressor support and BP stable ranging from 100s-150s systolically. Vent setting currently at RR:14, TV: 440, FiO2: 3%, PEEP: 5.     Review of Systems:  Unable to obtain     ICU Vital Signs Last 24 Hrs  T(C): 37.6 (21 Jun 2022 07:00), Max: 37.6 (21 Jun 2022 07:00)  T(F): 99.6 (21 Jun 2022 07:00), Max: 99.6 (21 Jun 2022 07:00)  HR: 75 (21 Jun 2022 09:00) (55 - 93)  BP: 143/67 (21 Jun 2022 09:00) (103/56 - 154/73)  BP(mean): 97 (21 Jun 2022 09:00) (75 - 111)  ABP: 159/66 (20 Jun 2022 16:00) (116/52 - 159/66)  ABP(mean): 96 (20 Jun 2022 16:00) (70 - 96)  RR: 21 (21 Jun 2022 09:00) (21 - 32)  SpO2: 94% (21 Jun 2022 09:00) (91% - 97%)      Mode: AC/ CMV (Assist Control/ Continuous Mandatory Ventilation), RR (machine): 14, TV (machine): 440, FiO2: 30, PEEP: 5  06-20-22 @ 07:01  -  06-21-22 @ 07:00  --------------------------------------------------------  IN: 3941 mL / OUT: 1915 mL / NET: 2026 mL        CAPILLARY BLOOD GLUCOSE          I&O's Summary    20 Jun 2022 07:01  -  21 Jun 2022 07:00  --------------------------------------------------------  IN: 3941 mL / OUT: 1915 mL / NET: 2026 mL      PHYSICAL EXAM:  GENERAL: M in NAD  HEENT:  NC/AT, intubated  CHEST/LUNG: scant rhonchi b/l lung fields  HEART:  RRR, S1, S2  ABDOMEN:  BS+, soft, nontender, nondistended  EXTREMITIES: no edema, cyanosis, or calf tenderness  NERVOUS SYSTEM: sedated and unresponsive     Meds:  piperacillin/tazobactam IVPB.. IV Intermittent      atorvastatin Oral  hydrocortisone sodium succinate Injectable IV Push    albuterol/ipratropium for Nebulization Nebulizer    carbidopa/levodopa  25/100 Oral  citalopram Oral  dexMEDEtomidine Infusion IV Continuous  fentaNYL    Injectable IV Push PRN  primidone Oral        pantoprazole  Injectable IV Push  polyethylene glycol 3350 Oral      dextrose 5%. IV Continuous      chlorhexidine 0.12% Liquid Oral Mucosa  chlorhexidine 2% Cloths Topical                              8.8    6.14  )-----------( 131      ( 21 Jun 2022 06:46 )             26.9       06-21    155<H>  |  120<H>  |  35<H>  ----------------------------<  198<H>  4.1   |  33<H>  |  1.00    Ca    10.1      21 Jun 2022 06:46  Phos  1.8     06-21  Mg     2.4     06-21    TPro  5.7<L>  /  Alb  1.7<L>  /  TBili  0.2  /  DBili  x   /  AST  23  /  ALT  19  /  AlkPhos  79  06-21          PT/INR - ( 20 Jun 2022 06:22 )   PT: 11.9 sec;   INR: 1.02 ratio         PTT - ( 20 Jun 2022 06:22 )  PTT:36.2 sec    ET Tube ET Tube   Moderate Escherichia coli  Normal Respiratory Richa present   Moderate polymorphonuclear leukocytes per low power field  Rare Squamous epithelial cells per low power field  Few Gram Positive Rods per oil power field  Rare Yeast per oil power field 06-18 @ 20:58  Clean Catch Clean Catch (Midstream)   >100,000 CFU/ml Pseudomonas aeruginosa -- 06-18 @ 00:58  .Blood Blood-Peripheral   No growth to date. -- 06-17 @ 18:45      Rapid RVP Result: NotDetec (06-17 @ 12:21)          Radiology:    Bedside Ultrasound:    Tubes/Lines:      GLOBAL ISSUE/BEST PRACTICE:  Analgesia:  Sedation:  HOB elevation: Y  Stress ulcer prophylaxis:  VTE prophylaxis:  Glycemic control:  Nutrition:    CODE STATUS:        Interval Events:   Patient seen and examined at bedside. Appears comfortable and in NAD. Unable to obtain history given patient intubated and sedated with Precedex gtt. Overnight, patient was extubated and tolerated SBT for 4hrs though required re-intubation. Currently on vasopressor support and BP stable ranging from 100s-150s systolically. Vent setting currently at RR:14, TV: 440, FiO2: 3%, PEEP: 5.     Review of Systems:  Unable to obtain     ICU Vital Signs Last 24 Hrs  T(C): 37.6 (21 Jun 2022 07:00), Max: 37.6 (21 Jun 2022 07:00)  T(F): 99.6 (21 Jun 2022 07:00), Max: 99.6 (21 Jun 2022 07:00)  HR: 75 (21 Jun 2022 09:00) (55 - 93)  BP: 143/67 (21 Jun 2022 09:00) (103/56 - 154/73)  BP(mean): 97 (21 Jun 2022 09:00) (75 - 111)  ABP: 159/66 (20 Jun 2022 16:00) (116/52 - 159/66)  ABP(mean): 96 (20 Jun 2022 16:00) (70 - 96)  RR: 21 (21 Jun 2022 09:00) (21 - 32)  SpO2: 94% (21 Jun 2022 09:00) (91% - 97%)      Mode: AC/ CMV (Assist Control/ Continuous Mandatory Ventilation), RR (machine): 14, TV (machine): 440, FiO2: 30, PEEP: 5  06-20-22 @ 07:01  -  06-21-22 @ 07:00  --------------------------------------------------------  IN: 3941 mL / OUT: 1915 mL / NET: 2026 mL        CAPILLARY BLOOD GLUCOSE          I&O's Summary    20 Jun 2022 07:01  -  21 Jun 2022 07:00  --------------------------------------------------------  IN: 3941 mL / OUT: 1915 mL / NET: 2026 mL      PHYSICAL EXAM:  Gen: Sedated and intubated   Neuro: Sedated   Resp: Scant rhochi b/l   CVS: S1, S2, No rubs, gallops or murmurs   Abd: Soft and non-tender   Ext: No edema, no cyanosis     Meds:  piperacillin/tazobactam IVPB.. IV Intermittent      atorvastatin Oral  hydrocortisone sodium succinate Injectable IV Push    albuterol/ipratropium for Nebulization Nebulizer    carbidopa/levodopa  25/100 Oral  citalopram Oral  dexMEDEtomidine Infusion IV Continuous  fentaNYL    Injectable IV Push PRN  primidone Oral        pantoprazole  Injectable IV Push  polyethylene glycol 3350 Oral      dextrose 5%. IV Continuous      chlorhexidine 0.12% Liquid Oral Mucosa  chlorhexidine 2% Cloths Topical                              8.8    6.14  )-----------( 131      ( 21 Jun 2022 06:46 )             26.9       06-21    155<H>  |  120<H>  |  35<H>  ----------------------------<  198<H>  4.1   |  33<H>  |  1.00    Ca    10.1      21 Jun 2022 06:46  Phos  1.8     06-21  Mg     2.4     06-21    TPro  5.7<L>  /  Alb  1.7<L>  /  TBili  0.2  /  DBili  x   /  AST  23  /  ALT  19  /  AlkPhos  79  06-21          PT/INR - ( 20 Jun 2022 06:22 )   PT: 11.9 sec;   INR: 1.02 ratio         PTT - ( 20 Jun 2022 06:22 )  PTT:36.2 sec    ET Tube ET Tube   Moderate Escherichia coli  Normal Respiratory Richa present   Moderate polymorphonuclear leukocytes per low power field  Rare Squamous epithelial cells per low power field  Few Gram Positive Rods per oil power field  Rare Yeast per oil power field 06-18 @ 20:58  Clean Catch Clean Catch (Midstream)   >100,000 CFU/ml Pseudomonas aeruginosa -- 06-18 @ 00:58  .Blood Blood-Peripheral   No growth to date. -- 06-17 @ 18:45      Rapid RVP Result: NotDetec (06-17 @ 12:21)          Radiology: No new imaging     Bedside Ultrasound: N/A     Tubes/Lines:  Midline, L radial A Line, NGT, banks     GLOBAL ISSUE/BEST PRACTICE:  Analgesia: Y   Sedation: Y   HOB elevation: Y  Stress ulcer prophylaxis: Y  VTE prophylaxis: Y  Glycemic control: N  Nutrition: N    CODE STATUS: DNR

## 2022-06-21 NOTE — PROGRESS NOTE ADULT - ATTENDING COMMENTS
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 coli pneumonia (likely aspiration) + Pseudomonas UTI, resulting in acute metabolic encephalopathy, lactic acidosis, septic shock, and ÁNGEL.      1. Neuro: continue dexmedetomidine, will need sedation vacation after PEG. Continue home sinemet, primidone, and citalopram  2. CV: resolved shock, remains off pressors. Brief hypotension during PEG. Taper hydrocortisone to 25 mg q12h. Doing well off midodrine. If BP rises, can restart home diltiazem. Hold enalapril given resolving ÁNGEL. Continue statin  3. Pulm: continue lung protective ventilation. Worsening hypercapnia today, increase respiratory rate to 24, FiO2 at 40%, PEEP at 5. Daily SBT as tolerates, developed hypercapnia with PSV 12/5. Continue airway clearance therapy with albuterol, ipratropium, and hypertonic saline  4. GI: keep NPO for PEG today. Continue PPI as per home regimen  5. Renal: ÁNGEL 2/2 ATN slowly resolving. Strict I/O's, close monitoring of kidney function and lytes. Hold free water for PEG. Start D5W at 125mL/hr for worsening hypernatremia. Replete low phos  6. ID: E. coli pneumonia and Pseudomonas UTI. Continue Zosyn (day 4/7)  7. Heme: thrombocytopenia, possibly due to sepsis vs. drug-induced. Doubt HIT, f/up heparin-induced antibody test. Hold HSQ for PEG. Will eventually need to resume therapeutic enoxaparin given recent CVA and suspected hypercoagulable state  8. Endo: hypercalcemia likely related to malignancy, f/up PTHrP. Interestingly the 1,25-diOH vitamin D level is elevated, although patient does not have history of granulomatous inflammation. Administer pamidronic acid. Hold off on calcitonin given corrected calcium is 12.36  9. Skin: L radial A-line placed 6/17, d/c after PEG tomorrow. Keep banks for strict I/O's  10. Dispo: DNR, discussed with daughter Viki by phone  CC time spent: 35 min 76M PMH Prostate cancer, SCC of tongue s/p partial resection now with recurrence, parkinson's disease, hypertension, hyperlipidemia, recent CVA, and oropharyngeal dysphagia who presents with acute hypoxemic respiratory failure and septic shock due to bilateral lower lobe E. coli pneumonia (likely aspiration) + Pseudomonas UTI, resulting in acute metabolic encephalopathy, lactic acidosis, septic shock, and ÁNGEL.      1. Neuro: continue dexmedetomidine, will need sedation vacation after PEG. Continue home sinemet, primidone, and citalopram  2. CV: resolved shock, remains off pressors. Brief hypotension during PEG. Taper hydrocortisone to 25 mg q12h. Doing well off midodrine. If BP rises, can restart home diltiazem. Hold enalapril given resolving ÁNGEL. Continue statin  3. Pulm: continue lung protective ventilation. Worsening hypercapnia today, increase respiratory rate to 24, FiO2 at 40%, PEEP at 5. Daily SBT as tolerates, developed hypercapnia with PSV 12/5. Continue airway clearance therapy with albuterol, ipratropium, and hypertonic saline  4. GI: keep NPO for PEG today. Continue PPI as per home regimen  5. Renal: ÁNGEL 2/2 ATN slowly resolving. Strict I/O's, close monitoring of kidney function and lytes. Hold free water for PEG. Start D5W at 125mL/hr for worsening hypernatremia. Replete low phos  6. ID: E. coli pneumonia and Pseudomonas UTI. Continue Zosyn (day 4/7)  7. Heme: thrombocytopenia, possibly due to sepsis vs. drug-induced. Doubt HIT, f/up heparin-induced antibody test. Hold HSQ for PEG. Will eventually need to resume therapeutic enoxaparin given recent CVA and suspected hypercoagulable state  8. Endo: hypercalcemia likely related to malignancy, f/up PTHrP. Interestingly the 1,25-diOH vitamin D level is elevated, although patient does not have history of granulomatous inflammation. S/p pamidronic acid 6/20. Hold off on calcitonin. Hypercalcemia improved  9. Skin: No lines, keep banks for strict I/O's  10. Dispo: DNR, discussed with daughter Viki at bedside  CC time spent: 35 min

## 2022-06-21 NOTE — PROGRESS NOTE ADULT - ASSESSMENT
76 year old male with history of prostate cancer, SCC of tongue s/p partial resection now with recurrence, parkinson's disease, hypertension, hyperlipidemia, recent CVA, and oropharyngeal dysphagia admitted for septic shock and acute hypoxic respiratory failure in the setting of b/l lower lobe growing e.coli PNA and UTI growing pseudomonas     Neuro:  - Titrated down on Precedex gtt  - Trial of SBT today   - Fentanyl prn  - Continue home Citalopran, Primidone and Sinemet for Parkinson's     CV:  - Hypotension 2/2 septic shock  - Weaned off Levophed and Vasopressin, will continue to monitor   - Continue stress dose steroids Hydrocortisone 50mg q8h   - Continue midodrine 5mg Q8H     Pulm:  - Continue lung protective ventilation, titrate settings as tolerated  - Duoneb Tx Q6H   - F/u morning ABG                   GI:  - TF via NGT  - Keep NPO after midnight for scheduled PEG placement tomorrow   - IV Protonix 40mg qd for GI ppx    Renal:  - Cr 1.20 this AM from 1.60   - Monitor I&Os and renal indices     Heme:  - Heparin for DVT ppx; Hold after midnight for PEG placement tomorrow   - Per prior Hospitalist note from 6/2, pt was on full dose AC for recent stroke and hypercoagulable state. Will hold off on full dose AC for now     ID:  - B/l lower lobe PNA and UTI  - Urine cx positive for Pseudomonas  - Continue IV Zosyn  - ET positive for E.Coli f/u sensitivities     Endo:  - Hypercalcemia likely 2/2 malignancy; Start on Pamidronate + Endocrine consulted   - PTH wnl, vit D,25,hydroxy 39.2 and vit D,1,25 dihydroxy 89.9  - F/u PTHrP   - Hyponatremia 153 this AM; Continue with free water    - Hypokalemia 3.2 this AM; Replete   - Hypophosphatemia 1.5; Replete     Tubes/lines:  - Midline, L radial A Line, NGT, banks 76 year old male with history of prostate cancer, SCC of tongue s/p partial resection now with recurrence, parkinson's disease, hypertension, hyperlipidemia, recent CVA, and oropharyngeal dysphagia admitted for septic shock and acute hypoxic respiratory failure in the setting of b/l lower lobe growing e.coli PNA and UTI growing pseudomonas     Neuro:  - Titrated down on Precedex gtt as tolerated   - Trial of SBT today   - Fentanyl prn  - Continue home Citalopran, Primidone and Sinemet for Parkinson's     CV:  - Hypotension 2/2 septic shock  - S/P Midodrine, Levophed and Vasopressin, will continue to monitor   - Tapered down stress dose steroids to Hydrocortisone 25mg q12h     Pulm:  - Continue lung protective ventilation, titrate settings as tolerated  - Duoneb Tx Q6H                   GI:  - Scheduled PEG placement tomorrow   - IV Protonix 40mg qd for GI ppx    Renal:  - Cr 1.00 this AM from 1.20  - Monitor I&Os and renal indices     Heme:  - Heparin for DVT ppx; Hold after midnight for PEG placement tomorrow   - Per prior Hospitalist note from 6/2, pt was on full dose AC for recent stroke and hypercoagulable state. Will hold off on full dose AC for now     ID:  - B/l lower lobe PNA and UTI  - Urine cx positive for Pseudomonas  - Continue IV Zosyn  - ET positive for E.Coli     Endo:  - Hypercalcemia likely 2/2 malignancy; s/p + Endocrine consulted   - PTH wnl, vit D,25,hydroxy 39.2 and vit D,1,25 dihydroxy 89.9  - F/u PTHrP   - Hyponatremia 154 this AM; Continue with free water and start on D5  - Hypokalemia 4.8   - Hypophosphatemia 1.8; Replete     Tubes/lines:  - Midline, L radial A Line, NGT, banks  - L radial A will be d/c after PEG tube

## 2022-06-21 NOTE — PROGRESS NOTE ADULT - ASSESSMENT
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     Acute hypoxic resp failure   Secondary to tee pneumonia   Sepsis with hypotension and shock   Vent support   Sedation with propofol , fentanyl -- sedation holiday PRN to assess mentation  Levophed   Solucortef   IV abx Zosyn   follow blood cx, urine culture, sputum cultures - ETT w/ e.coli, pansensitive    ÁNGEL   Fluid support per ICU   Monitor I/Os   Monitor Renal indices     -Hypercalcemia   likely secondary to occult malignancy   Trended from 11.6 to 10.8  Intact PTH 19   Vit D 80.7  Will require IVF support as can tolerate  pamidronic acid per icu    Parkinsonism   - Continue home Sinemet, Primidone and Celexa    Need for prophylactic measure    IV protonix for GI protection  Heparin subcut q8     Supportive care     plan for PEG tomorrow w/ GI             76 year old male with history of prostate cancer, SCC of tongue s/p partial resection now with recurrence, parkinson's disease, hypertension, hyperlipidemia, recent CVA, and oropharyngeal dysphagia admitted for septic shock and acute hypoxic respiratory failure in the setting of b/l lower lobe PNA and UTI.     Acute hypoxic resp failure   Secondary to tee pneumonia   Sepsis with hypotension and shock   Vent support   Sedation with propofol , fentanyl -- sedation holiday PRN to assess mentation  Levophed   Solucortef   IV abx Zosyn Day 4/7  follow blood cx, urine culture, sputum cultures - ETT w/ e.coli, pansensitive    ÁNGEL   Fluid support per ICU   Monitor I/Os   Monitor Renal indices     -Hypercalcemia   likely secondary to occult malignancy   Trended from 11.6 to 10.8  Intact PTH 19   Vit D 80.7  Will require IVF support as can tolerate  pamidronic acid per icu    Parkinsonism   - Continue home Sinemet, Primidone and Celexa    Need for prophylactic measure    IV protonix for GI protection  Heparin subcut q8     Supportive care     s/p PEG today, meds/free water today, feeds tomorrow per GI

## 2022-06-21 NOTE — PROGRESS NOTE ADULT - SUBJECTIVE AND OBJECTIVE BOX
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, PD, hypertension, hld, recent CVA and oropharyngeal dysphagia now admitted to the MICU for acute hypoxemic rsep failure, septic shoch 2/2 to pna +/- UTI, metabolic encephalopathy, lactic acidosis, ÁNGEL and hyperNa    Events last 24 hours:   - Unable to extubate given mental status, tolerated 4 hours of SBT   - Patient remains off vasopressor support     Review of Systems:  Unable to assess given clinical condition     PAST MEDICAL & SURGICAL HISTORY:  Hypertension  Hyperlipidemia  GERD (gastroesophageal reflux disease)  Parkinson&#x27;s disease  Prostate cancer  Mitral valve prolapse  Malignant neoplasm of tongue, 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  Throat cancer  History of vasectomy  Prostate cancer  s/p cyber knife  S/P partial glossectomy  4/21    Medications:  piperacillin/tazobactam IVPB.. 3.375 Gram(s) IV Intermittent every 8 hours  albuterol/ipratropium for Nebulization 3 milliLiter(s) Nebulizer every 6 hours  carbidopa/levodopa  25/100 1 Tablet(s) Oral <User Schedule>  citalopram 20 milliGRAM(s) Oral daily  dexMEDEtomidine Infusion 0.3 MICROgram(s)/kG/Hr IV Continuous <Continuous>  fentaNYL    Injectable 25 MICROGram(s) IV Push every 2 hours PRN  primidone 50 milliGRAM(s) Oral daily  pantoprazole  Injectable 40 milliGRAM(s) IV Push daily  polyethylene glycol 3350 17 Gram(s) Oral every 24 hours  atorvastatin 80 milliGRAM(s) Oral at bedtime  hydrocortisone sodium succinate Injectable 25 milliGRAM(s) IV Push every 8 hours  chlorhexidine 0.12% Liquid 15 milliLiter(s) Oral Mucosa every 12 hours  chlorhexidine 2% Cloths 1 Application(s) Topical <User Schedule>    Mode: AC/ CMV (Assist Control/ Continuous Mandatory Ventilation)  RR (machine): 14  TV (machine): 440  FiO2: 30  PEEP: 5  ITime: 1  MAP: 11  PIP: 21    ICU Vital Signs Last 24 Hrs  T(C): 37.1 (20 Jun 2022 16:00), Max: 37.1 (20 Jun 2022 16:00)  T(F): 98.8 (20 Jun 2022 16:00), Max: 98.8 (20 Jun 2022 16:00)  HR: 63 (21 Jun 2022 00:29) (58 - 86)  BP: 154/72 (20 Jun 2022 22:00) (119/55 - 154/72)  BP(mean): 104 (20 Jun 2022 22:00) (80 - 111)  ABP: 159/66 (20 Jun 2022 16:00) (103/47 - 159/66)  ABP(mean): 96 (20 Jun 2022 16:00) (62 - 96)  RR: 31 (20 Jun 2022 22:00) (23 - 32)  SpO2: 93% (21 Jun 2022 00:29) (92% - 97%)    ABG - ( 19 Jun 2022 11:46 )  pH, Arterial: 7.38  pH, Blood: x     /  pCO2: 45    /  pO2: 94    / HCO3: 27    / Base Excess: 1.5   /  SaO2: 100.0     I&O's Detail    19 Jun 2022 07:01  -  20 Jun 2022 07:00  --------------------------------------------------------  IN:    Dexmedetomidine: 60.2 mL    Enteral Tube Flush: 980 mL    IV PiggyBack: 250 mL    Norepinephrine: 213.4 mL    Propofol: 43.7 mL    Vasopressin: 34.4 mL    Vital1.5: 725 mL  Total IN: 2306.6 mL    OUT:    Indwelling Catheter - Urethral (mL): 1126 mL  Total OUT: 1126 mL    Total NET: 1180.6 mL    20 Jun 2022 07:01  -  21 Jun 2022 02:11  --------------------------------------------------------  IN:    Dexmedetomidine: 40.6 mL    Enteral Tube Flush: 1050 mL    Enteral Tube Flush: 300 mL    IV PiggyBack: 200 mL    IV PiggyBack: 500 mL    IV PiggyBack: 250 mL    Vital1.5: 715 mL  Total IN: 3055.6 mL    OUT:    Indwelling Catheter - Urethral (mL): 1115 mL  Total OUT: 1115 mL    Total NET: 1940.6 mL    LABS:                        8.7    9.72  )-----------( 162      ( 20 Jun 2022 06:22 )             26.7     06-20    153<H>  |  119<H>  |  46<H>  ----------------------------<  177<H>  3.2<L>   |  28  |  1.20    Ca    10.6<H>      20 Jun 2022 06:22  Phos  1.5     06-20  Mg     2.4     06-20    TPro  6.0  /  Alb  1.8<L>  /  TBili  0.3  /  DBili  x   /  AST  27  /  ALT  15  /  AlkPhos  86  06-20    PT/INR - ( 20 Jun 2022 06:22 )   PT: 11.9 sec;   INR: 1.02 ratio    PTT - ( 20 Jun 2022 06:22 )  PTT:36.2 sec    CULTURES:  Culture Results:   Moderate Escherichia coli  Normal Respiratory Richa present (06-18 @ 20:58)  Culture Results:   >100,000 CFU/ml Pseudomonas aeruginosa (06-18 @ 00:58)  Culture Results:   No growth to date. (06-17 @ 18:45)  Culture Results:   No growth to date. (06-17 @ 18:45)  Rapid RVP Result: NotDetec (06-17 @ 12:21)    Physical Examination:  General: No acute distress.    HEENT: Pupils equal, reactive to light.  Symmetric.  PULM: Diminished breath sounds b/l  NECK: Supple, no lymphadenopathy, trachea midline  CVS: Regular rate and rhythm, no murmurs, rubs, or gallops  ABD: Soft, nondistended, nontender, normoactive bowel sounds, no masses  EXT: No edema, nontender  SKIN: Warm and well perfused  RADIOLOGY:   < from: CT Chest No Cont (06.17.22 @ 14:15) >    ACC: [id number] EXAM:  CT ABDOMEN AND PELVIS                        ACC: [id number] EXAM:  CT CHEST                          PROCEDURE DATE:  06/17/2022      INTERPRETATION:  CLINICAL INFORMATION: Sepsis.  Acute hypoxic respiratory failure.  History of recurrent tongue squamous cell carcinoma.    COMPARISON: CT scan of the chest 04/7/2022 and CT scan of the abdomen   pelvis 5/5/2022.    CONTRAST/COMPLICATIONS:  IV Contrast: NONE  Oral Contrast: NONE  Complications: None reported at time of study completion    PROCEDURE:  CT of the Chest, Abdomen and Pelvis was performed.  Sagittal and coronal reformats were performed.    FINDINGS:  Streak artifact degrades image quality limiting evaluation.    CHEST:    LUNGS AND LARGE AIRWAYS: PLEURA:  Endotracheal tube, below the level of the margy.  The central airways remain patent.    Patchy bibasilar airspace consolidation may reflect atelectasis and/or   pneumonia in the appropriate clinical setting.    Bilateral mosaic groundglass attenuation which may reflect small vessel   or small airway disease.  Poorly defined groundglass centrilobular nodular opacities, most   pronounced in the left upper and right lower lobe; which may reflect   infectious/inflammatory small airway disease.    Trace bilateral pleural effusions.    VESSELS: Atherosclerotic changes thoracic aorta and coronary artery   calcifications.    HEART: Heart size is normal.  Aortic and mitral valvular calcification.  No pericardial effusion.    MEDIASTINUM AND STEPHANIE: No lymphadenopathy.    CHEST WALL AND LOWER NECK: Within normal limits.    ABDOMEN AND PELVIS:    Streak artifact degrades image quality limiting evaluation.    The evaluation of the solid organ parenchyma is limited without   intravenous contrast.    LIVER:Within normal limits.  BILE DUCTS: Normal caliber.  GALLBLADDER: Distended with biliary sludge. No gallbladder wall   thickening.  SPLEEN: Within normal limits.  PANCREAS: Within normal limits.  ADRENALS: Within normal limits.  KIDNEYS/URETERS:  3 mmnonobstructing intrarenal calcification lower pole right kidney.  Right renal cyst with layering milk of calcium, stable.  Left renal cyst.    BLADDER: Amato catheter, nondistended bladder.  REPRODUCTIVE ORGANS: Prostate fiducial markers.    BOWEL:  Colonic fecal retention, without bowel obstruction.  Appendix not visualized.  PERITONEUM: No ascites.    VESSELS: Atherosclerotic changes.  RETROPERITONEUM/LYMPH NODES: No lymphadenopathy.    ABDOMINAL WALL: Cachexia.    BONES:  Prominent S-shaped thoracolumbar scoliosis.  Degenerative changes.    IMPRESSION:    Technically limited study.    Bibasilar airspace consolidation which may reflect atelectasis and/or   pneumonia.  Bilateral groundglass centrilobular nodular opacities which may reflect   infectious and/or inflammatory airway disease.    Colonic fecal retention without bowel obstruction.    Other chronic findings as above.    --- End of Report ---    VALENTINE SHERIDAN MD; Attending Radiologist  This document has been electronically signed. Jun 17 2022  3:11PM    < end of copied text >

## 2022-06-21 NOTE — PROGRESS NOTE ADULT - SUBJECTIVE AND OBJECTIVE BOX
Patient is a 76y old  Male who presents with a chief complaint of Acute Hypoxic Respiratory Failure (21 Jun 2022 09:14)      INTERVAL HPI/OVERNIGHT EVENTS:    MEDICATIONS  (STANDING):  albuterol/ipratropium for Nebulization 3 milliLiter(s) Nebulizer every 6 hours  atorvastatin 80 milliGRAM(s) Oral at bedtime  carbidopa/levodopa  25/100 1 Tablet(s) Oral <User Schedule>  chlorhexidine 0.12% Liquid 15 milliLiter(s) Oral Mucosa every 12 hours  chlorhexidine 2% Cloths 1 Application(s) Topical <User Schedule>  citalopram 20 milliGRAM(s) Oral daily  dexMEDEtomidine Infusion 0.3 MICROgram(s)/kG/Hr (4.2 mL/Hr) IV Continuous <Continuous>  dextrose 5%. 1000 milliLiter(s) (125 mL/Hr) IV Continuous <Continuous>  hydrocortisone sodium succinate Injectable 25 milliGRAM(s) IV Push every 12 hours  pantoprazole  Injectable 40 milliGRAM(s) IV Push daily  piperacillin/tazobactam IVPB.. 3.375 Gram(s) IV Intermittent every 8 hours  polyethylene glycol 3350 17 Gram(s) Oral every 24 hours  primidone 50 milliGRAM(s) Oral daily    MEDICATIONS  (PRN):  fentaNYL    Injectable 25 MICROGram(s) IV Push every 2 hours PRN Moderate Pain (4 - 6)      Allergies    No Known Allergies    Intolerances        REVIEW OF SYSTEMS:  CONSTITUTIONAL: No fever or chills  HEENT:  No headache, no sore throat  RESPIRATORY: No cough, wheezing, or shortness of breath  CARDIOVASCULAR: No chest pain, palpitations, or leg swelling  GASTROINTESTINAL: No abd pain, nausea, vomiting, or diarrhea  GENITOURINARY: No dysuria, frequency, or hematuria  NEUROLOGICAL: no focal weakness or dizziness  MUSCULOSKELETAL: no myalgias     Vital Signs Last 24 Hrs  T(C): 37.2 (21 Jun 2022 15:48), Max: 37.6 (21 Jun 2022 07:00)  T(F): 99 (21 Jun 2022 15:48), Max: 99.6 (21 Jun 2022 07:00)  HR: 75 (21 Jun 2022 17:00) (55 - 101)  BP: 112/67 (21 Jun 2022 17:00) (51/31 - 154/73)  BP(mean): 85 (21 Jun 2022 17:00) (36 - 107)  RR: 24 (21 Jun 2022 17:00) (21 - 31)  SpO2: 95% (21 Jun 2022 17:00) (85% - 100%)    PHYSICAL EXAM:  GENERAL: NAD  HEENT:  NC/AT, EOMI, moist mucous membranes  CHEST/LUNG:  CTA b/l, no rales, wheezes, or rhonchi  HEART:  RRR, S1, S2  ABDOMEN:  BS+, soft, nontender, nondistended  EXTREMITIES: no edema, cyanosis, or calf tenderness  NERVOUS SYSTEM: AA&Ox3    LABS:                        8.8    6.14  )-----------( 131      ( 21 Jun 2022 06:46 )             26.9     CBC Full  -  ( 21 Jun 2022 06:46 )  WBC Count : 6.14 K/uL  Hemoglobin : 8.8 g/dL  Hematocrit : 26.9 %  Platelet Count - Automated : 131 K/uL  Mean Cell Volume : 100.4 fl  Mean Cell Hemoglobin : 32.8 pg  Mean Cell Hemoglobin Concentration : 32.7 gm/dL  Auto Neutrophil # : 5.67 K/uL  Auto Lymphocyte # : 0.10 K/uL  Auto Monocyte # : 0.31 K/uL  Auto Eosinophil # : 0.00 K/uL  Auto Basophil # : 0.01 K/uL  Auto Neutrophil % : 92.4 %  Auto Lymphocyte % : 1.6 %  Auto Monocyte % : 5.0 %  Auto Eosinophil % : 0.0 %  Auto Basophil % : 0.2 %    21 Jun 2022 12:25    154    |  117    |  33     ----------------------------<  225    4.8     |  34     |  1.00     Ca    9.9        21 Jun 2022 12:25  Phos  1.8       21 Jun 2022 06:46  Mg     2.4       21 Jun 2022 06:46    TPro  5.7    /  Alb  1.7    /  TBili  0.2    /  DBili  x      /  AST  23     /  ALT  19     /  AlkPhos  79     21 Jun 2022 06:46    PT/INR - ( 20 Jun 2022 06:22 )   PT: 11.9 sec;   INR: 1.02 ratio         PTT - ( 20 Jun 2022 06:22 )  PTT:36.2 sec    CAPILLARY BLOOD GLUCOSE      POCT Blood Glucose.: 208 mg/dL (21 Jun 2022 11:27)        Culture - Sputum (collected 06-18-22 @ 20:58)  Source: ET Tube ET Tube  Gram Stain (06-18-22 @ 23:57):    Moderate polymorphonuclear leukocytes per low power field    Rare Squamous epithelial cells per low power field    Few Gram Positive Rods per oil power field    Rare Yeast per oil power field  Final Report (06-20-22 @ 14:19):    Moderate Escherichia coli    Normal Respiratory Richa present  Organism: Escherichia coli (06-20-22 @ 14:19)  Organism: Escherichia coli (06-20-22 @ 14:19)      -  Amikacin: S <=16      -  Amoxicillin/Clavulanic Acid: S <=8/4      -  Ampicillin: S <=8 These ampicillin results predict results for amoxicillin      -  Ampicillin/Sulbactam: S <=4/2 Enterobacter, Klebsiella aerogenes, Citrobacter, and Serratia may develop resistance during prolonged therapy (3-4 days)      -  Aztreonam: S <=4      -  Cefazolin: S <=2 Enterobacter, Klebsiella aerogenes, Citrobacter, and Serratia may develop resistance during prolonged therapy (3-4 days)      -  Cefepime: S <=2      -  Cefoxitin: S <=8      -  Ceftriaxone: S <=1 Enterobacter, Klebsiella aerogenes, Citrobacter, and Serratia may develop resistance during prolonged therapy      -  Ciprofloxacin: S <=0.25      -  Ertapenem: S <=0.5      -  Gentamicin: S <=2      -  Imipenem: S <=1      -  Levofloxacin: S <=0.5      -  Meropenem: S <=1      -  Piperacillin/Tazobactam: S <=8      -  Tobramycin: S <=2      -  Trimethoprim/Sulfamethoxazole: S <=0.5/9.5      Method Type: SAMY    Culture - Urine (collected 06-18-22 @ 00:58)  Source: Clean Catch Clean Catch (Midstream)  Final Report (06-20-22 @ 11:07):    >100,000 CFU/ml Pseudomonas aeruginosa  Organism: Pseudomonas aeruginosa (06-20-22 @ 11:07)  Organism: Pseudomonas aeruginosa (06-20-22 @ 11:07)      -  Amikacin: S <=16      -  Aztreonam: S 8      -  Cefepime: I 16      -  Ceftazidime: I 16      -  Ciprofloxacin: S <=0.25      -  Gentamicin: S <=2      -  Imipenem: S 2      -  Levofloxacin: S <=0.5      -  Meropenem: S <=1      -  Piperacillin/Tazobactam: S 16      -  Tobramycin: S <=2      Method Type: SAMY    Culture - Blood (collected 06-17-22 @ 18:45)  Source: .Blood Blood-Peripheral  Preliminary Report (06-18-22 @ 19:01):    No growth to date.    Culture - Blood (collected 06-17-22 @ 18:45)  Source: .Blood Blood-Peripheral  Preliminary Report (06-18-22 @ 19:01):    No growth to date.        RADIOLOGY & ADDITIONAL TESTS:    Personally reviewed.     Consultant(s) Notes Reviewed:  [x] YES  [ ] NO    Care Discussed with [x] Consultants  [x] Patient  [ ] Family  [ ]      [ ] Other; RN  DVT ppx   Patient is a 76y old  Male who presents with a chief complaint of Acute Hypoxic Respiratory Failure (21 Jun 2022 09:14)      INTERVAL HPI/OVERNIGHT EVENTS: Pt seen and examined at bedside. unable to obtain ROS 2/2 sedation.     MEDICATIONS  (STANDING):  albuterol/ipratropium for Nebulization 3 milliLiter(s) Nebulizer every 6 hours  atorvastatin 80 milliGRAM(s) Oral at bedtime  carbidopa/levodopa  25/100 1 Tablet(s) Oral <User Schedule>  chlorhexidine 0.12% Liquid 15 milliLiter(s) Oral Mucosa every 12 hours  chlorhexidine 2% Cloths 1 Application(s) Topical <User Schedule>  citalopram 20 milliGRAM(s) Oral daily  dexMEDEtomidine Infusion 0.3 MICROgram(s)/kG/Hr (4.2 mL/Hr) IV Continuous <Continuous>  dextrose 5%. 1000 milliLiter(s) (125 mL/Hr) IV Continuous <Continuous>  hydrocortisone sodium succinate Injectable 25 milliGRAM(s) IV Push every 12 hours  pantoprazole  Injectable 40 milliGRAM(s) IV Push daily  piperacillin/tazobactam IVPB.. 3.375 Gram(s) IV Intermittent every 8 hours  polyethylene glycol 3350 17 Gram(s) Oral every 24 hours  primidone 50 milliGRAM(s) Oral daily    MEDICATIONS  (PRN):  fentaNYL    Injectable 25 MICROGram(s) IV Push every 2 hours PRN Moderate Pain (4 - 6)      Allergies    No Known Allergies    Intolerances        REVIEW OF SYSTEMS: unable to obtain ROS 2/2 sedation.     Vital Signs Last 24 Hrs  T(C): 37.2 (21 Jun 2022 15:48), Max: 37.6 (21 Jun 2022 07:00)  T(F): 99 (21 Jun 2022 15:48), Max: 99.6 (21 Jun 2022 07:00)  HR: 75 (21 Jun 2022 17:00) (55 - 101)  BP: 112/67 (21 Jun 2022 17:00) (51/31 - 154/73)  BP(mean): 85 (21 Jun 2022 17:00) (36 - 107)  RR: 24 (21 Jun 2022 17:00) (21 - 31)  SpO2: 95% (21 Jun 2022 17:00) (85% - 100%)    PHYSICAL EXAM:  GENERAL: M in NAD  HEENT:  NC/AT, intubated  CHEST/LUNG: scant rhonchi b/l lung fields  HEART:  RRR, S1, S2  ABDOMEN:  BS+, soft, nontender, nondistended  EXTREMITIES: no edema, cyanosis, or calf tenderness  NERVOUS SYSTEM: sedated, opens eyes on command    LABS:                        8.8    6.14  )-----------( 131      ( 21 Jun 2022 06:46 )             26.9     CBC Full  -  ( 21 Jun 2022 06:46 )  WBC Count : 6.14 K/uL  Hemoglobin : 8.8 g/dL  Hematocrit : 26.9 %  Platelet Count - Automated : 131 K/uL  Mean Cell Volume : 100.4 fl  Mean Cell Hemoglobin : 32.8 pg  Mean Cell Hemoglobin Concentration : 32.7 gm/dL  Auto Neutrophil # : 5.67 K/uL  Auto Lymphocyte # : 0.10 K/uL  Auto Monocyte # : 0.31 K/uL  Auto Eosinophil # : 0.00 K/uL  Auto Basophil # : 0.01 K/uL  Auto Neutrophil % : 92.4 %  Auto Lymphocyte % : 1.6 %  Auto Monocyte % : 5.0 %  Auto Eosinophil % : 0.0 %  Auto Basophil % : 0.2 %    21 Jun 2022 12:25    154    |  117    |  33     ----------------------------<  225    4.8     |  34     |  1.00     Ca    9.9        21 Jun 2022 12:25  Phos  1.8       21 Jun 2022 06:46  Mg     2.4       21 Jun 2022 06:46    TPro  5.7    /  Alb  1.7    /  TBili  0.2    /  DBili  x      /  AST  23     /  ALT  19     /  AlkPhos  79     21 Jun 2022 06:46    PT/INR - ( 20 Jun 2022 06:22 )   PT: 11.9 sec;   INR: 1.02 ratio         PTT - ( 20 Jun 2022 06:22 )  PTT:36.2 sec    CAPILLARY BLOOD GLUCOSE      POCT Blood Glucose.: 208 mg/dL (21 Jun 2022 11:27)        Culture - Sputum (collected 06-18-22 @ 20:58)  Source: ET Tube ET Tube  Gram Stain (06-18-22 @ 23:57):    Moderate polymorphonuclear leukocytes per low power field    Rare Squamous epithelial cells per low power field    Few Gram Positive Rods per oil power field    Rare Yeast per oil power field  Final Report (06-20-22 @ 14:19):    Moderate Escherichia coli    Normal Respiratory Richa present  Organism: Escherichia coli (06-20-22 @ 14:19)  Organism: Escherichia coli (06-20-22 @ 14:19)      -  Amikacin: S <=16      -  Amoxicillin/Clavulanic Acid: S <=8/4      -  Ampicillin: S <=8 These ampicillin results predict results for amoxicillin      -  Ampicillin/Sulbactam: S <=4/2 Enterobacter, Klebsiella aerogenes, Citrobacter, and Serratia may develop resistance during prolonged therapy (3-4 days)      -  Aztreonam: S <=4      -  Cefazolin: S <=2 Enterobacter, Klebsiella aerogenes, Citrobacter, and Serratia may develop resistance during prolonged therapy (3-4 days)      -  Cefepime: S <=2      -  Cefoxitin: S <=8      -  Ceftriaxone: S <=1 Enterobacter, Klebsiella aerogenes, Citrobacter, and Serratia may develop resistance during prolonged therapy      -  Ciprofloxacin: S <=0.25      -  Ertapenem: S <=0.5      -  Gentamicin: S <=2      -  Imipenem: S <=1      -  Levofloxacin: S <=0.5      -  Meropenem: S <=1      -  Piperacillin/Tazobactam: S <=8      -  Tobramycin: S <=2      -  Trimethoprim/Sulfamethoxazole: S <=0.5/9.5      Method Type: SAMY    Culture - Urine (collected 06-18-22 @ 00:58)  Source: Clean Catch Clean Catch (Midstream)  Final Report (06-20-22 @ 11:07):    >100,000 CFU/ml Pseudomonas aeruginosa  Organism: Pseudomonas aeruginosa (06-20-22 @ 11:07)  Organism: Pseudomonas aeruginosa (06-20-22 @ 11:07)      -  Amikacin: S <=16      -  Aztreonam: S 8      -  Cefepime: I 16      -  Ceftazidime: I 16      -  Ciprofloxacin: S <=0.25      -  Gentamicin: S <=2      -  Imipenem: S 2      -  Levofloxacin: S <=0.5      -  Meropenem: S <=1      -  Piperacillin/Tazobactam: S 16      -  Tobramycin: S <=2      Method Type: SAMY    Culture - Blood (collected 06-17-22 @ 18:45)  Source: .Blood Blood-Peripheral  Preliminary Report (06-18-22 @ 19:01):    No growth to date.    Culture - Blood (collected 06-17-22 @ 18:45)  Source: .Blood Blood-Peripheral  Preliminary Report (06-18-22 @ 19:01):    No growth to date.        RADIOLOGY & ADDITIONAL TESTS:    Personally reviewed.     Consultant(s) Notes Reviewed:  [x] YES  [ ] NO    Care Discussed with [x] Consultants  [x] Patient  [ ] Family  [ ]      [ ] Other; RN  DVT ppx

## 2022-06-22 NOTE — DISCHARGE NOTE PROVIDER - DETAILS OF MALNUTRITION DIAGNOSIS/DIAGNOSES
This patient has been assessed with a concern for Malnutrition and was treated during this hospitalization for the following Nutrition diagnosis/diagnoses:     -  06/18/2022: Severe protein-calorie malnutrition

## 2022-06-22 NOTE — CONSULT NOTE ADULT - SUBJECTIVE AND OBJECTIVE BOX
HPI:  77 yo male with PMH of prostate ca in remission, tongue SCC s/p partial section last year now recurrence not fit for chemo, planned for immunotherapy, parkinson's disease, HTN, HLD, GERD, recent multiple admission for stroke like symptoms, weakness, and falls, recently admitted to [facility] for CVA (no TPA given due to recent hx of CVA), Dysphagia who presented to the ED from [facility] unresponsive, hypoxic, hypotensive and in respiratory distress. As per the facility note, EMS was called emergently for increased respiratory difficulties this morning, gurgling, AMS, and significant change from baseline. Pt was evaluated yesterday for PEG placement due to dysphagia, scheduled for placement on 6/20. Unable to perform ROS as per patient mental status and he is intubated and sedated.       PAST MEDICAL & SURGICAL HISTORY:  Hypertension      Hyperlipidemia      GERD (gastroesophageal reflux disease)      Parkinson disease      Prostate cancer      Mitral valve prolapse      Malignant neoplasm of tongue, 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      Throat cancer      History of vasectomy      Prostate cancer  s/p cyber knife      S/P partial glossectomy  4/21    REVIEW OF SYSTEMS  Unable to obtain ROS  2/2 non-verbal status        MEDICATIONS  (STANDING):  albuterol/ipratropium for Nebulization 3 milliLiter(s) Nebulizer every 6 hours  atorvastatin 80 milliGRAM(s) Oral at bedtime  carbidopa/levodopa  25/100 1 Tablet(s) Oral <User Schedule>  chlorhexidine 0.12% Liquid 15 milliLiter(s) Oral Mucosa every 12 hours  chlorhexidine 2% Cloths 1 Application(s) Topical <User Schedule>  citalopram 20 milliGRAM(s) Oral daily  dexMEDEtomidine Infusion 0.3 MICROgram(s)/kG/Hr (4.2 mL/Hr) IV Continuous <Continuous>  heparin   Injectable 5000 Unit(s) SubCutaneous every 8 hours  pantoprazole  Injectable 40 milliGRAM(s) IV Push daily  piperacillin/tazobactam IVPB.. 3.375 Gram(s) IV Intermittent every 8 hours  polyethylene glycol 3350 17 Gram(s) Oral every 24 hours  primidone 50 milliGRAM(s) Oral daily    MEDICATIONS  (PRN):  acetaminophen     Tablet .. 650 milliGRAM(s) Oral every 6 hours PRN Temp greater or equal to 38C (100.4F), Mild Pain (1 - 3)  fentaNYL    Injectable 25 MICROGram(s) IV Push every 2 hours PRN Moderate Pain (4 - 6)      Allergies    No Known Allergies    Intolerances      SOCIAL HISTORY:      FAMILY HISTORY:  FH: heart disease  father, brothers    FH: type 2 diabetes  mother        Vital Signs Last 24 Hrs  T(C): 38.2 (22 Jun 2022 12:54), Max: 38.2 (22 Jun 2022 08:11)  T(F): 100.8 (22 Jun 2022 12:54), Max: 100.8 (22 Jun 2022 08:11)  HR: 82 (22 Jun 2022 14:02) (72 - 101)  BP: 108/65 (22 Jun 2022 14:00) (51/31 - 183/88)  BP(mean): 80 (22 Jun 2022 14:00) (36 - 126)  RR: 24 (22 Jun 2022 14:00) (21 - 49)  SpO2: 94% (22 Jun 2022 14:02) (72% - 100%)                              9.3    7.55  )-----------( 123      ( 22 Jun 2022 05:34 )             28.4     06-22    149<H>  |  114<H>  |  26<H>  ----------------------------<  152<H>  3.7   |  31  |  1.00    Ca    9.1      22 Jun 2022 05:34  Phos  1.5     06-22  Mg     2.2     06-22    TPro  5.6<L>  /  Alb  1.7<L>  /  TBili  0.2  /  DBili  x   /  AST  19  /  ALT  9<L>  /  AlkPhos  67  06-22    Auto Neutrophil #: 7.11 K/uL (06-22-22 @ 05:34)    A1C with Estimated Average Glucose Result: 6.1 % (06-18-22 @ 13:04)  A1C with Estimated Average Glucose Result: 5.6 % (05-29-22 @ 03:40)  A1C with Estimated Average Glucose Result: 5.7 % (05-07-22 @ 10:42)      PHYSICAL EXAM:    General: intubated/sedated  Extremities: no clubbing or cyanosis;  no gross deformities,   Dermatologic: Deep tissue injury Sacral coccygeal area extending to left and right buttocks 16 x 7  Neurologic: intubated/sedated  Musculoskeletal/Vascular: no deformities/ contractures

## 2022-06-22 NOTE — PROGRESS NOTE ADULT - ASSESSMENT
Assessment:  1. Septic shock --> resolved  2. E. coli pna   3. Pseudomonal UTI   4. Aspiration pneumonitis   5. Acute hypoxic respiratory failure   6. ÁNGEL  7. Acute metabolic encephalopathy   8. Hypernatremia   9. Hypercalcemia     Plan:  Neuro: C/w Precedex w/daily SAT. C/w home Celexa, Primidone, and Sinemet   CV: Shock state resolved. Monitor HR and BP. Taper down stress dose steroids    Pulm: Patient currently on Full vent support. Titrate settings to maintain SaO2 >90%, or pH >7.25. Consider low tidal volume ventilation strategy w/ goal Tv 4-6 cc/kg of ideal body weight. Plateau pressure goal <30. Peridex oral care. Aggressive pulmonary toilet. Daily sedation vacation with spontaneous breathing trial if clinical condition warrants, discuss with respiratory therapy  Renal: ÁNGEL improving. Hypernatremia, now on d5 given PEG tube just placed. Strict I/Os, goal UOP >0.5cc/kg/hr. Trend renal function and electrolytes, replete as needed. Avoid nephrotoxic agents  GI: PPI, PEG tube placed yesterday   ID: Zosyn for E coli PNA and Pseudomonal UTI. Trend WBC/fevers/procalcitonin   Heme: Holding chemical DVT prophylaxis given PEG tubed placed yesterday. Will then continue full dose AC given recent CVA   Endo: Goal blood glucose <180. Hypercalcemia, suspect 2/2 malignancy, f/u PTHrP. Received 1 dose pamidronate f/u calcium levels     CRITICAL CARE TIME SPENT: 35 minutes assessing presenting problems of acute illness, which pose high probability of life threatening deterioration or end organ damage/dysfunction, as well as medical decision making including initiating plan of care, reviewing data, reviewing radiologic exams, discussing with multidisciplinary team,  discussing goals of care with patient/family, and writing this note.  Non-inclusive of procedures performed

## 2022-06-22 NOTE — PROGRESS NOTE ADULT - SUBJECTIVE AND OBJECTIVE BOX
[facility]  Bernard Jenkins PA-C  [address] [place] [address]  [phone]      INTERVAL HPI/OVERNIGHT EVENTS:  Pt s/e in ICU  Unable to provide hx, sedated  S/p peg placement     MEDICATIONS  (STANDING):  albuterol/ipratropium for Nebulization 3 milliLiter(s) Nebulizer every 6 hours  atorvastatin 80 milliGRAM(s) Oral at bedtime  carbidopa/levodopa  25/100 1 Tablet(s) Oral <User Schedule>  chlorhexidine 0.12% Liquid 15 milliLiter(s) Oral Mucosa every 12 hours  chlorhexidine 2% Cloths 1 Application(s) Topical <User Schedule>  citalopram 20 milliGRAM(s) Oral daily  dexMEDEtomidine Infusion 0.3 MICROgram(s)/kG/Hr (4.2 mL/Hr) IV Continuous <Continuous>  dextrose 5%. 1000 milliLiter(s) (125 mL/Hr) IV Continuous <Continuous>  heparin   Injectable 5000 Unit(s) SubCutaneous every 8 hours  hydrocortisone sodium succinate Injectable 25 milliGRAM(s) IV Push every 12 hours  pantoprazole  Injectable 40 milliGRAM(s) IV Push daily  piperacillin/tazobactam IVPB.. 3.375 Gram(s) IV Intermittent every 8 hours  polyethylene glycol 3350 17 Gram(s) Oral every 24 hours  potassium phosphate IVPB 30 milliMole(s) IV Intermittent once  primidone 50 milliGRAM(s) Oral daily    MEDICATIONS  (PRN):  acetaminophen     Tablet .. 650 milliGRAM(s) Oral every 6 hours PRN Temp greater or equal to 38C (100.4F), Mild Pain (1 - 3)  fentaNYL    Injectable 25 MICROGram(s) IV Push every 2 hours PRN Moderate Pain (4 - 6)      Allergies  No Known Allergies    PHYSICAL EXAM:   Vital Signs:  Vital Signs Last 24 Hrs  T(C): 38.2 (2022 08:11), Max: 38.2 (2022 08:11)  T(F): 100.8 (2022 08:11), Max: 100.8 (2022 08:11)  HR: 91 (2022 09:00) (61 - 101)  BP: 140/71 (2022 09:00) (51/31 - 160/77)  BP(mean): 99 (2022 09:00) (36 - 107)  RR: 28 (:00) (21 - 35)  SpO2: 94% (:00) (85% - 100%)  Daily     Daily Weight in k.1 (2022 05:00)    GENERAL:  Appears stated age  ABDOMEN:  Soft, non-tender, non-distended  NEURO:  Sedated      LABS:                        9.3    7.55  )-----------( 123      ( 2022 05:34 )             28.4     06-22    149<H>  |  114<H>  |  26<H>  ----------------------------<  152<H>  3.7   |  31  |  1.00    Ca    9.1      2022 05:34  Phos  1.5       Mg     2.2         TPro  5.6<L>  /  Alb  1.7<L>  /  TBili  0.2  /  DBili  x   /  AST  19  /  ALT  9<L>  /  AlkPhos  67  22     [facility]  Bernard Jenkins PA-C  [address] [place] [address]  [phone]      INTERVAL HPI/OVERNIGHT EVENTS:  Pt s/e in ICU  Unable to provide hx, sedated  S/p peg placement     MEDICATIONS  (STANDING):  albuterol/ipratropium for Nebulization 3 milliLiter(s) Nebulizer every 6 hours  atorvastatin 80 milliGRAM(s) Oral at bedtime  carbidopa/levodopa  25/100 1 Tablet(s) Oral <User Schedule>  chlorhexidine 0.12% Liquid 15 milliLiter(s) Oral Mucosa every 12 hours  chlorhexidine 2% Cloths 1 Application(s) Topical <User Schedule>  citalopram 20 milliGRAM(s) Oral daily  dexMEDEtomidine Infusion 0.3 MICROgram(s)/kG/Hr (4.2 mL/Hr) IV Continuous <Continuous>  dextrose 5%. 1000 milliLiter(s) (125 mL/Hr) IV Continuous <Continuous>  heparin   Injectable 5000 Unit(s) SubCutaneous every 8 hours  hydrocortisone sodium succinate Injectable 25 milliGRAM(s) IV Push every 12 hours  pantoprazole  Injectable 40 milliGRAM(s) IV Push daily  piperacillin/tazobactam IVPB.. 3.375 Gram(s) IV Intermittent every 8 hours  polyethylene glycol 3350 17 Gram(s) Oral every 24 hours  potassium phosphate IVPB 30 milliMole(s) IV Intermittent once  primidone 50 milliGRAM(s) Oral daily    MEDICATIONS  (PRN):  acetaminophen     Tablet .. 650 milliGRAM(s) Oral every 6 hours PRN Temp greater or equal to 38C (100.4F), Mild Pain (1 - 3)  fentaNYL    Injectable 25 MICROGram(s) IV Push every 2 hours PRN Moderate Pain (4 - 6)      Allergies  No Known Allergies    PHYSICAL EXAM:   Vital Signs:  Vital Signs Last 24 Hrs  T(C): 38.2 (2022 08:11), Max: 38.2 (2022 08:11)  T(F): 100.8 (2022 08:11), Max: 100.8 (2022 08:11)  HR: 91 (2022 09:00) (61 - 101)  BP: 140/71 (2022 09:00) (51/31 - 160/77)  BP(mean): 99 (2022 09:00) (36 - 107)  RR: 28 (:00) (21 - 35)  SpO2: 94% (:00) (85% - 100%)  Daily     Daily Weight in k.1 (2022 05:00)    GENERAL:  Appears stated age  ABDOMEN:  Soft, non-tender, non-distended, +peg  NEURO:  Sedated      LABS:                        9.3    7.55  )-----------( 123      ( 2022 05:34 )             28.4     06-22    149<H>  |  114<H>  |  26<H>  ----------------------------<  152<H>  3.7   |  31  |  1.00    Ca    9.1      2022 05:34  Phos  1.5     06-  Mg     2.2     06    TPro  5.6<L>  /  Alb  1.7<L>  /  TBili  0.2  /  DBili  x   /  AST  19  /  ALT  9<L>  /  AlkPhos  67  22

## 2022-06-22 NOTE — PROGRESS NOTE ADULT - ASSESSMENT
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     Acute hypoxic resp failure Secondary to b/lpneumonia   Sepsis with hypotension and shock   Vent support   Levophed   Solucortef   Continue Zosyn  Blood cultures- show NGTD  ETT culture growing E.Coli  Urine Culture growing pseudomonas    ÁNGEL   Renal fxn improved  -monitor intake and output    -Hypercalcemia   Corrected calcium 10.9  -continue IVF and recheck Ca in AM    Hypophosphatemia: replete PO4    Parkinsonism   - Continue home Sinemet, Primidone and Celexa    Need for prophylactic measure    IV protonix for GI protection  Heparin subcut q8

## 2022-06-22 NOTE — PROGRESS NOTE ADULT - ATTENDING COMMENTS
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 coli pneumonia (likely aspiration) + Pseudomonas UTI, resulting in acute metabolic encephalopathy, lactic acidosis, septic shock, and ÁNGEL.      1. Neuro: hold sedation for now. Continue home sinemet, primidone, and citalopram  2. CV: resolved shock, remains off pressors. Brief hypotension during PEG. Taper hydrocortisone to 25 mg qd for 24 hours. Hold enalapril given resolving ÁNGEL. Continue statin  3. Pulm: continue lung protective ventilation. Failed SBT today due to tachypnea and hypertension. Daily SBT as tolerates. Continue airway clearance therapy with albuterol, ipratropium, and hypertonic saline  4. GI: restart Vital feeds via PEG. Continue PPI as per home regimen  5. Renal: ÁNGEL 2/2 ATN now resolving. Strict I/O's, close monitoring of kidney function and lytes. Restart free water 350 mL q4h. D/c D5W. Replete low phos  6. ID: E. coli pneumonia and Pseudomonas UTI. Continue Zosyn (day 5/7)  7. Heme: thrombocytopenia, possibly due to sepsis vs. drug-induced. HIT negative. Restart heparin sq for DVT ppx. Will eventually need to resume therapeutic enoxaparin given recent CVA and suspected hypercoagulable state  8. Endo: hypercalcemia likely related to malignancy, f/up PTHrP. Interestingly the 1,25-diOH vitamin D level is elevated, although patient does not have history of granulomatous inflammation. S/p pamidronic acid 6/20. Hold off on calcitonin. Hypercalcemia improved  9. Skin: No lines, keep banks for strict I/O's  10. Dispo: DNR, discussed with daughter Viki at bedside  CC time spent: 35 min

## 2022-06-22 NOTE — PROGRESS NOTE ADULT - SUBJECTIVE AND OBJECTIVE BOX
Interval Events:   Patient seen and examined at bedside. Patient still unresponsive and intubated at this time therefore unable to obtain meaningful history. Patient appears comfortable and in NAD. S/P PEG tube yesterday with no complications and to start tube feeds today. Off Precedex this morning and remains of vasopressors. Hemodynamically stable.     Review of Systems:  Unable to obtain     ICU Vital Signs Last 24 Hrs  T(C): 38.2 (22 Jun 2022 12:54), Max: 38.2 (22 Jun 2022 08:11)  T(F): 100.8 (22 Jun 2022 12:54), Max: 100.8 (22 Jun 2022 08:11)  HR: 78 (22 Jun 2022 11:40) (72 - 101)  BP: 140/71 (22 Jun 2022 09:00) (51/31 - 160/77)  BP(mean): 99 (22 Jun 2022 09:00) (36 - 107)  ABP: --  ABP(mean): --  RR: 28 (22 Jun 2022 09:00) (21 - 35)  SpO2: 97% (22 Jun 2022 11:40) (85% - 100%)      Mode: CPAP with PS, FiO2: 40, PEEP: 5, PS: 15  06-21-22 @ 07:01  -  06-22-22 @ 07:00  --------------------------------------------------------  IN: 3781.6 mL / OUT: 2100 mL / NET: 1681.6 mL    06-22-22 @ 07:01  -  06-22-22 @ 13:20  --------------------------------------------------------  IN: 250 mL / OUT: 100 mL / NET: 150 mL        CAPILLARY BLOOD GLUCOSE      POCT Blood Glucose.: 189 mg/dL (22 Jun 2022 12:11)      I&O's Summary    21 Jun 2022 07:01  -  22 Jun 2022 07:00  --------------------------------------------------------  IN: 3781.6 mL / OUT: 2100 mL / NET: 1681.6 mL    22 Jun 2022 07:01  -  22 Jun 2022 13:20  --------------------------------------------------------  IN: 250 mL / OUT: 100 mL / NET: 150 mL        PHYSICAL EXAM:  Gen: Unresponsive and intubated   Neuro: Unresponsive, not following commands, eye closed   Resp: Scant rhochi b/l   CVS: S1, S2, No rubs, gallops or murmurs   Abd: Soft and non-tender, PEG tube with no surrounding draingage   Ext: No edema, no cyanosis       Meds:  piperacillin/tazobactam IVPB.. IV Intermittent      atorvastatin Oral    albuterol/ipratropium for Nebulization Nebulizer    acetaminophen     Tablet .. Oral PRN  carbidopa/levodopa  25/100 Oral  citalopram Oral  dexMEDEtomidine Infusion IV Continuous  fentaNYL    Injectable IV Push PRN  primidone Oral      heparin   Injectable SubCutaneous    pantoprazole  Injectable IV Push  polyethylene glycol 3350 Oral          chlorhexidine 0.12% Liquid Oral Mucosa  chlorhexidine 2% Cloths Topical                              9.3    7.55  )-----------( 123      ( 22 Jun 2022 05:34 )             28.4       06-22    149<H>  |  114<H>  |  26<H>  ----------------------------<  152<H>  3.7   |  31  |  1.00    Ca    9.1      22 Jun 2022 05:34  Phos  1.5     06-22  Mg     2.2     06-22    TPro  5.6<L>  /  Alb  1.7<L>  /  TBili  0.2  /  DBili  x   /  AST  19  /  ALT  9<L>  /  AlkPhos  67  06-22              ET Tube ET Tube   Moderate Escherichia coli  Normal Respiratory Richa present   Moderate polymorphonuclear leukocytes per low power field  Rare Squamous epithelial cells per low power field  Few Gram Positive Rods per oil power field  Rare Yeast per oil power field 06-18 @ 20:58  Clean Catch Clean Catch (Midstream)   >100,000 CFU/ml Pseudomonas aeruginosa -- 06-18 @ 00:58  .Blood Blood-Peripheral   No growth to date. -- 06-17 @ 18:45              Radiology: No new imaging     Bedside Ultrasound: N/A     Tubes/Lines:  Midline,PEG, NGT, banks     GLOBAL ISSUE/BEST PRACTICE:  Analgesia: Y   Sedation: N   HOB elevation: Y  Stress ulcer prophylaxis: Y   VTE prophylaxis: Y   Glycemic control: N   Nutrition: Y     CODE STATUS: FULL CODE

## 2022-06-22 NOTE — CONSULT NOTE ADULT - ASSESSMENT
Patient presents with:   1. Wound progression: Injury was noted on admission stage one and has rapidly progressed to  a 16 x 7 deep tissue injury likely giovani ulcer. I suspect this injury will continue to progress.   Recommendation:   -cavilon daily  -Triad  Turn and reposition per protocol  Patient is on a low air loss mattress  for the critically ill patient experiencing multiorgan failure, impaired tissue oxygenation, and perfusion can contribute to skin failure thus the development of a pressure related injury may be unavoidable    This pressure injury is community aquired /YES  At risk for altered tissue perfusion /YES  Impaired perfusion of peripheral tissue YES  Continue  Nutrition (as tolerated)  Continue  Offloading   Continue Pericare  Apply cair boots at all times while in bed.   Provide skin checks and foot placement q8h.  Care as per medicine will follow w/ you  Follow up as outpatient at [facility]   Findings and recommendations discussed with RUBEN Love   Thank you for this consult  Deena Clarke NP, [facility] [phone]

## 2022-06-22 NOTE — PROGRESS NOTE ADULT - SUBJECTIVE AND OBJECTIVE BOX
Patient is a 76y old  Male who presents with a chief complaint of Acute Hypoxic Respiratory Failure (21 Jun 2022 17:41)      BRIEF HOSPITAL COURSE:   77 y/o M w/ pmh of prostate ca, SCC of tounge s/p partial resection now w/ recurrence, PD, hypertension, hld, recent CVA and oropharyngeal dysphagia now admitted to the MICU for acute hypoxemic rsep failure, septic shoch 2/2 to pna +/- UTI, metabolic encephalopathy, lactic acidosis, ÁNGEL and hyperNa    Events last 24 hours:   - S/p PEG tube placement  - ABG during day hypercarbic acidosis   - ABG now improved   - Off vasopressors     Review of Systems:  Unable to assess given clinical condition     PAST MEDICAL & SURGICAL HISTORY:  Hypertension  Hyperlipidemia  GERD (gastroesophageal reflux disease)  Parkinson&#x27;s disease  Prostate cancer  Mitral valve prolapse  Malignant neoplasm of tongue, 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  Throat cancer  History of vasectomy  Prostate cancer  s/p cyber knife  S/P partial glossectomy  4/21    Medications:  piperacillin/tazobactam IVPB.. 3.375 Gram(s) IV Intermittent every 8 hours  albuterol/ipratropium for Nebulization 3 milliLiter(s) Nebulizer every 6 hours  carbidopa/levodopa  25/100 1 Tablet(s) Oral <User Schedule>  citalopram 20 milliGRAM(s) Oral daily  dexMEDEtomidine Infusion 0.3 MICROgram(s)/kG/Hr IV Continuous <Continuous>  fentaNYL    Injectable 25 MICROGram(s) IV Push every 2 hours PRN  primidone 50 milliGRAM(s) Oral daily  pantoprazole  Injectable 40 milliGRAM(s) IV Push daily  polyethylene glycol 3350 17 Gram(s) Oral every 24 hours  atorvastatin 80 milliGRAM(s) Oral at bedtime  hydrocortisone sodium succinate Injectable 25 milliGRAM(s) IV Push every 12 hours  dextrose 5%. 1000 milliLiter(s) IV Continuous <Continuous>  chlorhexidine 0.12% Liquid 15 milliLiter(s) Oral Mucosa every 12 hours  chlorhexidine 2% Cloths 1 Application(s) Topical <User Schedule>    Mode: AC/ CMV (Assist Control/ Continuous Mandatory Ventilation)  RR (machine): 24  TV (machine): 440  FiO2: 40  PEEP: 5  ITime: 1  MAP: 12  PIP: 22    ICU Vital Signs Last 24 Hrs  T(C): 37.6 (21 Jun 2022 23:36), Max: 37.6 (21 Jun 2022 07:00)  T(F): 99.7 (21 Jun 2022 23:36), Max: 99.7 (21 Jun 2022 23:36)  HR: 86 (21 Jun 2022 23:00) (55 - 101)  BP: 134/80 (21 Jun 2022 23:00) (51/31 - 160/77)  BP(mean): 102 (21 Jun 2022 23:00) (36 - 107)  RR: 24 (21 Jun 2022 23:00) (21 - 31)  SpO2: 95% (21 Jun 2022 23:00) (85% - 100%)    ABG - ( 21 Jun 2022 20:47 )  pH, Arterial: 7.51  pH, Blood: x     /  pCO2: 44    /  pO2: 155   / HCO3: 35    / Base Excess: 12.1  /  SaO2: 99.1      I&O's Detail    20 Jun 2022 07:01  -  21 Jun 2022 07:00  --------------------------------------------------------  IN:    Dexmedetomidine: 91 mL    Enteral Tube Flush: 360 mL    Enteral Tube Flush: 1400 mL    IV PiggyBack: 300 mL    IV PiggyBack: 500 mL    IV PiggyBack: 250 mL    Vital1.5: 1040 mL  Total IN: 3941 mL    OUT:    Indwelling Catheter - Urethral (mL): 1915 mL  Total OUT: 1915 mL    Total NET: 2026 mL    21 Jun 2022 07:01  -  22 Jun 2022 00:20  --------------------------------------------------------  IN:    Dexmedetomidine: 46.2 mL    dextrose 5%: 375 mL    dextrose 5%: 825 mL    Enteral Tube Flush: 60 mL    IV PiggyBack: 500 mL    IV PiggyBack: 200 mL  Total IN: 2006.2 mL    OUT:    Indwelling Catheter - Urethral (mL): 1100 mL  Total OUT: 1100 mL    Total NET: 906.2 mL    LABS:                        9.1    7.80  )-----------( 125      ( 21 Jun 2022 20:53 )             27.8     06-21    152<H>  |  116<H>  |  29<H>  ----------------------------<  165<H>  4.0   |  30  |  0.95    Ca    9.4      21 Jun 2022 20:53  Phos  1.8     06-21  Mg     2.4     06-21    TPro  5.7<L>  /  Alb  1.7<L>  /  TBili  0.2  /  DBili  x   /  AST  23  /  ALT  19  /  AlkPhos  79  06-21    POCT Blood Glucose.: 208 mg/dL (21 Jun 2022 11:27)    PT/INR - ( 20 Jun 2022 06:22 )   PT: 11.9 sec;   INR: 1.02 ratio    PTT - ( 20 Jun 2022 06:22 )  PTT:36.2 sec    CULTURES:  Culture Results:   Moderate Escherichia coli  Normal Respiratory Richa present (06-18 @ 20:58)  Culture Results:   >100,000 CFU/ml Pseudomonas aeruginosa (06-18 @ 00:58)  Culture Results:   No growth to date. (06-17 @ 18:45)  Culture Results:   No growth to date. (06-17 @ 18:45)  Rapid RVP Result: NotDetec (06-17 @ 12:21)    Physical Examination:  General: No acute distress.    HEENT: Pupils equal, reactive to light.  Symmetric.  PULM: Diminished breath sounds b/l  NECK: Supple, no lymphadenopathy, trachea midline  CVS: Regular rate and rhythm, no murmurs, rubs, or gallops  ABD: Soft, nondistended, nontender, normoactive bowel sounds, no masses  EXT: No edema, nontender  SKIN: Warm and well perfused  RADIOLOGY:   < from: CT Chest No Cont (06.17.22 @ 14:15) >    ACC: [id number] EXAM:  CT ABDOMEN AND PELVIS                        ACC: [id number] EXAM:  CT CHEST                          PROCEDURE DATE:  06/17/2022      INTERPRETATION:  CLINICAL INFORMATION: Sepsis.  Acute hypoxic respiratory failure.  History of recurrent tongue squamous cell carcinoma.    COMPARISON: CT scan of the chest 04/7/2022 and CT scan of the abdomen   pelvis 5/5/2022.    CONTRAST/COMPLICATIONS:  IV Contrast: NONE  Oral Contrast: NONE  Complications: None reported at time of study completion    PROCEDURE:  CT of the Chest, Abdomen and Pelvis was performed.  Sagittal and coronal reformats were performed.    FINDINGS:  Streak artifact degrades image quality limiting evaluation.    CHEST:    LUNGS AND LARGE AIRWAYS: PLEURA:  Endotracheal tube, below the level of the margy.  The central airways remain patent.    Patchy bibasilar airspace consolidation may reflect atelectasis and/or   pneumonia in the appropriate clinical setting.    Bilateral mosaic groundglass attenuation which may reflect small vessel   or small airway disease.  Poorly defined groundglass centrilobular nodular opacities, most   pronounced in the left upper and right lower lobe; which may reflect   infectious/inflammatory small airway disease.    Trace bilateral pleural effusions.    VESSELS: Atherosclerotic changes thoracic aorta and coronary artery   calcifications.    HEART: Heart size is normal.  Aortic and mitral valvular calcification.  No pericardial effusion.    MEDIASTINUM AND STEPHANIE: No lymphadenopathy.    CHEST WALL AND LOWER NECK: Within normal limits.    ABDOMEN AND PELVIS:    Streak artifact degrades image quality limiting evaluation.    The evaluation of the solid organ parenchyma is limited without   intravenous contrast.    LIVER:Within normal limits.  BILE DUCTS: Normal caliber.  GALLBLADDER: Distended with biliary sludge. No gallbladder wall   thickening.  SPLEEN: Within normal limits.  PANCREAS: Within normal limits.  ADRENALS: Within normal limits.  KIDNEYS/URETERS:  3 mmnonobstructing intrarenal calcification lower pole right kidney.  Right renal cyst with layering milk of calcium, stable.  Left renal cyst.    BLADDER: Amato catheter, nondistended bladder.  REPRODUCTIVE ORGANS: Prostate fiducial markers.    BOWEL:  Colonic fecal retention, without bowel obstruction.  Appendix not visualized.  PERITONEUM: No ascites.    VESSELS: Atherosclerotic changes.  RETROPERITONEUM/LYMPH NODES: No lymphadenopathy.    ABDOMINAL WALL: Cachexia.    BONES:  Prominent S-shaped thoracolumbar scoliosis.  Degenerative changes.    IMPRESSION:    Technically limited study.    Bibasilar airspace consolidation which may reflect atelectasis and/or   pneumonia.  Bilateral groundglass centrilobular nodular opacities which may reflect   infectious and/or inflammatory airway disease.    Colonic fecal retention without bowel obstruction.    Other chronic findings as above.    --- End of Report ---    VALENTINE SHERIDAN MD; Attending Radiologist  This document has been electronically signed. Jun 17 2022  3:11PM    < end of copied text >

## 2022-06-22 NOTE — DISCHARGE NOTE PROVIDER - NSDCMRMEDTOKEN_GEN_ALL_CORE_FT
acetaminophen 325 mg oral tablet: 2 tab(s) orally every 8 hours, As Needed  atorvastatin 80 mg oral tablet: 1 tab(s) orally once a day (at bedtime)  carbidopa-levodopa 25 mg-100 mg oral tablet: 1 tab(s) orally 2 times a day  citalopram 20 mg oral tablet: 1 tab(s) orally once a day  DilTIAZem (Eqv-Cardizem CD) 240 mg/24 hours oral capsule, extended release: 1 cap(s) orally once a day  enalapril 10 mg oral tablet: 1 tab(s) orally once a day  ferrous sulfate 325 mg (65 mg elemental iron) oral delayed release tablet: 1 tab(s) orally once a day   Lovenox 60 mg/0.6 mL injectable solution: 60 milligram(s) subcutaneously 2 times a day   PriLOSEC 40 mg oral delayed release capsule: 1 cap(s) orally once a day  primidone 50 mg oral tablet: 1 tab(s) orally once a day  Vitamin C 1000 mg oral tablet: 1 tab(s) orally once a day  zinc oxide 20% topical ointment: Apply topically to affected area 3 times a day and As Needed (Sacrum Deep Tissue injury Pressure Wound)

## 2022-06-22 NOTE — DISCHARGE NOTE PROVIDER - HOSPITAL COURSE
FROM ADMISSION H+P:   HPI:  77 yo male with PMH of prostate ca in remission, tongue SCC s/p partial section last year now recurrence not fit for chemo, planned for immunotherapy, parkinson's disease, HTN, HLD, GERD, recent multiple admission for stroke like symptoms, weakness, and falls, recently admitted to [facility] for CVA (no TPA given due to recent hx of CVA), Dysphagia who presented to the ED from [facility] unresponsive, hypoxic, hypotensive and in respiratory distress. As per the facility note, EMS was called emergently for increased respiratory difficulties this morning, gurgling, AMS, and significant change from baseline. Pt was evaluated yesterday for PEG placement due to dysphagia, scheduled for placement on . Unable to perform ROS as per patient mental status and he is intubated and sedated.     EMS Vitals:  Temp: NA BP 72/52  RR 24 Unable to obtain SpO2 reading, placed on 15L NRB    In the ED:  T 102F Rectal,  /80 RR Unknown SpO2 79% on NRB  Hgb 11.6 INR 1.73 Na 149 K 3.1 Cl 116  Cr 2.5 Glucose 125 Ca 12.5  Albumin 2.2 (corrected calcium 13.9) Lactate 4.8  AB.34/43/52/23  Vanc x1, Zosyn x1, 1L NS Bolus x1, 20mg Etomidate, 100mg Succ, 50mcg Fentanyl, 1g IV Tylenol, Levophed gtt  Vent Settings: 350/14/80/5 (2022 13:40)      ---  HOSPITAL COURSE:   76 year old male with history of prostate cancer, SCC of tongue s/p partial resection now with recurrence, parkinson's disease, hypertension, hyperlipidemia, recent CVA, and oropharyngeal dysphagia admitted for septic shock and acute hypoxic respiratory failure in the setting of b/l lower lobe growing e.coli PNA and UTI growing pseudomonas. Upon arrival to ED patient was medically unstable and critically ill so was intubated and placed of Propofol then on Precedex. Also, vasopressors were started though has now been weaned off and on stress dose of IV steroids. Patient was also started on Zosyn and continued through out hospital course. Electrolyte abnormalities occurred through out hospital. He was noted to be hypernatremic and started on free water and D5 and is slowly down trending. PTH was normal and PTHrP was ---. BUN/Cr improved through out hospital course and is not at baseline. Patient with chronic history of dysphagia given SCC of tongue with partial resection required PEG tube placed on 22 which was tolerated and no complications.     CONSULTANTS:     ---  TIME SPENT:  I, the attending physician, was physically present for the key portions of the evaluation and management (E/M) service provided. The total amount of time spent reviewing the hospital notes, laboratory values, imaging findings, assessing/counseling the patient, discussing with consultant physicians, social work, nursing staff was -- minutes    ---  Primary care provider was made aware of plan for discharge:      [  ] NO     [  ] YES   FROM ADMISSION H+P:   HPI:  75 yo male with PMH of prostate ca in remission, tongue SCC s/p partial section last year now recurrence not fit for chemo, planned for immunotherapy, parkinson's disease, HTN, HLD, GERD, recent multiple admission for stroke like symptoms, weakness, and falls, recently admitted to [facility] for CVA (no TPA given due to recent hx of CVA), Dysphagia who presented to the ED from [facility] unresponsive, hypoxic, hypotensive and in respiratory distress. As per the facility note, EMS was called emergently for increased respiratory difficulties this morning, gurgling, AMS, and significant change from baseline. Pt was evaluated yesterday for PEG placement due to dysphagia, scheduled for placement on . Unable to perform ROS as per patient mental status and he is intubated and sedated.     EMS Vitals:  Temp: NA BP 72/52  RR 24 Unable to obtain SpO2 reading, placed on 15L NRB    In the ED:  T 102F Rectal,  /80 RR Unknown SpO2 79% on NRB  Hgb 11.6 INR 1.73 Na 149 K 3.1 Cl 116  Cr 2.5 Glucose 125 Ca 12.5  Albumin 2.2 (corrected calcium 13.9) Lactate 4.8  AB.34/43/52/23  Vanc x1, Zosyn x1, 1L NS Bolus x1, 20mg Etomidate, 100mg Succ, 50mcg Fentanyl, 1g IV Tylenol, Levophed gtt  Vent Settings: 350/14/80/5 (2022 13:40)      ---  HOSPITAL COURSE:   76 year old male with history of prostate cancer, SCC of tongue s/p partial resection now with recurrence, parkinson's disease, hypertension, hyperlipidemia, recent CVA, and oropharyngeal dysphagia admitted for septic shock and acute hypoxic respiratory failure in the setting of b/l lower lobe growing e.coli PNA and UTI growing pseudomonas. Upon arrival to ED patient was medically unstable and critically ill so was intubated and placed of Propofol then on Precedex. Also, vasopressors were started though has now been weaned off and on stress dose of IV steroids. Patient was also started on Zosyn and continued through out hospital course however ET repeat was positive Pseudomonas so Abx were switched to Meropenum and Tobramycin  Electrolyte abnormalities occurred through out hospital. He was noted to be hypernatremic and started on free water and D5 and is slowly down trending. PTH was normal and PTHrP was normal. Hypercalcemia attributed to inflammatory process. BUN/Cr improved through out hospital course and is now at baseline. Patient with chronic history of dysphagia given SCC of tongue with partial resection required PEG tube placed on 22 which was tolerated and no complications and tube feeds were started. Patient remained off sedation and pressors with no clinical improvement as he is obtunded and minimall responsive to stimuli.     CONSULTANTS:     ---  TIME SPENT:  I, the attending physician, was physically present for the key portions of the evaluation and management (E/M) service provided. The total amount of time spent reviewing the hospital notes, laboratory values, imaging findings, assessing/counseling the patient, discussing with consultant physicians, social work, nursing staff was -- minutes    ---  Primary care provider was made aware of plan for discharge:      [  ] NO     [  ] YES   FROM ADMISSION H+P:   HPI:  75 yo male with PMH of prostate ca in remission, tongue SCC s/p partial section last year now recurrence not fit for chemo, planned for immunotherapy, parkinson's disease, HTN, HLD, GERD, recent multiple admission for stroke like symptoms, weakness, and falls, recently admitted to [facility] for CVA (no TPA given due to recent hx of CVA), Dysphagia who presented to the ED from [facility] unresponsive, hypoxic, hypotensive and in respiratory distress. As per the facility note, EMS was called emergently for increased respiratory difficulties this morning, gurgling, AMS, and significant change from baseline. Pt was evaluated yesterday for PEG placement due to dysphagia, scheduled for placement on . Unable to perform ROS as per patient mental status and he is intubated and sedated.     EMS Vitals:  Temp: NA BP 72/52  RR 24 Unable to obtain SpO2 reading, placed on 15L NRB    In the ED:  T 102F Rectal,  /80 RR Unknown SpO2 79% on NRB  Hgb 11.6 INR 1.73 Na 149 K 3.1 Cl 116  Cr 2.5 Glucose 125 Ca 12.5  Albumin 2.2 (corrected calcium 13.9) Lactate 4.8  AB.34/43/52/23  Vanc x1, Zosyn x1, 1L NS Bolus x1, 20mg Etomidate, 100mg Succ, 50mcg Fentanyl, 1g IV Tylenol, Levophed gtt  Vent Settings: 350/14/80/5 (2022 13:40)      ---  HOSPITAL COURSE:   76 year old male with history of prostate cancer, SCC of tongue s/p partial resection now with recurrence, parkinson's disease, hypertension, hyperlipidemia, recent CVA, and oropharyngeal dysphagia admitted for septic shock and acute hypoxic respiratory failure in the setting of b/l lower lobe growing e.coli PNA and UTI growing pseudomonas. Upon arrival to ED patient was medically unstable and critically ill so was intubated and placed of Propofol then on Precedex. Also, vasopressors were started though has now been weaned off and on stress dose of IV steroids. Patient was also started on Zosyn and continued through out hospital course however ET repeat was positive Pseudomonas so Abx were switched to Meropenum and Tobramycin  Electrolyte abnormalities occurred through out hospital. He was noted to be hypernatremic and started on free water and D5, sodium level was monitored and improved. PTH was normal and PTHrP was normal. Hypercalcemia attributed to inflammatory process which improved as well. BUN/Cr improved throughout hospital course and is now at baseline. Patient with chronic history of dysphagia given SCC of tongue with partial resection required PEG tube placed on 22 which was tolerated and no complications and tube feeds were started. Patient remained off sedation and pressors with no clinical improvement as he is obtunded and minimal responsive to stimuli. Pt's Sinemet was decreased. Pt was found to have elevated ammonia levels and were started on Lactulose and Rifaximin. Pt had weaning trials which he tolerated well and was extubated on _______     CONSULTANTS:     ---  TIME SPENT:  I, the attending physician, was physically present for the key portions of the evaluation and management (E/M) service provided. The total amount of time spent reviewing the hospital notes, laboratory values, imaging findings, assessing/counseling the patient, discussing with consultant physicians, social work, nursing staff was -- minutes    ---  Primary care provider was made aware of plan for discharge:      [  ] NO     [  ] YES   FROM ADMISSION H+P:   HPI:  75 yo male with PMH of prostate ca in remission, tongue SCC s/p partial section last year now recurrence not fit for chemo, planned for immunotherapy, parkinson's disease, HTN, HLD, GERD, recent multiple admission for stroke like symptoms, weakness, and falls, recently admitted to [facility] for CVA (no TPA given due to recent hx of CVA), Dysphagia who presented to the ED from [facility] unresponsive, hypoxic, hypotensive and in respiratory distress. As per the facility note, EMS was called emergently for increased respiratory difficulties this morning, gurgling, AMS, and significant change from baseline. Pt was evaluated yesterday for PEG placement due to dysphagia, scheduled for placement on . Unable to perform ROS as per patient mental status and he is intubated and sedated.     EMS Vitals:  Temp: NA BP 72/52  RR 24 Unable to obtain SpO2 reading, placed on 15L NRB    In the ED:  T 102F Rectal,  /80 RR Unknown SpO2 79% on NRB  Hgb 11.6 INR 1.73 Na 149 K 3.1 Cl 116  Cr 2.5 Glucose 125 Ca 12.5  Albumin 2.2 (corrected calcium 13.9) Lactate 4.8  AB.34/43/52/23  Vanc x1, Zosyn x1, 1L NS Bolus x1, 20mg Etomidate, 100mg Succ, 50mcg Fentanyl, 1g IV Tylenol, Levophed gtt  Vent Settings: 350/14/80/5 (2022 13:40)      ---  HOSPITAL COURSE:   76 year old male with history of prostate cancer, SCC of tongue s/p partial resection now with recurrence, parkinson's disease, hypertension, hyperlipidemia, recent CVA, and oropharyngeal dysphagia admitted for septic shock and acute hypoxic respiratory failure in the setting of b/l lower lobe growing e.coli PNA and UTI growing pseudomonas. Upon arrival to ED patient was medically unstable and critically ill so was intubated and placed of Propofol then on Precedex. Also, vasopressors were started though has now been weaned off and on stress dose of IV steroids. Patient was also started on Zosyn and continued through out hospital course however ET repeat was positive Pseudomonas and Urine culture was positive for Pseudomonas as well. Abx were switched to Meropenum and Tobramycin . Electrolyte abnormalities occurred through out hospital. He was noted to be hypernatremic and started on free water and D5, sodium level was monitored and improved. PTH was normal and PTHrP was normal. Hypercalcemia attributed to inflammatory process which improved as well. BUN/Cr improved throughout hospital course and is now at baseline. Patient with chronic history of dysphagia given SCC of tongue with partial resection required PEG tube placed on 22 which was tolerated and no complications and tube feeds were started. Patient remained off sedation and pressors with no clinical improvement as he is obtunded and minimal responsive to stimuli. Pt's Sinemet was decreased. Primidone was discontinued after being given a taper. Pt was found to have elevated ammonia levels and were started on Lactulose and Rifaximin and ammonia levels improved. Rifaximin was discontinued. Pt was started on steroids for laryngeal edema. Pt had weaning trials which he tolerated well and was extubated on _______ . Pt was also found to be anemic and was given 1uprbc on .     UPDATED 7/3    CONSULTANTS:   - Gastroenterology: Dr. Radford   - Endocrine: Dr. Perlman     ---  TIME SPENT:  I, the attending physician, was physically present for the key portions of the evaluation and management (E/M) service provided. The total amount of time spent reviewing the hospital notes, laboratory values, imaging findings, assessing/counseling the patient, discussing with consultant physicians, social work, nursing staff was -- minutes    ---  Primary care provider was made aware of plan for discharge:      [  ] NO     [  ] YES

## 2022-06-22 NOTE — DISCHARGE NOTE PROVIDER - NSDCFUSCHEDAPPT_GEN_ALL_CORE_FT
Jayden Boland  [facility]  [address] [facility]  Scheduled Appointment: 07/19/2022    Tricia Herrera  [address]  Scheduled Appointment: 07/22/2022

## 2022-06-22 NOTE — PROGRESS NOTE ADULT - ASSESSMENT
dysphagia  FTT    s/p peg placement 6/21  ok to start feeds  flush peg after use  will follow    I reviewed the overnight course of events on the unit, re-confirming the patient history. I discussed the care with the patient and their family  The plan of care was discussed with the physician assistant and modifications were made to the notation where appropriate.   Differential diagnosis and plan of care discussed with patient after the evaluation  35 minutes spent on total encounter of which more than fifty percent of the encounter was spent counseling and/or coordinating care by the attending physician.  Advanced care planning was discussed with patient and family.  Advanced care planning forms were reviewed and discussed.  Risks, benefits and alternatives of gastroenterologic procedures were discussed in detail and all questions were answered.

## 2022-06-22 NOTE — PROGRESS NOTE ADULT - ASSESSMENT
76 year old male with history of prostate cancer, SCC of tongue s/p partial resection now with recurrence, parkinson's disease, hypertension, hyperlipidemia, recent CVA, and oropharyngeal dysphagia admitted for septic shock and acute hypoxic respiratory failure in the setting of b/l lower lobe growing e.coli PNA and UTI growing pseudomonas     Neuro:  - S/P Precedex gtt  - Tylenol prn   - Fentanyl prn  - Continue home Citalopran, Primidone and Sinemet for Parkinson's     CV:  - Hypotension 2/2 septic shock  - S/P Midodrine, Levophed and Vasopressin, will continue to monitor   - Tapered down stress dose steroids to Hydrocortisone 25mg daily and DC tomorrow     Pulm:  - Continue lung protective ventilation, titrate settings as tolerated  - Duoneb Tx Q6H                   GI:  - S/P PEG tube 6/21/22   - Start tube feeds today   - IV Protonix 40mg qd for GI ppx    Renal:  - Cr/BUN 1.00/26  - Monitor I&Os and renal indices     Heme:  - Restarted on Heparin SubQ   - Per prior Hospitalist note from 6/2, pt was on full dose AC for recent stroke and hypercoagulable state. Will hold off on full dose AC for now     ID:  - B/l lower lobe PNA and UTI  - Urine cx positive for Pseudomonas  - ET positive for E.Coli   - Continue IV Zosyn    Endo:  - Hypercalcemia likely 2/2 malignancy; s/p + Endocrine consulted   - PTH wnl, vit D,25,hydroxy 39.2 and vit D,1,25 dihydroxy 89.9  - F/u PTHrP   - Hyperatremia 149 this AM; Continue with free water     Tubes/lines:  - Midline, PEG tube, banks

## 2022-06-22 NOTE — PROGRESS NOTE ADULT - SUBJECTIVE AND OBJECTIVE BOX
Patient seen and examined at bedside. Unable to obtain hx as patient is on ventilator and sedated.     T(C): 38.2 (06-22-22 @ 08:11), Max: 38.2 (06-22-22 @ 08:11)  HR: 91 (06-22-22 @ 09:00) (61 - 101)  BP: 140/71 (06-22-22 @ 09:00) (51/31 - 160/77)  RR: 28 (06-22-22 @ 09:00) (21 - 35)  SpO2: 94% (06-22-22 @ 09:00) (85% - 100%)  Wt(kg): --    Physical Exam:   GENERAL: sedated on ventilator, well groomed  HEENT: head NC/AT;  PERRLA, conjunctiva & sclera clear;moist mucous membranes  NECK: supple, no JVD  RESPIRATORY: rhonchi b/l lung bases, no wheezing  CARDIOVASCULAR: S1&S2, RRR, no murmurs or gallops  ABDOMEN: soft, non-tender, non-distended, + Bowel sounds x4 quadrants, no guarding, rebound or rigidity. PEG site without surrounding drainage  MUSCULOSKELETAL:  no clubbing, cyanosis or edema of all 4 extremities  LYMPH: no cervical lymphadenopathy  VASCULAR: Radial pulses 2+ bilaterally, no varicose veins   SKIN: warm and dry, color normal  NEUROLOGIC: sedated,   Psych: unable to assess

## 2022-06-22 NOTE — CONSULT NOTE ADULT - SUBJECTIVE AND OBJECTIVE BOX
Patient is a 76y old  Male who presents with a chief complaint of Acute Hypoxic Respiratory Failure (2022 11:02)      Reason For Consult: hypercalcemia    HPI:  75 yo male with PMH of prostate ca in remission, tongue SCC s/p partial section last year now recurrence not fit for chemo, planned for immunotherapy, parkinson's disease, HTN, HLD, GERD, recent multiple admission for stroke like symptoms, weakness, and falls, recently admitted to [facility] for CVA (no TPA given due to recent hx of CVA), Dysphagia who presented to the ED from [facility] unresponsive, hypoxic, hypotensive and in respiratory distress. As per the facility note, EMS was called emergently for increased respiratory difficulties this morning, gurgling, AMS, and significant change from baseline. Pt was evaluated yesterday for PEG placement due to dysphagia, scheduled for placement on . Unable to perform ROS as per patient mental status and he is intubated and sedated.     EMS Vitals:  Temp: NA BP 72/52  RR 24 Unable to obtain SpO2 reading, placed on 15L NRB    In the ED:  T 102F Rectal,  /80 RR Unknown SpO2 79% on NRB  Hgb 11.6 INR 1.73 Na 149 K 3.1 Cl 116  Cr 2.5 Glucose 125 Ca 12.5  Albumin 2.2 (corrected calcium 13.9) Lactate 4.8  AB.34/43/52/23  Vanc x1, Zosyn x1, 1L NS Bolus x1, 20mg Etomidate, 100mg Succ, 50mcg Fentanyl, 1g IV Tylenol, Levophed gtt  Vent Settings: 350/14/80/5 (2022 13:40)      PAST MEDICAL & SURGICAL HISTORY:  Hypertension      Hyperlipidemia      GERD (gastroesophageal reflux disease)      Parkinson&#x27;s disease      Prostate cancer      Mitral valve prolapse      Malignant neoplasm of tongue, unspecified  s/p surgery and radiation discontinued       Throat cancer      History of vasectomy      Prostate cancer  s/p cyber knife      S/P partial glossectomy            FAMILY HISTORY:  FH: heart disease  father, brothers    FH: type 2 diabetes  mother          Social History:    MEDICATIONS  (STANDING):  albuterol/ipratropium for Nebulization 3 milliLiter(s) Nebulizer every 6 hours  atorvastatin 80 milliGRAM(s) Oral at bedtime  carbidopa/levodopa  25/100 1 Tablet(s) Oral <User Schedule>  chlorhexidine 0.12% Liquid 15 milliLiter(s) Oral Mucosa every 12 hours  chlorhexidine 2% Cloths 1 Application(s) Topical <User Schedule>  citalopram 20 milliGRAM(s) Oral daily  dexMEDEtomidine Infusion 0.3 MICROgram(s)/kG/Hr (4.2 mL/Hr) IV Continuous <Continuous>  heparin   Injectable 5000 Unit(s) SubCutaneous every 8 hours  pantoprazole  Injectable 40 milliGRAM(s) IV Push daily  piperacillin/tazobactam IVPB.. 3.375 Gram(s) IV Intermittent every 8 hours  polyethylene glycol 3350 17 Gram(s) Oral every 24 hours  primidone 50 milliGRAM(s) Oral daily    MEDICATIONS  (PRN):  acetaminophen     Tablet .. 650 milliGRAM(s) Oral every 6 hours PRN Temp greater or equal to 38C (100.4F), Mild Pain (1 - 3)  fentaNYL    Injectable 25 MICROGram(s) IV Push every 2 hours PRN Moderate Pain (4 - 6)        T(C): 38.2 (22 @ 12:54), Max: 38.2 (22 @ 08:11)  HR: 78 (22 @ 11:40) (72 - 101)  BP: 140/71 (22 @ 09:00) (51/31 - 160/77)  RR: 28 (22 @ 09:00) (21 - 35)  SpO2: 97% (22 @ 11:40) (85% - 100%)  Wt(kg): --    PHYSICAL EXAM:    CHEST/LUNG: Clear to percussion bilaterally; No rales, rhonchi, wheezing, or rubs  HEART: Regular rate and rhythm; No murmurs, rubs, or gallops  ABDOMEN: Soft, Nontender, Nondistended; Bowel sounds present  EXTREMITIES:  2+ Peripheral Pulses, No clubbing, cyanosis, or edema  SKIN: No rashes or lesions    CAPILLARY BLOOD GLUCOSE      POCT Blood Glucose.: 189 mg/dL (2022 12:11)                            9.3    7.55  )-----------( 123      ( 2022 05:34 )             28.4       CMP:   @ 05:34  SGPT 9  Albumin 1.7   Alk Phos 67   Anion Gap 4   SGOT 19   Total Bili 0.2   BUN 26   Calcium Total 9.1   CO2 31   Chloride 114   Creatinine 1.00   eGFR if AA --   eGFR if non AA --   Glucose 152   Potassium 3.7   Protein 5.6   Sodium 149      Thyroid Function Tests:      Diabetes Tests:       Radiology:

## 2022-06-23 NOTE — PROGRESS NOTE ADULT - SUBJECTIVE AND OBJECTIVE BOX
Patient seen and examined at bedside. Unable to obtain ROS as patient intubated.     T(C): 38.3 (06-23-22 @ 08:30), Max: 38.6 (06-23-22 @ 05:00)  HR: 81 (06-23-22 @ 07:37) (78 - 102)  BP: 127/71 (06-23-22 @ 07:00) (108/65 - 183/88)  RR: 35 (06-23-22 @ 07:00) (24 - 49)  SpO2: 93% (06-23-22 @ 07:37) (72% - 100%)  Wt(kg): --    Physical Exam:   GENERAL: sedated on ventilator, well groomed  HEENT: head NC/AT;  PERRLA, conjunctiva & sclera clear  NECK: supple, no JVD  RESPIRATORY: rhonchi b/l lung bases, no wheezing  CARDIOVASCULAR: S1&S2, RRR, no murmurs or gallops  ABDOMEN: soft, non-tender, non-distended, + Bowel sounds x4 quadrants, no guarding, rebound or rigidity. PEG site without surrounding drainage  MUSCULOSKELETAL:  no clubbing, cyanosis or edema of all 4 extremities  LYMPH: no cervical lymphadenopathy  VASCULAR: Radial pulses 2+ bilaterally, no varicose veins   SKIN: warm and dry, color normal  NEUROLOGIC: sedated  Psych: unable to assess mood or affect       Patient seen and examined at bedside. Unable to obtain ROS as patient intubated.     T(C): 38.3 (06-23-22 @ 08:30), Max: 38.6 (06-23-22 @ 05:00)  HR: 81 (06-23-22 @ 07:37) (78 - 102)  BP: 127/71 (06-23-22 @ 07:00) (108/65 - 183/88)  RR: 35 (06-23-22 @ 07:00) (24 - 49)  SpO2: 93% (06-23-22 @ 07:37) (72% - 100%)  Wt(kg): --    Physical Exam:   GENERAL: sedated on ventilator, well groomed  HEENT: head NC/AT;  PERRLA, conjunctiva & sclera clear, poor dentition  NECK: supple, no JVD  RESPIRATORY: rhonchi b/l lung bases, no wheezing  CARDIOVASCULAR: S1&S2, RRR, no murmurs or gallops  ABDOMEN: soft, non-tender, non-distended, + Bowel sounds x4 quadrants, no guarding, rebound or rigidity. PEG site without surrounding drainage  MUSCULOSKELETAL:  no clubbing, cyanosis or edema of all 4 extremities  LYMPH: no cervical lymphadenopathy  VASCULAR: Radial pulses 2+ bilaterally, no varicose veins   SKIN: warm and dry, color normal  NEUROLOGIC: sedated  Psych: unable to assess mood or affect

## 2022-06-23 NOTE — PROGRESS NOTE ADULT - SUBJECTIVE AND OBJECTIVE BOX
[facility]  Bernard Jenkins PA-C  [facility] [place] [address]  [phone]      INTERVAL HPI/OVERNIGHT EVENTS:  Pt s/e in ICU  Pt unable to provide hx 2/2 sedation and intubation  +peg, no reported issues    MEDICATIONS  (STANDING):  albuterol/ipratropium for Nebulization 3 milliLiter(s) Nebulizer every 6 hours  atorvastatin 80 milliGRAM(s) Oral at bedtime  carbidopa/levodopa  25/100 1 Tablet(s) Oral <User Schedule>  chlorhexidine 0.12% Liquid 15 milliLiter(s) Oral Mucosa every 12 hours  chlorhexidine 2% Cloths 1 Application(s) Topical <User Schedule>  citalopram 20 milliGRAM(s) Oral daily  dexMEDEtomidine Infusion 0.3 MICROgram(s)/kG/Hr (4.2 mL/Hr) IV Continuous <Continuous>  heparin   Injectable 5000 Unit(s) SubCutaneous every 8 hours  pantoprazole  Injectable 40 milliGRAM(s) IV Push daily  piperacillin/tazobactam IVPB.. 3.375 Gram(s) IV Intermittent every 8 hours  polyethylene glycol 3350 17 Gram(s) Oral every 24 hours  potassium phosphate IVPB 30 milliMole(s) IV Intermittent once  primidone 50 milliGRAM(s) Oral daily    MEDICATIONS  (PRN):  acetaminophen     Tablet .. 650 milliGRAM(s) Oral every 6 hours PRN Temp greater or equal to 38C (100.4F), Mild Pain (1 - 3)  fentaNYL    Injectable 25 MICROGram(s) IV Push every 2 hours PRN Moderate Pain (4 - 6)      Allergies  No Known Allergies    PHYSICAL EXAM:   Vital Signs:  Vital Signs Last 24 Hrs  T(C): 38.3 (23 Jun 2022 08:30), Max: 38.6 (23 Jun 2022 05:00)  T(F): 100.9 (23 Jun 2022 08:30), Max: 101.5 (23 Jun 2022 05:00)  HR: 96 (23 Jun 2022 10:15) (78 - 102)  BP: 73/53 (23 Jun 2022 10:06) (64/44 - 183/88)  BP(mean): 58 (23 Jun 2022 10:06) (50 - 126)  RR: 24 (23 Jun 2022 10:15) (24 - 49)  SpO2: 96% (23 Jun 2022 10:15) (72% - 100%)  Daily     Daily     GENERAL:  Appears stated age  ABDOMEN:  Soft, non-tender, non-distended  NEURO:  Sedated      LABS:                        9.4    12.07 )-----------( 141      ( 23 Jun 2022 05:44 )             27.9     06-23    143  |  105  |  22  ----------------------------<  200<H>  3.6   |  30  |  0.99    Ca    8.7      23 Jun 2022 05:44  Phos  2.2     06-23  Mg     1.8     06-23    TPro  5.8<L>  /  Alb  1.5<L>  /  TBili  0.3  /  DBili  x   /  AST  26  /  ALT  17  /  AlkPhos  81  06-23

## 2022-06-23 NOTE — PROGRESS NOTE ADULT - ASSESSMENT
76 year old male with history of prostate cancer, SCC of tongue s/p partial resection now with recurrence, parkinson's disease, hypertension, hyperlipidemia, recent CVA, and oropharyngeal dysphagia admitted for septic shock and acute hypoxic respiratory failure in the setting of b/l lower lobe growing e.coli PNA and UTI growing pseudomonas     Neuro:  - Tylenol prn   - Fentanyl prn  - Continue home Citalopran, Primidone and Sinemet for Parkinson's     CV:  - Hypotension 2/2 septic shock resolved though overnight patient noted to be hypotensive   - S/P 500ml of LR, 250ml Albumin x1   - Start Albumin 50ml Q6H   - s/p steroids     Pulm:  - Continue lung protective ventilation, titrate settings as tolerated  - Repeat chest x-ray with persistent infiltrates   - Duoneb Tx Q6H                   GI:  - S/P PEG tube 6/21/22   - Continue on tube feeds today   - IV Protonix 40mg qd for GI ppx    Renal:  - Cr/BUN 0.99/22  - Monitor I&Os and renal indices     Heme:  - Continue on Heparin SubQ   - Per prior Hospitalist note from 6/2, pt was on full dose AC for recent stroke and hypercoagulable state. Will hold off on full dose AC for now     ID:  - B/l lower lobe PNA and UTI  - Urine cx positive for Pseudomonas  - ET positive for E.Coli   - D/C Zosyn now stared on Meropenum   - F/U Repeat BC X 2 and ET culture     Endo:  - Hypercalcemia likely 2/2 malignancy  - PTH wnl, vit D,25,hydroxy 39.2 and vit D,1,25 dihydroxy 89.9  - F/u PTHrP   - Hyperatremia now resolved     Skin:  - Decubitus ulcer wound care following     Tubes/lines:  - Midline, PEG tube, banks

## 2022-06-23 NOTE — PROGRESS NOTE ADULT - SUBJECTIVE AND OBJECTIVE BOX
Patient is a 76y old  Male who presents with a chief complaint of Acute Hypoxic Respiratory Failure (2022 14:34)    HPI:  77 yo male with PMH of prostate ca in remission, tongue SCC s/p partial section last year now recurrence not fit for chemo, planned for immunotherapy, parkinson's disease, HTN, HLD, GERD, recent multiple admission for stroke like symptoms admitted for acute hypoxic respiratory failure requiring mechanical ventilation. Course further complicated by E.coli Pneumonia, Klebsiella UTI, metabolic encephalopathy, and ÁNGEL    Allergies: No Known Allergies    PAST MEDICAL & SURGICAL HISTORY:  Hypertension      Hyperlipidemia      GERD (gastroesophageal reflux disease)      Parkinson&#x27;s disease      Prostate cancer      Mitral valve prolapse      Malignant neoplasm of tongue, unspecified  s/p surgery and radiation discontinued       Throat cancer      History of vasectomy      Prostate cancer  s/p cyber knife      S/P partial glossectomy          FAMILY HISTORY:  FH: heart disease  father, brothers    FH: type 2 diabetes  mother      SOCIAL HISTORY:    Home Medications:    Review of Systems:  Unable to participate in ROS while on ventilator    T(F): 100.2 (22 @ 20:17), Max: 101.5 (22 @ 05:00)  HR: 88 (22 @ 20:10) (79 - 105)  BP: 98/55 (22 @ 19:00) (64/44 - 147/75)  RR: 24 (22 @ 19:00) (24 - 37)  SpO2: 98% (22 @ 20:10)  Wt(kg): --  Mode: AC/ CMV (Assist Control/ Continuous Mandatory Ventilation), RR (machine): 24, TV (machine): 440, FiO2: 40, PEEP: 5  CAPILLARY BLOOD GLUCOSE      POCT Blood Glucose.: 156 mg/dL (2022 17:39)    I&O's Summary    2022 07:01  -  2022 07:00  --------------------------------------------------------  IN: 2585 mL / OUT: 840 mL / NET: 1745 mL    2022 07:01  -  2022 23:19  --------------------------------------------------------  IN: 3143.3 mL / OUT: 467 mL / NET: 2676.3 mL        Physical Exam:     Gen: critically ill, intubated  Neuro: lethargic, arousable  CVS: +S1S2  Resp: CTA  Abd: soft, NT, ND, +PEG  Ext: warm,dry, no edema  Skin: well perfused    Meds:  meropenem  IVPB 1000 milliGRAM(s) IV Intermittent every 8 hours       atorvastatin 80 milliGRAM(s) Oral at bedtime     albuterol/ipratropium for Nebulization 3 milliLiter(s) Nebulizer every 6 hours     acetaminophen     Tablet .. 650 milliGRAM(s) Oral every 6 hours PRN  carbidopa/levodopa  25/100 1 Tablet(s) Oral <User Schedule>  citalopram 20 milliGRAM(s) Oral daily  fentaNYL    Injectable 25 MICROGram(s) IV Push every 2 hours PRN  primidone 50 milliGRAM(s) Oral daily        heparin   Injectable 5000 Unit(s) SubCutaneous every 8 hours     polyethylene glycol 3350 17 Gram(s) Oral every 24 hours        albumin human 25% IVPB 50 milliLiter(s) IV Intermittent every 6 hours        chlorhexidine 0.12% Liquid 15 milliLiter(s) Oral Mucosa every 12 hours  chlorhexidine 2% Cloths 1 Application(s) Topical <User Schedule>                              9.4    12.07 )-----------( 141      ( 2022 05:44 )             27.9           143  |  105  |  22  ----------------------------<  200<H>  3.6   |  30  |  0.99    Ca    8.7      2022 05:44  Phos  2.2     06-  Mg     1.8         TPro  5.8<L>  /  Alb  1.5<L>  /  TBili  0.3  /  DBili  x   /  AST  26  /  ALT  17  /  AlkPhos  81  06            Urinalysis Basic - ( 2022 13:50 )    Color: Yellow / Appearance: Slightly Turbid / S.020 / pH: x  Gluc: x / Ketone: Negative  / Bili: Negative / Urobili: Negative   Blood: x / Protein: 100 / Nitrite: Negative   Leuk Esterase: Moderate / RBC: 11-25 /HPF / WBC 11-25   Sq Epi: x / Non Sq Epi: Occasional / Bacteria: Occasional      ET Tube ET Tube --   Few polymorphonuclear leukocytes per low power field  No Squamous epithelial cells per low power field  Few Gram Negative Rods per oil power field  @ 13:50        ABG - ( 2022 05:43 )  pH, Arterial: 7.45  pH, Blood: x     /  pCO2: 45    /  pO2: 65    / HCO3: 31    / Base Excess: 7.3   /  SaO2: 96.3              Radiology:   < from: Xray Chest 1 View- PORTABLE-Urgent (Xray Chest 1 View- PORTABLE-Urgent .) (22 @ 12:20) >    ACC: [id number] EXAM:  XR CHEST PORTABLE URGENT 1V                          PROCEDURE DATE:  2022          INTERPRETATION:  AP semierect chest on 2022 at 12:04 PM.   Clinically patient has pneumonia.    Heart magnified by technique. Left loop recorder and moderate to severe   right thoracic curve again noted.    Significant infiltrations in the left mid lower lung field and a right   base are again noted.    Endotracheal tube remains.    NG tube present on  is been removed.    IMPRESSION: Removal of NG tube. Persistent infiltrates.    --- End of Report ---            MEGAN CANNON MD; Attending Radiologist  This document has been electronically signed. 2022  1:43PM    < end of copied text >    Problems  -Acute hypoxic respiratory failure  -Failure to Thrive  -E.Coli Pneumonia  -Klebsiella UTI  -Hypernatremia      Assessment/Plan:    -Holding continous sedation at this time to optimize mental status for ventilator liberation  -Home Sinemet BID  -Pressors as needed to maintain MAP >65  -Acute hypoxic respiratory failure; failed SBT earlier and placed back on full mechanical ventilation. Actively titrating for o2 sat >90%. Will re-attempt SBT in AM  -PEG tube feeds w/ Vital. Protonix for stress prophylaxis  -Supplemental free water via PEG tube for hypernatremia  -Respiratory cx growing E.coli. Urine cx growing Pseudomonas. Abx coverage w/ Meropenem  -Heparin SC for DVT prophylaxis  -Tight glucose control    Critical care time: 37 minutes including time spent reviewing chart, ordering tests/labs, discussing with interdisciplinary team. Not including time spent performing procedures

## 2022-06-23 NOTE — PROGRESS NOTE ADULT - SUBJECTIVE AND OBJECTIVE BOX
Interval Events:   Patient seen and examined at bedside. Laying in bed and appears to be comfortable. Overnight noted to be febrile at 101.1, and noted to be hypotensive s/p 500mL LR  x1, Albumin 250ml infuse x1, Albumin 50ml infusion every 6 hrs. Zosyn switched to meropenum. Continues to fail SBT, and CPAP. Unable to obtain history given mental status.     Review of Systems:  Unable to obtain     ICU Vital Signs Last 24 Hrs  T(C): 38 (2022 11:57), Max: 38.6 (2022 05:00)  T(F): 100.4 (2022 11:57), Max: 101.5 (2022 05:00)  HR: 101 (2022 11:00) (79 - 102)  BP: 97/62 (2022 11:00) (64/44 - 146/74)  BP(mean): 75 (2022 11:00) (50 - 104)  ABP: --  ABP(mean): --  RR: 26 (2022 11:00) (24 - 37)  SpO2: 100% (2022 11:00) (88% - 100%)      Mode: CPAP with PS, FiO2: 40, PEEP: 5, PS: 15  22 @ 07:01  -  22 @ 07:00  --------------------------------------------------------  IN: 2585 mL / OUT: 840 mL / NET: 1745 mL    22 @ 07:01  -  22 @ 14:38  --------------------------------------------------------  IN: 500 mL / OUT: 248 mL / NET: 252 mL        CAPILLARY BLOOD GLUCOSE      POCT Blood Glucose.: 200 mg/dL (2022 12:06)      I&O's Summary    2022 07:01  -  2022 07:00  --------------------------------------------------------  IN: 2585 mL / OUT: 840 mL / NET: 1745 mL    2022 07:01  -  2022 14:38  --------------------------------------------------------  IN: 500 mL / OUT: 248 mL / NET: 252 mL        Physical Exam:   Gen: Unresponsive and intubated   Neuro: Unresponsive, not following commands, eye closed   Resp: Scant rhochi b/l   CVS: S1, S2, No rubs, gallops or murmurs   Abd: Soft and non-tender, PEG tube with no surrounding drainage   Ext: No edema, no cyanosis     Meds:  meropenem  IVPB IV Intermittent      atorvastatin Oral    albuterol/ipratropium for Nebulization Nebulizer    acetaminophen     Tablet .. Oral PRN  carbidopa/levodopa  25/100 Oral  citalopram Oral  dexMEDEtomidine Infusion IV Continuous  fentaNYL    Injectable IV Push PRN  primidone Oral      heparin   Injectable SubCutaneous    polyethylene glycol 3350 Oral      albumin human 25% IVPB IV Intermittent      chlorhexidine 0.12% Liquid Oral Mucosa  chlorhexidine 2% Cloths Topical                              9.4    12.07 )-----------( 141      ( 2022 05:44 )             27.9           143  |  105  |  22  ----------------------------<  200<H>  3.6   |  30  |  0.99    Ca    8.7      2022 05:44  Phos  2.2       Mg     1.8         TPro  5.8<L>  /  Alb  1.5<L>  /  TBili  0.3  /  DBili  x   /  AST  26  /  ALT  17  /  AlkPhos  81              Urinalysis Basic - ( 2022 13:50 )    Color: Yellow / Appearance: Slightly Turbid / S.020 / pH: x  Gluc: x / Ketone: Negative  / Bili: Negative / Urobili: Negative   Blood: x / Protein: 100 / Nitrite: Negative   Leuk Esterase: Moderate / RBC: 11-25 /HPF / WBC 11-25   Sq Epi: x / Non Sq Epi: Occasional / Bacteria: Occasional      ET Tube ET Tube   Moderate Escherichia coli  Normal Respiratory Richa present   Moderate polymorphonuclear leukocytes per low power field  Rare Squamous epithelial cells per low power field  Few Gram Positive Rods per oil power field  Rare Yeast per oil power field  @ 20:58              Radiology: < from: Xray Chest 1 View- PORTABLE-Urgent (Xray Chest 1 View- PORTABLE-Urgent .) (22 @ 12:20) >  ACC: [id number] EXAM:  XR CHEST PORTABLE URGENT 1V                          PROCEDURE DATE:  2022          INTERPRETATION:  AP semierect chest on 2022 at 12:04 PM.   Clinically patient has pneumonia.    Heart magnified by technique. Left loop recorder and moderate to severe   right thoracic curve again noted.    Significant infiltrations in the left mid lower lung field and a right   base are again noted.    Endotracheal tube remains.    NG tube present on  is been removed.    IMPRESSION: Removal of NG tube. Persistent infiltrates.    --- End of Report ---            MEGAN CANNON MD; Attending Radiologist  This document has been electronically signed. 2022  1:43PM    < end of copied text >      Bedside Ultrasound:    Tubes/Lines:  Midline,PEG, NGT, banks       GLOBAL ISSUE/BEST PRACTICE:  Analgesia: Y   Sedation: N   HOB elevation: Y  Stress ulcer prophylaxis: Y   VTE prophylaxis: Y   Glycemic control: N   Nutrition: Y     CODE STATUS: Full code

## 2022-06-23 NOTE — PROGRESS NOTE ADULT - ASSESSMENT
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     Acute hypoxic resp failure Secondary to b/lpneumonia   Sepsis with hypotension and shock   Vent support   IV steroids and pressor mgmt per ICU team  Continue Zosyn  Blood cultures- show NGTD  ETT culture growing E.Coli  Urine Culture growing pseudomonas    ÁNGEL   Renal fxn improved  -monitor intake and output    -Hypercalcemia   Resolved.     Hypophosphatemia: replete PO4 per ICU team    Parkinsonism   - Continue home Sinemet, Primidone and Celexa    Need for prophylactic measure    IV protonix for GI protection  Heparin subcut q8                  76 year old male with history of prostate cancer, SCC of tongue s/p partial resection now with recurrence, parkinson's disease, hypertension, hyperlipidemia, recent CVA, and oropharyngeal dysphagia admitted for septic shock and acute hypoxic respiratory failure in the setting of b/l lower lobe PNA and UTI.     Acute hypoxic resp failure Secondary to b/lpneumonia   Sepsis with hypotension and shock   Vent support   IV steroids and pressor mgmt per ICU team  Continue Zosyn  Blood cultures- show NGTD  ETT culture growing E.Coli  Urine Culture growing pseudomonas    ÁNGEL   Renal fxn improved  -monitor intake and output    -Hypercalcemia   Improved. Mgmt per Endocrine    Hypophosphatemia: replete PO4 per ICU team    Parkinsonism   - Continue home Sinemet, Primidone and Celexa    Need for prophylactic measure    IV protonix for GI protection  Heparin subcut q8                  76 year old male with history of prostate cancer, SCC of tongue s/p partial resection now with recurrence, parkinson's disease, hypertension, hyperlipidemia, recent CVA, and oropharyngeal dysphagia admitted for septic shock and acute hypoxic respiratory failure in the setting of b/l lower lobe PNA and UTI.     Acute hypoxic resp failure Secondary to b/l pneumonia   Sepsis with hypotension and shock   Vent support   IV steroids and pressor mgmt per ICU team  Continue Zosyn  Blood cultures- show NGTD  ETT culture growing E.Coli  Urine Culture growing pseudomonas    ÁNGEL   Renal fxn improved  -monitor intake and output    -Hypercalcemia   Improved. Mgmt per Endocrine    Hypophosphatemia: replete PO4 per ICU team    Parkinsonism   - Continue home Sinemet, Primidone and Celexa    Need for prophylactic measure    IV protonix for GI protection  Heparin subcut q8

## 2022-06-23 NOTE — PROGRESS NOTE ADULT - ATTENDING COMMENTS
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 coli pneumonia (likely aspiration) + Pseudomonas UTI, resulting in acute metabolic encephalopathy, lactic acidosis, septic shock, and ÁNGEL. Now with prolonged respiratory failure and new fevers worrisome for VAP.    1. Neuro: keep off sedation. Continue home sinemet, primidone, and citalopram  2. CV: resolved shock, remains off pressors. Taper off hydrocortisone. Hold enalapril given resolving ÁNGEL. Continue statin  3. Pulm: continue lung protective ventilation. Tolerated pressure support 15/5 for about 1 hour but then with rapid shallow breathing. Daily SBT as tolerates. Continue airway clearance therapy with albuterol, ipratropium, and hypertonic saline  4. GI: continue Vital feeds via PEG. Continue PPI as per home regimen  5. Renal: ÁNGEL 2/2 ATN now resolving. Strict I/O's, close monitoring of kidney function and lytes. Continue free water 350 mL q4h. Replete low phos  6. ID: E. coli pneumonia and Pseudomonas UTI, now with recurrent fevers and purulent tracheal secretions. D/c Zosyn and start Meropenem. Check blood and sputum cultures  7. Heme: improving thrombocytopenia, possibly due to sepsis vs. drug-induced. HIT negative. Continue HSQ for DVT ppx. Will eventually need to resume therapeutic enoxaparin given recent CVA and suspected hypercoagulable state  8. Endo: hypercalcemia likely related to malignancy, f/up PTHrP. Interestingly the 1,25-diOH vitamin D level is elevated, although patient does not have history of granulomatous inflammation. S/p pamidronic acid 6/20. Hold off on calcitonin. Hypercalcemia improved  9. Skin: No lines, keep banks for strict I/O's  10. Dispo: DNR, discussed with daughter Viki at bedside  CC time spent: 35 min

## 2022-06-24 NOTE — PROGRESS NOTE ADULT - ASSESSMENT
76M with a significant PMH Prostate cancer, SCC of tongue s/p partial resection, parkinson's, HTN, HLD, recent CVA, and dysphagia a/w acute hypoxemic RF, septic shock 2/2 E. coli PNA/pseudomonas UTI and ÁNGEL. Remains intubated in ICU 2/2 acute metabolic encephalopathy, failure to liberate from vent and possible new VAP.    Acute hypoxemic RF  E. Coli PNA  Pseudomonas UTI  ÁNGEL  Metabolic Encephalopathy    Neuro: Continue off sedation;  C/w sinemet, primidone, and citalopram; Fentanyl PRN pain  CV: Septic shock s/p vasopressors/ SDS. C/w  statin  Pulm: Remains intubated with failed PS/CPAP trials 2/2 hypercapnia c/w lung protective ventilation. SBT as approrpirate. C/w albuterol, ipratropium, and hypertonic saline as patient with thick secretions  GI: s/p PEG placement; tolerating TF; PPI  Renal: ÁNGEL 2/2 ATN Improved. Strict I/O's, Replete electrolytes PRN. c/w free water 350 mL q4h  ID: E. coli pneumonia and Pseudomonas UTI, now with recurrent low grade fevers. Blood cultures NGTD; On alex; awaiting repeat Cx  Heme: HSQ for DVT ppx.   Dispo: Critically ill req ICU level of care; DNR

## 2022-06-24 NOTE — PROGRESS NOTE ADULT - SUBJECTIVE AND OBJECTIVE BOX
Patient seen and examined at bedside. No acute events overnight. Unable to obtain hx from patient due to patient being intubated.     T(C): 38.1 (06-24-22 @ 05:03), Max: 38.3 (06-23-22 @ 08:30)  HR: 84 (06-24-22 @ 07:00) (79 - 105)  BP: 142/76 (06-24-22 @ 07:00) (64/44 - 147/75)  RR: 27 (06-24-22 @ 07:00) (23 - 30)  SpO2: 97% (06-24-22 @ 07:00) (88% - 100%)  Wt(kg): --    Physical Exam:   GENERAL: sedated on ventilator, well groomed  HEENT: head NC/AT; conjunctiva & sclera clear, poor dentition  NECK: supple, no JVD  RESPIRATORY: decreased breath sounds at lung bases, no rales, no wheezing  CARDIOVASCULAR: S1&S2, RRR, no murmurs or gallops  ABDOMEN: soft, non-tender, non-distended, + Bowel sounds x4 quadrants, no guarding, rebound or rigidity. PEG site without surrounding drainage  MUSCULOSKELETAL:  no clubbing, cyanosis or edema of all 4 extremities  LYMPH: no cervical lymphadenopathy  VASCULAR: Radial pulses 2+ bilaterally, no varicose veins   SKIN: warm and dry, color normal  NEUROLOGIC: sedated  Psych: unable to assess mood or affect

## 2022-06-24 NOTE — PROGRESS NOTE ADULT - SUBJECTIVE AND OBJECTIVE BOX
Significant recent/past 24 hr events:  - Failed CPAP/PS trials due to hypercarbic; Remains intubated  - Low grade fever; antibiotics escalated to alex; Bcx sent    Subjective:    Review of Systems         [ ] A ten-point review of systems was otherwise negative except as noted.  [ x] Due to altered mental status/intubation, subjective information were not able to be obtained from the patient. History was obtained, to the extent possible, from review of the chart and collateral sources of information.      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 As per the facility note, EMS was called emergently for increased respiratory difficulties this morning, gurgling, AMS, and significant change from baseline. Pt was evaluated yesterday for PEG placement due to dysphagia, scheduled for placement on . Unable to perform ROS as per patient mental status and he is intubated and sedated.     EMS Vitals:  Temp: NA BP 72/52  RR 24 Unable to obtain SpO2 reading, placed on 15L NRB    In the ED:  T 102F Rectal,  /80 RR Unknown SpO2 79% on NRB  Hgb 11.6 INR 1.73 Na 149 K 3.1 Cl 116  Cr 2.5 Glucose 125 Ca 12.5  Albumin 2.2 (corrected calcium 13.9) Lactate 4.8  AB.34/43/52/23  Vanc x1, Zosyn x1, 1L NS Bolus x1, 20mg Etomidate, 100mg Succ, 50mcg Fentanyl, 1g IV Tylenol, Levophed gtt  Vent Settings: 350/14/80/5 (2022 13:40)    PAST MEDICAL & SURGICAL HISTORY:  Hypertension      Hyperlipidemia      GERD (gastroesophageal reflux disease)      Parkinson&#x27;s disease      Prostate cancer      Mitral valve prolapse      Malignant neoplasm of tongue, unspecified  s/p surgery and radiation discontinued       Throat cancer      History of vasectomy      Prostate cancer  s/p cyber knife      S/P partial glossectomy          FAMILY HISTORY:  FH: heart disease  father, brothers    FH: type 2 diabetes  mother        Vitals   ICU Vital Signs Last 24 Hrs  T(C): 37.6 (2022 15:30), Max: 38.1 (2022 05:03)  T(F): 99.7 (2022 15:30), Max: 100.6 (2022 05:03)  HR: 97 (2022 19:00) (60 - 98)  BP: 140/68 (2022 19:00) (106/60 - 145/68)  BP(mean): 98 (2022 19:00) (77 - 102)  ABP: --  ABP(mean): --  RR: 33 (2022 19:00) (23 - 33)  SpO2: 95% (2022 19:00) (93% - 99%)      Physical Exam:   Constitutional: Chronically ill appearing  HEENT: PERRLA, EOMI, no drainage or redness  Neck: supple,  No JVD, Trachea midline  Respiratory: Breath Sounds coarse bilaterally to auscultation, no accessory muscle use noted  Cardiovascular: Regular rate, regular rhythm, normal S1, S2; no murmurs or rub  Gastrointestinal: Soft, non-tender, non distended, no hepatosplenomegaly, normal bowel sounds  Extremities: SUH x 4, no peripheral edema, no cyanosis, no clubbing   Vascular: Equal and normal pulses: 2+ peripheral pulses throughout  Neurological: Intubated and altered  Psychiatric: calm  Musculoskeletal: No joint swelling or deformity; no limitation of movement  Skin: warm, dry, well perfused, no rashes    VENT SETTINGS   Mode: CPAP with PS  FiO2: 40  PEEP: 5  PS: 12  MAP: 10  PIP: 18    ABG - ( 2022 05:43 )  pH, Arterial: 7.45  pH, Blood: x     /  pCO2: 45    /  pO2: 65    / HCO3: 31    / Base Excess: 7.3   /  SaO2: 96.3                I&O's Detail    2022 07:01  -  2022 07:00  --------------------------------------------------------  IN:    Enteral Tube Flush: 160 mL    Enteral Tube Flush: 2100 mL    IV PiggyBack: 100 mL    IV PiggyBack: 666.6 mL    IV PiggyBack: 350 mL    Lactated Ringers Bolus: 500 mL    Vital1.5: 1124 mL  Total IN: 5000.6 mL    OUT:    Indwelling Catheter - Urethral (mL): 1052 mL  Total OUT: 1052 mL    Total NET: 3948.6 mL      2022 07:01  -  2022 19:28  --------------------------------------------------------  IN:    Enteral Tube Flush: 1050 mL    Enteral Tube Flush: 50 mL    Vital1.5: 780 mL  Total IN: 1880 mL    OUT:    Indwelling Catheter - Urethral (mL): 996 mL  Total OUT: 996 mL    Total NET: 884 mL          LABS                        8.0    13.07 )-----------( 138      ( 2022 05:45 )             24.0     06-24    144  |  108  |  29<H>  ----------------------------<  162<H>  3.9   |  31  |  1.10    Ca    8.2<L>      2022 05:45  Phos  2.8     -  Mg     2.0     -24    TPro  5.6<L>  /  Alb  1.7<L>  /  TBili  0.3  /  DBili  x   /  AST  40<H>  /  ALT  18  /  AlkPhos  82  06-24    LIVER FUNCTIONS - ( 2022 05:45 )  Alb: 1.7 g/dL / Pro: 5.6 g/dL / ALK PHOS: 82 U/L / ALT: 18 U/L / AST: 40 U/L / GGT: x                   Urinalysis Basic - ( 2022 13:50 )    Color: Yellow / Appearance: Slightly Turbid / S.020 / pH: x  Gluc: x / Ketone: Negative  / Bili: Negative / Urobili: Negative   Blood: x / Protein: 100 / Nitrite: Negative   Leuk Esterase: Moderate / RBC: 11-25 /HPF / WBC 11-25   Sq Epi: x / Non Sq Epi: Occasional / Bacteria: Occasional      POCT Blood Glucose.: 156 mg/dL *H* (22 @ 17:16)  POCT Blood Glucose.: 177 mg/dL *H* (22 @ 11:14)        MEDICATIONS  (STANDING):  albuterol/ipratropium for Nebulization 3 milliLiter(s) Nebulizer every 6 hours  atorvastatin 80 milliGRAM(s) Oral at bedtime  carbidopa/levodopa  25/100 1 Tablet(s) Oral <User Schedule>  chlorhexidine 0.12% Liquid 15 milliLiter(s) Oral Mucosa every 12 hours  chlorhexidine 2% Cloths 1 Application(s) Topical <User Schedule>  citalopram 20 milliGRAM(s) Oral daily  heparin   Injectable 5000 Unit(s) SubCutaneous every 8 hours  meropenem  IVPB 1000 milliGRAM(s) IV Intermittent every 8 hours  polyethylene glycol 3350 17 Gram(s) Oral every 24 hours  primidone 50 milliGRAM(s) Oral daily  tobramycin for Nebulization 300 milliGRAM(s) Inhalation every 12 hours    MEDICATIONS  (PRN):  acetaminophen     Tablet .. 650 milliGRAM(s) Oral every 6 hours PRN Temp greater or equal to 38C (100.4F), Mild Pain (1 - 3)  fentaNYL    Injectable 25 MICROGram(s) IV Push every 2 hours PRN Moderate Pain (4 - 6)      Allergies:  No Known Allergies        CRITICAL CARE TIME SPENT: 35 minutes of critical care time spent providing medical care for patient's acute illness/conditions that impairs at least one vital organ system and/or poses a high risk of imminent or life threatening deterioration in the patient's condition. It includes time spent evaluating and treating the patient's acute illness as well as time spent reviewing labs, radiology, discussing goals of care with patient and/or patient's family, and discussing the case with a multidisciplinary team, in an effort to prevent further life threatening deterioration or end organ damage. This time is independent of any procedures performed.

## 2022-06-24 NOTE — PROGRESS NOTE ADULT - ASSESSMENT
76 year old male with history of prostate cancer, SCC of tongue s/p partial resection now with recurrence, parkinson's disease, hypertension, hyperlipidemia, recent CVA, and oropharyngeal dysphagia admitted for septic shock and acute hypoxic respiratory failure in the setting of b/l lower lobe growing e.coli PNA and UTI growing pseudomonas     Neuro:  - Tylenol prn   - Fentanyl prn  - Continue home Citalopran, Primidone and Sinemet for Parkinson's     CV:  - Hypotension 2/2 septic shock resolved though overnight patient noted to be hypotensive   - S/P 500ml of LR, 250ml Albumin x1   - Start Albumin 50ml Q6H   - s/p steroids     Pulm:  - Continue lung protective ventilation, titrate settings as tolerated  - Repeat chest x-ray with persistent infiltrates   - Duoneb Tx Q6H                   GI:  - S/P PEG tube 6/21/22   - Continue on tube feeds today   - IV Protonix 40mg qd for GI ppx    Renal:  - Cr/BUN 0.99/22  - Monitor I&Os and renal indices     Heme:  - Continue on Heparin SubQ   - Per prior Hospitalist note from 6/2, pt was on full dose AC for recent stroke and hypercoagulable state. Will hold off on full dose AC for now     ID:  - B/l lower lobe PNA and UTI  - Urine cx positive for Pseudomonas  - ET positive for E.Coli   - D/C Zosyn now stared on Meropenum   - F/U Repeat BC X 2 and ET culture     Endo:  - Hypercalcemia likely 2/2 malignancy  - PTH wnl, vit D,25,hydroxy 39.2 and vit D,1,25 dihydroxy 89.9  - F/u PTHrP   - Hyperatremia now resolved     Skin:  - Decubitus ulcer wound care following     Tubes/lines:  - Midline, PEG tube, banks   75yo male with PMH of prostate cancer, SCC of tongue s/p partial resection now with recurrence, parkinson's disease, hypertension, hyperlipidemia, recent CVA, and oropharyngeal dysphagia admitted for septic shock and acute hypoxic respiratory failure in the setting of b/l lower lobe E. coli pneumonia and Pseudomonas UTI.    Neuro:  - Encephalopathy: febrile overnight ?septic encephalopathy vs toxic encephalopathy from prolonged sedative use vs metabolic vs hypercapnic encephalopathy  - Check ammonia in AM  - Repeat ABG in AM to rule out hypercapnia  - Tylenol PRN  - Fentanyl PRN (has not used since 6/21)  - Continue home Citalopran, Primidone and Sinemet for Parkinson's     CV:  - Hypotension 2/2 septic shock: improving  - Hold albumin today s/p steroids    Pulm:  - PS trial today, tolerating  - Patient is still not waking up off sedatives, hopeful extubation once more awake and following commands  - Duoneb q6h                  GI:  - NPO with Vital Tube feeds  - PEG tube placed 6/21/22   - IV Protonix 40mg qd for GI ppx    Renal:  - Cr stable at 1.1 today  - Adequate urine output overnight, maintain banks   - Continue Free water 350mL q4h  - Monitor I&Os and renal indices     Heme:  - Continue on Heparin SubQ   - Per prior Hospitalist note from 6/2, pt was on full dose AC for recent stroke and hypercoagulable state, holding FD AC    ID:  - B/l lower lobe PNA and UTI  - Urine cx positive for Pseudomonas  - ET positive for E.Coli   - Continue meropenem  - ET sputum culture growing GNR, start tobramycin  - Repeat blood cultures NGTD    Endo:  - PTH wnl, vit D,25,hydroxy 39.2 and vit D,1,25 dihydroxy 89.9, PTHrP <2.2  - Hypercalcemia likely inflammatory  - Hypernatremia now resolved   - Fingersticks q6h, blood glucose goal 100-180 in ICU    Skin:  - Decubitus ulcer wound care following     Tubes/lines:  - Midline, PEG tube, banks

## 2022-06-24 NOTE — CHART NOTE - NSCHARTNOTEFT_GEN_A_CORE
Nutrition Follow Up Note    Seen for ICU follow up     Source: EMR, RN     Chart reviewed, events noted.     Per chart, 76 year old male with history of prostate cancer, SCC of tongue s/p partial resection now with recurrence, parkinson's disease, hypertension, hyperlipidemia, recent CVA, and oropharyngeal dysphagia admitted for septic shock and acute hypoxic respiratory failure in the setting of b/l lower lobe growing e.coli PNA and UTI growing pseudomonas.    Diet :  Diet, NPO with Tube Feed:   Tube Feeding Modality: Nasogastric  Vital 1.5 Gasper  Total Volume for 24 Hours (mL): 1300  Continuous  Starting Tube Feed Rate {mL per Hour}: 20  Increase Tube Feed Rate by (mL): 15     Every 8 hours  Until Goal Tube Feed Rate (mL per Hour): 65  Tube Feed Duration (in Hours): 20  Tube Feed Start Time: 06:00  Free Water Flush  Pump   Rate (mL per Hour): 50   Frequency: Every Hour    Duration (Hours): 20    Start Time: 06:00 (06-18-22 @ 10:28)      Nutrition Events:   -Enteral Nutrition: TF running at goal rate; Pump Study: 1299kcal x 24hrs (99.9% of goal), 1806kcal x 48hrs (was titrating towards goal)   -GI: PEG placed 6/21; last BM documented 6/22; bowel regimen noted (Miralax)   -Renal: hypophosphatemic; NaCl and albumin ordered   -Resp: acute resp failure 2/2 PNA  -CV: hypotensive overnight; monitor use of pressors         Pertinent Medications:  MEDICATIONS  (STANDING):  albuterol/ipratropium for Nebulization 3 milliLiter(s) Nebulizer every 6 hours  atorvastatin 80 milliGRAM(s) Oral at bedtime  carbidopa/levodopa  25/100 1 Tablet(s) Oral <User Schedule>  chlorhexidine 0.12% Liquid 15 milliLiter(s) Oral Mucosa every 12 hours  chlorhexidine 2% Cloths 1 Application(s) Topical <User Schedule>  citalopram 20 milliGRAM(s) Oral daily  heparin   Injectable 5000 Unit(s) SubCutaneous every 8 hours  meropenem  IVPB 1000 milliGRAM(s) IV Intermittent every 8 hours  pantoprazole    Tablet 40 milliGRAM(s) Oral before breakfast  polyethylene glycol 3350 17 Gram(s) Oral every 24 hours  primidone 50 milliGRAM(s) Oral daily  tobramycin for Nebulization 300 milliGRAM(s) Inhalation every 12 hours    MEDICATIONS  (PRN):  acetaminophen     Tablet .. 650 milliGRAM(s) Oral every 6 hours PRN Temp greater or equal to 38C (100.4F), Mild Pain (1 - 3)  fentaNYL    Injectable 25 MICROGram(s) IV Push every 2 hours PRN Moderate Pain (4 - 6)        Pertinent Labs:  06-24 Na144 mmol/L Glu 162 mg/dL<H> K+ 3.9 mmol/L Cr  1.10 mg/dL BUN 29 mg/dL<H> 06-24 Phos 2.8 mg/dL 06-24 Alb 1.7 g/dL<L> 05-29 Chol 148 mg/dL LDL --    HDL 45 mg/dL Trig 70 mg/dL     CAPILLARY BLOOD GLUCOSE      POCT Blood Glucose.: 156 mg/dL (23 Jun 2022 17:39)  POCT Blood Glucose.: 200 mg/dL (23 Jun 2022 12:06)       Pressure Injury per chart:   -Buttock: suspected DTI  -B/L heel: suspected DTI    Edema per chart:   -2+ generalized   -3+ L/R foot     Estimated Needs: based on 130 pounds   [x] no change since previous assessment  -Energy: 30-35kcal/kg (1773-2068kcal/day)  -Protein: 1.2-1.4g/kg (71-83g/day)       Previous Nutrition Diagnosis: malnutrition; increased nutrient need    Nutrition Diagnosis is: ongoing      New Nutrition Diagnosis: N/A    Interventions and Recommendations  1) Continue Vital 1.5 @ 65mL x 20hrs     Monitoring and Evaluation:     [X] PO intake [X] Tolerance to diet prescription [X] weight trends [x] skin integrity     RD remains available and will follow up per protocol. Nutrition Follow Up Note    Seen for ICU follow up     Source: EMR, RN     Chart reviewed, events noted.     Per chart, 76 year old male with history of prostate cancer, SCC of tongue s/p partial resection now with recurrence, parkinson's disease, hypertension, hyperlipidemia, recent CVA, and oropharyngeal dysphagia admitted for septic shock and acute hypoxic respiratory failure in the setting of b/l lower lobe growing e.coli PNA and UTI growing pseudomonas.    Diet :  Diet, NPO with Tube Feed:   Tube Feeding Modality: Nasogastric  Vital 1.5 Gasper  Total Volume for 24 Hours (mL): 1300  Continuous  Starting Tube Feed Rate {mL per Hour}: 20  Increase Tube Feed Rate by (mL): 15     Every 8 hours  Until Goal Tube Feed Rate (mL per Hour): 65  Tube Feed Duration (in Hours): 20  Tube Feed Start Time: 06:00  Free Water Flush  Pump   Rate (mL per Hour): 50   Frequency: Every Hour    Duration (Hours): 20    Start Time: 06:00 (06-18-22 @ 10:28)      Nutrition Events:   -Enteral Nutrition: TF running at goal rate; Pump Study: 1299kcal x 24hrs (99.9% of goal), 1806kcal x 48hrs (was titrating towards goal)   -GI: PEG placed 6/21; last BM documented 6/22; bowel regimen noted (Miralax)   -Renal: hypophosphatemic; NaCl and albumin ordered   -Resp: acute resp failure 2/2 PNA; intubated and on vent support   -CV: hypotensive overnight; monitor use of pressors         Pertinent Medications:  MEDICATIONS  (STANDING):  albuterol/ipratropium for Nebulization 3 milliLiter(s) Nebulizer every 6 hours  atorvastatin 80 milliGRAM(s) Oral at bedtime  carbidopa/levodopa  25/100 1 Tablet(s) Oral <User Schedule>  chlorhexidine 0.12% Liquid 15 milliLiter(s) Oral Mucosa every 12 hours  chlorhexidine 2% Cloths 1 Application(s) Topical <User Schedule>  citalopram 20 milliGRAM(s) Oral daily  heparin   Injectable 5000 Unit(s) SubCutaneous every 8 hours  meropenem  IVPB 1000 milliGRAM(s) IV Intermittent every 8 hours  pantoprazole    Tablet 40 milliGRAM(s) Oral before breakfast  polyethylene glycol 3350 17 Gram(s) Oral every 24 hours  primidone 50 milliGRAM(s) Oral daily  tobramycin for Nebulization 300 milliGRAM(s) Inhalation every 12 hours    MEDICATIONS  (PRN):  acetaminophen     Tablet .. 650 milliGRAM(s) Oral every 6 hours PRN Temp greater or equal to 38C (100.4F), Mild Pain (1 - 3)  fentaNYL    Injectable 25 MICROGram(s) IV Push every 2 hours PRN Moderate Pain (4 - 6)        Pertinent Labs:  06-24 Na144 mmol/L Glu 162 mg/dL<H> K+ 3.9 mmol/L Cr  1.10 mg/dL BUN 29 mg/dL<H> 06-24 Phos 2.8 mg/dL 06-24 Alb 1.7 g/dL<L> 05-29 Chol 148 mg/dL LDL --    HDL 45 mg/dL Trig 70 mg/dL     CAPILLARY BLOOD GLUCOSE      POCT Blood Glucose.: 156 mg/dL (23 Jun 2022 17:39)  POCT Blood Glucose.: 200 mg/dL (23 Jun 2022 12:06)       Pressure Injury per chart:   -Buttock: suspected DTI  -B/L heel: suspected DTI    Edema per chart:   -2+ generalized   -3+ L/R foot     Estimated Needs: based on 130 pounds   [x] no change since previous assessment  -Energy: 30-35kcal/kg (1773-2068kcal/day)  -Protein: 1.2-1.4g/kg (71-83g/day)       Previous Nutrition Diagnosis: malnutrition; increased nutrient need    Nutrition Diagnosis is: ongoing      New Nutrition Diagnosis: N/A    Interventions and Recommendations  1) Continue Vital 1.5 @ 65mL x 20hrs     Monitoring and Evaluation:     [X] PO intake [X] Tolerance to diet prescription [X] weight trends [x] skin integrity     RD remains available and will follow up per protocol.

## 2022-06-24 NOTE — PROGRESS NOTE ADULT - SUBJECTIVE AND OBJECTIVE BOX
Patient is a 76y old  Male who presents with a chief complaint of Acute Hypoxic Respiratory Failure (2022 07:54)    24 hour events: Febrile to 100.6F overnight.     REVIEW OF SYSTEMS  unable to obtain as patient is intubated    T(F): 100.6 (22 @ 05:03), Max: 100.6 (22 @ 05:03)  HR: 60 (22 @ 08:02) (60 - 105)  BP: 142/76 (22 @ 07:00) (64/44 - 147/75)  RR: 27 (22 @ 07:00) (23 - 30)  SpO2: 97% (22 @ 08:02) (88% - 100%)  Wt(kg): --    Mode: CPAP with PS, FiO2: 40, PEEP: 5, PS: 15        I&O's Summary     @ 07:01  -   @ 07:00  --------------------------------------------------------  IN: 5000.6 mL / OUT: 1052 mL / NET: 3948.6 mL      PHYSICAL EXAM  General: ill appearing male  CNS: intubated, off sedation. Not following commands  HEENT: ISHAN  Resp: scattered rhonchi  CVS: RRR, no murmurs  Abd: soft, NT/ND  Ext: no edema or cyanosis  Skin: warm and well perfused    MEDICATIONS  meropenem  IVPB IV Intermittent  tobramycin for Nebulization Inhalation      atorvastatin Oral    albuterol/ipratropium for Nebulization Nebulizer    acetaminophen     Tablet .. Oral PRN  carbidopa/levodopa  25/100 Oral  citalopram Oral  fentaNYL    Injectable IV Push PRN  primidone Oral      heparin   Injectable SubCutaneous    pantoprazole    Tablet Oral  polyethylene glycol 3350 Oral          chlorhexidine 0.12% Liquid Oral Mucosa  chlorhexidine 2% Cloths Topical                            8.0    13.07 )-----------( 138      ( 2022 05:45 )             24.0       06-    144  |  108  |  29<H>  ----------------------------<  162<H>  3.9   |  31  |  1.10    Ca    8.2<L>      2022 05:45  Phos  2.8     06-24  Mg     2.0     06-24    TPro  5.6<L>  /  Alb  1.7<L>  /  TBili  0.3  /  DBili  x   /  AST  40<H>  /  ALT  18  /  AlkPhos  82  06-24            Urinalysis Basic - ( 2022 13:50 )    Color: Yellow / Appearance: Slightly Turbid / S.020 / pH: x  Gluc: x / Ketone: Negative  / Bili: Negative / Urobili: Negative   Blood: x / Protein: 100 / Nitrite: Negative   Leuk Esterase: Moderate / RBC: 11-25 /HPF / WBC 11-25   Sq Epi: x / Non Sq Epi: Occasional / Bacteria: Occasional      ET Tube ET Tube --   Few polymorphonuclear leukocytes per low power field  No Squamous epithelial cells per low power field  Few Gram Negative Rods per oil power field  @ 13:50        Radiology: no new imaging   Bedside lung ultrasound: ***  Bedside ECHO: ***    CENTRAL LINE: N       MNEJIVAR: Y                          DATE INSERTED:               REMOVE: N  A-LINE: N                         PEG Tube: inserted     GLOBAL ISSUE/BEST PRACTICE  Analgesia: Fentanyl PRN  Sedation: none  CAM-ICU: n/a  HOB elevation: yes  Stress ulcer prophylaxis: pantoprazole  VTE prophylaxis: SQH  Glycemic control: none  Nutrition: NPO with vital tube feeds    CODE STATUS: DNR  GOC discussion: Y       Patient is a 76y old  Male who presents with a chief complaint of Acute Hypoxic Respiratory Failure (2022 07:54)    24 hour events: Febrile to 100.6F overnight. On pressure support trial, tolerating.     REVIEW OF SYSTEMS  unable to obtain as patient is intubated    T(F): 100.6 (22 @ 05:03), Max: 100.6 (22 @ 05:03)  HR: 60 (22 @ 08:02) (60 - 105)  BP: 142/76 (22 @ 07:00) (64/44 - 147/75)  RR: 27 (22 @ 07:00) (23 - 30)  SpO2: 97% (22 @ 08:02) (88% - 100%)  Wt(kg): --    Mode: CPAP with PS, FiO2: 40, PEEP: 5, PS: 15        I&O's Summary     @ 07:01  -   @ 07:00  --------------------------------------------------------  IN: 5000.6 mL / OUT: 1052 mL / NET: 3948.6 mL      PHYSICAL EXAM  General: ill appearing male  CNS: intubated, off sedation. Not following commands  HEENT: ISHAN  Resp: scattered rhonchi  CVS: RRR, no murmurs  Abd: soft, NT/ND  Ext: no edema or cyanosis  Skin: warm and well perfused    MEDICATIONS  meropenem  IVPB IV Intermittent  tobramycin for Nebulization Inhalation      atorvastatin Oral    albuterol/ipratropium for Nebulization Nebulizer    acetaminophen     Tablet .. Oral PRN  carbidopa/levodopa  25/100 Oral  citalopram Oral  fentaNYL    Injectable IV Push PRN  primidone Oral      heparin   Injectable SubCutaneous    pantoprazole    Tablet Oral  polyethylene glycol 3350 Oral          chlorhexidine 0.12% Liquid Oral Mucosa  chlorhexidine 2% Cloths Topical                            8.0    13.07 )-----------( 138      ( 2022 05:45 )             24.0       06-    144  |  108  |  29<H>  ----------------------------<  162<H>  3.9   |  31  |  1.10    Ca    8.2<L>      2022 05:45  Phos  2.8     06-24  Mg     2.0     -24    TPro  5.6<L>  /  Alb  1.7<L>  /  TBili  0.3  /  DBili  x   /  AST  40<H>  /  ALT  18  /  AlkPhos  82  06-24            Urinalysis Basic - ( 2022 13:50 )    Color: Yellow / Appearance: Slightly Turbid / S.020 / pH: x  Gluc: x / Ketone: Negative  / Bili: Negative / Urobili: Negative   Blood: x / Protein: 100 / Nitrite: Negative   Leuk Esterase: Moderate / RBC: 11-25 /HPF / WBC 11-25   Sq Epi: x / Non Sq Epi: Occasional / Bacteria: Occasional      ET Tube ET Tube --   Few polymorphonuclear leukocytes per low power field  No Squamous epithelial cells per low power field  Few Gram Negative Rods per oil power field  @ 13:50        Radiology: no new imaging   Bedside lung ultrasound: ***  Bedside ECHO: ***    CENTRAL LINE: N       MENJIVAR: Y                          DATE INSERTED:               REMOVE: N  A-LINE: N                         PEG Tube: inserted     GLOBAL ISSUE/BEST PRACTICE  Analgesia: Fentanyl PRN  Sedation: none  CAM-ICU: n/a  HOB elevation: yes  Stress ulcer prophylaxis: pantoprazole  VTE prophylaxis: SQH  Glycemic control: none  Nutrition: NPO with vital tube feeds    CODE STATUS: DNR  GOC discussion: Y

## 2022-06-24 NOTE — PROGRESS NOTE ADULT - SUBJECTIVE AND OBJECTIVE BOX
CAPILLARY BLOOD GLUCOSE      POCT Blood Glucose.: 156 mg/dL (23 Jun 2022 17:39)  POCT Blood Glucose.: 200 mg/dL (23 Jun 2022 12:06)      Vital Signs Last 24 Hrs  T(C): 38.1 (24 Jun 2022 05:03), Max: 38.3 (23 Jun 2022 08:30)  T(F): 100.6 (24 Jun 2022 05:03), Max: 100.9 (23 Jun 2022 08:30)  HR: 84 (24 Jun 2022 07:00) (79 - 105)  BP: 142/76 (24 Jun 2022 07:00) (64/44 - 147/75)  BP(mean): 102 (24 Jun 2022 07:00) (50 - 105)  RR: 27 (24 Jun 2022 07:00) (23 - 30)  SpO2: 97% (24 Jun 2022 07:00) (88% - 100%)    Respiratory: CTA B/L  CV: RRR, S1S2, no murmurs, rubs or gallops  Abdominal: Soft, NT, ND +BS, Last BM  Extremities: No edema, + peripheral pulses     06-23    143  |  105  |  22  ----------------------------<  200<H>  3.6   |  30  |  0.99    Ca    8.7      23 Jun 2022 05:44  Phos  2.2     06-23  Mg     1.8     06-23    TPro  5.8<L>  /  Alb  1.5<L>  /  TBili  0.3  /  DBili  x   /  AST  26  /  ALT  17  /  AlkPhos  81  06-23      atorvastatin 80 milliGRAM(s) Oral at bedtime

## 2022-06-24 NOTE — PROGRESS NOTE ADULT - SUBJECTIVE AND OBJECTIVE BOX
[facility]  Bernard Jenkins PA-C  [facility] [place] [address]  [phone]      INTERVAL HPI/OVERNIGHT EVENTS:  Pt s/e in ICU  Pt unable to provide hx 2/2 intubation and sedation  S/p peg placement, no reported issues with peg    MEDICATIONS  (STANDING):  albuterol/ipratropium for Nebulization 3 milliLiter(s) Nebulizer every 6 hours  atorvastatin 80 milliGRAM(s) Oral at bedtime  carbidopa/levodopa  25/100 1 Tablet(s) Oral <User Schedule>  chlorhexidine 0.12% Liquid 15 milliLiter(s) Oral Mucosa every 12 hours  chlorhexidine 2% Cloths 1 Application(s) Topical <User Schedule>  citalopram 20 milliGRAM(s) Oral daily  heparin   Injectable 5000 Unit(s) SubCutaneous every 8 hours  meropenem  IVPB 1000 milliGRAM(s) IV Intermittent every 8 hours  pantoprazole    Tablet 40 milliGRAM(s) Oral before breakfast  polyethylene glycol 3350 17 Gram(s) Oral every 24 hours  primidone 50 milliGRAM(s) Oral daily  tobramycin for Nebulization 300 milliGRAM(s) Inhalation every 12 hours    MEDICATIONS  (PRN):  acetaminophen     Tablet .. 650 milliGRAM(s) Oral every 6 hours PRN Temp greater or equal to 38C (100.4F), Mild Pain (1 - 3)  fentaNYL    Injectable 25 MICROGram(s) IV Push every 2 hours PRN Moderate Pain (4 - 6)      Allergies  No Known Allergies    PHYSICAL EXAM:   Vital Signs:  Vital Signs Last 24 Hrs  T(C): 37.9 (2022 08:43), Max: 38.1 (2022 05:03)  T(F): 100.2 (2022 08:43), Max: 100.6 (2022 05:03)  HR: 69 (2022 12:02) (60 - 103)  BP: 138/73 (2022 09:00) (98/55 - 147/75)  BP(mean): 100 (2022 09:00) (60 - 105)  RR: 25 (2022 09:00) (23 - 30)  SpO2: 96% (2022 12:02) (92% - 100%)  Daily     Daily Weight in k (2022 06:00)    GENERAL:  Appears stated age  ABDOMEN:  Soft, non-tender, non-distended  NEURO:  Alert      LABS:                        8.0    13.07 )-----------( 138      ( 2022 05:45 )             24.0     06-24    144  |  108  |  29<H>  ----------------------------<  162<H>  3.9   |  31  |  1.10    Ca    8.2<L>      2022 05:45  Phos  2.8     06-24  Mg     2.0     06-24    TPro  5.6<L>  /  Alb  1.7<L>  /  TBili  0.3  /  DBili  x   /  AST  40<H>  /  ALT  18  /  AlkPhos  82  06-24      Urinalysis Basic - ( 2022 13:50 )    Color: Yellow / Appearance: Slightly Turbid / S.020 / pH: x  Gluc: x / Ketone: Negative  / Bili: Negative / Urobili: Negative   Blood: x / Protein: 100 / Nitrite: Negative   Leuk Esterase: Moderate / RBC: 11-25 /HPF / WBC 11-25   Sq Epi: x / Non Sq Epi: Occasional / Bacteria: Occasional   [facility]  Bernard Jenkins PA-C  [facility] [place] [address]  [phone]      INTERVAL HPI/OVERNIGHT EVENTS:  Pt s/e in ICU  Pt unable to provide hx 2/2 intubation and sedation  S/p peg placement, no reported issues with peg    MEDICATIONS  (STANDING):  albuterol/ipratropium for Nebulization 3 milliLiter(s) Nebulizer every 6 hours  atorvastatin 80 milliGRAM(s) Oral at bedtime  carbidopa/levodopa  25/100 1 Tablet(s) Oral <User Schedule>  chlorhexidine 0.12% Liquid 15 milliLiter(s) Oral Mucosa every 12 hours  chlorhexidine 2% Cloths 1 Application(s) Topical <User Schedule>  citalopram 20 milliGRAM(s) Oral daily  heparin   Injectable 5000 Unit(s) SubCutaneous every 8 hours  meropenem  IVPB 1000 milliGRAM(s) IV Intermittent every 8 hours  pantoprazole    Tablet 40 milliGRAM(s) Oral before breakfast  polyethylene glycol 3350 17 Gram(s) Oral every 24 hours  primidone 50 milliGRAM(s) Oral daily  tobramycin for Nebulization 300 milliGRAM(s) Inhalation every 12 hours    MEDICATIONS  (PRN):  acetaminophen     Tablet .. 650 milliGRAM(s) Oral every 6 hours PRN Temp greater or equal to 38C (100.4F), Mild Pain (1 - 3)  fentaNYL    Injectable 25 MICROGram(s) IV Push every 2 hours PRN Moderate Pain (4 - 6)      Allergies  No Known Allergies    PHYSICAL EXAM:   Vital Signs:  Vital Signs Last 24 Hrs  T(C): 37.9 (2022 08:43), Max: 38.1 (2022 05:03)  T(F): 100.2 (2022 08:43), Max: 100.6 (2022 05:03)  HR: 69 (2022 12:02) (60 - 103)  BP: 138/73 (2022 09:00) (98/55 - 147/75)  BP(mean): 100 (2022 09:00) (60 - 105)  RR: 25 (2022 09:00) (23 - 30)  SpO2: 96% (2022 12:02) (92% - 100%)  Daily     Daily Weight in k (2022 06:00)    GENERAL:  Appears stated age  ABDOMEN:  Soft, non-tender, non-distended, +peg  NEURO:  Sedated      LABS:                        8.0    13.07 )-----------( 138      ( 2022 05:45 )             24.0     06-24    144  |  108  |  29<H>  ----------------------------<  162<H>  3.9   |  31  |  1.10    Ca    8.2<L>      2022 05:45  Phos  2.8     06-24  Mg     2.0     06-24    TPro  5.6<L>  /  Alb  1.7<L>  /  TBili  0.3  /  DBili  x   /  AST  40<H>  /  ALT  18  /  AlkPhos  82  06-24      Urinalysis Basic - ( 2022 13:50 )    Color: Yellow / Appearance: Slightly Turbid / S.020 / pH: x  Gluc: x / Ketone: Negative  / Bili: Negative / Urobili: Negative   Blood: x / Protein: 100 / Nitrite: Negative   Leuk Esterase: Moderate / RBC: 11-25 /HPF / WBC 11-25   Sq Epi: x / Non Sq Epi: Occasional / Bacteria: Occasional

## 2022-06-24 NOTE — PROGRESS NOTE ADULT - ATTENDING COMMENTS
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 coli pneumonia (likely aspiration) + Pseudomonas UTI, resulting in acute metabolic encephalopathy, lactic acidosis, septic shock, and ÁNGEL. Now with prolonged respiratory failure and new fevers worrisome for VAP.    1. Neuro: keep off sedation. Continue home sinemet, primidone, and citalopram  2. CV: resolved shock, remains off pressors. S/p stress steroids. Hold enalapril given resolving ÁNGEL. Continue statin  3. Pulm: continue lung protective ventilation. Doing well on pressure support 12/5 40%. Daily SBT as tolerates. Continue airway clearance therapy with albuterol, ipratropium, and hypertonic saline  4. GI: continue Vital feeds via PEG. Continue PPI as per home regimen  5. Renal: ÁNGEL 2/2 ATN now resolving. Strict I/O's, close monitoring of kidney function and lytes. Continue free water 350 mL q4h  6. ID: E. coli pneumonia and Pseudomonas UTI, now with recurrent fevers and purulent tracheal secretions. Blood cultures negative to date. Endotracheal cultures with GNR. Continue Meropenem (day 2)  7. Heme: improving thrombocytopenia, possibly due to sepsis vs. drug-induced. HIT negative. Continue HSQ for DVT ppx. Will eventually need to resume therapeutic enoxaparin given recent CVA and suspected hypercoagulable state  8. Endo: hypercalcemia improved with pamidronate. PTHrP negative. Interestingly the 1,25-diOH vitamin D level is elevated, although patient does not have history of granulomatous inflammation. S/p pamidronic acid 6/20  9. Skin: No lines, keep banks for strict I/O's  10. Dispo: DNR, discussed with daughter Viik CHRISTIAN time spent: 35 min

## 2022-06-24 NOTE — PROGRESS NOTE ADULT - ASSESSMENT
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     Acute hypoxic resp failure Secondary to b/l pneumonia   Sepsis with hypotension and shock   Vent support   IV steroids and pressor mgmt per ICU team  Continue Merropenem. Started on tobtamycin via nebulization today  Blood cultures- show NGTD  ETT culture growing E.Coli  Urine Culture growing pseudomonas  mgmt per ICU    Anemia: no signs or symptoms of active bleeding. Continue to monitor hgb.     Thrombocytopenia: likely in setting of sepsis.   -continue to monitor Platelet count    ÁNGEL   Renal fxn stable  -monitor intake and output    -Hypercalcemia   Resolved. Mgmt per Endocrine    Hypophosphatemia: resolved.    Parkinsonism   - Continue home Sinemet, Primidone and Celexa    Need for prophylactic measure    IV protonix for GI protection  Heparin subcut q8

## 2022-06-25 NOTE — PROGRESS NOTE ADULT - SUBJECTIVE AND OBJECTIVE BOX
Patient is a 76y old  Male who presents with a chief complaint of Acute Hypoxic Respiratory Failure (25 Jun 2022 13:12)    BRIEF HOSPITAL COURSE:   75 y/o M w/ pmh of prostate ca, SCC of tounge s/p partial resection now w/ recurrence, PD, hypertension, hld, recent CVA and oropharyngeal dysphagia now admitted to the MICU for acute hypoxemic rsep failure, septic shoch 2/2 to pna +/- UTI, metabolic encephalopathy, lactic acidosis, ÁNGEL and hyperNa    Events last 24 hours:   - Patient w/pseudomonal VAP, on Merrem   - Acute desaturation event tonight, hypoxic to 70s on 30%FiO2, improved w/suctioning, thick secretions   - Failing SBT     Review of Systems:  Unable to assess given clinical condition     PAST MEDICAL & SURGICAL HISTORY:  Hypertension  Hyperlipidemia  GERD (gastroesophageal reflux disease)  Parkinson&#x27;s disease  Prostate cancer  Mitral valve prolapse  Malignant neoplasm of tongue, 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  Throat cancer  History of vasectomy  Prostate cancer  s/p cyber knife  S/P partial glossectomy  4/21    Medications:  meropenem  IVPB 1000 milliGRAM(s) IV Intermittent every 8 hours  rifAXIMin 550 milliGRAM(s) Oral two times a day  tobramycin for Nebulization 300 milliGRAM(s) Inhalation every 12 hours  albuterol/ipratropium for Nebulization 3 milliLiter(s) Nebulizer every 6 hours  sodium chloride 3%  Inhalation 4 milliLiter(s) Inhalation every 6 hours  acetaminophen     Tablet .. 650 milliGRAM(s) Oral every 6 hours PRN  carbidopa/levodopa  25/100 1 Tablet(s) Oral <User Schedule>  citalopram 20 milliGRAM(s) Oral daily  primidone 25 milliGRAM(s) Oral daily  heparin   Injectable 5000 Unit(s) SubCutaneous every 8 hours  lactulose Syrup 20 Gram(s) Oral every 8 hours  pantoprazole   Suspension 40 milliGRAM(s) Oral daily  atorvastatin 80 milliGRAM(s) Oral at bedtime  chlorhexidine 0.12% Liquid 15 milliLiter(s) Oral Mucosa every 12 hours  chlorhexidine 2% Cloths 1 Application(s) Topical <User Schedule>    Mode: AC/ CMV (Assist Control/ Continuous Mandatory Ventilation)  RR (machine): 24  TV (machine): 440  FiO2: 30  PEEP: 5  PS: 15  ITime: 1  MAP: 12  PIP: 30    ICU Vital Signs Last 24 Hrs  T(C): 37.6 (25 Jun 2022 19:25), Max: 38 (24 Jun 2022 23:24)  T(F): 99.7 (25 Jun 2022 19:25), Max: 100.4 (24 Jun 2022 23:24)  HR: 78 (25 Jun 2022 19:00) (57 - 104)  BP: 146/71 (25 Jun 2022 19:00) (87/51 - 175/81)  BP(mean): 102 (25 Jun 2022 19:00) (62 - 117)  RR: 24 (25 Jun 2022 19:00) (24 - 32)  SpO2: 92% (25 Jun 2022 19:00) (87% - 100%)    I&O's Detail    24 Jun 2022 07:01  -  25 Jun 2022 07:00  --------------------------------------------------------  IN:    Enteral Tube Flush: 120 mL    Enteral Tube Flush: 2100 mL    IV PiggyBack: 100 mL    Vital1.5: 1300 mL  Total IN: 3620 mL    OUT:    Indwelling Catheter - Urethral (mL): 1616 mL  Total OUT: 1616 mL    Total NET: 2004 mL    25 Jun 2022 07:01  -  25 Jun 2022 19:49  --------------------------------------------------------  IN:    Enteral Tube Flush: 1050 mL    Enteral Tube Flush: 310 mL    IV PiggyBack: 50 mL    Vital1.5: 780 mL  Total IN: 2190 mL    OUT:    Indwelling Catheter - Urethral (mL): 610 mL  Total OUT: 610 mL    Total NET: 1580 mL    LABS:                        9.2    13.38 )-----------( Clumped    ( 25 Jun 2022 06:04 )             29.1     06-25    142  |  107  |  34<H>  ----------------------------<  145<H>  4.9   |  32<H>  |  1.10    Ca    8.8      25 Jun 2022 06:04  Phos  3.3     06-25  Mg     2.2     06-25    TPro  6.2  /  Alb  1.8<L>  /  TBili  0.3  /  DBili  x   /  AST  62<H>  /  ALT  23  /  AlkPhos  91  06-25    POCT Blood Glucose.: 155 mg/dL (25 Jun 2022 17:51)    CULTURES:  Culture Results:   Numerous Pseudomonas aeruginosa  Normal Respiratory Richa present (06-23 @ 13:50)  Culture Results:   No growth to date. (06-23 @ 08:40)  Culture Results:   No growth to date. (06-23 @ 08:35)  Culture Results:   Moderate Escherichia coli  Normal Respiratory Richa present (06-18 @ 20:58)    Physical Examination:  General: No acute distress.    HEENT: Pupils equal, reactive to light.  Symmetric.  PULM: Diminished breath sounds b/l  NECK: Supple, no lymphadenopathy, trachea midline  CVS: Regular rate and rhythm, no murmurs, rubs, or gallops  ABD: Soft, nondistended, nontender, normoactive bowel sounds, no masses  EXT: No edema, nontender  SKIN: Warm and well perfused  RADIOLOGY:   < from: Xray Chest 1 View- PORTABLE-Urgent (Xray Chest 1 View- PORTABLE-Urgent .) (06.23.22 @ 12:20) >    ACC: [id number] EXAM:  XR CHEST PORTABLE URGENT 1V                          PROCEDURE DATE:  06/23/2022      INTERPRETATION:  AP semierect chest on June 23, 2022 at 12:04 PM.   Clinically patient has pneumonia.    Heart magnified by technique. Left loop recorder and moderate to severe   right thoracic curve again noted.    Significant infiltrations in the left mid lower lung field and a right   base are again noted.    Endotracheal tube remains.    NG tube present on June 17 is been removed.    IMPRESSION: Removal of NG tube. Persistent infiltrates.    --- End of Report ---    MEGAN CANNON MD; Attending Radiologist  This document has been electronically signed. Jun 23 2022  1:43PM    < end of copied text >

## 2022-06-25 NOTE — PROGRESS NOTE ADULT - ASSESSMENT
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 coli pneumonia and Pseudomonas UTI.    Neuro:  - Encephalopathy: febrile overnight ?septic encephalopathy vs toxic encephalopathy from prolonged sedative use vs metabolic vs hypercapnic encephalopathy  - Check ammonia in AM  - Repeat ABG in AM to rule out hypercapnia  - Tylenol PRN  - Fentanyl PRN (has not used since 6/21)  - Continue home Citalopran, Primidone and Sinemet for Parkinson's     CV:  - Hypotension 2/2 septic shock: improving  - Hold albumin today s/p steroids    Pulm:  - PS trial today, tolerating  - Patient is still not waking up off sedatives, hopeful extubation once more awake and following commands  - Duoneb q6h                  GI:  - NPO with Vital Tube feeds  - PEG tube placed 6/21/22   - IV Protonix 40mg qd for GI ppx    Renal:  - Cr stable at 1.1 today  - Adequate urine output overnight, maintain banks   - Continue Free water 350mL q4h  - Monitor I&Os and renal indices     Heme:  - Continue on Heparin SubQ   - Per prior Hospitalist note from 6/2, pt was on full dose AC for recent stroke and hypercoagulable state, holding FD AC    ID:  - B/l lower lobe PNA and UTI  - Urine cx positive for Pseudomonas  - ET positive for E.Coli   - Continue meropenem  - ET sputum culture growing GNR, start tobramycin  - Repeat blood cultures NGTD    Endo:  - PTH wnl, vit D,25,hydroxy 39.2 and vit D,1,25 dihydroxy 89.9, PTHrP <2.2  - Hypercalcemia likely inflammatory  - Hypernatremia now resolved   - Fingersticks q6h, blood glucose goal 100-180 in ICU    Skin:  - Decubitus ulcer wound care following     Tubes/lines:  - Midline, PEG tube, banks   75yo male with PMH of prostate cancer, SCC of tongue s/p partial resection now with recurrence, parkinson's disease, hypertension, hyperlipidemia, recent CVA, and oropharyngeal dysphagia admitted for septic shock and acute hypoxic respiratory failure in the setting of b/l lower lobe E. coli pneumonia and Pseudomonas UTI.    Neuro:  - Encephalopathy likely 2/2 to hyperammonemia noted to be 66  - Started on Lactulose and Rifaximin   - Tylenol PRN  - Continue home Citalopran, and Sinemet for Parkinson's   - Will decrease dose of Primidone given mental status and monitor for improvement     CV:  - Currently hemodynamically stable   - Hypotension 2/2 septic shock: improving    Pulm:  - Remains on mechanical ventilation and wean as tolerated   - Patient is still not waking up off sedatives, hopeful extubation once more awake and following commands  - Duoneb q6h  - May benefit from Bronchoscopy                   GI:  - NPO with Vital Tube feeds  - PEG tube placed 6/21/22  - IV Protonix 40mg qd for GI ppx    Renal:  - Cr stable  - Adequate urine output overnight will d/c banks today   - Continue Free water 350mL q4h  - Monitor I&Os and renal indices     Heme:  - Continue on Heparin SubQ   - Per prior Hospitalist note from 6/2, pt was on full dose AC for recent stroke and hypercoagulable state, holding FD AC  - Will consider re-started full dose AC tomorrow     ID:  - B/l lower lobe PNA and UTI  - Urine cx positive for Pseudomonas  - ET positive for E.Coli now with Pseudomonas    - Continue meropenem and Tobramycin     Endo:  - PTH wnl, vit D,25,hydroxy 39.2 and vit D,1,25 dihydroxy 89.9, PTHrP <2.2  - Hypercalcemia likely inflammatory  - Hypernatremia now resolved   - Fingersticks q6h, blood glucose goal 100-180 in ICU    Skin:  - Decubitus ulcer wound care following     Tubes/lines:  - Midline, PEG tube, banks (to be d/valery today)

## 2022-06-25 NOTE — PROGRESS NOTE ADULT - SUBJECTIVE AND OBJECTIVE BOX
[facility]  [place] [address] [phone]    INTERVAL HPI/OVERNIGHT EVENTS:  Pt resting comfortably. No overnight events.  On tube feeds.  Intubated.    MEDICATIONS  (STANDING):  albuterol/ipratropium for Nebulization 3 milliLiter(s) Nebulizer every 6 hours  atorvastatin 80 milliGRAM(s) Oral at bedtime  carbidopa/levodopa  25/100 1 Tablet(s) Oral <User Schedule>  chlorhexidine 0.12% Liquid 15 milliLiter(s) Oral Mucosa every 12 hours  chlorhexidine 2% Cloths 1 Application(s) Topical <User Schedule>  citalopram 20 milliGRAM(s) Oral daily  heparin   Injectable 5000 Unit(s) SubCutaneous every 8 hours  lactulose Syrup 20 Gram(s) Oral every 8 hours  meropenem  IVPB 1000 milliGRAM(s) IV Intermittent every 8 hours  pantoprazole   Suspension 40 milliGRAM(s) Oral daily  primidone 25 milliGRAM(s) Oral daily  rifAXIMin 550 milliGRAM(s) Oral two times a day  sodium chloride 3%  Inhalation 4 milliLiter(s) Inhalation every 6 hours  tobramycin for Nebulization 300 milliGRAM(s) Inhalation every 12 hours    MEDICATIONS  (PRN):  acetaminophen     Tablet .. 650 milliGRAM(s) Oral every 6 hours PRN Temp greater or equal to 38C (100.4F), Mild Pain (1 - 3)    Vital Signs Last 24 Hrs  T(C): 37.5 (25 Jun 2022 12:24), Max: 38.3 (24 Jun 2022 19:37)  T(F): 99.5 (25 Jun 2022 12:24), Max: 100.9 (24 Jun 2022 19:37)  HR: 69 (25 Jun 2022 13:00) (57 - 104)  BP: 137/65 (25 Jun 2022 13:00) (87/51 - 175/81)  BP(mean): 94 (25 Jun 2022 13:00) (62 - 117)  RR: 25 (25 Jun 2022 13:00) (24 - 33)  SpO2: 100% (25 Jun 2022 13:00) (87% - 100%)    Physical:  General: Intubated. NAD.  Abdomen: Soft nondistended, no tenderness elicited. PEG in place.    I&O's Detail    24 Jun 2022 07:01  -  25 Jun 2022 07:00  --------------------------------------------------------  IN:    Enteral Tube Flush: 120 mL    Enteral Tube Flush: 2100 mL    IV PiggyBack: 100 mL    Vital1.5: 1300 mL  Total IN: 3620 mL    OUT:    Indwelling Catheter - Urethral (mL): 1616 mL  Total OUT: 1616 mL    Total NET: 2004 mL      25 Jun 2022 07:01  -  25 Jun 2022 14:33  --------------------------------------------------------  IN:    Enteral Tube Flush: 350 mL    Enteral Tube Flush: 220 mL    Vital1.5: 390 mL  Total IN: 960 mL    OUT:    Indwelling Catheter - Urethral (mL): 610 mL  Total OUT: 610 mL    Total NET: 350 mL    LABS:                        9.2    13.38 )-----------( Clumped    ( 25 Jun 2022 06:04 )             29.1             06-25    142  |  107  |  34<H>  ----------------------------<  145<H>  4.9   |  32<H>  |  1.10    Ca    8.8      25 Jun 2022 06:04  Phos  3.3     06-25  Mg     2.2     06-25    TPro  6.2  /  Alb  1.8<L>  /  TBili  0.3  /  DBili  x   /  AST  62<H>  /  ALT  23  /  AlkPhos  91  06-25

## 2022-06-25 NOTE — PROGRESS NOTE ADULT - SUBJECTIVE AND OBJECTIVE BOX
Patient is a 76y old  Male who presents with a chief complaint of Acute Hypoxic Respiratory Failure (2022 19:28)      Subjective:  INTERVAL HPI/OVERNIGHT EVENTS: Patient seen and examined at bedside. No overnight events occurred.    MEDICATIONS  (STANDING):  albuterol/ipratropium for Nebulization 3 milliLiter(s) Nebulizer every 6 hours  atorvastatin 80 milliGRAM(s) Oral at bedtime  carbidopa/levodopa  25/100 1 Tablet(s) Oral <User Schedule>  chlorhexidine 0.12% Liquid 15 milliLiter(s) Oral Mucosa every 12 hours  chlorhexidine 2% Cloths 1 Application(s) Topical <User Schedule>  citalopram 20 milliGRAM(s) Oral daily  heparin   Injectable 5000 Unit(s) SubCutaneous every 8 hours  meropenem  IVPB 1000 milliGRAM(s) IV Intermittent every 8 hours  pantoprazole   Suspension 40 milliGRAM(s) Oral daily  polyethylene glycol 3350 17 Gram(s) Oral every 24 hours  primidone 50 milliGRAM(s) Oral daily  sodium chloride 3%  Inhalation 4 milliLiter(s) Inhalation every 6 hours  tobramycin for Nebulization 300 milliGRAM(s) Inhalation every 12 hours    MEDICATIONS  (PRN):  acetaminophen     Tablet .. 650 milliGRAM(s) Oral every 6 hours PRN Temp greater or equal to 38C (100.4F), Mild Pain (1 - 3)      Allergies    No Known Allergies    Intolerances        REVIEW OF SYSTEMS:  unable to obtain     Objective:  Vital Signs Last 24 Hrs  T(C): 37.7 (2022 08:13), Max: 38.3 (2022 19:37)  T(F): 99.9 (2022 08:13), Max: 100.9 (2022 19:37)  HR: 72 (2022 08:06) (57 - 104)  BP: 93/50 (2022 07:00) (87/51 - 175/81)  BP(mean): 64 (2022 07:00) (62 - 117)  RR: 25 (2022 07:00) (24 - 33)  SpO2: 89% (2022 08:06) (87% - 100%)    PE:    LABS:                        9.2    13.38 )-----------( Clumped    ( 2022 06:04 )             29.1     CBC Full  -  ( 2022 06:04 )  WBC Count : 13.38 K/uL  Hemoglobin : 9.2 g/dL  Hematocrit : 29.1 %  Platelet Count - Automated : Clumped K/uL  Mean Cell Volume : 100.7 fl  Mean Cell Hemoglobin : 31.8 pg  Mean Cell Hemoglobin Concentration : 31.6 gm/dL  Auto Neutrophil # : 12.21 K/uL  Auto Lymphocyte # : 0.39 K/uL  Auto Monocyte # : 0.35 K/uL  Auto Eosinophil # : 0.26 K/uL  Auto Basophil # : 0.02 K/uL  Auto Neutrophil % : 91.4 %  Auto Lymphocyte % : 2.9 %  Auto Monocyte % : 2.6 %  Auto Eosinophil % : 1.9 %  Auto Basophil % : 0.1 %    2022 06:04    142    |  107    |  34     ----------------------------<  145    4.9     |  32     |  1.10     Ca    8.8        2022 06:04  Phos  3.3       2022 06:04  Mg     2.2       2022 06:04    TPro  6.2    /  Alb  1.8    /  TBili  0.3    /  DBili  x      /  AST  62     /  ALT  23     /  AlkPhos  91     2022 06:04      Urinalysis Basic - ( 2022 13:50 )    Color: Yellow / Appearance: Slightly Turbid / S.020 / pH: x  Gluc: x / Ketone: Negative  / Bili: Negative / Urobili: Negative   Blood: x / Protein: 100 / Nitrite: Negative   Leuk Esterase: Moderate / RBC: 11-25 /HPF / WBC 11-25   Sq Epi: x / Non Sq Epi: Occasional / Bacteria: Occasional      CAPILLARY BLOOD GLUCOSE      POCT Blood Glucose.: 166 mg/dL (2022 05:45)  POCT Blood Glucose.: 152 mg/dL (2022 02:50)  POCT Blood Glucose.: 156 mg/dL (2022 17:16)  POCT Blood Glucose.: 177 mg/dL (2022 11:14)        Culture - Sputum (collected 22 @ 13:50)  Source: ET Tube ET Tube  Gram Stain (22 @ 22:30):    Few polymorphonuclear leukocytes per low power field    No Squamous epithelial cells per low power field    Few Gram Negative Rods per oil power field  Preliminary Report (22 @ 17:10):    Numerous Pseudomonas aeruginosa    Normal Respiratory Richa present    Culture - Blood (collected 22 @ 08:40)  Source: .Blood Blood-Peripheral  Preliminary Report (22 @ 12:02):    No growth to date.    Culture - Blood (collected 22 @ 08:35)  Source: .Blood Blood-Peripheral  Preliminary Report (22 @ 12:02):    No growth to date.    Culture - Sputum (collected 22 @ 20:58)  Source: ET Tube ET Tube  Gram Stain (22 @ 23:57):    Moderate polymorphonuclear leukocytes per low power field    Rare Squamous epithelial cells per low power field    Few Gram Positive Rods per oil power field    Rare Yeast per oil power field  Final Report (22 @ 14:19):    Moderate Escherichia coli    Normal Respiratory Richa present  Organism: Escherichia coli (22 @ 14:19)  Organism: Escherichia coli (22 @ 14:19)      -  Amikacin: S <=16      -  Amoxicillin/Clavulanic Acid: S <=8/4      -  Ampicillin: S <=8 These ampicillin results predict results for amoxicillin      -  Ampicillin/Sulbactam: S <=4/2 Enterobacter, Klebsiella aerogenes, Citrobacter, and Serratia may develop resistance during prolonged therapy (3-4 days)      -  Aztreonam: S <=4      -  Cefazolin: S <=2 Enterobacter, Klebsiella aerogenes, Citrobacter, and Serratia may develop resistance during prolonged therapy (3-4 days)      -  Cefepime: S <=2      -  Cefoxitin: S <=8      -  Ceftriaxone: S <=1 Enterobacter, Klebsiella aerogenes, Citrobacter, and Serratia may develop resistance during prolonged therapy      -  Ciprofloxacin: S <=0.25      -  Ertapenem: S <=0.5      -  Gentamicin: S <=2      -  Imipenem: S <=1      -  Levofloxacin: S <=0.5      -  Meropenem: S <=1      -  Piperacillin/Tazobactam: S <=8      -  Tobramycin: S <=2      -  Trimethoprim/Sulfamethoxazole: S <=0.5/9.5      Method Type: SAMY        RADIOLOGY & ADDITIONAL TESTS:    Personally reviewed.     Consultant(s) Notes Reviewed:  [x] YES  [ ] NO     Patient is a 76y old  Male who presents with a chief complaint of Acute Hypoxic Respiratory Failure (2022 19:28)      Subjective:  INTERVAL HPI/OVERNIGHT EVENTS: Patient seen and examined at bedside. pt with desaturation overnight. also with elevated ammonia this am.     MEDICATIONS  (STANDING):  albuterol/ipratropium for Nebulization 3 milliLiter(s) Nebulizer every 6 hours  atorvastatin 80 milliGRAM(s) Oral at bedtime  carbidopa/levodopa  25/100 1 Tablet(s) Oral <User Schedule>  chlorhexidine 0.12% Liquid 15 milliLiter(s) Oral Mucosa every 12 hours  chlorhexidine 2% Cloths 1 Application(s) Topical <User Schedule>  citalopram 20 milliGRAM(s) Oral daily  heparin   Injectable 5000 Unit(s) SubCutaneous every 8 hours  meropenem  IVPB 1000 milliGRAM(s) IV Intermittent every 8 hours  pantoprazole   Suspension 40 milliGRAM(s) Oral daily  polyethylene glycol 3350 17 Gram(s) Oral every 24 hours  primidone 50 milliGRAM(s) Oral daily  sodium chloride 3%  Inhalation 4 milliLiter(s) Inhalation every 6 hours  tobramycin for Nebulization 300 milliGRAM(s) Inhalation every 12 hours    MEDICATIONS  (PRN):  acetaminophen     Tablet .. 650 milliGRAM(s) Oral every 6 hours PRN Temp greater or equal to 38C (100.4F), Mild Pain (1 - 3)      Allergies    No Known Allergies    Intolerances        REVIEW OF SYSTEMS:  unable to obtain     Objective:  Vital Signs Last 24 Hrs  T(C): 37.7 (2022 08:13), Max: 38.3 (2022 19:37)  T(F): 99.9 (2022 08:13), Max: 100.9 (2022 19:37)  HR: 72 (2022 08:06) (57 - 104)  BP: 93/50 (2022 07:00) (87/51 - 175/81)  BP(mean): 64 (2022 07:00) (62 - 117)  RR: 25 (2022 07:00) (24 - 33)  SpO2: 89% (2022 08:06) (87% - 100%)    PE:  General: Well developed, well nourished, intubated  HEENT: NCAT, PERRLA, moist mucous membranes, sm dried blood on lip   Neck: Supple, nontender  Respiratory: intubated, b/l coarse breath sounds, no wheezing.   CV: RRR, +S1/S2, no murmurs, rubs or gallops  Abdominal: Soft, NT, ND +BSx4  Extremities: No C/C/E, + peripheral pulses  Skin: warm, dry      LABS:                        9.2    13.38 )-----------( Clumped    ( 2022 06:04 )             29.1     CBC Full  -  ( 2022 06:04 )  WBC Count : 13.38 K/uL  Hemoglobin : 9.2 g/dL  Hematocrit : 29.1 %  Platelet Count - Automated : Clumped K/uL  Mean Cell Volume : 100.7 fl  Mean Cell Hemoglobin : 31.8 pg  Mean Cell Hemoglobin Concentration : 31.6 gm/dL  Auto Neutrophil # : 12.21 K/uL  Auto Lymphocyte # : 0.39 K/uL  Auto Monocyte # : 0.35 K/uL  Auto Eosinophil # : 0.26 K/uL  Auto Basophil # : 0.02 K/uL  Auto Neutrophil % : 91.4 %  Auto Lymphocyte % : 2.9 %  Auto Monocyte % : 2.6 %  Auto Eosinophil % : 1.9 %  Auto Basophil % : 0.1 %    2022 06:04    142    |  107    |  34     ----------------------------<  145    4.9     |  32     |  1.10     Ca    8.8        2022 06:04  Phos  3.3       2022 06:04  Mg     2.2       2022 06:04    TPro  6.2    /  Alb  1.8    /  TBili  0.3    /  DBili  x      /  AST  62     /  ALT  23     /  AlkPhos  91     2022 06:04      Urinalysis Basic - ( 2022 13:50 )    Color: Yellow / Appearance: Slightly Turbid / S.020 / pH: x  Gluc: x / Ketone: Negative  / Bili: Negative / Urobili: Negative   Blood: x / Protein: 100 / Nitrite: Negative   Leuk Esterase: Moderate / RBC: 11-25 /HPF / WBC 11-25   Sq Epi: x / Non Sq Epi: Occasional / Bacteria: Occasional      CAPILLARY BLOOD GLUCOSE      POCT Blood Glucose.: 166 mg/dL (2022 05:45)  POCT Blood Glucose.: 152 mg/dL (2022 02:50)  POCT Blood Glucose.: 156 mg/dL (2022 17:16)  POCT Blood Glucose.: 177 mg/dL (2022 11:14)        Culture - Sputum (collected 22 @ 13:50)  Source: ET Tube ET Tube  Gram Stain (22 @ 22:30):    Few polymorphonuclear leukocytes per low power field    No Squamous epithelial cells per low power field    Few Gram Negative Rods per oil power field  Preliminary Report (22 @ 17:10):    Numerous Pseudomonas aeruginosa    Normal Respiratory Richa present    Culture - Blood (collected 22 @ 08:40)  Source: .Blood Blood-Peripheral  Preliminary Report (22 @ 12:02):    No growth to date.    Culture - Blood (collected 22 @ 08:35)  Source: .Blood Blood-Peripheral  Preliminary Report (22 @ 12:02):    No growth to date.    Culture - Sputum (collected 22 @ 20:58)  Source: ET Tube ET Tube  Gram Stain (22 @ 23:57):    Moderate polymorphonuclear leukocytes per low power field    Rare Squamous epithelial cells per low power field    Few Gram Positive Rods per oil power field    Rare Yeast per oil power field  Final Report (22 @ 14:19):    Moderate Escherichia coli    Normal Respiratory Richa present  Organism: Escherichia coli (22 @ 14:19)  Organism: Escherichia coli (22 @ 14:19)      -  Amikacin: S <=16      -  Amoxicillin/Clavulanic Acid: S <=8/4      -  Ampicillin: S <=8 These ampicillin results predict results for amoxicillin      -  Ampicillin/Sulbactam: S <=4/2 Enterobacter, Klebsiella aerogenes, Citrobacter, and Serratia may develop resistance during prolonged therapy (3-4 days)      -  Aztreonam: S <=4      -  Cefazolin: S <=2 Enterobacter, Klebsiella aerogenes, Citrobacter, and Serratia may develop resistance during prolonged therapy (3-4 days)      -  Cefepime: S <=2      -  Cefoxitin: S <=8      -  Ceftriaxone: S <=1 Enterobacter, Klebsiella aerogenes, Citrobacter, and Serratia may develop resistance during prolonged therapy      -  Ciprofloxacin: S <=0.25      -  Ertapenem: S <=0.5      -  Gentamicin: S <=2      -  Imipenem: S <=1      -  Levofloxacin: S <=0.5      -  Meropenem: S <=1      -  Piperacillin/Tazobactam: S <=8      -  Tobramycin: S <=2      -  Trimethoprim/Sulfamethoxazole: S <=0.5/9.5      Method Type: SAMY        RADIOLOGY & ADDITIONAL TESTS:    Personally reviewed.     Consultant(s) Notes Reviewed:  [x] YES  [ ] NO

## 2022-06-25 NOTE — PROGRESS NOTE ADULT - ASSESSMENT
Assessment:  1. Septic shock --> resolved  2. E. coli pna   3. Pseudomonal UTI   4. Aspiration pneumonitis   5. VAP w/pseudomonas   6. Acute hypoxic respiratory failure   7. ÁNGEL  8. Acute metabolic encephalopathy     Plan:  Neuro: C/w Precedex w/daily SAT. C/w home Celexa, Primidone, and Sinemet   CV: Shock state resolved. Monitor HR and BP. Off stress dose steroids   Pulm: Patient currently on Full vent support. Titrate settings to maintain SaO2 >90%, or pH >7.25. Consider low tidal volume ventilation strategy w/ goal Tv 4-6 cc/kg of ideal body weight. Plateau pressure goal <30. Peridex oral care. Aggressive pulmonary toilet. Daily sedation vacation with spontaneous breathing trial if clinical condition warrants, discuss with respiratory therapy. Failing SBT, now w/VAP, consider ETT exchange vs trache vs palliative extubation   Renal: ÁNGEL resolved. Hypernatremia resolved, c/w Free water. Strict I/Os, goal UOP >0.5cc/kg/hr. Trend renal function and electrolytes, replete as needed. Avoid nephrotoxic agents  GI: Tube feeds, PPI   ID: Merrem for pseudomonal VAP. Treated for E coli PNA and Pseudomonal UTI. C/w Tobramycin. F/u cultures. Trend WBC/fevers/procalcitonin   Heme: HSQ for DVT prophylaxis   Endo: Goal blood glucose <180  Dispo: Prognosis remains guarded. Patient failing liberation from ventilator. Given comorbidities and advanced age, GOC discussions underway. Trache vs palliative extubation     CRITICAL CARE TIME SPENT: 35 minutes assessing presenting problems of acute illness, which pose high probability of life threatening deterioration or end organ damage/dysfunction, as well as medical decision making including initiating plan of care, reviewing data, reviewing radiologic exams, discussing with multidisciplinary team,  discussing goals of care with patient/family, and writing this note.  Non-inclusive of procedures performed   Assessment:  1. Septic shock --> resolved  2. E. coli pna   3. Pseudomonal UTI   4. Aspiration pneumonitis   5. VAP w/pseudomonas   6. Acute hypoxic respiratory failure   7. ÁGNEL  8. Acute metabolic encephalopathy     Plan:  Neuro: Off sedation. C/w home Celexa, Primidone, and Sinemet   CV: Shock state resolved. Monitor HR and BP. Off stress dose steroids   Pulm: Patient currently on Full vent support. Titrate settings to maintain SaO2 >90%, or pH >7.25. Consider low tidal volume ventilation strategy w/ goal Tv 4-6 cc/kg of ideal body weight. Plateau pressure goal <30. Peridex oral care. Aggressive pulmonary toilet. Daily sedation vacation with spontaneous breathing trial if clinical condition warrants, discuss with respiratory therapy. Failing SBT, now w/VAP, consider ETT exchange vs trache vs palliative extubation   Renal: ÁNGEL resolved. Hypernatremia resolved, c/w Free water. Strict I/Os, goal UOP >0.5cc/kg/hr. Trend renal function and electrolytes, replete as needed. Avoid nephrotoxic agents  GI: Tube feeds, PPI   ID: Merrem for pseudomonal VAP. Treated for E coli PNA and Pseudomonal UTI. C/w Tobramycin. F/u cultures. Trend WBC/fevers/procalcitonin   Heme: HSQ for DVT prophylaxis   Endo: Goal blood glucose <180  Dispo: Prognosis remains guarded. Patient failing liberation from ventilator. Given comorbidities and advanced age, C discussions underway. Trache vs palliative extubation     CRITICAL CARE TIME SPENT: 35 minutes assessing presenting problems of acute illness, which pose high probability of life threatening deterioration or end organ damage/dysfunction, as well as medical decision making including initiating plan of care, reviewing data, reviewing radiologic exams, discussing with multidisciplinary team,  discussing goals of care with patient/family, and writing this note.  Non-inclusive of procedures performed

## 2022-06-25 NOTE — PROGRESS NOTE ADULT - ATTENDING COMMENTS
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 coli pneumonia (likely aspiration) + Pseudomonas UTI, resulting in acute metabolic encephalopathy, lactic acidosis, septic shock, and ÁNGEL. Now with prolonged respiratory failure and new fevers secondary to Pseudomonas aeruginosa VAP.    1. Neuro: ammonia elevated to 60, start lactulose and rifaximin. Decrease primidone to 25 mg daily. Continue sinemet and citalopram  2. CV: resolved shock, remains off pressors. S/p stress steroids. Hold enalapril given resolving ÁNGEL. Continue statin  3. Pulm: continue lung protective ventilation. Daily SBT as tolerates, did not tolerate much pressure support today. Continue airway clearance therapy with albuterol, ipratropium, and hypertonic saline  4. GI: continue Vital feeds via PEG. Continue PPI as per home regimen  5. Renal: ÁNGEL 2/2 ATN now resolving. Strict I/O's, close monitoring of kidney function and lytes. Continue free water 350 mL q4h  6. ID: E. coli pneumonia and Pseudomonas UTI s/p course of Zosyn. Now with Pseudomonas VAP, s/p Meropenem (day 3). Continue tobramycin nebs. F/up sensitivities. Blood cultures negative to date  7. Heme: thrombocytopenia, possibly due to sepsis vs. drug-induced. HIT negative. Continue to monitor. Continue HSQ for DVT ppx. Consider restarting therapeutic enoxaparin given recent CVA and suspected hypercoagulable state  8. Endo: hypercalcemia improved with pamidronate. PTHrP negative. Interestingly the 1,25-diOH vitamin D level is elevated, although patient does not have history of granulomatous inflammation. S/p pamidronic acid 6/20  9. Skin: No lines, d/c banks and trial of void  10. Dispo: DNR, discussed with daughter Viki at bedside. Overall prognosis guarded given significant functional decline over the past few months, unable to initiate immunotherapy for his recurrent tongue cancer. If unable to come off vent by next week, will need to discuss regarding tracheostomy vs. palliative extubation. She is unsure currently  CC time spent: 35 min

## 2022-06-25 NOTE — PROGRESS NOTE ADULT - ASSESSMENT
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     #Septic shock   - sepsis 2/2 e.coli pneumonia and pseudomonas UTI.   - blood cultures negative. ET culture initially with moderate E.coli. repeat with numerous pseudomonas   - currently on meropenem (day 3), continue   - shock resolved. no longer on pressor support    #Acute hypoxic respiratory failure   - 2/2 pneumonia. remains intbuated  - vent management and weaning trials per ICU    #Metabolic encephalopathy  - off sedation  - continue     #ÁNGEL  - suspect 2/2 ATN in setting of sepsis   - resolved  - banks in place for strict I&O    #Prophylactic measure  - continue PPI   - continue SQ heparin for vte ppx 76 year old male with history of prostate cancer, SCC of tongue s/p partial resection now with recurrence, parkinson's disease, hypertension, hyperlipidemia, recent CVA, and oropharyngeal dysphagia admitted for septic shock and acute hypoxic respiratory failure in the setting of b/l lower lobe PNA and UTI.     #Septic shock   - sepsis 2/2 e.coli pneumonia and pseudomonas UTI.   - blood cultures negative. ET culture initially with moderate E.coli. repeat with numerous pseudomonas   - currently on meropenem (day 3), continue   - shock resolved. no longer on pressor support    #Acute hypoxic respiratory failure   - 2/2 pneumonia. remains intubated  - vent management and weaning trials per ICU    #Metabolic encephalopathy  - off sedation  - ammonia 60 this am , continue lactulose and rifaxamin   - primidone dose decreased  - continue sinemet     #ÁNGEL  - suspect 2/2 ATN in setting of sepsis   - resolved  - banks in place for strict I&O    #Prophylactic measure  - continue PPI   - continue SQ heparin for vte ppx

## 2022-06-25 NOTE — PROGRESS NOTE ADULT - SUBJECTIVE AND OBJECTIVE BOX
Interval Events:     Review of Systems:  Constitutional: No fever, chills, fatigue  Neuro: No headache, numbness, weakness  Resp: No cough, wheezing, shortness of breath  CVS: No chest pain, palpitations, leg swelling  GI: No abdominal pain, nausea, vomiting, diarrhea   : No dysuria, frequency, incontinence  Skin: No itching, burning, rashes, or lesions   Msk: No joint pain or swelling  Psych: No depression, anxiety, mood swings    ICU Vital Signs Last 24 Hrs  T(C): 37.5 (2022 12:24), Max: 38.3 (2022 19:37)  T(F): 99.5 (2022 12:24), Max: 100.9 (2022 19:37)  HR: 69 (2022 13:00) (57 - 104)  BP: 137/65 (:00) (87/51 - 175/81)  BP(mean): 94 (:00) (62 - 117)  ABP: --  ABP(mean): --  RR: 25 (2022 13:00) (24 - 33)  SpO2: 100% (2022 13:00) (87% - 100%)      Mode: AC/ CMV (Assist Control/ Continuous Mandatory Ventilation), RR (machine): 24, TV (machine): 440, FiO2: 30, PEEP: 5, PS: 15  22 @ 07:01  -  22 @ 07:00  --------------------------------------------------------  IN: 3620 mL / OUT: 1616 mL / NET: 2004 mL    22 @ 07:01  -  22 @ 13:13  --------------------------------------------------------  IN: 960 mL / OUT: 610 mL / NET: 350 mL        CAPILLARY BLOOD GLUCOSE      POCT Blood Glucose.: 166 mg/dL (2022 05:45)      I&O's Summary    2022 07:01  -  2022 07:00  --------------------------------------------------------  IN: 3620 mL / OUT: 1616 mL / NET: 2004 mL    2022 07:01  -  2022 13:13  --------------------------------------------------------  IN: 960 mL / OUT: 610 mL / NET: 350 mL        Physical Exam:   Gen:   Neuro:   HEENT:  Resp:  CVS:  Abd:  Ext:  Skin:     Meds:  meropenem  IVPB IV Intermittent  rifAXIMin Oral  tobramycin for Nebulization Inhalation      atorvastatin Oral    albuterol/ipratropium for Nebulization Nebulizer  sodium chloride 3%  Inhalation Inhalation    acetaminophen     Tablet .. Oral PRN  carbidopa/levodopa  25/100 Oral  citalopram Oral  primidone Oral      heparin   Injectable SubCutaneous    lactulose Syrup Oral  pantoprazole   Suspension Oral          chlorhexidine 0.12% Liquid Oral Mucosa  chlorhexidine 2% Cloths Topical                              9.2    13.38 )-----------( Clumped    ( 2022 06:04 )             29.1       06-25    142  |  107  |  34<H>  ----------------------------<  145<H>  4.9   |  32<H>  |  1.10    Ca    8.8      2022 06:04  Phos  3.3     06-25  Mg     2.2     06-25    TPro  6.2  /  Alb  1.8<L>  /  TBili  0.3  /  DBili  x   /  AST  62<H>  /  ALT  23  /  AlkPhos  91  06-25            Urinalysis Basic - ( 2022 13:50 )    Color: Yellow / Appearance: Slightly Turbid / S.020 / pH: x  Gluc: x / Ketone: Negative  / Bili: Negative / Urobili: Negative   Blood: x / Protein: 100 / Nitrite: Negative   Leuk Esterase: Moderate / RBC: 11-25 /HPF / WBC 11-25   Sq Epi: x / Non Sq Epi: Occasional / Bacteria: Occasional      ET Tube ET Tube   Numerous Pseudomonas aeruginosa  Normal Respiratory Richa present   Few polymorphonuclear leukocytes per low power field  No Squamous epithelial cells per low power field  Few Gram Negative Rods per oil power field  @ 13:50  .Blood Blood-Peripheral   No growth to date. --  @ 08:40  .Blood Blood-Peripheral   No growth to date. --  @ 08:35              Radiology:    Bedside Ultrasound:    Tubes/Lines:      GLOBAL ISSUE/BEST PRACTICE:  Analgesia:  Sedation:  HOB elevation: Y  Stress ulcer prophylaxis:  VTE prophylaxis:  Glycemic control:  Nutrition:    CODE STATUS:        Interval Events:   Patient seen and examined at bedside. Patient remains of pressors and sedation though obtunded and unresponsive at this time. Continues to fail CPAP, and remains on mechanical ventilation. Currently hemodynamically stable and labs remarkable for ammonia levels of 60.     Review of Systems:  Unable to obtain     ICU Vital Signs Last 24 Hrs  T(C): 37.5 (2022 12:24), Max: 38.3 (2022 19:37)  T(F): 99.5 (2022 12:24), Max: 100.9 (2022 19:37)  HR: 69 (2022 13:00) (57 - 104)  BP: 137/65 (:00) (87/51 - 175/81)  BP(mean): 94 (:00) (62 - 117)  ABP: --  ABP(mean): --  RR: 25 (2022 13:00) (24 - 33)  SpO2: 100% (:00) (87% - 100%)      Mode: AC/ CMV (Assist Control/ Continuous Mandatory Ventilation), RR (machine): 24, TV (machine): 440, FiO2: 30, PEEP: 5, PS: 15  22 @ 07:01  -  22 @ 07:00  --------------------------------------------------------  IN: 3620 mL / OUT: 1616 mL / NET: 2004 mL    22 @ 07:01  -  22 @ 13:13  --------------------------------------------------------  IN: 960 mL / OUT: 610 mL / NET: 350 mL        CAPILLARY BLOOD GLUCOSE      POCT Blood Glucose.: 166 mg/dL (2022 05:45)      I&O's Summary    2022 07:01  -  2022 07:00  --------------------------------------------------------  IN: 3620 mL / OUT: 1616 mL / NET: 2004 mL    2022 07:01  -  2022 13:13  --------------------------------------------------------  IN: 960 mL / OUT: 610 mL / NET: 350 mL        Physical Exam:   General: ill appearing male  CNS: intubated, off sedation. Not following commands  HEENT: ISHAN  Resp: scattered rhonchi  CVS: RRR, no murmurs  Abd: soft, NT/ND  Ext: no edema or cyanosis  Skin: warm and well perfused    Meds:  meropenem  IVPB IV Intermittent  rifAXIMin Oral  tobramycin for Nebulization Inhalation      atorvastatin Oral    albuterol/ipratropium for Nebulization Nebulizer  sodium chloride 3%  Inhalation Inhalation    acetaminophen     Tablet .. Oral PRN  carbidopa/levodopa  25/100 Oral  citalopram Oral  primidone Oral      heparin   Injectable SubCutaneous    lactulose Syrup Oral  pantoprazole   Suspension Oral          chlorhexidine 0.12% Liquid Oral Mucosa  chlorhexidine 2% Cloths Topical                              9.2    13.38 )-----------( Clumped    ( 2022 06:04 )             29.1       06-    142  |  107  |  34<H>  ----------------------------<  145<H>  4.9   |  32<H>  |  1.10    Ca    8.8      2022 06:04  Phos  3.3     06-25  Mg     2.2         TPro  6.2  /  Alb  1.8<L>  /  TBili  0.3  /  DBili  x   /  AST  62<H>  /  ALT  23  /  AlkPhos  91  06            Urinalysis Basic - ( 2022 13:50 )    Color: Yellow / Appearance: Slightly Turbid / S.020 / pH: x  Gluc: x / Ketone: Negative  / Bili: Negative / Urobili: Negative   Blood: x / Protein: 100 / Nitrite: Negative   Leuk Esterase: Moderate / RBC: 11-25 /HPF / WBC 11-25   Sq Epi: x / Non Sq Epi: Occasional / Bacteria: Occasional      ET Tube ET Tube   Numerous Pseudomonas aeruginosa  Normal Respiratory Richa present   Few polymorphonuclear leukocytes per low power field  No Squamous epithelial cells per low power field  Few Gram Negative Rods per oil power field  @ 13:50  .Blood Blood-Peripheral   No growth to date. --  @ 08:40  .Blood Blood-Peripheral   No growth to date. --  @ 08:35              Radiology: No new imaging in last 24hrs     Bedside Ultrasound: N/A     Tubes/Lines:   CENTRAL LINE: N       MENJIVAR: Y                          DATE INSERTED:               REMOVE: N  A-LINE: N                         PEG Tube: inserted     GLOBAL ISSUE/BEST PRACTICE:  Analgesia: Y  Sedation: N  HOB elevation: Y  Stress ulcer prophylaxis: Y  VTE prophylaxis: Y  Glycemic control: N  Nutrition: Y    CODE STATUS: Full code

## 2022-06-25 NOTE — PROGRESS NOTE ADULT - ASSESSMENT
76y.o. Male dysphagia  FTT    s/p peg placement 6/21  con't feeds  flush peg after use  will follow    Advanced care planning was discussed with patient and family.  Advanced care planning forms were reviewed and discussed.  Risks, benefits and alternatives of gastroenterologic procedures were discussed in detail and all questions were answered.    30 minutes spent.

## 2022-06-26 NOTE — PROGRESS NOTE ADULT - SUBJECTIVE AND OBJECTIVE BOX
Interval Events:     Review of Systems:  Constitutional: No fever, chills, fatigue  Neuro: No headache, numbness, weakness  Resp: No cough, wheezing, shortness of breath  CVS: No chest pain, palpitations, leg swelling  GI: No abdominal pain, nausea, vomiting, diarrhea   : No dysuria, frequency, incontinence  Skin: No itching, burning, rashes, or lesions   Msk: No joint pain or swelling  Psych: No depression, anxiety, mood swings    ICU Vital Signs Last 24 Hrs  T(C): 37.3 (26 Jun 2022 08:09), Max: 37.8 (25 Jun 2022 23:59)  T(F): 99.2 (26 Jun 2022 08:09), Max: 100 (25 Jun 2022 23:59)  HR: 91 (26 Jun 2022 10:00) (59 - 101)  BP: 154/80 (26 Jun 2022 10:00) (73/49 - 171/84)  BP(mean): 111 (26 Jun 2022 10:00) (58 - 120)  ABP: --  ABP(mean): --  RR: 27 (26 Jun 2022 10:00) (24 - 29)  SpO2: 98% (26 Jun 2022 10:00) (92% - 100%)      Mode: AC/ CMV (Assist Control/ Continuous Mandatory Ventilation), RR (machine): 24, TV (machine): 440, FiO2: 40, PEEP: 5  06-25-22 @ 07:01  -  06-26-22 @ 07:00  --------------------------------------------------------  IN: 3920 mL / OUT: 610 mL / NET: 3310 mL    06-26-22 @ 07:01  -  06-26-22 @ 10:30  --------------------------------------------------------  IN: 315 mL / OUT: 0 mL / NET: 315 mL        CAPILLARY BLOOD GLUCOSE      POCT Blood Glucose.: 114 mg/dL (26 Jun 2022 05:56)      I&O's Summary    25 Jun 2022 07:01  -  26 Jun 2022 07:00  --------------------------------------------------------  IN: 3920 mL / OUT: 610 mL / NET: 3310 mL    26 Jun 2022 07:01  -  26 Jun 2022 10:30  --------------------------------------------------------  IN: 315 mL / OUT: 0 mL / NET: 315 mL        Physical Exam:   Gen:   Neuro:   HEENT:  Resp:  CVS:  Abd:  Ext:  Skin:     Meds:  meropenem  IVPB IV Intermittent  rifAXIMin Oral  tobramycin for Nebulization Inhalation      atorvastatin Oral    acetylcysteine 10%  Inhalation Inhalation  albuterol/ipratropium for Nebulization Nebulizer  sodium chloride 3%  Inhalation Inhalation    acetaminophen     Tablet .. Oral PRN  carbidopa/levodopa  25/100 Oral  citalopram Oral  primidone Oral      enoxaparin Injectable SubCutaneous    lactulose Syrup Oral  pantoprazole   Suspension Oral          chlorhexidine 0.12% Liquid Oral Mucosa  chlorhexidine 2% Cloths Topical                              7.9    8.84  )-----------( 190      ( 26 Jun 2022 06:05 )             24.0       06-26    143  |  106  |  34<H>  ----------------------------<  110<H>  4.6   |  35<H>  |  1.00    Ca    8.8      26 Jun 2022 06:05  Phos  2.3     06-26  Mg     2.1     06-26    TPro  5.5<L>  /  Alb  1.7<L>  /  TBili  0.4  /  DBili  x   /  AST  71<H>  /  ALT  22  /  AlkPhos  83  06-26              ET Tube ET Tube   Numerous Pseudomonas aeruginosa  Normal Respiratory Richa present   Few polymorphonuclear leukocytes per low power field  No Squamous epithelial cells per low power field  Few Gram Negative Rods per oil power field 06-23 @ 13:50  .Blood Blood-Peripheral   No growth to date. -- 06-23 @ 08:40  .Blood Blood-Peripheral   No growth to date. -- 06-23 @ 08:35              Radiology:    Bedside Ultrasound:    Tubes/Lines:      GLOBAL ISSUE/BEST PRACTICE:  Analgesia:  Sedation:  HOB elevation: Y  Stress ulcer prophylaxis:  VTE prophylaxis:  Glycemic control:  Nutrition:    CODE STATUS:        Interval Events:   Patient seen and examined at bedside. Appears more awake than past few days, opens eyes and follows commands. Patient started on Rifaximin and Lactulose and half dosed of home Primidone Overnight, noted to desat in the 70s on Fi03 of 30% likely 2/2 to mucous plug. This morning, patient placed on trial on CPAP and will continue to monitor. Unable to obtain meaningful history at this time.      Review of Systems:  Unable to obtain 2/2 to mental status and intubation     ICU Vital Signs Last 24 Hrs  T(C): 37.3 (26 Jun 2022 08:09), Max: 37.8 (25 Jun 2022 23:59)  T(F): 99.2 (26 Jun 2022 08:09), Max: 100 (25 Jun 2022 23:59)  HR: 91 (26 Jun 2022 10:00) (59 - 101)  BP: 154/80 (26 Jun 2022 10:00) (73/49 - 171/84)  BP(mean): 111 (26 Jun 2022 10:00) (58 - 120)  ABP: --  ABP(mean): --  RR: 27 (26 Jun 2022 10:00) (24 - 29)  SpO2: 98% (26 Jun 2022 10:00) (92% - 100%)      Mode: AC/ CMV (Assist Control/ Continuous Mandatory Ventilation), RR (machine): 24, TV (machine): 440, FiO2: 40, PEEP: 5  06-25-22 @ 07:01  -  06-26-22 @ 07:00  --------------------------------------------------------  IN: 3920 mL / OUT: 610 mL / NET: 3310 mL    06-26-22 @ 07:01  -  06-26-22 @ 10:30  --------------------------------------------------------  IN: 315 mL / OUT: 0 mL / NET: 315 mL        CAPILLARY BLOOD GLUCOSE      POCT Blood Glucose.: 114 mg/dL (26 Jun 2022 05:56)      I&O's Summary    25 Jun 2022 07:01  -  26 Jun 2022 07:00  --------------------------------------------------------  IN: 3920 mL / OUT: 610 mL / NET: 3310 mL    26 Jun 2022 07:01  -  26 Jun 2022 10:30  --------------------------------------------------------  IN: 315 mL / OUT: 0 mL / NET: 315 mL        Physical Exam:   General: ill appearing male  CNS: intubated, off sedation. following commands  HEENT: ISHAN  Resp: scattered rhonchi  CVS: RRR, no murmurs  Abd: soft, NT/ND  Ext: edema in UE or cyanosis  Skin: warm and well perfused     Meds:  meropenem  IVPB IV Intermittent  rifAXIMin Oral  tobramycin for Nebulization Inhalation      atorvastatin Oral    acetylcysteine 10%  Inhalation Inhalation  albuterol/ipratropium for Nebulization Nebulizer  sodium chloride 3%  Inhalation Inhalation    acetaminophen     Tablet .. Oral PRN  carbidopa/levodopa  25/100 Oral  citalopram Oral  primidone Oral      enoxaparin Injectable SubCutaneous    lactulose Syrup Oral  pantoprazole   Suspension Oral          chlorhexidine 0.12% Liquid Oral Mucosa  chlorhexidine 2% Cloths Topical                              7.9    8.84  )-----------( 190      ( 26 Jun 2022 06:05 )             24.0       06-26    143  |  106  |  34<H>  ----------------------------<  110<H>  4.6   |  35<H>  |  1.00    Ca    8.8      26 Jun 2022 06:05  Phos  2.3     06-26  Mg     2.1     06-26    TPro  5.5<L>  /  Alb  1.7<L>  /  TBili  0.4  /  DBili  x   /  AST  71<H>  /  ALT  22  /  AlkPhos  83  06-26              ET Tube ET Tube   Numerous Pseudomonas aeruginosa  Normal Respiratory Richa present   Few polymorphonuclear leukocytes per low power field  No Squamous epithelial cells per low power field  Few Gram Negative Rods per oil power field 06-23 @ 13:50  .Blood Blood-Peripheral   No growth to date. -- 06-23 @ 08:40  .Blood Blood-Peripheral   No growth to date. -- 06-23 @ 08:35              Radiology: No new imaging     Bedside Ultrasound: N/A     CENTRAL LINE: N       MENJIVAR: Y                          DATE INSERTED: 6/17              D/C: 6/25/22   A-LINE: N                         PEG Tube: inserted 6/22    GLOBAL ISSUE/BEST PRACTICE:  Analgesia: Y   Sedation: N   HOB elevation: Y  Stress ulcer prophylaxis: Y   VTE prophylaxis: Y   Glycemic control: N   Nutrition: Y     CODE STATUS: Full code

## 2022-06-26 NOTE — PROGRESS NOTE ADULT - SUBJECTIVE AND OBJECTIVE BOX
CAPILLARY BLOOD GLUCOSE      POCT Blood Glucose.: 145 mg/dL (26 Jun 2022 11:01)  POCT Blood Glucose.: 114 mg/dL (26 Jun 2022 05:56)  POCT Blood Glucose.: 153 mg/dL (25 Jun 2022 23:28)  POCT Blood Glucose.: 155 mg/dL (25 Jun 2022 17:51)      Vital Signs Last 24 Hrs  T(C): 37.6 (26 Jun 2022 12:00), Max: 37.8 (25 Jun 2022 23:59)  T(F): 99.7 (26 Jun 2022 12:00), Max: 100 (25 Jun 2022 23:59)  HR: 83 (26 Jun 2022 12:00) (69 - 108)  BP: 136/77 (26 Jun 2022 12:00) (73/49 - 192/91)  BP(mean): 101 (26 Jun 2022 12:00) (58 - 130)  RR: 28 (26 Jun 2022 12:00) (24 - 29)  SpO2: 96% (26 Jun 2022 12:00) (86% - 100%)    General: WN/WD NAD  Respiratory: CTA B/L  CV: RRR, S1S2, no murmurs, rubs or gallops  Abdominal: Soft, NT, ND +BS, Last BM  Extremities: No edema, + peripheral pulses     06-26    143  |  106  |  34<H>  ----------------------------<  110<H>  4.6   |  35<H>  |  1.00    Ca    8.8      26 Jun 2022 06:05  Phos  2.3     06-26  Mg     2.1     06-26    TPro  5.5<L>  /  Alb  1.7<L>  /  TBili  0.4  /  DBili  x   /  AST  71<H>  /  ALT  22  /  AlkPhos  83  06-26      atorvastatin 80 milliGRAM(s) Oral at bedtime

## 2022-06-26 NOTE — PROGRESS NOTE ADULT - ATTENDING COMMENTS
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 coli pneumonia (likely aspiration) + Pseudomonas UTI, resulting in acute metabolic encephalopathy, lactic acidosis, septic shock, and ÁNGEL. Now with prolonged respiratory failure and new fevers secondary to Pseudomonas aeruginosa VAP.    1. Neuro: continue lactulose and rifaximin for hyperammonemia. Continue reduced dose primidone. Continue sinemet and citalopram. More awake and alert today, opening eyes and moving extremities but with profound weakness  2. CV: HD stable. S/p stress steroids. Hold enalapril given resolving ÁNGEL. Continue statin  3. Pulm: continue lung protective ventilation. Daily SBT as tolerates, did 1 hour of pressure support 15/5 today but then with rapid shallow breathing and hypoxemia. Continue airway clearance therapy with albuterol, ipratropium, mucomyst and hypertonic saline  4. GI: continue Vital feeds via PEG. Continue PPI as per home regimen  5. Renal: ÁNGEL 2/2 ATN now resolving. Strict I/O's, close monitoring of kidney function and lytes. Continue free water 350 mL q4h  6. ID: E. coli pneumonia and Pseudomonas UTI s/p course of Zosyn. Now with Pseudomonas VAP, s/p Meropenem (day 4). Continue tobramycin nebs. Blood cultures negative to date  7. Heme: thrombocytopenia resolved, likely due to sepsis vs. drug-induced. HIT negative. Start therapeutic enoxaparin given recent CVA and suspected hypercoagulable state  8. Endo: hypercalcemia improved with pamidronate. PTHrP negative. Interestingly the 1,25-diOH vitamin D level is elevated, although patient does not have history of granulomatous inflammation. S/p pamidronic acid 6/20  9. Skin: no lines or banks  10. Dispo: DNR, discussed with daughter Viki at bedside. Overall prognosis guarded given significant functional decline over the past few months, unable to initiate immunotherapy for his recurrent tongue cancer. If unable to come off vent by next week, will need to discuss regarding tracheostomy vs. palliative extubation. She is unsure currently  CC time spent: 35 min

## 2022-06-27 NOTE — PROGRESS NOTE ADULT - ATTENDING COMMENTS
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 coli pneumonia + Pseudomonas UTI, resulting in acute metabolic encephalopathy, lactic acidosis, septic shock, and ÁNGEL. Now with prolonged respiratory failure and new fevers secondary to Pseudomonas aeruginosa VAP.    --persistent encephalopathy  continue lower dose of primidone  hyperammonemia, continue lactulose and xifaximen  PD, continue sinemet  Hx stroke, continue full dose AC with lovenox  --htn, stable  HLD, continue statin  --acute hypoxemic respiratory failure  poor respiratory mechanics on weaning trials, worsened by encephalopathy and large secretions  continue secretion clearance measures  daily PS trials as tolerates, today on PA 12/5  --dysphagia, continue TF via PEG  --normal renal function  --pseudomonas pneumonia and UTI, E. coli pneumonia  continue meropenem and Nico nebs  --overall prognosis poor given persistent respiratory failure and underlying malignancy.

## 2022-06-27 NOTE — PROGRESS NOTE ADULT - ASSESSMENT
Pt is a 75 y/o M pmhx of HTN, HLD, recent CVA and oropharyngeal dysphagia, prostate CA, SCC of tongue s/p partial resection who presented to [facility] for hypoxic respiratory failure and septic shock 2nd to PNA. Subsequently found to have UTI, lactic acidosis, ÁNGEL and hypernatremia.     1. Sepsis   2. E. Coli + PNA   3. Acute hypoxic respiratory failure   4. Pseudomonas VAP  5. Aspiration pneumonitis  6. ÁNGEL     Plan:     Neuro: Remains off sedation but unresponsive on vent. R sided gaze on PE, CT head noncon to evaluate for any changes/bleeding.     CV: Remains off pressors, continue to monitor HD and maintain MAP >65.     Pulm: Remains on full vent support w/ low TV ventilation 4-6cc/kg, will titrate down FiO2 as tolerated, continue CPAP trials in AM. Duonebs as needed for hypoxia. VAP/ASP PNA on meropenem, w/ aspiration precautions/vent bundle.      GI: Diet: NPO w/ tube feeds, Protonix for GI PPX     Renal: ÁNGEL since resolved, good Urine output, monitor and maintain U/O >0.5cc/kg/hr. Avoid nephrotoxic meds.     Endo: Glucose <180, Mag>2, K>4    Heme: Lovenox for possible hypercoagulable state and hx of stroke, will hold tonight pending CT head.     ID: Septic shock since resolved, Cultures + for pseudomonas on 6/23/22 on meropenem for VAP, ASP PNA, continue to trend markers of infection  Pt is a 75 y/o M pmhx of HTN, HLD, recent CVA and oropharyngeal dysphagia, prostate CA, SCC of tongue s/p partial resection who presented to [facility] for hypoxic respiratory failure and septic shock 2nd to PNA. Subsequently found to have UTI, lactic acidosis, ÁNGEL and hypernatremia.     1. Sepsis   2. E. Coli + PNA   3. Acute hypoxic respiratory failure   4. Pseudomonas VAP  5. Aspiration pneumonitis  6. ÁNGEL     Plan:     Neuro: Remains off sedation following minimal commands, avoid sedating medications.     CV: Remains off pressors, continue to monitor HD and maintain MAP >65.     Pulm: Remains on full vent support w/ low TV ventilation 4-6cc/kg, will titrate down FiO2 as tolerated, continue CPAP trials in AM. Duonebs as needed for hypoxia. VAP/ASP PNA on meropenem, w/ aspiration precautions/vent bundle.      GI: Diet: NPO w/ tube feeds, Protonix for GI PPX     Renal: ÁNGEL since resolved, good Urine output, monitor and maintain U/O >0.5cc/kg/hr. Avoid nephrotoxic meds.     Endo: Glucose <180, Mag>2, K>4    Heme: Lovenox for possible hypercoagulable state and hx of stroke.     ID: Septic shock since resolved, Cultures + for pseudomonas on 6/23/22 on meropenem for VAP, ASP PNA, continue to trend markers of infection  Pt is a 77 y/o M pmhx of HTN, HLD, recent CVA and oropharyngeal dysphagia, prostate CA, SCC of tongue s/p partial resection who presented to [facility] for hypoxic respiratory failure and septic shock 2nd to PNA. Subsequently found to have UTI, lactic acidosis, ÁNGEL and hypernatremia.     1. Sepsis   2. E. Coli + PNA   3. Acute hypoxic respiratory failure   4. Pseudomonas VAP  5. Aspiration pneumonitis  6. ÁNGEL     Plan:     Neuro: Remains off sedation following minimal commands, avoid sedating medications.     CV: Remains off pressors, continue to monitor HD and maintain MAP >65.     Pulm: Remains on full vent support w/ low TV ventilation 4-6cc/kg, will titrate down FiO2 as tolerated, continue CPAP trials in AM. Duonebs as needed for hypoxia. VAP/ASP PNA on meropenem, w/ aspiration precautions/vent bundle.      GI: Diet: NPO w/ tube feeds, Protonix for GI PPX     Renal: ÁNGEL since resolved, good Urine output, monitor and maintain U/O >0.5cc/kg/hr. Avoid nephrotoxic meds.     Endo: Glucose <180, Mag>2, K>4    Heme: Lovenox for possible hypercoagulable state and hx of stroke.     ID: Septic shock since resolved, Cultures + for pseudomonas on 6/23/22 on meropenem for VAP, ASP PNA, continue to trend markers of infection     Dispo: DNR, copy in chart.

## 2022-06-27 NOTE — PROGRESS NOTE ADULT - TIME BILLING
direct care of a critically ill patient including but not limited to reviewing chart, medications ,laboratory data, imaging reports, discussion of plan of care with consultants on the case, coordination of care with multidisciplinary team involved in the case and discussion of plan with ICU team.  Overall prognosis is poor. direct care of a critically ill patient including but not limited to reviewing chart, medications ,laboratory data, imaging reports, discussion of plan of care with consultants on the case, coordination of care with multidisciplinary team involved in the case and discussion of plan with ICU team.  Overall prognosis is poor.    Continue peg feeds; aspiration precautions

## 2022-06-27 NOTE — PROGRESS NOTE ADULT - SUBJECTIVE AND OBJECTIVE BOX
MEDICAL ATTENDING NOTE    Patient is a 76y old  Male who presents with a chief complaint of Acute Hypoxic Respiratory Failure (27 Jun 2022 13:51)      INTERVAL HPI/OVERNIGHT EVENTS: intubated    MEDICATIONS  (STANDING):  acetylcysteine 10%  Inhalation 4 milliLiter(s) Inhalation every 6 hours  albuterol/ipratropium for Nebulization 3 milliLiter(s) Nebulizer every 6 hours  atorvastatin 80 milliGRAM(s) Oral at bedtime  carbidopa/levodopa  25/100 1 Tablet(s) Oral <User Schedule>  chlorhexidine 0.12% Liquid 15 milliLiter(s) Oral Mucosa every 12 hours  chlorhexidine 2% Cloths 1 Application(s) Topical <User Schedule>  citalopram 20 milliGRAM(s) Oral daily  enoxaparin Injectable 70 milliGRAM(s) SubCutaneous every 12 hours  lactulose Syrup 20 Gram(s) Oral every 8 hours  meropenem  IVPB 1000 milliGRAM(s) IV Intermittent every 8 hours  pantoprazole   Suspension 40 milliGRAM(s) Oral daily  primidone 25 milliGRAM(s) Oral daily  rifAXIMin 550 milliGRAM(s) Oral two times a day  sodium chloride 3%  Inhalation 4 milliLiter(s) Inhalation every 6 hours  tobramycin for Nebulization 300 milliGRAM(s) Inhalation every 12 hours    MEDICATIONS  (PRN):  acetaminophen     Tablet .. 650 milliGRAM(s) Oral every 6 hours PRN Temp greater or equal to 38C (100.4F), Mild Pain (1 - 3)      __________________________________________________  ----------------------------------------------------------------------------------  REVIEW OF SYSTEMS: unable to participate in ROS as he is intubated      Vital Signs Last 24 Hrs  T(C): 37.2 (27 Jun 2022 12:00), Max: 37.7 (27 Jun 2022 00:00)  T(F): 99 (27 Jun 2022 12:00), Max: 99.9 (27 Jun 2022 00:00)  HR: 96 (27 Jun 2022 15:00) (70 - 97)  BP: 121/67 (27 Jun 2022 15:00) (82/50 - 161/77)  BP(mean): 88 (27 Jun 2022 15:00) (59 - 112)  RR: 24 (27 Jun 2022 15:00) (24 - 42)  SpO2: 98% (27 Jun 2022 15:00) (87% - 100%)    _________________  PHYSICAL EXAM:  ---------------------------   NAD; Normocephalic;   LUNGS - no wheezing; bilateral air entry  HEART: S1 S2+   ABDOMEN: Soft, Nontender, non distended; BS+  EXTREMITIES: no cyanosis; no LE edema; UE edema++  NERVOUS SYSTEM:  Intubated     _________________________________________________  LABS:                        7.9    8.38  )-----------( 237      ( 27 Jun 2022 14:10 )             24.6     06-27    141  |  102  |  30<H>  ----------------------------<  170<H>  4.5   |  40<H>  |  0.90    Ca    9.1      27 Jun 2022 14:10  Phos  3.0     06-27  Mg     2.3     06-27    TPro  5.7<L>  /  Alb  1.6<L>  /  TBili  0.2  /  DBili  x   /  AST  37  /  ALT  24  /  AlkPhos  87  06-27    PT/INR - ( 27 Jun 2022 14:10 )   PT: 14.4 sec;   INR: 1.23 ratio         PTT - ( 27 Jun 2022 14:10 )  PTT:36.4 sec    CAPILLARY BLOOD GLUCOSE      POCT Blood Glucose.: 169 mg/dL (27 Jun 2022 11:44)  POCT Blood Glucose.: 136 mg/dL (26 Jun 2022 23:51)  POCT Blood Glucose.: 170 mg/dL (26 Jun 2022 16:45)                Plan of care was discussed with patient ; all questions and concerns were addressed and care was aligned with patient's wishes.             MEDICAL ATTENDING NOTE    Patient is a 76y old  Male who presents with a chief complaint of Acute Hypoxic Respiratory Failure (27 Jun 2022 13:51)      INTERVAL HPI/OVERNIGHT EVENTS: intubated    MEDICATIONS  (STANDING):  acetylcysteine 10%  Inhalation 4 milliLiter(s) Inhalation every 6 hours  albuterol/ipratropium for Nebulization 3 milliLiter(s) Nebulizer every 6 hours  atorvastatin 80 milliGRAM(s) Oral at bedtime  carbidopa/levodopa  25/100 1 Tablet(s) Oral <User Schedule>  chlorhexidine 0.12% Liquid 15 milliLiter(s) Oral Mucosa every 12 hours  chlorhexidine 2% Cloths 1 Application(s) Topical <User Schedule>  citalopram 20 milliGRAM(s) Oral daily  enoxaparin Injectable 70 milliGRAM(s) SubCutaneous every 12 hours  lactulose Syrup 20 Gram(s) Oral every 8 hours  meropenem  IVPB 1000 milliGRAM(s) IV Intermittent every 8 hours  pantoprazole   Suspension 40 milliGRAM(s) Oral daily  primidone 25 milliGRAM(s) Oral daily  rifAXIMin 550 milliGRAM(s) Oral two times a day  sodium chloride 3%  Inhalation 4 milliLiter(s) Inhalation every 6 hours  tobramycin for Nebulization 300 milliGRAM(s) Inhalation every 12 hours    MEDICATIONS  (PRN):  acetaminophen     Tablet .. 650 milliGRAM(s) Oral every 6 hours PRN Temp greater or equal to 38C (100.4F), Mild Pain (1 - 3)      __________________________________________________  ----------------------------------------------------------------------------------  REVIEW OF SYSTEMS: unable to participate in ROS as he is intubated      Vital Signs Last 24 Hrs  T(C): 37.2 (27 Jun 2022 12:00), Max: 37.7 (27 Jun 2022 00:00)  T(F): 99 (27 Jun 2022 12:00), Max: 99.9 (27 Jun 2022 00:00)  HR: 96 (27 Jun 2022 15:00) (70 - 97)  BP: 121/67 (27 Jun 2022 15:00) (82/50 - 161/77)  BP(mean): 88 (27 Jun 2022 15:00) (59 - 112)  RR: 24 (27 Jun 2022 15:00) (24 - 42)  SpO2: 98% (27 Jun 2022 15:00) (87% - 100%)    _________________  PHYSICAL EXAM:  ---------------------------   NAD; Normocephalic;   LUNGS - no wheezing; bilateral air entry  HEART: S1 S2+   ABDOMEN: Soft, Nontender, non distended; peg+; BS+  EXTREMITIES: no cyanosis; no LE edema; UE edema++  NERVOUS SYSTEM:  Intubated     _________________________________________________  LABS:                        7.9    8.38  )-----------( 237      ( 27 Jun 2022 14:10 )             24.6     06-27    141  |  102  |  30<H>  ----------------------------<  170<H>  4.5   |  40<H>  |  0.90    Ca    9.1      27 Jun 2022 14:10  Phos  3.0     06-27  Mg     2.3     06-27    TPro  5.7<L>  /  Alb  1.6<L>  /  TBili  0.2  /  DBili  x   /  AST  37  /  ALT  24  /  AlkPhos  87  06-27    PT/INR - ( 27 Jun 2022 14:10 )   PT: 14.4 sec;   INR: 1.23 ratio         PTT - ( 27 Jun 2022 14:10 )  PTT:36.4 sec    CAPILLARY BLOOD GLUCOSE      POCT Blood Glucose.: 169 mg/dL (27 Jun 2022 11:44)  POCT Blood Glucose.: 136 mg/dL (26 Jun 2022 23:51)  POCT Blood Glucose.: 170 mg/dL (26 Jun 2022 16:45)                Plan of care was discussed with patient ; all questions and concerns were addressed and care was aligned with patient's wishes.

## 2022-06-27 NOTE — PROGRESS NOTE ADULT - ASSESSMENT
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     1. Gram negative bacterial infection with sepsis    2. Acute hypoxic respiratory failure due to  pneumonia - remains intubated  3. Metabolic encephalopathy suspected hepatic with elevated ammonia level  4. ÁNGEL on CKD likely ATN  5. Severe Protein Calorie malnutrition  6. Hypoalbuminemia  7. Anemia- possibly multifactorial; monitor hb; no signs of active blood loss  8. h/o Parkinson's disease  9. Gram negative UTI     76 year old male with history of prostate cancer, SCC of tongue s/p partial resection now with recurrence, parkinson's disease, hypertension, hyperlipidemia, recent CVA, and oropharyngeal dysphagia admitted for septic shock and acute hypoxic respiratory failure in the setting of b/l lower lobe PNA and UTI.     1. Gram negative bacterial infection with sepsis    2. Acute hypoxic respiratory failure due to  pneumonia - remains intubated  3. Metabolic encephalopathy suspected hepatic with elevated ammonia level  4. ÁNGEL on CKD likely ATN  5. Severe Protein Calorie malnutrition s/p peg 6/ 21/22  6. Hypoalbuminemia  7. Anemia- possibly multifactorial; monitor hb; no signs of active blood loss  8. h/o Parkinson's disease  9. Gram negative UTI

## 2022-06-27 NOTE — PROGRESS NOTE ADULT - SUBJECTIVE AND OBJECTIVE BOX
Interval Events:   Patient seen and examined at bedside. Patient remains intubated, off sedation and pressors. Opens eyes when name called though today not able to follow commands. Vent settings last at PEEP: 5, TV: 440, RR: 24, FiO4: 40%. Unable to obtain meaningful history as patient intubated and confused. Pending morning labs. Hemodynamically stable.     Review of Systems:  Unable to obtain     ICU Vital Signs Last 24 Hrs  T(C): 37.2 (27 Jun 2022 12:00), Max: 37.7 (27 Jun 2022 00:00)  T(F): 99 (27 Jun 2022 12:00), Max: 99.9 (27 Jun 2022 00:00)  HR: 90 (27 Jun 2022 13:00) (70 - 96)  BP: 109/67 (27 Jun 2022 13:00) (101/59 - 161/77)  BP(mean): 83 (27 Jun 2022 13:00) (72 - 112)  ABP: --  ABP(mean): --  RR: 24 (27 Jun 2022 13:00) (24 - 42)  SpO2: 99% (27 Jun 2022 13:00) (87% - 100%)      Mode: CPAP with PS, RR (machine): 25, FiO2: 30, PEEP: 5, PS: 12  06-26-22 @ 07:01  -  06-27-22 @ 07:00  --------------------------------------------------------  IN: 4065 mL / OUT: 0 mL / NET: 4065 mL    06-27-22 @ 07:01  -  06-27-22 @ 13:51  --------------------------------------------------------  IN: 190 mL / OUT: 0 mL / NET: 190 mL        CAPILLARY BLOOD GLUCOSE      POCT Blood Glucose.: 169 mg/dL (27 Jun 2022 11:44)      I&O's Summary    26 Jun 2022 07:01 - 27 Jun 2022 07:00  --------------------------------------------------------  IN: 4065 mL / OUT: 0 mL / NET: 4065 mL    27 Jun 2022 07:01  -  27 Jun 2022 13:51  --------------------------------------------------------  IN: 190 mL / OUT: 0 mL / NET: 190 mL    Physical Exam:   General: ill appearing male  CNS: intubated, off sedation. Opens eyes.   HEENT: ISHAN  Resp: scattered rhonchi  CVS: RRR, no murmurs  Abd: soft, NT/ND  Ext: edema in UE or no cyanosis  Skin: warm and well perfused     Meds:  meropenem  IVPB IV Intermittent  rifAXIMin Oral  tobramycin for Nebulization Inhalation      atorvastatin Oral    acetylcysteine 10%  Inhalation Inhalation  albuterol/ipratropium for Nebulization Nebulizer  sodium chloride 3%  Inhalation Inhalation    acetaminophen     Tablet .. Oral PRN  carbidopa/levodopa  25/100 Oral  citalopram Oral  primidone Oral      enoxaparin Injectable SubCutaneous    lactulose Syrup Oral  pantoprazole   Suspension Oral          chlorhexidine 0.12% Liquid Oral Mucosa  chlorhexidine 2% Cloths Topical                              7.9    8.84  )-----------( 190      ( 26 Jun 2022 06:05 )             24.0       06-26    143  |  106  |  34<H>  ----------------------------<  110<H>  4.6   |  35<H>  |  1.00    Ca    8.8      26 Jun 2022 06:05  Phos  2.3     06-26  Mg     2.1     06-26    TPro  5.5<L>  /  Alb  1.7<L>  /  TBili  0.4  /  DBili  x   /  AST  71<H>  /  ALT  22  /  AlkPhos  83  06-26          ET Tube ET Tube   Numerous Pseudomonas aeruginosa  Normal Respiratory Richa present   Few polymorphonuclear leukocytes per low power field  No Squamous epithelial cells per low power field  Few Gram Negative Rods per oil power field 06-23 @ 13:50  .Blood Blood-Peripheral   No growth to date. -- 06-23 @ 08:40  .Blood Blood-Peripheral   No growth to date. -- 06-23 @ 08:35      Radiology: No new imaging     Bedside Ultrasound:     Tubes/Lines: Midline and Peg tube     GLOBAL ISSUE/BEST PRACTICE:  Analgesia: Y   Sedation: N   HOB elevation: Y  Stress ulcer prophylaxis: Y   VTE prophylaxis: Y   Glycemic control: N   Nutrition: Y     CODE STATUS: Full code        Interval Events:   Patient seen and examined at bedside. Patient remains intubated. Opens eyes when name called though today not able to follow commands. Vent settings AC/25/440/40/5. Unable to obtain meaningful history as patient intubated and confused.     Review of Systems:  Unable to obtain     ICU Vital Signs Last 24 Hrs  T(C): 37.2 (27 Jun 2022 12:00), Max: 37.7 (27 Jun 2022 00:00)  T(F): 99 (27 Jun 2022 12:00), Max: 99.9 (27 Jun 2022 00:00)  HR: 90 (27 Jun 2022 13:00) (70 - 96)  BP: 109/67 (27 Jun 2022 13:00) (101/59 - 161/77)  BP(mean): 83 (27 Jun 2022 13:00) (72 - 112)  ABP: --  ABP(mean): --  RR: 24 (27 Jun 2022 13:00) (24 - 42)  SpO2: 99% (27 Jun 2022 13:00) (87% - 100%)      Mode: CPAP with PS, RR (machine): 25, FiO2: 30, PEEP: 5, PS: 12  06-26-22 @ 07:01  -  06-27-22 @ 07:00  --------------------------------------------------------  IN: 4065 mL / OUT: 0 mL / NET: 4065 mL    06-27-22 @ 07:01  -  06-27-22 @ 13:51  --------------------------------------------------------  IN: 190 mL / OUT: 0 mL / NET: 190 mL        CAPILLARY BLOOD GLUCOSE      POCT Blood Glucose.: 169 mg/dL (27 Jun 2022 11:44)      I&O's Summary    26 Jun 2022 07:01  -  27 Jun 2022 07:00  --------------------------------------------------------  IN: 4065 mL / OUT: 0 mL / NET: 4065 mL    27 Jun 2022 07:01  -  27 Jun 2022 13:51  --------------------------------------------------------  IN: 190 mL / OUT: 0 mL / NET: 190 mL    Physical Exam:   General: critically ill appearing male  CNS: intubated, off sedation. Opens eyes to loud voice, does not follow commands  HEENT: PERRL  Resp: scattered rhonchi  CVS: RRR, no murmurs  Abd: soft, NT/ND  Ext: edema in UE or no cyanosis  Skin: warm and well perfused     Meds:  meropenem  IVPB IV Intermittent  rifAXIMin Oral  tobramycin for Nebulization Inhalation      atorvastatin Oral    acetylcysteine 10%  Inhalation Inhalation  albuterol/ipratropium for Nebulization Nebulizer  sodium chloride 3%  Inhalation Inhalation    acetaminophen     Tablet .. Oral PRN  carbidopa/levodopa  25/100 Oral  citalopram Oral  primidone Oral      enoxaparin Injectable SubCutaneous    lactulose Syrup Oral  pantoprazole   Suspension Oral          chlorhexidine 0.12% Liquid Oral Mucosa  chlorhexidine 2% Cloths Topical                              7.9    8.84  )-----------( 190      ( 26 Jun 2022 06:05 )             24.0       06-26    143  |  106  |  34<H>  ----------------------------<  110<H>  4.6   |  35<H>  |  1.00    Ca    8.8      26 Jun 2022 06:05  Phos  2.3     06-26  Mg     2.1     06-26    TPro  5.5<L>  /  Alb  1.7<L>  /  TBili  0.4  /  DBili  x   /  AST  71<H>  /  ALT  22  /  AlkPhos  83  06-26          ET Tube ET Tube   Numerous Pseudomonas aeruginosa  Normal Respiratory Richa present   Few polymorphonuclear leukocytes per low power field  No Squamous epithelial cells per low power field  Few Gram Negative Rods per oil power field 06-23 @ 13:50  .Blood Blood-Peripheral   No growth to date. -- 06-23 @ 08:40  .Blood Blood-Peripheral   No growth to date. -- 06-23 @ 08:35      Radiology: No new imaging       Tubes/Lines: Midline and Peg tube     GLOBAL ISSUE/BEST PRACTICE:  Analgesia: Y   Sedation: N   HOB elevation: Y  Stress ulcer prophylaxis: Y   VTE prophylaxis: Y   Glycemic control: N   Nutrition: Y     CODE STATUS: Full code

## 2022-06-27 NOTE — PROGRESS NOTE ADULT - SUBJECTIVE AND OBJECTIVE BOX
Patient is a 76y old  Male who presents with a chief complaint of Acute Hypoxic Respiratory Failure (27 Jun 2022 15:55)      BRIEF HOSPITAL COURSE: Pt is a 75 y/o M pmhx of HTN, HLD, recent CVA and oropharyngeal dysphagia, prostate CA, SCC of tongue s/p partial resection who presented to [facility] for hypoxic respiratory failure and septic shock 2nd to PNA. Subsequently found to have UTI, lactic acidosis, ÁNGEL and hypernatremia.     Events last 24 hours: Remains unresponsive on vent off sedation. Failed CPAP trial this AM.     PAST MEDICAL & SURGICAL HISTORY:  Hypertension      Hyperlipidemia      GERD (gastroesophageal reflux disease)      Parkinson&#x27;s disease      Prostate cancer      Mitral valve prolapse      Malignant neoplasm of tongue, 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      Throat cancer      History of vasectomy      Prostate cancer  s/p cyber knife      S/P partial glossectomy  4/21          Review of Systems:  Unable to assess, nonresponsive on vent.       Medications:  meropenem  IVPB 1000 milliGRAM(s) IV Intermittent every 8 hours  rifAXIMin 550 milliGRAM(s) Oral two times a day  tobramycin for Nebulization 300 milliGRAM(s) Inhalation every 12 hours      acetylcysteine 10%  Inhalation 4 milliLiter(s) Inhalation every 6 hours  albuterol/ipratropium for Nebulization 3 milliLiter(s) Nebulizer every 6 hours  sodium chloride 3%  Inhalation 4 milliLiter(s) Inhalation every 6 hours    acetaminophen     Tablet .. 650 milliGRAM(s) Oral every 6 hours PRN  carbidopa/levodopa  25/100 1 Tablet(s) Oral <User Schedule>  citalopram 20 milliGRAM(s) Oral daily  primidone 25 milliGRAM(s) Oral daily      enoxaparin Injectable 70 milliGRAM(s) SubCutaneous every 12 hours    lactulose Syrup 20 Gram(s) Oral every 8 hours  pantoprazole   Suspension 40 milliGRAM(s) Oral daily      atorvastatin 80 milliGRAM(s) Oral at bedtime        chlorhexidine 0.12% Liquid 15 milliLiter(s) Oral Mucosa every 12 hours  chlorhexidine 2% Cloths 1 Application(s) Topical <User Schedule>        Mode: AC/ CMV (Assist Control/ Continuous Mandatory Ventilation)  RR (machine): 24  TV (machine): 440  FiO2: 40  PEEP: 5  ITime: 1  MAP: 14  PIP: 32      ICU Vital Signs Last 24 Hrs  T(C): 37.1 (27 Jun 2022 18:00), Max: 37.7 (27 Jun 2022 00:00)  T(F): 98.8 (27 Jun 2022 18:00), Max: 99.9 (27 Jun 2022 00:00)  HR: 87 (27 Jun 2022 20:59) (70 - 104)  BP: 100/63 (27 Jun 2022 20:00) (82/50 - 161/77)  BP(mean): 76 (27 Jun 2022 20:00) (59 - 115)  ABP: --  ABP(mean): --  RR: 31 (27 Jun 2022 20:00) (24 - 42)  SpO2: 100% (27 Jun 2022 20:59) (87% - 100%)      ABG - ( 27 Jun 2022 17:27 )  pH, Arterial: 7.45  pH, Blood: x     /  pCO2: 56    /  pO2: 92    / HCO3: 39    / Base Excess: 14.9  /  SaO2: 99.7                I&O's Detail    26 Jun 2022 07:01  -  27 Jun 2022 07:00  --------------------------------------------------------  IN:    Enteral Tube Flush: 300 mL    Enteral Tube Flush: 2100 mL    IV PiggyBack: 250 mL    IV PiggyBack: 50 mL    Vital1.5: 1365 mL  Total IN: 4065 mL    OUT:  Total OUT: 0 mL    Total NET: 4065 mL      27 Jun 2022 07:01  -  27 Jun 2022 22:14  --------------------------------------------------------  IN:    Enteral Tube Flush: 180 mL    Enteral Tube Flush: 1050 mL    IV PiggyBack: 50 mL    Vital1.5: 780 mL  Total IN: 2060 mL    OUT:    Oral Fluid: 0 mL  Total OUT: 0 mL    Total NET: 2060 mL            LABS:                        7.9    8.38  )-----------( 237      ( 27 Jun 2022 14:10 )             24.6     06-27    141  |  102  |  30<H>  ----------------------------<  170<H>  4.5   |  40<H>  |  0.90    Ca    9.1      27 Jun 2022 14:10  Phos  3.0     06-27  Mg     2.3     06-27    TPro  5.7<L>  /  Alb  1.6<L>  /  TBili  0.2  /  DBili  x   /  AST  37  /  ALT  24  /  AlkPhos  87  06-27          CAPILLARY BLOOD GLUCOSE      POCT Blood Glucose.: 145 mg/dL (27 Jun 2022 17:02)    PT/INR - ( 27 Jun 2022 14:10 )   PT: 14.4 sec;   INR: 1.23 ratio         PTT - ( 27 Jun 2022 14:10 )  PTT:36.4 sec    CULTURES:  Culture Results:   Numerous Pseudomonas aeruginosa  Normal Respiratory Richa present (06-23-22 @ 13:50)  Culture Results:   No growth to date. (06-23-22 @ 08:40)  Culture Results:   No growth to date. (06-23-22 @ 08:35)      Physical Examination:    General: Pt laying in hospital bed in no acute distress.  Nonresponsive on vent.     HEENT: Pupils equal, reactive to light, Rightward gaze .  Symmetric.    PULM: Clear to auscultation bilaterally, no significant sputum production    CVS: Regular rate and rhythm, no murmurs, rubs, or gallops    ABD: Soft, nondistended, nontender, normoactive bowel sounds, no masses    EXT: No edema, nontender    SKIN: Warm and well perfused    NEURO: Unresponsive on vent off sedation.       RADIOLOGY: < from: Xray Chest 1 View- PORTABLE-Urgent (Xray Chest 1 View- PORTABLE-Urgent .) (06.23.22 @ 12:20) >  ACC: [id number] EXAM:  XR CHEST PORTABLE URGENT 1V                          PROCEDURE DATE:  06/23/2022          INTERPRETATION:  AP semierect chest on June 23, 2022 at 12:04 PM.   Clinically patient has pneumonia.    Heart magnified by technique. Left loop recorder and moderate to severe   right thoracic curve again noted.    Significant infiltrations in the left mid lower lung field and a right   base are again noted.    Endotracheal tube remains.    NG tube present on June 17 is been removed.    IMPRESSION: Removal of NG tube. Persistent infiltrates.    --- End of Report ---            MEGAN CANNON MD; Attending Radiologist  This document has been electronically signed. Jun 23 2022  1:43PM          CRITICAL CARE TIME SPENT: 38 minutes assessing presenting problems of acute illness, which pose high probability of life threatening deterioration or end organ damage/dysfunction, as well as medical decision making including initiating plan of care, reviewing data, reviewing radiologic exams, discussing with multidisciplinary team,  discussing goals of care with patient/family, and writing this note.  Non-inclusive of procedures performed,    Patient is a 76y old  Male who presents with a chief complaint of Acute Hypoxic Respiratory Failure (27 Jun 2022 15:55)      BRIEF HOSPITAL COURSE: Pt is a 77 y/o M pmhx of HTN, HLD, recent CVA and oropharyngeal dysphagia, prostate CA, SCC of tongue s/p partial resection who presented to [facility] for hypoxic respiratory failure and septic shock 2nd to PNA. Subsequently found to have UTI, lactic acidosis, ÁNGEL and hypernatremia.     Events last 24 hours: Remains unresponsive on vent off sedation. Failed CPAP trial this AM.     PAST MEDICAL & SURGICAL HISTORY:  Hypertension      Hyperlipidemia      GERD (gastroesophageal reflux disease)      Parkinson&#x27;s disease      Prostate cancer      Mitral valve prolapse      Malignant neoplasm of tongue, 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      Throat cancer      History of vasectomy      Prostate cancer  s/p cyber knife      S/P partial glossectomy  4/21          Review of Systems:  Unable to assess, nonresponsive on vent.       Medications:  meropenem  IVPB 1000 milliGRAM(s) IV Intermittent every 8 hours  rifAXIMin 550 milliGRAM(s) Oral two times a day  tobramycin for Nebulization 300 milliGRAM(s) Inhalation every 12 hours      acetylcysteine 10%  Inhalation 4 milliLiter(s) Inhalation every 6 hours  albuterol/ipratropium for Nebulization 3 milliLiter(s) Nebulizer every 6 hours  sodium chloride 3%  Inhalation 4 milliLiter(s) Inhalation every 6 hours    acetaminophen     Tablet .. 650 milliGRAM(s) Oral every 6 hours PRN  carbidopa/levodopa  25/100 1 Tablet(s) Oral <User Schedule>  citalopram 20 milliGRAM(s) Oral daily  primidone 25 milliGRAM(s) Oral daily      enoxaparin Injectable 70 milliGRAM(s) SubCutaneous every 12 hours    lactulose Syrup 20 Gram(s) Oral every 8 hours  pantoprazole   Suspension 40 milliGRAM(s) Oral daily      atorvastatin 80 milliGRAM(s) Oral at bedtime        chlorhexidine 0.12% Liquid 15 milliLiter(s) Oral Mucosa every 12 hours  chlorhexidine 2% Cloths 1 Application(s) Topical <User Schedule>        Mode: AC/ CMV (Assist Control/ Continuous Mandatory Ventilation)  RR (machine): 24  TV (machine): 440  FiO2: 40  PEEP: 5  ITime: 1  MAP: 14  PIP: 32      ICU Vital Signs Last 24 Hrs  T(C): 37.1 (27 Jun 2022 18:00), Max: 37.7 (27 Jun 2022 00:00)  T(F): 98.8 (27 Jun 2022 18:00), Max: 99.9 (27 Jun 2022 00:00)  HR: 87 (27 Jun 2022 20:59) (70 - 104)  BP: 100/63 (27 Jun 2022 20:00) (82/50 - 161/77)  BP(mean): 76 (27 Jun 2022 20:00) (59 - 115)  ABP: --  ABP(mean): --  RR: 31 (27 Jun 2022 20:00) (24 - 42)  SpO2: 100% (27 Jun 2022 20:59) (87% - 100%)      ABG - ( 27 Jun 2022 17:27 )  pH, Arterial: 7.45  pH, Blood: x     /  pCO2: 56    /  pO2: 92    / HCO3: 39    / Base Excess: 14.9  /  SaO2: 99.7                I&O's Detail    26 Jun 2022 07:01  -  27 Jun 2022 07:00  --------------------------------------------------------  IN:    Enteral Tube Flush: 300 mL    Enteral Tube Flush: 2100 mL    IV PiggyBack: 250 mL    IV PiggyBack: 50 mL    Vital1.5: 1365 mL  Total IN: 4065 mL    OUT:  Total OUT: 0 mL    Total NET: 4065 mL      27 Jun 2022 07:01  -  27 Jun 2022 22:14  --------------------------------------------------------  IN:    Enteral Tube Flush: 180 mL    Enteral Tube Flush: 1050 mL    IV PiggyBack: 50 mL    Vital1.5: 780 mL  Total IN: 2060 mL    OUT:    Oral Fluid: 0 mL  Total OUT: 0 mL    Total NET: 2060 mL            LABS:                        7.9    8.38  )-----------( 237      ( 27 Jun 2022 14:10 )             24.6     06-27    141  |  102  |  30<H>  ----------------------------<  170<H>  4.5   |  40<H>  |  0.90    Ca    9.1      27 Jun 2022 14:10  Phos  3.0     06-27  Mg     2.3     06-27    TPro  5.7<L>  /  Alb  1.6<L>  /  TBili  0.2  /  DBili  x   /  AST  37  /  ALT  24  /  AlkPhos  87  06-27          CAPILLARY BLOOD GLUCOSE      POCT Blood Glucose.: 145 mg/dL (27 Jun 2022 17:02)    PT/INR - ( 27 Jun 2022 14:10 )   PT: 14.4 sec;   INR: 1.23 ratio         PTT - ( 27 Jun 2022 14:10 )  PTT:36.4 sec    CULTURES:  Culture Results:   Numerous Pseudomonas aeruginosa  Normal Respiratory Richa present (06-23-22 @ 13:50)  Culture Results:   No growth to date. (06-23-22 @ 08:40)  Culture Results:   No growth to date. (06-23-22 @ 08:35)      Physical Examination:    General: Pt laying in hospital bed in no acute distress.  Nonresponsive on vent.     HEENT: Pupils equal, reactive to light, symmetric.      PULM: Clear to auscultation bilaterally, no significant sputum production    CVS: Regular rate and rhythm, no murmurs, rubs, or gallops    ABD: Soft, nondistended, nontender, normoactive bowel sounds, no masses    EXT: No edema, nontender    SKIN: Warm and well perfused    NEURO: Opens eyes to command, unable to assess rest of neurological exam due to pt lethargy      RADIOLOGY: < from: Xray Chest 1 View- PORTABLE-Urgent (Xray Chest 1 View- PORTABLE-Urgent .) (06.23.22 @ 12:20) >  ACC: [id number] EXAM:  XR CHEST PORTABLE URGENT 1V                          PROCEDURE DATE:  06/23/2022          INTERPRETATION:  AP semierect chest on June 23, 2022 at 12:04 PM.   Clinically patient has pneumonia.    Heart magnified by technique. Left loop recorder and moderate to severe   right thoracic curve again noted.    Significant infiltrations in the left mid lower lung field and a right   base are again noted.    Endotracheal tube remains.    NG tube present on June 17 is been removed.    IMPRESSION: Removal of NG tube. Persistent infiltrates.    --- End of Report ---            MEGAN CANNON MD; Attending Radiologist  This document has been electronically signed. Jun 23 2022  1:43PM          CRITICAL CARE TIME SPENT: 38 minutes assessing presenting problems of acute illness, which pose high probability of life threatening deterioration or end organ damage/dysfunction, as well as medical decision making including initiating plan of care, reviewing data, reviewing radiologic exams, discussing with multidisciplinary team,  discussing goals of care with patient/family, and writing this note.  Non-inclusive of procedures performed,

## 2022-06-27 NOTE — PROGRESS NOTE ADULT - PROBLEM SELECTOR PLAN 1
Remains intubated and sedated  Continue ventilator support    Overall prognosis is guarded  Vent management and weaning trials per ICU

## 2022-06-27 NOTE — PROGRESS NOTE ADULT - ASSESSMENT
75yo male with PMH of prostate cancer, SCC of tongue s/p partial resection now with recurrence, parkinson's disease, hypertension, hyperlipidemia, recent CVA, and oropharyngeal dysphagia admitted for septic shock and acute hypoxic respiratory failure in the setting of b/l lower lobe E. coli pneumonia and Pseudomonas UTI.    Neuro:  - Encephalopathy likely 2/2 to hyperammonemia though ammonia levels WNL   - Continue Lactulose and Rifaximin and titrate with BM   - Tylenol PRN  - Continue home Citalopram, and Sinemet for Parkinson's   - Continue with half dose of Primidone given mental status and monitor for improvement     CV:  - Currently hemodynamically stable   - Hypotension 2/2 septic shock: improving    Pulm:  - Remains on mechanical ventilation and wean as tolerated   - Duoneb q6h and hypersecretion therapy                   GI:  - NPO with Vital Tube feeds  - PEG tube placed 6/21/22  - IV Protonix 40mg qd for GI ppx    Renal:  - Cr stable  - S/P Amato   - Continue Free water 350mL q4h  - Monitor I&Os and renal indices     Heme:  - Started on full dose of Lovenox today given Hx as below   - Per prior Hospitalist note from 6/2, pt was on full dose AC for recent stroke and hypercoagulable state    ID:  - B/l lower lobe PNA and UTI  - Urine cx positive for Pseudomonas  - ET positive for E.Coli now with Pseudomonas    - BC NGTD   - Continue meropenem and Tobramycin     Endo:  - PTH wnl, vit D,25,hydroxy 39.2 and vit D,1,25 dihydroxy 89.9, PTHrP <2.2  - Hypercalcemia likely inflammatory  - Hypernatremia now resolved   - Fingersticks q6h, blood glucose goal 100-180 in ICU    Skin:  - Decubitus ulcer wound care following     Tubes/lines:  - Midline, PEG tube

## 2022-06-28 NOTE — PROGRESS NOTE ADULT - SUBJECTIVE AND OBJECTIVE BOX
[facility]  Bernard Jenkins PA-C  [facility] [place] [address]  [phone]      INTERVAL HPI/OVERNIGHT EVENTS:  Pt s/e in ICU  Unable to provide hx 2/2 sedation  +peg no reported issues    MEDICATIONS  (STANDING):  acetylcysteine 10%  Inhalation 4 milliLiter(s) Inhalation every 6 hours  albuterol/ipratropium for Nebulization 3 milliLiter(s) Nebulizer every 6 hours  atorvastatin 80 milliGRAM(s) Oral at bedtime  carbidopa/levodopa  25/100 1 Tablet(s) Oral daily  chlorhexidine 0.12% Liquid 15 milliLiter(s) Oral Mucosa every 12 hours  chlorhexidine 2% Cloths 1 Application(s) Topical <User Schedule>  citalopram 20 milliGRAM(s) Oral daily  enoxaparin Injectable 70 milliGRAM(s) SubCutaneous every 12 hours  lactulose Syrup 20 Gram(s) Oral every 8 hours  meropenem  IVPB 1000 milliGRAM(s) IV Intermittent every 8 hours  pantoprazole   Suspension 40 milliGRAM(s) Oral daily  primidone 25 milliGRAM(s) Oral daily  rifAXIMin 550 milliGRAM(s) Oral two times a day  sodium chloride 3%  Inhalation 4 milliLiter(s) Inhalation every 6 hours  tobramycin for Nebulization 300 milliGRAM(s) Inhalation every 12 hours    MEDICATIONS  (PRN):  acetaminophen     Tablet .. 650 milliGRAM(s) Oral every 6 hours PRN Temp greater or equal to 38C (100.4F), Mild Pain (1 - 3)      Allergies  No Known Allergies      PHYSICAL EXAM:   Vital Signs:  Vital Signs Last 24 Hrs  T(C): 35.8 (2022 11:56), Max: 37.1 (2022 18:00)  T(F): 96.4 (2022 11:56), Max: 98.8 (2022 18:00)  HR: 83 (2022 13:30) (67 - 104)  BP: 105/61 (2022 11:00) (82/50 - 159/84)  BP(mean): 78 (2022 11:00) (59 - 115)  RR: 25 (2022 11:00) (24 - 45)  SpO2: 99% (2022 13:30) (96% - 100%)  Daily     Daily Weight in k (2022 07:00)    GENERAL:  Appears stated age  ABDOMEN:  Soft, non-tender, non-distended  NEURO:  Alert      LABS:                        7.8    8.00  )-----------( 262      ( 2022 08:40 )             24.2     06-28    142  |  100  |  31<H>  ----------------------------<  170<H>  4.9   |  37<H>  |  0.88    Ca    9.4      2022 08:40  Phos  3.3     06-28  Mg     2.3     06-28    TPro  5.8<L>  /  Alb  1.7<L>  /  TBili  0.3  /  DBili  x   /  AST  36  /  ALT  42  /  AlkPhos  83  06-28    PT/INR - ( 2022 14:10 )   PT: 14.4 sec;   INR: 1.23 ratio         PTT - ( 2022 14:10 )  PTT:36.4 sec

## 2022-06-28 NOTE — PROGRESS NOTE ADULT - ATTENDING COMMENTS
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 coli pneumonia + Pseudomonas UTI, resulting in acute metabolic encephalopathy, lactic acidosis, septic shock, and ÁNGEL. Now with prolonged respiratory failure and new fevers secondary to Pseudomonas aeruginosa VAP.    --persistent encephalopathy  continue lower dose of primidone and decrease dose of sinemet  hyperammonemia, continue lactulose and xifaximen  Hx stroke, continue full dose AC with lovenox  --htn, stable  HLD, continue statin  --acute hypoxemic respiratory failure  today had acceptable respiratory mechanics on PS 10/5 ( with ETT 6.5 this is adequate weaning trial).  continue secretion clearance measures  --dysphagia, continue TF via PEG  --normal renal function  --pseudomonas pneumonia and UTI, E. coli pneumonia  continue meropenem and Nico nebs for 2w course  --daughter Viki updated.

## 2022-06-28 NOTE — PROGRESS NOTE ADULT - ASSESSMENT
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 coli pneumonia and Pseudomonas UTI.    Neuro:  - Encephalopathy likely 2/2 multifactorial   - Continue Lactulose and Rifaximin and titrate with BM   - Continue home Citalopram  - Decrease home Sinemet to once a day   - Continue with half dose of Primidone given mental status and monitor for improvement     CV:  - Currently hemodynamically stable   - Hypotension 2/2 septic shock: improved     Pulm:  - Remains on mechanical ventilation and wean as tolerated   - Duoneb q6h and hypersecretion therapy                   GI:  - NPO with Vital Tube feeds  - PEG tube placed 6/21/22  - IV Protonix 40mg qd for GI ppx    Renal:  - Cr stable  - S/P Amato   - Monitor I&Os and renal indices     Heme:  - Continue on full dose of Lovenox today given Hx as below   - Per prior Hospitalist note from 6/2, pt was on full dose AC for recent stroke and hypercoagulable state 2/2 to underlying malignancy     ID:  - B/l lower lobe PNA and UTI  - Urine cx positive for Pseudomonas  - ET positive for E.Coli now with Pseudomonas    - BC NGTD   - Continue meropenem and Tobramycin     Endo:  - PTH wnl, vit D,25,hydroxy 39.2 and vit D,1,25 dihydroxy 89.9, PTHrP <2.2  - Hypercalcemia likely inflammatory  - Hypernatremia now resolved though continue free water flushes   - Fingersticks q6h, blood glucose goal 100-180 in ICU    Skin:  - Decubitus ulcer wound care following     Tubes/lines:  - Midline

## 2022-06-28 NOTE — PROGRESS NOTE ADULT - SUBJECTIVE AND OBJECTIVE BOX
MEDICAL ATTENDING NOTE    Patient is a 76y old  Male who presents with a chief complaint of Acute Hypoxic Respiratory Failure (28 Jun 2022 13:42)      INTERVAL HPI/OVERNIGHT EVENTS: intubated    MEDICATIONS  (STANDING):  acetylcysteine 10%  Inhalation 4 milliLiter(s) Inhalation every 6 hours  albuterol/ipratropium for Nebulization 3 milliLiter(s) Nebulizer every 6 hours  atorvastatin 80 milliGRAM(s) Oral at bedtime  carbidopa/levodopa  25/100 1 Tablet(s) Oral daily  chlorhexidine 0.12% Liquid 15 milliLiter(s) Oral Mucosa every 12 hours  chlorhexidine 2% Cloths 1 Application(s) Topical <User Schedule>  citalopram 20 milliGRAM(s) Oral daily  enoxaparin Injectable 70 milliGRAM(s) SubCutaneous every 12 hours  lactulose Syrup 20 Gram(s) Oral every 8 hours  meropenem  IVPB 1000 milliGRAM(s) IV Intermittent every 8 hours  pantoprazole   Suspension 40 milliGRAM(s) Oral daily  primidone 25 milliGRAM(s) Oral daily  rifAXIMin 550 milliGRAM(s) Oral two times a day  sodium chloride 3%  Inhalation 4 milliLiter(s) Inhalation every 6 hours  tobramycin for Nebulization 300 milliGRAM(s) Inhalation every 12 hours    MEDICATIONS  (PRN):  acetaminophen     Tablet .. 650 milliGRAM(s) Oral every 6 hours PRN Temp greater or equal to 38C (100.4F), Mild Pain (1 - 3)      __________________________________________________  ----------------------------------------------------------------------------------  REVIEW OF SYSTEMS: intubated so unable to participate in ROS      Vital Signs Last 24 Hrs  T(C): 35.8 (28 Jun 2022 11:56), Max: 37.1 (27 Jun 2022 18:00)  T(F): 96.4 (28 Jun 2022 11:56), Max: 98.8 (27 Jun 2022 18:00)  HR: 94 (28 Jun 2022 14:02) (67 - 104)  BP: 158/80 (28 Jun 2022 14:00) (84/53 - 159/84)  BP(mean): 111 (28 Jun 2022 14:00) (65 - 115)  RR: 31 (28 Jun 2022 14:00) (24 - 45)  SpO2: 95% (28 Jun 2022 14:02) (91% - 100%)    _________________  PHYSICAL EXAM:  ---------------------------   NAD; Normocephalic;   LUNGS - ET tube in place; bilateral air entry  HEART: S1 S2+   ABDOMEN: Soft, non distended; G tube+  EXTREMITIES: no cyanosis; no LE edema  NERVOUS SYSTEM:  intubated    _________________________________________________  LABS:                        7.8    8.00  )-----------( 262      ( 28 Jun 2022 08:40 )             24.2     06-28    142  |  100  |  31<H>  ----------------------------<  170<H>  4.9   |  37<H>  |  0.88    Ca    9.4      28 Jun 2022 08:40  Phos  3.3     06-28  Mg     2.3     06-28    TPro  5.8<L>  /  Alb  1.7<L>  /  TBili  0.3  /  DBili  x   /  AST  36  /  ALT  42  /  AlkPhos  83  06-28    PT/INR - ( 27 Jun 2022 14:10 )   PT: 14.4 sec;   INR: 1.23 ratio         PTT - ( 27 Jun 2022 14:10 )  PTT:36.4 sec    CAPILLARY BLOOD GLUCOSE      POCT Blood Glucose.: 146 mg/dL (28 Jun 2022 11:24)  POCT Blood Glucose.: 145 mg/dL (27 Jun 2022 17:02)

## 2022-06-28 NOTE — PROGRESS NOTE ADULT - ASSESSMENT
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     1. Gram negative bacterial infection with sepsis    2. Acute hypoxic respiratory failure due to  pneumonia - remains intubated  3. Metabolic encephalopathy suspected hepatic with elevated ammonia level  4. ÁNGEL on CKD likely ATN  5. Severe Protein Calorie malnutrition s/p peg 6/ 21/22  6. Hypoalbuminemia  7. Anemia- possibly multifactorial; monitor hb; no signs of active blood loss  8. h/o Parkinson's disease  9. Gram negative UTI

## 2022-06-28 NOTE — PROGRESS NOTE ADULT - SUBJECTIVE AND OBJECTIVE BOX
CAPILLARY BLOOD GLUCOSE      POCT Blood Glucose.: 145 mg/dL (27 Jun 2022 17:02)  POCT Blood Glucose.: 169 mg/dL (27 Jun 2022 11:44)      Vital Signs Last 24 Hrs  T(C): 36.1 (28 Jun 2022 07:54), Max: 37.2 (27 Jun 2022 12:00)  T(F): 97 (28 Jun 2022 07:54), Max: 99 (27 Jun 2022 12:00)  HR: 100 (28 Jun 2022 07:58) (67 - 104)  BP: 134/64 (28 Jun 2022 07:00) (82/50 - 161/77)  BP(mean): 92 (28 Jun 2022 07:00) (59 - 115)  RR: 24 (28 Jun 2022 07:00) (24 - 45)  SpO2: 97% (28 Jun 2022 07:58) (87% - 100%)    General: WN/WD NAD  Respiratory: CTA B/L  CV: RRR, S1S2, no murmurs, rubs or gallops  Abdominal: Soft, NT, ND +BS, Last BM  Extremities: No edema, + peripheral pulses     06-27    141  |  102  |  30<H>  ----------------------------<  170<H>  4.5   |  40<H>  |  0.90    Ca    9.1      27 Jun 2022 14:10  Phos  3.0     06-27  Mg     2.3     06-27    TPro  5.7<L>  /  Alb  1.6<L>  /  TBili  0.2  /  DBili  x   /  AST  37  /  ALT  24  /  AlkPhos  87  06-27      atorvastatin 80 milliGRAM(s) Oral at bedtime

## 2022-06-28 NOTE — PROGRESS NOTE ADULT - CONVERSATION DETAILS
Discussed that pt made some improvement today and was able to tolerate PS 10/5 x 2hrs (with ETT 6.5cm this is adequate breathing trial).  However he remains minimally responsive and so his overall prognosis is poor and his risk of recurrent respiratory failure remains high.  We discussed the possibility of extubation when optimized but with DNI in place.  Viki is understanding and open to this.  She expressed that if his breathing were to worsen she would not want him to suffer and I reassured her he would be comfortable in this case.
Discussed that pt having difficulty with vent weaning.  Today day 10.  Will need to discuss whether trach would be appropriate.  Viki appropriately expressed concerns about quality of life.  She hasn't decided yet but does not want to "be selfish."  We will continue current management and continue to discuss GOC.

## 2022-06-28 NOTE — PROGRESS NOTE ADULT - SUBJECTIVE AND OBJECTIVE BOX
Interval Events:   Patient seen and examined at bedside. Patient remains intubated, off sedation and unresponsive. Opens eyes on command though does not follow commands. Continues to fail CPAP and overnight at 12/5. Vent settings at RR:24, TV; 440, PEEP: 5, and FiO2: 40%. On going GOC with daughter for possible trach vs comfort measures     Review of Systems:  Unable to obtain     ICU Vital Signs Last 24 Hrs  T(C): 35.8 (28 Jun 2022 11:56), Max: 37.1 (27 Jun 2022 18:00)  T(F): 96.4 (28 Jun 2022 11:56), Max: 98.8 (27 Jun 2022 18:00)  HR: 100 (28 Jun 2022 11:40) (67 - 104)  BP: 105/61 (28 Jun 2022 11:00) (82/50 - 159/84)  BP(mean): 78 (28 Jun 2022 11:00) (59 - 115)  ABP: --  ABP(mean): --  RR: 25 (28 Jun 2022 11:00) (24 - 45)  SpO2: 98% (28 Jun 2022 11:40) (96% - 100%)      Mode: AC/ CMV (Assist Control/ Continuous Mandatory Ventilation), RR (machine): 24, TV (machine): 440, FiO2: 35, PEEP: 5  06-27-22 @ 07:01  -  06-28-22 @ 07:00  --------------------------------------------------------  IN: 3890 mL / OUT: 0 mL / NET: 3890 mL    06-28-22 @ 07:01  -  06-28-22 @ 12:40  --------------------------------------------------------  IN: 640 mL / OUT: 0 mL / NET: 640 mL        CAPILLARY BLOOD GLUCOSE      POCT Blood Glucose.: 146 mg/dL (28 Jun 2022 11:24)      I&O's Summary    27 Jun 2022 07:01  -  28 Jun 2022 07:00  --------------------------------------------------------  IN: 3890 mL / OUT: 0 mL / NET: 3890 mL    28 Jun 2022 07:01  -  28 Jun 2022 12:40  --------------------------------------------------------  IN: 640 mL / OUT: 0 mL / NET: 640 mL    Physical Exam:   General: critically ill appearing male  CNS: intubated, off sedation. Opens eyes to loud voice, does not follow commands  HEENT: PERRL  Resp: scattered rhonchi  CVS: RRR, no murmurs  Abd: soft, NT/ND  Ext: edema in UE or no cyanosis  Skin: warm and well perfused     Meds:  meropenem  IVPB IV Intermittent  rifAXIMin Oral  tobramycin for Nebulization Inhalation      atorvastatin Oral    acetylcysteine 10%  Inhalation Inhalation  albuterol/ipratropium for Nebulization Nebulizer  sodium chloride 3%  Inhalation Inhalation    acetaminophen     Tablet .. Oral PRN  carbidopa/levodopa  25/100 Oral  citalopram Oral  primidone Oral      enoxaparin Injectable SubCutaneous    lactulose Syrup Oral  pantoprazole   Suspension Oral          chlorhexidine 0.12% Liquid Oral Mucosa  chlorhexidine 2% Cloths Topical                              7.8    8.00  )-----------( 262      ( 28 Jun 2022 08:40 )             24.2       06-28    142  |  100  |  31<H>  ----------------------------<  170<H>  4.9   |  37<H>  |  0.88    Ca    9.4      28 Jun 2022 08:40  Phos  3.3     06-28  Mg     2.3     06-28    TPro  5.8<L>  /  Alb  1.7<L>  /  TBili  0.3  /  DBili  x   /  AST  36  /  ALT  42  /  AlkPhos  83  06-28          PT/INR - ( 27 Jun 2022 14:10 )   PT: 14.4 sec;   INR: 1.23 ratio         PTT - ( 27 Jun 2022 14:10 )  PTT:36.4 sec    ET Tube ET Tube   Numerous Pseudomonas aeruginosa  Normal Respiratory Richa present   Few polymorphonuclear leukocytes per low power field  No Squamous epithelial cells per low power field  Few Gram Negative Rods per oil power field 06-23 @ 13:50              Radiology: No new imaging in last 24 hrs     Bedside Ultrasound:    Tubes/Lines: Midline     GLOBAL ISSUE/BEST PRACTICE:  Analgesia: Y   Sedation: N   HOB elevation: Y  Stress ulcer prophylaxis: Y   VTE prophylaxis: Y   Glycemic control: N   Nutrition: Y     CODE STATUS: Full code          Interval Events:   Patient seen and examined at bedside. Yesterday tolerated PS 12/5 x 2hrs.  Patient remains intubated, off sedation and unresponsive. Opens eyes on command though does not follow commands. Vent settings at RR:24, TV; 440, PEEP: 5, and FiO2: 40%.    Review of Systems:  Unable to obtain     ICU Vital Signs Last 24 Hrs  T(C): 35.8 (28 Jun 2022 11:56), Max: 37.1 (27 Jun 2022 18:00)  T(F): 96.4 (28 Jun 2022 11:56), Max: 98.8 (27 Jun 2022 18:00)  HR: 100 (28 Jun 2022 11:40) (67 - 104)  BP: 105/61 (28 Jun 2022 11:00) (82/50 - 159/84)  BP(mean): 78 (28 Jun 2022 11:00) (59 - 115)  ABP: --  ABP(mean): --  RR: 25 (28 Jun 2022 11:00) (24 - 45)  SpO2: 98% (28 Jun 2022 11:40) (96% - 100%)      Mode: AC/ CMV (Assist Control/ Continuous Mandatory Ventilation), RR (machine): 24, TV (machine): 440, FiO2: 35, PEEP: 5  06-27-22 @ 07:01  -  06-28-22 @ 07:00  --------------------------------------------------------  IN: 3890 mL / OUT: 0 mL / NET: 3890 mL    06-28-22 @ 07:01  -  06-28-22 @ 12:40  --------------------------------------------------------  IN: 640 mL / OUT: 0 mL / NET: 640 mL        CAPILLARY BLOOD GLUCOSE      POCT Blood Glucose.: 146 mg/dL (28 Jun 2022 11:24)      I&O's Summary    27 Jun 2022 07:01  -  28 Jun 2022 07:00  --------------------------------------------------------  IN: 3890 mL / OUT: 0 mL / NET: 3890 mL    28 Jun 2022 07:01  -  28 Jun 2022 12:40  --------------------------------------------------------  IN: 640 mL / OUT: 0 mL / NET: 640 mL    Physical Exam:   General: critically ill appearing male  CNS: intubated, off sedation. Opens eyes to loud voice, does not follow commands  HEENT: PERRL  Resp: scattered rhonchi  CVS: RRR, no murmurs  Abd: soft, NT/ND  Ext: edema in UE or no cyanosis  Skin: warm and well perfused     Meds:  meropenem  IVPB IV Intermittent  rifAXIMin Oral  tobramycin for Nebulization Inhalation      atorvastatin Oral    acetylcysteine 10%  Inhalation Inhalation  albuterol/ipratropium for Nebulization Nebulizer  sodium chloride 3%  Inhalation Inhalation    acetaminophen     Tablet .. Oral PRN  carbidopa/levodopa  25/100 Oral  citalopram Oral  primidone Oral      enoxaparin Injectable SubCutaneous    lactulose Syrup Oral  pantoprazole   Suspension Oral          chlorhexidine 0.12% Liquid Oral Mucosa  chlorhexidine 2% Cloths Topical                              7.8    8.00  )-----------( 262      ( 28 Jun 2022 08:40 )             24.2       06-28    142  |  100  |  31<H>  ----------------------------<  170<H>  4.9   |  37<H>  |  0.88    Ca    9.4      28 Jun 2022 08:40  Phos  3.3     06-28  Mg     2.3     06-28    TPro  5.8<L>  /  Alb  1.7<L>  /  TBili  0.3  /  DBili  x   /  AST  36  /  ALT  42  /  AlkPhos  83  06-28          PT/INR - ( 27 Jun 2022 14:10 )   PT: 14.4 sec;   INR: 1.23 ratio         PTT - ( 27 Jun 2022 14:10 )  PTT:36.4 sec    ET Tube ET Tube   Numerous Pseudomonas aeruginosa  Normal Respiratory Richa present   Few polymorphonuclear leukocytes per low power field  No Squamous epithelial cells per low power field  Few Gram Negative Rods per oil power field 06-23 @ 13:50      Tubes/Lines: Midline     GLOBAL ISSUE/BEST PRACTICE:  Analgesia: Y   Sedation: N   HOB elevation: Y  Stress ulcer prophylaxis: Y   VTE prophylaxis: Y   Glycemic control: N   Nutrition: Y     CODE STATUS: Full code

## 2022-06-29 NOTE — PROGRESS NOTE ADULT - ASSESSMENT
75yo male with PMH of prostate cancer, SCC of tongue s/p partial resection now with recurrence, parkinson's disease, hypertension, hyperlipidemia, recent CVA, and oropharyngeal dysphagia admitted for septic shock and acute hypoxic respiratory failure in the setting of b/l lower lobe E. coli pneumonia and Pseudomonas UTI.    Neuro:  - Encephalopathy likely 2/2 multifactorial   - Continue Lactulose and Rifaximin and titrate with BM   - Decreased dose of Citalopram given mental status + prolonged QTc and monitor for improvement    - Continue Decrease home Sinemet to once a day given mental status and monitor for improvement   - Continue with half dose of Primidone given mental status and monitor for improvement     CV:  - Came in with hypotension 2/2 to septic shock that is now improved though BP now dropping  - Episode of hypotension to 63/44 that responded to 500cc of Fluid   - Will continue to monitor   - Hx of HLD, continue home statin   - Given QTc of 500 will repeat EKG and monitor     Pulm:  - Remains on mechanical ventilation and wean as tolerated   - Trial of pressure support of 10/5 today and will monitor   - Duoneb q6h and hypersecretion therapy                   GI:  - NPO with Vital Tube feeds  - PEG tube placed 6/21/22  - IV Protonix 40mg qd for GI ppx    Renal:  - Cr stable  - S/P Amato   - Monitor I&Os and renal indices     Heme:  - Continue on full dose of Lovenox today given Hx as below   - Per prior Hospitalist note from 6/2, pt was on full dose AC for recent stroke and hypercoagulable state 2/2 to underlying malignancy     ID:  - B/l lower lobe PNA and UTI  - Urine cx positive for Pseudomonas  - ET positive for E.Coli now with Pseudomonas    - BC NGTD   - Continue meropenem and Tobramycin     Endo:  - PTH wnl, vit D,25,hydroxy 39.2 and vit D,1,25 dihydroxy 89.9, PTHrP <2.2  - Hypercalcemia likely inflammatory  - Hypernatremia now resolved though continue free water flushes   - Fingersticks q6h, blood glucose goal 100-180 in ICU    Skin:  - Decubitus ulcer wound care following     Tubes/lines:  - None

## 2022-06-29 NOTE — PROGRESS NOTE ADULT - TIME BILLING
direct care of a critically ill patient including but not limited to reviewing chart, medications ,laboratory data, imaging reports, discussion of plan of care with consultants on the case, coordination of care with multidisciplinary team involved in the case and discussion of plan with ICU team.      Prognosis is guarded  Continue ICU care

## 2022-06-29 NOTE — PROGRESS NOTE ADULT - ASSESSMENT
Reason for Call:  Other call back    Detailed comments: Pt wanting lab results from March 6th    Phone Number Patient can be reached at: Cell number on file:    Telephone Information:   Mobile [phone]       Best Time: anytime    Can we leave a detailed message on this number? YES    Call taken on 3/9/2018 at 11:30 AM by Flory Hartman       76 year old male with history of prostate cancer, SCC of tongue s/p partial resection now with recurrence, parkinson's disease, hypertension, hyperlipidemia, recent CVA, and oropharyngeal dysphagia admitted for septic shock and acute hypoxic respiratory failure in the setting of b/l lower lobe PNA and UTI.     1. Gram negative bacterial infection with sepsis    2. Acute hypoxic respiratory failure due to  pneumonia - remains intubated  3. Metabolic encephalopathy suspected hepatic with elevated ammonia level  4. ÁNGEL on CKD likely ATN  5. Severe Protein Calorie malnutrition s/p peg 6/ 21/22  6. Hypoalbuminemia  7. Anemia- possibly multifactorial; monitor hb; no signs of active blood loss  8. h/o Parkinson's disease  9. Gram negative UTI

## 2022-06-29 NOTE — PROGRESS NOTE ADULT - ATTENDING COMMENTS
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 coli pneumonia + Pseudomonas UTI, resulting in acute metabolic encephalopathy, lactic acidosis, septic shock, and ÁNGEL. Now with prolonged respiratory failure and new fevers secondary to Pseudomonas aeruginosa VAP.    --persistent encephalopathy, minimally improved today  continue lower dose of primidone and decrease dose of sinemet  hyperammonemia, continue lactulose and xifaximen  Hx stroke, continue full dose AC with lovenox  --hypotension today, improved with IVF bolus  HLD, continue statin  --acute hypoxemic respiratory failure  performed poorly on PS 10/5 today after about 30min with hypoxemia and htn  continue secretion clearance measures  --dysphagia, continue TF via PEG  --normal renal function  --pseudomonas pneumonia and UTI, E. coli pneumonia  continue meropenem and Nico nebs for 10day course  --daughter Viki updated.

## 2022-06-29 NOTE — PROGRESS NOTE ADULT - SUBJECTIVE AND OBJECTIVE BOX
[facility]  Bernard Jenkins PA-C  [place] [address]  [phone]      INTERVAL HPI/OVERNIGHT EVENTS:  Pt s/e in ICU  Opens eyes but unable to provide hx since intubated  +peg, no reported issues    MEDICATIONS  (STANDING):  acetylcysteine 10%  Inhalation 4 milliLiter(s) Inhalation every 6 hours  albuterol/ipratropium for Nebulization 3 milliLiter(s) Nebulizer every 6 hours  atorvastatin 80 milliGRAM(s) Oral at bedtime  carbidopa/levodopa  25/100 1 Tablet(s) Oral daily  chlorhexidine 0.12% Liquid 15 milliLiter(s) Oral Mucosa every 12 hours  chlorhexidine 2% Cloths 1 Application(s) Topical <User Schedule>  citalopram 10 milliGRAM(s) Oral daily  enoxaparin Injectable 70 milliGRAM(s) SubCutaneous every 12 hours  lactulose Syrup 20 Gram(s) Oral every 8 hours  meropenem  IVPB 1000 milliGRAM(s) IV Intermittent every 8 hours  pantoprazole   Suspension 40 milliGRAM(s) Oral daily  primidone 25 milliGRAM(s) Oral daily  rifAXIMin 550 milliGRAM(s) Oral two times a day  sodium chloride 3%  Inhalation 4 milliLiter(s) Inhalation every 6 hours  tobramycin for Nebulization 300 milliGRAM(s) Inhalation every 12 hours    MEDICATIONS  (PRN):  acetaminophen     Tablet .. 650 milliGRAM(s) Oral every 6 hours PRN Temp greater or equal to 38C (100.4F), Mild Pain (1 - 3)      Allergies  No Known Allergies      PHYSICAL EXAM:   Vital Signs:  Vital Signs Last 24 Hrs  T(C): 37.2 (2022 08:52), Max: 37.6 (2022 16:03)  T(F): 98.9 (2022 08:52), Max: 99.6 (2022 16:03)  HR: 95 (2022 09:00) (80 - 111)  BP: 80/52 (2022 09:00) (63/44 - 158/80)  BP(mean): 60 (2022 09:00) (50 - 111)  RR: 24 (2022 09:00) (24 - 35)  SpO2: 99% (2022 09:00) (91% - 100%)  Daily     Daily Weight in k.2 (2022 07:00)    GENERAL:  Appears stated age  ABDOMEN:  Soft, non-tender, non-distended, +peg  NEURO:  Opens eyes, not verbally responsive since intubated      LABS:                        7.3    5.77  )-----------( 289      ( 2022 07:36 )             22.4     06-    138  |  99  |  33<H>  ----------------------------<  112<H>  4.9   |  35<H>  |  0.79    Ca    9.0      2022 05:03  Phos  2.4       Mg     2.2         TPro  5.4<L>  /  Alb  1.6<L>  /  TBili  0.2  /  DBili  x   /  AST  32  /  ALT  52  /  AlkPhos  70  29    PT/INR - ( 2022 14:10 )   PT: 14.4 sec;   INR: 1.23 ratio         PTT - ( 2022 14:10 )  PTT:36.4 sec

## 2022-06-29 NOTE — PROGRESS NOTE ADULT - PROBLEM SELECTOR PLAN 1
Remains intubated  Continue ventilator support    Overall prognosis is guarded  Vent management and weaning trials per ICU

## 2022-06-29 NOTE — PROGRESS NOTE ADULT - SUBJECTIVE AND OBJECTIVE BOX
Interval Events:   Overnight, continues to have weaning trials with minimal improvement. Also, became bradycardic and hypotensive thismorning   Review of Systems:  Constitutional: No fever, chills, fatigue  Neuro: No headache, numbness, weakness  Resp: No cough, wheezing, shortness of breath  CVS: No chest pain, palpitations, leg swelling  GI: No abdominal pain, nausea, vomiting, diarrhea   : No dysuria, frequency, incontinence  Skin: No itching, burning, rashes, or lesions   Msk: No joint pain or swelling  Psych: No depression, anxiety, mood swings    ICU Vital Signs Last 24 Hrs  T(C): 37.2 (29 Jun 2022 08:52), Max: 37.6 (28 Jun 2022 16:03)  T(F): 98.9 (29 Jun 2022 08:52), Max: 99.6 (28 Jun 2022 16:03)  HR: 95 (29 Jun 2022 09:00) (80 - 111)  BP: 80/52 (29 Jun 2022 09:00) (63/44 - 158/80)  BP(mean): 60 (29 Jun 2022 09:00) (50 - 111)  ABP: --  ABP(mean): --  RR: 24 (29 Jun 2022 09:00) (24 - 35)  SpO2: 99% (29 Jun 2022 09:00) (91% - 100%)      Mode: AC/ CMV (Assist Control/ Continuous Mandatory Ventilation), RR (machine): 24, TV (machine): 440, FiO2: 40, PEEP: 5  06-28-22 @ 07:01  -  06-29-22 @ 07:00  --------------------------------------------------------  IN: 2065 mL / OUT: 0 mL / NET: 2065 mL    06-29-22 @ 07:01  -  06-29-22 @ 11:35  --------------------------------------------------------  IN: 630 mL / OUT: 0 mL / NET: 630 mL        CAPILLARY BLOOD GLUCOSE      POCT Blood Glucose.: 148 mg/dL (28 Jun 2022 23:50)      I&O's Summary    28 Jun 2022 07:01  -  29 Jun 2022 07:00  --------------------------------------------------------  IN: 2065 mL / OUT: 0 mL / NET: 2065 mL    29 Jun 2022 07:01  -  29 Jun 2022 11:35  --------------------------------------------------------  IN: 630 mL / OUT: 0 mL / NET: 630 mL        Physical Exam:   Gen:   Neuro:   HEENT:  Resp:  CVS:  Abd:  Ext:  Skin:     Meds:  meropenem  IVPB IV Intermittent  rifAXIMin Oral  tobramycin for Nebulization Inhalation      atorvastatin Oral    acetylcysteine 10%  Inhalation Inhalation  albuterol/ipratropium for Nebulization Nebulizer  sodium chloride 3%  Inhalation Inhalation    acetaminophen     Tablet .. Oral PRN  carbidopa/levodopa  25/100 Oral  citalopram Oral  primidone Oral      enoxaparin Injectable SubCutaneous    lactulose Syrup Oral  pantoprazole   Suspension Oral          chlorhexidine 0.12% Liquid Oral Mucosa  chlorhexidine 2% Cloths Topical                              7.3    5.77  )-----------( 289      ( 29 Jun 2022 07:36 )             22.4       06-29    138  |  99  |  33<H>  ----------------------------<  112<H>  4.9   |  35<H>  |  0.79    Ca    9.0      29 Jun 2022 05:03  Phos  2.4     06-29  Mg     2.2     06-29    TPro  5.4<L>  /  Alb  1.6<L>  /  TBili  0.2  /  DBili  x   /  AST  32  /  ALT  52  /  AlkPhos  70  06-29          PT/INR - ( 27 Jun 2022 14:10 )   PT: 14.4 sec;   INR: 1.23 ratio         PTT - ( 27 Jun 2022 14:10 )  PTT:36.4 sec                Radiology:    Bedside Ultrasound:    Tubes/Lines:      GLOBAL ISSUE/BEST PRACTICE:  Analgesia:  Sedation:  HOB elevation: Y  Stress ulcer prophylaxis:  VTE prophylaxis:  Glycemic control:  Nutrition:    CODE STATUS:        Interval Events:   Overnight, continues to have weaning trials with minimal improvement. Also, became bradycardic and hypotensive this morning  and 500cc of given. Pressure support weaned to 10/5 will continue to monitor. This morning opened eyes and followed commands. Hgb 7.3 this morning and type and screen ordered. Will receive 1 unit of blood today. Unable to obtain meaningful history at this time as he remains intubated and lethargic.     Review of Systems:  Unable to obtain     ICU Vital Signs Last 24 Hrs  T(C): 37.2 (29 Jun 2022 08:52), Max: 37.6 (28 Jun 2022 16:03)  T(F): 98.9 (29 Jun 2022 08:52), Max: 99.6 (28 Jun 2022 16:03)  HR: 95 (29 Jun 2022 09:00) (80 - 111)  BP: 80/52 (29 Jun 2022 09:00) (63/44 - 158/80)  BP(mean): 60 (29 Jun 2022 09:00) (50 - 111)  ABP: --  ABP(mean): --  RR: 24 (29 Jun 2022 09:00) (24 - 35)  SpO2: 99% (29 Jun 2022 09:00) (91% - 100%)      Mode: AC/ CMV (Assist Control/ Continuous Mandatory Ventilation), RR (machine): 24, TV (machine): 440, FiO2: 40, PEEP: 5  06-28-22 @ 07:01  -  06-29-22 @ 07:00  --------------------------------------------------------  IN: 2065 mL / OUT: 0 mL / NET: 2065 mL    06-29-22 @ 07:01  -  06-29-22 @ 11:35  --------------------------------------------------------  IN: 630 mL / OUT: 0 mL / NET: 630 mL        CAPILLARY BLOOD GLUCOSE      POCT Blood Glucose.: 148 mg/dL (28 Jun 2022 23:50)      I&O's Summary    28 Jun 2022 07:01  -  29 Jun 2022 07:00  --------------------------------------------------------  IN: 2065 mL / OUT: 0 mL / NET: 2065 mL    29 Jun 2022 07:01  -  29 Jun 2022 11:35  --------------------------------------------------------  IN: 630 mL / OUT: 0 mL / NET: 630 mL    Physical Exam:   General: critically ill appearing male  CNS: intubated, off sedation. Opens eyes to loud voice, and follow commands  HEENT: PERRL  Resp: scattered rhonchi  CVS: RRR, no murmurs  Abd: soft, NT/ND  Ext: edema in UE or no cyanosis  Skin: warm and well perfused     Meds:  meropenem  IVPB IV Intermittent  rifAXIMin Oral  tobramycin for Nebulization Inhalation      atorvastatin Oral    acetylcysteine 10%  Inhalation Inhalation  albuterol/ipratropium for Nebulization Nebulizer  sodium chloride 3%  Inhalation Inhalation    acetaminophen     Tablet .. Oral PRN  carbidopa/levodopa  25/100 Oral  citalopram Oral  primidone Oral      enoxaparin Injectable SubCutaneous    lactulose Syrup Oral  pantoprazole   Suspension Oral          chlorhexidine 0.12% Liquid Oral Mucosa  chlorhexidine 2% Cloths Topical                              7.3    5.77  )-----------( 289      ( 29 Jun 2022 07:36 )             22.4       06-29    138  |  99  |  33<H>  ----------------------------<  112<H>  4.9   |  35<H>  |  0.79    Ca    9.0      29 Jun 2022 05:03  Phos  2.4     06-29  Mg     2.2     06-29    TPro  5.4<L>  /  Alb  1.6<L>  /  TBili  0.2  /  DBili  x   /  AST  32  /  ALT  52  /  AlkPhos  70  06-29          PT/INR - ( 27 Jun 2022 14:10 )   PT: 14.4 sec;   INR: 1.23 ratio         PTT - ( 27 Jun 2022 14:10 )  PTT:36.4 sec                Radiology: No new imaging     Bedside Ultrasound: N/A     Tubes/Lines: No tubes or lines     GLOBAL ISSUE/BEST PRACTICE:  Analgesia: Y   Sedation: N   HOB elevation: Y  Stress ulcer prophylaxis: Y   VTE prophylaxis: Y   Glycemic control: N   Nutrition: Y     CODE STATUS: Full code        Interval Events:   Overnight, continues to have weaning trials with minimal improvement. Also, became bradycardic and hypotensive this morning  and 500cc of given. Pressure support weaned to 10/5 will continue to monitor. This morning opened eyes and followed commands. Unable to obtain meaningful history at this time as he remains intubated and lethargic.     Review of Systems:  Unable to obtain     ICU Vital Signs Last 24 Hrs  T(C): 37.2 (29 Jun 2022 08:52), Max: 37.6 (28 Jun 2022 16:03)  T(F): 98.9 (29 Jun 2022 08:52), Max: 99.6 (28 Jun 2022 16:03)  HR: 95 (29 Jun 2022 09:00) (80 - 111)  BP: 80/52 (29 Jun 2022 09:00) (63/44 - 158/80)  BP(mean): 60 (29 Jun 2022 09:00) (50 - 111)  ABP: --  ABP(mean): --  RR: 24 (29 Jun 2022 09:00) (24 - 35)  SpO2: 99% (29 Jun 2022 09:00) (91% - 100%)      Mode: AC/ CMV (Assist Control/ Continuous Mandatory Ventilation), RR (machine): 24, TV (machine): 440, FiO2: 40, PEEP: 5  06-28-22 @ 07:01  -  06-29-22 @ 07:00  --------------------------------------------------------  IN: 2065 mL / OUT: 0 mL / NET: 2065 mL    06-29-22 @ 07:01  -  06-29-22 @ 11:35  --------------------------------------------------------  IN: 630 mL / OUT: 0 mL / NET: 630 mL        CAPILLARY BLOOD GLUCOSE      POCT Blood Glucose.: 148 mg/dL (28 Jun 2022 23:50)      I&O's Summary    28 Jun 2022 07:01 - 29 Jun 2022 07:00  --------------------------------------------------------  IN: 2065 mL / OUT: 0 mL / NET: 2065 mL    29 Jun 2022 07:01  -  29 Jun 2022 11:35  --------------------------------------------------------  IN: 630 mL / OUT: 0 mL / NET: 630 mL    Physical Exam:   General: critically ill appearing male  CNS: intubated, off sedation. Opens eyes to loud voice, and follows simple commands  HEENT: PERRL  Resp: scattered rhonchi  CVS: RRR, no murmurs  Abd: soft, NT/ND  Ext: edema in UE or no cyanosis  Skin: warm and well perfused     Meds:  meropenem  IVPB IV Intermittent  rifAXIMin Oral  tobramycin for Nebulization Inhalation      atorvastatin Oral    acetylcysteine 10%  Inhalation Inhalation  albuterol/ipratropium for Nebulization Nebulizer  sodium chloride 3%  Inhalation Inhalation    acetaminophen     Tablet .. Oral PRN  carbidopa/levodopa  25/100 Oral  citalopram Oral  primidone Oral      enoxaparin Injectable SubCutaneous    lactulose Syrup Oral  pantoprazole   Suspension Oral          chlorhexidine 0.12% Liquid Oral Mucosa  chlorhexidine 2% Cloths Topical                              7.3    5.77  )-----------( 289      ( 29 Jun 2022 07:36 )             22.4       06-29    138  |  99  |  33<H>  ----------------------------<  112<H>  4.9   |  35<H>  |  0.79    Ca    9.0      29 Jun 2022 05:03  Phos  2.4     06-29  Mg     2.2     06-29    TPro  5.4<L>  /  Alb  1.6<L>  /  TBili  0.2  /  DBili  x   /  AST  32  /  ALT  52  /  AlkPhos  70  06-29          PT/INR - ( 27 Jun 2022 14:10 )   PT: 14.4 sec;   INR: 1.23 ratio         PTT - ( 27 Jun 2022 14:10 )  PTT:36.4 sec      Tubes/Lines: No tubes or lines     GLOBAL ISSUE/BEST PRACTICE:  Analgesia: Y   Sedation: N   HOB elevation: Y  Stress ulcer prophylaxis: Y   VTE prophylaxis: Y   Glycemic control: N   Nutrition: Y     CODE STATUS: Full code

## 2022-06-29 NOTE — PROGRESS NOTE ADULT - SUBJECTIVE AND OBJECTIVE BOX
MEDICAL ATTENDING NOTE    Patient is a 76y old  Male who presents with a chief complaint of Acute Hypoxic Respiratory Failure (29 Jun 2022 11:34)      INTERVAL HPI/OVERNIGHT EVENTS: Intubated    MEDICATIONS  (STANDING):  acetylcysteine 10%  Inhalation 4 milliLiter(s) Inhalation every 6 hours  albuterol/ipratropium for Nebulization 3 milliLiter(s) Nebulizer every 6 hours  atorvastatin 80 milliGRAM(s) Oral at bedtime  carbidopa/levodopa  25/100 1 Tablet(s) Oral daily  chlorhexidine 0.12% Liquid 15 milliLiter(s) Oral Mucosa every 12 hours  chlorhexidine 2% Cloths 1 Application(s) Topical <User Schedule>  citalopram 10 milliGRAM(s) Oral daily  enoxaparin Injectable 70 milliGRAM(s) SubCutaneous every 12 hours  lactulose Syrup 20 Gram(s) Oral every 8 hours  meropenem  IVPB 1000 milliGRAM(s) IV Intermittent every 8 hours  pantoprazole   Suspension 40 milliGRAM(s) Oral daily  primidone 25 milliGRAM(s) Oral daily  rifAXIMin 550 milliGRAM(s) Oral two times a day  sodium chloride 3%  Inhalation 4 milliLiter(s) Inhalation every 6 hours  tobramycin for Nebulization 300 milliGRAM(s) Inhalation every 12 hours    MEDICATIONS  (PRN):  acetaminophen     Tablet .. 650 milliGRAM(s) Oral every 6 hours PRN Temp greater or equal to 38C (100.4F), Mild Pain (1 - 3)      __________________________________________________  ----------------------------------------------------------------------------------  REVIEW OF SYSTEMS: unable to participate in ROS due to intubation      Vital Signs Last 24 Hrs  T(C): 37.2 (29 Jun 2022 12:00), Max: 37.6 (28 Jun 2022 16:03)  T(F): 99 (29 Jun 2022 12:00), Max: 99.6 (28 Jun 2022 16:03)  HR: 95 (29 Jun 2022 13:49) (52 - 111)  BP: 218/96 (29 Jun 2022 13:00) (63/44 - 218/96)  BP(mean): 138 (29 Jun 2022 13:00) (50 - 138)  RR: 28 (29 Jun 2022 13:00) (24 - 35)  SpO2: 96% (29 Jun 2022 13:49) (90% - 100%)    _________________  PHYSICAL EXAM:  ---------------------------   Normocephalic; intubated  LUNGS - ET tube in place; bilateral air entry+  HEART: S1 S2+   ABDOMEN: Soft,  non distended, BS+  EXTREMITIES: no cyanosis; no edema  NERVOUS SYSTEM:  unabl;e to assess as patient intubated    _________________________________________________  LABS:                        7.3    5.77  )-----------( 289      ( 29 Jun 2022 07:36 )             22.4     06-29    138  |  99  |  33<H>  ----------------------------<  112<H>  4.9   |  35<H>  |  0.79    Ca    9.0      29 Jun 2022 05:03  Phos  2.4     06-29  Mg     2.2     06-29    TPro  5.4<L>  /  Alb  1.6<L>  /  TBili  0.2  /  DBili  x   /  AST  32  /  ALT  52  /  AlkPhos  70  06-29    PT/INR - ( 27 Jun 2022 14:10 )   PT: 14.4 sec;   INR: 1.23 ratio         PTT - ( 27 Jun 2022 14:10 )  PTT:36.4 sec    CAPILLARY BLOOD GLUCOSE      POCT Blood Glucose.: 140 mg/dL (29 Jun 2022 11:35)  POCT Blood Glucose.: 148 mg/dL (28 Jun 2022 23:50)  POCT Blood Glucose.: 167 mg/dL (28 Jun 2022 17:35)                Updates about current status and plan of care provided to family by ICU team             MEDICAL ATTENDING NOTE    Patient is a 76y old  Male who presents with a chief complaint of Acute Hypoxic Respiratory Failure (29 Jun 2022 11:34)      INTERVAL HPI/OVERNIGHT EVENTS: Intubated    MEDICATIONS  (STANDING):  acetylcysteine 10%  Inhalation 4 milliLiter(s) Inhalation every 6 hours  albuterol/ipratropium for Nebulization 3 milliLiter(s) Nebulizer every 6 hours  atorvastatin 80 milliGRAM(s) Oral at bedtime  carbidopa/levodopa  25/100 1 Tablet(s) Oral daily  chlorhexidine 0.12% Liquid 15 milliLiter(s) Oral Mucosa every 12 hours  chlorhexidine 2% Cloths 1 Application(s) Topical <User Schedule>  citalopram 10 milliGRAM(s) Oral daily  enoxaparin Injectable 70 milliGRAM(s) SubCutaneous every 12 hours  lactulose Syrup 20 Gram(s) Oral every 8 hours  meropenem  IVPB 1000 milliGRAM(s) IV Intermittent every 8 hours  pantoprazole   Suspension 40 milliGRAM(s) Oral daily  primidone 25 milliGRAM(s) Oral daily  rifAXIMin 550 milliGRAM(s) Oral two times a day  sodium chloride 3%  Inhalation 4 milliLiter(s) Inhalation every 6 hours  tobramycin for Nebulization 300 milliGRAM(s) Inhalation every 12 hours    MEDICATIONS  (PRN):  acetaminophen     Tablet .. 650 milliGRAM(s) Oral every 6 hours PRN Temp greater or equal to 38C (100.4F), Mild Pain (1 - 3)      __________________________________________________  ----------------------------------------------------------------------------------  REVIEW OF SYSTEMS: unable to participate in ROS due to intubation      Vital Signs Last 24 Hrs  T(C): 37.2 (29 Jun 2022 12:00), Max: 37.6 (28 Jun 2022 16:03)  T(F): 99 (29 Jun 2022 12:00), Max: 99.6 (28 Jun 2022 16:03)  HR: 95 (29 Jun 2022 13:49) (52 - 111)  BP: 218/96 (29 Jun 2022 13:00) (63/44 - 218/96)  BP(mean): 138 (29 Jun 2022 13:00) (50 - 138)  RR: 28 (29 Jun 2022 13:00) (24 - 35)  SpO2: 96% (29 Jun 2022 13:49) (90% - 100%)    _________________  PHYSICAL EXAM:  ---------------------------   Normocephalic; intubated  LUNGS - ET tube in place; bilateral air entry+  HEART: S1 S2+   ABDOMEN: Soft,  non distended, BS+  EXTREMITIES: no cyanosis; trace bilateral foot edema; UE exedema++  NERVOUS SYSTEM:  unable to assess as patient intubated    _________________________________________________  LABS:                        7.3    5.77  )-----------( 289      ( 29 Jun 2022 07:36 )             22.4     06-29    138  |  99  |  33<H>  ----------------------------<  112<H>  4.9   |  35<H>  |  0.79    Ca    9.0      29 Jun 2022 05:03  Phos  2.4     06-29  Mg     2.2     06-29    TPro  5.4<L>  /  Alb  1.6<L>  /  TBili  0.2  /  DBili  x   /  AST  32  /  ALT  52  /  AlkPhos  70  06-29    PT/INR - ( 27 Jun 2022 14:10 )   PT: 14.4 sec;   INR: 1.23 ratio         PTT - ( 27 Jun 2022 14:10 )  PTT:36.4 sec    CAPILLARY BLOOD GLUCOSE      POCT Blood Glucose.: 140 mg/dL (29 Jun 2022 11:35)  POCT Blood Glucose.: 148 mg/dL (28 Jun 2022 23:50)  POCT Blood Glucose.: 167 mg/dL (28 Jun 2022 17:35)                Updates about current status and plan of care provided to family by ICU team

## 2022-06-30 NOTE — PROGRESS NOTE ADULT - TIME BILLING
direct care of a critically ill patient including but not limited to reviewing chart, medications ,laboratory data, imaging reports, discussion of plan of care with consultants on the case, coordination of care with multidisciplinary team involved in the case and discussion of plan with ICU team.    Overall prognosis poor.

## 2022-06-30 NOTE — PROGRESS NOTE ADULT - ASSESSMENT
Pt is a 77 y/o M pmhx of HTN, HLD, recent CVA and oropharyngeal dysphagia, prostate CA, SCC of tongue s/p partial resection who presented to [facility] for hypoxic respiratory failure and septic shock 2nd to PNA. Subsequently found to have UTI, lactic acidosis, ÁNGEL and hypernatremia.     1. Sepsis   2. E. Coli + PNA   3. Acute hypoxic respiratory failure   4. Pseudomonas VAP  5. Aspiration pneumonitis  6. ÁNGEL     Plan:     Neuro: Remains off sedation following minimal commands, avoid sedating medications to further assess mentation and attempt to wean from vent. On home Carbidopa/levodopa, lactulose 20mg Q8.     CV: Remains off pressors, continue to monitor HD and maintain MAP >65. Atorvastatin 80 mg daily.     Pulm: Remains on full vent support w/ low TV ventilation 4-6cc/kg, will titrate down FiO2 as tolerated, continue CPAP trials in AM. Duonebs as needed for hypoxia. VAP/ASP PNA on meropenem, w/ aspiration precautions/vent bundle in place.     GI: Diet: NPO w/ tube feeds, Protonix for GI PPX     Renal: ÁNGEL since resolved, good Urine output, monitor and maintain U/O >0.5cc/kg/hr. Avoid nephrotoxic meds.     Endo: Glucose <180, Mag>2, K>4    Heme: Lovenox for possible hypercoagulable state and hx of stroke.     ID: Septic shock since resolved, Urine culture + for pseudomonas on 6/23/22, ET cultures positive for E. Coli w/ pseudomonas on meropenem, Rifaximin and tobramycin for VAP, ASP PNA, continue to trend markers of infection. Repeat cultures negative.     Dispo: DNR, copy in chart.

## 2022-06-30 NOTE — PROGRESS NOTE ADULT - ATTENDING COMMENTS
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 coli pneumonia + Pseudomonas UTI, resulting in acute metabolic encephalopathy, lactic acidosis, septic shock, and ÁNGEL. Now with prolonged respiratory failure and new fevers secondary to Pseudomonas aeruginosa VAP.    --persistent encephalopathy, minimally improved   continue decreased dose of sinemet  d/c primidone after taper for last few days  hyperammonemia, continue lactulose and xifaximen, recheck NH3  Hx stroke, continue full dose AC with lovenox  --hemodynamically stable  HLD, continue statin  --acute hypoxemic respiratory failure  tolerated PS 15/5 x 3hrs today  check CXR  continue secretion clearance measures  --dysphagia, continue TF via PEG  --normal renal function  --pseudomonas pneumonia and UTI, E. coli pneumonia  continue meropenem and Nico nebs for 10day course

## 2022-06-30 NOTE — PROGRESS NOTE ADULT - SUBJECTIVE AND OBJECTIVE BOX
Patient is a 76y old  Male who presents with a chief complaint of Acute Hypoxic Respiratory Failure (30 Jun 2022 18:07)      BRIEF HOSPITAL COURSE: Pt is a 75 y/o M pmhx of HTN, HLD, recent CVA and oropharyngeal dysphagia, prostate CA, SCC of tongue s/p partial resection who presented to [facility] for hypoxic respiratory failure and septic shock 2nd to PNA. Subsequently found to have UTI, lactic acidosis, ÁNGEL and hypernatremia.     Events last 24 hours: Pt remains on full vent support, unable to wean from vent.     PAST MEDICAL & SURGICAL HISTORY:  Hypertension      Hyperlipidemia      GERD (gastroesophageal reflux disease)      Parkinson&#x27;s disease      Prostate cancer      Mitral valve prolapse      Malignant neoplasm of tongue, 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      Throat cancer      History of vasectomy      Prostate cancer  s/p cyber knife      S/P partial glossectomy  4/21          Review of Systems:  Unable to assess secondary to mentation.       Medications:  meropenem  IVPB 1000 milliGRAM(s) IV Intermittent every 8 hours  rifAXIMin 550 milliGRAM(s) Oral two times a day  tobramycin for Nebulization 300 milliGRAM(s) Inhalation every 12 hours      acetylcysteine 10%  Inhalation 4 milliLiter(s) Inhalation every 6 hours  albuterol/ipratropium for Nebulization 3 milliLiter(s) Nebulizer every 6 hours  sodium chloride 3%  Inhalation 4 milliLiter(s) Inhalation every 6 hours    acetaminophen     Tablet .. 650 milliGRAM(s) Oral every 6 hours PRN  carbidopa/levodopa  25/100 1 Tablet(s) Oral daily  citalopram 10 milliGRAM(s) Oral daily      enoxaparin Injectable 70 milliGRAM(s) SubCutaneous every 12 hours    lactulose Syrup 20 Gram(s) Oral every 8 hours  pantoprazole   Suspension 40 milliGRAM(s) Oral daily      atorvastatin 80 milliGRAM(s) Oral at bedtime        chlorhexidine 0.12% Liquid 15 milliLiter(s) Oral Mucosa every 12 hours  chlorhexidine 2% Cloths 1 Application(s) Topical <User Schedule>        Mode: AC/ CMV (Assist Control/ Continuous Mandatory Ventilation)  RR (machine): 24  TV (machine): 440  FiO2: 40  PEEP: 5  ITime: 1  MAP: 13  PIP: 31      ICU Vital Signs Last 24 Hrs  T(C): 37 (30 Jun 2022 15:41), Max: 37.2 (30 Jun 2022 00:12)  T(F): 98.6 (30 Jun 2022 15:41), Max: 99 (30 Jun 2022 00:12)  HR: 107 (30 Jun 2022 19:53) (86 - 110)  BP: 112/60 (30 Jun 2022 13:00) (92/53 - 133/68)  BP(mean): 79 (30 Jun 2022 13:00) (68 - 96)  ABP: --  ABP(mean): --  RR: 28 (30 Jun 2022 13:00) (24 - 36)  SpO2: 100% (30 Jun 2022 19:53) (89% - 100%)          I&O's Detail    29 Jun 2022 07:01  -  30 Jun 2022 07:00  --------------------------------------------------------  IN:    Enteral Tube Flush: 1050 mL    Enteral Tube Flush: 220 mL    IV PiggyBack: 100 mL    Lactated Ringers Bolus: 500 mL    Vital1.5: 1300 mL  Total IN: 3170 mL    OUT:  Total OUT: 0 mL    Total NET: 3170 mL      30 Jun 2022 07:01  -  30 Jun 2022 21:04  --------------------------------------------------------  IN:    Enteral Tube Flush: 100 mL    Enteral Tube Flush: 1050 mL    IV PiggyBack: 50 mL    Vital1.5: 780 mL  Total IN: 1980 mL    OUT:  Total OUT: 0 mL    Total NET: 1980 mL            LABS:                        7.4    5.13  )-----------( 358      ( 30 Jun 2022 06:46 )             22.7     06-30    139  |  100  |  34<H>  ----------------------------<  127<H>  4.5   |  37<H>  |  0.85    Ca    9.0      30 Jun 2022 06:46  Phos  2.9     06-30  Mg     2.3     06-30    TPro  5.9<L>  /  Alb  1.7<L>  /  TBili  0.3  /  DBili  x   /  AST  31  /  ALT  49  /  AlkPhos  73  06-30          CAPILLARY BLOOD GLUCOSE      POCT Blood Glucose.: 138 mg/dL (30 Jun 2022 19:53)    PT/INR - ( 30 Jun 2022 06:46 )   PT: 14.4 sec;   INR: 1.23 ratio         PTT - ( 30 Jun 2022 06:46 )  PTT:36.1 sec    CULTURES:      Physical Examination:    General: Pt laying in hospital bed in no acute distress. Responsive to verbal stimuli.     HEENT: Pupils equal, reactive to light.  Symmetric.    PULM: Clear to auscultation bilaterally, no significant sputum production    CVS: Regular rate and rhythm, no murmurs, rubs, or gallops    ABD: Soft, nondistended, nontender, normoactive bowel sounds, no masses    EXT: No edema, nontender    SKIN: Warm and well perfused    NEURO: Opens eyes to commands, unable to assess rest of neurological exam due to lethargy.       RADIOLOGY:   < from: Xray Chest 1 View AP/PA (06.25.22 @ 22:21) >    ACC: [id number] EXAM:  XR CHEST AP OR PA 1V                          PROCEDURE DATE:  06/25/2022          INTERPRETATION:  Chest one view    HISTORY: Hypoxia    COMPARISON STUDY: 6/23/2022    Frontal expiratory view of the chest shows the heart to besimilar in   size. Endotracheal tube and loop recorder are again noted.    The lungs show partial clearing of the lower lungs with reduction of left   effusion and there is no evidence of pneumothorax nor right pleural   effusion.    IMPRESSION:  Decreased congestive changes.        Thank you for the courtesy of this referral.    --- End of Report ---            LARA SINGH MD; Attending Interventional Radiologist  This document has been electronically signed. Jun 28 2022  8:18AM        CRITICAL CARE TIME SPENT: 43 minutes assessing presenting problems of acute illness, which pose high probability of life threatening deterioration or end organ damage/dysfunction, as well as medical decision making including initiating plan of care, reviewing data, reviewing radiologic exams, discussing with multidisciplinary team,  discussing goals of care with patient/family, and writing this note.  Non-inclusive of procedures performed,

## 2022-06-30 NOTE — PROGRESS NOTE ADULT - SUBJECTIVE AND OBJECTIVE BOX
CAPILLARY BLOOD GLUCOSE      POCT Blood Glucose.: 137 mg/dL (30 Jun 2022 05:20)  POCT Blood Glucose.: 171 mg/dL (29 Jun 2022 23:40)  POCT Blood Glucose.: 148 mg/dL (29 Jun 2022 18:19)  POCT Blood Glucose.: 140 mg/dL (29 Jun 2022 11:35)      Vital Signs Last 24 Hrs  T(C): 37.2 (30 Jun 2022 08:23), Max: 37.6 (29 Jun 2022 20:10)  T(F): 99 (30 Jun 2022 08:23), Max: 99.6 (29 Jun 2022 20:10)  HR: 109 (30 Jun 2022 08:13) (52 - 109)  BP: 110/69 (30 Jun 2022 07:00) (73/51 - 218/96)  BP(mean): 85 (30 Jun 2022 07:00) (58 - 138)  RR: 24 (30 Jun 2022 07:00) (24 - 45)  SpO2: 100% (30 Jun 2022 08:13) (90% - 100%)    General: WN/WD NAD  Respiratory: CTA B/L  CV: RRR, S1S2, no murmurs, rubs or gallops  Abdominal: Soft, NT, ND +BS, Last BM  Extremities: No edema, + peripheral pulses     06-30    139  |  100  |  34<H>  ----------------------------<  127<H>  4.5   |  37<H>  |  0.85    Ca    9.0      30 Jun 2022 06:46  Phos  2.9     06-30  Mg     2.3     06-30    TPro  5.9<L>  /  Alb  1.7<L>  /  TBili  0.3  /  DBili  x   /  AST  31  /  ALT  49  /  AlkPhos  73  06-30      atorvastatin 80 milliGRAM(s) Oral at bedtime

## 2022-06-30 NOTE — PROGRESS NOTE ADULT - ASSESSMENT
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 coli pneumonia and Pseudomonas UTI.    Neuro:  - Encephalopathy likely 2/2 multifactorial   - Continue Lactulose and Rifaximin and titrate with BM; will check ammonia level and if down trending will d/c these meds   - Continue decreased dose of Citalopram given mental status + prolonged QTc and monitor for improvement    - Continue Decrease home Sinemet to once a day given mental status and monitor for improvement   - Discontinued home Primidone given mental status and monitor for improvement     CV:  - Came in with hypotension 2/2 to septic shock that is now improved  - Will continue to monitor   - Hx of HLD, continue home statin     Pulm:  - Remains on mechanical ventilation and wean as tolerated   - Duoneb q6h and hypersecretion therapy                   GI:  - NPO with Vital Tube feeds  - PEG tube placed 6/21/22  - IV Protonix 40mg qd for GI ppx    Renal:  - Cr stable  - S/P Amato   - Monitor I&Os and renal indices     Heme:  - Continue on full dose of Lovenox today given Hx as below   - Per prior Hospitalist note from 6/2, pt was on full dose AC for recent stroke and hypercoagulable state 2/2 to underlying malignancy     ID:  - B/l lower lobe PNA and UTI  - Urine cx positive for Pseudomonas  - ET positive for E.Coli now with Pseudomonas    - BC NGTD   - Continue meropenem and Tobramycin     Endo:  - PTH wnl, vit D,25,hydroxy 39.2 and vit D,1,25 dihydroxy 89.9, PTHrP <2.2  - Hypercalcemia likely inflammatory  - Hypernatremia now resolved though continue free water flushes   - Fingersticks q6h, blood glucose goal 100-180 in ICU    Skin:  - Decubitus ulcer wound care following     Tubes/lines:  - None    77yo male with PMH of prostate cancer, SCC of tongue s/p partial resection now with recurrence, parkinson's disease, hypertension, hyperlipidemia, recent CVA, and oropharyngeal dysphagia admitted for septic shock and acute hypoxic respiratory failure in the setting of b/l lower lobe E. coli pneumonia and Pseudomonas UTI.    Neuro:  - Encephalopathy likely 2/2 multifactorial   - Continue Lactulose and Rifaximin and titrate with BM; will check ammonia level and if down trending will d/c these meds   - Continue decreased dose of Citalopram given mental status + prolonged QTc and monitor for improvement    - Continue Decrease home Sinemet to once a day given mental status and monitor for improvement   - Discontinued home Primidone given mental status and monitor for improvement     CV:  - Came in with hypotension 2/2 to septic shock that is now improved  - Will continue to monitor   - Hx of HLD, continue home statin     Pulm:  - Remains on mechanical ventilation and wean as tolerated   - Duoneb q6h and hypersecretion therapy                   GI:  - NPO with Vital Tube feeds  - PEG tube placed 6/21/22  - IV Protonix 40mg qd for GI ppx    Renal:  - Cr stable  - S/P Amato   - Monitor I&Os and renal indices     Heme:  - Continue on full dose of Lovenox today given Hx as below   - Per prior Hospitalist note from 6/2, pt was on full dose AC for recent stroke and hypercoagulable state 2/2 to underlying malignancy     ID:  - B/l lower lobe PNA and UTI  - Urine cx positive for Pseudomonas  - ET positive for E.Coli now with Pseudomonas    - BC NGTD   - Continue meropenem and Tobramycin     Endo:  - PTH wnl, vit D,25,hydroxy 39.2 and vit D,1,25 dihydroxy 89.9, PTHrP <2.2  - Hypercalcemia likely inflammatory  - Hypernatremia now resolved though continue free water flushes   - Fingersticks q6h, blood glucose goal 100-180 in ICU    Skin:  - Decubitus ulcer wound care following     Tubes/lines:  - None     Daughter updated at bedside   75yo male with PMH of prostate cancer, SCC of tongue s/p partial resection now with recurrence, parkinson's disease, hypertension, hyperlipidemia, recent CVA, and oropharyngeal dysphagia admitted for septic shock and acute hypoxic respiratory failure in the setting of b/l lower lobe E. coli pneumonia and Pseudomonas UTI.    Neuro:  - Encephalopathy likely 2/2 multifactorial   - Continue Lactulose and Rifaximin and titrate with BM; will check ammonia level and if down trending will d/c these meds   - Continue decreased dose of Citalopram given mental status + prolonged QTc and monitor for improvement    - Continue Decrease home Sinemet to once a day given mental status and monitor for improvement   - Discontinued home Primidone given mental status and monitor for improvement     CV:  - Came in with hypotension 2/2 to septic shock that is now improved  - Will continue to monitor   - Hx of HLD, continue home statin     Pulm:  - Remains on mechanical ventilation and wean as tolerated   - Duoneb q6h and hypersecretion therapy   - F/U Chest X-RAY                   GI:  - NPO with Vital Tube feeds  - PEG tube placed 6/21/22  - IV Protonix 40mg qd for GI ppx    Renal:  - Cr stable  - S/P Amato   - Monitor I&Os and renal indices     Heme:  - Continue on full dose of Lovenox today given Hx as below   - Per prior Hospitalist note from 6/2, pt was on full dose AC for recent stroke and hypercoagulable state 2/2 to underlying malignancy     ID:  - B/l lower lobe PNA and UTI  - Urine cx positive for Pseudomonas  - ET positive for E.Coli now with Pseudomonas    - BC NGTD   - Continue meropenem and Tobramycin     Endo:  - PTH wnl, vit D,25,hydroxy 39.2 and vit D,1,25 dihydroxy 89.9, PTHrP <2.2  - Hypercalcemia likely inflammatory  - Hypernatremia now resolved though continue free water flushes   - Fingersticks q6h, blood glucose goal 100-180 in ICU    Skin:  - Decubitus ulcer wound care following     Tubes/lines:  - None     Daughter updated at bedside   75yo male with PMH of prostate cancer, SCC of tongue s/p partial resection now with recurrence, parkinson's disease, hypertension, hyperlipidemia, recent CVA, and oropharyngeal dysphagia admitted for septic shock and acute hypoxic respiratory failure in the setting of b/l lower lobe E. coli pneumonia and Pseudomonas UTI.    Neuro:  - Encephalopathy likely 2/2 multifactorial   - Continue Lactulose and Rifaximin and titrate with BM; will check ammonia level and if down trending will d/c these meds   - Continue decreased dose of Citalopram given mental status + prolonged QTc and monitor for improvement    - Continue Decrease home Sinemet to once a day given mental status and monitor for improvement   - Discontinued home Primidone given mental status and monitor for improvement     CV:  - Came in with hypotension 2/2 to septic shock that is now improved  - Will continue to monitor   - Hx of HLD, continue home statin     Pulm:  - Remains on mechanical ventilation and wean as tolerated   - Duoneb q6h and hypersecretion therapy   - F/U Chest X-RAY                   GI:  - NPO with Vital Tube feeds  - PEG tube placed 6/21/22  - IV Protonix 40mg qd for GI ppx    Renal:  - Cr stable  - S/P Amato   - Monitor I&Os and renal indices     Heme:  - Continue on full dose of Lovenox today given Hx as below   - Per prior Hospitalist note from 6/2, pt was on full dose AC for recent stroke and hypercoagulable state 2/2 to underlying malignancy   - Given low Hgb will give administer 1 unit of PRBCs though to obtain consent prior     ID:  - B/l lower lobe PNA and UTI  - Urine cx positive for Pseudomonas  - ET positive for E.Coli now with Pseudomonas    - BC NGTD   - Continue meropenem and Tobramycin     Endo:  - PTH wnl, vit D,25,hydroxy 39.2 and vit D,1,25 dihydroxy 89.9, PTHrP <2.2  - Hypercalcemia likely inflammatory  - Hypernatremia now resolved though continue free water flushes   - Fingersticks q6h, blood glucose goal 100-180 in ICU    Skin:  - Decubitus ulcer wound care following     Tubes/lines:  - None     Daughter updated at bedside

## 2022-06-30 NOTE — PROGRESS NOTE ADULT - SUBJECTIVE AND OBJECTIVE BOX
MEDICAL ATTENDING NOTE    Patient is a 76y old  Male who presents with a chief complaint of Acute Hypoxic Respiratory Failure (30 Jun 2022 09:15)      INTERVAL HPI/OVERNIGHT EVENTS: Intubated    MEDICATIONS  (STANDING):  acetylcysteine 10%  Inhalation 4 milliLiter(s) Inhalation every 6 hours  albuterol/ipratropium for Nebulization 3 milliLiter(s) Nebulizer every 6 hours  atorvastatin 80 milliGRAM(s) Oral at bedtime  carbidopa/levodopa  25/100 1 Tablet(s) Oral daily  chlorhexidine 0.12% Liquid 15 milliLiter(s) Oral Mucosa every 12 hours  chlorhexidine 2% Cloths 1 Application(s) Topical <User Schedule>  citalopram 10 milliGRAM(s) Oral daily  enoxaparin Injectable 70 milliGRAM(s) SubCutaneous every 12 hours  lactulose Syrup 20 Gram(s) Oral every 8 hours  meropenem  IVPB 1000 milliGRAM(s) IV Intermittent every 8 hours  pantoprazole   Suspension 40 milliGRAM(s) Oral daily  rifAXIMin 550 milliGRAM(s) Oral two times a day  sodium chloride 3%  Inhalation 4 milliLiter(s) Inhalation every 6 hours  tobramycin for Nebulization 300 milliGRAM(s) Inhalation every 12 hours    MEDICATIONS  (PRN):  acetaminophen     Tablet .. 650 milliGRAM(s) Oral every 6 hours PRN Temp greater or equal to 38C (100.4F), Mild Pain (1 - 3)      __________________________________________________  ----------------------------------------------------------------------------------  REVIEW OF SYSTEMS: unable to participate in ROS due to intubation      Vital Signs Last 24 Hrs  T(C): 37.2 (30 Jun 2022 08:23), Max: 37.6 (29 Jun 2022 20:10)  T(F): 99 (30 Jun 2022 08:23), Max: 99.6 (29 Jun 2022 20:10)  HR: 98 (30 Jun 2022 12:04) (52 - 109)  BP: 125/64 (30 Jun 2022 09:00) (73/51 - 218/96)  BP(mean): 88 (30 Jun 2022 09:00) (58 - 138)  RR: 28 (30 Jun 2022 09:00) (24 - 45)  SpO2: 97% (30 Jun 2022 12:04) (90% - 100%)    _________________  PHYSICAL EXAM:  ---------------------------   Normocephalic; intubated  LUNGS - ET tube in place; bilateral air entry+  HEART: S1 S2+   ABDOMEN: Soft,  non distended, BS+  EXTREMITIES: no cyanosis; no edema  NERVOUS SYSTEM:  unabl;e to assess as patient intubated    _________________________________________________  LABS:                        7.4    5.13  )-----------( 358      ( 30 Jun 2022 06:46 )             22.7     06-30    139  |  100  |  34<H>  ----------------------------<  127<H>  4.5   |  37<H>  |  0.85    Ca    9.0      30 Jun 2022 06:46  Phos  2.9     06-30  Mg     2.3     06-30    TPro  5.9<L>  /  Alb  1.7<L>  /  TBili  0.3  /  DBili  x   /  AST  31  /  ALT  49  /  AlkPhos  73  06-30    PT/INR - ( 30 Jun 2022 06:46 )   PT: 14.4 sec;   INR: 1.23 ratio         PTT - ( 30 Jun 2022 06:46 )  PTT:36.1 sec    CAPILLARY BLOOD GLUCOSE      POCT Blood Glucose.: 156 mg/dL (30 Jun 2022 12:05)  POCT Blood Glucose.: 137 mg/dL (30 Jun 2022 05:20)  POCT Blood Glucose.: 171 mg/dL (29 Jun 2022 23:40)  POCT Blood Glucose.: 148 mg/dL (29 Jun 2022 18:19)                Updates about current status and plan of care provided to family by ICU team

## 2022-06-30 NOTE — PROGRESS NOTE ADULT - SUBJECTIVE AND OBJECTIVE BOX
Interval Events:   Patient seen and examined at bedside. Patient's mental status continues to improve, now opening eyes and following commands though remains weak. Currently on pressure support of 15/5 and now on FiO2 of 40% satting well. This morning Hgb of 7.4 which increased from 7.0 with no unit of blood given. Labs reviewed. Unable to obtain meaningful history given intubation.     Review of Systems:  Unable to obtain     ICU Vital Signs Last 24 Hrs  T(C): 37.2 (30 Jun 2022 08:23), Max: 37.6 (29 Jun 2022 20:10)  T(F): 99 (30 Jun 2022 08:23), Max: 99.6 (29 Jun 2022 20:10)  HR: 100 (30 Jun 2022 09:00) (52 - 109)  BP: 125/64 (30 Jun 2022 09:00) (73/51 - 218/96)  BP(mean): 88 (30 Jun 2022 09:00) (58 - 138)  ABP: --  ABP(mean): --  RR: 28 (30 Jun 2022 09:00) (24 - 45)  SpO2: 99% (30 Jun 2022 09:00) (90% - 100%)      Mode: CPAP with PS, FiO2: 40, PEEP: 5, PS: 15  06-29-22 @ 07:01  -  06-30-22 @ 07:00  --------------------------------------------------------  IN: 3170 mL / OUT: 0 mL / NET: 3170 mL        CAPILLARY BLOOD GLUCOSE      POCT Blood Glucose.: 137 mg/dL (30 Jun 2022 05:20)      I&O's Summary    29 Jun 2022 07:01  -  30 Jun 2022 07:00  --------------------------------------------------------  IN: 3170 mL / OUT: 0 mL / NET: 3170 mL    Physical Exam:   General: critically ill appearing male  CNS: intubated, off sedation. Opens eyes to loud voice, and follows simple commands  HEENT: PERRL  Resp: scattered rhonchi  CVS: RRR, no murmurs  Abd: soft, NT/ND  Ext: edema in UE or no cyanosis  Skin: warm and well perfused     Meds:  meropenem  IVPB IV Intermittent  rifAXIMin Oral  tobramycin for Nebulization Inhalation      atorvastatin Oral    acetylcysteine 10%  Inhalation Inhalation  albuterol/ipratropium for Nebulization Nebulizer  sodium chloride 3%  Inhalation Inhalation    acetaminophen     Tablet .. Oral PRN  carbidopa/levodopa  25/100 Oral  citalopram Oral      enoxaparin Injectable SubCutaneous    lactulose Syrup Oral  pantoprazole   Suspension Oral          chlorhexidine 0.12% Liquid Oral Mucosa  chlorhexidine 2% Cloths Topical                              7.4    5.13  )-----------( 358      ( 30 Jun 2022 06:46 )             22.7       06-30    139  |  100  |  34<H>  ----------------------------<  127<H>  4.5   |  37<H>  |  0.85    Ca    9.0      30 Jun 2022 06:46  Phos  2.9     06-30  Mg     2.3     06-30    TPro  5.9<L>  /  Alb  1.7<L>  /  TBili  0.3  /  DBili  x   /  AST  31  /  ALT  49  /  AlkPhos  73  06-30          PT/INR - ( 30 Jun 2022 06:46 )   PT: 14.4 sec;   INR: 1.23 ratio         PTT - ( 30 Jun 2022 06:46 )  PTT:36.1 sec                Radiology:    Bedside Ultrasound:    Tubes/Lines:      GLOBAL ISSUE/BEST PRACTICE:  Analgesia:  Sedation:  HOB elevation: Y  Stress ulcer prophylaxis:  VTE prophylaxis:  Glycemic control:  Nutrition:    CODE STATUS:        Interval Events:   Patient seen and examined at bedside. Patient's mental status continues to improve, now opening eyes and following commands though remains weak. Currently on pressure support of 15/5 and now on FiO2 of 40% satting well. This morning Hgb of 7.4 which increased from 7.0 with no unit of blood given. Labs reviewed. Unable to obtain meaningful history given intubation.     Review of Systems:  Unable to obtain     ICU Vital Signs Last 24 Hrs  T(C): 37.2 (30 Jun 2022 08:23), Max: 37.6 (29 Jun 2022 20:10)  T(F): 99 (30 Jun 2022 08:23), Max: 99.6 (29 Jun 2022 20:10)  HR: 100 (30 Jun 2022 09:00) (52 - 109)  BP: 125/64 (30 Jun 2022 09:00) (73/51 - 218/96)  BP(mean): 88 (30 Jun 2022 09:00) (58 - 138)  ABP: --  ABP(mean): --  RR: 28 (30 Jun 2022 09:00) (24 - 45)  SpO2: 99% (30 Jun 2022 09:00) (90% - 100%)      Mode: CPAP with PS, FiO2: 40, PEEP: 5, PS: 15  06-29-22 @ 07:01  -  06-30-22 @ 07:00  --------------------------------------------------------  IN: 3170 mL / OUT: 0 mL / NET: 3170 mL        CAPILLARY BLOOD GLUCOSE      POCT Blood Glucose.: 137 mg/dL (30 Jun 2022 05:20)      I&O's Summary    29 Jun 2022 07:01  -  30 Jun 2022 07:00  --------------------------------------------------------  IN: 3170 mL / OUT: 0 mL / NET: 3170 mL    Physical Exam:   General: critically ill appearing male  CNS: intubated, off sedation. Opens eyes to loud voice, and follows simple commands  HEENT: PERRL  Resp: CTA B/L   CVS: RRR, no murmurs  Abd: soft, NT/ND  Ext: edema in UE or no cyanosis  Skin: warm and well perfused     Meds:  meropenem  IVPB IV Intermittent  rifAXIMin Oral  tobramycin for Nebulization Inhalation      atorvastatin Oral    acetylcysteine 10%  Inhalation Inhalation  albuterol/ipratropium for Nebulization Nebulizer  sodium chloride 3%  Inhalation Inhalation    acetaminophen     Tablet .. Oral PRN  carbidopa/levodopa  25/100 Oral  citalopram Oral      enoxaparin Injectable SubCutaneous    lactulose Syrup Oral  pantoprazole   Suspension Oral          chlorhexidine 0.12% Liquid Oral Mucosa  chlorhexidine 2% Cloths Topical                              7.4    5.13  )-----------( 358      ( 30 Jun 2022 06:46 )             22.7       06-30    139  |  100  |  34<H>  ----------------------------<  127<H>  4.5   |  37<H>  |  0.85    Ca    9.0      30 Jun 2022 06:46  Phos  2.9     06-30  Mg     2.3     06-30    TPro  5.9<L>  /  Alb  1.7<L>  /  TBili  0.3  /  DBili  x   /  AST  31  /  ALT  49  /  AlkPhos  73  06-30          PT/INR - ( 30 Jun 2022 06:46 )   PT: 14.4 sec;   INR: 1.23 ratio         PTT - ( 30 Jun 2022 06:46 )  PTT:36.1 sec        Radiology: No new imaging in last 24hrs     Bedside Ultrasound: N/A     Tubes/Lines: No tubes or lines     GLOBAL ISSUE/BEST PRACTICE:  Analgesia: Y   Sedation: N   HOB elevation: Y  Stress ulcer prophylaxis: Y   VTE prophylaxis: Y   Glycemic control: N  Nutrition: Y     CODE STATUS: Full code

## 2022-06-30 NOTE — PROGRESS NOTE ADULT - SUBJECTIVE AND OBJECTIVE BOX
INTERVAL HPI/OVERNIGHT EVENTS:  No new overnight event.  No N/V/D.  Tolerating diet via peg    Allergies    No Known Allergies    Intolerances    General:  No wt loss, fevers, chills, night sweats, fatigue,   Eyes:  Good vision, no reported pain  ENT:  No sore throat, pain, runny nose, dysphagia  CV:  No pain, palpitations, hypo/hypertension  Resp:  No dyspnea, cough, tachypnea, wheezing  GI:  No pain, No nausea, No vomiting, No diarrhea, No constipation, No weight loss, No fever, No pruritis, No rectal bleeding, No tarry stools, No dysphagia,  :  No pain, bleeding, incontinence, nocturia  Muscle:  No pain, weakness  Neuro:  No weakness, tingling, memory problems  Psych:  No fatigue, insomnia, mood problems, depression  Endocrine:  No polyuria, polydipsia, cold/heat intolerance  Heme:  No petechiae, ecchymosis, easy bruisability  Skin:  No rash, tattoos, scars, edema      PHYSICAL EXAM:   Vital Signs:  Vital Signs Last 24 Hrs  T(C): 37 (2022 15:41), Max: 37.6 (2022 20:10)  T(F): 98.6 (2022 15:41), Max: 99.6 (2022 20:10)  HR: 97 (2022 17:02) (86 - 109)  BP: 112/60 (2022 13:00) (92/53 - 133/68)  BP(mean): 79 (2022 13:00) (68 - 96)  RR: 28 (2022 13:00) (24 - 45)  SpO2: 100% (2022 17:02) (89% - 100%)  Daily     Daily Weight in k (2022 05:00)I&O's Summary    2022 07:01  -  2022 07:00  --------------------------------------------------------  IN: 3170 mL / OUT: 0 mL / NET: 3170 mL        GENERAL:  Appears stated age, well-groomed, well-nourished, no distress  HEENT:  NC/AT,  conjunctivae clear and pink, no thyromegaly, nodules, adenopathy, no JVD, sclera -anicteric  CHEST:  Full & symmetric excursion, no increased effort, breath sounds clear  HEART:  Regular rhythm, S1, S2, no murmur/rub/S3/S4, no abdominal bruit, no edema  ABDOMEN:  Soft, non-tender, non-distended, normoactive bowel sounds,  no masses ,no hepato-splenomegaly, no signs of chronic liver disease  EXTEREMITIES:  no cyanosis,clubbing or edema  SKIN:  No rash/erythema/ecchymoses/petechiae/wounds/abscess/warm/dry  NEURO:  Alert, oriented, no asterixis, no tremor, no encephalopathy      LABS:                        7.4    5.13  )-----------( 358      ( 2022 06:46 )             22.7     06-30    139  |  100  |  34<H>  ----------------------------<  127<H>  4.5   |  37<H>  |  0.85    Ca    9.0      2022 06:46  Phos  2.9     06-30  Mg     2.3     06-30    TPro  5.9<L>  /  Alb  1.7<L>  /  TBili  0.3  /  DBili  x   /  AST  31  /  ALT  49  /  AlkPhos  73  06-30    PT/INR - ( 2022 06:46 )   PT: 14.4 sec;   INR: 1.23 ratio         PTT - ( 2022 06:46 )  PTT:36.1 sec    amylase   lipase  RADIOLOGY & ADDITIONAL TESTS:

## 2022-06-30 NOTE — ASU PATIENT PROFILE, ADULT - CLICK TO LAUNCH ORM
. Cosentyx Counseling:  I discussed with the patient the risks of Cosentyx including but not limited to worsening of Crohn's disease, immunosuppression, allergic reactions and infections.  The patient understands that monitoring is required including a PPD at baseline and must alert us or the primary physician if symptoms of infection or other concerning signs are noted.

## 2022-07-01 NOTE — PROGRESS NOTE ADULT - SUBJECTIVE AND OBJECTIVE BOX
MEDICAL ATTENDING NOTE    Patient is a 76y old  Male who presents with a chief complaint of Acute Hypoxic Respiratory Failure (30 Jun 2022 21:03)      INTERVAL HPI/OVERNIGHT EVENTS: Intubated    MEDICATIONS  (STANDING):  acetylcysteine 10%  Inhalation 4 milliLiter(s) Inhalation every 6 hours  albuterol/ipratropium for Nebulization 3 milliLiter(s) Nebulizer every 6 hours  atorvastatin 80 milliGRAM(s) Oral at bedtime  carbidopa/levodopa  25/100 1 Tablet(s) Oral daily  chlorhexidine 0.12% Liquid 15 milliLiter(s) Oral Mucosa every 12 hours  chlorhexidine 2% Cloths 1 Application(s) Topical <User Schedule>  citalopram 10 milliGRAM(s) Oral daily  enoxaparin Injectable 70 milliGRAM(s) SubCutaneous every 12 hours  lactulose Syrup 20 Gram(s) Oral every 8 hours  meropenem  IVPB 1000 milliGRAM(s) IV Intermittent every 8 hours  pantoprazole   Suspension 40 milliGRAM(s) Oral daily  rifAXIMin 550 milliGRAM(s) Oral two times a day  sodium chloride 3%  Inhalation 4 milliLiter(s) Inhalation every 6 hours  tobramycin for Nebulization 300 milliGRAM(s) Inhalation every 12 hours    MEDICATIONS  (PRN):  acetaminophen     Tablet .. 650 milliGRAM(s) Oral every 6 hours PRN Temp greater or equal to 38C (100.4F), Mild Pain (1 - 3)      __________________________________________________  ----------------------------------------------------------------------------------  REVIEW OF SYSTEMS: unable to participate in ROS due to intubation      Vital Signs Last 24 Hrs  T(C): 36.6 (01 Jul 2022 08:28), Max: 37.1 (30 Jun 2022 12:15)  T(F): 97.8 (01 Jul 2022 08:28), Max: 98.7 (30 Jun 2022 12:15)  HR: 90 (01 Jul 2022 09:00) (82 - 110)  BP: 92/56 (01 Jul 2022 09:00) (84/56 - 152/65)  BP(mean): 67 (01 Jul 2022 09:00) (63 - 95)  RR: 24 (01 Jul 2022 09:00) (24 - 30)  SpO2: 97% (01 Jul 2022 09:00) (81% - 100%)    _________________  PHYSICAL EXAM:  ---------------------------   Normocephalic; intubated  LUNGS - ET tube in place; bilateral air entry+  HEART: S1 S2+   ABDOMEN: Soft,  non distended, BS+  EXTREMITIES: no cyanosis; no edema  NERVOUS SYSTEM:  unabl;e to assess as patient intubated    _________________________________________________  LABS:                        7.0    3.68  )-----------( 339      ( 01 Jul 2022 07:10 )             21.6     07-01    139  |  100  |  33<H>  ----------------------------<  105<H>  4.5   |  36<H>  |  0.72    Ca    8.9      01 Jul 2022 06:56  Phos  2.7     07-01  Mg     2.3     07-01    TPro  5.7<L>  /  Alb  1.6<L>  /  TBili  0.3  /  DBili  x   /  AST  38<H>  /  ALT  48  /  AlkPhos  72  07-01    PT/INR - ( 30 Jun 2022 06:46 )   PT: 14.4 sec;   INR: 1.23 ratio         PTT - ( 30 Jun 2022 06:46 )  PTT:36.1 sec    CAPILLARY BLOOD GLUCOSE      POCT Blood Glucose.: 120 mg/dL (01 Jul 2022 05:50)  POCT Blood Glucose.: 143 mg/dL (30 Jun 2022 23:42)  POCT Blood Glucose.: 138 mg/dL (30 Jun 2022 19:53)  POCT Blood Glucose.: 156 mg/dL (30 Jun 2022 12:05)                Updates about current status and plan of care provided to family by ICU team

## 2022-07-01 NOTE — PROGRESS NOTE ADULT - ASSESSMENT
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     1. Gram negative bacterial infection with sepsis    2. Acute hypoxic respiratory failure due to  pneumonia - remains intubated  3. Metabolic encephalopathy suspected hepatic with elevated ammonia level  4. ÁNGEL on CKD likely ATN  5. Severe Protein Calorie malnutrition s/p peg 6/ 21/22  6. Hypoalbuminemia  7. Anemia- possibly multifactorial; monitor hb; no signs of active blood loss  8. h/o Parkinson's disease  9. Gram negative UTI  10. Hypercalcemia - resolved

## 2022-07-01 NOTE — CHART NOTE - NSCHARTNOTEFT_GEN_A_CORE
Pt with poor performance on PS trials and planned to exchange ETT for larger tube (now 6.5 ETT). When visualizing airway with glidescope the epiglottis appears edematous, pale, with copious thin watery secretions.  Due to anatomy the ETT exchange was deferred.  Will start decadron to decrease edema to facilitate extubation.

## 2022-07-01 NOTE — PROVIDER CONTACT NOTE (CRITICAL VALUE NOTIFICATION) - ACTION/TREATMENT ORDERED:
PA aware ordered to give pt 1 unit PRBC's.
CCNP aware, no changes at this time
Cont. current treatment.
treatment in progress
ON IVF and IV abx
RR on vent increased from 14 to 20

## 2022-07-01 NOTE — PROGRESS NOTE ADULT - SUBJECTIVE AND OBJECTIVE BOX
Interval events:   transfused 1 PRBC this am for Hb 7  failed weaning trial on PS 12/5 this am    Review of Systems: intubated, RACHELLE    T(F): 99 (07-01-22 @ 16:00), Max: 99 (07-01-22 @ 16:00)  HR: 91 (07-01-22 @ 20:26) (82 - 100)  BP: 102/64 (07-01-22 @ 20:00) (84/56 - 168/84)  RR: 24 (07-01-22 @ 20:00) (24 - 29)  SpO2: 98% (07-01-22 @ 20:26) (81% - 100%)  Wt(kg): --    Mode: AC/ CMV (Assist Control/ Continuous Mandatory Ventilation), RR (machine): 24, TV (machine): 440, FiO2: 21, PEEP: 5  06-30-22 @ 07:01  -  07-01-22 @ 07:00  --------------------------------------------------------  IN: 2930 mL / OUT: 0 mL / NET: 2930 mL    07-01-22 @ 07:01  -  07-01-22 @ 22:20  --------------------------------------------------------  IN: 1527 mL / OUT: 0 mL / NET: 1527 mL        CAPILLARY BLOOD GLUCOSE      POCT Blood Glucose.: 120 mg/dL (01 Jul 2022 05:50)      I&O's Summary    30 Jun 2022 07:01  -  01 Jul 2022 07:00  --------------------------------------------------------  IN: 2930 mL / OUT: 0 mL / NET: 2930 mL    01 Jul 2022 07:01  -  01 Jul 2022 22:20  --------------------------------------------------------  IN: 1527 mL / OUT: 0 mL / NET: 1527 mL        Physical Exam:   Gen: frail and elderly, critically ill, intubated  Neuro: opens eyes spontaneously but does not follow commands  HEENT: PERRL  Resp: CTABL  CVS: RRR  Abd: soft, NTND  Ext: no edema   Skin: WWP    Meds:  meropenem  IVPB IV Intermittent  tobramycin for Nebulization Inhalation      atorvastatin Oral  dexAMETHasone  IVPB IV Intermittent    acetylcysteine 10%  Inhalation Inhalation  albuterol/ipratropium for Nebulization Nebulizer  sodium chloride 3%  Inhalation Inhalation    acetaminophen     Tablet .. Oral PRN  carbidopa/levodopa  25/100 Oral  citalopram Oral      enoxaparin Injectable SubCutaneous    lactulose Syrup Oral  pantoprazole   Suspension Oral          chlorhexidine 0.12% Liquid Oral Mucosa  chlorhexidine 2% Cloths Topical                              7.0    3.68  )-----------( 339      ( 01 Jul 2022 07:10 )             21.6       07-01    139  |  100  |  33<H>  ----------------------------<  105<H>  4.5   |  36<H>  |  0.72    Ca    8.9      01 Jul 2022 06:56  Phos  2.7     07-01  Mg     2.3     07-01    TPro  5.7<L>  /  Alb  1.6<L>  /  TBili  0.3  /  DBili  x   /  AST  38<H>  /  ALT  48  /  AlkPhos  72  07-01          PT/INR - ( 30 Jun 2022 06:46 )   PT: 14.4 sec;   INR: 1.23 ratio         PTT - ( 30 Jun 2022 06:46 )  PTT:36.1 sec      Tubes/lines:  midline    GLOBAL ISSUE/BEST PRACTICE:  Analgesia:  N  Sedation:  N  HOB elevation: yes  Stress ulcer prophylaxis: Y  VTE prophylaxis: Y  Glycemic control: Y  Nutrition: Y    CODE STATUS: DNR

## 2022-07-01 NOTE — PROGRESS NOTE ADULT - SUBJECTIVE AND OBJECTIVE BOX
INTERVAL HPI/OVERNIGHT EVENTS:    intubated in ICU  hgb noted, receiving PRBC  tolerating peg feeds well  seen with RN  bedside, no reports of overt gi bleeding    MEDICATIONS  (STANDING):  acetylcysteine 10%  Inhalation 4 milliLiter(s) Inhalation every 6 hours  albuterol/ipratropium for Nebulization 3 milliLiter(s) Nebulizer every 6 hours  atorvastatin 80 milliGRAM(s) Oral at bedtime  carbidopa/levodopa  25/100 1 Tablet(s) Oral daily  chlorhexidine 0.12% Liquid 15 milliLiter(s) Oral Mucosa every 12 hours  chlorhexidine 2% Cloths 1 Application(s) Topical <User Schedule>  citalopram 10 milliGRAM(s) Oral daily  enoxaparin Injectable 70 milliGRAM(s) SubCutaneous every 12 hours  lactulose Syrup 20 Gram(s) Oral every 8 hours  meropenem  IVPB 1000 milliGRAM(s) IV Intermittent every 8 hours  pantoprazole   Suspension 40 milliGRAM(s) Oral daily  sodium chloride 3%  Inhalation 4 milliLiter(s) Inhalation every 6 hours  tobramycin for Nebulization 300 milliGRAM(s) Inhalation every 12 hours    MEDICATIONS  (PRN):  acetaminophen     Tablet .. 650 milliGRAM(s) Oral every 6 hours PRN Temp greater or equal to 38C (100.4F), Mild Pain (1 - 3)      Allergies    No Known Allergies    Intolerances        Review of Systems: *pt minimally verbal to nonverbal, unable to obtain ROS         Vital Signs Last 24 Hrs  T(C): 37.1 (01 Jul 2022 10:30), Max: 37.1 (30 Jun 2022 12:15)  T(F): 98.8 (01 Jul 2022 10:30), Max: 98.8 (01 Jul 2022 10:30)  HR: 87 (01 Jul 2022 11:19) (82 - 110)  BP: 127/73 (01 Jul 2022 10:30) (84/56 - 152/65)  BP(mean): 95 (01 Jul 2022 10:30) (63 - 95)  RR: 24 (01 Jul 2022 10:30) (24 - 29)  SpO2: 99% (01 Jul 2022 11:19) (81% - 100%)    PHYSICAL EXAM:    Constitutional: NAD  HEENT: EOMI, throat clear  Neck: No LAD, supple  Respiratory: CTA and P  Cardiovascular: S1 and S2, RRR, no M  Gastrointestinal: BS+, soft, NT/ND, neg HSM, +peg  Extremities: No peripheral edema, neg clubbing, cyanosis  Vascular: 2+ peripheral pulses  Neurological: A/O x0  Psychiatric: intubated  Skin: No rashes      LABS:                        7.0    3.68  )-----------( 339      ( 01 Jul 2022 07:10 )             21.6     07-01    139  |  100  |  33<H>  ----------------------------<  105<H>  4.5   |  36<H>  |  0.72    Ca    8.9      01 Jul 2022 06:56  Phos  2.7     07-01  Mg     2.3     07-01    TPro  5.7<L>  /  Alb  1.6<L>  /  TBili  0.3  /  DBili  x   /  AST  38<H>  /  ALT  48  /  AlkPhos  72  07-01    PT/INR - ( 30 Jun 2022 06:46 )   PT: 14.4 sec;   INR: 1.23 ratio         PTT - ( 30 Jun 2022 06:46 )  PTT:36.1 sec      RADIOLOGY & ADDITIONAL TESTS:

## 2022-07-01 NOTE — PHARMACOTHERAPY INTERVENTION NOTE - COMMENTS
Patient is a 76y M currently ordered for Zosyn 3.375g IV q8h x5 days for PNA and UTI. Urine cx with growth of PsA and ET tube cx with growth of E coli. WBC trending up and patient persistently febrile. Discussed alternative therapy with ICU Dr. Chavez and agreed to escalate therapy to meropenem 1g IV q8h. Entered order per discussion.
Patient ordered for vancomycin by level and Cefepime 1g q12 for PNA. Patient with previous ucx with E. Faecalis sens to vancomycin and ampicillin.     MRSA PCR resulted negative, discussed regiment with Dr. Garland and suggested vancomycin discontinuation and changing cefepime to MD Paramjit agreed. Patient's renal function is improving with a current CrCl of 22ml/min, entered order for Zosyn 3.375g q8. 
Medication reconciliation was completed by pharmacy representative. The following discrepancies were discussed with Dr. Garland.     Facility Med	Current Order/Not Ordered  Citalopram 20mg 1T QD 	Not ordered   Primidone 50mg 1T QD	Not ordered     MD aware and would like to add medications to initiate outpatient medications. Entered orders.     
Patient currently ordered for vasopressin with titration instructions, MAP of 65-70. Discussed with ICU Dr. Edwards and recommended adjusting order to a set rate of 0.04 units/min. MD agreed and order updated.
Patient with sepsis possibly 2/2 pna ordered for vancomycin by level and Zosyn 3.375g q8. Patient is in ÁNGEL with a current CrCl of 19.6ml/min. Discussed increased risk of ÁNGEL associated with concomitant use of antibiotics and suggested de-escalation to cefepime 1g q12, MD agreed. Entered order. Also suggested ordering MRSA PCR and adjusting vancomycin trough timing, MD agreed.  Entered orders. 
Discontinued rifaximin per telephone order, Dr. Garland.  Patient ammonia level 30, also on lactulose.

## 2022-07-01 NOTE — PROGRESS NOTE ADULT - ASSESSMENT
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 coli pneumonia + Pseudomonas UTI, resulting in acute metabolic encephalopathy, lactic acidosis, septic shock, and ÁNGEL. Now with prolonged respiratory failure and new fevers secondary to Pseudomonas aeruginosa VAP.  Now found to have laryngeal edema.      --persistent encephalopathy  continue decreased dose of sinemet  hyperammonemia improved and doubt this is significantly contributing to encephalopathy, continue lactulose, d/c xifaximen  Hx stroke, continue full dose AC with lovenox  --hemodynamically stable  HLD, continue statin  --acute hypoxemic respiratory failure  failed weaning trial this am  today found to have laryngeal edema (see separate procedure note) which would preclude extubation, start decadron   continue secretion clearance measures  --dysphagia, continue TF via PEG  --normal renal function  --pseudomonas pneumonia and UTI, E. coli pneumonia  continue meropenem and Nico nebs for 10day course   --anemia, likely due to acute illness and phlebotomy, doubt bleeding, transfuse 1 PRBC today  --daughter updated by ICU NP

## 2022-07-01 NOTE — PROGRESS NOTE ADULT - ASSESSMENT
76y.o. Male dysphagia  FTT  Anemia    s/p peg placement 6/21  con't feeds  flush peg after use  ppi daily as gi ppx while intubated  will follow    Advanced care planning was discussed with patient and family.  Advanced care planning forms were reviewed and discussed.  Risks, benefits and alternatives of gastroenterologic procedures were discussed in detail and all questions were answered.    30 minutes spent.

## 2022-07-02 NOTE — PROGRESS NOTE ADULT - SUBJECTIVE AND OBJECTIVE BOX
****** CHARTING IN PROGRESS *******    INTERVAL HPI/OVERNIGHT EVENTS:    SUBJECTIVE: Patient seen and examined at bedside.     ROS:  CV: Denies chest pain  Resp: Denies SOB  GI: Denies abdominal pain, constipation, diarrhea, nausea, vomiting  : Denies dysuria  ID: Denies fevers, chills  MSK: Denies joint pain     OBJECTIVE:    VITAL SIGNS:  ICU Vital Signs Last 24 Hrs  T(C): 36.9 (02 Jul 2022 08:00), Max: 37.2 (01 Jul 2022 16:00)  T(F): 98.5 (02 Jul 2022 08:00), Max: 99 (01 Jul 2022 16:00)  HR: 99 (02 Jul 2022 10:00) (83 - 100)  BP: 154/85 (02 Jul 2022 10:00) (93/56 - 169/92)  BP(mean): 114 (02 Jul 2022 10:00) (69 - 122)  ABP: --  ABP(mean): --  RR: 24 (02 Jul 2022 10:00) (24 - 39)  SpO2: 96% (02 Jul 2022 10:00) (94% - 100%)    Mode: CPAP with PS, FiO2: 30, PEEP: 5, PS: 15, MAP: 11, PIP: 21    07-01 @ 07:01 - 07-02 @ 07:00  --------------------------------------------------------  IN: 2682 mL / OUT: 0 mL / NET: 2682 mL    07-02 @ 07:01 - 07-02 @ 13:45  --------------------------------------------------------  IN: 130 mL / OUT: 0 mL / NET: 130 mL      CAPILLARY BLOOD GLUCOSE      POCT Blood Glucose.: 120 mg/dL (01 Jul 2022 05:50)      PHYSICAL EXAM:  General: NAD, comfortable  HEENT: NCAT, PERRL, clear conjunctiva, no scleral icterus  Neck: supple, no JVD  Respiratory: CTA b/l, no wheezing, rhonchi, rales  Cardiovascular: RRR, normal S1S2, no M/R/G  Abdomen: soft, NT/ND, bowel sounds in all four quadrants, no palpable masses  Extremities: WWP, no clubbing, cyanosis, or edema  Neurology: A&Ox3, nonfocal, sensation intact     MEDICATIONS:  MEDICATIONS  (STANDING):  acetylcysteine 10%  Inhalation 4 milliLiter(s) Inhalation every 6 hours  albuterol/ipratropium for Nebulization 3 milliLiter(s) Nebulizer every 6 hours  atorvastatin 80 milliGRAM(s) Oral at bedtime  carbidopa/levodopa  25/100 1 Tablet(s) Oral daily  chlorhexidine 0.12% Liquid 15 milliLiter(s) Oral Mucosa every 12 hours  chlorhexidine 2% Cloths 1 Application(s) Topical <User Schedule>  citalopram 10 milliGRAM(s) Oral daily  enoxaparin Injectable 70 milliGRAM(s) SubCutaneous every 12 hours  lactulose Syrup 20 Gram(s) Oral every 8 hours  meropenem  IVPB 1000 milliGRAM(s) IV Intermittent every 8 hours  pantoprazole   Suspension 40 milliGRAM(s) Oral daily  sodium chloride 3%  Inhalation 4 milliLiter(s) Inhalation every 6 hours  tobramycin for Nebulization 300 milliGRAM(s) Inhalation every 12 hours    MEDICATIONS  (PRN):  acetaminophen     Tablet .. 650 milliGRAM(s) Oral every 6 hours PRN Temp greater or equal to 38C (100.4F), Mild Pain (1 - 3)      ALLERGIES:  Allergies    No Known Allergies    Intolerances        LABS:                        8.7    6.00  )-----------( 404      ( 02 Jul 2022 07:15 )             26.6     07-02    138  |  100  |  32<H>  ----------------------------<  144<H>  4.7   |  36<H>  |  0.71    Ca    9.3      02 Jul 2022 07:15  Phos  3.2     07-02  Mg     2.2     07-02    TPro  6.4  /  Alb  1.8<L>  /  TBili  0.3  /  DBili  x   /  AST  28  /  ALT  48  /  AlkPhos  82  07-02    PT/INR - ( 02 Jul 2022 07:15 )   PT: 14.6 sec;   INR: 1.25 ratio         PTT - ( 02 Jul 2022 07:15 )  PTT:39.3 sec    BEDSIDE LUNG: not performed  BEDSIDE ECHO: not performed    CENTRAL LINE: N        DATE INSERTED:             REMOVE: N  MENJIVAR: Y                       DATE INSERTED:              REMOVE: Y/N  A-LINE: N                       DATE INSERTED:              REMOVE: Y/N      GLOBAL ISSUE/BEST PRACTICE  Analgesia: N   Sedation: N   HOB elevation: yes  Stress ulcer prophylaxis: Y  VTE prophylaxis: Y  Glycemic control: Y  Nutrition: Y    CODE STATUS: Full Code ****** CHARTING IN PROGRESS *******    INTERVAL HPI/OVERNIGHT EVENTS: No acute overnight events    SUBJECTIVE: Patient seen and examined at bedside.     ROS: unable to obtain as pt is intubated     OBJECTIVE:    VITAL SIGNS:  ICU Vital Signs Last 24 Hrs  T(C): 36.9 (02 Jul 2022 08:00), Max: 37.2 (01 Jul 2022 16:00)  T(F): 98.5 (02 Jul 2022 08:00), Max: 99 (01 Jul 2022 16:00)  HR: 99 (02 Jul 2022 10:00) (83 - 100)  BP: 154/85 (02 Jul 2022 10:00) (93/56 - 169/92)  BP(mean): 114 (02 Jul 2022 10:00) (69 - 122)  ABP: --  ABP(mean): --  RR: 24 (02 Jul 2022 10:00) (24 - 39)  SpO2: 96% (02 Jul 2022 10:00) (94% - 100%)    Mode: CPAP with PS, FiO2: 30, PEEP: 5, PS: 15, MAP: 11, PIP: 21    07-01 @ 07:01  -  07-02 @ 07:00  --------------------------------------------------------  IN: 2682 mL / OUT: 0 mL / NET: 2682 mL    07-02 @ 07:01 - 07-02 @ 13:45  --------------------------------------------------------  IN: 130 mL / OUT: 0 mL / NET: 130 mL      CAPILLARY BLOOD GLUCOSE      POCT Blood Glucose.: 120 mg/dL (01 Jul 2022 05:50)      PHYSICAL EXAM:  General: NAD, comfortable  HEENT: NCAT, PERRL, clear conjunctiva, no scleral icterus  Neck: supple, no JVD  Respiratory: CTA b/l, expiratory wheezing, no rhonchi, rales  Cardiovascular: RRR, normal S1S2, no M/R/G  Abdomen: soft, NT/ND, bowel sounds in all four quadrants, no palpable masses  Extremities: no clubbing, cyanosis, or edema    MEDICATIONS:  MEDICATIONS  (STANDING):  acetylcysteine 10%  Inhalation 4 milliLiter(s) Inhalation every 6 hours  albuterol/ipratropium for Nebulization 3 milliLiter(s) Nebulizer every 6 hours  atorvastatin 80 milliGRAM(s) Oral at bedtime  carbidopa/levodopa  25/100 1 Tablet(s) Oral daily  chlorhexidine 0.12% Liquid 15 milliLiter(s) Oral Mucosa every 12 hours  chlorhexidine 2% Cloths 1 Application(s) Topical <User Schedule>  citalopram 10 milliGRAM(s) Oral daily  enoxaparin Injectable 70 milliGRAM(s) SubCutaneous every 12 hours  lactulose Syrup 20 Gram(s) Oral every 8 hours  meropenem  IVPB 1000 milliGRAM(s) IV Intermittent every 8 hours  pantoprazole   Suspension 40 milliGRAM(s) Oral daily  sodium chloride 3%  Inhalation 4 milliLiter(s) Inhalation every 6 hours  tobramycin for Nebulization 300 milliGRAM(s) Inhalation every 12 hours    MEDICATIONS  (PRN):  acetaminophen     Tablet .. 650 milliGRAM(s) Oral every 6 hours PRN Temp greater or equal to 38C (100.4F), Mild Pain (1 - 3)      ALLERGIES:  Allergies    No Known Allergies    Intolerances        LABS:                        8.7    6.00  )-----------( 404      ( 02 Jul 2022 07:15 )             26.6     07-02    138  |  100  |  32<H>  ----------------------------<  144<H>  4.7   |  36<H>  |  0.71    Ca    9.3      02 Jul 2022 07:15  Phos  3.2     07-02  Mg     2.2     07-02    TPro  6.4  /  Alb  1.8<L>  /  TBili  0.3  /  DBili  x   /  AST  28  /  ALT  48  /  AlkPhos  82  07-02    PT/INR - ( 02 Jul 2022 07:15 )   PT: 14.6 sec;   INR: 1.25 ratio         PTT - ( 02 Jul 2022 07:15 )  PTT:39.3 sec    CENTRAL LINE: N         MENJIVAR: Y                                A-LINE: N                             GLOBAL ISSUE/BEST PRACTICE  Analgesia: N   Sedation: N   HOB elevation: yes  Stress ulcer prophylaxis: Y  VTE prophylaxis: Y  Glycemic control: Y  Nutrition: Y    CODE STATUS: Full Code INTERVAL HPI/OVERNIGHT EVENTS: yesterday transfused 1 PRBC with Hb 7 to 8.7 this am    SUBJECTIVE: Patient seen and examined at bedside.     ROS: unable to obtain as pt is intubated     OBJECTIVE:    VITAL SIGNS:  ICU Vital Signs Last 24 Hrs  T(C): 36.9 (02 Jul 2022 08:00), Max: 37.2 (01 Jul 2022 16:00)  T(F): 98.5 (02 Jul 2022 08:00), Max: 99 (01 Jul 2022 16:00)  HR: 99 (02 Jul 2022 10:00) (83 - 100)  BP: 154/85 (02 Jul 2022 10:00) (93/56 - 169/92)  BP(mean): 114 (02 Jul 2022 10:00) (69 - 122)  ABP: --  ABP(mean): --  RR: 24 (02 Jul 2022 10:00) (24 - 39)  SpO2: 96% (02 Jul 2022 10:00) (94% - 100%)    Mode: CPAP with PS, FiO2: 30, PEEP: 5, PS: 15, MAP: 11, PIP: 21    07-01 @ 07:01 - 07-02 @ 07:00  --------------------------------------------------------  IN: 2682 mL / OUT: 0 mL / NET: 2682 mL    07-02 @ 07:01 - 07-02 @ 13:45  --------------------------------------------------------  IN: 130 mL / OUT: 0 mL / NET: 130 mL      CAPILLARY BLOOD GLUCOSE      POCT Blood Glucose.: 120 mg/dL (01 Jul 2022 05:50)      PHYSICAL EXAM:  General: NAD, comfortable  HEENT: NCAT, PERRL, clear conjunctiva, no scleral icterus  Neck: supple, no JVD  Respiratory: CTA b/l, expiratory wheezing, no rhonchi, rales  Cardiovascular: RRR, normal S1S2, no M/R/G  Abdomen: soft, NT/ND, bowel sounds in all four quadrants, no palpable masses  Extremities: no clubbing, cyanosis, or edema    MEDICATIONS:  MEDICATIONS  (STANDING):  acetylcysteine 10%  Inhalation 4 milliLiter(s) Inhalation every 6 hours  albuterol/ipratropium for Nebulization 3 milliLiter(s) Nebulizer every 6 hours  atorvastatin 80 milliGRAM(s) Oral at bedtime  carbidopa/levodopa  25/100 1 Tablet(s) Oral daily  chlorhexidine 0.12% Liquid 15 milliLiter(s) Oral Mucosa every 12 hours  chlorhexidine 2% Cloths 1 Application(s) Topical <User Schedule>  citalopram 10 milliGRAM(s) Oral daily  enoxaparin Injectable 70 milliGRAM(s) SubCutaneous every 12 hours  lactulose Syrup 20 Gram(s) Oral every 8 hours  meropenem  IVPB 1000 milliGRAM(s) IV Intermittent every 8 hours  pantoprazole   Suspension 40 milliGRAM(s) Oral daily  sodium chloride 3%  Inhalation 4 milliLiter(s) Inhalation every 6 hours  tobramycin for Nebulization 300 milliGRAM(s) Inhalation every 12 hours    MEDICATIONS  (PRN):  acetaminophen     Tablet .. 650 milliGRAM(s) Oral every 6 hours PRN Temp greater or equal to 38C (100.4F), Mild Pain (1 - 3)      ALLERGIES:  Allergies    No Known Allergies    Intolerances        LABS:                        8.7    6.00  )-----------( 404      ( 02 Jul 2022 07:15 )             26.6     07-02    138  |  100  |  32<H>  ----------------------------<  144<H>  4.7   |  36<H>  |  0.71    Ca    9.3      02 Jul 2022 07:15  Phos  3.2     07-02  Mg     2.2     07-02    TPro  6.4  /  Alb  1.8<L>  /  TBili  0.3  /  DBili  x   /  AST  28  /  ALT  48  /  AlkPhos  82  07-02    PT/INR - ( 02 Jul 2022 07:15 )   PT: 14.6 sec;   INR: 1.25 ratio         PTT - ( 02 Jul 2022 07:15 )  PTT:39.3 sec    CENTRAL LINE: R midline        MENJIVAR: Y                                A-LINE: N                             GLOBAL ISSUE/BEST PRACTICE  Analgesia: N   Sedation: N   HOB elevation: yes  Stress ulcer prophylaxis: Y  VTE prophylaxis: Y  Glycemic control: Y  Nutrition: Y    CODE STATUS: Full Code

## 2022-07-02 NOTE — PROGRESS NOTE ADULT - ATTENDING COMMENTS
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 coli pneumonia + Pseudomonas UTI, resulting in acute metabolic encephalopathy, lactic acidosis, septic shock, and ÁNGEL. Further complicated by prolonged respiratory failure, Pseudomonas aeruginosa VAP, and laryngeal edema.      --persistent encephalopathy  continue decreased dose of sinemet  Hx stroke, continue full dose AC with lovenox  --hemodynamically stable  HLD, continue statin  --acute hypoxemic respiratory failure  failed weaning trial today on PS 12/5 within 5-10min with increased work of breathing  --laryngeal edema, continue decadron x 2days  continue secretion clearance measures  --dysphagia, continue TF via PEG  --normal renal function  --pseudomonas pneumonia and UTI, E. coli pneumonia  continue meropenem and Nico nebs for 10day course   --anemia, improved after PRBC transfusion yesterday  --daughter updated at bedside

## 2022-07-02 NOTE — PROGRESS NOTE ADULT - ASSESSMENT
76y.o. Male dysphagia    FTT  Anemia    s/p peg placement 6/21  flush peg after use  ppi daily as gi ppx while intubated  will follow    Advanced care planning was discussed with patient and family.  Advanced care planning forms were reviewed and discussed.  Risks, benefits and alternatives of gastroenterologic procedures were discussed in detail and all questions were answered.    30 minutes spent.

## 2022-07-02 NOTE — PROGRESS NOTE ADULT - ASSESSMENT
75yo male with PMH of prostate cancer, SCC of tongue s/p partial resection now with recurrence, parkinson's disease, hypertension, hyperlipidemia, recent CVA, and oropharyngeal dysphagia admitted for septic shock and acute hypoxic respiratory failure in the setting of b/l lower lobe E. coli pneumonia and Pseudomonas UTI.    Neuro:  - Encephalopathy likely 2/2 multifactorial   - Continue Lactulose,  rifaximin dc'd;   - Continue decreased dose of Citalopram given mental status + prolonged QTc and monitor for improvement    - Continue Decrease home Sinemet to once a day given mental status and monitor for improvement   - Discontinued home Primidone given mental status and monitor for improvement     CV:  - Came in with hypotension 2/2 to septic shock that is now improved  - Will continue to monitor   - Hx of HLD, continue home statin     Pulm:  - Remains on mechanical ventilation and wean as tolerated   - Duoneb q6h and hypersecretion therapy   - F/U Chest X-RAY performed 6/30                  GI:  - NPO with Vital Tube feeds  - PEG tube placed 6/21/22  - IV Protonix 40mg qd for GI ppx    Renal:  - Cr stable  - S/P Amato   - Monitor I&Os and renal indices     Heme:  - Continue on full dose of Lovenox today given Hx as below   - Per prior Hospitalist note from 6/2, pt was on full dose AC for recent stroke and hypercoagulable state 2/2 to underlying malignancy   - 1 pRBC given, Hb up to 8.0 from 7.2      ID:  - B/l lower lobe PNA and UTI  - Urine cx positive for Pseudomonas  - ET positive for E.Coli now with Pseudomonas    - BC NGTD   - Continue meropenem and Tobramycin (last day 7/3)    Endo:  - PTH wnl, vit D,25,hydroxy 39.2 and vit D,1,25 dihydroxy 89.9, PTHrP <2.2  - Hypercalcemia likely inflammatory  - Hypernatremia now resolved though continue free water flushes   - Fingersticks q6h, blood glucose goal 100-180 in ICU    Skin:  - Decubitus ulcer wound care following     Tubes/lines:  - None  77yo male with PMH of prostate cancer, SCC of tongue s/p partial resection now with recurrence, parkinson's disease, hypertension, hyperlipidemia, recent CVA, and oropharyngeal dysphagia admitted for septic shock and acute hypoxic respiratory failure in the setting of b/l lower lobe E. coli pneumonia and Pseudomonas UTI.    Neuro:  - Encephalopathy likely 2/2 multifactorial   - Continue Lactulose,  rifaximin dc'd  - Continue decreased dose of Citalopram given mental status + prolonged QTc and monitor for improvement    - Continue Decrease home Sinemet to once a day given mental status and monitor for improvement   - Discontinued home Primidone given mental status and monitor for improvement     CV:  - Came in with hypotension 2/2 to septic shock that is now improved  - Will continue to monitor   - Hx of HLD, continue home statin     Pulm:  - Remains on mechanical ventilation and wean as tolerated   - Duoneb q6h and hypersecretion therapy   - Chest X-RAY performed 6/30, f/u official read                   GI:  - NPO with Vital Tube feeds  - PEG tube placed 6/21/22  - IV Protonix 40mg qd for GI ppx    Renal:  - Cr stable  - S/P Amato   - Monitor I&Os and renal indices     Heme:  - Continue on full dose of Lovenox today given Hx as below   - Per prior Hospitalist note from 6/2, pt was on full dose AC for recent stroke and hypercoagulable state 2/2 to underlying malignancy   - 1 pRBC given yesterday, Hb up to 8.0 from 7.2, will continue to monitor       ID:  - B/l lower lobe PNA and UTI  - Urine cx positive for Pseudomonas 6/18  - ET positive for E.Coli now with Pseudomonas  6/23  - BCx NGTD final 6/23  - Continue meropenem and Tobramycin (last day 7/3)    Endo:  - PTH wnl, vit D,25,hydroxy 39.2 and vit D,1,25 dihydroxy 89.9, PTHrP <2.2  - Hypercalcemia likely inflammatory  - Hypernatremia now resolved though continue free water flushes   - Fingersticks q6h, blood glucose goal 100-180 in ICU    Skin:  - Decubitus ulcer wound care following     Tubes/lines:  - None

## 2022-07-02 NOTE — PROGRESS NOTE ADULT - PROBLEM SELECTOR PLAN 1
Remains intubated  Continue ventilator support    Overall prognosis is guarded  Vent management and weaning trials per ICU Remains intubated  Continue ventilator support    Overall prognosis is guarded  Vent management and weaning trials per ICU- failed weaning trials likely as a result of laryngeal edema

## 2022-07-02 NOTE — PROGRESS NOTE ADULT - TIME BILLING
direct care of a critically ill patient including but not limited to reviewing chart, medications ,laboratory data, imaging reports, discussion of plan of care with consultants on the case, coordination of care with multidisciplinary team involved in the case and discussion of plan with ICU team

## 2022-07-02 NOTE — PROGRESS NOTE ADULT - SUBJECTIVE AND OBJECTIVE BOX
MEDICAL ATTENDING NOTE    Patient is a 76y old  Male who presents with a chief complaint of Acute Hypoxic Respiratory Failure (01 Jul 2022 14:29)      INTERVAL HPI/OVERNIGHT EVENTS: Intubated    MEDICATIONS  (STANDING):  acetylcysteine 10%  Inhalation 4 milliLiter(s) Inhalation every 6 hours  albuterol/ipratropium for Nebulization 3 milliLiter(s) Nebulizer every 6 hours  atorvastatin 80 milliGRAM(s) Oral at bedtime  carbidopa/levodopa  25/100 1 Tablet(s) Oral daily  chlorhexidine 0.12% Liquid 15 milliLiter(s) Oral Mucosa every 12 hours  chlorhexidine 2% Cloths 1 Application(s) Topical <User Schedule>  citalopram 10 milliGRAM(s) Oral daily  enoxaparin Injectable 70 milliGRAM(s) SubCutaneous every 12 hours  lactulose Syrup 20 Gram(s) Oral every 8 hours  meropenem  IVPB 1000 milliGRAM(s) IV Intermittent every 8 hours  pantoprazole   Suspension 40 milliGRAM(s) Oral daily  sodium chloride 3%  Inhalation 4 milliLiter(s) Inhalation every 6 hours  tobramycin for Nebulization 300 milliGRAM(s) Inhalation every 12 hours    MEDICATIONS  (PRN):  acetaminophen     Tablet .. 650 milliGRAM(s) Oral every 6 hours PRN Temp greater or equal to 38C (100.4F), Mild Pain (1 - 3)      __________________________________________________  ----------------------------------------------------------------------------------  REVIEW OF SYSTEMS: unable to participate in ROS due to intubation      Vital Signs Last 24 Hrs  T(C): 36.9 (02 Jul 2022 08:00), Max: 37.2 (01 Jul 2022 16:00)  T(F): 98.5 (02 Jul 2022 08:00), Max: 99 (01 Jul 2022 16:00)  HR: 99 (02 Jul 2022 10:00) (83 - 100)  BP: 154/85 (02 Jul 2022 10:00) (93/56 - 169/92)  BP(mean): 114 (02 Jul 2022 10:00) (69 - 122)  RR: 24 (02 Jul 2022 10:00) (24 - 39)  SpO2: 96% (02 Jul 2022 10:00) (94% - 100%)    _________________  PHYSICAL EXAM:  ---------------------------   Normocephalic; intubated  LUNGS - ET tube in place; bilateral air entry+  HEART: S1 S2+   ABDOMEN: Soft,  non distended, BS+  EXTREMITIES: no cyanosis; no edema  NERVOUS SYSTEM:  unabl;e to assess as patient intubated    _________________________________________________  LABS:                        8.7    6.00  )-----------( 404      ( 02 Jul 2022 07:15 )             26.6     07-02    138  |  100  |  32<H>  ----------------------------<  144<H>  4.7   |  36<H>  |  0.71    Ca    9.3      02 Jul 2022 07:15  Phos  3.2     07-02  Mg     2.2     07-02    TPro  6.4  /  Alb  1.8<L>  /  TBili  0.3  /  DBili  x   /  AST  28  /  ALT  48  /  AlkPhos  82  07-02    PT/INR - ( 02 Jul 2022 07:15 )   PT: 14.6 sec;   INR: 1.25 ratio         PTT - ( 02 Jul 2022 07:15 )  PTT:39.3 sec    CAPILLARY BLOOD GLUCOSE                    Updates about current status and plan of care provided to family by ICU team             MEDICAL ATTENDING NOTE    Patient is a 76y old  Male who presents with a chief complaint of Acute Hypoxic Respiratory Failure (01 Jul 2022 14:29)      INTERVAL HPI/OVERNIGHT EVENTS: Intubated    MEDICATIONS  (STANDING):  acetylcysteine 10%  Inhalation 4 milliLiter(s) Inhalation every 6 hours  albuterol/ipratropium for Nebulization 3 milliLiter(s) Nebulizer every 6 hours  atorvastatin 80 milliGRAM(s) Oral at bedtime  carbidopa/levodopa  25/100 1 Tablet(s) Oral daily  chlorhexidine 0.12% Liquid 15 milliLiter(s) Oral Mucosa every 12 hours  chlorhexidine 2% Cloths 1 Application(s) Topical <User Schedule>  citalopram 10 milliGRAM(s) Oral daily  enoxaparin Injectable 70 milliGRAM(s) SubCutaneous every 12 hours  lactulose Syrup 20 Gram(s) Oral every 8 hours  meropenem  IVPB 1000 milliGRAM(s) IV Intermittent every 8 hours  pantoprazole   Suspension 40 milliGRAM(s) Oral daily  sodium chloride 3%  Inhalation 4 milliLiter(s) Inhalation every 6 hours  tobramycin for Nebulization 300 milliGRAM(s) Inhalation every 12 hours    MEDICATIONS  (PRN):  acetaminophen     Tablet .. 650 milliGRAM(s) Oral every 6 hours PRN Temp greater or equal to 38C (100.4F), Mild Pain (1 - 3)      __________________________________________________  ----------------------------------------------------------------------------------  REVIEW OF SYSTEMS: unable to participate in ROS due to intubation      Vital Signs Last 24 Hrs  T(C): 36.9 (02 Jul 2022 08:00), Max: 37.2 (01 Jul 2022 16:00)  T(F): 98.5 (02 Jul 2022 08:00), Max: 99 (01 Jul 2022 16:00)  HR: 99 (02 Jul 2022 10:00) (83 - 100)  BP: 154/85 (02 Jul 2022 10:00) (93/56 - 169/92)  BP(mean): 114 (02 Jul 2022 10:00) (69 - 122)  RR: 24 (02 Jul 2022 10:00) (24 - 39)  SpO2: 96% (02 Jul 2022 10:00) (94% - 100%)    _________________  PHYSICAL EXAM:  ---------------------------   Normocephalic; intubated  LUNGS - ET tube in place; bilateral air entry+  HEART: S1 S2+   ABDOMEN: Soft,  non distended, BS+  EXTREMITIES: no cyanosis; no edema  NERVOUS SYSTEM:  unable to assess as patient intubated  - Amato catheter+    _________________________________________________  LABS:                        8.7    6.00  )-----------( 404      ( 02 Jul 2022 07:15 )             26.6     07-02    138  |  100  |  32<H>  ----------------------------<  144<H>  4.7   |  36<H>  |  0.71    Ca    9.3      02 Jul 2022 07:15  Phos  3.2     07-02  Mg     2.2     07-02    TPro  6.4  /  Alb  1.8<L>  /  TBili  0.3  /  DBili  x   /  AST  28  /  ALT  48  /  AlkPhos  82  07-02    PT/INR - ( 02 Jul 2022 07:15 )   PT: 14.6 sec;   INR: 1.25 ratio         PTT - ( 02 Jul 2022 07:15 )  PTT:39.3 sec    CAPILLARY BLOOD GLUCOSE                    Updates about current status and plan of care provided to family by ICU team

## 2022-07-02 NOTE — PROGRESS NOTE ADULT - PROBLEM SELECTOR PLAN 4
Renal functions improved.  Avoid nephrotoxins. Resolved  Avoid nephrotoxins.  Monitor renal functions

## 2022-07-02 NOTE — PROGRESS NOTE ADULT - SUBJECTIVE AND OBJECTIVE BOX
INTERVAL HPI/OVERNIGHT EVENTS:    intubated in ICU  no new gi events  responded to PRBC yesterday  tolerating peg feeds well      MEDICATIONS  (STANDING):  acetylcysteine 10%  Inhalation 4 milliLiter(s) Inhalation every 6 hours  albuterol/ipratropium for Nebulization 3 milliLiter(s) Nebulizer every 6 hours  atorvastatin 80 milliGRAM(s) Oral at bedtime  carbidopa/levodopa  25/100 1 Tablet(s) Oral daily  chlorhexidine 0.12% Liquid 15 milliLiter(s) Oral Mucosa every 12 hours  chlorhexidine 2% Cloths 1 Application(s) Topical <User Schedule>  citalopram 10 milliGRAM(s) Oral daily  enoxaparin Injectable 70 milliGRAM(s) SubCutaneous every 12 hours  lactulose Syrup 20 Gram(s) Oral every 8 hours  meropenem  IVPB 1000 milliGRAM(s) IV Intermittent every 8 hours  pantoprazole   Suspension 40 milliGRAM(s) Oral daily  sodium chloride 3%  Inhalation 4 milliLiter(s) Inhalation every 6 hours  tobramycin for Nebulization 300 milliGRAM(s) Inhalation every 12 hours    MEDICATIONS  (PRN):  acetaminophen     Tablet .. 650 milliGRAM(s) Oral every 6 hours PRN Temp greater or equal to 38C (100.4F), Mild Pain (1 - 3)      Allergies    No Known Allergies    Intolerances        Review of Systems: *pt minimally verbal to nonverbal, unable to obtain ROS     ICU Vital Signs Last 24 Hrs  T(C): 36.9 (02 Jul 2022 08:00), Max: 37.2 (01 Jul 2022 16:00)  T(F): 98.5 (02 Jul 2022 08:00), Max: 99 (01 Jul 2022 16:00)  HR: 99 (02 Jul 2022 10:00) (83 - 100)  BP: 154/85 (02 Jul 2022 10:00) (93/56 - 169/92)  BP(mean): 114 (02 Jul 2022 10:00) (69 - 122)  ABP: --  ABP(mean): --  RR: 24 (02 Jul 2022 10:00) (24 - 39)  SpO2: 96% (02 Jul 2022 10:00) (94% - 100%)    PHYSICAL EXAM:    Constitutional: NAD  HEENT: EOMI, throat clear  Neck: No LAD, supple  Respiratory: CTA and P  Cardiovascular: S1 and S2, RRR, no M  Gastrointestinal: BS+, soft, NT/ND, neg HSM, +peg  Extremities: No peripheral edema, neg clubbing, cyanosis  Vascular: 2+ peripheral pulses  Neurological: A/O x0  Psychiatric: intubated  Skin: No rashes          LABS:                        8.7    6.00  )-----------( 404      ( 02 Jul 2022 07:15 )             26.6     07-02    138  |  100  |  32<H>  ----------------------------<  144<H>  4.7   |  36<H>  |  0.71    Ca    9.3      02 Jul 2022 07:15  Phos  3.2     07-02  Mg     2.2     07-02    TPro  6.4  /  Alb  1.8<L>  /  TBili  0.3  /  DBili  x   /  AST  28  /  ALT  48  /  AlkPhos  82  07-02    PT/INR - ( 02 Jul 2022 07:15 )   PT: 14.6 sec;   INR: 1.25 ratio         PTT - ( 02 Jul 2022 07:15 )  PTT:39.3 sec        RADIOLOGY & ADDITIONAL TESTS:

## 2022-07-03 NOTE — PROGRESS NOTE ADULT - ATTENDING COMMENTS
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 coli pneumonia + Pseudomonas UTI, resulting in acute metabolic encephalopathy, lactic acidosis, septic shock, and ÁNGEL. Further complicated by prolonged respiratory failure, Pseudomonas aeruginosa VAP, and laryngeal edema.      --persistent encephalopathy  continue decreased dose of sinemet  Hx stroke, continue full dose AC with lovenox  --hemodynamically stable  HLD, continue statin  --acute hypoxemic respiratory failure  today tolerated PS 10/5 x 2hrs (with 6.5 ETT)  --laryngeal edema, continue decadron x 2days  continue secretion clearance measures  --dysphagia, continue TF via PEG  --normal renal function  --pseudomonas pneumonia and UTI, E. coli pneumonia  continue meropenem and Nico nebs for 10day course   --anemia, stable  --daughter updated at bedside, will hopefully extubate 7/5 with family present, with DNI in place

## 2022-07-03 NOTE — PROGRESS NOTE ADULT - SUBJECTIVE AND OBJECTIVE BOX
INTERVAL HPI/OVERNIGHT EVENTS:    intubated in ICU   no rectal bleeding reported   tolerating feeds    MEDICATIONS  (STANDING):  acetylcysteine 10%  Inhalation 4 milliLiter(s) Inhalation every 6 hours  albuterol/ipratropium for Nebulization 3 milliLiter(s) Nebulizer every 6 hours  atorvastatin 80 milliGRAM(s) Oral at bedtime  carbidopa/levodopa  25/100 1 Tablet(s) Oral daily  chlorhexidine 0.12% Liquid 15 milliLiter(s) Oral Mucosa every 12 hours  chlorhexidine 2% Cloths 1 Application(s) Topical <User Schedule>  citalopram 10 milliGRAM(s) Oral daily  dexAMETHasone  IVPB 10 milliGRAM(s) IV Intermittent every 12 hours  enoxaparin Injectable 70 milliGRAM(s) SubCutaneous every 12 hours  lactulose Syrup 20 Gram(s) Oral every 8 hours  pantoprazole   Suspension 40 milliGRAM(s) Oral daily  potassium phosphate / sodium phosphate Powder (PHOS-NaK) 1 Packet(s) Oral four times a day with meals  sodium chloride 3%  Inhalation 4 milliLiter(s) Inhalation every 6 hours    MEDICATIONS  (PRN):  acetaminophen     Tablet .. 650 milliGRAM(s) Oral every 6 hours PRN Temp greater or equal to 38C (100.4F), Mild Pain (1 - 3)      Allergies    No Known Allergies    Intolerances        Review of Systems: *pt minimally verbal to nonverbal, unable to obtain ROS         Vital Signs Last 24 Hrs  T(C): 37.5 (03 Jul 2022 08:15), Max: 37.6 (03 Jul 2022 05:00)  T(F): 99.5 (03 Jul 2022 08:15), Max: 99.7 (03 Jul 2022 05:00)  HR: 92 (03 Jul 2022 09:00) (82 - 105)  BP: 120/68 (03 Jul 2022 09:00) (86/59 - 166/89)  BP(mean): 88 (03 Jul 2022 09:00) (67 - 120)  RR: 27 (03 Jul 2022 09:00) (20 - 32)  SpO2: 100% (03 Jul 2022 09:00) (94% - 100%)    PHYSICAL EXAM:    Constitutional: NAD  HEENT: EOMI, throat clear  Neck: No LAD, supple  Respiratory: +intubated  Cardiovascular: S1 and S2, RRR, no M  Gastrointestinal: BS+, soft, NT/ND, neg HSM, +peg  Extremities: No peripheral edema, neg clubbing, cyanosis  Vascular: 2+ peripheral pulses  Neurological: A/O x 0  Psychiatric: lethargic  Skin: No rashes      LABS:                        8.5    8.17  )-----------( 431      ( 03 Jul 2022 06:30 )             26.5     07-03    140  |  102  |  34<H>  ----------------------------<  108<H>  4.1   |  32<H>  |  0.57    Ca    9.4      03 Jul 2022 06:30  Phos  2.2     07-03  Mg     2.3     07-03    TPro  6.0  /  Alb  1.8<L>  /  TBili  0.3  /  DBili  x   /  AST  30  /  ALT  36  /  AlkPhos  81  07-03    PT/INR - ( 03 Jul 2022 06:30 )   PT: 13.5 sec;   INR: 1.15 ratio         PTT - ( 03 Jul 2022 06:30 )  PTT:35.5 sec      RADIOLOGY & ADDITIONAL TESTS:

## 2022-07-03 NOTE — PROGRESS NOTE ADULT - SUBJECTIVE AND OBJECTIVE BOX
MEDICAL ATTENDING NOTE    Patient is a 76y old  Male who presents with a chief complaint of Acute Hypoxic Respiratory Failure (02 Jul 2022 13:42)      INTERVAL HPI/OVERNIGHT EVENTS: Intubated    MEDICATIONS  (STANDING):  acetylcysteine 10%  Inhalation 4 milliLiter(s) Inhalation every 6 hours  albuterol/ipratropium for Nebulization 3 milliLiter(s) Nebulizer every 6 hours  atorvastatin 80 milliGRAM(s) Oral at bedtime  carbidopa/levodopa  25/100 1 Tablet(s) Oral daily  chlorhexidine 0.12% Liquid 15 milliLiter(s) Oral Mucosa every 12 hours  chlorhexidine 2% Cloths 1 Application(s) Topical <User Schedule>  citalopram 10 milliGRAM(s) Oral daily  enoxaparin Injectable 70 milliGRAM(s) SubCutaneous every 12 hours  lactulose Syrup 20 Gram(s) Oral every 8 hours  pantoprazole   Suspension 40 milliGRAM(s) Oral daily  sodium chloride 3%  Inhalation 4 milliLiter(s) Inhalation every 6 hours    MEDICATIONS  (PRN):  acetaminophen     Tablet .. 650 milliGRAM(s) Oral every 6 hours PRN Temp greater or equal to 38C (100.4F), Mild Pain (1 - 3)      __________________________________________________  ----------------------------------------------------------------------------------  REVIEW OF SYSTEMS: unable to participate in ROS due to intubation      Vital Signs Last 24 Hrs  T(C): 37.5 (03 Jul 2022 08:15), Max: 37.6 (03 Jul 2022 05:00)  T(F): 99.5 (03 Jul 2022 08:15), Max: 99.7 (03 Jul 2022 05:00)  HR: 92 (03 Jul 2022 09:00) (82 - 105)  BP: 120/68 (03 Jul 2022 09:00) (86/59 - 166/89)  BP(mean): 88 (03 Jul 2022 09:00) (67 - 120)  RR: 27 (03 Jul 2022 09:00) (20 - 32)  SpO2: 100% (03 Jul 2022 09:00) (94% - 100%)    _________________  PHYSICAL EXAM:  ---------------------------   Normocephalic; intubated  LUNGS - ET tube in place; bilateral air entry+  HEART: S1 S2+   ABDOMEN: Soft,  non distended, BS+  EXTREMITIES: no cyanosis; no edema  NERVOUS SYSTEM:  unabl;e to assess as patient intubated    _________________________________________________  LABS:                        8.5    8.17  )-----------( 431      ( 03 Jul 2022 06:30 )             26.5     07-03    140  |  102  |  34<H>  ----------------------------<  108<H>  4.1   |  32<H>  |  0.57    Ca    9.4      03 Jul 2022 06:30  Phos  2.2     07-03  Mg     2.3     07-03    TPro  6.0  /  Alb  1.8<L>  /  TBili  0.3  /  DBili  x   /  AST  30  /  ALT  36  /  AlkPhos  81  07-03    PT/INR - ( 03 Jul 2022 06:30 )   PT: 13.5 sec;   INR: 1.15 ratio         PTT - ( 03 Jul 2022 06:30 )  PTT:35.5 sec    CAPILLARY BLOOD GLUCOSE                    Updates about current status and plan of care provided to family by ICU team

## 2022-07-03 NOTE — PROGRESS NOTE ADULT - TIME BILLING
direct care of a critically ill patient including but not limited to reviewing chart, medications ,laboratory data, imaging reports, discussion of plan of care with consultants on the case, coordination of care with multidisciplinary team involved in the case and discussion of plan with ICU team.    Prognosis guarded direct care of a critically ill patient including but not limited to reviewing chart, medications ,laboratory data, imaging reports, discussion of plan of care with consultants on the case, coordination of care with multidisciplinary team involved in the case and discussion of plan with ICU team.    Prognosis guarded.  Based on goals of care, anticipate extubation on 7/5 with family at bedside per ICU attending

## 2022-07-03 NOTE — PROGRESS NOTE ADULT - SUBJECTIVE AND OBJECTIVE BOX
INTERVAL HPI/OVERNIGHT EVENTS: Pt experienced minor bleeding from his mouth. Otherwise no acute events    SUBJECTIVE: Patient seen and examined at bedside.     ROS:  Pt currently intubated; unable to obtain    OBJECTIVE:    VITAL SIGNS:  ICU Vital Signs Last 24 Hrs  T(C): 37.5 (03 Jul 2022 08:15), Max: 37.6 (03 Jul 2022 05:00)  T(F): 99.5 (03 Jul 2022 08:15), Max: 99.7 (03 Jul 2022 05:00)  HR: 92 (03 Jul 2022 09:00) (82 - 105)  BP: 120/68 (03 Jul 2022 09:00) (86/59 - 166/89)  BP(mean): 88 (03 Jul 2022 09:00) (67 - 120)  ABP: --  ABP(mean): --  RR: 27 (03 Jul 2022 09:00) (20 - 32)  SpO2: 100% (03 Jul 2022 09:00) (94% - 100%)    Mode: CPAP with PS, FiO2: 30, PEEP: 5, PS: 10, MAP: 10, PIP: 21    07-02 @ 07:01  -  07-03 @ 07:00  --------------------------------------------------------  IN: 2485 mL / OUT: 0 mL / NET: 2485 mL    07-03 @ 07:01  -  07-03 @ 12:04  --------------------------------------------------------  IN: 130 mL / OUT: 0 mL / NET: 130 mL      CAPILLARY BLOOD GLUCOSE          PHYSICAL EXAM:  General: NAD, comfortable  HEENT: NCAT, PERRL, clear conjunctiva, no scleral icterus  Neck: supple, no JVD  Respiratory: CTA b/l, no wheezing, rhonchi, rales  Cardiovascular: RRR, normal S1S2, no M/R/G  Abdomen: soft, NT/ND, bowel sounds in all four quadrants, no palpable masses  Extremities: WWP, no clubbing, cyanosis, or edema  Neurology: A&Ox3, nonfocal, sensation intact     MEDICATIONS:  MEDICATIONS  (STANDING):  acetylcysteine 10%  Inhalation 4 milliLiter(s) Inhalation every 6 hours  albuterol/ipratropium for Nebulization 3 milliLiter(s) Nebulizer every 6 hours  atorvastatin 80 milliGRAM(s) Oral at bedtime  carbidopa/levodopa  25/100 1 Tablet(s) Oral daily  chlorhexidine 0.12% Liquid 15 milliLiter(s) Oral Mucosa every 12 hours  chlorhexidine 2% Cloths 1 Application(s) Topical <User Schedule>  citalopram 10 milliGRAM(s) Oral daily  dexAMETHasone  IVPB 10 milliGRAM(s) IV Intermittent every 12 hours  enoxaparin Injectable 70 milliGRAM(s) SubCutaneous every 12 hours  lactulose Syrup 20 Gram(s) Oral every 8 hours  pantoprazole   Suspension 40 milliGRAM(s) Oral daily  potassium phosphate / sodium phosphate Powder (PHOS-NaK) 1 Packet(s) Oral four times a day with meals  sodium chloride 3%  Inhalation 4 milliLiter(s) Inhalation every 6 hours    MEDICATIONS  (PRN):  acetaminophen     Tablet .. 650 milliGRAM(s) Oral every 6 hours PRN Temp greater or equal to 38C (100.4F), Mild Pain (1 - 3)      ALLERGIES:  Allergies    No Known Allergies    Intolerances        LABS:                        8.5    8.17  )-----------( 431      ( 03 Jul 2022 06:30 )             26.5     07-03    140  |  102  |  34<H>  ----------------------------<  108<H>  4.1   |  32<H>  |  0.57    Ca    9.4      03 Jul 2022 06:30  Phos  2.2     07-03  Mg     2.3     07-03    TPro  6.0  /  Alb  1.8<L>  /  TBili  0.3  /  DBili  x   /  AST  30  /  ALT  36  /  AlkPhos  81  07-03    PT/INR - ( 03 Jul 2022 06:30 )   PT: 13.5 sec;   INR: 1.15 ratio         PTT - ( 03 Jul 2022 06:30 )  PTT:35.5 sec      CENTRAL LINE: N        DATE INSERTED:             REMOVE: N  MENJIVAR: Y                       DATE INSERTED:              REMOVE: Y/N  A-LINE: N                       DATE INSERTED:              REMOVE: Y/N      GLOBAL ISSUE/BEST PRACTICE  Analgesia: Y  Sedation: Y  HOB elevation: yes  Stress ulcer prophylaxis: Y  VTE prophylaxis: Y  Glycemic control: Y  Nutrition: Y    CODE STATUS: Full Code INTERVAL HPI/OVERNIGHT EVENTS: Pt experienced minor bleeding from his mouth. Otherwise no acute events    SUBJECTIVE: Patient seen and examined at bedside.     ROS:  Pt currently intubated; unable to obtain    OBJECTIVE:    VITAL SIGNS:  ICU Vital Signs Last 24 Hrs  T(C): 37.5 (03 Jul 2022 08:15), Max: 37.6 (03 Jul 2022 05:00)  T(F): 99.5 (03 Jul 2022 08:15), Max: 99.7 (03 Jul 2022 05:00)  HR: 92 (03 Jul 2022 09:00) (82 - 105)  BP: 120/68 (03 Jul 2022 09:00) (86/59 - 166/89)  BP(mean): 88 (03 Jul 2022 09:00) (67 - 120)  ABP: --  ABP(mean): --  RR: 27 (03 Jul 2022 09:00) (20 - 32)  SpO2: 100% (03 Jul 2022 09:00) (94% - 100%)    Mode: CPAP with PS, FiO2: 30, PEEP: 5, PS: 10, MAP: 10, PIP: 21    07-02 @ 07:01  -  07-03 @ 07:00  --------------------------------------------------------  IN: 2485 mL / OUT: 0 mL / NET: 2485 mL    07-03 @ 07:01  -  07-03 @ 12:04  --------------------------------------------------------  IN: 130 mL / OUT: 0 mL / NET: 130 mL      CAPILLARY BLOOD GLUCOSE          PHYSICAL EXAM:  General: NAD, comfortable  HEENT: NCAT, PERRL, clear conjunctiva, no scleral icterus  Neck: thickened and indurated cervical and supraclavicular area b/l  Respiratory: CTA b/l, no wheezing, rhonchi, rales  Cardiovascular: RRR, normal S1S2, no M/R/G  Abdomen: soft, NT/ND, bowel sounds in all four quadrants, no palpable masses  Extremities: WWP, no clubbing, cyanosis, or edema  Neuro: awake but does not follow commands    MEDICATIONS:  MEDICATIONS  (STANDING):  acetylcysteine 10%  Inhalation 4 milliLiter(s) Inhalation every 6 hours  albuterol/ipratropium for Nebulization 3 milliLiter(s) Nebulizer every 6 hours  atorvastatin 80 milliGRAM(s) Oral at bedtime  carbidopa/levodopa  25/100 1 Tablet(s) Oral daily  chlorhexidine 0.12% Liquid 15 milliLiter(s) Oral Mucosa every 12 hours  chlorhexidine 2% Cloths 1 Application(s) Topical <User Schedule>  citalopram 10 milliGRAM(s) Oral daily  dexAMETHasone  IVPB 10 milliGRAM(s) IV Intermittent every 12 hours  enoxaparin Injectable 70 milliGRAM(s) SubCutaneous every 12 hours  lactulose Syrup 20 Gram(s) Oral every 8 hours  pantoprazole   Suspension 40 milliGRAM(s) Oral daily  potassium phosphate / sodium phosphate Powder (PHOS-NaK) 1 Packet(s) Oral four times a day with meals  sodium chloride 3%  Inhalation 4 milliLiter(s) Inhalation every 6 hours    MEDICATIONS  (PRN):  acetaminophen     Tablet .. 650 milliGRAM(s) Oral every 6 hours PRN Temp greater or equal to 38C (100.4F), Mild Pain (1 - 3)      ALLERGIES:  Allergies    No Known Allergies    Intolerances        LABS:                        8.5    8.17  )-----------( 431      ( 03 Jul 2022 06:30 )             26.5     07-03    140  |  102  |  34<H>  ----------------------------<  108<H>  4.1   |  32<H>  |  0.57    Ca    9.4      03 Jul 2022 06:30  Phos  2.2     07-03  Mg     2.3     07-03    TPro  6.0  /  Alb  1.8<L>  /  TBili  0.3  /  DBili  x   /  AST  30  /  ALT  36  /  AlkPhos  81  07-03    PT/INR - ( 03 Jul 2022 06:30 )   PT: 13.5 sec;   INR: 1.15 ratio         PTT - ( 03 Jul 2022 06:30 )  PTT:35.5 sec    Radiology  CXR 7/3 (my read)--persistent L base opacity, clear on R, scoliosis    CENTRAL LINE: N        DATE INSERTED:             REMOVE: N  MENJIVAR: N                       DATE INSERTED:              REMOVE: Y/N  A-LINE: N                       DATE INSERTED:              REMOVE: Y/N      GLOBAL ISSUE/BEST PRACTICE  Analgesia: Y  Sedation: Y  HOB elevation: yes  Stress ulcer prophylaxis: Y  VTE prophylaxis: Y  Glycemic control: Y  Nutrition: Y    CODE STATUS: Full Code

## 2022-07-03 NOTE — PROGRESS NOTE ADULT - ASSESSMENT
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 coli pneumonia and Pseudomonas UTI.    Neuro:  - Encephalopathy likely 2/2 multifactorial   - Continue Lactulose,  rifaximin dc'd  - Continue decreased dose of Citalopram given mental status + prolonged QTc and monitor for improvement    - Continue Decrease home Sinemet to once a day given mental status and monitor for improvement   - Discontinued home Primidone given mental status and monitor for improvement     CV:  - Came in with hypotension 2/2 to septic shock that is now improved  - Will continue to monitor   - Hx of HLD, continue home statin     Pulm:  - Remains on mechanical ventilation and wean as tolerated   - Duoneb q6h and hypersecretion therapy   - CXR ordered today 7/3, f/u                  GI:  - NPO with Vital Tube feeds  - PEG tube placed 6/21/22  - IV Protonix 40mg qd for GI ppx    Renal:  - Cr stable  - S/P Amato   - Monitor I&Os and renal indices     Heme:  - Continue on full dose of Lovenox today given Hx as below   - Per prior Hospitalist note from 6/2, pt was on full dose AC for recent stroke and hypercoagulable state 2/2 to underlying malignancy   - 1 pRBC given 7/1, Hb up to 8.0 from 7.2, will continue to monitor       ID:  - B/l lower lobe PNA and UTI  - Urine cx positive for Pseudomonas 6/18  - ET positive for E.Coli now with Pseudomonas  6/23  - BCx NGTD final 6/23  - Continue meropenem and Tobramycin (last day 7/3)    Endo:  - PTH wnl, vit D,25,hydroxy 39.2 and vit D,1,25 dihydroxy 89.9, PTHrP <2.2  - Hypercalcemia likely inflammatory  - Hypernatremia now resolved though continue free water flushes   - Fingersticks q6h, blood glucose goal 100-180 in ICU    Skin:  - Decubitus ulcer wound care following     Tubes/lines:  - None 75yo male with PMH of prostate cancer, SCC of tongue s/p partial resection now with recurrence, parkinson's disease, hypertension, hyperlipidemia, recent CVA, and oropharyngeal dysphagia admitted for septic shock and acute hypoxic respiratory failure in the setting of b/l lower lobe E. coli pneumonia and Pseudomonas UTI.    Neuro:  - Encephalopathy likely 2/2 multifactorial   - Continue Lactulose,  rifaximin dc'd  - Continue decreased dose of Citalopram given mental status + prolonged QTc and monitor for improvement    - Continue Decrease home Sinemet to once a day given mental status and monitor for improvement   - Discontinued home Primidone given mental status and monitor for improvement     CV:  - Came in with hypotension 2/2 to septic shock that is now improved  - Will continue to monitor   - Hx of HLD, continue home statin     Pulm:  - Remains on mechanical ventilation and wean as tolerated   - Duoneb q6h and hypersecretion therapy   - CXR ordered today 7/3, f/u                  GI:  - NPO with Vital Tube feeds  - PEG tube placed 6/21/22  - IV Protonix 40mg qd for GI ppx    Renal:  - Cr stable  - No banks at this time   - Monitor I&Os and renal indices     Heme:  - Continue on full dose of Lovenox today given Hx as below   - Per prior Hospitalist note from 6/2, pt was on full dose AC for recent stroke and hypercoagulable state 2/2 to underlying malignancy   - 1 pRBC given 7/1, Hb up to 8.0 from 7.2, will continue to monitor       ID:  - B/l lower lobe PNA and UTI  - Urine cx positive for Pseudomonas 6/18  - ET positive for E.Coli now with Pseudomonas  6/23  - BCx NGTD final 6/23  - Continue meropenem and Tobramycin (last day 7/3)    Endo:  - PTH wnl, vit D,25,hydroxy 39.2 and vit D,1,25 dihydroxy 89.9, PTHrP <2.2  - Hypercalcemia likely inflammatory  - Hypernatremia now resolved though continue free water flushes   - Fingersticks q6h, blood glucose goal 100-180 in ICU    Skin:  - Decubitus ulcer wound care following     Tubes/lines:  - None 75yo male with PMH of prostate cancer, SCC of tongue s/p partial resection now with recurrence, parkinson's disease, hypertension, hyperlipidemia, recent CVA, and oropharyngeal dysphagia admitted for septic shock and acute hypoxic respiratory failure in the setting of b/l lower lobe E. coli pneumonia and Pseudomonas UTI.    Neuro:  - Encephalopathy likely 2/2 multifactorial   - Continue Lactulose,  rifaximin dc'd  - Continue decreased dose of Citalopram given mental status + prolonged QTc and monitor for improvement    - Continue Decrease home Sinemet to once a day given mental status and monitor for improvement   - Discontinued home Primidone given mental status and monitor for improvement     CV:  - Came in with hypotension 2/2 to septic shock that is now improved  - Will continue to monitor   - Hx of HLD, continue home statin     Pulm:  - Remains on mechanical ventilation and wean as tolerated   - Methylprednisolone started  - Duoneb q6h and hypersecretion therapy   - CXR ordered today 7/3, f/u                  GI:  - NPO with Vital Tube feeds  - PEG tube placed 6/21/22  - IV Protonix 40mg qd for GI ppx    Renal:  - Cr stable  - No banks at this time   - Monitor I&Os and renal indices     Heme:  - Continue on full dose of Lovenox today given Hx as below   - Per prior Hospitalist note from 6/2, pt was on full dose AC for recent stroke and hypercoagulable state 2/2 to underlying malignancy   - 1 pRBC given 7/1, Hb up to 8.0 from 7.2, will continue to monitor       ID:  - B/l lower lobe PNA and UTI  - Urine cx positive for Pseudomonas 6/18  - ET positive for E.Coli now with Pseudomonas  6/23  - BCx NGTD final 6/23  - Continue meropenem and Tobramycin (last day 7/3)    Endo:  - PTH wnl, vit D,25,hydroxy 39.2 and vit D,1,25 dihydroxy 89.9, PTHrP <2.2  - Hypercalcemia likely inflammatory  - Hypernatremia now resolved though continue free water flushes   - Fingersticks q6h, blood glucose goal 100-180 in ICU    Skin:  - Decubitus ulcer wound care following     Tubes/lines:  - None 75yo male with PMH of prostate cancer, SCC of tongue s/p partial resection now with recurrence, parkinson's disease, hypertension, hyperlipidemia, recent CVA, and oropharyngeal dysphagia admitted for septic shock and acute hypoxic respiratory failure in the setting of b/l lower lobe E. coli pneumonia and Pseudomonas UTI.    Neuro:  - Encephalopathy likely 2/2 multifactorial   - Continue Lactulose,  rifaximin dc'd  - Continue decreased dose of Citalopram given mental status + prolonged QTc and monitor for improvement    - Continue Decrease home Sinemet to once a day given mental status and monitor for improvement   - Discontinued home Primidone given mental status and monitor for improvement     CV:  - Came in with hypotension 2/2 to septic shock that is now improved  - Will continue to monitor   - Hx of HLD, continue home statin     Pulm:  - Remains on mechanical ventilation and wean as tolerated   - Duoneb q6h and hypersecretion therapy   - CXR ordered today 7/3, f/u                  GI:  - Methylprednisolone started for laryngeal edema  - NPO with Vital Tube feeds  - PEG tube placed 6/21/22  - IV Protonix 40mg qd for GI ppx    Renal:  - Cr stable  - No banks at this time   - Monitor I&Os and renal indices     Heme:  - Continue on full dose of Lovenox today given Hx as below   - Per prior Hospitalist note from 6/2, pt was on full dose AC for recent stroke and hypercoagulable state 2/2 to underlying malignancy   - 1 pRBC given 7/1, Hb up to 8.0 from 7.2, will continue to monitor       ID:  - B/l lower lobe PNA and UTI  - Urine cx positive for Pseudomonas 6/18  - ET positive for E.Coli now with Pseudomonas  6/23  - BCx NGTD final 6/23  - Continue meropenem and Tobramycin (last day 7/3)    Endo:  - PTH wnl, vit D,25,hydroxy 39.2 and vit D,1,25 dihydroxy 89.9, PTHrP <2.2  - Hypercalcemia likely inflammatory  - Hypernatremia now resolved though continue free water flushes   - Fingersticks q6h, blood glucose goal 100-180 in ICU    Skin:  - Decubitus ulcer wound care following     Tubes/lines:  - None

## 2022-07-04 NOTE — PROGRESS NOTE ADULT - ASSESSMENT
76 M with history of prostate cancer, SCC of tongue s/p partial resection now with recurrence, Parkinson disease, hypertension, hyperlipidemia, CVA, and oropharyngeal dysphagia who is here with acute hypoxemic respiratory failure and septic shock due to bilateral lower lobe e. coli pneumonia, pseudomonas UTI. Course c/b acute metabolic encephalopathy, lactic acidosis, septic shock, and acute kidney injury. Completed course of antibiotics. No longer on vasopressors. He has a persistent encephalopathy. Now has developed laryngeal edema on short course of dexamethasone.    NEURO: Poor mentation off sedatives. Continue Sinemet, Citalopram.  CVS: Off vasopressors. Continue statin for HLD.  PULM: Ventilatory support. Airway clearance. Dexamethasone for laryngeal edema,  GI: Enteral feeds. Bowel regimen.  RENAL: Monitor UOP, Cr.  ID: Complete antibiotic course. Monitor off.  HEME: Full dose AC for history of hypercoaguable state / CVA  PPX:  PPI. Lovenox.  Prognosis poor  Tentative extubation tomorrow. Will discuss with family. 76 M with history of prostate cancer, SCC of tongue s/p partial resection now with recurrence, Parkinson disease, hypertension, hyperlipidemia, CVA, and oropharyngeal dysphagia who is here with acute hypoxemic respiratory failure and septic shock due to bilateral lower lobe e. coli pneumonia, pseudomonas UTI. Course c/b acute metabolic encephalopathy, lactic acidosis, septic shock, and acute kidney injury. Completed course of antibiotics. No longer on vasopressors. He has a persistent encephalopathy. Now has developed laryngeal edema on short course of dexamethasone.    NEURO: Poor mentation off sedatives. Continue Sinemet, Citalopram.  CVS: Off vasopressors. Continue statin for HLD.  PULM: Ventilatory support. Airway clearance. Dexamethasone for laryngeal edema,  GI: Oropharyngeal dysphagia. Continue enteral feeds via PEG. Bowel regimen.  RENAL: Monitor UOP, Cr.  ID: Complete antibiotic course. Monitor off.  HEME: Full dose AC for history of hypercoaguable state / CVA  PPX:  PPI. Lovenox.  Prognosis poor  Tentative extubation tomorrow. Will discuss with family.

## 2022-07-04 NOTE — PROGRESS NOTE ADULT - SUBJECTIVE AND OBJECTIVE BOX
MEDICAL ATTENDING NOTE    Patient is a 76y old  Male who presents with a chief complaint of Acute Hypoxic Respiratory Failure (04 Jul 2022 12:49)      INTERVAL HPI/OVERNIGHT EVENTS: Intubated    MEDICATIONS  (STANDING):  acetylcysteine 10%  Inhalation 4 milliLiter(s) Inhalation every 6 hours  albuterol/ipratropium for Nebulization 3 milliLiter(s) Nebulizer every 6 hours  atorvastatin 80 milliGRAM(s) Oral at bedtime  carbidopa/levodopa  25/100 1 Tablet(s) Oral daily  chlorhexidine 0.12% Liquid 15 milliLiter(s) Oral Mucosa every 12 hours  chlorhexidine 2% Cloths 1 Application(s) Topical <User Schedule>  citalopram 10 milliGRAM(s) Oral daily  dexAMETHasone  IVPB 10 milliGRAM(s) IV Intermittent every 12 hours  enoxaparin Injectable 70 milliGRAM(s) SubCutaneous every 12 hours  lactulose Syrup 20 Gram(s) Oral every 8 hours  pantoprazole   Suspension 40 milliGRAM(s) Oral daily  sodium chloride 3%  Inhalation 4 milliLiter(s) Inhalation every 6 hours    MEDICATIONS  (PRN):  acetaminophen     Tablet .. 650 milliGRAM(s) Oral every 6 hours PRN Temp greater or equal to 38C (100.4F), Mild Pain (1 - 3)      __________________________________________________  ----------------------------------------------------------------------------------  REVIEW OF SYSTEMS: unable to participate in ROS due to intubation      Vital Signs Last 24 Hrs  T(C): 37.4 (04 Jul 2022 12:23), Max: 37.6 (03 Jul 2022 15:26)  T(F): 99.3 (04 Jul 2022 12:23), Max: 99.7 (03 Jul 2022 15:26)  HR: 94 (04 Jul 2022 13:56) (79 - 104)  BP: 111/67 (04 Jul 2022 13:00) (88/58 - 162/88)  BP(mean): 83 (04 Jul 2022 13:00) (66 - 115)  RR: 24 (04 Jul 2022 13:00) (1 - 37)  SpO2: 98% (04 Jul 2022 13:56) (91% - 100%)    _________________  PHYSICAL EXAM:  ---------------------------   Normocephalic; intubated  LUNGS - ET tube in place; bilateral air entry+  HEART: S1 S2+   ABDOMEN: Soft,  non distended, BS+  EXTREMITIES: no cyanosis; no edema  NERVOUS SYSTEM:  unabl;e to assess as patient intubated    _________________________________________________  LABS:                        8.2    6.59  )-----------( 399      ( 04 Jul 2022 04:45 )             25.7     07-04    139  |  103  |  33<H>  ----------------------------<  134<H>  4.8   |  29  |  0.52    Ca    9.5      04 Jul 2022 04:45  Phos  2.6     07-04  Mg     2.4     07-04    TPro  6.0  /  Alb  1.8<L>  /  TBili  0.3  /  DBili  x   /  AST  30  /  ALT  36  /  AlkPhos  81  07-03    PT/INR - ( 04 Jul 2022 04:45 )   PT: 14.8 sec;   INR: 1.26 ratio         PTT - ( 04 Jul 2022 04:45 )  PTT:37.9 sec    CAPILLARY BLOOD GLUCOSE                    Updates about current status and plan of care provided to family by ICU team

## 2022-07-04 NOTE — CHART NOTE - NSCHARTNOTEFT_GEN_A_CORE
Assessment: patient sen for malnutrition/ICU follow up   76y old  Male who presents with a chief complaint of Acute Hypoxic Respiratory Failure   BM 7/2   patient seen on vent no sedation   tolerating feeding per RN   pump study 1133ml over 24 hours 87% of feeding received and 2352ml over 48 hours 90% of feeding received      Factors impacting intake: [ ] none [ ] nausea  [ ] vomiting [ ] diarrhea [ ] constipation  [ ]chewing problems [x ] swallowing issues  [ ] other: NGT     Diet Prescription: Diet, NPO with Tube Feed:   Tube Feeding Modality: Nasogastric  Vital 1.5 Gasper  Total Volume for 24 Hours (mL): 1300  Continuous  Starting Tube Feed Rate {mL per Hour}: 20  Increase Tube Feed Rate by (mL): 15     Every 8 hours  Until Goal Tube Feed Rate (mL per Hour): 65  Tube Feed Duration (in Hours): 20  Tube Feed Start Time: 06:00  Free Water Flush  Pump   Rate (mL per Hour): 50   Frequency: Every Hour    Duration (Hours): 20    Start Time: 06:00 (06-18-22 @ 10:2    Current Weight: 7/4 139.7# + 1 generalized edema + 3 left and right arm edema       Pertinent Medications: MEDICATIONS  (STANDING):  acetylcysteine 10%  Inhalation 4 milliLiter(s) Inhalation every 6 hours  albuterol/ipratropium for Nebulization 3 milliLiter(s) Nebulizer every 6 hours  atorvastatin 80 milliGRAM(s) Oral at bedtime  carbidopa/levodopa  25/100 1 Tablet(s) Oral daily  chlorhexidine 0.12% Liquid 15 milliLiter(s) Oral Mucosa every 12 hours  chlorhexidine 2% Cloths 1 Application(s) Topical <User Schedule>  citalopram 10 milliGRAM(s) Oral daily  dexAMETHasone  IVPB 10 milliGRAM(s) IV Intermittent every 12 hours  enoxaparin Injectable 70 milliGRAM(s) SubCutaneous every 12 hours  lactulose Syrup 20 Gram(s) Oral every 8 hours  pantoprazole   Suspension 40 milliGRAM(s) Oral daily  potassium phosphate / sodium phosphate Powder (PHOS-NaK) 1 Packet(s) Oral four times a day with meals  sodium chloride 3%  Inhalation 4 milliLiter(s) Inhalation every 6 hours    MEDICATIONS  (PRN):  acetaminophen     Tablet .. 650 milliGRAM(s) Oral every 6 hours PRN Temp greater or equal to 38C (100.4F), Mild Pain (1 - 3)    Pertinent Labs: POCT 120 A1c 6.1%   Skin: coccyx/sacrum DTI suspected  B/L heels stage 1    Estimated Needs:   [x ] no change since previous assessment based on 130# 30-35kcal/kg 1773-2068kcals and 70-82gms protein 1.2-1.4gms/kg fluid 2251-3430  [ ] recalculated:     Previous Nutrition Diagnosis:   [ ] Inadequate Energy Intake [ ]Inadequate Oral Intake [ ] Excessive Energy Intake   [ ] Underweight [x ] Increased Nutrient Needs [ ] Overweight/Obesity   [ ] Altered GI Function [ ] Unintended Weight Loss [ ] Food & Nutrition Related Knowledge Deficit [x ] Malnutrition     Nutrition Diagnosis is [x ] ongoing  [ ] resolved [ ] not applicable     New Nutrition Diagnosis: [x ] not applicable       Interventions:   Recommend  [ ] Change Diet To:  [ ] Nutrition Supplement  [x ] Nutrition Support  continue feeding at goal rate   [x ] Other: recommend MVI and Vit C 500mg BID    Monitoring and Evaluation:   [ ] PO intake [ x ] Tolerance to diet prescription [ x ] weights [ x ] labs[ x ] follow up per protocol  [ ] other:

## 2022-07-04 NOTE — PROGRESS NOTE ADULT - SUBJECTIVE AND OBJECTIVE BOX
Interval Events:  No events overnight  Remains intubated    REVIEW OF SYSTEMS:  [ ] All other systems negative  [x] Unable to assess ROS because patient is intubated, critically ill    OBJECTIVE:  ICU Vital Signs Last 24 Hrs  T(C): 37.4 (04 Jul 2022 15:32), Max: 37.4 (04 Jul 2022 00:55)  T(F): 99.3 (04 Jul 2022 15:32), Max: 99.3 (04 Jul 2022 00:55)  HR: 84 (04 Jul 2022 16:58) (79 - 104)  BP: 114/73 (04 Jul 2022 15:00) (88/58 - 162/88)  BP(mean): 89 (04 Jul 2022 15:00) (66 - 115)  ABP: --  ABP(mean): --  RR: 24 (04 Jul 2022 15:00) (24 - 37)  SpO2: 96% (04 Jul 2022 16:58) (91% - 100%)    Mode: AC/ CMV (Assist Control/ Continuous Mandatory Ventilation), RR (machine): 24, TV (machine): 500, FiO2: 40, PEEP: 5, ITime: 1, MAP: 12, PIP: 22    07-03 @ 07:01 - 07-04 @ 07:00  --------------------------------------------------------  IN: 2510 mL / OUT: 0 mL / NET: 2510 mL    07-04 @ 07:01 - 07-04 @ 17:28  --------------------------------------------------------  IN: 1140 mL / OUT: 0 mL / NET: 1140 mL      CAPILLARY BLOOD GLUCOSE          PHYSICAL EXAM:  General: NAD  HEENT: NC/AT  Neck: Supple  Respiratory: CTAB, no wheezing  Cardiovascular: RRR, no murmurs  Abdomen: Soft, NT/ND  Extremities: Warm  Neurological: Open eyes spontaneously. Not following commands.      HOSPITAL MEDICATIONS:  enoxaparin Injectable 70 milliGRAM(s) SubCutaneous every 12 hours    atorvastatin 80 milliGRAM(s) Oral at bedtime  dexAMETHasone  IVPB 10 milliGRAM(s) IV Intermittent every 12 hours    acetylcysteine 10%  Inhalation 4 milliLiter(s) Inhalation every 6 hours  albuterol/ipratropium for Nebulization 3 milliLiter(s) Nebulizer every 6 hours  sodium chloride 3%  Inhalation 4 milliLiter(s) Inhalation every 6 hours    acetaminophen     Tablet .. 650 milliGRAM(s) Oral every 6 hours PRN  carbidopa/levodopa  25/100 1 Tablet(s) Oral daily  citalopram 10 milliGRAM(s) Oral daily    lactulose Syrup 20 Gram(s) Oral every 8 hours  pantoprazole   Suspension 40 milliGRAM(s) Oral daily    chlorhexidine 0.12% Liquid 15 milliLiter(s) Oral Mucosa every 12 hours  chlorhexidine 2% Cloths 1 Application(s) Topical <User Schedule>        LABS:                        8.2    6.59  )-----------( 399      ( 04 Jul 2022 04:45 )             25.7     Hgb Trend: 8.2<--, 8.5<--, 8.7<--, 7.0<--, 7.4<--  07-04    139  |  103  |  33<H>  ----------------------------<  134<H>  4.8   |  29  |  0.52    Ca    9.5      04 Jul 2022 04:45  Phos  2.6     07-04  Mg     2.4     07-04    TPro  6.0  /  Alb  1.8<L>  /  TBili  0.3  /  DBili  x   /  AST  30  /  ALT  36  /  AlkPhos  81  07-03    Creatinine Trend: 0.52<--, 0.57<--, 0.71<--, 0.72<--, 0.85<--, 0.79<--  PT/INR - ( 04 Jul 2022 04:45 )   PT: 14.8 sec;   INR: 1.26 ratio         PTT - ( 04 Jul 2022 04:45 )  PTT:37.9 sec

## 2022-07-04 NOTE — PROGRESS NOTE ADULT - SUBJECTIVE AND OBJECTIVE BOX
INTERVAL HPI/OVERNIGHT EVENTS:  No new overnight event.  No N/V/D.  Tolerating diet.  some bleeding around the mouth  Allergies    No Known Allergies    Intolerances    General:  No wt loss, fevers, chills, night sweats, fatigue,   Eyes:  Good vision, no reported pain  ENT:  No sore throat, pain, runny nose, dysphagia  CV:  No pain, palpitations, hypo/hypertension  Resp:  No dyspnea, cough, tachypnea, wheezing  GI:  No pain, No nausea, No vomiting, No diarrhea, No constipation, No weight loss, No fever, No pruritis, No rectal bleeding, No tarry stools, No dysphagia,  :  No pain, bleeding, incontinence, nocturia  Muscle:  No pain, weakness  Neuro:  No weakness, tingling, memory problems  Psych:  No fatigue, insomnia, mood problems, depression  Endocrine:  No polyuria, polydipsia, cold/heat intolerance  Heme:  No petechiae, ecchymosis, easy bruisability  Skin:  No rash, tattoos, scars, edema      PHYSICAL EXAM:   Vital Signs:  Vital Signs Last 24 Hrs  T(C): 37.4 (2022 12:23), Max: 37.6 (2022 15:26)  T(F): 99.3 (2022 12:23), Max: 99.7 (2022 15:26)  HR: 90 (2022 12:00) (79 - 104)  BP: 109/59 (2022 12:00) (88/58 - 162/88)  BP(mean): 78 (2022 12:00) (66 - 115)  RR: 24 (2022 12:00) (1 - 37)  SpO2: 95% (2022 12:00) (91% - 100%)  Daily     Daily Weight in k.4 (2022 04:00)I&O's Summary    2022 07:01  -  2022 07:00  --------------------------------------------------------  IN: 2510 mL / OUT: 0 mL / NET: 2510 mL    2022 07:01  -  2022 12:49  --------------------------------------------------------  IN: 325 mL / OUT: 0 mL / NET: 325 mL        GENERAL:  Appears stated age, well-groomed, well-nourished, no distress  HEENT:  NC/AT,  conjunctivae clear and pink, no thyromegaly, nodules, adenopathy, no JVD, sclera -anicteric  CHEST:  Full & symmetric excursion, no increased effort, breath sounds clear  HEART:  Regular rhythm, S1, S2, no murmur/rub/S3/S4, no abdominal bruit, no edema  ABDOMEN:  Soft, non-tender, non-distended, normoactive bowel sounds,  no masses ,no hepato-splenomegaly, no signs of chronic liver disease  EXTEREMITIES:  no cyanosis,clubbing or edema  SKIN:  No rash/erythema/ecchymoses/petechiae/wounds/abscess/warm/dry  NEURO:  Alert, oriented, no asterixis, no tremor, no encephalopathy      LABS:                        8.2    6.59  )-----------( 399      ( 2022 04:45 )             25.7     07-04    139  |  103  |  33<H>  ----------------------------<  134<H>  4.8   |  29  |  0.52    Ca    9.5      2022 04:45  Phos  2.6     07-04  Mg     2.4     07-04    TPro  6.0  /  Alb  1.8<L>  /  TBili  0.3  /  DBili  x   /  AST  30  /  ALT  36  /  AlkPhos  81  07-03    PT/INR - ( 2022 04:45 )   PT: 14.8 sec;   INR: 1.26 ratio         PTT - ( 2022 04:45 )  PTT:37.9 sec    amylase   lipase  RADIOLOGY & ADDITIONAL TESTS:

## 2022-07-05 NOTE — PROGRESS NOTE ADULT - ASSESSMENT
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 coli pneumonia and Pseudomonas UTI.    Neuro:  - Encephalopathy likely 2/2 multifactorial   - Continue Lactulose  - Continue decreased dose of Citalopram given mental status + prolonged QTc and monitor for improvement    - Continue Decrease home Sinemet to once a day given mental status and monitor for improvement   - Discontinued home Primidone given mental status and monitor for improvement     CV:  - Came in with hypotension 2/2 to septic shock that is now improved  - Will continue to monitor   - Hx of HLD, continue home statin     Pulm:  - Remains on mechanical ventilation and wean as tolerated   - Extubation possibly today  - Duoneb q6h and hypersecretion therapy   - CXR ordered 7/3, f/u                  GI:  - Methylprednisolone completed for laryngeal edema  - Enteral feeds via PEG  - PEG tube placed 6/21/22  - IV Protonix 40mg qd for GI ppx    Renal:  - Cr stable  - No banks at this time   - Monitor I&Os and renal indices     Heme:  - Continue on full dose of Lovenox today given Hx as below   - Per prior Hospitalist note from 6/2, pt was on full dose AC for recent stroke and hypercoagulable state 2/2 to underlying malignancy   - 1 pRBC given 7/1, Hb up to 8.0 from 7.2, will continue to monitor       ID:  - B/l lower lobe PNA and UTI  - Urine cx positive for Pseudomonas 6/18  - ET positive for E.Coli now with Pseudomonas  6/23  - BCx NGTD final 6/23  - finished meropenem and Tobramycin (last day 7/3)    Endo:  - PTH wnl, vit D,25,hydroxy 39.2 and vit D,1,25 dihydroxy 89.9, PTHrP <2.2  - Hypercalcemia likely inflammatory  - Hypernatremia now resolved though continue free water flushes   - Fingersticks q6h, blood glucose goal 100-180 in ICU    Skin:  - Decubitus ulcer wound care following     Tubes/lines:  - None 75yo male with PMH of prostate cancer, SCC of tongue s/p partial resection now with recurrence, parkinson's disease, hypertension, hyperlipidemia, recent CVA, and oropharyngeal dysphagia admitted for septic shock and acute hypoxic respiratory failure in the setting of b/l lower lobe E. coli pneumonia and Pseudomonas UTI.    Neuro:  - Encephalopathy likely 2/2 multifactorial   - Continue Lactulose  - Continue decreased dose of Citalopram given mental status + prolonged QTc and monitor for improvement    - Continue Decrease home Sinemet to once a day given mental status and monitor for improvement   - Discontinued home Primidone given mental status and monitor for improvement     CV:  - Came in with hypotension 2/2 to septic shock that is now improved  - Will continue to monitor   - Hx of HLD, continue home statin     Pulm:  - Remains on mechanical ventilation and wean as tolerated   - Extubation possibly today  - Duoneb q6h and hypersecretion therapy   - CXR 7/3:  Suspect LEFT pleural effusion and/or posterior basilar   airspace disease decreased from prior 6/25/2022 exam.    GI:  - Methylprednisolone completed for laryngeal edema  - Enteral feeds via PEG  - PEG tube placed 6/21/22  - IV Protonix 40mg qd for GI ppx    Renal:  - Cr stable  - No banks at this time   - Monitor I&Os and renal indices     Heme:  - Continue on full dose of Lovenox today given Hx as below   - Per prior Hospitalist note from 6/2, pt was on full dose AC for recent stroke and hypercoagulable state 2/2 to underlying malignancy   - 1 pRBC given 7/1, Hb up to 8.0 from 7.2, will continue to monitor       ID:  - B/l lower lobe PNA and UTI  - Urine cx positive for Pseudomonas 6/18  - ET positive for E.Coli now with Pseudomonas  6/23  - BCx NGTD final 6/23  - finished meropenem and Tobramycin (last day 7/3)    Endo:  - PTH wnl, vit D,25,hydroxy 39.2 and vit D,1,25 dihydroxy 89.9, PTHrP <2.2  - Hypercalcemia likely inflammatory  - Hypernatremia now resolved though continue free water flushes   - Fingersticks q6h, blood glucose goal 100-180 in ICU    Skin:  - Decubitus ulcer wound care following     Tubes/lines:  - None

## 2022-07-05 NOTE — PROGRESS NOTE ADULT - PROBLEM SELECTOR PLAN 1
Remains intubated  Continue ventilator support    Overall prognosis is guarded  ?terminal extubation

## 2022-07-05 NOTE — PROGRESS NOTE ADULT - PROBLEM SELECTOR PROBLEM 1
Hypercalcemia
Acute respiratory failure with hypoxia
Hypercalcemia
Acute respiratory failure with hypoxia

## 2022-07-05 NOTE — PROGRESS NOTE ADULT - REASON FOR ADMISSION
Acute Hypoxic Respiratory Failure

## 2022-07-05 NOTE — PROGRESS NOTE ADULT - NUTRITIONAL ASSESSMENT
This patient has been assessed with a concern for Malnutrition and has been determined to have a diagnosis/diagnoses of Severe protein-calorie malnutrition.    This patient is being managed with:   Diet NPO after Midnight-     NPO Start Date: 20-Jun-2022   NPO Start Time: 23:59  Except Medications  Entered: Jun 20 2022 10:39AM    Diet NPO with Tube Feed-  Tube Feeding Modality: Nasogastric  Vital 1.5 Gasper  Total Volume for 24 Hours (mL): 1300  Continuous  Starting Tube Feed Rate {mL per Hour}: 20  Increase Tube Feed Rate by (mL): 15     Every 8 hours  Until Goal Tube Feed Rate (mL per Hour): 65  Tube Feed Duration (in Hours): 20  Tube Feed Start Time: 06:00  Free Water Flush  Pump   Rate (mL per Hour): 50   Frequency: Every Hour    Duration (Hours): 20    Start Time: 06:00  Entered: Jun 18 2022 10:28AM    
This patient has been assessed with a concern for Malnutrition and has been determined to have a diagnosis/diagnoses of Severe protein-calorie malnutrition.    This patient is being managed with:   Diet NPO after Midnight-     NPO Start Date: 20-Jun-2022   NPO Start Time: 23:59  Except Medications  Entered: Jun 20 2022 10:39AM    Diet NPO with Tube Feed-  Tube Feeding Modality: Nasogastric  Vital 1.5 Gasper  Total Volume for 24 Hours (mL): 1300  Continuous  Starting Tube Feed Rate {mL per Hour}: 20  Increase Tube Feed Rate by (mL): 15     Every 8 hours  Until Goal Tube Feed Rate (mL per Hour): 65  Tube Feed Duration (in Hours): 20  Tube Feed Start Time: 06:00  Free Water Flush  Pump   Rate (mL per Hour): 50   Frequency: Every Hour    Duration (Hours): 20    Start Time: 06:00  Entered: Jun 18 2022 10:28AM    
This patient has been assessed with a concern for Malnutrition and has been determined to have a diagnosis/diagnoses of Severe protein-calorie malnutrition.    This patient is being managed with:   Diet NPO with Tube Feed-  Tube Feeding Modality: Nasogastric  Vital 1.5 Gasper  Total Volume for 24 Hours (mL): 1300  Continuous  Starting Tube Feed Rate {mL per Hour}: 20  Increase Tube Feed Rate by (mL): 15     Every 8 hours  Until Goal Tube Feed Rate (mL per Hour): 65  Tube Feed Duration (in Hours): 20  Tube Feed Start Time: 06:00  Free Water Flush  Pump   Rate (mL per Hour): 50   Frequency: Every Hour    Duration (Hours): 20    Start Time: 06:00  Entered: Jun 18 2022 10:28AM    
This patient has been assessed with a concern for Malnutrition and has been determined to have a diagnosis/diagnoses of Severe protein-calorie malnutrition.    This patient is being managed with:   Diet NPO with Tube Feed-  Tube Feeding Modality: Nasogastric  Vital 1.5 Gasper  Total Volume for 24 Hours (mL): 1300  Continuous  Starting Tube Feed Rate {mL per Hour}: 20  Increase Tube Feed Rate by (mL): 15     Every 8 hours  Until Goal Tube Feed Rate (mL per Hour): 65  Tube Feed Duration (in Hours): 20  Tube Feed Start Time: 06:00  Free Water Flush  Pump   Rate (mL per Hour): 50   Frequency: Every Hour    Duration (Hours): 20    Start Time: 06:00  Entered: Jun 18 2022 10:28AM    
This patient has been assessed with a concern for Malnutrition and has been determined to have a diagnosis/diagnoses of Severe protein-calorie malnutrition.    This patient is being managed with:   Diet NPO after Midnight-     NPO Start Date: 20-Jun-2022   NPO Start Time: 23:59  Except Medications  Entered: Jun 20 2022 10:39AM    Diet NPO with Tube Feed-  Tube Feeding Modality: Nasogastric  Vital 1.5 Gasper  Total Volume for 24 Hours (mL): 1300  Continuous  Starting Tube Feed Rate {mL per Hour}: 20  Increase Tube Feed Rate by (mL): 15     Every 8 hours  Until Goal Tube Feed Rate (mL per Hour): 65  Tube Feed Duration (in Hours): 20  Tube Feed Start Time: 06:00  Free Water Flush  Pump   Rate (mL per Hour): 50   Frequency: Every Hour    Duration (Hours): 20    Start Time: 06:00  Entered: Jun 18 2022 10:28AM    
This patient has been assessed with a concern for Malnutrition and has been determined to have a diagnosis/diagnoses of Severe protein-calorie malnutrition.    This patient is being managed with:   Diet NPO with Tube Feed-  Tube Feeding Modality: Nasogastric  Vital 1.5 Gasper  Total Volume for 24 Hours (mL): 1300  Continuous  Starting Tube Feed Rate {mL per Hour}: 20  Increase Tube Feed Rate by (mL): 15     Every 8 hours  Until Goal Tube Feed Rate (mL per Hour): 65  Tube Feed Duration (in Hours): 20  Tube Feed Start Time: 06:00  Free Water Flush  Pump   Rate (mL per Hour): 50   Frequency: Every Hour    Duration (Hours): 20    Start Time: 06:00  Entered: Jun 18 2022 10:28AM    
This patient has been assessed with a concern for Malnutrition and has been determined to have a diagnosis/diagnoses of Severe protein-calorie malnutrition.    This patient is being managed with:   Diet NPO after Midnight-     NPO Start Date: 20-Jun-2022   NPO Start Time: 23:59  Except Medications  Entered: Jun 20 2022 10:39AM    Diet NPO with Tube Feed-  Tube Feeding Modality: Nasogastric  Vital 1.5 Gasper  Total Volume for 24 Hours (mL): 1300  Continuous  Starting Tube Feed Rate {mL per Hour}: 20  Increase Tube Feed Rate by (mL): 15     Every 8 hours  Until Goal Tube Feed Rate (mL per Hour): 65  Tube Feed Duration (in Hours): 20  Tube Feed Start Time: 06:00  Free Water Flush  Pump   Rate (mL per Hour): 50   Frequency: Every Hour    Duration (Hours): 20    Start Time: 06:00  Entered: Jun 18 2022 10:28AM    
This patient has been assessed with a concern for Malnutrition and has been determined to have a diagnosis/diagnoses of Severe protein-calorie malnutrition.    This patient is being managed with:   Diet NPO with Tube Feed-  Tube Feeding Modality: Nasogastric  Vital 1.5 Gasper  Total Volume for 24 Hours (mL): 1300  Continuous  Starting Tube Feed Rate {mL per Hour}: 20  Increase Tube Feed Rate by (mL): 15     Every 8 hours  Until Goal Tube Feed Rate (mL per Hour): 65  Tube Feed Duration (in Hours): 20  Tube Feed Start Time: 06:00  Free Water Flush  Pump   Rate (mL per Hour): 50   Frequency: Every Hour    Duration (Hours): 20    Start Time: 06:00  Entered: Jun 18 2022 10:28AM    
This patient has been assessed with a concern for Malnutrition and has been determined to have a diagnosis/diagnoses of Severe protein-calorie malnutrition.      
This patient has been assessed with a concern for Malnutrition and has been determined to have a diagnosis/diagnoses of Severe protein-calorie malnutrition.    This patient is being managed with:   Diet NPO after Midnight-     NPO Start Date: 20-Jun-2022   NPO Start Time: 23:59  Except Medications  Entered: Jun 20 2022 10:39AM    Diet NPO with Tube Feed-  Tube Feeding Modality: Nasogastric  Vital 1.5 Gasper  Total Volume for 24 Hours (mL): 1300  Continuous  Starting Tube Feed Rate {mL per Hour}: 20  Increase Tube Feed Rate by (mL): 15     Every 8 hours  Until Goal Tube Feed Rate (mL per Hour): 65  Tube Feed Duration (in Hours): 20  Tube Feed Start Time: 06:00  Free Water Flush  Pump   Rate (mL per Hour): 50   Frequency: Every Hour    Duration (Hours): 20    Start Time: 06:00  Entered: Jun 18 2022 10:28AM    
This patient has been assessed with a concern for Malnutrition and has been determined to have a diagnosis/diagnoses of Severe protein-calorie malnutrition.    This patient is being managed with:   Diet NPO with Tube Feed-  Tube Feeding Modality: Nasogastric  Vital 1.5 Gasper  Total Volume for 24 Hours (mL): 1300  Continuous  Starting Tube Feed Rate {mL per Hour}: 20  Increase Tube Feed Rate by (mL): 15     Every 8 hours  Until Goal Tube Feed Rate (mL per Hour): 65  Tube Feed Duration (in Hours): 20  Tube Feed Start Time: 06:00  Free Water Flush  Pump   Rate (mL per Hour): 50   Frequency: Every Hour    Duration (Hours): 20    Start Time: 06:00  Entered: Jun 18 2022 10:28AM    
This patient has been assessed with a concern for Malnutrition and has been determined to have a diagnosis/diagnoses of Severe protein-calorie malnutrition.    This patient is being managed with:   Diet NPO after Midnight-     NPO Start Date: 20-Jun-2022   NPO Start Time: 23:59  Except Medications  Entered: Jun 20 2022 10:39AM    Diet NPO with Tube Feed-  Tube Feeding Modality: Nasogastric  Vital 1.5 Gasper  Total Volume for 24 Hours (mL): 1300  Continuous  Starting Tube Feed Rate {mL per Hour}: 20  Increase Tube Feed Rate by (mL): 15     Every 8 hours  Until Goal Tube Feed Rate (mL per Hour): 65  Tube Feed Duration (in Hours): 20  Tube Feed Start Time: 06:00  Free Water Flush  Pump   Rate (mL per Hour): 50   Frequency: Every Hour    Duration (Hours): 20    Start Time: 06:00  Entered: Jun 18 2022 10:28AM    
This patient has been assessed with a concern for Malnutrition and has been determined to have a diagnosis/diagnoses of Severe protein-calorie malnutrition.    This patient is being managed with:   Diet NPO with Tube Feed-  Tube Feeding Modality: Nasogastric  Vital 1.5 Gasper  Total Volume for 24 Hours (mL): 1300  Continuous  Starting Tube Feed Rate {mL per Hour}: 20  Increase Tube Feed Rate by (mL): 15     Every 8 hours  Until Goal Tube Feed Rate (mL per Hour): 65  Tube Feed Duration (in Hours): 20  Tube Feed Start Time: 06:00  Free Water Flush  Pump   Rate (mL per Hour): 50   Frequency: Every Hour    Duration (Hours): 20    Start Time: 06:00  Entered: Jun 18 2022 10:28AM    
This patient has been assessed with a concern for Malnutrition and has been determined to have a diagnosis/diagnoses of Severe protein-calorie malnutrition.    This patient is being managed with:   Diet NPO with Tube Feed-  Tube Feeding Modality: Nasogastric  Vital 1.5 Gasper  Total Volume for 24 Hours (mL): 1300  Continuous  Starting Tube Feed Rate {mL per Hour}: 20  Increase Tube Feed Rate by (mL): 15     Every 8 hours  Until Goal Tube Feed Rate (mL per Hour): 65  Tube Feed Duration (in Hours): 20  Tube Feed Start Time: 06:00  Free Water Flush  Pump   Rate (mL per Hour): 50   Frequency: Every Hour    Duration (Hours): 20    Start Time: 06:00  Entered: Jun 18 2022 10:28AM      This patient has been assessed with a concern for Malnutrition and has been determined to have a diagnosis/diagnoses of Severe protein-calorie malnutrition.    This patient is being managed with:   Diet NPO with Tube Feed-  Tube Feeding Modality: Nasogastric  Vital 1.5 Gasper  Total Volume for 24 Hours (mL): 1300  Continuous  Starting Tube Feed Rate {mL per Hour}: 20  Increase Tube Feed Rate by (mL): 15     Every 8 hours  Until Goal Tube Feed Rate (mL per Hour): 65  Tube Feed Duration (in Hours): 20  Tube Feed Start Time: 06:00  Free Water Flush  Pump   Rate (mL per Hour): 50   Frequency: Every Hour    Duration (Hours): 20    Start Time: 06:00  Entered: Jun 18 2022 10:28AM    
This patient has been assessed with a concern for Malnutrition and has been determined to have a diagnosis/diagnoses of Severe protein-calorie malnutrition.    This patient is being managed with:   Diet NPO with Tube Feed-  Tube Feeding Modality: Nasogastric  Vital 1.5 Gasper  Total Volume for 24 Hours (mL): 1300  Continuous  Starting Tube Feed Rate {mL per Hour}: 20  Increase Tube Feed Rate by (mL): 15     Every 8 hours  Until Goal Tube Feed Rate (mL per Hour): 65  Tube Feed Duration (in Hours): 20  Tube Feed Start Time: 06:00  Free Water Flush  Pump   Rate (mL per Hour): 50   Frequency: Every Hour    Duration (Hours): 20    Start Time: 06:00  Entered: Jun 18 2022 10:28AM    

## 2022-07-05 NOTE — PROGRESS NOTE ADULT - NSPROGADDITIONALINFOA_GEN_ALL_CORE
Discussed with ICU attending  prognosis poor.  Possible extubation with family at bedside in am
day-to-day mgmt and GOC discussions per MICU team  poor prognosis

## 2022-07-05 NOTE — PROGRESS NOTE ADULT - SUBJECTIVE AND OBJECTIVE BOX
****** CHARTING IN PROGRESS *******    INTERVAL HPI/OVERNIGHT EVENTS:    SUBJECTIVE: Patient seen and examined at bedside.     ROS:  CV: Denies chest pain  Resp: Denies SOB  GI: Denies abdominal pain, constipation, diarrhea, nausea, vomiting  : Denies dysuria  ID: Denies fevers, chills  MSK: Denies joint pain     OBJECTIVE:    VITAL SIGNS:  ICU Vital Signs Last 24 Hrs  T(C): 37.4 (05 Jul 2022 12:13), Max: 37.4 (04 Jul 2022 15:32)  T(F): 99.3 (05 Jul 2022 12:13), Max: 99.3 (04 Jul 2022 15:32)  HR: 91 (05 Jul 2022 12:05) (81 - 99)  BP: 159/105 (05 Jul 2022 12:00) (86/52 - 166/83)  BP(mean): 127 (05 Jul 2022 12:00) (64 - 127)  ABP: --  ABP(mean): --  RR: 27 (05 Jul 2022 12:00) (24 - 31)  SpO2: 98% (05 Jul 2022 12:05) (95% - 100%)    Mode: CPAP with PS, FiO2: 40, PEEP: 5, PS: 7, MAP: 9, PIP: 14    07-04 @ 07:01  -  07-05 @ 07:00  --------------------------------------------------------  IN: 2790 mL / OUT: 0 mL / NET: 2790 mL    07-05 @ 07:01  -  07-05 @ 12:53  --------------------------------------------------------  IN: 325 mL / OUT: 0 mL / NET: 325 mL      CAPILLARY BLOOD GLUCOSE      POCT Blood Glucose.: 162 mg/dL (05 Jul 2022 11:33)      PHYSICAL EXAM:  General: NAD, comfortable  HEENT: NCAT, PERRL, clear conjunctiva, no scleral icterus  Neck: supple, no JVD  Respiratory: CTA b/l, no wheezing, rhonchi, rales  Cardiovascular: RRR, normal S1S2, no M/R/G  Abdomen: soft, NT/ND, bowel sounds in all four quadrants, no palpable masses  Extremities: WWP, no clubbing, cyanosis, or edema  Neurology: A&Ox3, nonfocal, sensation intact     MEDICATIONS:  MEDICATIONS  (STANDING):  acetylcysteine 10%  Inhalation 4 milliLiter(s) Inhalation every 6 hours  albuterol/ipratropium for Nebulization 3 milliLiter(s) Nebulizer every 6 hours  atorvastatin 80 milliGRAM(s) Oral at bedtime  carbidopa/levodopa  25/100 1 Tablet(s) Oral daily  chlorhexidine 0.12% Liquid 15 milliLiter(s) Oral Mucosa every 12 hours  chlorhexidine 2% Cloths 1 Application(s) Topical <User Schedule>  citalopram 10 milliGRAM(s) Oral daily  enoxaparin Injectable 70 milliGRAM(s) SubCutaneous every 12 hours  lactulose Syrup 20 Gram(s) Oral every 8 hours  pantoprazole   Suspension 40 milliGRAM(s) Oral daily  sodium chloride 3%  Inhalation 4 milliLiter(s) Inhalation every 6 hours    MEDICATIONS  (PRN):  acetaminophen     Tablet .. 650 milliGRAM(s) Oral every 6 hours PRN Temp greater or equal to 38C (100.4F), Mild Pain (1 - 3)      ALLERGIES:  Allergies    No Known Allergies    Intolerances        LABS:                        8.4    7.50  )-----------( 419      ( 05 Jul 2022 06:30 )             26.1     07-05    140  |  103  |  32<H>  ----------------------------<  163<H>  4.4   |  34<H>  |  0.67    Ca    9.7      05 Jul 2022 06:30  Phos  2.3     07-05  Mg     2.3     07-05    TPro  6.3  /  Alb  2.0<L>  /  TBili  0.2  /  DBili  x   /  AST  36  /  ALT  56  /  AlkPhos  80  07-05    PT/INR - ( 04 Jul 2022 04:45 )   PT: 14.8 sec;   INR: 1.26 ratio         PTT - ( 04 Jul 2022 04:45 )  PTT:37.9 sec      RADIOLOGY & ADDITIONAL TESTS: Reviewed. ***  BEDSIDE LUNG ULTRASOUND: ***  BEDSIDE ECHO: ***    CENTRAL LINE: N        DATE INSERTED:             REMOVE: N  MENJIVAR: Y                       DATE INSERTED:              REMOVE: Y/N  A-LINE: N                       DATE INSERTED:              REMOVE: Y/N      GLOBAL ISSUE/BEST PRACTICE  Analgesia: Y  Sedation: Y  HOB elevation: yes  Stress ulcer prophylaxis: Y  VTE prophylaxis: Y  Glycemic control: Y  Nutrition: Y    CODE STATUS: Full Code INTERVAL HPI/OVERNIGHT EVENTS:    SUBJECTIVE: Patient seen and examined at bedside.     ROS:  CV: Denies chest pain  Resp: Denies SOB  GI: Denies abdominal pain, constipation, diarrhea, nausea, vomiting  : Denies dysuria  ID: Denies fevers, chills  MSK: Denies joint pain     OBJECTIVE:    VITAL SIGNS:  ICU Vital Signs Last 24 Hrs  T(C): 37.4 (05 Jul 2022 12:13), Max: 37.4 (04 Jul 2022 15:32)  T(F): 99.3 (05 Jul 2022 12:13), Max: 99.3 (04 Jul 2022 15:32)  HR: 91 (05 Jul 2022 12:05) (81 - 99)  BP: 159/105 (05 Jul 2022 12:00) (86/52 - 166/83)  BP(mean): 127 (05 Jul 2022 12:00) (64 - 127)  ABP: --  ABP(mean): --  RR: 27 (05 Jul 2022 12:00) (24 - 31)  SpO2: 98% (05 Jul 2022 12:05) (95% - 100%)    Mode: CPAP with PS, FiO2: 40, PEEP: 5, PS: 7, MAP: 9, PIP: 14    07-04 @ 07:01  -  07-05 @ 07:00  --------------------------------------------------------  IN: 2790 mL / OUT: 0 mL / NET: 2790 mL    07-05 @ 07:01 - 07-05 @ 12:53  --------------------------------------------------------  IN: 325 mL / OUT: 0 mL / NET: 325 mL      CAPILLARY BLOOD GLUCOSE      POCT Blood Glucose.: 162 mg/dL (05 Jul 2022 11:33)      PHYSICAL EXAM:  General: NAD, comfortable  HEENT: NCAT, PERRL, clear conjunctiva, no scleral icterus  Neck: supple, no JVD  Respiratory: CTA b/l, no wheezing, rhonchi, rales  Cardiovascular: RRR, normal S1S2, no M/R/G  Abdomen: soft, NT/ND, bowel sounds in all four quadrants, no palpable masses  Extremities: WWP, no clubbing, cyanosis, or edema  Neurology: A&Ox3, nonfocal, sensation intact     MEDICATIONS:  MEDICATIONS  (STANDING):  acetylcysteine 10%  Inhalation 4 milliLiter(s) Inhalation every 6 hours  albuterol/ipratropium for Nebulization 3 milliLiter(s) Nebulizer every 6 hours  atorvastatin 80 milliGRAM(s) Oral at bedtime  carbidopa/levodopa  25/100 1 Tablet(s) Oral daily  chlorhexidine 0.12% Liquid 15 milliLiter(s) Oral Mucosa every 12 hours  chlorhexidine 2% Cloths 1 Application(s) Topical <User Schedule>  citalopram 10 milliGRAM(s) Oral daily  enoxaparin Injectable 70 milliGRAM(s) SubCutaneous every 12 hours  lactulose Syrup 20 Gram(s) Oral every 8 hours  pantoprazole   Suspension 40 milliGRAM(s) Oral daily  sodium chloride 3%  Inhalation 4 milliLiter(s) Inhalation every 6 hours    MEDICATIONS  (PRN):  acetaminophen     Tablet .. 650 milliGRAM(s) Oral every 6 hours PRN Temp greater or equal to 38C (100.4F), Mild Pain (1 - 3)      ALLERGIES:  Allergies    No Known Allergies    Intolerances        LABS:                        8.4    7.50  )-----------( 419      ( 05 Jul 2022 06:30 )             26.1     07-05    140  |  103  |  32<H>  ----------------------------<  163<H>  4.4   |  34<H>  |  0.67    Ca    9.7      05 Jul 2022 06:30  Phos  2.3     07-05  Mg     2.3     07-05    TPro  6.3  /  Alb  2.0<L>  /  TBili  0.2  /  DBili  x   /  AST  36  /  ALT  56  /  AlkPhos  80  07-05    PT/INR - ( 04 Jul 2022 04:45 )   PT: 14.8 sec;   INR: 1.26 ratio         PTT - ( 04 Jul 2022 04:45 )  PTT:37.9 sec      RADIOLOGY & ADDITIONAL TESTS: Reviewed. ***  BEDSIDE LUNG ULTRASOUND: ***  BEDSIDE ECHO: ***    CENTRAL LINE: N        DATE INSERTED:             REMOVE: N  MENJIVAR: Y                       DATE INSERTED:              REMOVE: Y/N  A-LINE: N                       DATE INSERTED:              REMOVE: Y/N      GLOBAL ISSUE/BEST PRACTICE  Analgesia: Y  Sedation: Y  HOB elevation: yes  Stress ulcer prophylaxis: Y  VTE prophylaxis: Y  Glycemic control: Y  Nutrition: Y    CODE STATUS: Full Code SUBJECTIVE: Patient seen and examined at bedside.     ROS:  Unable to obtain due to ventilation status.    OBJECTIVE:    VITAL SIGNS:  ICU Vital Signs Last 24 Hrs  T(C): 37.4 (05 Jul 2022 12:13), Max: 37.4 (04 Jul 2022 15:32)  T(F): 99.3 (05 Jul 2022 12:13), Max: 99.3 (04 Jul 2022 15:32)  HR: 91 (05 Jul 2022 12:05) (81 - 99)  BP: 159/105 (05 Jul 2022 12:00) (86/52 - 166/83)  BP(mean): 127 (05 Jul 2022 12:00) (64 - 127)  ABP: --  ABP(mean): --  RR: 27 (05 Jul 2022 12:00) (24 - 31)  SpO2: 98% (05 Jul 2022 12:05) (95% - 100%)    Mode: CPAP with PS, FiO2: 40, PEEP: 5, PS: 7, MAP: 9, PIP: 14    07-04 @ 07:01  -  07-05 @ 07:00  --------------------------------------------------------  IN: 2790 mL / OUT: 0 mL / NET: 2790 mL    07-05 @ 07:01  -  07-05 @ 12:53  --------------------------------------------------------  IN: 325 mL / OUT: 0 mL / NET: 325 mL      CAPILLARY BLOOD GLUCOSE      POCT Blood Glucose.: 162 mg/dL (05 Jul 2022 11:33)      PHYSICAL EXAM:  General: NAD, comfortable  HEENT: NCAT, PERRL, clear conjunctiva, no scleral icterus  Neck: supple, no JVD  Respiratory: CTA b/l, no wheezing, rhonchi, rales  Cardiovascular: RRR, normal S1S2, no M/R/G  Abdomen: soft, NT/ND, bowel sounds in all four quadrants, no palpable masses  Extremities: WWP, no clubbing, cyanosis, or edema  Neurology: A&Ox3, nonfocal, sensation intact     MEDICATIONS:  MEDICATIONS  (STANDING):  acetylcysteine 10%  Inhalation 4 milliLiter(s) Inhalation every 6 hours  albuterol/ipratropium for Nebulization 3 milliLiter(s) Nebulizer every 6 hours  atorvastatin 80 milliGRAM(s) Oral at bedtime  carbidopa/levodopa  25/100 1 Tablet(s) Oral daily  chlorhexidine 0.12% Liquid 15 milliLiter(s) Oral Mucosa every 12 hours  chlorhexidine 2% Cloths 1 Application(s) Topical <User Schedule>  citalopram 10 milliGRAM(s) Oral daily  enoxaparin Injectable 70 milliGRAM(s) SubCutaneous every 12 hours  lactulose Syrup 20 Gram(s) Oral every 8 hours  pantoprazole   Suspension 40 milliGRAM(s) Oral daily  sodium chloride 3%  Inhalation 4 milliLiter(s) Inhalation every 6 hours    MEDICATIONS  (PRN):  acetaminophen     Tablet .. 650 milliGRAM(s) Oral every 6 hours PRN Temp greater or equal to 38C (100.4F), Mild Pain (1 - 3)      ALLERGIES:  Allergies    No Known Allergies    Intolerances        LABS:                        8.4    7.50  )-----------( 419      ( 05 Jul 2022 06:30 )             26.1     07-05    140  |  103  |  32<H>  ----------------------------<  163<H>  4.4   |  34<H>  |  0.67    Ca    9.7      05 Jul 2022 06:30  Phos  2.3     07-05  Mg     2.3     07-05    TPro  6.3  /  Alb  2.0<L>  /  TBili  0.2  /  DBili  x   /  AST  36  /  ALT  56  /  AlkPhos  80  07-05    PT/INR - ( 04 Jul 2022 04:45 )   PT: 14.8 sec;   INR: 1.26 ratio         PTT - ( 04 Jul 2022 04:45 )  PTT:37.9 sec      CENTRAL LINE: N        DATE INSERTED:             REMOVE: N  MENJIVAR: Y                       DATE INSERTED:              REMOVE: Y/N  A-LINE: N                       DATE INSERTED:              REMOVE: Y/N      GLOBAL ISSUE/BEST PRACTICE  Analgesia: Y  Sedation: Y  HOB elevation: yes  Stress ulcer prophylaxis: Y  VTE prophylaxis: Y  Glycemic control: Y  Nutrition: Y    CODE STATUS: Full Code

## 2022-07-05 NOTE — PROGRESS NOTE ADULT - PROBLEM SELECTOR PROBLEM 4
ÁNGEL (acute kidney injury)
Prophylactic measure
ÁNGEL (acute kidney injury)
ÁNGEL (acute kidney injury)

## 2022-07-05 NOTE — PROGRESS NOTE ADULT - PROBLEM SELECTOR PROBLEM 3
Prophylactic measure
Severe protein-calorie malnutrition
Severe protein-calorie malnutrition
Prophylactic measure
Severe protein-calorie malnutrition
Prophylactic measure
Severe protein-calorie malnutrition
ÁNGEL (acute kidney injury)
Severe protein-calorie malnutrition

## 2022-07-05 NOTE — PROGRESS NOTE ADULT - PROVIDER SPECIALTY LIST ADULT
Critical Care
Gastroenterology
Gastroenterology
MICU
Critical Care
Gastroenterology
Hospitalist
Internal Medicine
Critical Care
Endocrinology
Gastroenterology
Hospitalist
MICU
MICU
Critical Care
Critical Care
Endocrinology
Endocrinology
Gastroenterology
Hospitalist
Internal Medicine
Critical Care
Critical Care
Gastroenterology
Gastroenterology
Hospitalist
Hospitalist
Endocrinology
Gastroenterology
Critical Care
Critical Care
Internal Medicine
Hospitalist
Hospitalist
Internal Medicine

## 2022-07-05 NOTE — PROGRESS NOTE ADULT - PROBLEM SELECTOR PLAN 2
resolving  Continue broad spectrum antibiotics
Gram negative sepsis with UTI and pneumonia  Continue broad spectrum antibiotics  Continue Meropenem
Continue broad spectrum antibiotics
Gram negative sepsis with UTI and pneumonia  Continue broad spectrum antibiotics  Continue merem
antibiotics as per ICU team
resolving  Continue broad spectrum antibiotics
Gram negative sepsis with UTI and pneumonia  Continue broad spectrum antibiotics  Continue merem
Gram negative sepsis   Continue broad spectrum antibiotics
Continue broad spectrum antibiotics

## 2022-07-05 NOTE — PROGRESS NOTE ADULT - CRITICAL CARE SERVICES PROVIDED
Patient is critically ill, requiring critical care services.

## 2022-07-05 NOTE — DISCHARGE NOTE FOR THE EXPIRED PATIENT - HOSPITAL COURSE
76 M with history of prostate cancer, SCC of tongue s/p partial resection now with recurrence, Parkinson disease, hypertension, hyperlipidemia, CVA, and oropharyngeal dysphagia who is here with acute hypoxemic respiratory failure and septic shock due to bilateral lower lobe e. coli pneumonia, pseudomonas UTI. Course c/b acute metabolic encephalopathy, lactic acidosis, septic shock, and acute kidney injury. Completed course of antibiotics. No longer on vasopressors. He has a persistent encephalopathy. Now has developed laryngeal edema on short course of dexamethasone. Unfortunately patient's mental status did not improve. Multiple discussions were had with the family and given his condition and overall medical history it was decided to make him comfortable and preform palliative extubation on 7/5. He passed away with his family at bedside on 7/5 at 16:58.     Viki his daughter was notified at bedside    Death exam:  Pupils fixed, dilated  Absent breath sounds, absent heart sounds  no carotid or radial pulses  Unresponsive to verbal or painful stimuli

## 2022-07-05 NOTE — PROGRESS NOTE ADULT - SUBJECTIVE AND OBJECTIVE BOX
INTERVAL HPI/OVERNIGHT EVENTS:  No new overnight event.      Allergies    No Known Allergies    Intolerances    General:  No wt loss, fevers, chills, night sweats, fatigue,   Eyes:  Good vision, no reported pain  ENT:  No sore throat, pain, runny nose, dysphagia  CV:  No pain, palpitations, hypo/hypertension  Resp:  No dyspnea, cough, tachypnea, wheezing  GI:  No pain, No nausea, No vomiting, No diarrhea, No constipation, No weight loss, No fever, No pruritis, No rectal bleeding, No tarry stools, No dysphagia,  :  No pain, bleeding, incontinence, nocturia  Muscle:  No pain, weakness  Neuro:  No weakness, tingling, memory problems  Psych:  No fatigue, insomnia, mood problems, depression  Endocrine:  No polyuria, polydipsia, cold/heat intolerance  Heme:  No petechiae, ecchymosis, easy bruisability  Skin:  No rash, tattoos, scars, edema      PHYSICAL EXAM:   Vital Signs:  Vital Signs Last 24 Hrs  T(C): 37.4 (2022 12:23), Max: 37.6 (2022 15:26)  T(F): 99.3 (2022 12:23), Max: 99.7 (2022 15:26)  HR: 90 (2022 12:00) (79 - 104)  BP: 109/59 (2022 12:00) (88/58 - 162/88)  BP(mean): 78 (2022 12:00) (66 - 115)  RR: 24 (2022 12:00) (1 - 37)  SpO2: 95% (2022 12:00) (91% - 100%)  Daily     Daily Weight in k.4 (2022 04:00)I&O's Summary    2022 07:01  -  2022 07:00  --------------------------------------------------------  IN: 2510 mL / OUT: 0 mL / NET: 2510 mL    2022 07:01  -  2022 12:49  --------------------------------------------------------  IN: 325 mL / OUT: 0 mL / NET: 325 mL        GENERAL:  Appears stated age, well-groomed, well-nourished, no distress  HEENT:  NC/AT,  conjunctivae clear and pink, no thyromegaly, nodules, adenopathy, no JVD, sclera -anicteric  CHEST:  Full & symmetric excursion, no increased effort, breath sounds clear  HEART:  Regular rhythm, S1, S2, no murmur/rub/S3/S4, no abdominal bruit, no edema  ABDOMEN:  Soft, non-tender, non-distended, normoactive bowel sounds,  no masses ,no hepato-splenomegaly, no signs of chronic liver disease  EXTEREMITIES:  no cyanosis,clubbing or edema  SKIN:  No rash/erythema/ecchymoses/petechiae/wounds/abscess/warm/dry  NEURO:  Alert, oriented, no asterixis, no tremor, no encephalopathy      LABS:                        8.2    6.59  )-----------( 399      ( 2022 04:45 )             25.7     07-04    139  |  103  |  33<H>  ----------------------------<  134<H>  4.8   |  29  |  0.52    Ca    9.5      2022 04:45  Phos  2.6     07-04  Mg     2.4     07-04    TPro  6.0  /  Alb  1.8<L>  /  TBili  0.3  /  DBili  x   /  AST  30  /  ALT  36  /  AlkPhos  81  07-03    PT/INR - ( 2022 04:45 )   PT: 14.8 sec;   INR: 1.26 ratio         PTT - ( 2022 04:45 )  PTT:37.9 sec    amylase   lipase  RADIOLOGY & ADDITIONAL TESTS:

## 2022-07-05 NOTE — AIRWAY REMOVAL NOTE  ADULT & PEDS - O2 FLOW (L/MIN)
Chart reviewed, RHC noted,   cardiology signed off, please call again if needed
Sent record release to Dr Judith Jaramillo office in [place] for cardiology records.
3

## 2022-07-05 NOTE — PROGRESS NOTE ADULT - PROBLEM SELECTOR PROBLEM 2
Gram-negative sepsis, unspecified

## 2022-07-05 NOTE — PROGRESS NOTE ADULT - PROBLEM SELECTOR PLAN 3
continue tube feeding
Renal functions improved.  Avoid nephrotoxins.
Continue Lovenox

## 2022-07-05 NOTE — PROGRESS NOTE ADULT - SUBJECTIVE AND OBJECTIVE BOX
Patient is a 76y old  Male who presents with a chief complaint of Acute Hypoxic Respiratory Failure (05 Jul 2022 12:53)      INTERVAL History of Present Illness/OVERNIGHT EVENTS: seen this AM  plan to extubate/place on comfort    MEDICATIONS  (STANDING):  HYDROmorphone Infusion 1 mG/Hr (5 mL/Hr) IV Continuous <Continuous>    MEDICATIONS  (PRN):  glycopyrrolate Injectable 0.2 milliGRAM(s) IV Push every 8 hours PRN comfort care  HYDROmorphone  Injectable 1 milliGRAM(s) IV Push every 4 hours PRN comfort      Allergies    No Known Allergies    Intolerances        REVIEW OF SYSTEMS:  Negative unless otherwise specified above.    Vital Signs Last 24 Hrs  T(C): 37.4 (05 Jul 2022 12:13), Max: 37.4 (04 Jul 2022 15:32)  T(F): 99.3 (05 Jul 2022 12:13), Max: 99.3 (04 Jul 2022 15:32)  HR: 92 (05 Jul 2022 14:00) (81 - 99)  BP: 143/79 (05 Jul 2022 14:00) (86/52 - 166/83)  BP(mean): 105 (05 Jul 2022 14:00) (64 - 127)  RR: 23 (05 Jul 2022 14:00) (23 - 31)  SpO2: 99% (05 Jul 2022 14:00) (95% - 100%)        PHYSICAL EXAM:  GENERAL: No apparent distress, appears unresponsive  HEAD:  Atraumatic, Normocephalic  EYES: Conjunctiva and sclera clear, no discharge  ENMT: Moist mucous membranes, no nasal discharge  NECK: Supple, no JVD  CHEST/LUNG: Air entry bilaterally, labored breathing  HEART: Regular rhythm, no rubs or gallops  ABDOMEN: Soft, +PEG  EXTREMITIES:  No clubbing, cyanosis or edema  SKIN: No rash, no new discoloration  NERVOUS SYSTEM:  Unresponsive      LABS:                        8.4    7.50  )-----------( 419      ( 05 Jul 2022 06:30 )             26.1     05 Jul 2022 06:30    140    |  103    |  32     ----------------------------<  163    4.4     |  34     |  0.67     Ca    9.7        05 Jul 2022 06:30  Phos  2.3       05 Jul 2022 06:30  Mg     2.3       05 Jul 2022 06:30    TPro  6.3    /  Alb  2.0    /  TBili  0.2    /  DBili  x      /  AST  36     /  ALT  56     /  AlkPhos  80     05 Jul 2022 06:30    PT/INR - ( 04 Jul 2022 04:45 )   PT: 14.8 sec;   INR: 1.26 ratio         PTT - ( 04 Jul 2022 04:45 )  PTT:37.9 sec    CAPILLARY BLOOD GLUCOSE      POCT Blood Glucose.: 162 mg/dL (05 Jul 2022 11:33)  POCT Blood Glucose.: 158 mg/dL (05 Jul 2022 05:25)  POCT Blood Glucose.: 174 mg/dL (05 Jul 2022 00:03)      RADIOLOGY & ADDITIONAL TESTS:      Images reviewed personally    Consultant Notes Reviewed and Care Discussed with relevant Consultants.

## 2022-07-05 NOTE — PROGRESS NOTE ADULT - ATTENDING COMMENTS
76 M with history of prostate cancer, SCC of tongue s/p partial resection now with recurrence, Parkinson disease, hypertension, hyperlipidemia, CVA, and oropharyngeal dysphagia who is here with acute hypoxemic respiratory failure and septic shock due to bilateral lower lobe e. coli pneumonia, pseudomonas UTI. Course c/b acute metabolic encephalopathy, lactic acidosis, septic shock, and acute kidney injury. Completed course of antibiotics. No longer on vasopressors. He has a persistent encephalopathy.     Decision made to pursue comfort measures. Patient was extubated today. Code status updated to DNR/DNI. Dilaudid gtt started. Received dilaudid IVP PRN and glycopyrrolate. Patient passed away with family at bedside at 16:58.

## 2022-07-19 ENCOUNTER — APPOINTMENT (OUTPATIENT)
Dept: OTOLARYNGOLOGY | Facility: CLINIC | Age: 77
End: 2022-07-19

## 2022-07-20 NOTE — H&P PST ADULT - HEIGHT IN INCHES
Topical Retinoid counseling:  Patient advised to apply a pea-sized amount only at bedtime and wait 30 minutes after washing their face before applying.  If too drying, patient may add a non-comedogenic moisturizer. The patient verbalized understanding of the proper use and possible adverse effects of retinoids.  All of the patient's questions and concerns were addressed. 5

## 2022-07-22 ENCOUNTER — APPOINTMENT (OUTPATIENT)
Dept: PHYSICAL MEDICINE AND REHAB | Facility: CLINIC | Age: 77
End: 2022-07-22

## 2022-07-29 NOTE — ED ADULT NURSE NOTE - CHPI ED NUR SYMPTOMS POS

## 2022-08-03 NOTE — H&P PST ADULT - NS ABD PE RECTAL EXAM
Retention Suture Text: Retention sutures were placed to support the closure and prevent dehiscence. not examined

## 2022-08-22 NOTE — ASU DISCHARGE PLAN (ADULT/PEDIATRIC) - ACCOMPANIED BY
[[email or web address]] : Agree with NP assessment and plan as outlined above. 
Vaccine status unknown
Family

## 2022-09-11 NOTE — PATIENT PROFILE ADULT - FALLEN IN THE PAST
Pt arrives from home with c/o not having a period in over a month.     Pt states \"I want to see what is going on with my period. I took several pregnancy tests and they were all negative and I have had STD checking and they were negative.\" Pt endorses minor cramp   
no

## 2022-09-30 LAB — SAO2 % BLDA: SIGNIFICANT CHANGE UP % (ref 94–98)

## 2023-02-28 NOTE — PROGRESS NOTE ADULT - PROBLEM SELECTOR PROBLEM 5
Acute UTI Malignant neoplasm of tongue, unspecified Bilobed Transposition Flap Text: The defect edges were debeveled with a #15 scalpel blade.  Given the location of the defect and the proximity to free margins a bilobed transposition flap was deemed most appropriate.  Using a sterile surgical marker, an appropriate bilobe flap drawn around the defect.    The area thus outlined was incised deep to adipose tissue with a #15 scalpel blade.  The skin margins were undermined to an appropriate distance in all directions utilizing iris scissors.

## 2023-03-20 NOTE — ASU PATIENT PROFILE, ADULT - MEDICATION HERBAL REMEDIES, PROFILE
Subjective   Patient ID: Checo is a 36 year old male.    Chief Complaint   Patient presents with   • Office Visit   • Physical     Lump on right side of groin that pt noticed 2 weeks ago.  Refill Ketoconazole.     Pt here for annual physical.    Pt reports about 2 weeks' h/o right groin lump; pt has been running more frequently lately, but no heavy lifting.  Denies any pain, no change in size.      Patient's medications, allergies, past medical, surgical, social and family histories were reviewed and updated as appropriate.    Current Outpatient Medications   Medication Sig   • ketoconazole (NIZORAL) 2 % cream Apply topically daily.   • fluticasone (FLONASE) 50 MCG/ACT nasal spray USE 2 SPRAYS IN EACH NOSTRIL ONCE DAILY AS DIRECTED   • azelastine (ASTELIN) 0.1 % nasal spray USE 1 SPRAY IN EACH NOSTRIL TWICE DAILY AS DIRECTED   • citalopram (CeleXA) 20 MG tablet TK 1 AND 1/2 TS PO D     No current facility-administered medications for this visit.       Review of Systems   All other systems reviewed and are negative.      Objective   Visit Vitals  /62 (BP Location: LUE - Left upper extremity, Patient Position: Sitting, Cuff Size: Regular)   Pulse 75   Temp 97.8 °F (36.6 °C) (Temporal)   Resp 16   Ht 5' 11\" (1.803 m)   Wt 84.8 kg (186 lb 13.4 oz)   SpO2 95%   BMI 26.06 kg/m²     Physical Exam  Vitals and nursing note reviewed.   Constitutional:       General: He is not in acute distress.     Appearance: Normal appearance. He is well-developed.   HENT:      Head: Normocephalic and atraumatic.      Right Ear: Tympanic membrane, ear canal and external ear normal.      Left Ear: Tympanic membrane, ear canal and external ear normal.      Neck: Normal range of motion and neck supple.   Eyes:      Extraocular Movements: Extraocular movements intact.      Conjunctiva/sclera: Conjunctivae normal.      Pupils: Pupils are equal, round, and reactive to light.   Cardiovascular:      Rate and Rhythm: Normal rate and regular  rhythm.      Heart sounds: Normal heart sounds.   Pulmonary:      Effort: Pulmonary effort is normal. No respiratory distress.      Breath sounds: Normal breath sounds. No wheezing or rales.   Abdominal:      General: Bowel sounds are normal. There is no distension.      Palpations: Abdomen is soft.      Tenderness: There is no abdominal tenderness.   Genitourinary:     Comments: ~1cm non-mobile palpable lump right groin  Musculoskeletal:         General: Normal range of motion.   Lymphadenopathy:      Cervical: No cervical adenopathy.   Skin:     General: Skin is warm and dry.   Neurological:      General: No focal deficit present.      Mental Status: He is alert and oriented to person, place, and time.   Psychiatric:         Mood and Affect: Mood normal.         Behavior: Behavior normal.         Assessment   Problem List Items Addressed This Visit        Gastrointestinal and Abdominal    Groin lump     Suspect hernia - will send for U/S groin for confirmation and to general surgery for possible repair.  Monitor with warning signs.         Relevant Orders    US SOFT TISSUE GROIN    SERVICE TO SURGERY GENERAL       Health Encounters    Physical exam, routine - Primary     Reassuring exam, will check annual labs today (non-fasting); TDaP received in 2015, COVID vaccines completed, flu vaccine received this season.         Relevant Orders    CBC WITH DIFFERENTIAL    LIPID PANEL WITH REFLEX    COMPREHENSIVE METABOLIC PANEL   Other Visit Diagnoses     Lipid screening        Relevant Orders    LIPID PANEL WITH REFLEX    COMPREHENSIVE METABOLIC PANEL         no

## 2023-04-06 NOTE — ED ADULT NURSE NOTE - HOW OFTEN DO YOU HAVE A DRINK CONTAINING ALCOHOL?
ascending colon contains small amount of stool and gas. In the hepatic flexure of colon small amount of stool and gas are present. Transverse colon is empty and spastic. No pericolic inflammatory change. The descending colon is empty and spastic. The inflammatory phlegmon from around pancreas extends to the medial aspect and posterior aspect of descending colon. Proximal sigmoid colon is mostly empty. Mid sigmoid colon contains small amount of gas and stool. Rectum contains small amount of gas and small to moderate stool. No obvious diverticulosis coli. There is no typical sign of colitis. Pelvis: No acute process in the pelvis. No enlarged prostate. Peritoneum/Retroperitoneum: No evidence of periaortic or mesenteric pathologic lymphadenopathy. Abdominal aorta is of normal size, without dissection. All mesenteric arteries and their major branches are patent and opacified. There is no obvious ascites in the abdomen or in the pelvis. Bones/Soft Tissues: No acute process in the bony structures of lumbar spine and pelvis. Mild-to-moderate multilevel degenerative disc disease in the lumbar spine. Bilateral hip joints are intact. No evidence of inguinal, femoral or ventral hernia. Severe acute pancreatitis with marked, confluent inflammatory phlegmon around the entire pancreas. The peripancreatic inflammatory phlegmon is partially extended to the upper anterior portion of bilateral pararenal space. The inflammatory phlegmon also has extended to the medial aspect and posterior aspect of the descending colon. In the visualized pancreas there is no definite pancreatic necrosis and no definable pancreatic abscess. No definable pseudocyst. Evidence of at least mild diffuse fatty infiltration in liver without focal lesion. No abnormality in bilateral adrenal glands and kidneys. No obstructive uropathy. There is no ascites in the abdomen or in the pelvis. No obvious intestinal ileus.   Most of colon appears
Never

## 2023-04-20 NOTE — ED ADULT TRIAGE NOTE - DATE OF FIRST COVID-19 BOOSTER
01-Jan-2022 Fluconazole Counseling:  Patient counseled regarding adverse effects of fluconazole including but not limited to headache, diarrhea, nausea, upset stomach, liver function test abnormalities, taste disturbance, and stomach pain.  There is a rare possibility of liver failure that can occur when taking fluconazole.  The patient understands that monitoring of LFTs and kidney function test may be required, especially at baseline. The patient verbalized understanding of the proper use and possible adverse effects of fluconazole.  All of the patient's questions and concerns were addressed.

## 2023-04-22 NOTE — PROGRESS NOTE ADULT - SUBJECTIVE AND OBJECTIVE BOX
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 Wife Viki at bedside providing collateral information. Pt has been doing well until around 8:30 PM last night. He was very weak and was having difficulty getting up and walking due to the weakness.  Pt normally walks with a cane but was unable to get up last night. Daughter also noted that pt was having sweats but denies any fever or chills. Pt has been eating and drinking well. Pt denies any urinary urgency, frequency, dysuria or hematuria. Denies chest pain, palpitations, SOB, cough, abdominal pain, nausea, vomiting, diarrhea, headaches, dizziness.   Denies recent travel, recent antibiotic use, or sick contacts.    ED Course:   Vitals: BP: 123/53, HR: 74, Temp: 97.9 F, RR: 16, SpO2: 94% on RA   Labs: H/H: 12.9/37.9,  BUN/Cr: 25/1.80, GFR: 38, gluc: 228, lactate: 3.1  trop neg x1, COVID negative   UA: small bilirubin, trace ketone, 30 protein, moderate LE, 11-25 WBC, 3-5 RBC, few bacteria, few calcium oxalate   CXR: no acute lung pathology as per personal read, official read pending   EKG: junctional rhythm with PVCs, HR: 69   Received 1L NS bolus x1 and Rocephin in the ED  (05 May 2022 05:43)      Interval History -no new events    Patient is seen, chart was reviewed and case was discussed with the treatment team.  Pt is not in any distress.   Lying on bed comfortably.       Vital Signs Last 24 Hrs  T(C): 37.1 (10 May 2022 13:40), Max: 37.1 (09 May 2022 20:00)  T(F): 98.7 (10 May 2022 13:40), Max: 98.8 (09 May 2022 20:00)  HR: 83 (10 May 2022 13:40) (76 - 84)  BP: 109/63 (10 May 2022 13:40) (109/63 - 134/72)  BP(mean): --  RR: 18 (10 May 2022 13:40) (18 - 19)  SpO2: 91% (10 May 2022 13:40) (91% - 92%)        REVIEW OF SYSTEMS:    Constitutional: No fever,   Eyes: No eye pain, visual disturbances, or discharge  ENT:  No difficulty hearing, tinnitus, vertigo; No sinus or throat pain  Neck: No pain or stiffness  Respiratory: No cough, wheezing, chills or hemoptysis  Cardiovascular: No chest pain, palpitations, shortness of breath, dizziness or leg swelling  Gastrointestinal: No abdominal or epigastric pain. No nausea, vomiting or hematemesis; No diarrhea or constipation  Genitourinary: No  frequency, hematuria or incontinence  Neurological: No headaches, , loss of strength, numbness or tremors  Psychiatric: No depression, anxiety, mood swings or difficulty sleeping  Musculoskeletal: No joint pain or swelling; No muscle,   Skin: No itching, burning, rashes or lesions   Lymph Nodes: No enlarged glands  Endocrine: No heat or cold intolerance; No hair loss,   Allergy and Immunologic: No hives or eczema    On Neurological Examination:    Mental Status - Pt is alert, awake, . Follows simple commands     Speech -  Pt has dysarthria.    Cranial Nerves - Pupils 3 mm equal and reactive to light, extraocular eye movements intact. Pt has no visual field deficit.  Pt has right  facial asymmetry. Facial sensation is intact.Tongue - is in midline.    Muscle tone -cogwheel rigidity    Motor Exam - RUE 3/5; LUE 4+  LE 3/5  right pronator  drift.     Sensory Exam - Pin prick, temperature, joint position and vibration are intact on either side. Pt withdraws all extremities equally on stimulation.         Deep tendon Reflexes - 2 plus all over.         Neck Supple -  Yes.     MEDICATIONS  (STANDING):  aspirin enteric coated 81 milliGRAM(s) Oral daily  carbidopa/levodopa  25/100 1 Tablet(s) Oral daily  carbidopa/levodopa  25/100 1 Tablet(s) Oral at bedtime  citalopram 20 milliGRAM(s) Oral daily  diltiazem    milliGRAM(s) Oral daily  enalapril 10 milliGRAM(s) Oral daily  heparin   Injectable 5000 Unit(s) SubCutaneous every 8 hours            pantoprazole    Tablet 40 milliGRAM(s) Oral before breakfast  polyethylene glycol 3350 17 Gram(s) Oral daily  primidone 50 milliGRAM(s) Oral daily  senna 2 Tablet(s) Oral at bedtime  simvastatin 40 milliGRAM(s) Oral at bedtime  tamsulosin 0.4 milliGRAM(s) Oral at bedtime    MEDICATIONS  (PRN):  acetaminophen     Tablet .. 650 milliGRAM(s) Oral every 6 hours PRN Temp greater or equal to 38C (100.4F), Mild Pain (1 - 3)  melatonin 3 milliGRAM(s) Oral at bedtime PRN Insomnia  ondansetron Injectable 4 milliGRAM(s) IV Push every 8 hours PRN Nausea and/or Vomiting        Allergies    No Known Allergies    Intolerances                                       12.4   5.80  )-----------( 291      ( 10 May 2022 07:29 )             36.7           Hemoglobin A1C:   Lipid Panel 05-07 @ 10:47  Total Cholesterol, Serum 155  LDL --  Triglycerides 87    Vitamin B12     RADIOLOGY  < from: MR Head No Cont (05.06.22 @ 19:00) >    ACC: [id number] EXAM:  MR BRAIN                          PROCEDURE DATE:  05/06/2022          INTERPRETATION:  Clinical indication: Subacute CVA.    MRI of the brain was performed using sagittal T1 axial T1 T2 T2 FLAIR   diffusion and Maunie sequence.    This exam is compared with prior head CT performed on May 6, 2022.    Extensive parenchymal volume loss and chronic microvascular ischemic   changes are identified    Old lacunar infarct involving the right sublenticular nucleus region is   seen.    There is evidence of abnormal T2 prolongation with restricted diffusion   seen involving the left brachium pontis and left lenticular   nucleus/corona radiata region.    These findings are compatible with areas of acute infarcts. No   hemorrhagic transformation is seen. No significant shift or herniation is   seen.    The large vessels demonstrate normal flow    The visualized paranasal sinuses mastoid and mastoid clear.    IMPRESSION: Acute infarcts as described above.              SHARON VILLA MD; Attending Radiologist  This document has been electronically signed. 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

## 2023-05-03 NOTE — ASU PATIENT PROFILE, ADULT - REASON FOR ADMISSION, PROFILE
[facility] Care Transitions Follow Up Call    Patient Current Location:  Home: [address] [address]    LPN Care Coordinator contacted the patient by telephone to follow up after admission on 2023. Verified name and  with patient as identifiers. Patient: Leonel Srivastava  Patient : 1939   MRN: [id number]  Reason for Admission: Myasthenia Gravis  Discharge Date: 23 RARS: Readmission Risk Score: 11.8      Needs to be reviewed by the provider   Additional needs identified to be addressed with provider: No  none             Method of communication with provider: none. Spoke with patient states fine. Patient denies any concerns during this phone call. Patient states Neurology appointment on 2023 then PCP appointment on 5/10/2023. Addressed changes since last contact:  none  Discussed follow-up appointments. If no appointment was previously scheduled, appointment scheduling offered: Yes. Is follow up appointment scheduled within 7 days of discharge? No.    Follow Up  Future Appointments   Date Time Provider Jose Luis Duran   2023  2:40 PM SIMONE Londono [facility] AMB   5/10/2023  2:30 PM Robert Nugent MD [facility] AMB   2023  2:15 PM Robert Nugent MD [facility] AMB   2023 12:45 PM Shelia Nolan MD [facility] [place] [place] [facility] AM[facility]   10/25/2023 11:30 AM José Chance DO [facility] AMB     Non-[facility] follow up appointment(s): n/a    LPN Care Coordinator reviewed medical action plan with patient and discussed any barriers to care and/or understanding of plan of care after discharge. Discussed appropriate site of care based on symptoms and resources available to patient including: Specialist  When to call 911. The patient agrees to contact the PCP office for questions related to their healthcare. Advance Care Planning:   decision maker updated.      Patients top risk factors for readmission: medical condition-Myasthenia Gravis  Interventions to address risk factors: tumor on back of tongue

## 2023-08-05 NOTE — DIETITIAN INITIAL EVALUATION ADULT - OTHER INFO
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   1. Acute Hypoxic Respiratory Failure 2. Septic Shock 4. Acute Renal Failure, ATN 5. Metabolic Encephalopathy 6. 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   Pt with acute Renal Failure, ATN ;  Metabolic Encephalopathy ; UTI. On Propofol gtt for sedation and vent compliance. Continues on Levophed and Vasopressin pressor support. Noted,  Cr downtrending.

## 2023-08-09 NOTE — CHART NOTE - NSCHARTNOTEFT_GEN_A_CORE
nutr.re-assessment/follow up:     Factors impacting intake: [ ] none [ ] nausea  [ ] vomiting [ ] diarrhea [ ] constipation  [ ]chewing problems [ ] swallowing issues  [ ] other:     Diet Presciption: Diet, NPO with Tube Feed:   Tube Feeding Modality: Nasogastric  Vital 1.5 Gasper  Total Volume for 24 Hours (mL): 1300  Continuous  Starting Tube Feed Rate {mL per Hour}: 20  Increase Tube Feed Rate by (mL): 15     Every 8 hours  Until Goal Tube Feed Rate (mL per Hour): 65  Tube Feed Duration (in Hours): 20  Tube Feed Start Time: 06:00  Free Water Flush  Pump   Rate (mL per Hour): 50   Frequency: Every Hour    Duration (Hours): 20    Start Time: 06:00 (06-18-22 @ 10:28)    Intake:     Current Weight: Weight (kg): 68 (06-26 @ 11:09)  % Weight Change    Pertinent Medications: MEDICATIONS  (STANDING):  acetylcysteine 10%  Inhalation 4 milliLiter(s) Inhalation every 6 hours  albuterol/ipratropium for Nebulization 3 milliLiter(s) Nebulizer every 6 hours  atorvastatin 80 milliGRAM(s) Oral at bedtime  carbidopa/levodopa  25/100 1 Tablet(s) Oral daily  chlorhexidine 0.12% Liquid 15 milliLiter(s) Oral Mucosa every 12 hours  chlorhexidine 2% Cloths 1 Application(s) Topical <User Schedule>  citalopram 20 milliGRAM(s) Oral daily  enoxaparin Injectable 70 milliGRAM(s) SubCutaneous every 12 hours  lactulose Syrup 20 Gram(s) Oral every 8 hours  meropenem  IVPB 1000 milliGRAM(s) IV Intermittent every 8 hours  pantoprazole   Suspension 40 milliGRAM(s) Oral daily  primidone 25 milliGRAM(s) Oral daily  rifAXIMin 550 milliGRAM(s) Oral two times a day  sodium chloride 3%  Inhalation 4 milliLiter(s) Inhalation every 6 hours  tobramycin for Nebulization 300 milliGRAM(s) Inhalation every 12 hours    MEDICATIONS  (PRN):  acetaminophen     Tablet .. 650 milliGRAM(s) Oral every 6 hours PRN Temp greater or equal to 38C (100.4F), Mild Pain (1 - 3)    Pertinent Labs: 06-29 Na138 mmol/L Glu 112 mg/dL<H> K+ 4.9 mmol/L Cr  0.79 mg/dL BUN 33 mg/dL<H> 06-29 Phos 2.4 mg/dL<L> 06-29 Alb 1.6 g/dL<L>     CAPILLARY BLOOD GLUCOSE      POCT Blood Glucose.: 148 mg/dL (28 Jun 2022 23:50)  POCT Blood Glucose.: 167 mg/dL (28 Jun 2022 17:35)  POCT Blood Glucose.: 146 mg/dL (28 Jun 2022 11:24)    Skin:     Estimated Needs:   [ ] no change since previous assessment  [ ] recalculated:     Previous Nutrition Diagnosis:   [ ] Inadequate Energy Intake [ ]Inadequate Oral Intake [ ] Excessive Energy Intake   [ ] Underweight [ ] Increased Nutrient Needs [ ] Overweight/Obesity   [ ] Altered GI Function [ ] Unintended Weight Loss [ ] Food & Nutrition Related Knowledge Deficit [ ] Malnutrition     Nutrition Diagnosis is [ ] ongoing  [ ] resolved [ ] not applicable     New Nutrition Diagnosis: [ ] not applicable       Interventions:   Recommend  [ ] Change Diet To:  [ ] Nutrition Supplement  [ ] Nutrition Support  [ ] Other:     Monitoring and Evaluation:   [ ] PO intake [ x ] Tolerance to diet prescription [ x ] weights [ x ] labs[ x ] follow up per protocol  [ ] other: nutr.re-assessment/follow up: 76M with a significant PMH Prostate cancer, SCC of tongue s/p partial resection, parkinson's, HTN, HLD, recent CVA, and dysphagia a/w acute hypoxemic RF, septic shock 2/2 E. coli PNA/pseudomonas UTI and ÁNGEL. Remains intubated in ICU 2/2 acute metabolic encephalopathy, failure to liberate from vent and possible new VAP.      Factors impacting intake: [ ] none [ ] nausea  [ ] vomiting [ ] diarrhea [ ] constipation  [ ]chewing problems [ ] swallowing issues  [ ] other:     Diet Prescription: Diet, NPO with Tube Feed:   Tube Feeding Modality: Nasogastric  Vital 1.5 Gasper  Total Volume for 24 Hours (mL): 1300  Continuous  Starting Tube Feed Rate {mL per Hour}: 20  Increase Tube Feed Rate by (mL): 15     Every 8 hours  Until Goal Tube Feed Rate (mL per Hour): 65  Tube Feed Duration (in Hours): 20  Tube Feed Start Time: 06:00  Free Water Flush  Pump   Rate (mL per Hour): 50   Frequency: Every Hour    Duration (Hours): 20    Start Time: 06:00 (06-18-22 @ 10:28)  TF at goal rate over 20 hours/day provides 1950 kcals ~ 88 gm pro, 988 ml water and an addtl 1000 ml water/day via free water for at total of 1988 ml water/day     Intake: TF pump study reveals    Current Weight: Weight (kg): 68 (06-26 @ 11:09)  % Weight Change    Pertinent Medications: MEDICATIONS  (STANDING):  acetylcysteine 10%  Inhalation 4 milliLiter(s) Inhalation every 6 hours  albuterol/ipratropium for Nebulization 3 milliLiter(s) Nebulizer every 6 hours  atorvastatin 80 milliGRAM(s) Oral at bedtime  carbidopa/levodopa  25/100 1 Tablet(s) Oral daily  chlorhexidine 0.12% Liquid 15 milliLiter(s) Oral Mucosa every 12 hours  chlorhexidine 2% Cloths 1 Application(s) Topical <User Schedule>  citalopram 20 milliGRAM(s) Oral daily  enoxaparin Injectable 70 milliGRAM(s) SubCutaneous every 12 hours  lactulose Syrup 20 Gram(s) Oral every 8 hours  meropenem  IVPB 1000 milliGRAM(s) IV Intermittent every 8 hours  pantoprazole   Suspension 40 milliGRAM(s) Oral daily  primidone 25 milliGRAM(s) Oral daily  rifAXIMin 550 milliGRAM(s) Oral two times a day  sodium chloride 3%  Inhalation 4 milliLiter(s) Inhalation every 6 hours  tobramycin for Nebulization 300 milliGRAM(s) Inhalation every 12 hours    MEDICATIONS  (PRN):  acetaminophen     Tablet .. 650 milliGRAM(s) Oral every 6 hours PRN Temp greater or equal to 38C (100.4F), Mild Pain (1 - 3)    Pertinent Labs: 06-29 Na138 mmol/L Glu 112 mg/dL<H> K+ 4.9 mmol/L Cr  0.79 mg/dL BUN 33 mg/dL<H> 06-29 Phos 2.4 mg/dL<L> 06-29 Alb 1.6 g/dL<L>     CAPILLARY BLOOD GLUCOSE      POCT Blood Glucose.: 148 mg/dL (28 Jun 2022 23:50)  POCT Blood Glucose.: 167 mg/dL (28 Jun 2022 17:35)  POCT Blood Glucose.: 146 mg/dL (28 Jun 2022 11:24)    Skin:     Estimated Needs:   [ ] no change since previous assessment  [ ] recalculated:     Previous Nutrition Diagnosis:   [ ] Inadequate Energy Intake [ ]Inadequate Oral Intake [ ] Excessive Energy Intake   [ ] Underweight [ ] Increased Nutrient Needs [ ] Overweight/Obesity   [ ] Altered GI Function [ ] Unintended Weight Loss [ ] Food & Nutrition Related Knowledge Deficit [ ] Malnutrition     Nutrition Diagnosis is [ ] ongoing  [ ] resolved [ ] not applicable     New Nutrition Diagnosis: [ ] not applicable       Interventions:   Recommend  [ ] Change Diet To:  [ ] Nutrition Supplement  [ ] Nutrition Support  [ ] Other:     Monitoring and Evaluation:   [ ] PO intake [ x ] Tolerance to diet prescription [ x ] weights [ x ] labs[ x ] follow up per protocol  [ ] other: nutr.re-assessment/follow up:  ( excerpts from MD/PA progress notes) 76M w/ a sign. PMH Prostate cancer, SCC of tongue s/p partial resection, parkinson's, HTN, HLD, recent CVA, and dysphagia a/w acute hypoxemic RF, septic shock 2/2 E. coli PNA/pseudomonas UTI and ÁNGEL. Remains intubated in ICU 2/2 acute metabolic encephalopathy, failure to liberate from vent and possible new VAP. Pt requires 1 U PRBC today due to H/H: 7/21.6    Factors impacting intake: [ ] none [ ] nausea  [ ] vomiting [ ] diarrhea [ ] constipation  [ ]chewing problems [ ] swallowing issues  [ X] other: mech vent, orally intubated    Diet Prescription: Diet, NPO with Tube Feed:   Tube Feeding Modality: Nasogastric  Vital 1.5 Gasper  Total Volume for 24 Hours (mL): 1300  Continuous  Starting Tube Feed Rate {mL per Hour}: 20  Increase Tube Feed Rate by (mL): 15     Every 8 hours  Until Goal Tube Feed Rate (mL per Hour): 65  Tube Feed Duration (in Hours): 20  Tube Feed Start Time: 06:00  Free Water Flush  Pump   Rate (mL per Hour): 50   Frequency: Every Hour    Duration (Hours): 20    Start Time: 06:00 (06-18-22 @ 10:28)  TF at goal rate over 20 hours/day provides 1950 kcals ~ 88 gm pro, 988 ml water and an addtl 1000 ml water/day via free water for at total of 1988 ml water/day     Intake: TF pump study reveals over the last 72 hours 3497 ml , 1165 ml/day, 90% RX, 1748 kcals and ~ 79 gm pro  which meets low end of range of assessed kcals needs ( 8458-2327 kcals) and meets pro needs ( 71-83 gm pro)                                                                  48 hours 2029 ml, 1014 ml/day , 78% RX                                                                  24 hours 1091ml , 1091 ml/day , 84% RX , 1637 kcals and ~ 74 kcals                                              Current Weight: Weight (kg): 68 (06-26 @ 11:09)  63 kg ( 6/28/22)   65.1 kg ( 6/22/22)   % Weight Change : wt fluctuations likely r/t scale accuracy, fluid shifts. If consistently trending down may be r/t possible ongoing inadequacy of nutr support delivery     Pertinent Medications: MEDICATIONS  (STANDING):  acetylcysteine 10%  Inhalation 4 milliLiter(s) Inhalation every 6 hours  albuterol/ipratropium for Nebulization 3 milliLiter(s) Nebulizer every 6 hours  atorvastatin 80 milliGRAM(s) Oral at bedtime  carbidopa/levodopa  25/100 1 Tablet(s) Oral daily  chlorhexidine 0.12% Liquid 15 milliLiter(s) Oral Mucosa every 12 hours  chlorhexidine 2% Cloths 1 Application(s) Topical <User Schedule>  citalopram 20 milliGRAM(s) Oral daily  enoxaparin Injectable 70 milliGRAM(s) SubCutaneous every 12 hours  lactulose Syrup 20 Gram(s) Oral every 8 hours  meropenem  IVPB 1000 milliGRAM(s) IV Intermittent every 8 hours  pantoprazole   Suspension 40 milliGRAM(s) Oral daily  primidone 25 milliGRAM(s) Oral daily  rifAXIMin 550 milliGRAM(s) Oral two times a day  sodium chloride 3%  Inhalation 4 milliLiter(s) Inhalation every 6 hours  tobramycin for Nebulization 300 milliGRAM(s) Inhalation every 12 hours    MEDICATIONS  (PRN):  acetaminophen     Tablet .. 650 milliGRAM(s) Oral every 6 hours PRN Temp greater or equal to 38C (100.4F), Mild Pain (1 - 3)    Pertinent Labs: 06-29 Na138 mmol/L Glu 112 mg/dL<H> K+ 4.9 mmol/L Cr  0.79 mg/dL BUN 33 mg/dL<H> 06-29 Phos 2.4 mg/dL<L> 06-29 Alb 1.6 g/dL<L>     CAPILLARY BLOOD GLUCOSE      POCT Blood Glucose.: 148 mg/dL (28 Jun 2022 23:50)  POCT Blood Glucose.: 167 mg/dL (28 Jun 2022 17:35)  POCT Blood Glucose.: 146 mg/dL (28 Jun 2022 11:24)    Skin: suspected DTI to sacrum extending to coccyx  edema: 2 + generalized , 3+ bilat feet  BM: today 6/29, incontinent      Estimated Needs:   [ X] no change since previous assessment  [ ] recalculated:     Previous Nutrition Diagnosis:   [ ] Inadequate Energy Intake [ ]Inadequate Oral Intake [ ] Excessive Energy Intake   [ ] Underweight [ X] Increased Nutrient Needs [ ] Overweight/Obesity   [ ] Altered GI Function [ ] Unintended Weight Loss [ ] Food & Nutrition Related Knowledge Deficit [X ] Malnutrition     Nutrition Diagnosis is [X ] ongoing  [ ] resolved [ ] not applicable     New Nutrition Diagnosis: [ ] not applicable       Interventions:   Recommend  [ ] Change Diet To:  [X ] Nutrition Supplement : vit C, Zns04, mvi with min  [ ] Nutrition Support  [X ] Other: cont POC for now, RD will cont to assess TF pump study, monitor wt trends and re-evaluate TF regimen                  Monitoring and Evaluation:   [ ] PO intake [ x ] Tolerance to diet prescription [ x ] weights [ x ] labs[ x ] follow up per protocol  [ ] other: nutr.re-assessment/follow up:  ( excerpts from MD/PA progress notes) 76M w/ a sign. PMH Prostate cancer, SCC of tongue s/p partial resection, parkinson's, HTN, HLD, recent CVA, and dysphagia a/w acute hypoxemic RF, septic shock 2/2 E. coli PNA/pseudomonas UTI and ÁNGEL. Remains intubated in ICU 2/2 acute metabolic encephalopathy, failure to liberate from vent and possible new VAP. Pt requires 1 U PRBC today due to H/H: 7/21.6    Factors impacting intake: [ ] none [ ] nausea  [ ] vomiting [ ] diarrhea [ ] constipation  [ ]chewing problems [ ] swallowing issues  [ X] other: mech vent, orally intubated    Diet Prescription: Diet, NPO with Tube Feed:   Tube Feeding Modality: Nasogastric  Vital 1.5 Gasper  Total Volume for 24 Hours (mL): 1300  Continuous  Starting Tube Feed Rate {mL per Hour}: 20  Increase Tube Feed Rate by (mL): 15     Every 8 hours  Until Goal Tube Feed Rate (mL per Hour): 65  Tube Feed Duration (in Hours): 20  Tube Feed Start Time: 06:00  Free Water Flush  Pump   Rate (mL per Hour): 50   Frequency: Every Hour    Duration (Hours): 20    Start Time: 06:00 (06-18-22 @ 10:28)   TF at goal rate over 20 hours/day provides 1950 kcals ~ 88 gm pro, 988 ml water and an addtl 1000 ml water/day via free water for at total of 1988 ml water/day     Intake: TF pump study reveals over the last 72 hours 3497 ml , 1165 ml/day, 90% RX, 1748 kcals and ~ 79 gm pro  which meets low end of range of assessed kcals needs ( 2715-3519 kcals) and meets pro needs ( 71-83 gm pro)                                                                  48 hours 2029 ml, 1014 ml/day , 78% RX                                                                  24 hours 1091ml , 1091 ml/day , 84% RX , 1637 kcals and ~ 74 kcals                                              Current Weight: Weight (kg): 68 (06-26 @ 11:09)  63 kg ( 6/28/22)   65.1 kg ( 6/22/22)   % Weight Change : wt fluctuations likely r/t scale accuracy, fluid shifts. If consistently trending down may be r/t possible ongoing inadequacy of nutr support delivery     Pertinent Medications: MEDICATIONS  (STANDING):  acetylcysteine 10%  Inhalation 4 milliLiter(s) Inhalation every 6 hours  albuterol/ipratropium for Nebulization 3 milliLiter(s) Nebulizer every 6 hours  atorvastatin 80 milliGRAM(s) Oral at bedtime  carbidopa/levodopa  25/100 1 Tablet(s) Oral daily  chlorhexidine 0.12% Liquid 15 milliLiter(s) Oral Mucosa every 12 hours  chlorhexidine 2% Cloths 1 Application(s) Topical <User Schedule>  citalopram 20 milliGRAM(s) Oral daily  enoxaparin Injectable 70 milliGRAM(s) SubCutaneous every 12 hours  lactulose Syrup 20 Gram(s) Oral every 8 hours  meropenem  IVPB 1000 milliGRAM(s) IV Intermittent every 8 hours  pantoprazole   Suspension 40 milliGRAM(s) Oral daily  primidone 25 milliGRAM(s) Oral daily  rifAXIMin 550 milliGRAM(s) Oral two times a day  sodium chloride 3%  Inhalation 4 milliLiter(s) Inhalation every 6 hours  tobramycin for Nebulization 300 milliGRAM(s) Inhalation every 12 hours    MEDICATIONS  (PRN):  acetaminophen     Tablet .. 650 milliGRAM(s) Oral every 6 hours PRN Temp greater or equal to 38C (100.4F), Mild Pain (1 - 3)    Pertinent Labs: 06-29 Na138 mmol/L Glu 112 mg/dL<H> K+ 4.9 mmol/L Cr  0.79 mg/dL BUN 33 mg/dL<H> 06-29 Phos 2.4 mg/dL<L> 06-29 Alb 1.6 g/dL<L>     CAPILLARY BLOOD GLUCOSE      POCT Blood Glucose.: 148 mg/dL (28 Jun 2022 23:50)  POCT Blood Glucose.: 167 mg/dL (28 Jun 2022 17:35)  POCT Blood Glucose.: 146 mg/dL (28 Jun 2022 11:24)    Skin: suspected DTI to sacrum extending to coccyx  edema: 2 + generalized , 3+ bilat feet  BM: today 6/29, incontinent      Estimated Needs:   [ X] no change since previous assessment  [ ] recalculated:     Previous Nutrition Diagnosis:   [ ] Inadequate Energy Intake [ ]Inadequate Oral Intake [ ] Excessive Energy Intake   [ ] Underweight [ X] Increased Nutrient Needs [ ] Overweight/Obesity   [ ] Altered GI Function [ ] Unintended Weight Loss [ ] Food & Nutrition Related Knowledge Deficit [X ] Malnutrition     Nutrition Diagnosis is [X ] ongoing  [ ] resolved [ ] not applicable     New Nutrition Diagnosis: [ ] not applicable       Interventions:   Recommend  [ ] Change Diet To:  [X ] Nutrition Supplement : vit C, Zns04 220 mg x 10 days , mvi with min for administration via NGT  [ ] Nutrition Support  [X ] Other: cont POC for now, RD will cont to assess TF pump study, monitor wt trends and re-evaluate TF regimen                  Monitoring and Evaluation:   [ ] PO intake [ x ] Tolerance to diet prescription [ x ] weights [ x ] labs[ x ] follow up per protocol  [ ] other: yes

## 2023-09-20 NOTE — PATIENT PROFILE ADULT - TRANSPORTATION
Ultrasound Probe Disinfection    A transvaginal ultrasound was performed. Prior to use, disinfection was performed with High Level Disinfection Process (Trophon). Probe serial number [id number] was used.       Mayela Heller  09/20/23  3:00 PM no

## 2023-10-27 NOTE — ED ADULT NURSE NOTE - SUICIDE SCREENING QUESTION 1
Group Topic: BH Activity Group    Date: 10/27/2023  Start Time: 1400  End Time: 1500  Facilitators: Palmira Santiago LPC    Focus: Wendel   Number in attendance: 6    Group watched a movie focused on friendship.    Method: Group   Attendance: Present  Participation: Moderate  Patient Response: Appeared distracted  Mood: Happy    Pt watched a movie and joined in discussion on friendship. Pt completed accompanying activity. Pt was in-an-out of group, which they were monitored by staff. Pt was easily distracted by peers, and required redirection to focus on group.      No

## 2023-11-06 NOTE — PROCEDURAL SAFETY CHECKLIST WITH OR WITHOUT SEDATION - NSPREALLERGYSED_GEN_ALL_CORE
Called patient inform  schedule is not available at the moment. Per patient mother will like to be schedule with Jennifer since Dr. Awan is not available. Provided an appointment date for patient mother declined and will cb later to schedule.    done

## 2023-11-27 NOTE — PACU DISCHARGE NOTE - AIRWAY PATENCY:
Problem: Pain  Goal: #Acceptable pain level achieved/maintained at rest using NRS/Faces  Description: This goal is used for patients who can self-report.  Acceptable means the level is at or below the identified comfort/function goal.  Outcome: Outcome Met, Continue evaluating goal progress toward completion  Goal: # Acceptable pain level achieved/maintained at rest using NRS/Faces without oversedation (opioid naive or PCA/Epidural infusion)  Description: This goal is used if Opioid-naïve or on PCA/Epidural Infusion.  Outcome: Outcome Met, Continue evaluating goal progress toward completion  Goal: # Acceptable pain level achieved/maintained with activity using NRS/Faces  Description: This goal is used for patients who can self-report and are not achieving acceptable pain control during activity.  Outcome: Outcome Met, Continue evaluating goal progress toward completion  Goal: Acceptable pain/comfort level is achieved/maintained at rest (based on Pain Behaviors Scale)  Description: This goal is used for patients who are not able to self-report pain and are assessed for pain using the Pain Behaviors Scale  Outcome: Outcome Met, Continue evaluating goal progress toward completion  Goal: Acceptable pain/comfort level is achieved/maintained at rest based on PAINAID scale (Dementia)  Description: This goal is used for patients who are not able to self-report pain, have dementia, and assessed using the PAINAD scale.  Outcome: Outcome Met, Continue evaluating goal progress toward completion  Goal: Acceptable pain/comfort level is achieved/maintained at rest (based on pediatric behavior tool: NIPS, NPASS, or FLACC)  Description: This goal is used for pediatric patients who are not able to self report pain.  Outcome: Outcome Met, Continue evaluating goal progress toward completion  Goal: # Verbalizes understanding of pain management  Description: Documented in Patient Education Activity  Outcome: Outcome Met, Continue  evaluating goal progress toward completion  Goal: Verbalizes understanding and effective use of Patient Controlled Analgesia (PCA)  Description: Documented in Patient Education Activity  This goal is used for patients with PCA  Outcome: Outcome Met, Continue evaluating goal progress toward completion  Goal: Maximum comfort achieved/maintained at end of life (Hospice)  Outcome: Outcome Met, Continue evaluating goal progress toward completion     Problem: At Risk for Falls  Goal: # Patient does not fall  Outcome: Outcome Met, Continue evaluating goal progress toward completion  Goal: # Takes action to control fall-related risks  Outcome: Outcome Met, Continue evaluating goal progress toward completion  Goal: # Verbalizes understanding of fall risk/precautions  Description: Document education using the patient education activity  Outcome: Outcome Met, Continue evaluating goal progress toward completion     Problem: At Risk for Injury Due to Fall  Goal: # Patient does not fall  Outcome: Outcome Met, Continue evaluating goal progress toward completion  Goal: # Takes action to control condition specific risks  Outcome: Outcome Met, Continue evaluating goal progress toward completion  Goal: # Verbalizes understanding of fall-related injury personal risks  Description: Document education using the patient education activity  Outcome: Outcome Met, Continue evaluating goal progress toward completion     Problem: Pressure Injury, Risk for  Goal: # Skin remains intact  Outcome: Outcome Met, Continue evaluating goal progress toward completion  Goal: No new pressure injury (PI) development  Outcome: Outcome Met, Continue evaluating goal progress toward completion  Goal: # Verbalizes understanding of PI risk factors and prevention strategies  Description: Document education using the patient education activity.   Outcome: Outcome Met, Continue evaluating goal progress toward completion  Goal: Comfort optimized with pressure injury  prevention strategies guided by patient/family preference. (Hospice)  Outcome: Outcome Met, Continue evaluating goal progress toward completion     Problem: Pressure Injury Actual  Goal: # No deterioration in pressure injury (PI)  Outcome: Outcome Met, Continue evaluating goal progress toward completion  Goal: # Verbalizes pressure injury management  Description: Document education using the patient education activity.  Outcome: Outcome Met, Continue evaluating goal progress toward completion  Goal: Wound care provided to promote comfort needs (Hospice)  Outcome: Outcome Met, Continue evaluating goal progress toward completion     Problem: Non-Pressure Injury Wound  Goal: # No deterioration in wound  Outcome: Outcome Met, Continue evaluating goal progress toward completion  Goal: # Verbalizes understanding of wound and wound care  Description: If abnormality is a skin tear, avoid using tape on skin including transparent dressings. Document education using the patient education activity.  Outcome: Outcome Met, Continue evaluating goal progress toward completion  Goal: Participates in wound care activities  Outcome: Outcome Met, Continue evaluating goal progress toward completion  Goal: Wound care provided to promote comfort needs (Hospice)  Outcome: Outcome Met, Continue evaluating goal progress toward completion      Satisfactory

## 2024-02-01 NOTE — ED ADULT NURSE NOTE - NURSING GU BLADDER
No Change No Change Improving Improving Improving No Change No Change Improving No Change No Change No Change Improving No Change Improving Improving No Change Improving Improving No Change Improving non-distended

## 2024-07-10 NOTE — PROGRESS NOTE ADULT - SUBJECTIVE AND OBJECTIVE BOX
Interval Events:  76 year old male with history of prostate cancer, SCC of tongue s/p partial resection now with recurrence, parkinson's disease, hypertension, hyperlipidemia, recent CVA, and oropharyngeal dysphagia here with unresponsiveness and hypotension requiring intubation.     REVIEW OF SYSTEMS:  [ ] All other systems negative  [x] Unable to assess ROS because ________    OBJECTIVE:  ICU Vital Signs Last 24 Hrs  T(C): 37.7 (2022 16:00), Max: 38.9 (2022 11:56)  T(F): 99.9 (2022 16:00), Max: 102 (2022 11:56)  HR: 79 (2022 17:30) (75 - 122)  BP: 95/53 (2022 16:00) (67/47 - 143/79)  BP(mean): 69 (2022 16:00) (53 - 84)  ABP: 115/54 (2022 17:30) (97/41 - 115/54)  ABP(mean): 75 (2022 17:30) (61 - 75)  RR: 23 (2022 17:30) (14 - 29)  SpO2: 96% (2022 17:30) (79% - 100%)    Mode: AC/ CMV (Assist Control/ Continuous Mandatory Ventilation), RR (machine): 14, TV (machine): 400, FiO2: 100, PEEP: 10, ITime: 1, MAP: 15, PIP: 25     @ 07:01  -   @ 17:54  --------------------------------------------------------  IN: 2063.8 mL / OUT: 15 mL / NET: 2048.8 mL      CAPILLARY BLOOD GLUCOSE      POCT Blood Glucose.: 116 mg/dL (2022 11:46)      PHYSICAL EXAM:  General:  HEENT:   Lymph Nodes:   Neck:   Respiratory:   Cardiovascular:   Abdomen:  Extremities:  Skin:   Neurological:   Psychiatry:     HOSPITAL MEDICATIONS:  heparin   Injectable 5000 Unit(s) SubCutaneous every 8 hours    cefepime   IVPB 1000 milliGRAM(s) IV Intermittent every 12 hours    norepinephrine Infusion 0.05 MICROgram(s)/kG/Min IV Continuous <Continuous>  tamsulosin 0.4 milliGRAM(s) Oral at bedtime    atorvastatin 80 milliGRAM(s) Oral at bedtime  hydrocortisone sodium succinate Injectable 50 milliGRAM(s) IV Push every 8 hours  vasopressin Infusion 0.04 Unit(s)/Min IV Continuous <Continuous>      carbidopa/levodopa  25/100 1 Tablet(s) Oral <User Schedule>  propofol Infusion 20 MICROgram(s)/kG/Min IV Continuous <Continuous>    pantoprazole  Injectable 40 milliGRAM(s) IV Push daily            chlorhexidine 0.12% Liquid 15 milliLiter(s) Oral Mucosa every 12 hours  chlorhexidine 2% Cloths 1 Application(s) Topical <User Schedule>        LABS:                        11.6   4.61  )-----------( 282      ( 2022 12:23 )             34.2     Hgb Trend: 11.6<--  06-17    149<H>  |  115<H>  |  38<H>  ----------------------------<  146<H>  3.1<L>   |  26  |  2.60<H>    Ca    12.0<H>      2022 16:53  Phos  3.2     -  Mg     2.1     17    TPro  6.2  /  Alb  2.1<L>  /  TBili  0.7  /  DBili  x   /  AST  31  /  ALT  7<L>  /  AlkPhos  49  06-17    Creatinine Trend: 2.60<--, 2.50<--, 0.91<--, 0.94<--, 1.05<--, 1.03<--  PT/INR - ( 2022 12:23 )   PT: 20.4 sec;   INR: 1.73 ratio         PTT - ( 2022 12:23 )  PTT:43.1 sec  Urinalysis Basic - ( 2022 16:54 )    Color: Yellow / Appearance: Turbid / S.015 / pH: x  Gluc: x / Ketone: Trace  / Bili: Small / Urobili: 1   Blood: x / Protein: 100 / Nitrite: Negative   Leuk Esterase: Moderate / RBC: 25-50 /HPF / WBC 26-50   Sq Epi: x / Non Sq Epi: Few / Bacteria: TNTC      Arterial Blood Gas:   @ 17:06  7.24/54/134/23/100.0/-4.3  ABG lactate: --  Arterial Blood Gas:   @ 13:14  7.34/43/52/23/82.8/-2.6  ABG lactate: --        MICROBIOLOGY:     RADIOLOGY:  [ ] Reviewed and interpreted by me    ASSESSMENT AND RECOMMENDATION: Please call pt's daughter and schedule pt for pre op appointment. Pre-op appointment needs to be at least two weeks prior to appointment.    Interval Events:  76 year old male with history of prostate cancer, SCC of tongue s/p partial resection now with recurrence, parkinson's disease, hypertension, hyperlipidemia, recent CVA, and oropharyngeal dysphagia here with unresponsiveness and hypotension requiring intubation.     REVIEW OF SYSTEMS:  [ ] All other systems negative  [x] Unable to assess ROS because ________    OBJECTIVE:  ICU Vital Signs Last 24 Hrs  T(C): 37.7 (2022 16:00), Max: 38.9 (2022 11:56)  T(F): 99.9 (2022 16:00), Max: 102 (2022 11:56)  HR: 79 (2022 17:30) (75 - 122)  BP: 95/53 (2022 16:00) (67/47 - 143/79)  BP(mean): 69 (2022 16:00) (53 - 84)  ABP: 115/54 (2022 17:30) (97/41 - 115/54)  ABP(mean): 75 (2022 17:30) (61 - 75)  RR: 23 (2022 17:30) (14 - 29)  SpO2: 96% (2022 17:30) (79% - 100%)    Mode: AC/ CMV (Assist Control/ Continuous Mandatory Ventilation), RR (machine): 14, TV (machine): 400, FiO2: 100, PEEP: 10, ITime: 1, MAP: 15, PIP: 25     @ 07:01  -   @ 17:54  --------------------------------------------------------  IN: 2063.8 mL / OUT: 15 mL / NET: 2048.8 mL      CAPILLARY BLOOD GLUCOSE      POCT Blood Glucose.: 116 mg/dL (2022 11:46)      PHYSICAL EXAM:  General: Intubated, sedated. Chronically ill appearing. Cachectic.   Respiratory: Mechanical BS. No wheezing.  Cardiovascular: RRR, no edema  Abdomen: Soft, nondistended  Extremities: Warm, RUE PICC  Neurological: A&Ox0. PERRL      HOSPITAL MEDICATIONS:  heparin   Injectable 5000 Unit(s) SubCutaneous every 8 hours    cefepime   IVPB 1000 milliGRAM(s) IV Intermittent every 12 hours    norepinephrine Infusion 0.05 MICROgram(s)/kG/Min IV Continuous <Continuous>  tamsulosin 0.4 milliGRAM(s) Oral at bedtime    atorvastatin 80 milliGRAM(s) Oral at bedtime  hydrocortisone sodium succinate Injectable 50 milliGRAM(s) IV Push every 8 hours  vasopressin Infusion 0.04 Unit(s)/Min IV Continuous <Continuous>      carbidopa/levodopa  25/100 1 Tablet(s) Oral <User Schedule>  propofol Infusion 20 MICROgram(s)/kG/Min IV Continuous <Continuous>    pantoprazole  Injectable 40 milliGRAM(s) IV Push daily            chlorhexidine 0.12% Liquid 15 milliLiter(s) Oral Mucosa every 12 hours  chlorhexidine 2% Cloths 1 Application(s) Topical <User Schedule>        LABS:                        11.6   4.61  )-----------( 282      ( 2022 12:23 )             34.2     Hgb Trend: 11.6<--  06-    149<H>  |  115<H>  |  38<H>  ----------------------------<  146<H>  3.1<L>   |  26  |  2.60<H>    Ca    12.0<H>      2022 16:53  Phos  3.2       Mg     2.1         TPro  6.2  /  Alb  2.1<L>  /  TBili  0.7  /  DBili  x   /  AST  31  /  ALT  7<L>  /  AlkPhos  49  -    Creatinine Trend: 2.60<--, 2.50<--, 0.91<--, 0.94<--, 1.05<--, 1.03<--  PT/INR - ( 2022 12:23 )   PT: 20.4 sec;   INR: 1.73 ratio         PTT - ( 2022 12:23 )  PTT:43.1 sec  Urinalysis Basic - ( 2022 16:54 )    Color: Yellow / Appearance: Turbid / S.015 / pH: x  Gluc: x / Ketone: Trace  / Bili: Small / Urobili: 1   Blood: x / Protein: 100 / Nitrite: Negative   Leuk Esterase: Moderate / RBC: 25-50 /HPF / WBC 26-50   Sq Epi: x / Non Sq Epi: Few / Bacteria: TNTC      Arterial Blood Gas:   @ 17:06  7.24/54/134/23/100.0/-4.3  ABG lactate: --  Arterial Blood Gas:   @ 13:14  7.34/43/52/23/82.8/-2.6  ABG lactate: --        MICROBIOLOGY:     RADIOLOGY:  [ ] Reviewed and interpreted by me  < from: CT Chest No Cont (22 @ 14:15) >    IMPRESSION:    Technically limited study.    Bibasilar airspace consolidation which may reflect atelectasis and/or   pneumonia.  Bilateral groundglass centrilobular nodular opacities which may reflect   infectious and/or inflammatory airway disease.    Colonic fecal retention without bowel obstruction.    Other chronic findings as above.    --- End of Report ---    < end of copied text >

## 2024-09-19 NOTE — PATIENT PROFILE ADULT - FUNCTIONAL ASSESSMENT - DAILY ACTIVITY 3.
[facility].  Per ER outpatient lab work showed sodium of 126  Sodium 123 on admission.  It appears that patient does have some degree of chronic hyponatremia as he reports having to be hospitalized in the past for it.  Patient states intermittently his sodium level has been noted to be low on outpatient lab work and has required salt tablets  Patient reports drinking 1 gallon of water by 10 AM and then more throughout the day.  Hyponatremia likely related to increased fluid intake and hydrochlorothiazide use  On repeat lab work sodium level went up to 129 and 132 today.  D5W 500 mL bolus given x 1 and then started on D5W at 100 mL/h per nephrology recommendation.  Continue patient on 1500 mL fluid restriction and get repeat BMP in 4 hours.  Further management based on Na on repeat lab work  urine osm 173/serum osm 272, urine sodium 36, TSH within normal limits, uric acid 5  Nephrology input appreciated   3 = A little assistance

## 2024-10-03 NOTE — ED ADULT TRIAGE NOTE - TEMPERATURE IN FAHRENHEIT (DEGREES F)
Total/partial knee Replacement Discharge Instructions    [phone] [facility] Line for issues or concerns      General Care:  After surgery you may feel tired/sleepy. This is normal. If you have any question along the way please contact the office. If you feel it is an issue cannot wait for normal office hours, contact the on-call physician. You should not drive or operate machines/equipment until released by your physician to do so.     Bandages:   It s normal to have some blood-tinged fluid or shadowing on your bandages, this may occur for a few days after being sent home from the hospital. For the knee incision bandage, leave the dressing placed in the hospital until follow-up.  This is a water resistant dressing so you may shower with it on.We only recommend changing the dressing if the shadowing is greater than 60% of the bandage, you can call us if that were to occur.    For the drain site bandage, do daily dressing changes with gauze and tape or a Band-Aid until 2 days with no drainage then discontinue any bandage. Keep the area clean and dry.       Bathing:  Do not submerge your incision in water such as a bath or pool. It is ok to shower when you get home over the aquacell water resistant bandage.  You may find in comfortable to get a shower chair for the first month while you continue to heal and get stronger.     Follow up:  Your follow up appointment should already be scheduled. If it s not, please call the office to verify an appointment 10-14 days after surgery.    Diet:  You may not have a full appetite at discharge this should get better. Progress to bland foods such as crackers and bread and finally to your normal diet if you have no problems.  Avoid alcohol when taking narcotic pain medications.      Pain control:  Take your pain medications as prescribed. These medications may make you sleepy. Do not drive, operate equipment, or drink alcohol when taking these.  You may take Tylenol  (Generic name is acetaminophen) as directed on the bottle for additional relief or in place of the prescribed pain medications as your pain gets better.  If the medications cause a reaction such as nausea or skin rash, stop taking them and contact your doctor. Please plan accordingly, pain medications will not be re-filled on the weekends or at night. Call the office during the day if you need more medications.    Blood thinner:  It is very important to take the Aspirin 81 mg twice a day to help prevent blood clots. No medication is perfect, so if you notice a sudden onset of pain/swelling in your calf area call your doctor. If you notice a sudden onset of troubles breathing and/or chest pain call 911.     Icing:  It is common for some swelling, aching and stiffness to occur for awhile after a knee replacement. Apply ice for 20 minutes at a time. For the first 1-2 weeks apply ice 2-3 x a day or more after therapy/exercises.    Walker/crutches:  Use a walker/crutches when you go home. You will transition to the use of a cane and finally to no additional support.     Physical Therapy:  The success of your knee replacement is based on doing physical therapy. You will have some pain and discomfort along the way. If you feel your pain is limiting your progress make sure to take some pain medication prior to your therapy session. If your pain medications are not working talk to your surgeon.   For the first 1-2 weeks after going home you will have in-home physical therapy. The goal is to work on walking and feeling steady.  Usually after your first post-operative follow up you will move to out-patient physical therapy.   If you are going to transitional care, they will work on physical therapy there then you will have home physical therapy when discharged to home, then outpatient physical therapy after a few weeks as explained above.     Activity:  Unless otherwise instructed, you can weight bear as tolerated with a  walker/cane.     Normal findings after surgery:  Numbness and tenderness around the incisions.   You may have bruising around the incisions and down the thigh.   Low grade fevers less than 100.5 degrees Fahrenheit are normal.   You will have some increased pain after your therapy sessions.     When to call the Office:  Temperature greater than 101.5 degrees Fahreheit.  Fever, chills, and increasing pain in the hip.  Excessive drainage from the incisions that include bright red blood.  Drainage from the incisions sites that appear yellow, pus-like, or foul smelling.  Increasing pain the hip not relieved by the prescribed pain medications or ice.  Persistent nausea or vomiting not helped by the Zofran.  Increased pain or swelling in your calf area (in back above your ankle) that wasn t there when in the hospital.  Any other effects you feel are significant.  Call 911 if you experience any chest pain and/or shortness or breath.    98.8

## 2024-12-02 NOTE — H&P ADULT - NSICDXFAMILYHX_GEN_ALL_CORE_FT
\"Have you been to the ER, urgent care clinic since your last visit?  Hospitalized since your last visit?\"    NO    “Have you seen or consulted any other health care providers outside our system since your last visit?”    NO     “Have you had a pap smear?”    NO    Date of last Cervical Cancer screen (HPV or PAP): 7/19/2019             FAMILY HISTORY:  FH: heart disease, father, brothers  FH: type 2 diabetes, mother

## 2025-01-17 NOTE — DIETITIAN INITIAL EVALUATION ADULT - NSICDXPASTSURGICALHX_GEN_ALL_CORE_FT
PAST SURGICAL HISTORY:  History of vasectomy     Prostate cancer s/p cyber knife    S/P partial glossectomy 4/21     HTN (hypertension)
